# Patient Record
Sex: MALE | Race: WHITE | NOT HISPANIC OR LATINO | Employment: OTHER | URBAN - METROPOLITAN AREA
[De-identification: names, ages, dates, MRNs, and addresses within clinical notes are randomized per-mention and may not be internally consistent; named-entity substitution may affect disease eponyms.]

---

## 2019-02-19 ENCOUNTER — HOSPITAL ENCOUNTER (OUTPATIENT)
Dept: OTHER | Facility: HOSPITAL | Age: 69
Discharge: HOME | End: 2019-02-19
Attending: PHYSICIAN ASSISTANT

## 2019-05-10 ENCOUNTER — HOSPITAL ENCOUNTER (OUTPATIENT)
Dept: LAB | Facility: HOSPITAL | Age: 69
Discharge: HOME | End: 2019-05-10
Attending: INTERNAL MEDICINE

## 2019-05-10 LAB
ALBUMIN SERPL-MCNC: 4.1 G/DL (ref 3.5–5)
ALBUMIN/GLOB SERPL: 1.4 {RATIO} (ref 1.4–2.6)
ALP SERPL-CCNC: 79 U/L (ref 56–155)
ALT SERPL-CCNC: 19 U/L (ref 10–40)
ANION GAP SERPL CALC-SCNC: 17 MMOL/L (ref 8–19)
AST SERPL-CCNC: 28 U/L (ref 15–50)
BASOPHILS # BLD AUTO: 0.04 10*3/UL (ref 0–0.2)
BASOPHILS NFR BLD AUTO: 0.6 % (ref 0–3)
BILIRUB SERPL-MCNC: 0.16 MG/DL (ref 0.2–1.3)
BUN SERPL-MCNC: 4 MG/DL (ref 5–25)
BUN/CREAT SERPL: 5 {RATIO} (ref 6–20)
CALCIUM SERPL-MCNC: 8.7 MG/DL (ref 8.7–10.4)
CHLORIDE SERPL-SCNC: 89 MMOL/L (ref 99–111)
CONV ABS IMM GRAN: 0.07 10*3/UL (ref 0–0.2)
CONV CO2: 24 MMOL/L (ref 22–32)
CONV IMMATURE GRAN: 1.1 % (ref 0–1.8)
CONV TOTAL PROTEIN: 7.1 G/DL (ref 6.3–8.2)
CREAT UR-MCNC: 0.79 MG/DL (ref 0.7–1.2)
DEPRECATED RDW RBC AUTO: 47.7 FL (ref 35.1–43.9)
EOSINOPHIL # BLD AUTO: 0.48 10*3/UL (ref 0–0.7)
EOSINOPHIL # BLD AUTO: 7.5 % (ref 0–7)
ERYTHROCYTE [DISTWIDTH] IN BLOOD BY AUTOMATED COUNT: 13.5 % (ref 11.6–14.4)
EST. AVERAGE GLUCOSE BLD GHB EST-MCNC: 120 MG/DL
GFR SERPLBLD BASED ON 1.73 SQ M-ARVRAT: >60 ML/MIN/{1.73_M2}
GLOBULIN UR ELPH-MCNC: 3 G/DL (ref 2–3.5)
GLUCOSE SERPL-MCNC: 92 MG/DL (ref 70–99)
HBA1C MFR BLD: 12.1 G/DL (ref 14–18)
HBA1C MFR BLD: 5.8 % (ref 3.5–5.7)
HCT VFR BLD AUTO: 36.6 % (ref 42–52)
LYMPHOCYTES # BLD AUTO: 1.81 10*3/UL (ref 1–5)
MCH RBC QN AUTO: 31.6 PG (ref 27–31)
MCHC RBC AUTO-ENTMCNC: 33.1 G/DL (ref 33–37)
MCV RBC AUTO: 95.6 FL (ref 80–96)
MONOCYTES # BLD AUTO: 0.51 10*3/UL (ref 0.2–1.2)
MONOCYTES NFR BLD AUTO: 8 % (ref 3–10)
NEUTROPHILS # BLD AUTO: 3.5 10*3/UL (ref 2–8)
NEUTROPHILS NFR BLD AUTO: 54.6 % (ref 30–85)
NRBC CBCN: 0 % (ref 0–0.7)
OSMOLALITY SERPL CALC.SUM OF ELEC: 257 MOSM/KG (ref 273–304)
PLATELET # BLD AUTO: 349 10*3/UL (ref 130–400)
PMV BLD AUTO: 9.7 FL (ref 9.4–12.4)
POTASSIUM SERPL-SCNC: 4.8 MMOL/L (ref 3.5–5.3)
RBC # BLD AUTO: 3.83 10*6/UL (ref 4.7–6.1)
SODIUM SERPL-SCNC: 125 MMOL/L (ref 135–147)
VARIANT LYMPHS NFR BLD MANUAL: 28.2 % (ref 20–45)
WBC # BLD AUTO: 6.41 10*3/UL (ref 4.8–10.8)

## 2019-05-16 ENCOUNTER — HOSPITAL ENCOUNTER (OUTPATIENT)
Dept: LAB | Facility: HOSPITAL | Age: 69
Discharge: HOME | End: 2019-05-16
Attending: INTERNAL MEDICINE

## 2019-05-16 LAB
ANION GAP SERPL CALC-SCNC: 16 MMOL/L (ref 8–19)
BUN SERPL-MCNC: 5 MG/DL (ref 5–25)
BUN/CREAT SERPL: 6 {RATIO} (ref 6–20)
CALCIUM SERPL-MCNC: 8.7 MG/DL (ref 8.7–10.4)
CHLORIDE SERPL-SCNC: 90 MMOL/L (ref 99–111)
CONV CO2: 25 MMOL/L (ref 22–32)
CORTIS SERPL-MCNC: 13.5 UG/DL (ref 4.3–22.4)
CREAT UR-MCNC: 0.77 MG/DL (ref 0.7–1.2)
GFR SERPLBLD BASED ON 1.73 SQ M-ARVRAT: >60 ML/MIN/{1.73_M2}
GLUCOSE SERPL-MCNC: 109 MG/DL (ref 70–99)
OSMOLALITY SERPL CALC.SUM OF ELEC: 260 MOSM/KG (ref 273–304)
POTASSIUM SERPL-SCNC: 4.8 MMOL/L (ref 3.5–5.3)
SODIUM SERPL-SCNC: 126 MMOL/L (ref 135–147)
T4 FREE SERPL-MCNC: 1.1 NG/DL (ref 0.9–1.8)
TSH SERPL-ACNC: 2.48 M[IU]/L (ref 0.27–4.2)

## 2019-05-20 ENCOUNTER — HOSPITAL ENCOUNTER (OUTPATIENT)
Dept: LAB | Facility: HOSPITAL | Age: 69
Discharge: HOME | End: 2019-05-20
Attending: INTERNAL MEDICINE

## 2019-05-20 LAB
SODIUM 24H UR-SRATE: 105 MMOL/(24.H) (ref 40–220)
SODIUM 24H UR-SRATE: 41 MMOL/L
SPECIMEN VOL 24H UR: 2550 ML

## 2019-05-23 ENCOUNTER — HOSPITAL ENCOUNTER (OUTPATIENT)
Dept: OTHER | Facility: HOSPITAL | Age: 69
Discharge: HOME | End: 2019-05-23
Attending: INTERNAL MEDICINE

## 2019-07-18 ENCOUNTER — HOSPITAL ENCOUNTER (OUTPATIENT)
Dept: LAB | Facility: HOSPITAL | Age: 69
Discharge: HOME | End: 2019-07-18
Attending: INTERNAL MEDICINE

## 2019-07-18 LAB
ANION GAP SERPL CALC-SCNC: 20 MMOL/L (ref 8–19)
BASOPHILS # BLD AUTO: 0.05 10*3/UL (ref 0–0.2)
BASOPHILS NFR BLD AUTO: 0.7 % (ref 0–3)
BUN SERPL-MCNC: 8 MG/DL (ref 5–25)
BUN/CREAT SERPL: 10 {RATIO} (ref 6–20)
CALCIUM SERPL-MCNC: 8.8 MG/DL (ref 8.7–10.4)
CHLORIDE SERPL-SCNC: 94 MMOL/L (ref 99–111)
CONV ABS IMM GRAN: 0.07 10*3/UL (ref 0–0.2)
CONV CO2: 23 MMOL/L (ref 22–32)
CONV IMMATURE GRAN: 0.9 % (ref 0–1.8)
CREAT UR-MCNC: 0.84 MG/DL (ref 0.7–1.2)
DEPRECATED RDW RBC AUTO: 47.3 FL (ref 35.1–43.9)
EOSINOPHIL # BLD AUTO: 0.49 10*3/UL (ref 0–0.7)
EOSINOPHIL # BLD AUTO: 6.5 % (ref 0–7)
ERYTHROCYTE [DISTWIDTH] IN BLOOD BY AUTOMATED COUNT: 13.2 % (ref 11.6–14.4)
FOLATE SERPL-MCNC: 6.3 NG/ML (ref 4.8–20)
GFR SERPLBLD BASED ON 1.73 SQ M-ARVRAT: >60 ML/MIN/{1.73_M2}
GLUCOSE SERPL-MCNC: 87 MG/DL (ref 70–99)
HBA1C MFR BLD: 11.1 G/DL (ref 14–18)
HCT VFR BLD AUTO: 34.5 % (ref 42–52)
IRON SERPL-MCNC: 56 UG/DL (ref 70–180)
LYMPHOCYTES # BLD AUTO: 1.39 10*3/UL (ref 1–5)
MCH RBC QN AUTO: 31 PG (ref 27–31)
MCHC RBC AUTO-ENTMCNC: 32.2 G/DL (ref 33–37)
MCV RBC AUTO: 96.4 FL (ref 80–96)
MONOCYTES # BLD AUTO: 0.64 10*3/UL (ref 0.2–1.2)
MONOCYTES NFR BLD AUTO: 8.5 % (ref 3–10)
NEUTROPHILS # BLD AUTO: 4.86 10*3/UL (ref 2–8)
NEUTROPHILS NFR BLD AUTO: 64.9 % (ref 30–85)
NRBC CBCN: 0 % (ref 0–0.7)
OSMOLALITY SERPL CALC.SUM OF ELEC: 272 MOSM/KG (ref 273–304)
PLATELET # BLD AUTO: 313 10*3/UL (ref 130–400)
PMV BLD AUTO: 9.7 FL (ref 9.4–12.4)
POTASSIUM SERPL-SCNC: 5.2 MMOL/L (ref 3.5–5.3)
RBC # BLD AUTO: 3.58 10*6/UL (ref 4.7–6.1)
SODIUM SERPL-SCNC: 132 MMOL/L (ref 135–147)
VARIANT LYMPHS NFR BLD MANUAL: 18.5 % (ref 20–45)
VIT B12 SERPL-MCNC: 277 PG/ML (ref 211–911)
WBC # BLD AUTO: 7.5 10*3/UL (ref 4.8–10.8)

## 2019-07-30 ENCOUNTER — OFFICE VISIT CONVERTED (OUTPATIENT)
Dept: GASTROENTEROLOGY | Facility: CLINIC | Age: 69
End: 2019-07-30
Attending: PHYSICIAN ASSISTANT

## 2019-08-20 ENCOUNTER — HOSPITAL ENCOUNTER (OUTPATIENT)
Dept: LAB | Facility: HOSPITAL | Age: 69
Discharge: HOME | End: 2019-08-20
Attending: INTERNAL MEDICINE

## 2019-08-20 LAB
ALBUMIN SERPL-MCNC: 3.7 G/DL (ref 3.5–5)
ALBUMIN/GLOB SERPL: 1.1 {RATIO} (ref 1.4–2.6)
ALP SERPL-CCNC: 129 U/L (ref 56–155)
ALT SERPL-CCNC: 52 U/L (ref 10–40)
ANION GAP SERPL CALC-SCNC: 16 MMOL/L (ref 8–19)
AST SERPL-CCNC: 33 U/L (ref 15–50)
BASOPHILS # BLD AUTO: 0.06 10*3/UL (ref 0–0.2)
BASOPHILS NFR BLD AUTO: 0.9 % (ref 0–3)
BILIRUB SERPL-MCNC: 0.21 MG/DL (ref 0.2–1.3)
BNP SERPL-MCNC: 4499 PG/ML (ref 0–900)
BUN SERPL-MCNC: 9 MG/DL (ref 5–25)
BUN/CREAT SERPL: 10 {RATIO} (ref 6–20)
CALCIUM SERPL-MCNC: 9.1 MG/DL (ref 8.7–10.4)
CHLORIDE SERPL-SCNC: 98 MMOL/L (ref 99–111)
CONV ABS IMM GRAN: 0.13 10*3/UL (ref 0–0.2)
CONV CO2: 24 MMOL/L (ref 22–32)
CONV IMMATURE GRAN: 1.9 % (ref 0–1.8)
CONV TOTAL PROTEIN: 7 G/DL (ref 6.3–8.2)
CREAT UR-MCNC: 0.91 MG/DL (ref 0.7–1.2)
DEPRECATED RDW RBC AUTO: 50.7 FL (ref 35.1–43.9)
EOSINOPHIL # BLD AUTO: 0.96 10*3/UL (ref 0–0.7)
EOSINOPHIL # BLD AUTO: 14.3 % (ref 0–7)
ERYTHROCYTE [DISTWIDTH] IN BLOOD BY AUTOMATED COUNT: 13.9 % (ref 11.6–14.4)
GFR SERPLBLD BASED ON 1.73 SQ M-ARVRAT: >60 ML/MIN/{1.73_M2}
GLOBULIN UR ELPH-MCNC: 3.3 G/DL (ref 2–3.5)
GLUCOSE SERPL-MCNC: 117 MG/DL (ref 70–99)
HCT VFR BLD AUTO: 37.3 % (ref 42–52)
HGB BLD-MCNC: 11.5 G/DL (ref 14–18)
LYMPHOCYTES # BLD AUTO: 1.19 10*3/UL (ref 1–5)
LYMPHOCYTES NFR BLD AUTO: 17.8 % (ref 20–45)
MCH RBC QN AUTO: 30.5 PG (ref 27–31)
MCHC RBC AUTO-ENTMCNC: 30.8 G/DL (ref 33–37)
MCV RBC AUTO: 98.9 FL (ref 80–96)
MONOCYTES # BLD AUTO: 0.48 10*3/UL (ref 0.2–1.2)
MONOCYTES NFR BLD AUTO: 7.2 % (ref 3–10)
NEUTROPHILS # BLD AUTO: 3.87 10*3/UL (ref 2–8)
NEUTROPHILS NFR BLD AUTO: 57.9 % (ref 30–85)
NRBC CBCN: 0 % (ref 0–0.7)
OSMOLALITY SERPL CALC.SUM OF ELEC: 278 MOSM/KG (ref 273–304)
PLATELET # BLD AUTO: 347 10*3/UL (ref 130–400)
PMV BLD AUTO: 9.8 FL (ref 9.4–12.4)
POTASSIUM SERPL-SCNC: 4.2 MMOL/L (ref 3.5–5.3)
RBC # BLD AUTO: 3.77 10*6/UL (ref 4.7–6.1)
SODIUM SERPL-SCNC: 134 MMOL/L (ref 135–147)
WBC # BLD AUTO: 6.69 10*3/UL (ref 4.8–10.8)

## 2019-08-27 ENCOUNTER — HOSPITAL ENCOUNTER (OUTPATIENT)
Dept: OTHER | Facility: HOSPITAL | Age: 69
Discharge: HOME | End: 2019-08-27
Attending: INTERNAL MEDICINE

## 2019-08-27 LAB
ANION GAP SERPL CALC-SCNC: 20 MMOL/L (ref 8–19)
BASOPHILS # BLD AUTO: 0.05 10*3/UL (ref 0–0.2)
BASOPHILS NFR BLD AUTO: 0.8 % (ref 0–3)
BUN SERPL-MCNC: 9 MG/DL (ref 5–25)
BUN/CREAT SERPL: 10 {RATIO} (ref 6–20)
CALCIUM SERPL-MCNC: 8.7 MG/DL (ref 8.7–10.4)
CHLORIDE SERPL-SCNC: 89 MMOL/L (ref 99–111)
CONV ABS IMM GRAN: 0.05 10*3/UL (ref 0–0.2)
CONV CO2: 23 MMOL/L (ref 22–32)
CONV IMMATURE GRAN: 0.8 % (ref 0–1.8)
CREAT UR-MCNC: 0.9 MG/DL (ref 0.7–1.2)
DEPRECATED RDW RBC AUTO: 48.1 FL (ref 35.1–43.9)
EOSINOPHIL # BLD AUTO: 0.96 10*3/UL (ref 0–0.7)
EOSINOPHIL # BLD AUTO: 14.5 % (ref 0–7)
ERYTHROCYTE [DISTWIDTH] IN BLOOD BY AUTOMATED COUNT: 13.6 % (ref 11.6–14.4)
GFR SERPLBLD BASED ON 1.73 SQ M-ARVRAT: >60 ML/MIN/{1.73_M2}
GLUCOSE SERPL-MCNC: 73 MG/DL (ref 70–99)
HCT VFR BLD AUTO: 35.4 % (ref 42–52)
HGB BLD-MCNC: 11.1 G/DL (ref 14–18)
INR PPP: 1.01 (ref 2–3)
LYMPHOCYTES # BLD AUTO: 1.49 10*3/UL (ref 1–5)
LYMPHOCYTES NFR BLD AUTO: 22.5 % (ref 20–45)
MCH RBC QN AUTO: 30.1 PG (ref 27–31)
MCHC RBC AUTO-ENTMCNC: 31.4 G/DL (ref 33–37)
MCV RBC AUTO: 95.9 FL (ref 80–96)
MONOCYTES # BLD AUTO: 0.49 10*3/UL (ref 0.2–1.2)
MONOCYTES NFR BLD AUTO: 7.4 % (ref 3–10)
NEUTROPHILS # BLD AUTO: 3.57 10*3/UL (ref 2–8)
NEUTROPHILS NFR BLD AUTO: 54 % (ref 30–85)
NRBC CBCN: 0 % (ref 0–0.7)
OSMOLALITY SERPL CALC.SUM OF ELEC: 261 MOSM/KG (ref 273–304)
PLATELET # BLD AUTO: 346 10*3/UL (ref 130–400)
PMV BLD AUTO: 9.1 FL (ref 9.4–12.4)
POTASSIUM SERPL-SCNC: 4.7 MMOL/L (ref 3.5–5.3)
PROTHROMBIN TIME: 10.5 S (ref 9.4–12)
RBC # BLD AUTO: 3.69 10*6/UL (ref 4.7–6.1)
SODIUM SERPL-SCNC: 127 MMOL/L (ref 135–147)
WBC # BLD AUTO: 6.61 10*3/UL (ref 4.8–10.8)

## 2019-09-06 ENCOUNTER — HOSPITAL ENCOUNTER (OUTPATIENT)
Facility: HOSPITAL | Age: 69
Setting detail: SURGERY ADMIT
End: 2019-09-06
Attending: THORACIC SURGERY (CARDIOTHORACIC VASCULAR SURGERY) | Admitting: THORACIC SURGERY (CARDIOTHORACIC VASCULAR SURGERY)

## 2019-09-06 ENCOUNTER — HOSPITAL ENCOUNTER (INPATIENT)
Facility: HOSPITAL | Age: 69
LOS: 9 days | Discharge: HOME HEALTH | End: 2019-09-15
Attending: THORACIC SURGERY (CARDIOTHORACIC VASCULAR SURGERY) | Admitting: THORACIC SURGERY (CARDIOTHORACIC VASCULAR SURGERY)

## 2019-09-06 ENCOUNTER — PREP FOR SURGERY (OUTPATIENT)
Dept: OTHER | Facility: HOSPITAL | Age: 69
End: 2019-09-06

## 2019-09-06 DIAGNOSIS — Z95.1 S/P CABG (CORONARY ARTERY BYPASS GRAFT): ICD-10-CM

## 2019-09-06 DIAGNOSIS — I25.119 CORONARY ARTERY DISEASE INVOLVING NATIVE CORONARY ARTERY OF NATIVE HEART WITH ANGINA PECTORIS (HCC): Primary | ICD-10-CM

## 2019-09-06 LAB
BH CV XLRA MEAS - DIST GSV CALF DIST LEFT: 0.16 CM
BH CV XLRA MEAS - DIST GSV CALF DIST RIGHT: 0.14 CM
BH CV XLRA MEAS - DIST GSV THIGH DIST LEFT: 0.22 CM
BH CV XLRA MEAS - DIST GSV THIGH DIST RIGHT: 0.13 CM
BH CV XLRA MEAS - GSV ANKLE DIST LEFT: 0.12 CM
BH CV XLRA MEAS - GSV ANKLE DIST RIGHT: 0.1 CM
BH CV XLRA MEAS - GSV KNEE DIST LEFT: 0.15 CM
BH CV XLRA MEAS - GSV KNEE DIST RIGHT: 0.17 CM
BH CV XLRA MEAS - GSV ORIGIN DIST LEFT: 0.6 CM
BH CV XLRA MEAS - GSV ORIGIN DIST RIGHT: 0.36 CM
BH CV XLRA MEAS - MID GSV CALF LEFT: 0.15 CM
BH CV XLRA MEAS - MID GSV CALF RIGHT: 0.2 CM
BH CV XLRA MEAS - MID GSV THIGH  LEFT: 0.24 CM
BH CV XLRA MEAS - MID GSV THIGH  RIGHT: 0.17 CM
BH CV XLRA MEAS - MID LSV CALF DIST LEFT: 0.07 CM
BH CV XLRA MEAS - MID LSV CALF DIST RIGHT: 0.09 CM
BH CV XLRA MEAS - PROX GSV CALF DIST LEFT: 0.13 CM
BH CV XLRA MEAS - PROX GSV CALF DIST RIGHT: 0.17 CM
BH CV XLRA MEAS - PROX GSV THIGH  LEFT: 0.27 CM
BH CV XLRA MEAS - PROX GSV THIGH  RIGHT: 0.31 CM
BH CV XLRA MEAS - PROX LSV CALF DIST LEFT: 0.12 CM
BH CV XLRA MEAS - PROX LSV CALF DIST RIGHT: 0.12 CM

## 2019-09-07 ENCOUNTER — APPOINTMENT (OUTPATIENT)
Dept: CT IMAGING | Facility: HOSPITAL | Age: 69
End: 2019-09-07

## 2019-09-07 ENCOUNTER — APPOINTMENT (OUTPATIENT)
Dept: CARDIOLOGY | Facility: HOSPITAL | Age: 69
End: 2019-09-07

## 2019-09-07 ENCOUNTER — APPOINTMENT (OUTPATIENT)
Dept: ULTRASOUND IMAGING | Facility: HOSPITAL | Age: 69
End: 2019-09-07

## 2019-09-07 ENCOUNTER — APPOINTMENT (OUTPATIENT)
Dept: GENERAL RADIOLOGY | Facility: HOSPITAL | Age: 69
End: 2019-09-07

## 2019-09-07 PROBLEM — J44.9 COPD (CHRONIC OBSTRUCTIVE PULMONARY DISEASE) WITH CHRONIC BRONCHITIS: Chronic | Status: ACTIVE | Noted: 2019-09-07

## 2019-09-07 PROBLEM — J44.89 COPD (CHRONIC OBSTRUCTIVE PULMONARY DISEASE) WITH CHRONIC BRONCHITIS: Chronic | Status: ACTIVE | Noted: 2019-09-07

## 2019-09-07 LAB
ALBUMIN SERPL-MCNC: 4.2 G/DL (ref 3.5–5.2)
ALBUMIN UR-MCNC: 13.7 MG/DL
ALBUMIN/GLOB SERPL: 1.2 G/DL
ALP SERPL-CCNC: 122 U/L (ref 39–117)
ALT SERPL W P-5'-P-CCNC: 70 U/L (ref 1–41)
ANION GAP SERPL CALCULATED.3IONS-SCNC: 12.9 MMOL/L (ref 5–15)
ANION GAP SERPL CALCULATED.3IONS-SCNC: 14.7 MMOL/L (ref 5–15)
APTT PPP: 29.7 SECONDS (ref 22.7–35.4)
APTT PPP: 30.6 SECONDS (ref 22.7–35.4)
APTT PPP: 37.9 SECONDS (ref 22.7–35.4)
ARTERIAL PATENCY WRIST A: POSITIVE
AST SERPL-CCNC: 74 U/L (ref 1–40)
ATMOSPHERIC PRESS: 752.3 MMHG
BACTERIA UR QL AUTO: ABNORMAL /HPF
BASE EXCESS BLDA CALC-SCNC: 2.5 MMOL/L (ref 0–2)
BASOPHILS # BLD AUTO: 0.05 10*3/MM3 (ref 0–0.2)
BASOPHILS NFR BLD AUTO: 0.6 % (ref 0–1.5)
BDY SITE: ABNORMAL
BH CV XLRA MEAS LEFT DIST CCA EDV: 17.1 CM/SEC
BH CV XLRA MEAS LEFT DIST CCA PSV: 48.1 CM/SEC
BH CV XLRA MEAS LEFT DIST ICA EDV: 17 CM/SEC
BH CV XLRA MEAS LEFT DIST ICA PSV: 48 CM/SEC
BH CV XLRA MEAS LEFT ICA/CCA RATIO: 1
BH CV XLRA MEAS LEFT MID ICA EDV: -23.2 CM/SEC
BH CV XLRA MEAS LEFT MID ICA PSV: -49.9 CM/SEC
BH CV XLRA MEAS LEFT PROX CCA EDV: 11.7 CM/SEC
BH CV XLRA MEAS LEFT PROX CCA PSV: 56.5 CM/SEC
BH CV XLRA MEAS LEFT PROX ECA EDV: -18.8 CM/SEC
BH CV XLRA MEAS LEFT PROX ECA PSV: -80.9 CM/SEC
BH CV XLRA MEAS LEFT PROX ICA EDV: -21 CM/SEC
BH CV XLRA MEAS LEFT PROX ICA PSV: -43.3 CM/SEC
BH CV XLRA MEAS LEFT PROX SCLA PSV: 57.6 CM/SEC
BH CV XLRA MEAS LEFT VERTEBRAL A EDV: 14.1 CM/SEC
BH CV XLRA MEAS LEFT VERTEBRAL A PSV: 33.6 CM/SEC
BH CV XLRA MEAS RIGHT DIST CCA EDV: 12.1 CM/SEC
BH CV XLRA MEAS RIGHT DIST CCA PSV: 36.9 CM/SEC
BH CV XLRA MEAS RIGHT DIST ICA EDV: -17.1 CM/SEC
BH CV XLRA MEAS RIGHT DIST ICA PSV: -42.9 CM/SEC
BH CV XLRA MEAS RIGHT ICA/CCA RATIO: 1.7
BH CV XLRA MEAS RIGHT MID ICA EDV: -25.5 CM/SEC
BH CV XLRA MEAS RIGHT MID ICA PSV: -62.5 CM/SEC
BH CV XLRA MEAS RIGHT PROX CCA EDV: 14.5 CM/SEC
BH CV XLRA MEAS RIGHT PROX CCA PSV: 58.5 CM/SEC
BH CV XLRA MEAS RIGHT PROX ECA EDV: -13.8 CM/SEC
BH CV XLRA MEAS RIGHT PROX ECA PSV: -62.9 CM/SEC
BH CV XLRA MEAS RIGHT PROX ICA EDV: 11.7 CM/SEC
BH CV XLRA MEAS RIGHT PROX ICA PSV: 33.8 CM/SEC
BH CV XLRA MEAS RIGHT PROX SCLA PSV: 21.6 CM/SEC
BH CV XLRA MEAS RIGHT VERTEBRAL A EDV: 9.8 CM/SEC
BH CV XLRA MEAS RIGHT VERTEBRAL A PSV: 30.4 CM/SEC
BILIRUB SERPL-MCNC: 0.4 MG/DL (ref 0.2–1.2)
BILIRUB UR QL STRIP: NEGATIVE
BUN BLD-MCNC: 29 MG/DL (ref 8–23)
BUN BLD-MCNC: 38 MG/DL (ref 8–23)
BUN/CREAT SERPL: 19.2 (ref 7–25)
BUN/CREAT SERPL: 22.5 (ref 7–25)
CALCIUM SPEC-SCNC: 8.9 MG/DL (ref 8.6–10.5)
CALCIUM SPEC-SCNC: 9.4 MG/DL (ref 8.6–10.5)
CHLORIDE SERPL-SCNC: 87 MMOL/L (ref 98–107)
CHLORIDE SERPL-SCNC: 88 MMOL/L (ref 98–107)
CHOLEST SERPL-MCNC: 174 MG/DL (ref 0–200)
CLARITY UR: CLEAR
CLOSE TME COLL+ADP + EPINEP PNL BLD: 86 %
CO2 SERPL-SCNC: 27.1 MMOL/L (ref 22–29)
CO2 SERPL-SCNC: 28.3 MMOL/L (ref 22–29)
COLOR UR: YELLOW
CREAT BLD-MCNC: 1.51 MG/DL (ref 0.76–1.27)
CREAT BLD-MCNC: 1.69 MG/DL (ref 0.76–1.27)
CREAT UR-MCNC: 107.7 MG/DL
DEPRECATED RDW RBC AUTO: 47.2 FL (ref 37–54)
EOSINOPHIL # BLD AUTO: 0.86 10*3/MM3 (ref 0–0.4)
EOSINOPHIL NFR BLD AUTO: 10.1 % (ref 0.3–6.2)
ERYTHROCYTE [DISTWIDTH] IN BLOOD BY AUTOMATED COUNT: 13.4 % (ref 12.3–15.4)
GFR SERPL CREATININE-BSD FRML MDRD: 40 ML/MIN/1.73
GFR SERPL CREATININE-BSD FRML MDRD: 46 ML/MIN/1.73
GLOBULIN UR ELPH-MCNC: 3.4 GM/DL
GLUCOSE BLD-MCNC: 177 MG/DL (ref 65–99)
GLUCOSE BLD-MCNC: 98 MG/DL (ref 65–99)
GLUCOSE BLDC GLUCOMTR-MCNC: 115 MG/DL (ref 70–130)
GLUCOSE BLDC GLUCOMTR-MCNC: 120 MG/DL (ref 70–130)
GLUCOSE UR STRIP-MCNC: NEGATIVE MG/DL
HBA1C MFR BLD: 6.1 % (ref 4.8–5.6)
HCO3 BLDA-SCNC: 26.4 MMOL/L (ref 22–28)
HCT VFR BLD AUTO: 37.2 % (ref 37.5–51)
HDLC SERPL-MCNC: 90 MG/DL (ref 40–60)
HGB BLD-MCNC: 12.1 G/DL (ref 13–17.7)
HGB UR QL STRIP.AUTO: NEGATIVE
HYALINE CASTS UR QL AUTO: ABNORMAL /LPF
IMM GRANULOCYTES # BLD AUTO: 0.07 10*3/MM3 (ref 0–0.05)
IMM GRANULOCYTES NFR BLD AUTO: 0.8 % (ref 0–0.5)
INR PPP: 0.99 (ref 0.9–1.1)
KETONES UR QL STRIP: NEGATIVE
LDLC SERPL CALC-MCNC: 66 MG/DL (ref 0–100)
LDLC/HDLC SERPL: 0.73 {RATIO}
LEUKOCYTE ESTERASE UR QL STRIP.AUTO: ABNORMAL
LYMPHOCYTES # BLD AUTO: 1.68 10*3/MM3 (ref 0.7–3.1)
LYMPHOCYTES NFR BLD AUTO: 19.8 % (ref 19.6–45.3)
MAGNESIUM SERPL-MCNC: 2.1 MG/DL (ref 1.6–2.4)
MCH RBC QN AUTO: 30.7 PG (ref 26.6–33)
MCHC RBC AUTO-ENTMCNC: 32.5 G/DL (ref 31.5–35.7)
MCV RBC AUTO: 94.4 FL (ref 79–97)
MICROALBUMIN/CREAT UR: 127.2 MG/G
MODALITY: ABNORMAL
MONOCYTES # BLD AUTO: 0.8 10*3/MM3 (ref 0.1–0.9)
MONOCYTES NFR BLD AUTO: 9.4 % (ref 5–12)
NEUTROPHILS # BLD AUTO: 5.04 10*3/MM3 (ref 1.7–7)
NEUTROPHILS NFR BLD AUTO: 59.3 % (ref 42.7–76)
NITRITE UR QL STRIP: NEGATIVE
NRBC BLD AUTO-RTO: 0 /100 WBC (ref 0–0.2)
NT-PROBNP SERPL-MCNC: ABNORMAL PG/ML (ref 5–900)
PCO2 BLDA: 38 MM HG (ref 35–45)
PH BLDA: 7.45 PH UNITS (ref 7.35–7.45)
PH UR STRIP.AUTO: 5.5 [PH] (ref 5–8)
PLATELET # BLD AUTO: 289 10*3/MM3 (ref 140–450)
PMV BLD AUTO: 9.8 FL (ref 6–12)
PO2 BLDA: 64.7 MM HG (ref 80–100)
POTASSIUM BLD-SCNC: 4.1 MMOL/L (ref 3.5–5.2)
POTASSIUM BLD-SCNC: 4.1 MMOL/L (ref 3.5–5.2)
PROT SERPL-MCNC: 7.6 G/DL (ref 6–8.5)
PROT UR QL STRIP: ABNORMAL
PROTHROMBIN TIME: 12.8 SECONDS (ref 11.7–14.2)
RBC # BLD AUTO: 3.94 10*6/MM3 (ref 4.14–5.8)
RBC # UR: ABNORMAL /HPF
REF LAB TEST METHOD: ABNORMAL
RIGHT ARM BP: NORMAL MMHG
SAO2 % BLDCOA: 93.3 % (ref 92–99)
SODIUM BLD-SCNC: 128 MMOL/L (ref 136–145)
SODIUM BLD-SCNC: 130 MMOL/L (ref 136–145)
SP GR UR STRIP: 1.02 (ref 1–1.03)
SQUAMOUS #/AREA URNS HPF: ABNORMAL /HPF
T4 FREE SERPL-MCNC: 1.24 NG/DL (ref 0.93–1.7)
TOTAL RATE: 16 BREATHS/MINUTE
TRIGL SERPL-MCNC: 92 MG/DL (ref 0–150)
TSH SERPL DL<=0.05 MIU/L-ACNC: 2.76 UIU/ML (ref 0.27–4.2)
UROBILINOGEN UR QL STRIP: ABNORMAL
VLDLC SERPL-MCNC: 18.4 MG/DL (ref 5–40)
WBC NRBC COR # BLD: 8.5 10*3/MM3 (ref 3.4–10.8)
WBC UR QL AUTO: ABNORMAL /HPF

## 2019-09-07 PROCEDURE — 36600 WITHDRAWAL OF ARTERIAL BLOOD: CPT

## 2019-09-07 PROCEDURE — 85025 COMPLETE CBC W/AUTO DIFF WBC: CPT | Performed by: THORACIC SURGERY (CARDIOTHORACIC VASCULAR SURGERY)

## 2019-09-07 PROCEDURE — 25010000002 METHYLPREDNISOLONE PER 40 MG: Performed by: INTERNAL MEDICINE

## 2019-09-07 PROCEDURE — 71250 CT THORAX DX C-: CPT

## 2019-09-07 PROCEDURE — 84443 ASSAY THYROID STIM HORMONE: CPT | Performed by: INTERNAL MEDICINE

## 2019-09-07 PROCEDURE — 99221 1ST HOSP IP/OBS SF/LOW 40: CPT | Performed by: INTERNAL MEDICINE

## 2019-09-07 PROCEDURE — S0260 H&P FOR SURGERY: HCPCS | Performed by: THORACIC SURGERY (CARDIOTHORACIC VASCULAR SURGERY)

## 2019-09-07 PROCEDURE — 84439 ASSAY OF FREE THYROXINE: CPT | Performed by: INTERNAL MEDICINE

## 2019-09-07 PROCEDURE — 71046 X-RAY EXAM CHEST 2 VIEWS: CPT

## 2019-09-07 PROCEDURE — 94640 AIRWAY INHALATION TREATMENT: CPT

## 2019-09-07 PROCEDURE — 85576 BLOOD PLATELET AGGREGATION: CPT | Performed by: THORACIC SURGERY (CARDIOTHORACIC VASCULAR SURGERY)

## 2019-09-07 PROCEDURE — 25010000002 HEPARIN (PORCINE) PER 1000 UNITS: Performed by: THORACIC SURGERY (CARDIOTHORACIC VASCULAR SURGERY)

## 2019-09-07 PROCEDURE — 76775 US EXAM ABDO BACK WALL LIM: CPT

## 2019-09-07 PROCEDURE — 80053 COMPREHEN METABOLIC PANEL: CPT | Performed by: THORACIC SURGERY (CARDIOTHORACIC VASCULAR SURGERY)

## 2019-09-07 PROCEDURE — 82570 ASSAY OF URINE CREATININE: CPT | Performed by: INTERNAL MEDICINE

## 2019-09-07 PROCEDURE — 85730 THROMBOPLASTIN TIME PARTIAL: CPT | Performed by: THORACIC SURGERY (CARDIOTHORACIC VASCULAR SURGERY)

## 2019-09-07 PROCEDURE — 80061 LIPID PANEL: CPT | Performed by: THORACIC SURGERY (CARDIOTHORACIC VASCULAR SURGERY)

## 2019-09-07 PROCEDURE — 83036 HEMOGLOBIN GLYCOSYLATED A1C: CPT | Performed by: THORACIC SURGERY (CARDIOTHORACIC VASCULAR SURGERY)

## 2019-09-07 PROCEDURE — 93970 EXTREMITY STUDY: CPT

## 2019-09-07 PROCEDURE — 93306 TTE W/DOPPLER COMPLETE: CPT

## 2019-09-07 PROCEDURE — 82803 BLOOD GASES ANY COMBINATION: CPT

## 2019-09-07 PROCEDURE — 99222 1ST HOSP IP/OBS MODERATE 55: CPT | Performed by: INTERNAL MEDICINE

## 2019-09-07 PROCEDURE — 83735 ASSAY OF MAGNESIUM: CPT | Performed by: THORACIC SURGERY (CARDIOTHORACIC VASCULAR SURGERY)

## 2019-09-07 PROCEDURE — 93880 EXTRACRANIAL BILAT STUDY: CPT

## 2019-09-07 PROCEDURE — 82962 GLUCOSE BLOOD TEST: CPT

## 2019-09-07 PROCEDURE — 83880 ASSAY OF NATRIURETIC PEPTIDE: CPT | Performed by: THORACIC SURGERY (CARDIOTHORACIC VASCULAR SURGERY)

## 2019-09-07 PROCEDURE — 87070 CULTURE OTHR SPECIMN AEROBIC: CPT | Performed by: THORACIC SURGERY (CARDIOTHORACIC VASCULAR SURGERY)

## 2019-09-07 PROCEDURE — 85610 PROTHROMBIN TIME: CPT | Performed by: THORACIC SURGERY (CARDIOTHORACIC VASCULAR SURGERY)

## 2019-09-07 PROCEDURE — 82043 UR ALBUMIN QUANTITATIVE: CPT | Performed by: INTERNAL MEDICINE

## 2019-09-07 PROCEDURE — 93306 TTE W/DOPPLER COMPLETE: CPT | Performed by: INTERNAL MEDICINE

## 2019-09-07 PROCEDURE — 93005 ELECTROCARDIOGRAM TRACING: CPT | Performed by: THORACIC SURGERY (CARDIOTHORACIC VASCULAR SURGERY)

## 2019-09-07 PROCEDURE — 93010 ELECTROCARDIOGRAM REPORT: CPT | Performed by: INTERNAL MEDICINE

## 2019-09-07 PROCEDURE — 87205 SMEAR GRAM STAIN: CPT | Performed by: THORACIC SURGERY (CARDIOTHORACIC VASCULAR SURGERY)

## 2019-09-07 PROCEDURE — 25010000002 PERFLUTREN (DEFINITY) 8.476 MG IN SODIUM CHLORIDE 0.9 % 10 ML INJECTION: Performed by: THORACIC SURGERY (CARDIOTHORACIC VASCULAR SURGERY)

## 2019-09-07 PROCEDURE — 81001 URINALYSIS AUTO W/SCOPE: CPT | Performed by: THORACIC SURGERY (CARDIOTHORACIC VASCULAR SURGERY)

## 2019-09-07 RX ORDER — DIPHENHYDRAMINE HCL 25 MG
25 CAPSULE ORAL NIGHTLY PRN
Status: DISCONTINUED | OUTPATIENT
Start: 2019-09-07 | End: 2019-09-09

## 2019-09-07 RX ORDER — ASPIRIN 81 MG/1
81 TABLET, CHEWABLE ORAL DAILY
COMMUNITY

## 2019-09-07 RX ORDER — FUROSEMIDE 40 MG/1
40 TABLET ORAL DAILY
Status: ON HOLD | COMMUNITY
End: 2022-01-01

## 2019-09-07 RX ORDER — SPIRONOLACTONE 25 MG/1
25 TABLET ORAL DAILY
Status: ON HOLD | COMMUNITY
End: 2022-01-01

## 2019-09-07 RX ORDER — ALPRAZOLAM 0.25 MG/1
0.25 TABLET ORAL EVERY 8 HOURS PRN
Status: DISCONTINUED | OUTPATIENT
Start: 2019-09-07 | End: 2019-09-07

## 2019-09-07 RX ORDER — CETIRIZINE HYDROCHLORIDE 10 MG/1
10 TABLET ORAL DAILY
COMMUNITY

## 2019-09-07 RX ORDER — FUROSEMIDE 20 MG/1
20 TABLET ORAL DAILY
Status: DISCONTINUED | OUTPATIENT
Start: 2019-09-07 | End: 2019-09-07

## 2019-09-07 RX ORDER — SPIRONOLACTONE 25 MG/1
25 TABLET ORAL DAILY
Status: DISCONTINUED | OUTPATIENT
Start: 2019-09-07 | End: 2019-09-07

## 2019-09-07 RX ORDER — METHYLPREDNISOLONE SODIUM SUCCINATE 40 MG/ML
40 INJECTION, POWDER, LYOPHILIZED, FOR SOLUTION INTRAMUSCULAR; INTRAVENOUS EVERY 8 HOURS
Status: DISCONTINUED | OUTPATIENT
Start: 2019-09-07 | End: 2019-09-09

## 2019-09-07 RX ORDER — CETIRIZINE HYDROCHLORIDE 10 MG/1
5 TABLET ORAL DAILY
Status: DISCONTINUED | OUTPATIENT
Start: 2019-09-07 | End: 2019-09-09

## 2019-09-07 RX ORDER — GUAIFENESIN 600 MG/1
TABLET, EXTENDED RELEASE ORAL 2 TIMES DAILY
COMMUNITY
End: 2022-01-01

## 2019-09-07 RX ORDER — FAMOTIDINE 20 MG/1
20 TABLET, FILM COATED ORAL 2 TIMES DAILY
COMMUNITY
End: 2022-01-01

## 2019-09-07 RX ORDER — ALBUTEROL SULFATE 90 UG/1
2 AEROSOL, METERED RESPIRATORY (INHALATION) EVERY 4 HOURS PRN
COMMUNITY
End: 2022-01-01

## 2019-09-07 RX ORDER — ALPRAZOLAM 0.25 MG/1
0.25 TABLET ORAL EVERY 12 HOURS PRN
Status: DISCONTINUED | OUTPATIENT
Start: 2019-09-07 | End: 2019-09-09

## 2019-09-07 RX ORDER — ATORVASTATIN CALCIUM 20 MG/1
20 TABLET, FILM COATED ORAL NIGHTLY
Status: DISCONTINUED | OUTPATIENT
Start: 2019-09-07 | End: 2019-09-09

## 2019-09-07 RX ORDER — HYDROXYCHLOROQUINE SULFATE 200 MG/1
200 TABLET, FILM COATED ORAL DAILY
COMMUNITY
End: 2019-09-15 | Stop reason: HOSPADM

## 2019-09-07 RX ORDER — PANTOPRAZOLE SODIUM 40 MG/1
40 TABLET, DELAYED RELEASE ORAL DAILY
COMMUNITY

## 2019-09-07 RX ORDER — SERTRALINE HYDROCHLORIDE 25 MG/1
25 TABLET, FILM COATED ORAL DAILY
COMMUNITY
End: 2021-01-01

## 2019-09-07 RX ORDER — PRIMIDONE 250 MG/1
250 TABLET ORAL 2 TIMES DAILY
Status: ON HOLD | COMMUNITY
End: 2021-01-01

## 2019-09-07 RX ORDER — ASPIRIN 81 MG/1
81 TABLET, CHEWABLE ORAL DAILY
Status: DISCONTINUED | OUTPATIENT
Start: 2019-09-07 | End: 2019-09-09

## 2019-09-07 RX ORDER — ALBUTEROL SULFATE 0.63 MG/3ML
0.63 SOLUTION RESPIRATORY (INHALATION) EVERY 6 HOURS PRN
Status: DISCONTINUED | OUTPATIENT
Start: 2019-09-07 | End: 2019-09-09

## 2019-09-07 RX ORDER — ACETAMINOPHEN 325 MG/1
650 TABLET ORAL EVERY 4 HOURS PRN
Status: DISCONTINUED | OUTPATIENT
Start: 2019-09-07 | End: 2019-09-09

## 2019-09-07 RX ORDER — METOPROLOL SUCCINATE 25 MG/1
25 TABLET, EXTENDED RELEASE ORAL DAILY
COMMUNITY
End: 2019-09-15 | Stop reason: HOSPADM

## 2019-09-07 RX ORDER — NITROGLYCERIN 0.4 MG/1
0.4 TABLET SUBLINGUAL
Status: DISCONTINUED | OUTPATIENT
Start: 2019-09-07 | End: 2019-09-09

## 2019-09-07 RX ORDER — LISINOPRIL 5 MG/1
5 TABLET ORAL 2 TIMES DAILY
COMMUNITY
End: 2019-09-15 | Stop reason: HOSPADM

## 2019-09-07 RX ORDER — FOLIC ACID 1 MG/1
1 TABLET ORAL DAILY
COMMUNITY
End: 2021-01-01

## 2019-09-07 RX ORDER — ATORVASTATIN CALCIUM 40 MG/1
40 TABLET, FILM COATED ORAL NIGHTLY
COMMUNITY
End: 2022-01-01

## 2019-09-07 RX ORDER — IPRATROPIUM BROMIDE AND ALBUTEROL SULFATE 2.5; .5 MG/3ML; MG/3ML
3 SOLUTION RESPIRATORY (INHALATION)
Status: DISCONTINUED | OUTPATIENT
Start: 2019-09-07 | End: 2019-09-08

## 2019-09-07 RX ORDER — TEMAZEPAM 7.5 MG/1
7.5 CAPSULE ORAL NIGHTLY PRN
Status: DISCONTINUED | OUTPATIENT
Start: 2019-09-07 | End: 2019-09-09

## 2019-09-07 RX ORDER — AZITHROMYCIN 250 MG/1
500 TABLET, FILM COATED ORAL
Status: DISCONTINUED | OUTPATIENT
Start: 2019-09-07 | End: 2019-09-09

## 2019-09-07 RX ORDER — HEPARIN SODIUM 10000 [USP'U]/100ML
500 INJECTION, SOLUTION INTRAVENOUS CONTINUOUS
Status: DISCONTINUED | OUTPATIENT
Start: 2019-09-07 | End: 2019-09-09

## 2019-09-07 RX ORDER — SERTRALINE HYDROCHLORIDE 25 MG/1
25 TABLET, FILM COATED ORAL DAILY
Status: DISCONTINUED | OUTPATIENT
Start: 2019-09-07 | End: 2019-09-09

## 2019-09-07 RX ORDER — METOLAZONE 10 MG/1
10 TABLET ORAL DAILY
COMMUNITY
End: 2019-09-15 | Stop reason: HOSPADM

## 2019-09-07 RX ADMIN — SERTRALINE 25 MG: 25 TABLET, FILM COATED ORAL at 14:19

## 2019-09-07 RX ADMIN — AZITHROMYCIN 500 MG: 250 TABLET, FILM COATED ORAL at 16:57

## 2019-09-07 RX ADMIN — PERFLUTREN 2 ML: 6.52 INJECTION, SUSPENSION INTRAVENOUS at 12:15

## 2019-09-07 RX ADMIN — FUROSEMIDE 20 MG: 20 TABLET ORAL at 14:19

## 2019-09-07 RX ADMIN — CETIRIZINE HYDROCHLORIDE 5 MG: 10 TABLET, FILM COATED ORAL at 14:19

## 2019-09-07 RX ADMIN — SPIRONOLACTONE 25 MG: 25 TABLET, FILM COATED ORAL at 14:19

## 2019-09-07 RX ADMIN — ATORVASTATIN CALCIUM 20 MG: 20 TABLET, FILM COATED ORAL at 20:58

## 2019-09-07 RX ADMIN — IPRATROPIUM BROMIDE AND ALBUTEROL SULFATE 3 ML: 2.5; .5 SOLUTION RESPIRATORY (INHALATION) at 20:39

## 2019-09-07 RX ADMIN — HEPARIN SODIUM 500 UNITS/HR: 10000 INJECTION, SOLUTION INTRAVENOUS at 07:58

## 2019-09-07 RX ADMIN — METHYLPREDNISOLONE SODIUM SUCCINATE 40 MG: 40 INJECTION, POWDER, FOR SOLUTION INTRAMUSCULAR; INTRAVENOUS at 16:57

## 2019-09-07 RX ADMIN — ASPIRIN 81 MG: 81 TABLET, CHEWABLE ORAL at 14:19

## 2019-09-07 NOTE — CONSULTS
Visited Pt. He has a strong jj support group. His [employer]`s members visited. Offered prayer.     Spiritual consult.

## 2019-09-07 NOTE — CONSULTS
[facility]  Cardiology Consult Note    Patient Identification:  Name: Cedric Wade  Age: 68 y.o.  Sex: male  :  1950  MRN: [id number]             Requesting Physician: Dr. Mazariegos    Reason for Consultation / Chief Complaint: management recommendations  cad    History of Present Illness:   History of Present Illness: Cedric Wade is a 68 year old male with a history of Coronary diease and presented to [facility] with an MI. He is a current every day smoker 2 PPD for 58 years, and has Type II DM on Metformin BID. He was transferred to [facility] for CABG and was admitted to Dr. Mazariegos. He had a previous positive stress test for anterior ischemia and was scheduled for outpatient cardiac catheterization however he developed SOA and came to the ER on 9/3/0219.     He presented to [facility] with SOA that has been worsening over the past 5 weeks. He was found to have had a NSTEMI with new heart failure that required intubation and dobutamine. He was in SVT at one point that converted spontaneously. He was extubated on 2019 and taken to cath lab on 2019.  His ECHO there showed an EF of 15% on 2019 and showed Moderate TR and MR with pulmonary HTN with an estimated PAP pressure of 63 mmHg.  He was taken to heart cath urgently on 2019 that showed showed a significant CAD  Including left main. So Dr. Mazariegos and Dr. Tidwell decided to transfer him to [facility] for possible surgical intervention.     We have been asked to see for concurrent care and the plan is to take him to CABG this week, hopefully Monday.        Comorbid cardiac risk factors:     Past Medical History:  Past Medical History:   Diagnosis Date   • Arthritis    • CHF (congestive heart failure) (CMS/[facility])    • COPD (chronic obstructive pulmonary disease) (CMS/[facility])    • Coronary artery disease    • Diabetes mellitus (CMS/[facility])    • Elevated cholesterol    • GERD (gastroesophageal reflux disease)    • Hypertension      Past Surgical  History:  No past surgical history on file.   Allergies:  Allergies   Allergen Reactions   • Codeine Nausea And Vomiting     Home Meds:  Medications Prior to Admission   Medication Sig Dispense Refill Last Dose   • albuterol sulfate  (90 Base) MCG/ACT inhaler Inhale 2 puffs Every 4 (Four) Hours As Needed for Wheezing.      • aspirin 81 MG chewable tablet Chew 81 mg Daily.   9/6/2019 at Unknown time   • atorvastatin (LIPITOR) 40 MG tablet Take 40 mg by mouth Every Night.   9/6/2019 at Unknown time   • cetirizine (zyrTEC) 10 MG tablet Take 10 mg by mouth Daily.   9/6/2019 at Unknown time   • folic acid (FOLVITE) 1 MG tablet Take 1 mg by mouth Daily.   9/6/2019 at Unknown time   • furosemide (LASIX) 40 MG tablet Take 40 mg by mouth Daily.      • guaiFENesin (MUCINEX) 600 MG 12 hr tablet Take  by mouth 2 (Two) Times a Day.   9/6/2019 at Unknown time   • hydroxychloroquine (PLAQUENIL) 200 MG tablet Take 200 mg by mouth Daily.      • lisinopril (PRINIVIL,ZESTRIL) 5 MG tablet Take 5 mg by mouth 2 (Two) Times a Day.      • metFORMIN (GLUCOPHAGE) 850 MG tablet Take 850 mg by mouth 2 (Two) Times a Day With Meals.      • metOLazone (ZAROXOLYN) 10 MG tablet Take 10 mg by mouth Daily.   Past Week at Unknown time   • metoprolol succinate XL (TOPROL-XL) 25 MG 24 hr tablet Take 25 mg by mouth Daily.      • pantoprazole (PROTONIX) 40 MG EC tablet Take 40 mg by mouth Daily.      • primidone (MYSOLINE) 250 MG tablet Take 250 mg by mouth 2 (Two) Times a Day.      • sertraline (ZOLOFT) 25 MG tablet Take 25 mg by mouth Daily.   9/6/2019 at Unknown time   • spironolactone (ALDACTONE) 25 MG tablet Take 25 mg by mouth Daily.   9/6/2019 at Unknown time   • tiotropium bromide-olodaterol (STIOLTO RESPIMAT) 2.5-2.5 MCG/ACT aerosol solution inhaler Inhale 1 puff As Needed.      • famotidine (PEPCID) 20 MG tablet Take 20 mg by mouth 2 (Two) Times a Day.        Current Meds:   [unfilled]  Social History:   Social History     Tobacco Use   •  Smoking status: Not on file   Substance Use Topics   • Alcohol use: Not on file      Family History:  No family history on file.     Review of Systems    Constitutional: No weakness,fatigue, fever, rigors, chills   Eyes: No vision changes, eye pain   ENT/oropharynx: No difficulty swallowing, sore throat, epistaxis, changes in hearing   Cardiovascular: No chest pain, chest tightness, palpitations, paroxysmal nocturnal dyspnea, orthopnea, diaphoresis, dizziness / syncopal episode   Respiratory: No shortness of breath, dyspnea on exertion, cough, wheezing hemoptysis   Gastrointestinal: No abdominal pain, nausea, vomiting, diarrhea, bloody stools   Genitourinary: No hematuria, dysuria   Neurological: No headache, tremors, numbness,  one-sided weakness    Musculoskeletal: No cramps, myalgias,  joint pain, joint swelling   Integument: No rash, edema           Constitutional:  Temp:  [97.4 °F (36.3 °C)-98.7 °F (37.1 °C)] 97.4 °F (36.3 °C)  Heart Rate:  [83-84] 84  Resp:  [16-18] 18  BP: ()/(66-83) 99/66    Physical Exam   General:  Appears in no acute distress  Eyes: PERTL,  HEENT:  No JVD. Thyroid not visibly enlarged. No mucosal pallor or cyanosis  Respiratory: Respirations regular and unlabored at rest. BBS with good air entry in all fields. No crackles, rubs or wheezes auscultated  Cardiovascular: S1S2 Regular rate and rhythm. No murmur, rub or gallop auscultated. No carotid bruits. DP/PT pulses    . No pretibial pitting edema  Gastrointestinal: Abdomen soft, flat, non tender. Bowel sounds present. No hepatosplenomegaly. No ascites  Musculoskeletal: DELVALLE x4. No abnormal movements  Extremities: No digital clubbing or cyanosis  Skin: Color pink. Skin warm and dry to touch. No rashes  No xanthoma  Neuro: AAO x3 CN II-XII grossly intact              Cardiographics  ECG:     Telemetry:    Echocardiogram:     Imaging  Chest X-ray:     Lab Review       Results from last 7 days   Lab Units 09/07/19  0519   MAGNESIUM mg/dL  2.1     Results from last 7 days   Lab Units 09/07/19  0519   SODIUM mmol/L 130*   POTASSIUM mmol/L 4.1   BUN mg/dL 29*   CREATININE mg/dL 1.51*   CALCIUM mg/dL 9.4     @[id number]@  Results from last 7 days   Lab Units 09/07/19  0520   WBC 10*3/mm3 8.50   HEMOGLOBIN g/dL 12.1*   HEMATOCRIT % 37.2*   PLATELETS 10*3/mm3 289     Results from last 7 days   Lab Units 09/07/19  0813 09/07/19  0519   INR   --  0.99   APTT seconds 30.6 29.7         Assessment:  Recent myocardial infarction with a recent diagnostic heart catheter with a severe coronary artery disease    Recommendations / Plan:   cabg monday          Nidhi Riggs MD  9/7/2019, 3:27 PM      EMR Dragon/Transcription:   Dictated utilizing Dragon dictation

## 2019-09-07 NOTE — H&P
H&P    Reason for Consultation: Transfer from [facility] for possible cardiac surgery.    History of Present Illness:     This is a pleasant 68-year-old  gentleman with multiple medical problems.  Approximately 5 weeks ago he began to develop increasing shortness of breath over his baseline shortness of breath he has been having for several years.  He presented at [facility]; an echocardiogram at that time showed him to have reduced ejection fraction, around 20%.  He underwent a nuclear stress test suggesting ischemia.  Prior to his scheduled left heart catheterization he represented with substernal discomfort and a non-ST elevation myocardial infarction; at one point he was intubated and was even on dobutamine drip.  Urgent left heart catheterization was performed by Dr. Tidwell yesterday, showing a significant left main to proximal LAD lesion.  The patient appears to have viable surgical targets in the LAD, circumflex, and diagonal distributions.  He also has a point of haziness in his proximal PDA which I believe is significant.  I discussed the situation with Dr. Tidwell and we agreed that we should transfer him to [facility] for further evaluation and management.  Since his transfer here he has been relatively asymptomatic and stable.    Upon further questioning the patient is had a productive cough over the last several years.  He is a very active smoker with over 100 pack years.  He denies any hemoptysis, fevers, or chills.  He claims that his productive cough has worsened over the last 2 to 3 weeks.    Review of Systems   Constitutional: Positive for activity change, appetite change and fatigue. Negative for chills, diaphoresis, fever and unexpected weight change.   HENT: Positive for congestion and trouble swallowing. Negative for dental problem, drooling, hearing loss, mouth sores, nosebleeds, postnasal drip, rhinorrhea, sinus pressure, sinus pain, sneezing, sore  throat, tinnitus and voice change.    Eyes: Negative for visual disturbance.   Respiratory: Positive for cough, chest tightness, shortness of breath and wheezing. Negative for choking and stridor.    Cardiovascular: Positive for chest pain and palpitations. Negative for leg swelling.   Gastrointestinal: Negative for abdominal distention, abdominal pain, constipation, diarrhea, nausea and vomiting.   Endocrine: Negative for cold intolerance and heat intolerance.   Genitourinary: Negative for dysuria, flank pain and hematuria.   Musculoskeletal: Positive for neck pain and neck stiffness. Negative for arthralgias, back pain, gait problem, joint swelling and myalgias.   Skin: Negative for color change, pallor, rash and wound.   Allergic/Immunologic: Negative for environmental allergies, food allergies and immunocompromised state.   Neurological: Negative for dizziness, tremors, seizures, syncope, facial asymmetry, speech difficulty, weakness, light-headedness, numbness and headaches.   Hematological: Negative for adenopathy. Bruises/bleeds easily.   Psychiatric/Behavioral: Negative for agitation, behavioral problems, confusion, decreased concentration, dysphoric mood, hallucinations, self-injury, sleep disturbance and suicidal ideas. The patient is not nervous/anxious and is not hyperactive.         Past Medical History:   Diagnosis Date   • Arthritis    • CHF (congestive heart failure) (CMS/[facility])    • COPD (chronic obstructive pulmonary disease) (CMS/[facility])    • Coronary artery disease    • Diabetes mellitus (CMS/[facility])    • Elevated cholesterol    • GERD (gastroesophageal reflux disease)    • Hypertension      Past surgical history:  Posterior cervical surgery with plating.    No family history on file.  Social History     Tobacco Use   • Smoking status: Not on file   Substance Use Topics   • Alcohol use: Not on file   • Drug use: Not on file   [place] .  He was exposed to agent orange.      Medications Prior to  Admission   Medication Sig Dispense Refill Last Dose   • albuterol sulfate  (90 Base) MCG/ACT inhaler Inhale 2 puffs Every 4 (Four) Hours As Needed for Wheezing.      • aspirin 81 MG chewable tablet Chew 81 mg Daily.   9/6/2019 at Unknown time   • atorvastatin (LIPITOR) 40 MG tablet Take 40 mg by mouth Every Night.   9/6/2019 at Unknown time   • cetirizine (zyrTEC) 10 MG tablet Take 10 mg by mouth Daily.   9/6/2019 at Unknown time   • folic acid (FOLVITE) 1 MG tablet Take 1 mg by mouth Daily.   9/6/2019 at Unknown time   • furosemide (LASIX) 40 MG tablet Take 40 mg by mouth Daily.      • guaiFENesin (MUCINEX) 600 MG 12 hr tablet Take  by mouth 2 (Two) Times a Day.   9/6/2019 at Unknown time   • hydroxychloroquine (PLAQUENIL) 200 MG tablet Take 200 mg by mouth Daily.      • lisinopril (PRINIVIL,ZESTRIL) 5 MG tablet Take 5 mg by mouth 2 (Two) Times a Day.      • metFORMIN (GLUCOPHAGE) 850 MG tablet Take 850 mg by mouth 2 (Two) Times a Day With Meals.      • metOLazone (ZAROXOLYN) 10 MG tablet Take 10 mg by mouth Daily.   Past Week at Unknown time   • metoprolol succinate XL (TOPROL-XL) 25 MG 24 hr tablet Take 25 mg by mouth Daily.      • pantoprazole (PROTONIX) 40 MG EC tablet Take 40 mg by mouth Daily.      • primidone (MYSOLINE) 250 MG tablet Take 250 mg by mouth 2 (Two) Times a Day.      • sertraline (ZOLOFT) 25 MG tablet Take 25 mg by mouth Daily.   9/6/2019 at Unknown time   • spironolactone (ALDACTONE) 25 MG tablet Take 25 mg by mouth Daily.   9/6/2019 at Unknown time   • tiotropium bromide-olodaterol (STIOLTO RESPIMAT) 2.5-2.5 MCG/ACT aerosol solution inhaler Inhale 1 puff As Needed.      • famotidine (PEPCID) 20 MG tablet Take 20 mg by mouth 2 (Two) Times a Day.          Current Facility-Administered Medications:   •  acetaminophen (TYLENOL) tablet 650 mg, 650 mg, Oral, Q4H PRN, Alvaro Mazariegos MD  •  ALPRAZolam (XANAX) tablet 0.25 mg, 0.25 mg, Oral, Q12H PRN, Alvaro Mazariegos MD  •  aspirin  "chewable tablet 81 mg, 81 mg, Oral, Daily, Alvaro Mazariegos MD  •  atorvastatin (LIPITOR) tablet 20 mg, 20 mg, Oral, Nightly, Alvaro Mazariegos MD  •  cetirizine (zyrTEC) tablet 5 mg, 5 mg, Oral, Daily, Alvaro Mazariegos MD  •  diphenhydrAMINE (BENADRYL) capsule 25 mg, 25 mg, Oral, Nightly PRN, Alvaro Mazariegos MD  •  heparin 82130 units/250 mL (100 units/mL) in 0.45 % NaCl infusion, 500 Units/hr, Intravenous, Continuous, Alvaro Mazariegos MD, Last Rate: 5 mL/hr at 09/07/19 0758, 500 Units/hr at 09/07/19 0758  •  nitroglycerin (NITROSTAT) SL tablet 0.4 mg, 0.4 mg, Sublingual, Q5 Min PRN, Alvaro Mazariegos MD  •  sertraline (ZOLOFT) tablet 25 mg, 25 mg, Oral, Daily, Alvaro Mazariegos MD  •  spironolactone (ALDACTONE) tablet 25 mg, 25 mg, Oral, Daily, Alvaro Mazariegos MD  •  temazepam (RESTORIL) capsule 7.5 mg, 7.5 mg, Oral, Nightly PRN, Alvaro Mazariegos MD    aspirin 81 mg Oral Daily   atorvastatin 20 mg Oral Nightly   cetirizine 5 mg Oral Daily   sertraline 25 mg Oral Daily   spironolactone 25 mg Oral Daily     Allergies:  Codeine    Objective      Vital Signs  Temp:  [97.4 °F (36.3 °C)-98.7 °F (37.1 °C)] 97.4 °F (36.3 °C)  Heart Rate:  [83-84] 84  Resp:  [16-18] 18  BP: ()/(66-83) 99/66    Flowsheet Rows      First Filed Value   Admission Height  167.6 cm (66\") Documented at 09/07/2019 1234   Admission Weight  64.5 kg (142 lb 3.2 oz) Documented at 09/07/2019 0438        167.6 cm (66\")    Physical Exam   Constitutional: He is oriented to person, place, and time. No distress.   HENT:   Head: Normocephalic and atraumatic.   Mouth/Throat: No oropharyngeal exudate.   Eyes: EOM are normal. Pupils are equal, round, and reactive to light. No scleral icterus.   Neck: Normal range of motion. Neck supple. No JVD present. No tracheal deviation present. No thyromegaly present.   Cardiovascular: Normal rate and regular rhythm. PMI is not displaced. Exam reveals no gallop and no friction rub.   Murmur heard.   " Systolic murmur is present with a grade of 3/6.  Pulses:       Radial pulses are 2+ on the right side, and 2+ on the left side.        Femoral pulses are 1+ on the right side, and 0 on the left side.       Dorsalis pedis pulses are 0 on the right side, and 0 on the left side.   Pulmonary/Chest: Effort normal. No stridor. No respiratory distress. He has wheezes. He has rales. He exhibits no tenderness.   No sternal abnormality.   Abdominal: Soft. Bowel sounds are normal.   Musculoskeletal: Normal range of motion. He exhibits no edema, tenderness or deformity.   Lymphadenopathy:     He has no cervical adenopathy.   Neurological: He is alert and oriented to person, place, and time. He has normal strength. He displays normal reflexes. No cranial nerve deficit or sensory deficit. He displays a negative Romberg sign. Coordination normal. GCS eye subscore is 4. GCS verbal subscore is 5. GCS motor subscore is 6.   Skin: Skin is warm and dry. Capillary refill takes 2 to 3 seconds. No rash noted. He is not diaphoretic. No erythema. No pallor.   Psychiatric: He has a normal mood and affect. His behavior is normal. Judgment and thought content normal. Cognition and memory are normal.   Vitals reviewed.    Results Review:       Assessment/Plan:     This is a pleasant 68-year-old  gentleman with several severe comorbidities.  He was recently admitted with a non-ST elevation myocardial infarction.  He has ischemic cardiomyopathy and probably moderate mitral valve regurgitation.  He also clearly has COPD and possibly even chronic bronchitis with exacerbation.  He also appears to have chronic kidney disease.  He is also a borderline diabetic.    Work-up in progress for probable CABG/mitral valve procedure this coming Monday.    Alvaro Mazraiegos MD  09/07/19  1:30 PM

## 2019-09-07 NOTE — CONSULTS
Group: [facility]         CONSULT NOTE    Patient Identification:  Cedric Wade  68 y.o.  male  1950  [id number]            Requesting physician: Dr. Mazariegos    Reason for Consultation: COPD with exacerbation    CC:     History of Present Illness:  Very pleasant 68-year-old gentleman pretty much lifelong smoking history admitted with non-ST MI with plans for CABG.  He has ischemic cardiomyopathy with moderate mitral valve regurgitation.  He tells me he has been smoking until the day he was admitted to the hospital.  He has never had PFTs but was told in the past that he has COPD he was prescribed nebulizers at home.  Currently he is resting comfortably is coughing up some purulent sputum.  Denies any fever or chills.  He reports no shortness of breath at rest currently.  Prior to admission he stated that he had decent functional capacity with no limitation with exertion as such.  He denies any active chest pain.      Review of Systems  Constitutional: Positive for activity change, appetite change and fatigue. Negative for chills, diaphoresis, fever and unexpected weight change.   HENT: Positive for congestion and trouble swallowing. Negative for dental problem, drooling, hearing loss, mouth sores, nosebleeds, postnasal drip, rhinorrhea, sinus pressure, sinus pain, sneezing, sore throat, tinnitus and voice change.    Eyes: Negative for visual disturbance.   Respiratory: Positive for cough, chest tightness, shortness of breath and wheezing. Negative for choking and stridor.    Cardiovascular: Positive for chest pain and palpitations. Negative for leg swelling.   Gastrointestinal: Negative for abdominal distention, abdominal pain, constipation, diarrhea, nausea and vomiting.   Endocrine: Negative for cold intolerance and heat intolerance.   Genitourinary: Negative for dysuria, flank pain and hematuria.   Musculoskeletal: Positive for neck pain and neck stiffness. Negative for arthralgias, back pain,  gait problem, joint swelling and myalgias.   Skin: Negative for color change, pallor, rash and wound.   Allergic/Immunologic: Negative for environmental allergies, food allergies and immunocompromised state.   Neurological: Negative for dizziness, tremors, seizures, syncope, facial asymmetry, speech difficulty, weakness, light-headedness, numbness and headaches.   Hematological: Negative for adenopathy. Bruises/bleeds easily.   Psychiatric/Behavioral: Negative for agitation, behavioral problems, confusion, decreased concentration, dysphoric mood, hallucinations, self-injury, sleep disturbance and suicidal ideas. The patient is not nervous/anxious and is not hyperactive.    Past Medical History:  Past Medical History:   Diagnosis Date   • Arthritis    • CHF (congestive heart failure) (CMS/[facility])    • COPD (chronic obstructive pulmonary disease) (CMS/[facility])    • Coronary artery disease    • Diabetes mellitus (CMS/[facility])    • Elevated cholesterol    • GERD (gastroesophageal reflux disease)    • Hypertension        Past Surgical History:  No past surgical history on file.     Home Meds:  Medications Prior to Admission   Medication Sig Dispense Refill Last Dose   • albuterol sulfate  (90 Base) MCG/ACT inhaler Inhale 2 puffs Every 4 (Four) Hours As Needed for Wheezing.      • aspirin 81 MG chewable tablet Chew 81 mg Daily.   9/6/2019 at Unknown time   • atorvastatin (LIPITOR) 40 MG tablet Take 40 mg by mouth Every Night.   9/6/2019 at Unknown time   • cetirizine (zyrTEC) 10 MG tablet Take 10 mg by mouth Daily.   9/6/2019 at Unknown time   • folic acid (FOLVITE) 1 MG tablet Take 1 mg by mouth Daily.   9/6/2019 at Unknown time   • furosemide (LASIX) 40 MG tablet Take 40 mg by mouth Daily.      • guaiFENesin (MUCINEX) 600 MG 12 hr tablet Take  by mouth 2 (Two) Times a Day.   9/6/2019 at Unknown time   • hydroxychloroquine (PLAQUENIL) 200 MG tablet Take 200 mg by mouth Daily.      • lisinopril (PRINIVIL,ZESTRIL) 5 MG tablet  "Take 5 mg by mouth 2 (Two) Times a Day.      • metFORMIN (GLUCOPHAGE) 850 MG tablet Take 850 mg by mouth 2 (Two) Times a Day With Meals.      • metOLazone (ZAROXOLYN) 10 MG tablet Take 10 mg by mouth Daily.   Past Week at Unknown time   • metoprolol succinate XL (TOPROL-XL) 25 MG 24 hr tablet Take 25 mg by mouth Daily.      • pantoprazole (PROTONIX) 40 MG EC tablet Take 40 mg by mouth Daily.      • primidone (MYSOLINE) 250 MG tablet Take 250 mg by mouth 2 (Two) Times a Day.      • sertraline (ZOLOFT) 25 MG tablet Take 25 mg by mouth Daily.   9/6/2019 at Unknown time   • spironolactone (ALDACTONE) 25 MG tablet Take 25 mg by mouth Daily.   9/6/2019 at Unknown time   • tiotropium bromide-olodaterol (STIOLTO RESPIMAT) 2.5-2.5 MCG/ACT aerosol solution inhaler Inhale 1 puff As Needed.      • famotidine (PEPCID) 20 MG tablet Take 20 mg by mouth 2 (Two) Times a Day.          Allergies:  Allergies   Allergen Reactions   • Codeine Nausea And Vomiting       Social History:   Social History     Socioeconomic History   • Marital status:      Spouse name: Not on file   • Number of children: Not on file   • Years of education: Not on file   • Highest education level: Not on file       Family History:  No family history on file.    Physical Exam:  BP 99/66 (BP Location: Right arm, Patient Position: Lying)   Pulse 84   Temp 97.4 °F (36.3 °C) (Oral)   Resp 18   Ht 167.6 cm (66\")   Wt 64.4 kg (142 lb)   SpO2 92%   BMI 22.92 kg/m²  Body mass index is 22.92 kg/m². 92% 64.4 kg (142 lb)  Physical Exam  Middle-age gentleman resting comfortably no distress  Well-developed normal body habitus  Eyes normal conjunctive a pupils reactive to light  ENT Mallampati between 2 and 3 with normal nasal exam  Neck midline trachea no thyromegaly  Chest diminished breath sound diffuse wheezing bilaterally  No labored breathing  CVs regular rate and rhythm no lower extremity edema  Abdomen is soft nontender no hepatospleno megaly  CNS intact " with normal sensory exam  Musculoskeletal no cyanosis no clubbing normal range of motion  Skin no rashes no nodules  Psych oriented to time place and person normal memory    LABS:  Lab Results   Component Value Date    CALCIUM 9.4 09/07/2019     Results from last 7 days   Lab Units 09/07/19  0520 09/07/19  0519   MAGNESIUM mg/dL  --  2.1   SODIUM mmol/L  --  130*   POTASSIUM mmol/L  --  4.1   CHLORIDE mmol/L  --  87*   CO2 mmol/L  --  28.3   BUN mg/dL  --  29*   CREATININE mg/dL  --  1.51*   GLUCOSE mg/dL  --  98   CALCIUM mg/dL  --  9.4   WBC 10*3/mm3 8.50  --    HEMOGLOBIN g/dL 12.1*  --    PLATELETS 10*3/mm3 289  --    ALT (SGPT) U/L  --  70*   AST (SGOT) U/L  --  74*   PROBNP pg/mL  --  19,685.0*     No results found for: CKTOTAL, CKMB, CKMBINDEX, TROPONINI, TROPONINT              Results from last 7 days   Lab Units 09/07/19  0354   PH, ARTERIAL pH units 7.451*   PCO2, ARTERIAL mm Hg 38.0   PO2 ART mm Hg 64.7*   MODALITY  Room Air   O2 SATURATION CALC % 93.3         Results from last 7 days   Lab Units 09/07/19  0519   INR  0.99         No results found for: TSH  Estimated Creatinine Clearance: 42.1 mL/min (A) (by C-G formula based on SCr of 1.51 mg/dL (H)).         Imaging: I personally visualized the images of scans/x-rays performed within last 3 days.      Assessment:  COPD with exacerbation  Chronic bronchitis  Non-ST elevation MI  Ischemic cardiomyopathy  Moderate mitral regurgitation  Tobacco abuse      Recommendations:  We will go ahead and treat patient with steroid and bronchodilators  We will give her a round of Zithromax.  Chest x-ray negative for pneumonia  Advised patient to quit smoking long-term  He will need PFTs at some point but currently since patient in exacerbation they will be inaccurate  Aggressive pulmonary toilet  Advised patient to quit smoking long-term  Discussed with patient and wife.  Due to advanced COPD postop complications including postop atelectasis pneumonia prolonged vent  etc. risks are high.  Surgery obviously is not prohibitive since its cardiac.  I discussed further with  and he did state that if needed surgery can be postponed for a few days but definitely has to be done at some point.  We will reevaluate in the next 24 to 48 hours.          Yajaira Ardon MD  9/7/2019  3:17 PM      Much of this encounter note is an electronic transcription/translation of spoken language to printed text using Dragon Software.

## 2019-09-07 NOTE — PLAN OF CARE
Problem: Patient Care Overview  Goal: Plan of Care Review  Outcome: Ongoing (interventions implemented as appropriate)   09/07/19 0557   Coping/Psychosocial   Plan of Care Reviewed With patient   Plan of Care Review   Progress no change   OTHER   Outcome Summary Rec'd pt from [facility]. VSS. NSR/PVC's on monitor. No c/o CP/SOA. Rested well over night. Surigcal work up in progress. Will continue to monitor.      09/07/19 0557   Coping/Psychosocial   Plan of Care Reviewed With patient   Plan of Care Review   Progress no change   OTHER   Outcome Summary Rec'd pt from [facility]. VSS. NSR/PVC's on monitor. No c/o CP/SOA. Rested well over night. Surigcal work up in progress. Will continue to monitor.

## 2019-09-08 ENCOUNTER — ANESTHESIA EVENT (OUTPATIENT)
Dept: PERIOP | Facility: HOSPITAL | Age: 69
End: 2019-09-08

## 2019-09-08 ENCOUNTER — APPOINTMENT (OUTPATIENT)
Dept: RESPIRATORY THERAPY | Facility: HOSPITAL | Age: 69
End: 2019-09-08

## 2019-09-08 PROBLEM — R80.9 TYPE 2 DIABETES MELLITUS WITH MICROALBUMINURIA (HCC): Status: ACTIVE | Noted: 2019-09-08

## 2019-09-08 PROBLEM — I10 ESSENTIAL HYPERTENSION: Status: ACTIVE | Noted: 2019-09-08

## 2019-09-08 PROBLEM — E11.29 TYPE 2 DIABETES MELLITUS WITH MICROALBUMINURIA: Status: ACTIVE | Noted: 2019-09-08

## 2019-09-08 PROBLEM — Z77.098 H/O AGENT ORANGE EXPOSURE: Status: ACTIVE | Noted: 2019-09-08

## 2019-09-08 PROBLEM — R80.9 MICROALBUMINURIA: Status: ACTIVE | Noted: 2019-09-08

## 2019-09-08 PROBLEM — E78.5 HYPERLIPIDEMIA: Status: ACTIVE | Noted: 2019-09-08

## 2019-09-08 LAB
ABO GROUP BLD: NORMAL
ALBUMIN SERPL-MCNC: 3.6 G/DL (ref 3.5–5.2)
ANION GAP SERPL CALCULATED.3IONS-SCNC: 15.3 MMOL/L (ref 5–15)
APTT PPP: 37.7 SECONDS (ref 22.7–35.4)
APTT PPP: 41.4 SECONDS (ref 22.7–35.4)
APTT PPP: 46.5 SECONDS (ref 22.7–35.4)
BH CV ECHO MEAS - ACS: 1.9 CM
BH CV ECHO MEAS - AO MAX PG (FULL): 0.33 MMHG
BH CV ECHO MEAS - AO MAX PG: 4.5 MMHG
BH CV ECHO MEAS - AO MEAN PG (FULL): 0 MMHG
BH CV ECHO MEAS - AO MEAN PG: 2 MMHG
BH CV ECHO MEAS - AO ROOT AREA (BSA CORRECTED): 1.8
BH CV ECHO MEAS - AO ROOT AREA: 7.5 CM^2
BH CV ECHO MEAS - AO ROOT DIAM: 3.1 CM
BH CV ECHO MEAS - AO V2 MAX: 106 CM/SEC
BH CV ECHO MEAS - AO V2 MEAN: 57.6 CM/SEC
BH CV ECHO MEAS - AO V2 VTI: 16.4 CM
BH CV ECHO MEAS - AVA(I,A): 3.2 CM^2
BH CV ECHO MEAS - AVA(I,D): 3.2 CM^2
BH CV ECHO MEAS - AVA(V,A): 3 CM^2
BH CV ECHO MEAS - AVA(V,D): 3 CM^2
BH CV ECHO MEAS - BSA(HAYCOCK): 1.7 M^2
BH CV ECHO MEAS - BSA: 1.7 M^2
BH CV ECHO MEAS - BZI_BMI: 22.9 KILOGRAMS/M^2
BH CV ECHO MEAS - BZI_METRIC_HEIGHT: 167.6 CM
BH CV ECHO MEAS - BZI_METRIC_WEIGHT: 64.4 KG
BH CV ECHO MEAS - EDV(CUBED): 195.1 ML
BH CV ECHO MEAS - EDV(MOD-SP2): 168 ML
BH CV ECHO MEAS - EDV(MOD-SP4): 203 ML
BH CV ECHO MEAS - EDV(TEICH): 166.6 ML
BH CV ECHO MEAS - EF(CUBED): 27.9 %
BH CV ECHO MEAS - EF(MOD-BP): 25 %
BH CV ECHO MEAS - EF(MOD-SP2): 27.4 %
BH CV ECHO MEAS - EF(MOD-SP4): 23.2 %
BH CV ECHO MEAS - EF(TEICH): 22.2 %
BH CV ECHO MEAS - ESV(CUBED): 140.6 ML
BH CV ECHO MEAS - ESV(MOD-SP2): 122 ML
BH CV ECHO MEAS - ESV(MOD-SP4): 156 ML
BH CV ECHO MEAS - ESV(TEICH): 129.5 ML
BH CV ECHO MEAS - FS: 10.3 %
BH CV ECHO MEAS - IVS/LVPW: 0.64
BH CV ECHO MEAS - IVSD: 0.9 CM
BH CV ECHO MEAS - LAT PEAK E' VEL: 8 CM/SEC
BH CV ECHO MEAS - LV DIASTOLIC VOL/BSA (35-75): 117.4 ML/M^2
BH CV ECHO MEAS - LV MASS(C)D: 280.4 GRAMS
BH CV ECHO MEAS - LV MASS(C)DI: 162.2 GRAMS/M^2
BH CV ECHO MEAS - LV MAX PG: 4.2 MMHG
BH CV ECHO MEAS - LV MEAN PG: 2 MMHG
BH CV ECHO MEAS - LV SYSTOLIC VOL/BSA (12-30): 90.2 ML/M^2
BH CV ECHO MEAS - LV V1 MAX: 102 CM/SEC
BH CV ECHO MEAS - LV V1 MEAN: 54.3 CM/SEC
BH CV ECHO MEAS - LV V1 VTI: 16.8 CM
BH CV ECHO MEAS - LVIDD: 5.8 CM
BH CV ECHO MEAS - LVIDS: 5.2 CM
BH CV ECHO MEAS - LVLD AP2: 9.2 CM
BH CV ECHO MEAS - LVLD AP4: 9.5 CM
BH CV ECHO MEAS - LVLS AP2: 8.9 CM
BH CV ECHO MEAS - LVLS AP4: 9.5 CM
BH CV ECHO MEAS - LVOT AREA (M): 3.1 CM^2
BH CV ECHO MEAS - LVOT AREA: 3.1 CM^2
BH CV ECHO MEAS - LVOT DIAM: 2 CM
BH CV ECHO MEAS - LVPWD: 1.4 CM
BH CV ECHO MEAS - MED PEAK E' VEL: 5 CM/SEC
BH CV ECHO MEAS - MV A DUR: 0.11 SEC
BH CV ECHO MEAS - MV A MAX VEL: 77.6 CM/SEC
BH CV ECHO MEAS - MV DEC SLOPE: 504 CM/SEC^2
BH CV ECHO MEAS - MV DEC TIME: 0.15 SEC
BH CV ECHO MEAS - MV E MAX VEL: 85.4 CM/SEC
BH CV ECHO MEAS - MV E/A: 1.1
BH CV ECHO MEAS - MV MAX PG: 3.8 MMHG
BH CV ECHO MEAS - MV MEAN PG: 2 MMHG
BH CV ECHO MEAS - MV P1/2T MAX VEL: 93.7 CM/SEC
BH CV ECHO MEAS - MV P1/2T: 54.5 MSEC
BH CV ECHO MEAS - MV V2 MAX: 97.6 CM/SEC
BH CV ECHO MEAS - MV V2 MEAN: 59 CM/SEC
BH CV ECHO MEAS - MV V2 VTI: 23.1 CM
BH CV ECHO MEAS - MVA P1/2T LCG: 2.3 CM^2
BH CV ECHO MEAS - MVA(P1/2T): 4 CM^2
BH CV ECHO MEAS - MVA(VTI): 2.3 CM^2
BH CV ECHO MEAS - PA ACC TIME: 0.07 SEC
BH CV ECHO MEAS - PA MAX PG (FULL): 1.5 MMHG
BH CV ECHO MEAS - PA MAX PG: 2.9 MMHG
BH CV ECHO MEAS - PA PR(ACCEL): 47.5 MMHG
BH CV ECHO MEAS - PA V2 MAX: 85.5 CM/SEC
BH CV ECHO MEAS - PULM A REVS DUR: 0.14 SEC
BH CV ECHO MEAS - PULM A REVS VEL: 22.7 CM/SEC
BH CV ECHO MEAS - PULM DIAS VEL: 46.5 CM/SEC
BH CV ECHO MEAS - PULM S/D: 1.1
BH CV ECHO MEAS - PULM SYS VEL: 51.9 CM/SEC
BH CV ECHO MEAS - PVA(V,A): 2.2 CM^2
BH CV ECHO MEAS - PVA(V,D): 2.2 CM^2
BH CV ECHO MEAS - QP/QS: 0.65
BH CV ECHO MEAS - RV MAX PG: 1.5 MMHG
BH CV ECHO MEAS - RV MEAN PG: 1 MMHG
BH CV ECHO MEAS - RV V1 MAX: 60.6 CM/SEC
BH CV ECHO MEAS - RV V1 MEAN: 38.3 CM/SEC
BH CV ECHO MEAS - RV V1 VTI: 10.9 CM
BH CV ECHO MEAS - RVOT AREA: 3.1 CM^2
BH CV ECHO MEAS - RVOT DIAM: 2 CM
BH CV ECHO MEAS - SI(AO): 71.6 ML/M^2
BH CV ECHO MEAS - SI(CUBED): 31.5 ML/M^2
BH CV ECHO MEAS - SI(LVOT): 30.5 ML/M^2
BH CV ECHO MEAS - SI(MOD-SP2): 26.6 ML/M^2
BH CV ECHO MEAS - SI(MOD-SP4): 27.2 ML/M^2
BH CV ECHO MEAS - SI(TEICH): 21.4 ML/M^2
BH CV ECHO MEAS - SV(AO): 123.8 ML
BH CV ECHO MEAS - SV(CUBED): 54.5 ML
BH CV ECHO MEAS - SV(LVOT): 52.8 ML
BH CV ECHO MEAS - SV(MOD-SP2): 46 ML
BH CV ECHO MEAS - SV(MOD-SP4): 47 ML
BH CV ECHO MEAS - SV(RVOT): 34.2 ML
BH CV ECHO MEAS - SV(TEICH): 37.1 ML
BH CV ECHO MEAS - TAPSE (>1.6): 1.4 CM2
BH CV ECHO MEASUREMENTS AVERAGE E/E' RATIO: 13.14
BH CV VAS BP RIGHT ARM: NORMAL MMHG
BH CV XLRA - RV BASE: 3.7 CM
BH CV XLRA - TDI S': 8 CM/SEC
BLD GP AB SCN SERPL QL: NEGATIVE
BUN BLD-MCNC: 41 MG/DL (ref 8–23)
BUN/CREAT SERPL: 28.5 (ref 7–25)
CALCIUM SPEC-SCNC: 8.8 MG/DL (ref 8.6–10.5)
CHLORIDE SERPL-SCNC: 89 MMOL/L (ref 98–107)
CO2 SERPL-SCNC: 25.7 MMOL/L (ref 22–29)
CREAT BLD-MCNC: 1.44 MG/DL (ref 0.76–1.27)
DEPRECATED RDW RBC AUTO: 47.4 FL (ref 37–54)
ERYTHROCYTE [DISTWIDTH] IN BLOOD BY AUTOMATED COUNT: 13.5 % (ref 12.3–15.4)
GFR SERPL CREATININE-BSD FRML MDRD: 49 ML/MIN/1.73
GLUCOSE BLD-MCNC: 207 MG/DL (ref 65–99)
GLUCOSE BLDC GLUCOMTR-MCNC: 127 MG/DL (ref 70–130)
GLUCOSE BLDC GLUCOMTR-MCNC: 136 MG/DL (ref 70–130)
GLUCOSE BLDC GLUCOMTR-MCNC: 162 MG/DL (ref 70–130)
GLUCOSE BLDC GLUCOMTR-MCNC: 204 MG/DL (ref 70–130)
HCT VFR BLD AUTO: 36.7 % (ref 37.5–51)
HGB BLD-MCNC: 11.9 G/DL (ref 13–17.7)
LEFT ATRIUM VOLUME INDEX: 37 ML/M2
MAXIMAL PREDICTED HEART RATE: 151 BPM
MCH RBC QN AUTO: 30.9 PG (ref 26.6–33)
MCHC RBC AUTO-ENTMCNC: 32.4 G/DL (ref 31.5–35.7)
MCV RBC AUTO: 95.3 FL (ref 79–97)
PHOSPHATE SERPL-MCNC: 4.4 MG/DL (ref 2.5–4.5)
PLATELET # BLD AUTO: 307 10*3/MM3 (ref 140–450)
PMV BLD AUTO: 10.3 FL (ref 6–12)
POTASSIUM BLD-SCNC: 4.2 MMOL/L (ref 3.5–5.2)
RBC # BLD AUTO: 3.85 10*6/MM3 (ref 4.14–5.8)
RH BLD: POSITIVE
SODIUM BLD-SCNC: 130 MMOL/L (ref 136–145)
STRESS TARGET HR: 128 BPM
T&S EXPIRATION DATE: NORMAL
URATE SERPL-MCNC: 8.7 MG/DL (ref 3.4–7)
WBC NRBC COR # BLD: 8.63 10*3/MM3 (ref 3.4–10.8)

## 2019-09-08 PROCEDURE — 86923 COMPATIBILITY TEST ELECTRIC: CPT

## 2019-09-08 PROCEDURE — 86900 BLOOD TYPING SEROLOGIC ABO: CPT | Performed by: THORACIC SURGERY (CARDIOTHORACIC VASCULAR SURGERY)

## 2019-09-08 PROCEDURE — 85730 THROMBOPLASTIN TIME PARTIAL: CPT | Performed by: THORACIC SURGERY (CARDIOTHORACIC VASCULAR SURGERY)

## 2019-09-08 PROCEDURE — 94060 EVALUATION OF WHEEZING: CPT

## 2019-09-08 PROCEDURE — 84550 ASSAY OF BLOOD/URIC ACID: CPT | Performed by: INTERNAL MEDICINE

## 2019-09-08 PROCEDURE — 94799 UNLISTED PULMONARY SVC/PX: CPT

## 2019-09-08 PROCEDURE — 86901 BLOOD TYPING SEROLOGIC RH(D): CPT

## 2019-09-08 PROCEDURE — 86901 BLOOD TYPING SEROLOGIC RH(D): CPT | Performed by: THORACIC SURGERY (CARDIOTHORACIC VASCULAR SURGERY)

## 2019-09-08 PROCEDURE — 82962 GLUCOSE BLOOD TEST: CPT

## 2019-09-08 PROCEDURE — 25010000002 METHYLPREDNISOLONE PER 40 MG: Performed by: INTERNAL MEDICINE

## 2019-09-08 PROCEDURE — 63710000001 INSULIN LISPRO (HUMAN) PER 5 UNITS: Performed by: INTERNAL MEDICINE

## 2019-09-08 PROCEDURE — 86900 BLOOD TYPING SEROLOGIC ABO: CPT

## 2019-09-08 PROCEDURE — 85027 COMPLETE CBC AUTOMATED: CPT | Performed by: THORACIC SURGERY (CARDIOTHORACIC VASCULAR SURGERY)

## 2019-09-08 PROCEDURE — 99232 SBSQ HOSP IP/OBS MODERATE 35: CPT | Performed by: INTERNAL MEDICINE

## 2019-09-08 PROCEDURE — 86850 RBC ANTIBODY SCREEN: CPT | Performed by: THORACIC SURGERY (CARDIOTHORACIC VASCULAR SURGERY)

## 2019-09-08 PROCEDURE — 80069 RENAL FUNCTION PANEL: CPT | Performed by: INTERNAL MEDICINE

## 2019-09-08 PROCEDURE — 99024 POSTOP FOLLOW-UP VISIT: CPT | Performed by: NURSE PRACTITIONER

## 2019-09-08 RX ORDER — CHLORHEXIDINE GLUCONATE 0.12 MG/ML
15 RINSE ORAL EVERY 12 HOURS SCHEDULED
Status: COMPLETED | OUTPATIENT
Start: 2019-09-08 | End: 2019-09-09

## 2019-09-08 RX ORDER — IPRATROPIUM BROMIDE AND ALBUTEROL SULFATE 2.5; .5 MG/3ML; MG/3ML
3 SOLUTION RESPIRATORY (INHALATION)
Status: DISCONTINUED | OUTPATIENT
Start: 2019-09-08 | End: 2019-09-09

## 2019-09-08 RX ORDER — DOCUSATE SODIUM 100 MG/1
100 CAPSULE, LIQUID FILLED ORAL 2 TIMES DAILY
Status: DISCONTINUED | OUTPATIENT
Start: 2019-09-08 | End: 2019-09-09

## 2019-09-08 RX ORDER — GABAPENTIN 300 MG/1
600 CAPSULE ORAL
Status: COMPLETED | OUTPATIENT
Start: 2019-09-09 | End: 2019-09-09

## 2019-09-08 RX ORDER — ACETAMINOPHEN 500 MG
1000 TABLET ORAL
Status: COMPLETED | OUTPATIENT
Start: 2019-09-09 | End: 2019-09-09

## 2019-09-08 RX ORDER — NICOTINE 21 MG/24HR
1 PATCH, TRANSDERMAL 24 HOURS TRANSDERMAL EVERY 24 HOURS
Status: DISCONTINUED | OUTPATIENT
Start: 2019-09-08 | End: 2019-09-09

## 2019-09-08 RX ORDER — FAMOTIDINE 10 MG/ML
20 INJECTION, SOLUTION INTRAVENOUS ONCE
Status: COMPLETED | OUTPATIENT
Start: 2019-09-08 | End: 2019-09-09

## 2019-09-08 RX ORDER — LORAZEPAM 2 MG/ML
1 INJECTION INTRAMUSCULAR
Status: DISCONTINUED | OUTPATIENT
Start: 2019-09-09 | End: 2019-09-09 | Stop reason: HOSPADM

## 2019-09-08 RX ORDER — CHLORHEXIDINE GLUCONATE 500 MG/1
1 CLOTH TOPICAL EVERY 12 HOURS
Status: DISCONTINUED | OUTPATIENT
Start: 2019-09-08 | End: 2019-09-09

## 2019-09-08 RX ORDER — SODIUM CHLORIDE 9 MG/ML
100 INJECTION, SOLUTION INTRAVENOUS CONTINUOUS
Status: DISCONTINUED | OUTPATIENT
Start: 2019-09-08 | End: 2019-09-08

## 2019-09-08 RX ORDER — ALBUTEROL SULFATE 2.5 MG/3ML
2.5 SOLUTION RESPIRATORY (INHALATION) ONCE
Status: COMPLETED | OUTPATIENT
Start: 2019-09-08 | End: 2019-09-08

## 2019-09-08 RX ORDER — POLYETHYLENE GLYCOL 3350 17 G/17G
17 POWDER, FOR SOLUTION ORAL 2 TIMES DAILY
Status: DISCONTINUED | OUTPATIENT
Start: 2019-09-08 | End: 2019-09-09

## 2019-09-08 RX ADMIN — CHLORHEXIDINE GLUCONATE 15 ML: 1.2 RINSE ORAL at 22:20

## 2019-09-08 RX ADMIN — CETIRIZINE HYDROCHLORIDE 5 MG: 10 TABLET, FILM COATED ORAL at 08:01

## 2019-09-08 RX ADMIN — ASPIRIN 81 MG: 81 TABLET, CHEWABLE ORAL at 08:01

## 2019-09-08 RX ADMIN — NICOTINE 1 PATCH: 21 PATCH, EXTENDED RELEASE TRANSDERMAL at 12:49

## 2019-09-08 RX ADMIN — SERTRALINE 25 MG: 25 TABLET, FILM COATED ORAL at 08:01

## 2019-09-08 RX ADMIN — DOCUSATE SODIUM 100 MG: 100 CAPSULE, LIQUID FILLED ORAL at 12:50

## 2019-09-08 RX ADMIN — IPRATROPIUM BROMIDE AND ALBUTEROL SULFATE 3 ML: 2.5; .5 SOLUTION RESPIRATORY (INHALATION) at 20:13

## 2019-09-08 RX ADMIN — INSULIN LISPRO 1 UNITS: 100 INJECTION, SOLUTION INTRAVENOUS; SUBCUTANEOUS at 20:53

## 2019-09-08 RX ADMIN — POLYETHYLENE GLYCOL 3350 17 G: 17 POWDER, FOR SOLUTION ORAL at 20:53

## 2019-09-08 RX ADMIN — POLYETHYLENE GLYCOL 3350 17 G: 17 POWDER, FOR SOLUTION ORAL at 12:50

## 2019-09-08 RX ADMIN — MUPIROCIN 1 APPLICATION: 20 OINTMENT TOPICAL at 22:20

## 2019-09-08 RX ADMIN — ALBUTEROL SULFATE 2.5 MG: 2.5 SOLUTION RESPIRATORY (INHALATION) at 16:46

## 2019-09-08 RX ADMIN — IPRATROPIUM BROMIDE AND ALBUTEROL SULFATE 3 ML: 2.5; .5 SOLUTION RESPIRATORY (INHALATION) at 07:21

## 2019-09-08 RX ADMIN — METHYLPREDNISOLONE SODIUM SUCCINATE 40 MG: 40 INJECTION, POWDER, FOR SOLUTION INTRAMUSCULAR; INTRAVENOUS at 17:44

## 2019-09-08 RX ADMIN — DOCUSATE SODIUM 100 MG: 100 CAPSULE, LIQUID FILLED ORAL at 20:53

## 2019-09-08 RX ADMIN — METHYLPREDNISOLONE SODIUM SUCCINATE 40 MG: 40 INJECTION, POWDER, FOR SOLUTION INTRAMUSCULAR; INTRAVENOUS at 01:48

## 2019-09-08 RX ADMIN — ATORVASTATIN CALCIUM 20 MG: 20 TABLET, FILM COATED ORAL at 20:53

## 2019-09-08 RX ADMIN — SODIUM CHLORIDE 100 ML/HR: 9 INJECTION, SOLUTION INTRAVENOUS at 12:58

## 2019-09-08 RX ADMIN — METHYLPREDNISOLONE SODIUM SUCCINATE 40 MG: 40 INJECTION, POWDER, FOR SOLUTION INTRAMUSCULAR; INTRAVENOUS at 08:01

## 2019-09-08 RX ADMIN — AZITHROMYCIN 500 MG: 250 TABLET, FILM COATED ORAL at 08:01

## 2019-09-08 NOTE — PLAN OF CARE
Problem: Patient Care Overview  Goal: Plan of Care Review  Outcome: Ongoing (interventions implemented as appropriate)   09/08/19 0773   Coping/Psychosocial   Plan of Care Reviewed With patient   Plan of Care Review   Progress no change   OTHER   Outcome Summary SBP 's otherwise VSS. NSR/PVC's on monitor. Surgery work up and Heparin gtt continue. No c/o CP. Rested well over night with spouse at bedside. Surgery planned for Monday. Will continue to monitor.

## 2019-09-08 NOTE — CONSULTS
Patient Name: Cedric Wade  :1950  68 y.o.    Date of Admission: 2019  Date of Consultation:  19  Encounter Provider: Rody Carlos RN  Place of Service: [facility] CARDIOLOG[facility]  Referring Provider: Alvaro Mazariegos MD  Patient Care Team:  Ean Farmer MD as PCP - General (Internal Medicine)      Chief complaint: management recommendations CAD    History of Present Illness: Cedric Wade is a 68 year old male with a history of Coronary diease and presented to [facility] with an MI. He is a current every day smoker 2 PPD for 58 years, and has Type II DM on Metformin BID. He was transferred to [facility] for CABG and was admitted to Dr. Mazariegos. He had a previous positive stress test for anterior ischemia and was scheduled for outpatient cardiac catheterization however he developed SOA and came to the ER on 9/3/0219.     He presented to [facility] with SOA that has been worsening over the past 5 weeks. He was found to have had a NSTEMI with new heart failure that required intubation and dobutamine. He was in SVT at one point that converted spontaneously. He was extubated on 2019 and taken to cath lab on 2019.  His ECHO there showed an EF of 15% on 2019 and showed Moderate TR and MR with pulmonary HTN with an estimated PAP pressure of 63 mmHg.  He was taken to heart cath urgently on 2019 that showed showed a significant CAD  Including left main. So Dr. Mazariegos and Dr. Tidwell decided to transfer him to [facility] for possible surgical intervention.    We have been asked to see for concurrent care and the plan is to take him to CABG this week, hopefully Monday.         Past Medical History:   Diagnosis Date   • Arthritis    • CHF (congestive heart failure) (CMS/[facility])    • COPD (chronic obstructive pulmonary disease) (CMS/[facility])    • Coronary artery disease    • Cutaneous lupus erythematosus    • Diabetes mellitus (CMS/[facility])    • Elevated cholesterol     • GERD (gastroesophageal reflux disease)    • Hypertension        No past surgical history on file.      Prior to Admission medications    Medication Sig Start Date End Date Taking? Authorizing Provider   albuterol sulfate  (90 Base) MCG/ACT inhaler Inhale 2 puffs Every 4 (Four) Hours As Needed for Wheezing.   Yes Rajwinder Tyler MD   aspirin 81 MG chewable tablet Chew 81 mg Daily.   Yes Rajwinder Tyler MD   atorvastatin (LIPITOR) 40 MG tablet Take 40 mg by mouth Every Night.   Yes Rajwinder Tyler MD   cetirizine (zyrTEC) 10 MG tablet Take 10 mg by mouth Daily.   Yes Rajwinder Tyler MD   folic acid (FOLVITE) 1 MG tablet Take 1 mg by mouth Daily.   Yes Rajwinder Tyler MD   furosemide (LASIX) 40 MG tablet Take 40 mg by mouth Daily.   Yes Rajwinder Tyler MD   guaiFENesin (MUCINEX) 600 MG 12 hr tablet Take  by mouth 2 (Two) Times a Day.   Yes Rajwinder Tyler MD   hydroxychloroquine (PLAQUENIL) 200 MG tablet Take 200 mg by mouth Daily.   Yes Rajwinder Tyler MD   lisinopril (PRINIVIL,ZESTRIL) 5 MG tablet Take 5 mg by mouth 2 (Two) Times a Day.   Yes Rajwinder Tyler MD   metFORMIN (GLUCOPHAGE) 850 MG tablet Take 850 mg by mouth 2 (Two) Times a Day With Meals.   Yes Rajwinder Tyler MD   metOLazone (ZAROXOLYN) 10 MG tablet Take 10 mg by mouth Daily.   Yes Rajwinder Tyler MD   metoprolol succinate XL (TOPROL-XL) 25 MG 24 hr tablet Take 25 mg by mouth Daily.   Yes Rajwinder Tyler MD   pantoprazole (PROTONIX) 40 MG EC tablet Take 40 mg by mouth Daily.   Yes Rajwinder Tyler MD   primidone (MYSOLINE) 250 MG tablet Take 250 mg by mouth 2 (Two) Times a Day.   Yes Rajwinder Tyler MD   sertraline (ZOLOFT) 25 MG tablet Take 25 mg by mouth Daily.   Yes Rajwinder Tyler MD   spironolactone (ALDACTONE) 25 MG tablet Take 25 mg by mouth Daily.   Yes Rajwinder Tyler MD   tiotropium bromide-olodaterol (STIOLTO RESPIMAT) 2.5-2.5 MCG/ACT  "aerosol solution inhaler Inhale 1 puff As Needed.   Yes Provider, Historical, MD   famotidine (PEPCID) 20 MG tablet Take 20 mg by mouth 2 (Two) Times a Day.    Provider, MD Rajwinder       Allergies   Allergen Reactions   • Codeine Nausea And Vomiting       Social History     Socioeconomic History   • Marital status:      Spouse name: Not on file   • Number of children: Not on file   • Years of education: Not on file   • Highest education level: Not on file       Family History   Problem Relation Age of Onset   • Lung cancer Father    • Diabetes Sister        REVIEW OF SYSTEMS:   All systems reviewed.  Pertinent positives identified in HPI.  All other systems are negative.      Objective:     Vitals:    09/07/19 1234 09/07/19 1559 09/07/19 2039 09/08/19 0000   BP:  98/74  91/66   BP Location:  Left arm  Left arm   Patient Position:  Lying  Lying   Pulse:  92 90 81   Resp:  18 18 18   Temp:  98.1 °F (36.7 °C)  97.6 °F (36.4 °C)   TempSrc:  Oral  Oral   SpO2:  97% 97% 91%   Weight: 64.4 kg (142 lb)      Height: 167.6 cm (66\")        Body mass index is 22.92 kg/m².    General Appearance:    Alert, cooperative, in no acute distress   Head:    Normocephalic, without obvious abnormality, atraumatic   Eyes:            Lids and lashes normal, conjunctivae and sclerae normal, no   icterus, no pallor, corneas clear, PERRLA   Ears:    Ears appear intact with no abnormalities noted   Throat:   No oral lesions, no thrush, oral mucosa moist   Neck:   No adenopathy, supple, trachea midline, no thyromegaly, no   carotid bruit, no JVD   Back:     No kyphosis present, no scoliosis present, no skin lesions, erythema or scars, no tenderness to percussion or palpation, range of motion normal   Lungs:     Clear to auscultation,respirations regular, even and unlabored    Heart:    Regular rhythm and normal rate, normal S1 and S2, no murmur, no gallop, no rub, no click   Chest Wall:    No abnormalities observed   Abdomen:     " Normal bowel sounds, no masses, no organomegaly, soft        non-tender, non-distended, no guarding, no rebound  tenderness   Extremities:   Moves all extremities well, no edema, no cyanosis, no redness   Pulses:   Pulses palpable and equal bilaterally. Normal radial, carotid, femoral, dorsalis pedis and posterior tibial pulses bilaterally. Normal abdominal aorta   Skin:  Psychiatric:   No bleeding, bruising or rash    Alert and oriented x 3, normal mood and affect   Lab Review:     Results from last 7 days   Lab Units 09/07/19  1749 09/07/19  0519   SODIUM mmol/L 128* 130*   POTASSIUM mmol/L 4.1 4.1   CHLORIDE mmol/L 88* 87*   CO2 mmol/L 27.1 28.3   BUN mg/dL 38* 29*   CREATININE mg/dL 1.69* 1.51*   CALCIUM mg/dL 8.9 9.4   BILIRUBIN mg/dL  --  0.4   ALK PHOS U/L  --  122*   ALT (SGPT) U/L  --  70*   AST (SGOT) U/L  --  74*   GLUCOSE mg/dL 177* 98         Results from last 7 days   Lab Units 09/07/19  0520   WBC 10*3/mm3 8.50   HEMOGLOBIN g/dL 12.1*   HEMATOCRIT % 37.2*   PLATELETS 10*3/mm3 289     Results from last 7 days   Lab Units 09/08/19  0015 09/07/19  1604 09/07/19  0813 09/07/19  0519   INR   --   --   --  0.99   APTT seconds 41.4* 37.9* 30.6 29.7     Results from last 7 days   Lab Units 09/07/19  0519   MAGNESIUM mg/dL 2.1     Results from last 7 days   Lab Units 09/07/19  0519   CHOLESTEROL mg/dL 174   TRIGLYCERIDES mg/dL 92   HDL CHOL mg/dL 90*   LDL CHOL mg/dL 66               I personally viewed and interpreted the patient's EKG/Telemetry data.        Assessment and Plan:           Rody Carlos RN  09/08/19  6:39 AM

## 2019-09-08 NOTE — PROGRESS NOTES
" LOS: 2 days   Patient Care Team:  Ean Farmer MD as PCP - General (Internal Medicine)    Chief Complaint: RANDA/ckd    Subjective     F/u RANDA vs CKD        Subjective:  Symptoms:  No shortness of breath, chest pain, chest pressure or anxiety.    Diet:  No nausea or vomiting.    Pain:  He reports no pain.        History taken from: patient    Objective     Vital Sign Min/Max for last 24 hours  Temp  Min: 97.5 °F (36.4 °C)  Max: 98.1 °F (36.7 °C)   BP  Min: 91/66  Max: 108/68   Pulse  Min: 81  Max: 92   Resp  Min: 16  Max: 18   SpO2  Min: 91 %  Max: 97 %   No Data Recorded   Weight  Min: 62.6 kg (137 lb 14.4 oz)  Max: 64.4 kg (142 lb)     Flowsheet Rows      First Filed Value   Admission Height  167.6 cm (66\") Documented at 09/07/2019 1234   Admission Weight  64.5 kg (142 lb 3.2 oz) Documented at 09/07/2019 0438          I/O this shift:  In: 360 [P.O.:360]  Out: 600 [Urine:600]  I/O last 3 completed shifts:  In: 720 [P.O.:720]  Out: 700 [Urine:700]    Objective:  General Appearance:  Comfortable.    Vital signs: (most recent): Blood pressure 108/68, pulse 85, temperature 97.5 °F (36.4 °C), temperature source Oral, resp. rate 16, height 167.6 cm (66\"), weight 62.6 kg (137 lb 14.4 oz), SpO2 95 %.  Vital signs are normal.  No fever.    HEENT: Normal HEENT exam.    Lungs:  Normal effort and normal respiratory rate.  Breath sounds clear to auscultation.    Heart: Normal rate.  Regular rhythm.    Abdomen: Abdomen is soft and non-distended.  Bowel sounds are normal.     Extremities: There is no dependent edema.    Neurological: Patient is alert and oriented to person, place and time.    Skin:  Warm and dry.              Results Review:     I reviewed the patient's new clinical results.    WBC WBC   Date Value Ref Range Status   09/08/2019 8.63 3.40 - 10.80 10*3/mm3 Final   09/07/2019 8.50 3.40 - 10.80 10*3/mm3 Final      HGB Hemoglobin   Date Value Ref Range Status   09/08/2019 11.9 (L) 13.0 - 17.7 g/dL Final "   09/07/2019 12.1 (L) 13.0 - 17.7 g/dL Final      HCT Hematocrit   Date Value Ref Range Status   09/08/2019 36.7 (L) 37.5 - 51.0 % Final   09/07/2019 37.2 (L) 37.5 - 51.0 % Final      Platlets No results found for: LABPLAT   MCV MCV   Date Value Ref Range Status   09/08/2019 95.3 79.0 - 97.0 fL Final   09/07/2019 94.4 79.0 - 97.0 fL Final          Sodium Sodium   Date Value Ref Range Status   09/08/2019 130 (L) 136 - 145 mmol/L Final   09/07/2019 128 (L) 136 - 145 mmol/L Final   09/07/2019 130 (L) 136 - 145 mmol/L Final      Potassium Potassium   Date Value Ref Range Status   09/08/2019 4.2 3.5 - 5.2 mmol/L Final   09/07/2019 4.1 3.5 - 5.2 mmol/L Final   09/07/2019 4.1 3.5 - 5.2 mmol/L Final      Chloride Chloride   Date Value Ref Range Status   09/08/2019 89 (L) 98 - 107 mmol/L Final   09/07/2019 88 (L) 98 - 107 mmol/L Final   09/07/2019 87 (L) 98 - 107 mmol/L Final      CO2 CO2   Date Value Ref Range Status   09/08/2019 25.7 22.0 - 29.0 mmol/L Final   09/07/2019 27.1 22.0 - 29.0 mmol/L Final   09/07/2019 28.3 22.0 - 29.0 mmol/L Final      BUN BUN   Date Value Ref Range Status   09/08/2019 41 (H) 8 - 23 mg/dL Final   09/07/2019 38 (H) 8 - 23 mg/dL Final   09/07/2019 29 (H) 8 - 23 mg/dL Final      Creatinine Creatinine   Date Value Ref Range Status   09/08/2019 1.44 (H) 0.76 - 1.27 mg/dL Final   09/07/2019 1.69 (H) 0.76 - 1.27 mg/dL Final   09/07/2019 1.51 (H) 0.76 - 1.27 mg/dL Final      Calcium Calcium   Date Value Ref Range Status   09/08/2019 8.8 8.6 - 10.5 mg/dL Final   09/07/2019 8.9 8.6 - 10.5 mg/dL Final   09/07/2019 9.4 8.6 - 10.5 mg/dL Final      PO4 No results found for: CAPO4   Albumin Albumin   Date Value Ref Range Status   09/08/2019 3.60 3.50 - 5.20 g/dL Final   09/07/2019 4.20 3.50 - 5.20 g/dL Final      Magnesium Magnesium   Date Value Ref Range Status   09/07/2019 2.1 1.6 - 2.4 mg/dL Final      Uric Acid Uric Acid   Date Value Ref Range Status   09/08/2019 8.7 (H) 3.4 - 7.0 mg/dL Final         Medication Review: y    Assessment/Plan       Coronary artery disease involving native coronary artery of native heart with angina pectoris (CMS/HCC)    COPD (chronic obstructive pulmonary disease) with chronic bronchitis (CMS/[facility])      Assessment & Plan     1.  Acute kidney injury:  Creatinine slightly better today.  Unsure his baseline at this time, he is unaware of previous CKD.  He may have underlying nephrosclerosis.  USN ok.  UA fairly benign.  Will give 1L NS and monitor for improvement.  Continue to hold lasix/aldactone.  2.  Multivessel CAD:  Plans for CABG under the care of Dr. Mazariegos.  3.  Tobacco abuse:     4.  Type 2 diabetes on metformin  5. Hyponatremia- appears volume deplete, giving IVF's as noted.    Jordan Peterson MD  09/08/19  11:39 AM

## 2019-09-08 NOTE — PROGRESS NOTES
CC:  SOA    No events overnight, denies any c/o SOA or CP    Cr 1.44 (1.69, 1.51)   Sputum culture ()--light growth 2+ resp mignon   Drips:  Heparin    PREOP studies:  Carotid duplex:  Mild stenosis  Vein moe:  Inadequate  a1c 6.1  plt ag%    CT chest without contrast:  1. Minimal emphysema and fibrosis, no active airspace disease.  2. Mild nonspecific mediastinal adenopathy which will require follow-up.  3. Small pericardial effusion.  4. Probable upper pole right renal cyst    Neuro intact, nad, resting in bed, family at bedside  Tele:  SR 70s, s1s2  Lungs CTA, on room air, +productive cough  +bs x 4 quads, no n/v/d, no BM  No edema, + pedal pulses    MV CAD, EF 20%  Moderate mitral regurgitation  NSTEMI  Acute on chronic HFrEF--maximize meds prior to discharge  RANDA--cr worsening, renal following  COPD with exacerbation--pulm following  DM, type 2--a1c 6.1  HLD--statin  Current tobacco abuse--cessation d/w pt  PVD--has been on Pletal  Hx anxiety/depression--cont Zoloft  Hx Lupus--Plaquenil at home  Hx essential tremors--on primidone per neurology  Mediastinal adenopathy--per CT scan, follow up recommended    Dr. Mazariegos asked that I cancel case d/t worsening renal numbers--but late labs this morning show cr actually 1.44 down from 1.69  Dr. Mazariegos wants pulm to see and weigh in on whether timing is right for CABG tomorrow  Case cancelled currently but final decision per Dr. Mazariegos after pt evaluated by all teams--this was d/w pt and family at length    Plan for CABG/possible mitral valve procedure tomorrow if okay with pulmonary service.  The patient himself appears to be much more comfortable today; I anticipate we will do the surgery tomorrow but will wait to speak with pulmonary service.

## 2019-09-08 NOTE — PROGRESS NOTES
"  ENDOCRINE    Subjective   AND PLANS  Cedric Wade is a 68 y.o. male.     Follow-up diabetes related disorders    No chest pain or shortness of breath.  On Solu-Medrol 30 mg every 8 hours  Fasting glucose 127.  Random glucose 162-207.  Continue Humalog correction scale at 1 unit per 50 greater than 120.    Continue Lipitor        Objective   BP 97/66 (BP Location: Left arm, Patient Position: Lying)   Pulse 79   Temp 97.5 °F (36.4 °C) (Oral)   Resp 16   Ht 167.6 cm (66\")   Wt 62.6 kg (137 lb 14.4 oz)   SpO2 95%   BMI 22.26 kg/m²   Physical Exam    Awake, alert, not dyspneic, not in distress.  No rales or wheezes.    Regular heart rate and rhythm.  No gallop.  Abdomen soft, nontender.  Extremities warm.  No cyanosis or pedal edema.    Lab Results (last 24 hours)     Procedure Component Value Units Date/Time    POC Glucose Once [[id number]]  (Abnormal) Collected:  09/08/19 1038    Specimen:  Blood Updated:  09/08/19 1039     Glucose 162 mg/dL     Renal Function Panel [[id number]]  (Abnormal) Collected:  09/08/19 0817    Specimen:  Blood from Arm, Left Updated:  09/08/19 0926     Glucose 207 mg/dL      BUN 41 mg/dL      Creatinine 1.44 mg/dL      Sodium 130 mmol/L      Potassium 4.2 mmol/L      Chloride 89 mmol/L      CO2 25.7 mmol/L      Calcium 8.8 mg/dL      Albumin 3.60 g/dL      Phosphorus 4.4 mg/dL      Anion Gap 15.3 mmol/L      BUN/Creatinine Ratio 28.5     eGFR Non African Amer 49 mL/min/1.73     Narrative:       GFR Normal >60  Chronic Kidney Disease <60  Kidney Failure <15    Uric Acid [[id number]]  (Abnormal) Collected:  09/08/19 0817    Specimen:  Blood from Arm, Left Updated:  09/08/19 0926     Uric Acid 8.7 mg/dL     Respiratory Culture - Sputum, Cough [[id number]] Collected:  09/07/19 1543    Specimen:  Sputum from Cough Updated:  09/08/19 0909     Respiratory Culture Light growth (2+) Normal Respiratory Nellie     Gram Stain Rare (1+) Mixed bacterial morphotypes seen on Gram Stain      Rare (1+) " WBCs per low power field      No epithelial cells seen    aPTT [[id number]]  (Abnormal) Collected:  09/08/19 0817    Specimen:  Blood from Arm, Left Updated:  09/08/19 0904     PTT 46.5 seconds     CBC (No Diff) [[id number]]  (Abnormal) Collected:  09/08/19 0817    Specimen:  Blood from Arm, Left Updated:  09/08/19 0855     WBC 8.63 10*3/mm3      RBC 3.85 10*6/mm3      Hemoglobin 11.9 g/dL      Hematocrit 36.7 %      MCV 95.3 fL      MCH 30.9 pg      MCHC 32.4 g/dL      RDW 13.5 %      RDW-SD 47.4 fl      MPV 10.3 fL      Platelets 307 10*3/mm3     POC Glucose Once [[id number]]  (Normal) Collected:  09/08/19 0625    Specimen:  Blood Updated:  09/08/19 0627     Glucose 127 mg/dL     aPTT [[id number]]  (Abnormal) Collected:  09/08/19 0015    Specimen:  Blood Updated:  09/08/19 0118     PTT 41.4 seconds     Microalbumin / Creatinine Urine Ratio - Urine, Clean Catch [[id number]] Collected:  09/07/19 1916    Specimen:  Urine, Clean Catch Updated:  09/07/19 2146     Microalbumin/Creatinine Ratio 127.2 mg/g      Creatinine, Urine 107.7 mg/dL      Microalbumin, Urine 13.7 mg/dL     T4, Free [[id number]]  (Normal) Collected:  09/07/19 1749    Specimen:  Blood Updated:  09/07/19 2141     Free T4 1.24 ng/dL     TSH [[id number]]  (Normal) Collected:  09/07/19 1749    Specimen:  Blood Updated:  09/07/19 2141     TSH 2.760 uIU/mL     POC Glucose Once [[id number]]  (Normal) Collected:  09/07/19 2105    Specimen:  Blood Updated:  09/07/19 2106     Glucose 120 mg/dL     Urinalysis With Culture If Indicated - Urine, Clean Catch [[id number]]  (Abnormal) Collected:  09/07/19 1916    Specimen:  Urine, Clean Catch Updated:  09/07/19 1958     Color, UA Yellow     Appearance, UA Clear     pH, UA 5.5     Specific Gravity, UA 1.018     Glucose, UA Negative     Ketones, UA Negative     Bilirubin, UA Negative     Blood, UA Negative     Protein, UA 30 mg/dL (1+)     Leuk Esterase, UA Trace     Nitrite, UA Negative     Urobilinogen, UA 0.2 E.U./dL     Urinalysis, Microscopic Only - Urine, Clean Catch [[id number]]  (Abnormal) Collected:  09/07/19 1916    Specimen:  Urine, Clean Catch Updated:  09/07/19 1958     RBC, UA 3-5 /HPF      WBC, UA 3-5 /HPF      Bacteria, UA None Seen /HPF      Squamous Epithelial Cells, UA 0-2 /HPF      Hyaline Casts, UA 7-12 /LPF      Methodology Manual Light Microscopy    Basic Metabolic Panel [[id number]]  (Abnormal) Collected:  09/07/19 1749    Specimen:  Blood Updated:  09/07/19 1853     Glucose 177 mg/dL      BUN 38 mg/dL      Creatinine 1.69 mg/dL      Sodium 128 mmol/L      Potassium 4.1 mmol/L      Chloride 88 mmol/L      CO2 27.1 mmol/L      Calcium 8.9 mg/dL      eGFR Non African Amer 40 mL/min/1.73      BUN/Creatinine Ratio 22.5     Anion Gap 12.9 mmol/L     Narrative:       GFR Normal >60  Chronic Kidney Disease <60  Kidney Failure <15    aPTT [[id number]]  (Abnormal) Collected:  09/07/19 1604    Specimen:  Blood Updated:  09/07/19 1725     PTT 37.9 seconds     POC Glucose Once [[id number]]  (Normal) Collected:  09/07/19 1556    Specimen:  Blood Updated:  09/07/19 1558     Glucose 115 mg/dL               Coronary artery disease involving native coronary artery of native heart with angina pectoris (CMS/HCC)    COPD (chronic obstructive pulmonary disease) with chronic bronchitis (CMS/HCC)    Microalbuminuria    Type 2 diabetes mellitus with microalbuminuria (CMS/HCC)    Hyperlipidemia    Essential hypertension    H/O agent Orange exposure    Diabetes therapy as discussed above.  Continue Lipitor.  Serum creatinine slightly improved.  Nephrology following patient

## 2019-09-08 NOTE — PROGRESS NOTES
"[facility] PULMONARY CARE         Dr Gates Sayied   LOS: 2 days   Patient Care Team:  Ean Farmer MD as PCP - General (Internal Medicine)    Chief Complains acute exacerbation of COPD with chronic bronchitis.  MI ischemic cardia myopathy    Interval History: Feels better this morning.  Patient improved with steroid bronchodilators.    REVIEW OF SYSTEMS:   CARDIOVASCULAR: No chest pain, chest pressure or chest discomfort. No palpitations or edema.   RESPIRATORY: No shortness of breath, cough or sputum.   GASTROINTESTINAL: No anorexia, nausea, vomiting or diarrhea. No abdominal pain or blood.   HEMATOLOGIC: No bleeding or bruising.     Ventilator/Non-Invasive Ventilation Settings (From admission, onward)    None            Vital Signs  Temp:  [97.5 °F (36.4 °C)-98.1 °F (36.7 °C)] 97.5 °F (36.4 °C)  Heart Rate:  [79-92] 79  Resp:  [16-18] 16  BP: ()/(66-74) 97/66    Intake/Output Summary (Last 24 hours) at 9/8/2019 1416  Last data filed at 9/8/2019 1211  Gross per 24 hour   Intake 840 ml   Output 1050 ml   Net -210 ml     Flowsheet Rows      First Filed Value   Admission Height  167.6 cm (66\") Documented at 09/07/2019 1234   Admission Weight  64.5 kg (142 lb 3.2 oz) Documented at 09/07/2019 0438          Physical Exam:   General Appearance:    Alert, cooperative, in no acute distress   Lungs:    Diminished breath sound no wheezing today.    Heart:    Regular rhythm and normal rate, normal S1 and S2, no            murmur, no gallop, no rub, no click   Chest Wall:    No abnormalities observed   Abdomen:     Normal bowel sounds, no masses, no organomegaly, soft        non-tender, non-distended, no guarding, no rebound                tenderness   Extremities:   Moves all extremities well, no edema, no cyanosis, no             redness     Results Review:        Results from last 7 days   Lab Units 09/08/19  0817 09/07/19  1749 09/07/19  0519   SODIUM mmol/L 130* 128* 130*   POTASSIUM mmol/L 4.2 4.1 " 4.1   CHLORIDE mmol/L 89* 88* 87*   CO2 mmol/L 25.7 27.1 28.3   BUN mg/dL 41* 38* 29*   CREATININE mg/dL 1.44* 1.69* 1.51*   GLUCOSE mg/dL 207* 177* 98   CALCIUM mg/dL 8.8 8.9 9.4         Results from last 7 days   Lab Units 09/08/19  0817 09/07/19  0520   WBC 10*3/mm3 8.63 8.50   HEMOGLOBIN g/dL 11.9* 12.1*   HEMATOCRIT % 36.7* 37.2*   PLATELETS 10*3/mm3 307 289     Results from last 7 days   Lab Units 09/08/19  0817 09/08/19  0015 09/07/19  1604  09/07/19  0519   INR   --   --   --   --  0.99   APTT seconds 46.5* 41.4* 37.9*   < > 29.7    < > = values in this interval not displayed.     Results from last 7 days   Lab Units 09/07/19  0519   CHOLESTEROL mg/dL 174     Results from last 7 days   Lab Units 09/07/19  0519   MAGNESIUM mg/dL 2.1     Results from last 7 days   Lab Units 09/07/19  0519   CHOLESTEROL mg/dL 174   TRIGLYCERIDES mg/dL 92   HDL CHOL mg/dL 90*   LDL CHOL mg/dL 66     Results from last 7 days   Lab Units 09/07/19  0354   PH, ARTERIAL pH units 7.451*   PO2 ART mm Hg 64.7*   PCO2, ARTERIAL mm Hg 38.0   HCO3 ART mmol/L 26.4       I reviewed the patient's new clinical results.  I personally viewed and interpreted the patient's CXR        Medication Review:     aspirin 81 mg Oral Daily   atorvastatin 20 mg Oral Nightly   azithromycin 500 mg Oral Q24H   cetirizine 5 mg Oral Daily   docusate sodium 100 mg Oral BID   insulin lispro 0-12 Units Subcutaneous 4x Daily With Meals & Nightly   ipratropium-albuterol 3 mL Nebulization Q6H While Awake - RT   methylPREDNISolone sodium succinate 40 mg Intravenous Q8H   nicotine 1 patch Transdermal Q24H   polyethylene glycol 17 g Oral BID   sertraline 25 mg Oral Daily         heparin (porcine) 500 Units/hr Last Rate: 500 Units/hr (09/08/19 0922)   sodium chloride 100 mL/hr Last Rate: 100 mL/hr (09/08/19 1258)       ASSESSMENT:   COPD with exacerbation  Chronic bronchitis  Non-ST elevation MI  Ischemic cardiomyopathy  Moderate mitral regurgitation  Tobacco  abuse    PLAN:  Clinically much improved  We will wean down steroid as tolerated  Discussed with patient, wife and with Dr. Rios.  I believe he is close to his baseline status.  He is at increased risk for postop complications including prolonged ventilation, pneumonia atelectasis etc.  Family understands and since surgery is lifesaving surgery they are agreeable to proceed.  I will continue bronchodilators and postop aggressive pulmonary toilet  May proceed with rapid weaning protocol postop  Will likely need PFTs down the road      Yajaira Ardon MD  09/08/19  2:16 PM

## 2019-09-08 NOTE — CONSULTS
ENDOCRINOLOGY CONSULTATION    CONSULTING PHYSICIAN: Christiano Fernandes MD    REASON FOR CONSULTATION: Treatment of diabetes mellitus.    HISTORY OF PRESENT ILLNESS: The patient is a 68-year-old male who was initially seen at [facility] for acute chest pain. He was found to have a non-ST segment elevation myocardial infarction and cardiac catheterization showed left main disease. He was transferred to [facility] on 09/06/2019 for possible CABG.     Patient has known diabetes mellitus since 2009. He has been on metformin 850 mg b.i.d. prior to this admission. Hemoglobin A1c on admission was 6.1%.     He checks his blood sugar at home and its runs in the 140s. His last eye examination was 4 months ago and there was no retinopathy. He has elevated serum creatinine of 1.69 on admission. He denies any numbness, tingling or burning in his hands or feet.     PAST MEDICAL HISTORY:   1. 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   2. History of congestive heart failure.  3. COPD.   4. Hyperlipidemia, on Lipitor 40 mg per day.  5. Acid reflux disease and had previous esophageal dilatation.   6. Hypertension.   7. Depression.   8. Previous sinus surgery.   9. Posterior cervical surgery.  10. Normal colonoscopy at age 54.     SOCIAL HISTORY: Patient smoked 2 packs of cigarettes a day for 58 years. He denies alcohol or drug abuse. He is a [place]  and was exposed to agent orange.     FAMILY HISTORY: Sister had diabetes mellitus. Father had cancer of the lungs and was a smoker.     REVIEW OF SYSTEMS: He denies any significant weight change. He denies any fever or chills. He has cough productive of grayish sputum. He has intermittent wheezing and shortness of breath. He denies chest pain at present. He denies palpitations. He denies polyuria, polydipsia or nocturia. He denies melena, hematochezia, hematemesis, hematuria or dysuria. He denies diarrhea or constipation. He denies seizures or loss of  consciousness.     PHYSICAL EXAMINATION:   VITAL SIGNS: Weight 64.4 kilograms, height 167.6 cm, body mass index 22.9, blood pressure 98/74, respiratory rate 18, heart rate 90, temperature 98.1.   GENERAL: Patient is awake, alert, oriented, not dyspneic, not tachypneic, not orthopneic, not in acute distress.   HEENT:  Pink conjunctivae. Anicteric sclerae. No xanthelasma. Full extraocular muscle movement. No facial asymmetry. Speech is fluent. No pharyngeal congestion. No oral thrush. Thyroid is not enlarged. No cervical lymphadenopathy. No carotid bruits.   LUNGS: Equal chest expansion. No rales or wheezes.   HEART: Regular heart rate and rhythm. No gallop.   ABDOMEN: Soft, nontender. Bowel sounds are active. No palpable masses.  EXTREMITIES: Warm. No cyanosis, pedal edema or clubbing. Light touch sensation is grossly intact. No xanthomas.    IMPRESSION:   1. Type 2 diabetes mellitus.  2. Coronary artery disease status post recent non-ST segment elevation myocardial infarction.  3. Hyperlipidemia.  4. Hypertension.  5. COPD exacerbation.    RECOMMENDATIONS: The patient has been well controlled on metformin.  However, because of the elevated serum creatinine, will stop metformin. He has been started on Solu-Medrol by the pulmonologist for hisCOPD and his blood sugar may rise further. Start patient on Humalog correction scale 1 unit per 50 greater than 120. Obtain urine microalbumin  And thyroid function test. Continue Lipitor 40 mg daily and adjust dose if needed.     Thank you for the referral. I will follow the patient with you during his hospital stay.

## 2019-09-08 NOTE — CONSULTS
Referring Provider: Dr. Mazariegos  Reason for Consultation: Acute kidney injury    Subjective     Chief complaint No chief complaint on file.      History of present illness:  68 year old WM with recent myocardial infarction who is s/p cardiac catheterization yesterday at [facility] and has been sent up here due to multivessel disease.  Dr. Mazariegos is planning on doing a CABG on Monday.  His creatinine has been noted to be abnormal at 1.5.  He has no prior diagnosis of ckd, no family history of kidney disease.  He denies urinary symptoms.  Multiple family members with CAD.      Past Medical History:   Diagnosis Date   • Arthritis    • CHF (congestive heart failure) (CMS/[facility])    • COPD (chronic obstructive pulmonary disease) (CMS/[facility])    • Coronary artery disease    • Diabetes mellitus (CMS/[facility])    • Elevated cholesterol    • GERD (gastroesophageal reflux disease)    • Hypertension      No past surgical history on file.  No family history on file.  He is  and works at a [employer] store.  He smokes daily, no alcohol.    Social History     Tobacco Use   • Smoking status: Not on file   Substance Use Topics   • Alcohol use: Not on file   • Drug use: Not on file     Medications Prior to Admission   Medication Sig Dispense Refill Last Dose   • albuterol sulfate  (90 Base) MCG/ACT inhaler Inhale 2 puffs Every 4 (Four) Hours As Needed for Wheezing.      • aspirin 81 MG chewable tablet Chew 81 mg Daily.   9/6/2019 at Unknown time   • atorvastatin (LIPITOR) 40 MG tablet Take 40 mg by mouth Every Night.   9/6/2019 at Unknown time   • cetirizine (zyrTEC) 10 MG tablet Take 10 mg by mouth Daily.   9/6/2019 at Unknown time   • folic acid (FOLVITE) 1 MG tablet Take 1 mg by mouth Daily.   9/6/2019 at Unknown time   • furosemide (LASIX) 40 MG tablet Take 40 mg by mouth Daily.      • guaiFENesin (MUCINEX) 600 MG 12 hr tablet Take  by mouth 2 (Two) Times a Day.   9/6/2019 at Unknown time   • hydroxychloroquine (PLAQUENIL)  "200 MG tablet Take 200 mg by mouth Daily.      • lisinopril (PRINIVIL,ZESTRIL) 5 MG tablet Take 5 mg by mouth 2 (Two) Times a Day.      • metFORMIN (GLUCOPHAGE) 850 MG tablet Take 850 mg by mouth 2 (Two) Times a Day With Meals.      • metOLazone (ZAROXOLYN) 10 MG tablet Take 10 mg by mouth Daily.   Past Week at Unknown time   • metoprolol succinate XL (TOPROL-XL) 25 MG 24 hr tablet Take 25 mg by mouth Daily.      • pantoprazole (PROTONIX) 40 MG EC tablet Take 40 mg by mouth Daily.      • primidone (MYSOLINE) 250 MG tablet Take 250 mg by mouth 2 (Two) Times a Day.      • sertraline (ZOLOFT) 25 MG tablet Take 25 mg by mouth Daily.   9/6/2019 at Unknown time   • spironolactone (ALDACTONE) 25 MG tablet Take 25 mg by mouth Daily.   9/6/2019 at Unknown time   • tiotropium bromide-olodaterol (STIOLTO RESPIMAT) 2.5-2.5 MCG/ACT aerosol solution inhaler Inhale 1 puff As Needed.      • famotidine (PEPCID) 20 MG tablet Take 20 mg by mouth 2 (Two) Times a Day.        Allergies:  Codeine    Review of Systems  A comprehensive review of systems was negative.    Objective     Vital Signs  Temp:  [97.4 °F (36.3 °C)-98.7 °F (37.1 °C)] 98.1 °F (36.7 °C)  Heart Rate:  [83-92] 92  Resp:  [16-18] 18  BP: ()/(66-83) 98/74    Flowsheet Rows      First Filed Value   Admission Height  167.6 cm (66\") Documented at 09/07/2019 1234   Admission Weight  64.5 kg (142 lb 3.2 oz) Documented at 09/07/2019 0438           No intake/output data recorded.  I/O last 3 completed shifts:  In: 720 [P.O.:720]  Out: 700 [Urine:700]    Intake/Output Summary (Last 24 hours) at 9/7/2019 2036  Last data filed at 9/7/2019 1900  Gross per 24 hour   Intake 720 ml   Output 700 ml   Net 20 ml       Physical Exam:     General Appearance:    Alert, cooperative, in no acute distress   Head:    Normocephalic, without obvious abnormality, atraumatic   Eyes:            Lids and lashes normal, conjunctivae and sclerae normal, no   icterus, no pallor, corneas clear, " PERRLA   Ears:    Ears appear intact with no abnormalities noted   Throat:   No oral lesions, no thrush, oral mucosa moist   Neck:   No adenopathy, supple, trachea midline, no thyromegaly, no   carotid bruit, no JVD   Back:     No kyphosis present, no scoliosis present, no skin lesions,      erythema or scars, no tenderness to percussion or                   palpation,   range of motion normal   Lungs:     Clear to auscultation,respirations regular, even and                  unlabored    Heart:    Regular rhythm and normal rate, normal S1 and S2, no            murmur, no gallop, no rub, no click   Chest Wall:    No abnormalities observed   Abdomen:     Normal bowel sounds, no masses, no organomegaly, soft        non-tender, non-distended, no guarding, no rebound                tenderness   Rectal:     Deferred   Extremities:   Moves all extremities well, no edema, no cyanosis, no             redness   Pulses:   Pulses palpable and equal bilaterally   Skin:   No bleeding, bruising or rash   Lymph nodes:   No palpable adenopathy   Neurologic:   Cranial nerves 2 - 12 grossly intact, sensation intact, DTR       present and equal bilaterally       Results Review:  Results for orders placed or performed during the hospital encounter of 09/06/19   Respiratory Culture - Sputum, Cough   Result Value Ref Range    Gram Stain       Rare (1+) Mixed bacterial morphotypes seen on Gram Stain    Gram Stain Rare (1+) WBCs per low power field     Gram Stain No epithelial cells seen    Urinalysis With Culture If Indicated - Urine, Clean Catch   Result Value Ref Range    Color, UA Yellow Yellow, Straw    Appearance, UA Clear Clear    pH, UA 5.5 5.0 - 8.0    Specific Gravity, UA 1.018 1.005 - 1.030    Glucose, UA Negative Negative    Ketones, UA Negative Negative    Bilirubin, UA Negative Negative    Blood, UA Negative Negative    Protein, UA 30 mg/dL (1+) (A) Negative    Leuk Esterase, UA Trace (A) Negative    Nitrite, UA Negative  Negative    Urobilinogen, UA 0.2 E.U./dL 0.2 - 1.0 E.U./dL   Platelet Function ADP   Result Value Ref Range    ADP Aggregation, % Platelet 86 %   aPTT   Result Value Ref Range    PTT 29.7 22.7 - 35.4 seconds   Lipid Panel   Result Value Ref Range    Total Cholesterol 174 0 - 200 mg/dL    Triglycerides 92 0 - 150 mg/dL    HDL Cholesterol 90 (H) 40 - 60 mg/dL    LDL Cholesterol  66 0 - 100 mg/dL    VLDL Cholesterol 18.4 5 - 40 mg/dL    LDL/HDL Ratio 0.73    BNP   Result Value Ref Range    proBNP 19,685.0 (H) 5.0 - 900.0 pg/mL   Hemoglobin A1c   Result Value Ref Range    Hemoglobin A1C 6.10 (H) 4.80 - 5.60 %   Magnesium   Result Value Ref Range    Magnesium 2.1 1.6 - 2.4 mg/dL   Comprehensive Metabolic Panel   Result Value Ref Range    Glucose 98 65 - 99 mg/dL    BUN 29 (H) 8 - 23 mg/dL    Creatinine 1.51 (H) 0.76 - 1.27 mg/dL    Sodium 130 (L) 136 - 145 mmol/L    Potassium 4.1 3.5 - 5.2 mmol/L    Chloride 87 (L) 98 - 107 mmol/L    CO2 28.3 22.0 - 29.0 mmol/L    Calcium 9.4 8.6 - 10.5 mg/dL    Total Protein 7.6 6.0 - 8.5 g/dL    Albumin 4.20 3.50 - 5.20 g/dL    ALT (SGPT) 70 (H) 1 - 41 U/L    AST (SGOT) 74 (H) 1 - 40 U/L    Alkaline Phosphatase 122 (H) 39 - 117 U/L    Total Bilirubin 0.4 0.2 - 1.2 mg/dL    eGFR Non African Amer 46 (L) >60 mL/min/1.73    Globulin 3.4 gm/dL    A/G Ratio 1.2 g/dL    BUN/Creatinine Ratio 19.2 7.0 - 25.0    Anion Gap 14.7 5.0 - 15.0 mmol/L   Protime-INR   Result Value Ref Range    Protime 12.8 11.7 - 14.2 Seconds    INR 0.99 0.90 - 1.10   CBC Auto Differential   Result Value Ref Range    WBC 8.50 3.40 - 10.80 10*3/mm3    RBC 3.94 (L) 4.14 - 5.80 10*6/mm3    Hemoglobin 12.1 (L) 13.0 - 17.7 g/dL    Hematocrit 37.2 (L) 37.5 - 51.0 %    MCV 94.4 79.0 - 97.0 fL    MCH 30.7 26.6 - 33.0 pg    MCHC 32.5 31.5 - 35.7 g/dL    RDW 13.4 12.3 - 15.4 %    RDW-SD 47.2 37.0 - 54.0 fl    MPV 9.8 6.0 - 12.0 fL    Platelets 289 140 - 450 10*3/mm3    Neutrophil % 59.3 42.7 - 76.0 %    Lymphocyte % 19.8 19.6 -  45.3 %    Monocyte % 9.4 5.0 - 12.0 %    Eosinophil % 10.1 (H) 0.3 - 6.2 %    Basophil % 0.6 0.0 - 1.5 %    Immature Grans % 0.8 (H) 0.0 - 0.5 %    Neutrophils, Absolute 5.04 1.70 - 7.00 10*3/mm3    Lymphocytes, Absolute 1.68 0.70 - 3.10 10*3/mm3    Monocytes, Absolute 0.80 0.10 - 0.90 10*3/mm3    Eosinophils, Absolute 0.86 (H) 0.00 - 0.40 10*3/mm3    Basophils, Absolute 0.05 0.00 - 0.20 10*3/mm3    Immature Grans, Absolute 0.07 (H) 0.00 - 0.05 10*3/mm3    nRBC 0.0 0.0 - 0.2 /100 WBC   Blood Gas, Arterial   Result Value Ref Range    Site Arterial: right radial     Brad's Test Positive     pH, Arterial 7.451 (H) 7.350 - 7.450 pH units    pCO2, Arterial 38.0 35.0 - 45.0 mm Hg    pO2, Arterial 64.7 (L) 80.0 - 100.0 mm Hg    HCO3, Arterial 26.4 22.0 - 28.0 mmol/L    Base Excess, Arterial 2.5 (H) 0.0 - 2.0 mmol/L    O2 Saturation Calculated 93.3 92.0 - 99.0 %    Barometric Pressure for Blood Gas 752.3 mmHg    Modality Room Air     Rate 16 Breaths/minute   aPTT   Result Value Ref Range    PTT 30.6 22.7 - 35.4 seconds   aPTT   Result Value Ref Range    PTT 37.9 (H) 22.7 - 35.4 seconds   Basic Metabolic Panel   Result Value Ref Range    Glucose 177 (H) 65 - 99 mg/dL    BUN 38 (H) 8 - 23 mg/dL    Creatinine 1.69 (H) 0.76 - 1.27 mg/dL    Sodium 128 (L) 136 - 145 mmol/L    Potassium 4.1 3.5 - 5.2 mmol/L    Chloride 88 (L) 98 - 107 mmol/L    CO2 27.1 22.0 - 29.0 mmol/L    Calcium 8.9 8.6 - 10.5 mg/dL    eGFR Non African Amer 40 (L) >60 mL/min/1.73    BUN/Creatinine Ratio 22.5 7.0 - 25.0    Anion Gap 12.9 5.0 - 15.0 mmol/L   Urinalysis, Microscopic Only - Urine, Clean Catch   Result Value Ref Range    RBC, UA 3-5 (A) None Seen, 0-2 /HPF    WBC, UA 3-5 (A) None Seen, 0-2 /HPF    Bacteria, UA None Seen None Seen /HPF    Squamous Epithelial Cells, UA 0-2 None Seen, 0-2 /HPF    Hyaline Casts, UA 7-12 None Seen /LPF    Methodology Manual Light Microscopy    POC Glucose Once   Result Value Ref Range    Glucose 115 70 - 130 mg/dL    Adult Transthoracic Echo Complete W/ Cont if Necessary Per Protocol   Result Value Ref Range    BSA 1.7 m^2    IVSd 0.9 cm    LVIDd 5.8 cm    LVIDs 5.2 cm    LVPWd 1.4 cm    IVS/LVPW 0.64     FS 10.3 %    EDV(Teich) 166.6 ml    ESV(Teich) 129.5 ml    EF(Teich) 22.2 %    EDV(cubed) 195.1 ml    ESV(cubed) 140.6 ml    EF(cubed) 27.9 %    LV mass(C)d 280.4 grams    LV mass(C)dI 162.2 grams/m^2    SV(Teich) 37.1 ml    SI(Teich) 21.4 ml/m^2    SV(cubed) 54.5 ml    SI(cubed) 31.5 ml/m^2    Ao root diam 3.1 cm    Ao root area 7.5 cm^2    ACS 1.9 cm    LVOT diam 2.0 cm    LVOT area 3.1 cm^2    LVOT area(traced) 3.1 cm^2    RVOT diam 2.0 cm    RVOT area 3.1 cm^2    LVLd ap4 9.5 cm    EDV(MOD-sp4) 203.0 ml    LVLs ap4 9.5 cm    ESV(MOD-sp4) 156.0 ml    EF(MOD-sp4) 23.2 %    LVLd ap2 9.2 cm    EDV(MOD-sp2) 168.0 ml    LVLs ap2 8.9 cm    ESV(MOD-sp2) 122.0 ml    EF(MOD-sp2) 27.4 %    SV(MOD-sp4) 47.0 ml    SI(MOD-sp4) 27.2 ml/m^2    SV(MOD-sp2) 46.0 ml    SI(MOD-sp2) 26.6 ml/m^2    Ao root area (BSA corrected) 1.8     LV Martinez Vol (BSA corrected) 117.4 ml/m^2    LV Sys Vol (BSA corrected) 90.2 ml/m^2    MV A dur 0.11 sec    MV E max mukund 85.4 cm/sec    MV A max mukund 77.6 cm/sec    MV E/A 1.1     MV V2 max 97.6 cm/sec    MV max PG 3.8 mmHg    MV V2 mean 59.0 cm/sec    MV mean PG 2.0 mmHg    MV V2 VTI 23.1 cm    MVA(VTI) 2.3 cm^2    MV P1/2t max mukund 93.7 cm/sec    MV P1/2t 54.5 msec    MVA(P1/2t) 4.0 cm^2    MV dec slope 504.0 cm/sec^2    MV dec time 0.15 sec    Ao pk mukund 106.0 cm/sec    Ao max PG 4.5 mmHg    Ao max PG (full) 0.33 mmHg    Ao V2 mean 57.6 cm/sec    Ao mean PG 2.0 mmHg    Ao mean PG (full) 0 mmHg    Ao V2 VTI 16.4 cm    ALICIA(I,A) 3.2 cm^2    ALICIA(I,D) 3.2 cm^2    ALICIA(V,A) 3.0 cm^2    ALICIA(V,D) 3.0 cm^2    LV V1 max PG 4.2 mmHg    LV V1 mean PG 2.0 mmHg    LV V1 max 102.0 cm/sec    LV V1 mean 54.3 cm/sec    LV V1 VTI 16.8 cm    SV(Ao) 123.8 ml    SI(Ao) 71.6 ml/m^2    SV(LVOT) 52.8 ml    SV(RVOT) 34.2 ml    SI(LVOT) 30.5  ml/m^2    PA V2 max 85.5 cm/sec    PA max PG 2.9 mmHg    PA max PG (full) 1.5 mmHg     CV ECHO MARCELINO - PVA(V,A) 2.2 cm^2     CV ECHO MARCELINO - PVA(V,D) 2.2 cm^2    PA acc time 0.07 sec    RV V1 max PG 1.5 mmHg    RV V1 mean PG 1.0 mmHg    RV V1 max 60.6 cm/sec    RV V1 mean 38.3 cm/sec    RV V1 VTI 10.9 cm    PA pr(Accel) 47.5 mmHg    Pulm Sys Edwin 51.9 cm/sec    Pulm Martinez Edwin 46.5 cm/sec    Pulm S/D 1.1     Qp/Qs 0.65     Pulm A Revs Dur 0.14 sec    Pulm A Revs Edwin 22.7 cm/sec    MVA P1/2T LCG 2.3 cm^2     CV ECHO MARCELINO - BZI_BMI 22.9 kilograms/m^2     CV ECHO MARCELINO - BSA(HAYCOCK) 1.7 m^2     CV ECHO MARCELINO - BZI_METRIC_WEIGHT 64.4 kg     CV ECHO MARCELINO - BZI_METRIC_HEIGHT 167.6 cm    Target HR (85%) 128 bpm    Max. Pred. HR (100%) 151 bpm     CV VAS BP RIGHT ARM 99/66 mmHg    TDI S' 8.00 cm/sec    RV Base 3.70 cm    LA Volume Index 37.0 mL/m2    Avg E/e' ratio 13.14     EF(MOD-bp) 25.0 %    Lat Peak E' Edwin 8.0 cm/sec    Med Peak E' Edwin 5.00 cm/sec    TAPSE (>1.6) 1.40 cm2   Duplex Vein Mapping Lower Extremity - Bilateral CAR   Result Value Ref Range    GSV ORIGIN DIST RIGHT 0.36 cm    DIST GSV THIGH DIST RIGHT 0.13 cm    MID GSV THIGH  RIGHT 0.17 cm    PROX GSV THIGH  RIGHT 0.31 cm    GSV KNEE DIST RIGHT 0.17 cm    PROX GSV CALF DIST RIGHT 0.17 cm    DIST GSV CALF DIST RIGHT 0.14 cm    GSV ANKLE DIST RIGHT 0.10 cm    PROX LSV CALF DIST RIGHT 0.12 cm    MID LSV CALF DIST RIGHT 0.09 cm    GSV ORIGIN DIST LEFT 0.60 cm    DIST GSV THIGH DIST LEFT 0.22 cm    MID GSV THIGH  LEFT 0.24 cm    PROX GSV THIGH  LEFT 0.27 cm    GSV KNEE DIST LEFT 0.15 cm    PROX GSV CALF DIST LEFT 0.13 cm    DIST GSV CALF DIST LEFT 0.16 cm    GSV ANKLE DIST LEFT 0.12 cm    PROX LSV CALF DIST LEFT 0.12 cm    MID LSV CALF DIST LEFT 0.07 cm    MID GSV CALF RIGHT 0.20 cm    MID GSV CALF LEFT 0.15 cm   Carotid Duplex - Bilateral   Result Value Ref Range    Prox CCA PSV 56.5 cm/sec    Prox CCA PSV 58.5 cm/sec    Prox CCA EDV 11.7 cm/sec    Prox  CCA EDV 14.5 cm/sec    Dist CCA PSV 48.1 cm/sec    Dist CCA PSV 36.9 cm/sec    Dist CCA EDV 17.1 cm/sec    Dist CCA EDV 12.1 cm/sec    Prox ECA PSV -80.9 cm/sec    Prox ECA PSV -62.9 cm/sec    Prox ECA EDV -18.8 cm/sec    Prox ECA EDV -13.8 cm/sec    Prox ICA PSV -43.3 cm/sec    Prox ICA PSV 33.8 cm/sec    Prox ICA EDV -21.0 cm/sec    Prox ICA EDV 11.7 cm/sec    Mid ICA PSV -49.9 cm/sec    Mid ICA PSV -62.5 cm/sec    Mid ICA EDV -23.2 cm/sec    Mid ICA EDV -25.5 cm/sec    Dist ICA PSV -42.9 cm/sec    Dist ICA EDV -17.1 cm/sec    Vertebral A PSV 33.6 cm/sec    Vertebral A PSV 30.4 cm/sec    Vertebral A EDV 14.1 cm/sec    Vertebral A EDV 9.8 cm/sec    Prox SCLA PSV 57.6 cm/sec    Prox SCLA PSV 21.6 cm/sec    ICA/CCA ratio 1.7     Dist ICA PSV 48 cm/sec    Dist ICA EDV 17 cm/sec    ICA/CCA ratio 1     Right arm BP 90/60 mmHg     Imaging Results (last 72 hours)     Procedure Component Value Units Date/Time    US Renal Bilateral [[id number]] Collected:  09/07/19 2034     Updated:  09/07/19 2034    Narrative:       BILATERAL RENAL ULTRASOUND     CLINICAL HISTORY: carlos; I25.119-Atherosclerotic heart disease of native  coronary artery with unspecified angina pectoris     FINDINGS: Longitudinal and transverse images of the kidneys were  obtained. The right kidney measures 11.3 cm in length. The left kidney  measures 11.5 cm in length. There is an 11 mm upper pole right renal  cyst. There is no solid mass. There is no hydronephrosis. Limited  urinary bladder images are unremarkable.          Impression:       11 mm upper pole right renal cyst.          CT Chest Without Contrast [[id number]] Collected:  09/07/19 2026     Updated:  09/07/19 2026    Narrative:       THORACIC CT SCAN WITHOUT CONTRAST     HISTORY: COPD exacerbation, complicated     COMPARISON: None.     TECHNIQUE:  Radiation dose reduction techniques were utilized, including  automated exposure control and exposure modulation based on body size.  Axial images of the  thorax obtained without IV contrast, per request.     FINDINGS: Lungs well inflated. Minimal emphysema and fibrosis. No active  airspace disease, edema, effusion. Although performed without contrast,  there is mild mediastinal adenopathy. AP window node on image 38  measures 2.2 cm. Precarinal node on image 38 measures 2.3 cm. Subcarinal  node on image 47 measures 2.4 cm. This may be reactive or neoplastic.  Follow-up will be needed. Aorta nonaneurysmal. Heart size at the upper  spectrum of normal with small pericardial effusion present. 1 cm upper  pole right renal lesion favored to reflect tiny cyst. Assessment limited  without contrast.     Unenhanced images of the included upper abdomen are otherwise  unremarkable.       Impression:       1. Minimal emphysema and fibrosis, no active airspace disease.  2. Mild nonspecific mediastinal adenopathy which will require follow-up.  3. Small pericardial effusion.  4. Probable upper pole right renal cyst                    .        XR Chest PA & Lateral [[id number]] Collected:  09/07/19 1041     Updated:  09/07/19 1205    Narrative:       TWO-VIEW CHEST     HISTORY: Preop for cardiac surgery. Chest pain.     FINDINGS: The lungs are well-expanded and clear and the heart is  top-normal in size. There is no acute disease.     This report was finalized on 9/7/2019 12:02 PM by Dr. David Linda M.D.                 aspirin 81 mg Oral Daily   atorvastatin 20 mg Oral Nightly   azithromycin 500 mg Oral Q24H   cetirizine 5 mg Oral Daily   furosemide 20 mg Oral Daily   ipratropium-albuterol 3 mL Nebulization 6x Daily   methylPREDNISolone sodium succinate 40 mg Intravenous Q8H   sertraline 25 mg Oral Daily   spironolactone 25 mg Oral Daily       heparin (porcine) 500 Units/hr Last Rate: 500 Units/hr (09/07/19 0758)       Assessment/Plan       Coronary artery disease involving native coronary artery of native heart with angina pectoris (CMS/HCC)    COPD (chronic obstructive  pulmonary disease) with chronic bronchitis (CMS/[facility])    1.  Acute kidney injury:  This could be related to contrast from cardiac catherization yesterday.  He may also have underlying nephrosclerosis.  Urinalysis and kidney ultrasound have been ordered.  Repeat bmp today to see if he needs IVF, his blood pressure is relatively low.  2.  Multivessel CAD:  Plans for CABG under the care of Dr. Mazariegos.  3.  Tobacco abuse:  I counseled him on cessation.    4.  Type 2 diabetes on metformin  5.  Relative low blood pressure:  Will stop lasix and aldactone for now.    I discussed the patients findings and my recommendations with patient    Alma Luz Arndt MD  09/07/19  8:36 PM      Much of this encounter note is an electronic transcription/translation of spoken language to printed text. The electronic translation of spoken language may permit erroneous, or at times, nonsensical words or phrases to be inadvertently transcribed; Although I have reviewed the note for such errors, some may still exist

## 2019-09-08 NOTE — ANESTHESIA PREPROCEDURE EVALUATION
Anesthesia Evaluation                  Airway   Mallampati: II  Dental      Pulmonary - normal exam   (+) a smoker Current, COPD,   (-) sleep apnea  Cardiovascular - normal exam    (+) hypertension, CAD, angina, CHF, hyperlipidemia,       Neuro/Psych  GI/Hepatic/Renal/Endo    (+)  GERD,  diabetes mellitus type 2,     Musculoskeletal     Abdominal    Substance History      OB/GYN          Other                        Anesthesia Plan    ASA 4     general     Anesthetic plan, all risks, benefits, and alternatives have been provided, discussed and informed consent has been obtained with: patient.

## 2019-09-09 ENCOUNTER — APPOINTMENT (OUTPATIENT)
Dept: GENERAL RADIOLOGY | Facility: HOSPITAL | Age: 69
End: 2019-09-09

## 2019-09-09 ENCOUNTER — ANESTHESIA (OUTPATIENT)
Dept: PERIOP | Facility: HOSPITAL | Age: 69
End: 2019-09-09

## 2019-09-09 ENCOUNTER — ANCILLARY PROCEDURE (OUTPATIENT)
Dept: PERIOP | Facility: HOSPITAL | Age: 69
End: 2019-09-09

## 2019-09-09 LAB
ACT BLD: 103 SECONDS (ref 82–152)
ACT BLD: 109 SECONDS (ref 82–152)
ACT BLD: 312 SECONDS (ref 82–152)
ACT BLD: 378 SECONDS (ref 82–152)
ACT BLD: 450 SECONDS (ref 82–152)
ALBUMIN SERPL-MCNC: 3.8 G/DL (ref 3.5–5.2)
ALBUMIN SERPL-MCNC: 3.9 G/DL (ref 3.5–5.2)
ANION GAP SERPL CALCULATED.3IONS-SCNC: 11.6 MMOL/L (ref 5–15)
ANION GAP SERPL CALCULATED.3IONS-SCNC: 12.9 MMOL/L (ref 5–15)
ANION GAP SERPL CALCULATED.3IONS-SCNC: 13.4 MMOL/L (ref 5–15)
APTT PPP: 28.7 SECONDS (ref 22.7–35.4)
APTT PPP: 29.3 SECONDS (ref 22.7–35.4)
APTT PPP: 37 SECONDS (ref 22.7–35.4)
ARTERIAL PATENCY WRIST A: ABNORMAL
ARTERIAL PATENCY WRIST A: POSITIVE
ATMOSPHERIC PRESS: 754.8 MMHG
ATMOSPHERIC PRESS: 755.1 MMHG
ATMOSPHERIC PRESS: 756.1 MMHG
ATMOSPHERIC PRESS: 757.8 MMHG
BACTERIA SPEC RESP CULT: NORMAL
BASE EXCESS BLDA CALC-SCNC: -1.2 MMOL/L (ref 0–2)
BASE EXCESS BLDA CALC-SCNC: -1.2 MMOL/L (ref 0–2)
BASE EXCESS BLDA CALC-SCNC: -1.7 MMOL/L (ref 0–2)
BASE EXCESS BLDA CALC-SCNC: -1.9 MMOL/L (ref 0–2)
BASOPHILS # BLD AUTO: 0.04 10*3/MM3 (ref 0–0.2)
BASOPHILS NFR BLD AUTO: 0.2 % (ref 0–1.5)
BDY SITE: ABNORMAL
BUN BLD-MCNC: 29 MG/DL (ref 8–23)
BUN BLD-MCNC: 31 MG/DL (ref 8–23)
BUN BLD-MCNC: 37 MG/DL (ref 8–23)
BUN/CREAT SERPL: 22.5 (ref 7–25)
BUN/CREAT SERPL: 24.4 (ref 7–25)
BUN/CREAT SERPL: 30.8 (ref 7–25)
CA-I BLD-MCNC: 5 MG/DL (ref 4.6–5.4)
CA-I SERPL ISE-MCNC: 1.25 MMOL/L (ref 1.15–1.35)
CALCIUM SPEC-SCNC: 8 MG/DL (ref 8.6–10.5)
CALCIUM SPEC-SCNC: 8.1 MG/DL (ref 8.6–10.5)
CALCIUM SPEC-SCNC: 8.7 MG/DL (ref 8.6–10.5)
CHLORIDE SERPL-SCNC: 101 MMOL/L (ref 98–107)
CHLORIDE SERPL-SCNC: 102 MMOL/L (ref 98–107)
CHLORIDE SERPL-SCNC: 94 MMOL/L (ref 98–107)
CO2 SERPL-SCNC: 23.1 MMOL/L (ref 22–29)
CO2 SERPL-SCNC: 23.4 MMOL/L (ref 22–29)
CO2 SERPL-SCNC: 23.6 MMOL/L (ref 22–29)
CREAT BLD-MCNC: 1.2 MG/DL (ref 0.76–1.27)
CREAT BLD-MCNC: 1.27 MG/DL (ref 0.76–1.27)
CREAT BLD-MCNC: 1.29 MG/DL (ref 0.76–1.27)
DEPRECATED RDW RBC AUTO: 46.9 FL (ref 37–54)
DEPRECATED RDW RBC AUTO: 48.7 FL (ref 37–54)
DEPRECATED RDW RBC AUTO: 48.7 FL (ref 37–54)
DEPRECATED RDW RBC AUTO: 48.8 FL (ref 37–54)
EOSINOPHIL # BLD AUTO: 0.46 10*3/MM3 (ref 0–0.4)
EOSINOPHIL NFR BLD AUTO: 2.8 % (ref 0.3–6.2)
ERYTHROCYTE [DISTWIDTH] IN BLOOD BY AUTOMATED COUNT: 13.5 % (ref 12.3–15.4)
ERYTHROCYTE [DISTWIDTH] IN BLOOD BY AUTOMATED COUNT: 13.7 % (ref 12.3–15.4)
ERYTHROCYTE [DISTWIDTH] IN BLOOD BY AUTOMATED COUNT: 13.8 % (ref 12.3–15.4)
ERYTHROCYTE [DISTWIDTH] IN BLOOD BY AUTOMATED COUNT: 13.9 % (ref 12.3–15.4)
FIBRINOGEN PPP-MCNC: 273 MG/DL (ref 219–464)
GAS FLOW AIRWAY: 3 LPM
GAS FLOW AIRWAY: 5 LPM
GFR SERPL CREATININE-BSD FRML MDRD: 55 ML/MIN/1.73
GFR SERPL CREATININE-BSD FRML MDRD: 56 ML/MIN/1.73
GFR SERPL CREATININE-BSD FRML MDRD: 60 ML/MIN/1.73
GLUCOSE BLD-MCNC: 109 MG/DL (ref 65–99)
GLUCOSE BLD-MCNC: 133 MG/DL (ref 65–99)
GLUCOSE BLD-MCNC: 155 MG/DL (ref 65–99)
GLUCOSE BLDC GLUCOMTR-MCNC: 121 MG/DL (ref 70–130)
GLUCOSE BLDC GLUCOMTR-MCNC: 121 MG/DL (ref 70–130)
GLUCOSE BLDC GLUCOMTR-MCNC: 123 MG/DL (ref 70–130)
GLUCOSE BLDC GLUCOMTR-MCNC: 130 MG/DL (ref 70–130)
GLUCOSE BLDC GLUCOMTR-MCNC: 132 MG/DL (ref 70–130)
GLUCOSE BLDC GLUCOMTR-MCNC: 136 MG/DL (ref 70–130)
GLUCOSE BLDC GLUCOMTR-MCNC: 151 MG/DL (ref 70–130)
GLUCOSE BLDC GLUCOMTR-MCNC: 169 MG/DL (ref 70–130)
GLUCOSE BLDC GLUCOMTR-MCNC: 96 MG/DL (ref 70–130)
GRAM STN SPEC: NORMAL
HCO3 BLDA-SCNC: 23 MMOL/L (ref 22–28)
HCO3 BLDA-SCNC: 24.3 MMOL/L (ref 22–28)
HCO3 BLDA-SCNC: 24.4 MMOL/L (ref 22–28)
HCO3 BLDA-SCNC: 24.5 MMOL/L (ref 22–28)
HCT VFR BLD AUTO: 26.9 % (ref 37.5–51)
HCT VFR BLD AUTO: 29 % (ref 37.5–51)
HCT VFR BLD AUTO: 29.8 % (ref 37.5–51)
HCT VFR BLD AUTO: 31.8 % (ref 37.5–51)
HGB BLD-MCNC: 10.2 G/DL (ref 13–17.7)
HGB BLD-MCNC: 8.4 G/DL (ref 13–17.7)
HGB BLD-MCNC: 9.2 G/DL (ref 13–17.7)
HGB BLD-MCNC: 9.4 G/DL (ref 13–17.7)
HOROWITZ INDEX BLD+IHG-RTO: 100 %
HOROWITZ INDEX BLD+IHG-RTO: 40 %
IMM GRANULOCYTES # BLD AUTO: 0.31 10*3/MM3 (ref 0–0.05)
IMM GRANULOCYTES NFR BLD AUTO: 1.9 % (ref 0–0.5)
INR PPP: 1.35 (ref 0.9–1.1)
LYMPHOCYTES # BLD AUTO: 1.93 10*3/MM3 (ref 0.7–3.1)
LYMPHOCYTES NFR BLD AUTO: 11.7 % (ref 19.6–45.3)
MAGNESIUM SERPL-MCNC: 2.2 MG/DL (ref 1.6–2.4)
MAGNESIUM SERPL-MCNC: 2.7 MG/DL (ref 1.6–2.4)
MCH RBC QN AUTO: 30.5 PG (ref 26.6–33)
MCH RBC QN AUTO: 30.7 PG (ref 26.6–33)
MCH RBC QN AUTO: 30.7 PG (ref 26.6–33)
MCH RBC QN AUTO: 30.8 PG (ref 26.6–33)
MCHC RBC AUTO-ENTMCNC: 31.2 G/DL (ref 31.5–35.7)
MCHC RBC AUTO-ENTMCNC: 31.5 G/DL (ref 31.5–35.7)
MCHC RBC AUTO-ENTMCNC: 31.7 G/DL (ref 31.5–35.7)
MCHC RBC AUTO-ENTMCNC: 32.1 G/DL (ref 31.5–35.7)
MCV RBC AUTO: 95.2 FL (ref 79–97)
MCV RBC AUTO: 96.7 FL (ref 79–97)
MCV RBC AUTO: 97.4 FL (ref 79–97)
MCV RBC AUTO: 98.5 FL (ref 79–97)
MODALITY: ABNORMAL
MONOCYTES # BLD AUTO: 1.15 10*3/MM3 (ref 0.1–0.9)
MONOCYTES NFR BLD AUTO: 7 % (ref 5–12)
NEUTROPHILS # BLD AUTO: 12.6 10*3/MM3 (ref 1.7–7)
NEUTROPHILS NFR BLD AUTO: 76.4 % (ref 42.7–76)
NRBC BLD AUTO-RTO: 0 /100 WBC (ref 0–0.2)
O2 A-A PPRESDIFF RESPIRATORY: 0.5 MMHG
O2 A-A PPRESDIFF RESPIRATORY: 0.7 MMHG
PCO2 BLDA: 38.7 MM HG (ref 35–45)
PCO2 BLDA: 44.3 MM HG (ref 35–45)
PCO2 BLDA: 44.4 MM HG (ref 35–45)
PCO2 BLDA: 45.7 MM HG (ref 35–45)
PEEP RESPIRATORY: 5 CM[H2O]
PEEP RESPIRATORY: 5 CM[H2O]
PH BLDA: 7.33 PH UNITS (ref 7.35–7.45)
PH BLDA: 7.35 PH UNITS (ref 7.35–7.45)
PH BLDA: 7.35 PH UNITS (ref 7.35–7.45)
PH BLDA: 7.38 PH UNITS (ref 7.35–7.45)
PHOSPHATE SERPL-MCNC: 3.9 MG/DL (ref 2.5–4.5)
PHOSPHATE SERPL-MCNC: 4.1 MG/DL (ref 2.5–4.5)
PLATELET # BLD AUTO: 189 10*3/MM3 (ref 140–450)
PLATELET # BLD AUTO: 190 10*3/MM3 (ref 140–450)
PLATELET # BLD AUTO: 202 10*3/MM3 (ref 140–450)
PLATELET # BLD AUTO: 279 10*3/MM3 (ref 140–450)
PMV BLD AUTO: 10 FL (ref 6–12)
PMV BLD AUTO: 10.3 FL (ref 6–12)
PO2 BLDA: 106.8 MM HG (ref 80–100)
PO2 BLDA: 114.4 MM HG (ref 80–100)
PO2 BLDA: 124.7 MM HG (ref 80–100)
PO2 BLDA: 458.6 MM HG (ref 80–100)
POTASSIUM BLD-SCNC: 3.8 MMOL/L (ref 3.5–5.2)
POTASSIUM BLD-SCNC: 3.8 MMOL/L (ref 3.5–5.2)
POTASSIUM BLD-SCNC: 4 MMOL/L (ref 3.5–5.2)
POTASSIUM BLD-SCNC: 4.4 MMOL/L (ref 3.5–5.2)
PROTHROMBIN TIME: 16.4 SECONDS (ref 11.7–14.2)
PSV: 8 CMH2O
RBC # BLD AUTO: 2.73 10*6/MM3 (ref 4.14–5.8)
RBC # BLD AUTO: 3 10*6/MM3 (ref 4.14–5.8)
RBC # BLD AUTO: 3.06 10*6/MM3 (ref 4.14–5.8)
RBC # BLD AUTO: 3.34 10*6/MM3 (ref 4.14–5.8)
SAO2 % BLDCOA: 100 % (ref 92–99)
SAO2 % BLDCOA: 98.1 % (ref 92–99)
SAO2 % BLDCOA: 98.2 % (ref 92–99)
SAO2 % BLDCOA: 98.6 % (ref 92–99)
SET MECH RESP RATE: 12
SET MECH RESP RATE: 12
SET MECH RESP RATE: 16
SODIUM BLD-SCNC: 131 MMOL/L (ref 136–145)
SODIUM BLD-SCNC: 136 MMOL/L (ref 136–145)
SODIUM BLD-SCNC: 138 MMOL/L (ref 136–145)
TOTAL RATE: 14 BREATHS/MINUTE
TOTAL RATE: 18 BREATHS/MINUTE
VENTILATOR MODE: ABNORMAL
VENTILATOR MODE: AC
VT ON VENT VENT: 550 ML
VT ON VENT VENT: 710 ML
WBC NRBC COR # BLD: 13.87 10*3/MM3 (ref 3.4–10.8)
WBC NRBC COR # BLD: 16.49 10*3/MM3 (ref 3.4–10.8)
WBC NRBC COR # BLD: 16.9 10*3/MM3 (ref 3.4–10.8)
WBC NRBC COR # BLD: 9 10*3/MM3 (ref 3.4–10.8)

## 2019-09-09 PROCEDURE — 94799 UNLISTED PULMONARY SVC/PX: CPT

## 2019-09-09 PROCEDURE — P9041 ALBUMIN (HUMAN),5%, 50ML: HCPCS | Performed by: INTERNAL MEDICINE

## 2019-09-09 PROCEDURE — 25010000002 ALBUMIN HUMAN 5% PER 50 ML: Performed by: INTERNAL MEDICINE

## 2019-09-09 PROCEDURE — 80048 BASIC METABOLIC PNL TOTAL CA: CPT | Performed by: THORACIC SURGERY (CARDIOTHORACIC VASCULAR SURGERY)

## 2019-09-09 PROCEDURE — 25010000002 EPINEPHRINE PER 0.1 MG: Performed by: ANESTHESIOLOGY

## 2019-09-09 PROCEDURE — 85018 HEMOGLOBIN: CPT

## 2019-09-09 PROCEDURE — 82330 ASSAY OF CALCIUM: CPT | Performed by: THORACIC SURGERY (CARDIOTHORACIC VASCULAR SURGERY)

## 2019-09-09 PROCEDURE — 85730 THROMBOPLASTIN TIME PARTIAL: CPT | Performed by: THORACIC SURGERY (CARDIOTHORACIC VASCULAR SURGERY)

## 2019-09-09 PROCEDURE — 25010000002 AMIODARONE IN DEXTROSE 5% 360-4.14 MG/200ML-% SOLUTION

## 2019-09-09 PROCEDURE — 85027 COMPLETE CBC AUTOMATED: CPT | Performed by: THORACIC SURGERY (CARDIOTHORACIC VASCULAR SURGERY)

## 2019-09-09 PROCEDURE — 85014 HEMATOCRIT: CPT

## 2019-09-09 PROCEDURE — 02B70ZK EXCISION OF LEFT ATRIAL APPENDAGE, OPEN APPROACH: ICD-10-PCS | Performed by: THORACIC SURGERY (CARDIOTHORACIC VASCULAR SURGERY)

## 2019-09-09 PROCEDURE — 25010000002 HEPARIN (PORCINE) PER 1000 UNITS: Performed by: THORACIC SURGERY (CARDIOTHORACIC VASCULAR SURGERY)

## 2019-09-09 PROCEDURE — 25010000002 METOCLOPRAMIDE PER 10 MG: Performed by: THORACIC SURGERY (CARDIOTHORACIC VASCULAR SURGERY)

## 2019-09-09 PROCEDURE — 06BQ4ZZ EXCISION OF LEFT SAPHENOUS VEIN, PERCUTANEOUS ENDOSCOPIC APPROACH: ICD-10-PCS | Performed by: THORACIC SURGERY (CARDIOTHORACIC VASCULAR SURGERY)

## 2019-09-09 PROCEDURE — C1729 CATH, DRAINAGE: HCPCS | Performed by: THORACIC SURGERY (CARDIOTHORACIC VASCULAR SURGERY)

## 2019-09-09 PROCEDURE — 25010000002 PROTAMINE SULFATE PER 10 MG: Performed by: ANESTHESIOLOGY

## 2019-09-09 PROCEDURE — 93005 ELECTROCARDIOGRAM TRACING: CPT | Performed by: THORACIC SURGERY (CARDIOTHORACIC VASCULAR SURGERY)

## 2019-09-09 PROCEDURE — 84132 ASSAY OF SERUM POTASSIUM: CPT | Performed by: THORACIC SURGERY (CARDIOTHORACIC VASCULAR SURGERY)

## 2019-09-09 PROCEDURE — 80069 RENAL FUNCTION PANEL: CPT | Performed by: THORACIC SURGERY (CARDIOTHORACIC VASCULAR SURGERY)

## 2019-09-09 PROCEDURE — 99232 SBSQ HOSP IP/OBS MODERATE 35: CPT | Performed by: INTERNAL MEDICINE

## 2019-09-09 PROCEDURE — 25010000002 MAGNESIUM SULFATE IN D5W 1G/100ML (PREMIX) 1-5 GM/100ML-% SOLUTION: Performed by: THORACIC SURGERY (CARDIOTHORACIC VASCULAR SURGERY)

## 2019-09-09 PROCEDURE — 33508 ENDOSCOPIC VEIN HARVEST: CPT | Performed by: PHYSICIAN ASSISTANT

## 2019-09-09 PROCEDURE — 93318 ECHO TRANSESOPHAGEAL INTRAOP: CPT | Performed by: ANESTHESIOLOGY

## 2019-09-09 PROCEDURE — 02100Z9 BYPASS CORONARY ARTERY, ONE ARTERY FROM LEFT INTERNAL MAMMARY, OPEN APPROACH: ICD-10-PCS | Performed by: THORACIC SURGERY (CARDIOTHORACIC VASCULAR SURGERY)

## 2019-09-09 PROCEDURE — 25010000002 AMIODARONE IN DEXTROSE 5% 360-4.14 MG/200ML-% SOLUTION: Performed by: THORACIC SURGERY (CARDIOTHORACIC VASCULAR SURGERY)

## 2019-09-09 PROCEDURE — 25010000002 MIDAZOLAM PER 1 MG: Performed by: ANESTHESIOLOGY

## 2019-09-09 PROCEDURE — 63710000001 INSULIN REGULAR HUMAN PER 5 UNITS: Performed by: ANESTHESIOLOGY

## 2019-09-09 PROCEDURE — 71045 X-RAY EXAM CHEST 1 VIEW: CPT

## 2019-09-09 PROCEDURE — 25010000002 FENTANYL CITRATE (PF) 100 MCG/2ML SOLUTION: Performed by: ANESTHESIOLOGY

## 2019-09-09 PROCEDURE — 5A1221Z PERFORMANCE OF CARDIAC OUTPUT, CONTINUOUS: ICD-10-PCS | Performed by: THORACIC SURGERY (CARDIOTHORACIC VASCULAR SURGERY)

## 2019-09-09 PROCEDURE — 33518 CABG ARTERY-VEIN TWO: CPT | Performed by: PHYSICIAN ASSISTANT

## 2019-09-09 PROCEDURE — 33533 CABG ARTERIAL SINGLE: CPT | Performed by: PHYSICIAN ASSISTANT

## 2019-09-09 PROCEDURE — 85347 COAGULATION TIME ACTIVATED: CPT

## 2019-09-09 PROCEDURE — 93010 ELECTROCARDIOGRAM REPORT: CPT | Performed by: INTERNAL MEDICINE

## 2019-09-09 PROCEDURE — 33508 ENDOSCOPIC VEIN HARVEST: CPT | Performed by: THORACIC SURGERY (CARDIOTHORACIC VASCULAR SURGERY)

## 2019-09-09 PROCEDURE — 25010000002 HEPARIN (PORCINE) PER 1000 UNITS

## 2019-09-09 PROCEDURE — 25010000002 METHYLPREDNISOLONE PER 40 MG: Performed by: INTERNAL MEDICINE

## 2019-09-09 PROCEDURE — 85384 FIBRINOGEN ACTIVITY: CPT | Performed by: THORACIC SURGERY (CARDIOTHORACIC VASCULAR SURGERY)

## 2019-09-09 PROCEDURE — 82947 ASSAY GLUCOSE BLOOD QUANT: CPT

## 2019-09-09 PROCEDURE — 82803 BLOOD GASES ANY COMBINATION: CPT

## 2019-09-09 PROCEDURE — 33518 CABG ARTERY-VEIN TWO: CPT | Performed by: THORACIC SURGERY (CARDIOTHORACIC VASCULAR SURGERY)

## 2019-09-09 PROCEDURE — 82962 GLUCOSE BLOOD TEST: CPT

## 2019-09-09 PROCEDURE — 25010000002 PHENYLEPHRINE PER 1 ML: Performed by: ANESTHESIOLOGY

## 2019-09-09 PROCEDURE — A4648 IMPLANTABLE TISSUE MARKER: HCPCS | Performed by: THORACIC SURGERY (CARDIOTHORACIC VASCULAR SURGERY)

## 2019-09-09 PROCEDURE — P9047 ALBUMIN (HUMAN), 25%, 50ML: HCPCS

## 2019-09-09 PROCEDURE — C1713 ANCHOR/SCREW BN/BN,TIS/BN: HCPCS | Performed by: THORACIC SURGERY (CARDIOTHORACIC VASCULAR SURGERY)

## 2019-09-09 PROCEDURE — 25010000002 PAPAVERINE PER 60 MG: Performed by: THORACIC SURGERY (CARDIOTHORACIC VASCULAR SURGERY)

## 2019-09-09 PROCEDURE — C1751 CATH, INF, PER/CENT/MIDLINE: HCPCS | Performed by: ANESTHESIOLOGY

## 2019-09-09 PROCEDURE — 25010000002 ALBUMIN HUMAN 25% PER 50 ML

## 2019-09-09 PROCEDURE — 25010000002 PROPOFOL 10 MG/ML EMULSION: Performed by: ANESTHESIOLOGY

## 2019-09-09 PROCEDURE — 94002 VENT MGMT INPAT INIT DAY: CPT

## 2019-09-09 PROCEDURE — 25010000003 CEFAZOLIN 1-4 GM/50ML-% SOLUTION: Performed by: ANESTHESIOLOGY

## 2019-09-09 PROCEDURE — 25010000002 HEPARIN (PORCINE) PER 1000 UNITS: Performed by: ANESTHESIOLOGY

## 2019-09-09 PROCEDURE — 25010000003 CEFAZOLIN IN DEXTROSE 2-4 GM/100ML-% SOLUTION: Performed by: THORACIC SURGERY (CARDIOTHORACIC VASCULAR SURGERY)

## 2019-09-09 PROCEDURE — 85025 COMPLETE CBC W/AUTO DIFF WBC: CPT | Performed by: THORACIC SURGERY (CARDIOTHORACIC VASCULAR SURGERY)

## 2019-09-09 PROCEDURE — 33533 CABG ARTERIAL SINGLE: CPT | Performed by: THORACIC SURGERY (CARDIOTHORACIC VASCULAR SURGERY)

## 2019-09-09 PROCEDURE — 83735 ASSAY OF MAGNESIUM: CPT | Performed by: THORACIC SURGERY (CARDIOTHORACIC VASCULAR SURGERY)

## 2019-09-09 PROCEDURE — 021109W BYPASS CORONARY ARTERY, TWO ARTERIES FROM AORTA WITH AUTOLOGOUS VENOUS TISSUE, OPEN APPROACH: ICD-10-PCS | Performed by: THORACIC SURGERY (CARDIOTHORACIC VASCULAR SURGERY)

## 2019-09-09 PROCEDURE — 25010000002 ONDANSETRON PER 1 MG: Performed by: ANESTHESIOLOGY

## 2019-09-09 PROCEDURE — 85610 PROTHROMBIN TIME: CPT | Performed by: THORACIC SURGERY (CARDIOTHORACIC VASCULAR SURGERY)

## 2019-09-09 PROCEDURE — 25010000002 MORPHINE PER 10 MG: Performed by: THORACIC SURGERY (CARDIOTHORACIC VASCULAR SURGERY)

## 2019-09-09 DEVICE — SS SUTURE, 4 PER SLEEVE
Type: IMPLANTABLE DEVICE | Site: STERNUM | Status: FUNCTIONAL
Brand: MYO/WIRE II

## 2019-09-09 RX ORDER — POTASSIUM CHLORIDE 1.5 G/1.77G
40 POWDER, FOR SOLUTION ORAL AS NEEDED
Status: DISCONTINUED | OUTPATIENT
Start: 2019-09-09 | End: 2019-09-15 | Stop reason: HOSPADM

## 2019-09-09 RX ORDER — SODIUM CHLORIDE, SODIUM LACTATE, POTASSIUM CHLORIDE, CALCIUM CHLORIDE 600; 310; 30; 20 MG/100ML; MG/100ML; MG/100ML; MG/100ML
INJECTION, SOLUTION INTRAVENOUS CONTINUOUS PRN
Status: DISCONTINUED | OUTPATIENT
Start: 2019-09-09 | End: 2019-09-09 | Stop reason: SURG

## 2019-09-09 RX ORDER — ASPIRIN 325 MG
325 TABLET, DELAYED RELEASE (ENTERIC COATED) ORAL DAILY
Status: DISCONTINUED | OUTPATIENT
Start: 2019-09-10 | End: 2019-09-15 | Stop reason: HOSPADM

## 2019-09-09 RX ORDER — SODIUM CHLORIDE FOR INHALATION 0.9 %
3 VIAL, NEBULIZER (ML) INHALATION
Status: DISCONTINUED | OUTPATIENT
Start: 2019-09-09 | End: 2019-09-10

## 2019-09-09 RX ORDER — POTASSIUM CHLORIDE 29.8 MG/ML
20 INJECTION INTRAVENOUS
Status: DISCONTINUED | OUTPATIENT
Start: 2019-09-09 | End: 2019-09-15 | Stop reason: HOSPADM

## 2019-09-09 RX ORDER — MORPHINE SULFATE 2 MG/ML
4 INJECTION, SOLUTION INTRAMUSCULAR; INTRAVENOUS
Status: DISCONTINUED | OUTPATIENT
Start: 2019-09-09 | End: 2019-09-09

## 2019-09-09 RX ORDER — SENNA AND DOCUSATE SODIUM 50; 8.6 MG/1; MG/1
2 TABLET, FILM COATED ORAL NIGHTLY
Status: DISCONTINUED | OUTPATIENT
Start: 2019-09-10 | End: 2019-09-15 | Stop reason: HOSPADM

## 2019-09-09 RX ORDER — AMINOCAPROIC ACID 250 MG/ML
INJECTION, SOLUTION INTRAVENOUS AS NEEDED
Status: DISCONTINUED | OUTPATIENT
Start: 2019-09-09 | End: 2019-09-09 | Stop reason: SURG

## 2019-09-09 RX ORDER — NOREPINEPHRINE BIT/0.9 % NACL 8 MG/250ML
.02-.06 INFUSION BOTTLE (ML) INTRAVENOUS CONTINUOUS PRN
Status: DISCONTINUED | OUTPATIENT
Start: 2019-09-09 | End: 2019-09-10

## 2019-09-09 RX ORDER — MIDAZOLAM HYDROCHLORIDE 1 MG/ML
2 INJECTION INTRAMUSCULAR; INTRAVENOUS
Status: DISCONTINUED | OUTPATIENT
Start: 2019-09-09 | End: 2019-09-09

## 2019-09-09 RX ORDER — NITROGLYCERIN 20 MG/100ML
5-200 INJECTION INTRAVENOUS
Status: DISCONTINUED | OUTPATIENT
Start: 2019-09-09 | End: 2019-09-09

## 2019-09-09 RX ORDER — ONDANSETRON 2 MG/ML
INJECTION INTRAMUSCULAR; INTRAVENOUS AS NEEDED
Status: DISCONTINUED | OUTPATIENT
Start: 2019-09-09 | End: 2019-09-09 | Stop reason: SURG

## 2019-09-09 RX ORDER — PROTAMINE SULFATE 10 MG/ML
INJECTION, SOLUTION INTRAVENOUS AS NEEDED
Status: DISCONTINUED | OUTPATIENT
Start: 2019-09-09 | End: 2019-09-09 | Stop reason: SURG

## 2019-09-09 RX ORDER — MIDAZOLAM HYDROCHLORIDE 1 MG/ML
INJECTION INTRAMUSCULAR; INTRAVENOUS AS NEEDED
Status: DISCONTINUED | OUTPATIENT
Start: 2019-09-09 | End: 2019-09-09 | Stop reason: SURG

## 2019-09-09 RX ORDER — ACETAMINOPHEN 650 MG/1
650 SUPPOSITORY RECTAL EVERY 4 HOURS PRN
Status: DISCONTINUED | OUTPATIENT
Start: 2019-09-10 | End: 2019-09-09

## 2019-09-09 RX ORDER — METHYLPREDNISOLONE SODIUM SUCCINATE 40 MG/ML
40 INJECTION, POWDER, LYOPHILIZED, FOR SOLUTION INTRAMUSCULAR; INTRAVENOUS EVERY 8 HOURS
Status: DISCONTINUED | OUTPATIENT
Start: 2019-09-09 | End: 2019-09-09

## 2019-09-09 RX ORDER — ALPRAZOLAM 0.25 MG/1
0.25 TABLET ORAL EVERY 12 HOURS PRN
Status: DISCONTINUED | OUTPATIENT
Start: 2019-09-09 | End: 2019-09-15 | Stop reason: HOSPADM

## 2019-09-09 RX ORDER — POTASSIUM CHLORIDE 7.45 MG/ML
10 INJECTION INTRAVENOUS
Status: DISCONTINUED | OUTPATIENT
Start: 2019-09-09 | End: 2019-09-15 | Stop reason: HOSPADM

## 2019-09-09 RX ORDER — MILRINONE LACTATE 0.2 MG/ML
.25-.75 INJECTION, SOLUTION INTRAVENOUS CONTINUOUS PRN
Status: DISCONTINUED | OUTPATIENT
Start: 2019-09-09 | End: 2019-09-09

## 2019-09-09 RX ORDER — SODIUM CHLORIDE 9 MG/ML
30 INJECTION, SOLUTION INTRAVENOUS CONTINUOUS PRN
Status: DISCONTINUED | OUTPATIENT
Start: 2019-09-09 | End: 2019-09-15 | Stop reason: HOSPADM

## 2019-09-09 RX ORDER — MORPHINE SULFATE 2 MG/ML
1 INJECTION, SOLUTION INTRAMUSCULAR; INTRAVENOUS EVERY 4 HOURS PRN
Status: DISCONTINUED | OUTPATIENT
Start: 2019-09-09 | End: 2019-09-15 | Stop reason: HOSPADM

## 2019-09-09 RX ORDER — ALBUTEROL SULFATE 2.5 MG/3ML
2.5 SOLUTION RESPIRATORY (INHALATION)
Status: DISCONTINUED | OUTPATIENT
Start: 2019-09-09 | End: 2019-09-10

## 2019-09-09 RX ORDER — PROPOFOL 10 MG/ML
VIAL (ML) INTRAVENOUS AS NEEDED
Status: DISCONTINUED | OUTPATIENT
Start: 2019-09-09 | End: 2019-09-09 | Stop reason: SURG

## 2019-09-09 RX ORDER — PAPAVERINE HYDROCHLORIDE 30 MG/ML
INJECTION INTRAMUSCULAR; INTRAVENOUS AS NEEDED
Status: DISCONTINUED | OUTPATIENT
Start: 2019-09-09 | End: 2019-09-09 | Stop reason: HOSPADM

## 2019-09-09 RX ORDER — MEPERIDINE HYDROCHLORIDE 25 MG/ML
25 INJECTION INTRAMUSCULAR; INTRAVENOUS; SUBCUTANEOUS EVERY 4 HOURS PRN
Status: DISCONTINUED | OUTPATIENT
Start: 2019-09-09 | End: 2019-09-10

## 2019-09-09 RX ORDER — ALBUMIN, HUMAN INJ 5% 5 %
1500 SOLUTION INTRAVENOUS AS NEEDED
Status: ACTIVE | OUTPATIENT
Start: 2019-09-09 | End: 2019-09-10

## 2019-09-09 RX ORDER — SODIUM CHLORIDE 9 MG/ML
30 INJECTION, SOLUTION INTRAVENOUS CONTINUOUS
Status: DISCONTINUED | OUTPATIENT
Start: 2019-09-09 | End: 2019-09-12

## 2019-09-09 RX ORDER — DOPAMINE HYDROCHLORIDE 160 MG/100ML
2-20 INJECTION, SOLUTION INTRAVENOUS CONTINUOUS PRN
Status: DISCONTINUED | OUTPATIENT
Start: 2019-09-09 | End: 2019-09-09

## 2019-09-09 RX ORDER — FUROSEMIDE 10 MG/ML
40 INJECTION INTRAMUSCULAR; INTRAVENOUS EVERY 6 HOURS PRN
Status: DISCONTINUED | OUTPATIENT
Start: 2019-09-09 | End: 2019-09-09

## 2019-09-09 RX ORDER — ACETAMINOPHEN 325 MG/1
650 TABLET ORAL EVERY 4 HOURS
Status: ACTIVE | OUTPATIENT
Start: 2019-09-09 | End: 2019-09-10

## 2019-09-09 RX ORDER — OXYCODONE HYDROCHLORIDE 5 MG/1
10 TABLET ORAL EVERY 4 HOURS PRN
Status: DISCONTINUED | OUTPATIENT
Start: 2019-09-09 | End: 2019-09-09

## 2019-09-09 RX ORDER — ALBUMIN, HUMAN INJ 5% 5 %
500 SOLUTION INTRAVENOUS EVERY 6 HOURS
Status: DISPENSED | OUTPATIENT
Start: 2019-09-09 | End: 2019-09-10

## 2019-09-09 RX ORDER — CYCLOBENZAPRINE HCL 10 MG
10 TABLET ORAL EVERY 8 HOURS PRN
Status: DISCONTINUED | OUTPATIENT
Start: 2019-09-10 | End: 2019-09-09

## 2019-09-09 RX ORDER — ASPIRIN 81 MG/1
81 TABLET ORAL DAILY
Status: DISCONTINUED | OUTPATIENT
Start: 2019-09-10 | End: 2019-09-09

## 2019-09-09 RX ORDER — CEFAZOLIN SODIUM 1 G/50ML
INJECTION, SOLUTION INTRAVENOUS AS NEEDED
Status: DISCONTINUED | OUTPATIENT
Start: 2019-09-09 | End: 2019-09-09 | Stop reason: SURG

## 2019-09-09 RX ORDER — ALPRAZOLAM 0.25 MG/1
0.25 TABLET ORAL EVERY 8 HOURS PRN
Status: DISCONTINUED | OUTPATIENT
Start: 2019-09-09 | End: 2019-09-09

## 2019-09-09 RX ORDER — ACETAMINOPHEN 650 MG/1
650 SUPPOSITORY RECTAL EVERY 4 HOURS
Status: DISCONTINUED | OUTPATIENT
Start: 2019-09-09 | End: 2019-09-09

## 2019-09-09 RX ORDER — ACETAMINOPHEN 500 MG
500 TABLET ORAL EVERY 6 HOURS PRN
Status: DISCONTINUED | OUTPATIENT
Start: 2019-09-09 | End: 2019-09-15 | Stop reason: HOSPADM

## 2019-09-09 RX ORDER — SODIUM CHLORIDE 0.9 % (FLUSH) 0.9 %
30 SYRINGE (ML) INJECTION ONCE AS NEEDED
Status: DISCONTINUED | OUTPATIENT
Start: 2019-09-09 | End: 2019-09-15 | Stop reason: HOSPADM

## 2019-09-09 RX ORDER — METOCLOPRAMIDE HYDROCHLORIDE 5 MG/ML
10 INJECTION INTRAMUSCULAR; INTRAVENOUS EVERY 6 HOURS
Status: DISCONTINUED | OUTPATIENT
Start: 2019-09-09 | End: 2019-09-10

## 2019-09-09 RX ORDER — METHYLPREDNISOLONE SODIUM SUCCINATE 40 MG/ML
20 INJECTION, POWDER, LYOPHILIZED, FOR SOLUTION INTRAMUSCULAR; INTRAVENOUS EVERY 12 HOURS
Status: COMPLETED | OUTPATIENT
Start: 2019-09-10 | End: 2019-09-10

## 2019-09-09 RX ORDER — ACETAMINOPHEN 160 MG/5ML
650 SOLUTION ORAL EVERY 4 HOURS
Status: DISPENSED | OUTPATIENT
Start: 2019-09-09 | End: 2019-09-10

## 2019-09-09 RX ORDER — PROPOFOL 10 MG/ML
VIAL (ML) INTRAVENOUS CONTINUOUS PRN
Status: DISCONTINUED | OUTPATIENT
Start: 2019-09-09 | End: 2019-09-09 | Stop reason: SURG

## 2019-09-09 RX ORDER — PANTOPRAZOLE SODIUM 40 MG/1
40 TABLET, DELAYED RELEASE ORAL
Status: DISCONTINUED | OUTPATIENT
Start: 2019-09-10 | End: 2019-09-15 | Stop reason: HOSPADM

## 2019-09-09 RX ORDER — BISACODYL 10 MG
10 SUPPOSITORY, RECTAL RECTAL DAILY PRN
Status: DISCONTINUED | OUTPATIENT
Start: 2019-09-10 | End: 2019-09-15 | Stop reason: HOSPADM

## 2019-09-09 RX ORDER — NALOXONE HCL 0.4 MG/ML
0.4 VIAL (ML) INJECTION
Status: DISCONTINUED | OUTPATIENT
Start: 2019-09-09 | End: 2019-09-15 | Stop reason: HOSPADM

## 2019-09-09 RX ORDER — ACETAMINOPHEN 160 MG/5ML
650 SOLUTION ORAL EVERY 4 HOURS PRN
Status: DISCONTINUED | OUTPATIENT
Start: 2019-09-10 | End: 2019-09-09

## 2019-09-09 RX ORDER — ALBUTEROL SULFATE 90 UG/1
6 AEROSOL, METERED RESPIRATORY (INHALATION)
Status: DISCONTINUED | OUTPATIENT
Start: 2019-09-09 | End: 2019-09-09

## 2019-09-09 RX ORDER — NOREPINEPHRINE BITARTRATE 1 MG/ML
INJECTION, SOLUTION INTRAVENOUS CONTINUOUS PRN
Status: DISCONTINUED | OUTPATIENT
Start: 2019-09-09 | End: 2019-09-09 | Stop reason: SURG

## 2019-09-09 RX ORDER — BISACODYL 5 MG/1
10 TABLET, DELAYED RELEASE ORAL DAILY PRN
Status: DISCONTINUED | OUTPATIENT
Start: 2019-09-09 | End: 2019-09-15 | Stop reason: HOSPADM

## 2019-09-09 RX ORDER — ATORVASTATIN CALCIUM 20 MG/1
40 TABLET, FILM COATED ORAL NIGHTLY
Status: DISCONTINUED | OUTPATIENT
Start: 2019-09-09 | End: 2019-09-15 | Stop reason: HOSPADM

## 2019-09-09 RX ORDER — HEPARIN SODIUM 1000 [USP'U]/ML
INJECTION, SOLUTION INTRAVENOUS; SUBCUTANEOUS AS NEEDED
Status: DISCONTINUED | OUTPATIENT
Start: 2019-09-09 | End: 2019-09-09 | Stop reason: SURG

## 2019-09-09 RX ORDER — ONDANSETRON 2 MG/ML
4 INJECTION INTRAMUSCULAR; INTRAVENOUS EVERY 6 HOURS PRN
Status: DISCONTINUED | OUTPATIENT
Start: 2019-09-09 | End: 2019-09-15 | Stop reason: HOSPADM

## 2019-09-09 RX ORDER — FENTANYL CITRATE 50 UG/ML
INJECTION, SOLUTION INTRAMUSCULAR; INTRAVENOUS AS NEEDED
Status: DISCONTINUED | OUTPATIENT
Start: 2019-09-09 | End: 2019-09-09 | Stop reason: SURG

## 2019-09-09 RX ORDER — ROCURONIUM BROMIDE 10 MG/ML
INJECTION, SOLUTION INTRAVENOUS AS NEEDED
Status: DISCONTINUED | OUTPATIENT
Start: 2019-09-09 | End: 2019-09-09 | Stop reason: SURG

## 2019-09-09 RX ORDER — ACETAMINOPHEN 325 MG/1
650 TABLET ORAL EVERY 4 HOURS PRN
Status: DISCONTINUED | OUTPATIENT
Start: 2019-09-10 | End: 2019-09-09

## 2019-09-09 RX ORDER — MAGNESIUM SULFATE 1 G/100ML
1 INJECTION INTRAVENOUS EVERY 8 HOURS
Status: DISCONTINUED | OUTPATIENT
Start: 2019-09-09 | End: 2019-09-10

## 2019-09-09 RX ORDER — ALBUTEROL SULFATE 90 UG/1
AEROSOL, METERED RESPIRATORY (INHALATION) AS NEEDED
Status: DISCONTINUED | OUTPATIENT
Start: 2019-09-09 | End: 2019-09-09 | Stop reason: SURG

## 2019-09-09 RX ORDER — POTASSIUM CHLORIDE 750 MG/1
40 CAPSULE, EXTENDED RELEASE ORAL AS NEEDED
Status: DISCONTINUED | OUTPATIENT
Start: 2019-09-09 | End: 2019-09-15 | Stop reason: HOSPADM

## 2019-09-09 RX ORDER — CEFAZOLIN SODIUM 2 G/100ML
2 INJECTION, SOLUTION INTRAVENOUS EVERY 8 HOURS
Status: COMPLETED | OUTPATIENT
Start: 2019-09-09 | End: 2019-09-11

## 2019-09-09 RX ORDER — CHLORHEXIDINE GLUCONATE 0.12 MG/ML
15 RINSE ORAL EVERY 12 HOURS
Status: DISCONTINUED | OUTPATIENT
Start: 2019-09-09 | End: 2019-09-10

## 2019-09-09 RX ORDER — HYDROCODONE BITARTRATE AND ACETAMINOPHEN 5; 325 MG/1; MG/1
2 TABLET ORAL EVERY 4 HOURS PRN
Status: DISCONTINUED | OUTPATIENT
Start: 2019-09-09 | End: 2019-09-12

## 2019-09-09 RX ADMIN — SODIUM CHLORIDE, POTASSIUM CHLORIDE, SODIUM LACTATE AND CALCIUM CHLORIDE: 600; 310; 30; 20 INJECTION, SOLUTION INTRAVENOUS at 06:37

## 2019-09-09 RX ADMIN — SODIUM CHLORIDE 0.5 MCG/KG/HR: 900 INJECTION, SOLUTION INTRAVENOUS at 08:00

## 2019-09-09 RX ADMIN — RACEPINEPHRINE HYDROCHLORIDE 0.5 ML: 11.25 SOLUTION RESPIRATORY (INHALATION) at 18:48

## 2019-09-09 RX ADMIN — CHLORHEXIDINE GLUCONATE 15 ML: 1.2 RINSE ORAL at 05:52

## 2019-09-09 RX ADMIN — GABAPENTIN 600 MG: 300 CAPSULE ORAL at 05:52

## 2019-09-09 RX ADMIN — CEFAZOLIN SODIUM 2 G: 1 INJECTION, SOLUTION INTRAVENOUS at 11:10

## 2019-09-09 RX ADMIN — SODIUM CHLORIDE 0.25 MCG/KG/MIN: 0.9 INJECTION, SOLUTION INTRAVENOUS at 11:06

## 2019-09-09 RX ADMIN — ALBUTEROL SULFATE 2.5 MG: 2.5 SOLUTION RESPIRATORY (INHALATION) at 18:38

## 2019-09-09 RX ADMIN — MUPIROCIN 1 APPLICATION: 20 OINTMENT TOPICAL at 05:52

## 2019-09-09 RX ADMIN — EPINEPHRINE 0.02 MCG/KG/MIN: 1 INJECTION, SOLUTION, CONCENTRATE INTRAVENOUS at 07:08

## 2019-09-09 RX ADMIN — FENTANYL CITRATE 150 MCG: 50 INJECTION, SOLUTION INTRAMUSCULAR; INTRAVENOUS at 11:26

## 2019-09-09 RX ADMIN — MORPHINE SULFATE 2 MG: 2 INJECTION, SOLUTION INTRAMUSCULAR; INTRAVENOUS at 16:05

## 2019-09-09 RX ADMIN — METHYLPREDNISOLONE SODIUM SUCCINATE 40 MG: 40 INJECTION, POWDER, FOR SOLUTION INTRAMUSCULAR; INTRAVENOUS at 01:41

## 2019-09-09 RX ADMIN — METHYLPREDNISOLONE SODIUM SUCCINATE 40 MG: 40 INJECTION, POWDER, FOR SOLUTION INTRAMUSCULAR; INTRAVENOUS at 18:58

## 2019-09-09 RX ADMIN — MAGNESIUM SULFATE 1 G: 1 INJECTION INTRAVENOUS at 15:41

## 2019-09-09 RX ADMIN — ACETAMINOPHEN 1000 MG: 500 TABLET, FILM COATED ORAL at 05:52

## 2019-09-09 RX ADMIN — FENTANYL CITRATE 100 MCG: 50 INJECTION, SOLUTION INTRAMUSCULAR; INTRAVENOUS at 12:35

## 2019-09-09 RX ADMIN — VASOPRESSIN 0.02 UNITS/MIN: 20 INJECTION INTRAVENOUS at 23:24

## 2019-09-09 RX ADMIN — FENTANYL CITRATE 250 MCG: 50 INJECTION, SOLUTION INTRAMUSCULAR; INTRAVENOUS at 08:10

## 2019-09-09 RX ADMIN — SODIUM CHLORIDE 4 UNITS/HR: 900 INJECTION, SOLUTION INTRAVENOUS at 08:56

## 2019-09-09 RX ADMIN — AMINOCAPROIC ACID 5 G: 250 INJECTION, SOLUTION INTRAVENOUS at 07:50

## 2019-09-09 RX ADMIN — AMIODARONE HYDROCHLORIDE 1 MG/MIN: 1.8 INJECTION, SOLUTION INTRAVENOUS at 12:45

## 2019-09-09 RX ADMIN — PROPOFOL 25 MCG/KG/MIN: 10 INJECTION, EMULSION INTRAVENOUS at 07:35

## 2019-09-09 RX ADMIN — NOREPINEPHRINE BITARTRATE 0.05 MCG/KG/MIN: 1 INJECTION, SOLUTION, CONCENTRATE INTRAVENOUS at 10:47

## 2019-09-09 RX ADMIN — Medication 2 MG: at 07:07

## 2019-09-09 RX ADMIN — FAMOTIDINE 20 MG: 10 INJECTION INTRAVENOUS at 05:53

## 2019-09-09 RX ADMIN — ROCURONIUM BROMIDE 30 MG: 10 INJECTION INTRAVENOUS at 08:45

## 2019-09-09 RX ADMIN — AMINOCAPROIC ACID 5 G: 250 INJECTION, SOLUTION INTRAVENOUS at 11:15

## 2019-09-09 RX ADMIN — HEPARIN SODIUM 20000 UNITS: 1000 INJECTION, SOLUTION INTRAVENOUS; SUBCUTANEOUS at 08:46

## 2019-09-09 RX ADMIN — CHLORHEXIDINE GLUCONATE 15 ML: 1.2 RINSE ORAL at 19:11

## 2019-09-09 RX ADMIN — ATORVASTATIN CALCIUM 40 MG: 20 TABLET, FILM COATED ORAL at 20:26

## 2019-09-09 RX ADMIN — METOCLOPRAMIDE 10 MG: 5 INJECTION, SOLUTION INTRAMUSCULAR; INTRAVENOUS at 20:28

## 2019-09-09 RX ADMIN — SODIUM CHLORIDE 30 ML/HR: 9 INJECTION, SOLUTION INTRAVENOUS at 13:34

## 2019-09-09 RX ADMIN — SODIUM CHLORIDE 30 ML/HR: 9 INJECTION, SOLUTION INTRAVENOUS at 12:35

## 2019-09-09 RX ADMIN — PHENYLEPHRINE HYDROCHLORIDE 0.5 MCG/KG/MIN: 10 INJECTION, SOLUTION INTRAMUSCULAR; INTRAVENOUS; SUBCUTANEOUS at 07:08

## 2019-09-09 RX ADMIN — FENTANYL CITRATE 250 MCG: 50 INJECTION, SOLUTION INTRAMUSCULAR; INTRAVENOUS at 09:35

## 2019-09-09 RX ADMIN — MUPIROCIN 1 APPLICATION: 20 OINTMENT TOPICAL at 20:26

## 2019-09-09 RX ADMIN — ALBUMIN HUMAN 250 ML: 0.05 INJECTION, SOLUTION INTRAVENOUS at 14:08

## 2019-09-09 RX ADMIN — SODIUM CHLORIDE: 9 INJECTION, SOLUTION INTRAVENOUS at 06:37

## 2019-09-09 RX ADMIN — OXYCODONE HYDROCHLORIDE 5 MG: 5 TABLET ORAL at 18:23

## 2019-09-09 RX ADMIN — PROTAMINE SULFATE 300 MG: 10 INJECTION, SOLUTION INTRAVENOUS at 10:54

## 2019-09-09 RX ADMIN — PROPOFOL 70 MG: 10 INJECTION, EMULSION INTRAVENOUS at 07:10

## 2019-09-09 RX ADMIN — ACETAMINOPHEN ORAL SOLUTION 650 MG: 325 SOLUTION ORAL at 20:26

## 2019-09-09 RX ADMIN — METOPROLOL TARTRATE 12.5 MG: 25 TABLET ORAL at 05:53

## 2019-09-09 RX ADMIN — ONDANSETRON 4 MG: 2 INJECTION INTRAMUSCULAR; INTRAVENOUS at 11:22

## 2019-09-09 RX ADMIN — FENTANYL CITRATE 250 MCG: 50 INJECTION, SOLUTION INTRAMUSCULAR; INTRAVENOUS at 07:10

## 2019-09-09 RX ADMIN — AMIODARONE HYDROCHLORIDE 0.5 MG/MIN: 1.8 INJECTION, SOLUTION INTRAVENOUS at 18:23

## 2019-09-09 RX ADMIN — ROCURONIUM BROMIDE 70 MG: 10 INJECTION INTRAVENOUS at 07:10

## 2019-09-09 RX ADMIN — ALBUTEROL SULFATE 2 PUFF: 90 AEROSOL, METERED RESPIRATORY (INHALATION) at 10:55

## 2019-09-09 RX ADMIN — CEFAZOLIN SODIUM 2 G: 10 INJECTION, POWDER, FOR SOLUTION INTRAVENOUS at 19:11

## 2019-09-09 NOTE — PROGRESS NOTES
[facility]  Cardiology Progress Note    Patient Identification:  Name: Cedric Wade  Age: 68 y.o.  Sex: male  :  1950  MRN: [id number]                 Follow Up / Chief Complaint: Management recommendations for CAD    Interval History: Urgent coronary bypass x3 with lima to distal LAD, saphenous vein to OM 2, saphenous vein to PDA.  Left atrial appendage ligation.  Left lower extremity endoscopic vein harvest.  Intraoperative transesophageal echocardiogram.  PRP application to lima bed and sternum, by Dr. Alvaro Mazariegos       Subjective; unable to obtain  Objective:    Past Medical History:  Past Medical History:   Diagnosis Date   • Arthritis    • CHF (congestive heart failure) (CMS/[facility])    • COPD (chronic obstructive pulmonary disease) (CMS/[facility])    • Coronary artery disease    • Cutaneous lupus erythematosus    • Diabetes mellitus (CMS/HCC)    • Elevated cholesterol    • GERD (gastroesophageal reflux disease)    • Hypertension      Past Surgical History:  History reviewed. No pertinent surgical history.     Social History:   Social History     Tobacco Use   • Smoking status: Current Every Day Smoker     Packs/day: 2.00     Years: 58.00     Pack years: 116.00   • Smokeless tobacco: Never Used   Substance Use Topics   • Alcohol use: No     Frequency: Never      Family History:  Family History   Problem Relation Age of Onset   • Lung cancer Father    • Diabetes Sister           Allergies:  Allergies   Allergen Reactions   • Codeine Nausea And Vomiting     Scheduled Meds:    acetaminophen 650 mg Q4H   Or     acetaminophen 650 mg Q4H   Or     acetaminophen 650 mg Q4H   albumin human 500 mL Q6H   [START ON 9/10/2019] aspirin 81 mg Daily   atorvastatin 40 mg Nightly   ceFAZolin 2 g Q8H   chlorhexidine 15 mL Q12H   [START ON 9/10/2019] enoxaparin 40 mg Q24H   magnesium sulfate 1 g Q8H   metoclopramide 10 mg Q6H   [START ON 9/10/2019] metoprolol tartrate 12.5 mg Q12H   mupirocin  BID   [START ON  "9/10/2019] pantoprazole 40 mg Q AM   [START ON 9/10/2019] sennosides-docusate sodium 2 tablet Nightly           INTAKE AND OUTPUT:    Intake/Output Summary (Last 24 hours) at 9/9/2019 1548  Last data filed at 9/9/2019 1430  Gross per 24 hour   Intake 1675 ml   Output 4360 ml   Net -2685 ml       Review of Systems: Unable to obtain  GI:  Cardiac:  Pulmonary:    BP 93/65   Pulse 89   Temp 98.4 °F (36.9 °C) (Oral)   Resp 18   Ht 167.6 cm (66\")   Wt 63.6 kg (140 lb 4.8 oz)   SpO2 97%   BMI 22.65 kg/m²   General appearance: intubated  Neck: Trachea midline; NECK, supple, no thyromegaly or lymphadenopathy   Lungs: Normal size and shape, normal breath sounds, equal distribution of air, no rales and rhonchi   CV: S1-S2 regular, no murmurs, no rub, no gallop   Abdomen: Soft, non-tender; no masses , no abnormal abdominal sounds   Extremities: No deformity , normal color , no peripheral edema   Skin: Normal temperature, turgor and texture; no rash, ulcers                 Cardiographics  Telemetry:     ECG:         Echocardiogram:   9/7/19  Interpretation Summary     · The left ventricular cavity is mildly dilated.  · Right ventricular cavity is mildly dilated.  · Left atrial cavity size is mildly dilated.  · Calculated EF = 25.0%.  · There is no evidence of pericardial effusion.           Lab Review       Results from last 7 days   Lab Units 09/09/19  1247   MAGNESIUM mg/dL 2.2     Results from last 7 days   Lab Units 09/09/19  1247   SODIUM mmol/L 136   POTASSIUM mmol/L 3.8   BUN mg/dL 31*   CREATININE mg/dL 1.27   CALCIUM mg/dL 8.0*        Results from last 7 days   Lab Units 09/09/19  1247 09/09/19  0435 09/08/19  0817   WBC 10*3/mm3 16.90*  16.49* 9.00 8.63   HEMOGLOBIN g/dL 9.2*  9.4* 10.2* 11.9*   HEMATOCRIT % 29.0*  29.8* 31.8* 36.7*   PLATELETS 10*3/mm3 189  202 279 307     Results from last 7 days   Lab Units 09/09/19  1247 09/09/19  0435 09/09/19  0011  09/07/19  0519   INR  1.35*  --   --   --  0.99 " "  APTT seconds 28.7 29.3 37.0*   < > 29.7    < > = values in this interval not displayed.       The following medical decision was discussed in detail with Dr. Riggs      Assessment/plan:  1. NS STEMI: stable post cabg.     2. CAD, EF 20%, S/P which x3 with lima to distal LAD, saphenous vein to OM 2, saphenous vein to PDA.  Left atrial appendage ligation.  Left lower extremity endoscopic vein harvest.  Intraoperative transesophageal echocardiogram.  PRP application to lima bed and sternum by Dr. Alvaro Mazariegos.     3.  RANDA: Defer to nephrology    4.  HLD: On 9/7/2019 TC was 174, HDL 90, LDL 66, and triglycerides 92 .  Good control, continue current management    5.  Tobacco abuse: We will educate when he awakens from sedation.    6.  Acute on chronic systolic CHF: EF 20%.  Continue BB, and aspirin when not npo.  Add ACE or ARB when able, current blood pressure low normal.    7.  COPD with exacerbation: Defer to pulmonary    8.  DM: Defer to nephrology    9. PVD: Defer to attending    10.  History of lupus: Defer to attending    Labs/tests ordered for am: EKG, BMP, and mag      )9/9/2019  LEONIDAS Martin    Patient personally interviewed and above subjective findings personally confirmed during a face to face contact with patient today  All findings of physical examination confirmed  All pertinent and performed labs, cardiac procedures ,  radiographs of the last 24 hours personally reviewed  Impression and plans discussed/elaborated and implemented jointly as described above     Nidhi Riggs MD            EMR Dragon/Transcription:   \"Dictated utilizing Dragon dictation\".     "

## 2019-09-09 NOTE — PROGRESS NOTES
"  ENDOCRINE    Subjective   AND PLANS  Cedric Wade is a 68 y.o. male.     Follow-up diabetes and related disorders    Had three-vessel coronary artery bypass surgery earlier.  Extubated  On epinephrine, norepinephrine and insulin drips  Wheezing earlier and restarted back on Solu-Medrol 40 mg every 8 hours.  Blood sugar 130-169.  Continue on insulin drip until seen tomorrow.    Restart Lipitor when able to take oral medications well    Objective   BP (!) 84/57   Pulse 89   Temp 98.4 °F (36.9 °C) (Oral)   Resp 16   Ht 167.6 cm (66\")   Wt 63.6 kg (140 lb 4.8 oz)   SpO2 97%   BMI 22.65 kg/m²   Physical Exam    Sleepy but arousable to alertness.  Denies shortness of breath.  Complaining of postop pain.  Few rhonchi.  No rales or wheezes.  Regular heart rate and rhythm.  No gallop.  Abdomen soft, nontender.  Extremities cool but not cyanotic.  No pedal edema    Lab Results (last 24 hours)     Procedure Component Value Units Date/Time    POC Glucose Once [[id number]]  (Normal) Collected:  09/09/19 1806    Specimen:  Blood Updated:  09/09/19 1830     Glucose 130 mg/dL     Renal Function Panel [[id number]]  (Abnormal) Collected:  09/09/19 1652    Specimen:  Blood Updated:  09/09/19 1724     Glucose 155 mg/dL      BUN 29 mg/dL      Creatinine 1.29 mg/dL      Sodium 138 mmol/L      Potassium 3.8 mmol/L      Chloride 102 mmol/L      CO2 23.1 mmol/L      Calcium 8.1 mg/dL      Albumin 3.90 g/dL      Phosphorus 3.9 mg/dL      Anion Gap 12.9 mmol/L      BUN/Creatinine Ratio 22.5     eGFR Non African Amer 55 mL/min/1.73     Narrative:       GFR Normal >60  Chronic Kidney Disease <60  Kidney Failure <15    Magnesium [[id number]]  (Abnormal) Collected:  09/09/19 1652    Specimen:  Blood Updated:  09/09/19 1724     Magnesium 2.7 mg/dL     Blood Gas, Arterial [[id number]]  (Abnormal) Collected:  09/09/19 1719    Specimen:  Arterial Blood Updated:  09/09/19 1721     Site Arterial Line     Brad's Test Positive     pH, Arterial " 7.349 pH units      pCO2, Arterial 44.3 mm Hg      pO2, Arterial 114.4 mm Hg      HCO3, Arterial 24.4 mmol/L      Base Excess, Arterial -1.2 mmol/L      O2 Saturation Calculated 98.2 %      Barometric Pressure for Blood Gas 756.1 mmHg      Modality Cannula     Flow Rate 5 lpm      Set [facility] 16    CBC (No Diff) [[id number]]  (Abnormal) Collected:  09/09/19 1652    Specimen:  Blood Updated:  09/09/19 1717     WBC 13.87 10*3/mm3      RBC 2.73 10*6/mm3      Hemoglobin 8.4 g/dL      Hematocrit 26.9 %      MCV 98.5 fL      MCH 30.8 pg      MCHC 31.2 g/dL      RDW 13.9 %      RDW-SD 48.7 fl      MPV 10.0 fL      Platelets 190 10*3/mm3     POC Glucose Once [[id number]]  (Abnormal) Collected:  09/09/19 1654    Specimen:  Blood Updated:  09/09/19 1704     Glucose 169 mg/dL     Blood Gas, Arterial [[id number]]  (Abnormal) Collected:  09/09/19 1556    Specimen:  Arterial Blood Updated:  09/09/19 1605     Site Arterial Line     Brad's Test N/A     pH, Arterial 7.350 pH units      pCO2, Arterial 44.4 mm Hg      pO2, Arterial 124.7 mm Hg      HCO3, Arterial 24.5 mmol/L      Base Excess, Arterial -1.2 mmol/L      O2 Saturation Calculated 98.6 %      A-a Gradiant 0.5 mmHg      Barometric Pressure for Blood Gas 754.8 mmHg      Modality Adult Vent     FIO2 40 %      Ventilator Mode CPAP/PS     Set Tidal Volume 710     Set [facility] Resp Rate 12     PEEP 5     PSV 8 cmH2O     POC Glucose Once [[id number]]  (Normal) Collected:  09/09/19 1533    Specimen:  Blood Updated:  09/09/19 1544     Glucose 130 mg/dL     POC Glucose Once [[id number]]  (Normal) Collected:  09/09/19 1410    Specimen:  Blood Updated:  09/09/19 1420     Glucose 130 mg/dL     Blood Gas, Arterial [[id number]]  (Abnormal) Collected:  09/09/19 1346    Specimen:  Arterial Blood Updated:  09/09/19 1350     Site Arterial Line     Brad's Test N/A     pH, Arterial 7.333 pH units      pCO2, Arterial 45.7 mm Hg      pO2, Arterial 458.6 mm Hg      HCO3, Arterial 24.3 mmol/L       Base Excess, Arterial -1.7 mmol/L      O2 Saturation Calculated 100.0 %      A-a Gradiant 0.7 mmHg      Barometric Pressure for Blood Gas 757.8 mmHg      Modality Adult Vent     FIO2 100 %      Ventilator Mode AC     Set Tidal Volume 550     Set [facility] Resp Rate 12     Rate 14 Breaths/minute      PEEP 5    CBC (No Diff) [[id number]]  (Abnormal) Collected:  09/09/19 1247    Specimen:  Blood Updated:  09/09/19 1340     WBC 16.90 10*3/mm3      RBC 3.00 10*6/mm3      Hemoglobin 9.2 g/dL      Hematocrit 29.0 %      MCV 96.7 fL      MCH 30.7 pg      MCHC 31.7 g/dL      RDW 13.8 %      RDW-SD 48.7 fl      MPV 10.0 fL      Platelets 189 10*3/mm3     Calcium, Ionized [[id number]]  (Normal) Collected:  09/09/19 1247    Specimen:  Blood Updated:  09/09/19 1337     Ionized Calcium 1.25 mmol/L      Ionized Calcium 5.0 mg/dL     CBC & Differential [[id number]] Collected:  09/09/19 1247    Specimen:  Blood Updated:  09/09/19 1336    Narrative:       The following orders were created for panel order CBC & Differential.  Procedure                               Abnormality         Status                     ---------                               -----------         ------                     CBC Auto Differential[[id number]]        Abnormal            Final result                 Please view results for these tests on the individual orders.    CBC Auto Differential [[id number]]  (Abnormal) Collected:  09/09/19 1247    Specimen:  Blood Updated:  09/09/19 1336     WBC 16.49 10*3/mm3      RBC 3.06 10*6/mm3      Hemoglobin 9.4 g/dL      Hematocrit 29.8 %      MCV 97.4 fL      MCH 30.7 pg      MCHC 31.5 g/dL      RDW 13.7 %      RDW-SD 48.8 fl      MPV 10.0 fL      Platelets 202 10*3/mm3      Neutrophil % 76.4 %      Lymphocyte % 11.7 %      Monocyte % 7.0 %      Eosinophil % 2.8 %      Basophil % 0.2 %      Immature Grans % 1.9 %      Neutrophils, Absolute 12.60 10*3/mm3      Lymphocytes, Absolute 1.93 10*3/mm3      Monocytes, Absolute 1.15  10*3/mm3      Eosinophils, Absolute 0.46 10*3/mm3      Basophils, Absolute 0.04 10*3/mm3      Immature Grans, Absolute 0.31 10*3/mm3      nRBC 0.0 /100 WBC     Protime-INR [[id number]]  (Abnormal) Collected:  09/09/19 1247    Specimen:  Blood Updated:  09/09/19 1334     Protime 16.4 Seconds      INR 1.35    aPTT [[id number]]  (Normal) Collected:  09/09/19 1247    Specimen:  Blood Updated:  09/09/19 1334     PTT 28.7 seconds     Fibrinogen [[id number]]  (Normal) Collected:  09/09/19 1247    Specimen:  Blood Updated:  09/09/19 1334     Fibrinogen 273 mg/dL     Renal Function Panel [[id number]]  (Abnormal) Collected:  09/09/19 1247    Specimen:  Blood Updated:  09/09/19 1332     Glucose 109 mg/dL      BUN 31 mg/dL      Creatinine 1.27 mg/dL      Sodium 136 mmol/L      Potassium 3.8 mmol/L      Chloride 101 mmol/L      CO2 23.4 mmol/L      Calcium 8.0 mg/dL      Albumin 3.80 g/dL      Phosphorus 4.1 mg/dL      Anion Gap 11.6 mmol/L      BUN/Creatinine Ratio 24.4     eGFR Non African Amer 56 mL/min/1.73     Narrative:       GFR Normal >60  Chronic Kidney Disease <60  Kidney Failure <15    Magnesium [[id number]]  (Normal) Collected:  09/09/19 1247    Specimen:  Blood Updated:  09/09/19 1332     Magnesium 2.2 mg/dL     POC Glucose Once [[id number]]  (Normal) Collected:  09/09/19 1254    Specimen:  Blood Updated:  09/09/19 1259     Glucose 123 mg/dL     Respiratory Culture - Sputum, Cough [[id number]] Collected:  09/07/19 1543    Specimen:  Sputum from Cough Updated:  09/09/19 1140     Respiratory Culture Light growth (2+) Normal Respiratory Nellie     Gram Stain Rare (1+) Mixed bacterial morphotypes seen on Gram Stain      Rare (1+) WBCs per low power field      No epithelial cells seen    POC Activated Clotting Time [[id number]]  (Abnormal) Collected:  09/09/19 0934    Specimen:  Blood Updated:  09/09/19 1123     Activated Clotting Time  312 Seconds      Comment: Serial Number: [id number]:  [id number]       POC Activated Clotting  Time [[id number]]  (Normal) Collected:  09/09/19 0753    Specimen:  Blood Updated:  09/09/19 1123     Activated Clotting Time  109 Seconds      Comment: Serial Number: [id number]:  [id number]       POC Activated Clotting Time [[id number]]  (Normal) Collected:  09/09/19 1111    Specimen:  Blood Updated:  09/09/19 1123     Activated Clotting Time  103 Seconds      Comment: Serial Number: [id number]:  [id number]       POC Activated Clotting Time [[id number]]  (Abnormal) Collected:  09/09/19 1036    Specimen:  Blood Updated:  09/09/19 1122     Activated Clotting Time  378 Seconds      Comment: Serial Number: [id number]:  [id number]       POC Activated Clotting Time [[id number]]  (Abnormal) Collected:  09/09/19 1008    Specimen:  Blood Updated:  09/09/19 1122     Activated Clotting Time  450 Seconds      Comment: Serial Number: [id number]:  [id number]       Basic Metabolic Panel [[id number]]  (Abnormal) Collected:  09/09/19 0435    Specimen:  Blood Updated:  09/09/19 0559     Glucose 133 mg/dL      BUN 37 mg/dL      Creatinine 1.20 mg/dL      Sodium 131 mmol/L      Potassium 4.4 mmol/L      Chloride 94 mmol/L      CO2 23.6 mmol/L      Calcium 8.7 mg/dL      eGFR Non African Amer 60 mL/min/1.73      BUN/Creatinine Ratio 30.8     Anion Gap 13.4 mmol/L     Narrative:       GFR Normal >60  Chronic Kidney Disease <60  Kidney Failure <15    aPTT [[id number]]  (Normal) Collected:  09/09/19 0435    Specimen:  Blood from Arm, Right Updated:  09/09/19 0546     PTT 29.3 seconds     CBC (No Diff) [[id number]]  (Abnormal) Collected:  09/09/19 0435    Specimen:  Blood Updated:  09/09/19 0530     WBC 9.00 10*3/mm3      RBC 3.34 10*6/mm3      Hemoglobin 10.2 g/dL      Hematocrit 31.8 %      MCV 95.2 fL      MCH 30.5 pg      MCHC 32.1 g/dL      RDW 13.5 %      RDW-SD 46.9 fl      MPV 10.3 fL      Platelets 279 10*3/mm3     POC Glucose Once [[id number]]  (Abnormal) Collected:  09/09/19 0526    Specimen:  Blood Updated:  09/09/19 0527     Glucose 151  mg/dL     aPTT [[id number]]  (Abnormal) Collected:  09/09/19 0011    Specimen:  Blood Updated:  09/09/19 0031     PTT 37.0 seconds     POC Glucose Once [[id number]]  (Abnormal) Collected:  09/08/19 2003    Specimen:  Blood Updated:  09/08/19 2004     Glucose 204 mg/dL               Coronary artery disease involving native coronary artery of native heart with angina pectoris (CMS/HCC)    COPD (chronic obstructive pulmonary disease) with chronic bronchitis (CMS/[facility])    Microalbuminuria    Type 2 diabetes mellitus with microalbuminuria (CMS/[facility])    Hyperlipidemia    Essential hypertension    H/O agent Orange exposure    Continue insulin drip  Restart Lipitor when able to take oral medications well

## 2019-09-09 NOTE — PROGRESS NOTES
" LOS: 3 days   Patient Care Team:  Ean Farmer MD as PCP - General (Internal Medicine)    Chief Complaint: RANDA/ckd    Subjective     F/u RANDA vs CKD      Seen and examined.  Just came out of surgery.  Remains on touch of pressors including Primacor, epi and Levophed.  Sedated and intubated.    History taken from: patient    Objective     Vital Sign Min/Max for last 24 hours  Temp  Min: 97.6 °F (36.4 °C)  Max: 98.4 °F (36.9 °C)   BP  Min: 109/77  Max: 115/78   Pulse  Min: 83  Max: 99   Resp  Min: 12  Max: 18   SpO2  Min: 95 %  Max: 100 %   No Data Recorded   Weight  Min: 63.6 kg (140 lb 4.8 oz)  Max: 63.6 kg (140 lb 4.8 oz)     Flowsheet Rows      First Filed Value   Admission Height  167.6 cm (66\") Documented at 09/07/2019 1234   Admission Weight  64.5 kg (142 lb 3.2 oz) Documented at 09/07/2019 0438          I/O this shift:  In: 1435 [I.V.:1000; Blood:435]  Out: 550 [Urine:550]  I/O last 3 completed shifts:  In: 840 [P.O.:840]  Out: 2050 [Urine:2050]    Objective:  General Appearance:  Comfortable.    Vital signs: (most recent): Blood pressure 115/78, pulse 90, temperature 98.4 °F (36.9 °C), temperature source Oral, resp. rate 12, height 167.6 cm (66\"), weight 63.6 kg (140 lb 4.8 oz), SpO2 100 %.  Vital signs are normal.  No fever.    HEENT: Normal HEENT exam.    Lungs:  Normal effort and normal respiratory rate.  Breath sounds clear to auscultation.    Heart: Normal rate.  Regular rhythm.    Abdomen: Abdomen is soft and non-distended.  Bowel sounds are normal.     Extremities: There is no dependent edema.    Neurological: Patient is alert and oriented to person, place and time.    Skin:  Warm and dry.              Results Review:     I reviewed the patient's new clinical results.    WBC WBC   Date Value Ref Range Status   09/09/2019 9.00 3.40 - 10.80 10*3/mm3 Final   09/08/2019 8.63 3.40 - 10.80 10*3/mm3 Final   09/07/2019 8.50 3.40 - 10.80 10*3/mm3 Final      HGB Hemoglobin   Date Value Ref Range " Status   09/09/2019 10.2 (L) 13.0 - 17.7 g/dL Final   09/08/2019 11.9 (L) 13.0 - 17.7 g/dL Final   09/07/2019 12.1 (L) 13.0 - 17.7 g/dL Final      HCT Hematocrit   Date Value Ref Range Status   09/09/2019 31.8 (L) 37.5 - 51.0 % Final   09/08/2019 36.7 (L) 37.5 - 51.0 % Final   09/07/2019 37.2 (L) 37.5 - 51.0 % Final      Platlets No results found for: LABPLAT   MCV MCV   Date Value Ref Range Status   09/09/2019 95.2 79.0 - 97.0 fL Final   09/08/2019 95.3 79.0 - 97.0 fL Final   09/07/2019 94.4 79.0 - 97.0 fL Final          Sodium Sodium   Date Value Ref Range Status   09/09/2019 131 (L) 136 - 145 mmol/L Final   09/08/2019 130 (L) 136 - 145 mmol/L Final   09/07/2019 128 (L) 136 - 145 mmol/L Final   09/07/2019 130 (L) 136 - 145 mmol/L Final      Potassium Potassium   Date Value Ref Range Status   09/09/2019 4.4 3.5 - 5.2 mmol/L Final   09/08/2019 4.2 3.5 - 5.2 mmol/L Final   09/07/2019 4.1 3.5 - 5.2 mmol/L Final   09/07/2019 4.1 3.5 - 5.2 mmol/L Final      Chloride Chloride   Date Value Ref Range Status   09/09/2019 94 (L) 98 - 107 mmol/L Final   09/08/2019 89 (L) 98 - 107 mmol/L Final   09/07/2019 88 (L) 98 - 107 mmol/L Final   09/07/2019 87 (L) 98 - 107 mmol/L Final      CO2 CO2   Date Value Ref Range Status   09/09/2019 23.6 22.0 - 29.0 mmol/L Final   09/08/2019 25.7 22.0 - 29.0 mmol/L Final   09/07/2019 27.1 22.0 - 29.0 mmol/L Final   09/07/2019 28.3 22.0 - 29.0 mmol/L Final      BUN BUN   Date Value Ref Range Status   09/09/2019 37 (H) 8 - 23 mg/dL Final   09/08/2019 41 (H) 8 - 23 mg/dL Final   09/07/2019 38 (H) 8 - 23 mg/dL Final   09/07/2019 29 (H) 8 - 23 mg/dL Final      Creatinine Creatinine   Date Value Ref Range Status   09/09/2019 1.20 0.76 - 1.27 mg/dL Final   09/08/2019 1.44 (H) 0.76 - 1.27 mg/dL Final   09/07/2019 1.69 (H) 0.76 - 1.27 mg/dL Final   09/07/2019 1.51 (H) 0.76 - 1.27 mg/dL Final      Calcium Calcium   Date Value Ref Range Status   09/09/2019 8.7 8.6 - 10.5 mg/dL Final   09/08/2019 8.8 8.6 -  10.5 mg/dL Final   09/07/2019 8.9 8.6 - 10.5 mg/dL Final   09/07/2019 9.4 8.6 - 10.5 mg/dL Final      PO4 No results found for: CAPO4   Albumin Albumin   Date Value Ref Range Status   09/08/2019 3.60 3.50 - 5.20 g/dL Final   09/07/2019 4.20 3.50 - 5.20 g/dL Final      Magnesium Magnesium   Date Value Ref Range Status   09/07/2019 2.1 1.6 - 2.4 mg/dL Final      Uric Acid Uric Acid   Date Value Ref Range Status   09/08/2019 8.7 (H) 3.4 - 7.0 mg/dL Final        Medication Review: y    Assessment/Plan       Coronary artery disease involving native coronary artery of native heart with angina pectoris (CMS/HCC)    COPD (chronic obstructive pulmonary disease) with chronic bronchitis (CMS/[facility])    Microalbuminuria    Type 2 diabetes mellitus with microalbuminuria (CMS/[facility])    Hyperlipidemia    Essential hypertension    H/O agent Candor exposure      Assessment & Plan     1.  Acute kidney injury:    Unsure his baseline at this time, he is unaware of previous CKD.  He may have underlying nephrosclerosis.  He is down to 1.2 but his urine output dropped after surgery so we will repeat labs and monitor closely.  Maintain map above 65.  We will start him on IV albumin 5 %  500×2.  2.  Multivessel CAD:  S/P Urgent coronary bypass x3 with LIMA to distal LAD, saphenous vein to OM 2, saphenous vein to PDA  3.  Tobacco abuse:     4.  Type 2 diabetes on metformin  5. Hyponatremia-sodium is 131 today.    Nikolas Pedroza MD  09/09/19  12:59 PM

## 2019-09-09 NOTE — ANESTHESIA PROCEDURE NOTES
Airway  Urgency: elective    Date/Time: 9/9/2019 7:17 AM  Airway not difficult    General Information and Staff    Patient location during procedure: OR  Anesthesiologist: Rudy Coats MD    Indications and Patient Condition  Indications for airway management: airway protection    Preoxygenated: yes  MILS maintained throughout  Mask difficulty assessment: 1 - vent by mask    Final Airway Details  Final airway type: endotracheal airway      Successful airway: ETT  Cuffed: yes   Successful intubation technique: direct laryngoscopy  Endotracheal tube insertion site: oral  Blade: Yobani  Blade size: 3  ETT size (mm): 8.0  Cormack-Lehane Classification: grade I - full view of glottis  Placement verified by: chest auscultation and capnometry   Measured from: lips  ETT/EBT  to lips (cm): 23  Number of attempts at approach: 1

## 2019-09-09 NOTE — ANESTHESIA PROCEDURE NOTES
Procedure Performed: Emergent/Open-Heart Anesthesia BUDDY     Start Time:        End Time:      Preanesthesia Checklist:  Patient identified, IV assessed, risks and benefits discussed, monitors and equipment assessed, procedure being performed at surgeon's request and anesthesia consent obtained.    General Procedure Information  Diagnostic Indications for Echo:  assessment of ascending aorta and assessment of surgical repair  Physician Requesting Echo: Alvaro Mazariegos MD  Location performed:  OR  Intubated  Bite block not placed  Heart visualized  Probe Insertion:  Easy  Probe Type:  Multiplane  Modalities:  Color flow mapping, continuous wave Doppler and pulse wave Doppler    Echocardiographic and Doppler Measurements    Ventricles    Right Ventricle:  Cavity size normal.  Hypertrophy not present.  Thrombus not present.  Global function moderately impaired.    Left Ventricle:  Cavity size normal.  Hypertrophy not present.  Thrombus not present.  Global Function severely impaired.  Ejection Fraction 16%.      Ventricular Regional Function:    Wall Motion Comments:       There is severe global hypokinesia.    Estimated LV EF is 20-25%.    Valves    Aortic Valve:  Annulus normal.  Stenosis not present.  Regurgitation absent.  Leaflets normal.  Leaflet motions normal.      Mitral Valve:  Annulus normal.  Stenosis not present.  Regurgitation mild.  Leaflets normal.  Leaflet motions normal.      Tricuspid Valve:  Stenosis not present.  Regurgitation absent.    Pulmonic Valve:  Stenosis not present.  Regurgitation absent.          Aorta    Ascending Aorta:  Size normal.  Dissection not present.  Plaque thickness less than 3 mm.  Mobile plaque not present.    Aortic Arch:  Size normal.  Dissection not present.  Plaque thickness less than 3 mm.  Mobile plaque not present.    Descending Aorta:  Size normal.  Dissection not present.  Plaque thickness less than 3 mm.  Mobile plaque not present.          Atria    Right  Atrium:  Size normal.  Spontaneous echo contrast not present.  Thrombus not present.  Tumor not present.  Device not present.      Left Atrium:  Size normal.  Spontaneous echo contrast not present.  Thrombus not present.  Tumor not present.  Device not present.    Left atrial appendage normal.      Septa    Atrial Septum:  Intra-atrial septal morphology normal.      Ventricular Septum:  Intra-ventricular septum morphology normal.          Other Findings  Pericardium:  normal  Pleural Effusion:  none  Pulmonary Arteries:  normal  Pulmonary Venous Flow:  blunted (decreased) systolic flow    Anesthesia Information  Performed Personally  Anesthesiologist:  Rudy Coats MD      Echocardiogram Comments:       Post bypass:  LV EF is 35% by calculation. Estimated EF is 25-30%.  RV is mildly hypokinetic.  Valvular function is similar to baseline. MR is trace-mild.  Aorta is intact.  There is no pericardial effusion.

## 2019-09-09 NOTE — ANESTHESIA PROCEDURE NOTES
Central Line      Patient reassessed immediately prior to procedure    Patient location during procedure: OR  Indications: central pressure monitoring, vascular access and MD/Surgeon request  Staff  Anesthesiologist: Rudy Coats MD  Preanesthetic Checklist  Completed: patient identified, site marked, surgical consent, pre-op evaluation, timeout performed, IV checked, risks and benefits discussed and monitors and equipment checked  Central Line Prep  Sterile Tech:cap, gloves, gown, mask and sterile barriers  Prep: chloraprep  Patient monitoring: blood pressure monitoring, continuous pulse oximetry and EKG  Central Line Procedure  Laterality:right  Location:internal jugular  Catheter Type:MAC  Catheter Size:9 Fr  Guidance:ultrasound guided  PROCEDURE NOTE/ULTRASOUND INTERPRETATION.  Using ultrasound guidance the potential vascular sites for insertion of the catheter were visualized to determine the patency of the vessel to be used for vascular access.  After selecting the appropriate site for insertion, the needle was visualized under ultrasound being inserted into the internal jugular vein, followed by ultrasound confirmation of wire and catheter placement. There were no abnormalities seen on ultrasound; an image was taken; and the patient tolerated the procedure with no complications.   Assessment  Post procedure:biopatch applied, line sutured and occlusive dressing applied  Assessement:blood return through all ports and free fluid flow  Complications:no  Patient Tolerance:patient tolerated the procedure well with no apparent complications

## 2019-09-09 NOTE — ANESTHESIA PROCEDURE NOTES
Central Line      Patient reassessed immediately prior to procedure    Patient location during procedure: OR  Indications: central pressure monitoring, vascular access and MD/Surgeon request  Staff  Anesthesiologist: Rudy Coats MD  Preanesthetic Checklist  Completed: patient identified, site marked, surgical consent, pre-op evaluation, timeout performed, IV checked, risks and benefits discussed and monitors and equipment checked  Central Line Prep  Sterile Tech:cap, gown, sterile barriers and gloves  Prep: chloraprep  Patient monitoring: EKG, continuous pulse oximetry and blood pressure monitoring  Central Line Procedure  Laterality:right  Location:internal jugular  Catheter Type:[place]  Catheter Size:7 Fr  Guidance:ultrasound guided  PROCEDURE NOTE/ULTRASOUND INTERPRETATION.  Using ultrasound guidance the potential vascular sites for insertion of the catheter were visualized to determine the patency of the vessel to be used for vascular access.  After selecting the appropriate site for insertion, the needle was visualized under ultrasound being inserted into the internal jugular vein, followed by ultrasound confirmation of wire and catheter placement. There were no abnormalities seen on ultrasound; an image was taken; and the patient tolerated the procedure with no complications. Images: still images not obtained  Assessment  Post procedure:biopatch applied, line sutured and occlusive dressing applied  Assessement:free fluid flow and blood return through all ports  Complications:no  Patient Tolerance:patient tolerated the procedure well with no apparent complications

## 2019-09-09 NOTE — PROGRESS NOTES
"[facility] PULMONARY CARE         Dr Gates Sayied   LOS: 3 days   Patient Care Team:  Ean Farmer MD as PCP - General (Internal Medicine)    Chief Complains acute exacerbation of COPD with chronic bronchitis.  MI ischemic cardia myopathy    Interval History: Status post CABG postop on the vent.  On postop pressors and sedation.  He wakes up follow simple commands.  SBT trial is in the process.  Currently sedated intubated.    REVIEW OF SYSTEMS:   Sedated intubated.    Ventilator/Non-Invasive Ventilation Settings (From admission, onward)   SBT trial tolerating well.      Vital Signs  Temp:  [97.6 °F (36.4 °C)-98.4 °F (36.9 °C)] 98.4 °F (36.9 °C)  Heart Rate:  [83-99] 89  Resp:  [12-18] 12  BP: (102-115)/(68-80) 102/68  Arterial Line BP: (101-112)/(56-60) 101/56  FiO2 (%):  [50 %-100 %] 50 %    Intake/Output Summary (Last 24 hours) at 9/9/2019 1600  Last data filed at 9/9/2019 1430  Gross per 24 hour   Intake 1675 ml   Output 4360 ml   Net -2685 ml     Flowsheet Rows      First Filed Value   Admission Height  167.6 cm (66\") Documented at 09/07/2019 1234   Admission Weight  64.5 kg (142 lb 3.2 oz) Documented at 09/07/2019 0438          Physical Exam:   General Appearance:   Sedated intubated.  ET tube good position orally.  Wakes up follow simple commands.  SBT trial currently ongoing tolerating well.   Lungs:    Equal breath sounds on the vent.  No wheezing.  Peak pressures adequate.    Heart:    Regular rhythm and normal rate, normal S1 and S2, no            murmur, no gallop, no rub, no click   Chest Wall:   Status post CABG   Abdomen:     Normal bowel sounds, no masses, no organomegaly, soft        non-tender, non-distended, no guarding, no rebound                tenderness   Extremities:   Moves all extremities well, no edema, no cyanosis, no             redness  CNS intact  Skin no rashes no nodules       Results Review:        Results from last 7 days   Lab Units 09/09/19  1247 09/09/19  0435 " 09/08/19  0817   SODIUM mmol/L 136 131* 130*   POTASSIUM mmol/L 3.8 4.4 4.2   CHLORIDE mmol/L 101 94* 89*   CO2 mmol/L 23.4 23.6 25.7   BUN mg/dL 31* 37* 41*   CREATININE mg/dL 1.27 1.20 1.44*   GLUCOSE mg/dL 109* 133* 207*   CALCIUM mg/dL 8.0* 8.7 8.8         Results from last 7 days   Lab Units 09/09/19  1247 09/09/19  0435 09/08/19  0817   WBC 10*3/mm3 16.90*  16.49* 9.00 8.63   HEMOGLOBIN g/dL 9.2*  9.4* 10.2* 11.9*   HEMATOCRIT % 29.0*  29.8* 31.8* 36.7*   PLATELETS 10*3/mm3 189  202 279 307     Results from last 7 days   Lab Units 09/09/19  1247 09/09/19  0435 09/09/19  0011  09/07/19  0519   INR  1.35*  --   --   --  0.99   APTT seconds 28.7 29.3 37.0*   < > 29.7    < > = values in this interval not displayed.     Results from last 7 days   Lab Units 09/07/19  0519   CHOLESTEROL mg/dL 174     Results from last 7 days   Lab Units 09/09/19  1247   MAGNESIUM mg/dL 2.2     Results from last 7 days   Lab Units 09/07/19  0519   CHOLESTEROL mg/dL 174   TRIGLYCERIDES mg/dL 92   HDL CHOL mg/dL 90*   LDL CHOL mg/dL 66     Results from last 7 days   Lab Units 09/09/19  1346   PH, ARTERIAL pH units 7.333*   PO2 ART mm Hg 458.6*   PCO2, ARTERIAL mm Hg 45.7*   HCO3 ART mmol/L 24.3       I reviewed the patient's new clinical results.  I personally viewed and interpreted the patient's CXR        Medication Review:     acetaminophen 650 mg Oral Q4H   Or      acetaminophen 650 mg Oral Q4H   Or      acetaminophen 650 mg Rectal Q4H   albumin human 500 mL Intravenous Q6H   [START ON 9/10/2019] aspirin 81 mg Oral Daily   atorvastatin 40 mg Oral Nightly   ceFAZolin 2 g Intravenous Q8H   chlorhexidine 15 mL Mouth/Throat Q12H   [START ON 9/10/2019] enoxaparin 40 mg Subcutaneous Q24H   magnesium sulfate 1 g Intravenous Q8H   metoclopramide 10 mg Intravenous Q6H   [START ON 9/10/2019] metoprolol tartrate 12.5 mg Oral Q12H   mupirocin  Each Nare BID   [START ON 9/10/2019] pantoprazole 40 mg Oral Q AM   [START ON 9/10/2019]  sennosides-docusate sodium 2 tablet Oral Nightly         amiodarone 1 mg/min Last Rate: 1 mg/min (09/09/19 1345)   clevidipine 2-32 mg/hr    dexmedetomidine 0.2-1.5 mcg/kg/hr Last Rate: Stopped (09/09/19 1500)   DOPamine 2-20 mcg/kg/min    EPINEPHrine 0.02-0.3 mcg/kg/min Last Rate: 0.04 mcg/kg/min (09/09/19 1537)   insulin 0-50 Units/hr    milrinone 0.25-0.75 mcg/kg/min Last Rate: 0.125 mcg/kg/min (09/09/19 1413)   niCARdipine 5-15 mg/hr    nitroglycerin 5-200 mcg/min    norepinephrine 0.02-0.3 mcg/kg/min Last Rate: 0.05 mcg/kg/min (09/09/19 1537)   phenylephrine 0.2-3 mcg/kg/min    propofol 5-50 mcg/kg/min Last Rate: Stopped (09/09/19 1412)   sodium chloride 30 mL/hr Last Rate: 30 mL/hr (09/09/19 1334)   sodium chloride 30 mL/hr Last Rate: 30 mL/hr (09/09/19 1235)   vasopressin 0.02-0.1 Units/min        ASSESSMENT:   Coronary artery disease status post CABG  Postop respiratory failure  COPD with exacerbation  Chronic bronchitis  Non-ST elevation MI  Ischemic cardiomyopathy  Moderate mitral regurgitation  Tobacco abuse    PLAN:  Status post CABG.  Spontaneous breathing trial currently and tolerating well.  Patient wakes up follow simple commands.  We will proceed with rapid weaning protocol.  Postop drips per cardiothoracic surgery to be weaned down  Will add bronchodilators  No active wheezing so hold off steroids  Wean down pressors as tolerated  Will likely need outpatient PFTs down the road  Patient to quit smoking down the road  We will follow along closely        Yajaira Ardon MD  09/09/19  4:00 PM

## 2019-09-09 NOTE — ANESTHESIA PROCEDURE NOTES
Arterial Line      Patient reassessed immediately prior to procedure    Patient location during procedure: OR   Line placed for hemodynamic monitoring and ABGs/Labs/ISTAT.  Performed By   Anesthesiologist: Rudy Coats MD  Preanesthetic Checklist  Completed: patient identified, site marked, surgical consent, pre-op evaluation, timeout performed, IV checked, risks and benefits discussed and monitors and equipment checked  Arterial Line Prep   Sterile Tech: cap, gloves, mask and sterile barriers  Prep: ChloraPrep  Patient monitoring: blood pressure monitoring, continuous pulse oximetry and EKG  Arterial Line Procedure   Laterality:left  Location:  radial artery  Catheter size: 20 G   Guidance: palpation technique  Number of attempts: 1  Post Assessment   Dressing Type: occlusive dressing applied, secured with tape and wrist guard applied.   Complications no  Circ/Move/Sens Assessment: normal and unchanged.   Patient Tolerance: patient tolerated the procedure well with no apparent complications

## 2019-09-09 NOTE — OP NOTE
Date of procedure: 9/9/2019.    Preoperative diagnosis: Non-ST elevation myocardial infarction, left main and three-vessel coronary disease, ischemic cardiomyopathy, acute on chronic systolic congestive heart failure, acute on chronic renal insufficiency, exacerbation of COPD, hypertension, diabetes, exposure to agent orange.    Postoperative diagnosis: Same.    Procedure: Urgent coronary bypass x3 with LIMA to distal LAD, saphenous vein to OM 2, saphenous vein to PDA.  Left atrial appendage ligation.  Left lower extremity endoscopic vein harvest.  Intraoperative transesophageal echocardiogram.  PRP application to LIMA bed and sternum.    Surgeon: Alvaro Mazariegos MD.    Assistant: SALONI Oviedo.    Cardiologist: Mono Tidwell MD/SNEHA Riggs MD.    Anesthesia: GETA.    Findings: No atherosclerotic plaque on aortic palpation.  Left internal mammary artery and saphenous vein good-quality.  PDA 2 mm, OM 2 2 mm, LAD 2.5 mm.  Left atrial appendage small, ligated externally with 4 Prolene pledgeted suture.  Transesophageal echocardiogram showed a pre-sternotomy ejection fraction of 20%.  Images also showed mild mitral valve regurgitation.  Overall very friable tissue.    Aortic cross-clamp time: 67 minutes.    Cardio primary bypass time: 89 minutes.    Cell Saver: 435 ml.    Antegrade and retrograde cardioplegia.    Arterial: 20 Turkish to aorta.    Venous: 29/37 Turkish to right atrium.    History of present illness: This a pleasant 68-year-old  gentleman with multiple severe comorbidities and worsening dyspnea over the last several weeks.  Besides exacerbation of COPD he was diagnosed with a non-ST elevation myocardial infarction.  Left heart catheterization showed a severe ostial LAD lesion with extension into the left main.  The patient also had independent lesions in the circumflex and PDA distributions.  He also had mild mitral valve regurgitation.  His left ventricle ejection fraction was reduced at 20%,  consistent with ischemic cardiomyopathy.  His STS risks for morbidity and mortality were calculated and discussed.  Despite his increased risk he was counseled and consented for coronary bypass surgery.    Details: The patient was identified in the prep and holding area.  He was taken to the OR.  Following intubation he received a PD catheter and radial arterial line.  He also received a Weeks catheter.  He was positioned and then prepped and draped.  A midline sternotomy incision was made in the sternum was split with electric saw.  The left hemisternum was retracted and the left internal mammary artery was harvested as a pedicle.  The patient was heparinized and the pedicle was transected distally; the distal stump was controlled and the pedicle was fashioned; a left pleural chest was placed.  During LIMA harvest the patient underwent endoscopic separation of his left lower extremity, revealing adequate saphenous vein conduit which was harvested and prepared.  The sternum was retracted and the pericardium was opened; a well was created.  The aorta was palpated and found to be free of any worrisome atherosclerotic plaque; this was confirmed by BUDDY.  ACT was confirmed.  The ascending aorta and right atrium were cannulated.  Antegrade and retrograde cardioplegia lines were inserted.    Cardiopulmonary bypass was instituted and the patient was cooled to 34 °C.  An aortic cross-clamp was applied.  The patient received 800 mL of antegrade and 200 mL of retrograde cardioplegia.  Between distal anastomoses the patient received additional retrograde cardioplegia doses.  The heart was retracted.  The left atrial appendage was examined; this was relatively small.  The base was ligated externally with a 4-0 Prolene pledgeted suture to good effect.  The PDA was identified.  An arteriotomy was performed; a saphenous vein segment was anastomosed to this and found to be hemostatic; the proximal end of this segment was anastomosed  to an aortotomy; this was tied down and marked with a metallic ring.  The OM 2 branch was identified.  An arteriotomy was performed; a saphenous vein segment was anastomosed to this and found to be hemostatic; the proximal end of this segment was anastomosed to a separate aortotomy; this was tied down and marked with a metallic ring as well.  A pericardial window was created with electrocautery and used to deliver the LIMA pedicle into the well.  A distal LAD arteriotomy was created and then anastomosed to the LIMA and.  This was tied down and found to be hemostatic.  The pedicle was anchored to the epicardial surface.     After a hotshot antegrade cardioplegia dose the aortic cross clamp was removed and the bulldog clamp was removed from the LIMA; the patient recovered into sinus rhythm.  The retrograde cannula was removed.  The proximal and distal anastomoses were examined and found to be hemostatic.  Pacing wires were applied to the right ventricle and right atrium.  After sufficient de-airing the antegrade cardioplegia line was removed.  The patient was weaned off cardiopulmonary bypass.    The venous line was clamped and removed.  The patient received protamine.  The arterial line was clamped and removed.  Cannulation sites were reinforced.  The pericardium was approximated loosely.  A substernal chest tube was placed.  8 steel wires were used to approximate the sternum.  Sponge counts, instrument counts, and needle counts were correct.  The fascia layer, subdermal layer, and subcuticular layer were approximated.  The leg incision were closed in a similar fashion.  Bandages were applied.  The patient was transferred to the CVR.

## 2019-09-10 ENCOUNTER — APPOINTMENT (OUTPATIENT)
Dept: GENERAL RADIOLOGY | Facility: HOSPITAL | Age: 69
End: 2019-09-10

## 2019-09-10 LAB
ACT BLD: 268 SECONDS (ref 82–152)
ACT BLD: 279 SECONDS (ref 82–152)
ALBUMIN SERPL-MCNC: 3.9 G/DL (ref 3.5–5.2)
ALBUMIN SERPL-MCNC: 4.2 G/DL (ref 3.5–5.2)
ANION GAP SERPL CALCULATED.3IONS-SCNC: 13.2 MMOL/L (ref 5–15)
ANION GAP SERPL CALCULATED.3IONS-SCNC: 19.5 MMOL/L (ref 5–15)
BASE EXCESS BLDA CALC-SCNC: -1 MMOL/L (ref -5–5)
BASE EXCESS BLDA CALC-SCNC: -1 MMOL/L (ref -5–5)
BASE EXCESS BLDA CALC-SCNC: 1 MMOL/L (ref -5–5)
BASE EXCESS BLDA CALC-SCNC: 1 MMOL/L (ref -5–5)
BASE EXCESS BLDA CALC-SCNC: 2 MMOL/L (ref -5–5)
BASE EXCESS BLDA CALC-SCNC: 2 MMOL/L (ref -5–5)
BASOPHILS # BLD AUTO: 0.02 10*3/MM3 (ref 0–0.2)
BASOPHILS # BLD AUTO: 0.04 10*3/MM3 (ref 0–0.2)
BASOPHILS NFR BLD AUTO: 0.2 % (ref 0–1.5)
BASOPHILS NFR BLD AUTO: 0.4 % (ref 0–1.5)
BUN BLD-MCNC: 28 MG/DL (ref 8–23)
BUN BLD-MCNC: 28 MG/DL (ref 8–23)
BUN/CREAT SERPL: 19.2 (ref 7–25)
BUN/CREAT SERPL: 20 (ref 7–25)
CA-I BLD-MCNC: 4.9 MG/DL (ref 4.6–5.4)
CA-I SERPL ISE-MCNC: 1.22 MMOL/L (ref 1.15–1.35)
CALCIUM SPEC-SCNC: 8 MG/DL (ref 8.6–10.5)
CALCIUM SPEC-SCNC: 8.7 MG/DL (ref 8.6–10.5)
CHLORIDE SERPL-SCNC: 101 MMOL/L (ref 98–107)
CHLORIDE SERPL-SCNC: 103 MMOL/L (ref 98–107)
CO2 BLDA-SCNC: 27 MMOL/L (ref 24–29)
CO2 BLDA-SCNC: 28 MMOL/L (ref 24–29)
CO2 BLDA-SCNC: 28 MMOL/L (ref 24–29)
CO2 BLDA-SCNC: 29 MMOL/L (ref 24–29)
CO2 BLDA-SCNC: 29 MMOL/L (ref 24–29)
CO2 BLDA-SCNC: 30 MMOL/L (ref 24–29)
CO2 SERPL-SCNC: 17.5 MMOL/L (ref 22–29)
CO2 SERPL-SCNC: 21.8 MMOL/L (ref 22–29)
CREAT BLD-MCNC: 1.4 MG/DL (ref 0.76–1.27)
CREAT BLD-MCNC: 1.46 MG/DL (ref 0.76–1.27)
DEPRECATED RDW RBC AUTO: 50.6 FL (ref 37–54)
DEPRECATED RDW RBC AUTO: 52.4 FL (ref 37–54)
EOSINOPHIL # BLD AUTO: 0.09 10*3/MM3 (ref 0–0.4)
EOSINOPHIL # BLD AUTO: 0.15 10*3/MM3 (ref 0–0.4)
EOSINOPHIL NFR BLD AUTO: 1 % (ref 0.3–6.2)
EOSINOPHIL NFR BLD AUTO: 1.6 % (ref 0.3–6.2)
ERYTHROCYTE [DISTWIDTH] IN BLOOD BY AUTOMATED COUNT: 14 % (ref 12.3–15.4)
ERYTHROCYTE [DISTWIDTH] IN BLOOD BY AUTOMATED COUNT: 14.4 % (ref 12.3–15.4)
GFR SERPL CREATININE-BSD FRML MDRD: 48 ML/MIN/1.73
GFR SERPL CREATININE-BSD FRML MDRD: 50 ML/MIN/1.73
GLUCOSE BLD-MCNC: 125 MG/DL (ref 65–99)
GLUCOSE BLD-MCNC: 141 MG/DL (ref 65–99)
GLUCOSE BLDC GLUCOMTR-MCNC: 107 MG/DL (ref 70–130)
GLUCOSE BLDC GLUCOMTR-MCNC: 108 MG/DL (ref 70–130)
GLUCOSE BLDC GLUCOMTR-MCNC: 113 MG/DL (ref 70–130)
GLUCOSE BLDC GLUCOMTR-MCNC: 116 MG/DL (ref 70–130)
GLUCOSE BLDC GLUCOMTR-MCNC: 131 MG/DL (ref 70–130)
GLUCOSE BLDC GLUCOMTR-MCNC: 136 MG/DL (ref 70–130)
GLUCOSE BLDC GLUCOMTR-MCNC: 143 MG/DL (ref 70–130)
GLUCOSE BLDC GLUCOMTR-MCNC: 145 MG/DL (ref 70–130)
GLUCOSE BLDC GLUCOMTR-MCNC: 149 MG/DL (ref 70–130)
GLUCOSE BLDC GLUCOMTR-MCNC: 151 MG/DL (ref 70–130)
GLUCOSE BLDC GLUCOMTR-MCNC: 158 MG/DL (ref 70–130)
GLUCOSE BLDC GLUCOMTR-MCNC: 167 MG/DL (ref 70–130)
GLUCOSE BLDC GLUCOMTR-MCNC: 174 MG/DL (ref 70–130)
GLUCOSE BLDC GLUCOMTR-MCNC: 176 MG/DL (ref 70–130)
GLUCOSE BLDC GLUCOMTR-MCNC: 177 MG/DL (ref 70–130)
GLUCOSE BLDC GLUCOMTR-MCNC: 180 MG/DL (ref 70–130)
GLUCOSE BLDC GLUCOMTR-MCNC: 186 MG/DL (ref 70–130)
GLUCOSE BLDC GLUCOMTR-MCNC: 80 MG/DL (ref 70–130)
GLUCOSE BLDC GLUCOMTR-MCNC: 91 MG/DL (ref 70–130)
HCO3 BLDA-SCNC: 25.8 MMOL/L (ref 22–26)
HCO3 BLDA-SCNC: 26.3 MMOL/L (ref 22–26)
HCO3 BLDA-SCNC: 26.7 MMOL/L (ref 22–26)
HCO3 BLDA-SCNC: 27.6 MMOL/L (ref 22–26)
HCO3 BLDA-SCNC: 27.8 MMOL/L (ref 22–26)
HCO3 BLDA-SCNC: 27.8 MMOL/L (ref 22–26)
HCT VFR BLD AUTO: 25.3 % (ref 37.5–51)
HCT VFR BLD AUTO: 25.5 % (ref 37.5–51)
HCT VFR BLDA CALC: 23 % (ref 38–51)
HCT VFR BLDA CALC: 24 % (ref 38–51)
HCT VFR BLDA CALC: 26 % (ref 38–51)
HCT VFR BLDA CALC: 26 % (ref 38–51)
HCT VFR BLDA CALC: 27 % (ref 38–51)
HCT VFR BLDA CALC: 31 % (ref 38–51)
HGB BLD-MCNC: 7.7 G/DL (ref 13–17.7)
HGB BLD-MCNC: 7.8 G/DL (ref 13–17.7)
HGB BLDA-MCNC: 10.5 G/DL (ref 12–17)
HGB BLDA-MCNC: 7.8 G/DL (ref 12–17)
HGB BLDA-MCNC: 8.2 G/DL (ref 12–17)
HGB BLDA-MCNC: 8.8 G/DL (ref 12–17)
HGB BLDA-MCNC: 8.8 G/DL (ref 12–17)
HGB BLDA-MCNC: 9.2 G/DL (ref 12–17)
IMM GRANULOCYTES # BLD AUTO: 0.08 10*3/MM3 (ref 0–0.05)
IMM GRANULOCYTES # BLD AUTO: 0.08 10*3/MM3 (ref 0–0.05)
IMM GRANULOCYTES NFR BLD AUTO: 0.8 % (ref 0–0.5)
IMM GRANULOCYTES NFR BLD AUTO: 0.9 % (ref 0–0.5)
INR PPP: 1.16 (ref 0.9–1.1)
LYMPHOCYTES # BLD AUTO: 0.85 10*3/MM3 (ref 0.7–3.1)
LYMPHOCYTES # BLD AUTO: 0.9 10*3/MM3 (ref 0.7–3.1)
LYMPHOCYTES NFR BLD AUTO: 8.9 % (ref 19.6–45.3)
LYMPHOCYTES NFR BLD AUTO: 9.9 % (ref 19.6–45.3)
MAGNESIUM SERPL-MCNC: 2.3 MG/DL (ref 1.6–2.4)
MAGNESIUM SERPL-MCNC: 2.3 MG/DL (ref 1.6–2.4)
MCH RBC QN AUTO: 30.2 PG (ref 26.6–33)
MCH RBC QN AUTO: 30.7 PG (ref 26.6–33)
MCHC RBC AUTO-ENTMCNC: 30.4 G/DL (ref 31.5–35.7)
MCHC RBC AUTO-ENTMCNC: 30.6 G/DL (ref 31.5–35.7)
MCV RBC AUTO: 100.8 FL (ref 79–97)
MCV RBC AUTO: 98.8 FL (ref 79–97)
MONOCYTES # BLD AUTO: 0.81 10*3/MM3 (ref 0.1–0.9)
MONOCYTES # BLD AUTO: 0.87 10*3/MM3 (ref 0.1–0.9)
MONOCYTES NFR BLD AUTO: 8.5 % (ref 5–12)
MONOCYTES NFR BLD AUTO: 9.5 % (ref 5–12)
NEUTROPHILS # BLD AUTO: 7.15 10*3/MM3 (ref 1.7–7)
NEUTROPHILS # BLD AUTO: 7.65 10*3/MM3 (ref 1.7–7)
NEUTROPHILS NFR BLD AUTO: 78.5 % (ref 42.7–76)
NEUTROPHILS NFR BLD AUTO: 79.8 % (ref 42.7–76)
NRBC BLD AUTO-RTO: 0 /100 WBC (ref 0–0.2)
NRBC BLD AUTO-RTO: 0 /100 WBC (ref 0–0.2)
PCO2 BLDA: 46.9 MM HG (ref 35–45)
PCO2 BLDA: 51.1 MM HG (ref 35–45)
PCO2 BLDA: 54.8 MM HG (ref 35–45)
PCO2 BLDA: 55.3 MM HG (ref 35–45)
PCO2 BLDA: 56.4 MM HG (ref 35–45)
PCO2 BLDA: 56.7 MM HG (ref 35–45)
PH BLDA: 7.27 PH UNITS (ref 7.35–7.6)
PH BLDA: 7.28 PH UNITS (ref 7.35–7.6)
PH BLDA: 7.3 PH UNITS (ref 7.35–7.6)
PH BLDA: 7.31 PH UNITS (ref 7.35–7.6)
PH BLDA: 7.34 PH UNITS (ref 7.35–7.6)
PH BLDA: 7.36 PH UNITS (ref 7.35–7.6)
PHOSPHATE SERPL-MCNC: 4.1 MG/DL (ref 2.5–4.5)
PHOSPHATE SERPL-MCNC: 4.4 MG/DL (ref 2.5–4.5)
PLATELET # BLD AUTO: 149 10*3/MM3 (ref 140–450)
PLATELET # BLD AUTO: 160 10*3/MM3 (ref 140–450)
PMV BLD AUTO: 10 FL (ref 6–12)
PMV BLD AUTO: 9.8 FL (ref 6–12)
PO2 BLDA: 383 MMHG (ref 80–105)
PO2 BLDA: 447 MMHG (ref 80–105)
PO2 BLDA: 45 MMHG (ref 80–105)
PO2 BLDA: 469 MMHG (ref 80–105)
PO2 BLDA: 490 MMHG (ref 80–105)
PO2 BLDA: 498 MMHG (ref 80–105)
POTASSIUM BLD-SCNC: 4.2 MMOL/L (ref 3.5–5.2)
POTASSIUM BLD-SCNC: 4.3 MMOL/L (ref 3.5–5.2)
POTASSIUM BLDA-SCNC: 4.1 MMOL/L (ref 3.5–4.9)
POTASSIUM BLDA-SCNC: 4.2 MMOL/L (ref 3.5–4.9)
POTASSIUM BLDA-SCNC: 4.3 MMOL/L (ref 3.5–4.9)
POTASSIUM BLDA-SCNC: 4.6 MMOL/L (ref 3.5–4.9)
POTASSIUM BLDA-SCNC: 4.8 MMOL/L (ref 3.5–4.9)
POTASSIUM BLDA-SCNC: 5.3 MMOL/L (ref 3.5–4.9)
PROTHROMBIN TIME: 14.5 SECONDS (ref 11.7–14.2)
RBC # BLD AUTO: 2.51 10*6/MM3 (ref 4.14–5.8)
RBC # BLD AUTO: 2.58 10*6/MM3 (ref 4.14–5.8)
SAO2 % BLDA: 100 % (ref 95–98)
SAO2 % BLDA: 75 % (ref 95–98)
SODIUM BLD-SCNC: 138 MMOL/L (ref 136–145)
SODIUM BLD-SCNC: 138 MMOL/L (ref 136–145)
WBC NRBC COR # BLD: 9.11 10*3/MM3 (ref 3.4–10.8)
WBC NRBC COR # BLD: 9.58 10*3/MM3 (ref 3.4–10.8)

## 2019-09-10 PROCEDURE — 99232 SBSQ HOSP IP/OBS MODERATE 35: CPT | Performed by: INTERNAL MEDICINE

## 2019-09-10 PROCEDURE — 25010000002 CALCIUM GLUCONATE PER 10 ML: Performed by: NURSE PRACTITIONER

## 2019-09-10 PROCEDURE — 25010000002 AMIODARONE IN DEXTROSE 5% 360-4.14 MG/200ML-% SOLUTION: Performed by: NURSE PRACTITIONER

## 2019-09-10 PROCEDURE — 25010000002 METHYLPREDNISOLONE PER 40 MG: Performed by: THORACIC SURGERY (CARDIOTHORACIC VASCULAR SURGERY)

## 2019-09-10 PROCEDURE — 80069 RENAL FUNCTION PANEL: CPT | Performed by: THORACIC SURGERY (CARDIOTHORACIC VASCULAR SURGERY)

## 2019-09-10 PROCEDURE — 85610 PROTHROMBIN TIME: CPT | Performed by: THORACIC SURGERY (CARDIOTHORACIC VASCULAR SURGERY)

## 2019-09-10 PROCEDURE — 85025 COMPLETE CBC W/AUTO DIFF WBC: CPT | Performed by: THORACIC SURGERY (CARDIOTHORACIC VASCULAR SURGERY)

## 2019-09-10 PROCEDURE — 93005 ELECTROCARDIOGRAM TRACING: CPT | Performed by: NURSE PRACTITIONER

## 2019-09-10 PROCEDURE — 94799 UNLISTED PULMONARY SVC/PX: CPT

## 2019-09-10 PROCEDURE — 25010000002 AMIODARONE IN DEXTROSE 5% 150-4.21 MG/100ML-% SOLUTION: Performed by: NURSE PRACTITIONER

## 2019-09-10 PROCEDURE — 71045 X-RAY EXAM CHEST 1 VIEW: CPT

## 2019-09-10 PROCEDURE — 25010000002 MAGNESIUM SULFATE IN D5W 1G/100ML (PREMIX) 1-5 GM/100ML-% SOLUTION: Performed by: THORACIC SURGERY (CARDIOTHORACIC VASCULAR SURGERY)

## 2019-09-10 PROCEDURE — 25010000002 FUROSEMIDE PER 20 MG: Performed by: INTERNAL MEDICINE

## 2019-09-10 PROCEDURE — 25010000002 MAGNESIUM SULFATE IN D5W 1G/100ML (PREMIX) 1-5 GM/100ML-% SOLUTION: Performed by: NURSE PRACTITIONER

## 2019-09-10 PROCEDURE — 97162 PT EVAL MOD COMPLEX 30 MIN: CPT

## 2019-09-10 PROCEDURE — 25010000002 ENOXAPARIN PER 10 MG: Performed by: THORACIC SURGERY (CARDIOTHORACIC VASCULAR SURGERY)

## 2019-09-10 PROCEDURE — 97110 THERAPEUTIC EXERCISES: CPT

## 2019-09-10 PROCEDURE — 82962 GLUCOSE BLOOD TEST: CPT

## 2019-09-10 PROCEDURE — 25010000002 METOCLOPRAMIDE PER 10 MG: Performed by: THORACIC SURGERY (CARDIOTHORACIC VASCULAR SURGERY)

## 2019-09-10 PROCEDURE — 25010000002 CEFAZOLIN PER 500 MG: Performed by: THORACIC SURGERY (CARDIOTHORACIC VASCULAR SURGERY)

## 2019-09-10 PROCEDURE — 93010 ELECTROCARDIOGRAM REPORT: CPT | Performed by: INTERNAL MEDICINE

## 2019-09-10 PROCEDURE — 83735 ASSAY OF MAGNESIUM: CPT | Performed by: THORACIC SURGERY (CARDIOTHORACIC VASCULAR SURGERY)

## 2019-09-10 PROCEDURE — 63710000001 INSULIN LISPRO (HUMAN) PER 5 UNITS: Performed by: INTERNAL MEDICINE

## 2019-09-10 PROCEDURE — 83735 ASSAY OF MAGNESIUM: CPT | Performed by: NURSE PRACTITIONER

## 2019-09-10 RX ORDER — MAGNESIUM SULFATE 1 G/100ML
1 INJECTION INTRAVENOUS ONCE
Status: DISCONTINUED | OUTPATIENT
Start: 2019-09-10 | End: 2019-09-10

## 2019-09-10 RX ORDER — MAGNESIUM SULFATE 1 G/100ML
2 INJECTION INTRAVENOUS ONCE
Status: COMPLETED | OUTPATIENT
Start: 2019-09-10 | End: 2019-09-10

## 2019-09-10 RX ORDER — AMIODARONE HYDROCHLORIDE 200 MG/1
400 TABLET ORAL EVERY 12 HOURS SCHEDULED
Status: DISCONTINUED | OUTPATIENT
Start: 2019-09-10 | End: 2019-09-10

## 2019-09-10 RX ORDER — GUAIFENESIN 600 MG/1
1200 TABLET, EXTENDED RELEASE ORAL EVERY 12 HOURS SCHEDULED
Status: DISCONTINUED | OUTPATIENT
Start: 2019-09-10 | End: 2019-09-15 | Stop reason: HOSPADM

## 2019-09-10 RX ORDER — CYCLOBENZAPRINE HCL 10 MG
10 TABLET ORAL EVERY 8 HOURS PRN
Status: DISCONTINUED | OUTPATIENT
Start: 2019-09-10 | End: 2019-09-11

## 2019-09-10 RX ORDER — FUROSEMIDE 10 MG/ML
40 INJECTION INTRAMUSCULAR; INTRAVENOUS EVERY 8 HOURS
Status: DISCONTINUED | OUTPATIENT
Start: 2019-09-10 | End: 2019-09-11

## 2019-09-10 RX ORDER — CHLORHEXIDINE GLUCONATE 0.12 MG/ML
15 RINSE ORAL EVERY 12 HOURS
Status: DISCONTINUED | OUTPATIENT
Start: 2019-09-10 | End: 2019-09-11

## 2019-09-10 RX ORDER — PRIMIDONE 250 MG/1
250 TABLET ORAL 2 TIMES DAILY
Status: DISCONTINUED | OUTPATIENT
Start: 2019-09-10 | End: 2019-09-15 | Stop reason: HOSPADM

## 2019-09-10 RX ORDER — IPRATROPIUM BROMIDE AND ALBUTEROL SULFATE 2.5; .5 MG/3ML; MG/3ML
3 SOLUTION RESPIRATORY (INHALATION)
Status: DISCONTINUED | OUTPATIENT
Start: 2019-09-10 | End: 2019-09-12

## 2019-09-10 RX ORDER — SERTRALINE HYDROCHLORIDE 25 MG/1
25 TABLET, FILM COATED ORAL DAILY
Status: DISCONTINUED | OUTPATIENT
Start: 2019-09-10 | End: 2019-09-15 | Stop reason: HOSPADM

## 2019-09-10 RX ORDER — TRAMADOL HYDROCHLORIDE 50 MG/1
50 TABLET ORAL EVERY 6 HOURS PRN
Status: DISCONTINUED | OUTPATIENT
Start: 2019-09-10 | End: 2019-09-12

## 2019-09-10 RX ADMIN — METOCLOPRAMIDE 10 MG: 5 INJECTION, SOLUTION INTRAMUSCULAR; INTRAVENOUS at 01:30

## 2019-09-10 RX ADMIN — HYDROCODONE BITARTRATE AND ACETAMINOPHEN 1 TABLET: 5; 325 TABLET ORAL at 23:46

## 2019-09-10 RX ADMIN — CALCIUM GLUCONATE 1 G: 98 INJECTION, SOLUTION INTRAVENOUS at 09:25

## 2019-09-10 RX ADMIN — CHLORHEXIDINE GLUCONATE 15 ML: 1.2 RINSE ORAL at 05:27

## 2019-09-10 RX ADMIN — AMIODARONE HYDROCHLORIDE 1 MG/MIN: 1.8 INJECTION, SOLUTION INTRAVENOUS at 13:00

## 2019-09-10 RX ADMIN — CHLORHEXIDINE GLUCONATE 15 ML: 1.2 RINSE ORAL at 20:29

## 2019-09-10 RX ADMIN — INSULIN LISPRO 1 UNITS: 100 INJECTION, SOLUTION INTRAVENOUS; SUBCUTANEOUS at 22:00

## 2019-09-10 RX ADMIN — ACETAMINOPHEN ORAL SOLUTION 650 MG: 325 SOLUTION ORAL at 01:30

## 2019-09-10 RX ADMIN — HYDROCODONE BITARTRATE AND ACETAMINOPHEN 2 TABLET: 5; 325 TABLET ORAL at 16:11

## 2019-09-10 RX ADMIN — AMIODARONE HYDROCHLORIDE 0.5 MG/MIN: 1.8 INJECTION, SOLUTION INTRAVENOUS at 23:25

## 2019-09-10 RX ADMIN — FUROSEMIDE 40 MG: 10 INJECTION, SOLUTION INTRAMUSCULAR; INTRAVENOUS at 09:26

## 2019-09-10 RX ADMIN — ENOXAPARIN SODIUM 40 MG: 40 INJECTION SUBCUTANEOUS at 22:13

## 2019-09-10 RX ADMIN — AMIODARONE HYDROCHLORIDE 1 MG/MIN: 1.8 INJECTION, SOLUTION INTRAVENOUS at 15:36

## 2019-09-10 RX ADMIN — METHYLPREDNISOLONE SODIUM SUCCINATE 20 MG: 40 INJECTION, POWDER, FOR SOLUTION INTRAMUSCULAR; INTRAVENOUS at 10:11

## 2019-09-10 RX ADMIN — SENNOSIDES AND DOCUSATE SODIUM 2 TABLET: 8.6; 5 TABLET ORAL at 20:31

## 2019-09-10 RX ADMIN — ASPIRIN 325 MG: 325 TABLET, COATED ORAL at 09:25

## 2019-09-10 RX ADMIN — MUPIROCIN 1 APPLICATION: 20 OINTMENT TOPICAL at 22:00

## 2019-09-10 RX ADMIN — ALBUTEROL SULFATE 2.5 MG: 2.5 SOLUTION RESPIRATORY (INHALATION) at 11:45

## 2019-09-10 RX ADMIN — MUPIROCIN 1 APPLICATION: 20 OINTMENT TOPICAL at 09:37

## 2019-09-10 RX ADMIN — IPRATROPIUM BROMIDE AND ALBUTEROL SULFATE 3 ML: 2.5; .5 SOLUTION RESPIRATORY (INHALATION) at 20:45

## 2019-09-10 RX ADMIN — MAGNESIUM SULFATE 1 G: 1 INJECTION INTRAVENOUS at 05:12

## 2019-09-10 RX ADMIN — ALBUTEROL SULFATE 2.5 MG: 2.5 SOLUTION RESPIRATORY (INHALATION) at 06:59

## 2019-09-10 RX ADMIN — FUROSEMIDE 40 MG: 10 INJECTION, SOLUTION INTRAMUSCULAR; INTRAVENOUS at 19:04

## 2019-09-10 RX ADMIN — HYDROCODONE BITARTRATE AND ACETAMINOPHEN 2 TABLET: 5; 325 TABLET ORAL at 11:45

## 2019-09-10 RX ADMIN — SERTRALINE 25 MG: 25 TABLET, FILM COATED ORAL at 20:32

## 2019-09-10 RX ADMIN — METOCLOPRAMIDE 10 MG: 5 INJECTION, SOLUTION INTRAMUSCULAR; INTRAVENOUS at 07:01

## 2019-09-10 RX ADMIN — CEFAZOLIN SODIUM 2 G: 10 INJECTION, POWDER, FOR SOLUTION INTRAVENOUS at 18:23

## 2019-09-10 RX ADMIN — ALBUTEROL SULFATE 2.5 MG: 2.5 SOLUTION RESPIRATORY (INHALATION) at 15:27

## 2019-09-10 RX ADMIN — METHYLPREDNISOLONE SODIUM SUCCINATE 20 MG: 40 INJECTION, POWDER, FOR SOLUTION INTRAMUSCULAR; INTRAVENOUS at 20:29

## 2019-09-10 RX ADMIN — GUAIFENESIN 1200 MG: 600 TABLET, EXTENDED RELEASE ORAL at 20:30

## 2019-09-10 RX ADMIN — HYDROCODONE BITARTRATE AND ACETAMINOPHEN 2 TABLET: 5; 325 TABLET ORAL at 07:41

## 2019-09-10 RX ADMIN — PANTOPRAZOLE SODIUM 40 MG: 40 TABLET, DELAYED RELEASE ORAL at 05:27

## 2019-09-10 RX ADMIN — AMIODARONE HYDROCHLORIDE 150 MG: 1.5 INJECTION, SOLUTION INTRAVENOUS at 12:40

## 2019-09-10 RX ADMIN — CEFAZOLIN SODIUM 2 G: 10 INJECTION, POWDER, FOR SOLUTION INTRAVENOUS at 02:24

## 2019-09-10 RX ADMIN — CEFAZOLIN SODIUM 2 G: 10 INJECTION, POWDER, FOR SOLUTION INTRAVENOUS at 10:57

## 2019-09-10 RX ADMIN — MAGNESIUM SULFATE 2 G: 1 INJECTION INTRAVENOUS at 13:09

## 2019-09-10 RX ADMIN — AMIODARONE HYDROCHLORIDE 400 MG: 200 TABLET ORAL at 09:25

## 2019-09-10 RX ADMIN — ATORVASTATIN CALCIUM 40 MG: 20 TABLET, FILM COATED ORAL at 20:31

## 2019-09-10 NOTE — PROGRESS NOTES
[facility]  Cardiology Progress Note    Patient Identification:  Name: Cedric Wade  Age: 68 y.o.  Sex: male  :  1950  MRN: [id number]                 Follow Up / Chief Complaint: Management recommendations for CAD    Interval History: POD 1 CABG x3 with left EVH and SHANIA.  Now on amiodarone gtt, to be switched to PO.        Subjective: Patient has post-op pain, tolerable.  Denies chest pain and SOA    Objective:    Past Medical History:  Past Medical History:   Diagnosis Date   • Arthritis    • CHF (congestive heart failure) (CMS/[facility])    • COPD (chronic obstructive pulmonary disease) (CMS/[facility])    • Coronary artery disease    • Cutaneous lupus erythematosus    • Diabetes mellitus (CMS/HCC)    • Elevated cholesterol    • GERD (gastroesophageal reflux disease)    • Hypertension      Past Surgical History:  Past Surgical History:   Procedure Laterality Date   • CORONARY ARTERY BYPASS GRAFT N/A 2019    Procedure: INTRAOPERATIVE BUDDY, MIDLINE STENOTOMY WITH CORONARY ARTERY BYPASS GRAPHS X  3 UTILIZING LEFT SAVAGE  AND LEFT ENDOSCOPIC HARVESTED SAPHENOUS VEIN, LIGATION OF LEFT  ATRIAL APPENDAGE; PRP;  Surgeon: Alvaro Mazariegos MD;  [facility];  Service: Cardiothoracic        Social History:   Social History     Tobacco Use   • Smoking status: Current Every Day Smoker     Packs/day: 2.00     Years: 58.00     Pack years: 116.00   • Smokeless tobacco: Never Used   Substance Use Topics   • Alcohol use: No     Frequency: Never      Family History:  Family History   Problem Relation Age of Onset   • Lung cancer Father    • Diabetes Sister           Allergies:  Allergies   Allergen Reactions   • Codeine Nausea And Vomiting     Scheduled Meds:    albuterol 2.5 mg 4x Daily - RT   amiodarone 400 mg Q12H   aspirin 325 mg Daily   atorvastatin 40 mg Nightly   ceFAZolin 2 g Q8H   chlorhexidine 15 mL Q12H   enoxaparin 40 mg Q24H   furosemide 40 mg Q8H   insulin lispro 0-12 Units Q4H  "  methylPREDNISolone sodium succinate 20 mg Q12H   mupirocin  BID   pantoprazole 40 mg Q AM   racemic epinephrine 0.5 mL TID - RT   And     sodium chloride 3 mL TID - RT   sennosides-docusate sodium 2 tablet Nightly     I have reviewed the patient's recent medical history and current medications, as well as personally reviewed/interpreted the ECG/telemetry data    INTAKE AND OUTPUT:    Intake/Output Summary (Last 24 hours) at 9/10/2019 1420  Last data filed at 9/10/2019 1309  Gross per 24 hour   Intake 3284.2 ml   Output 2180 ml   Net 1104.2 ml     ROS  Constitutional: Awake and alert, no fever. No nosebleeds  Abdomen           no abdominal pain   Cardiac              no chest pain  Pulmonary          no shortness of breath      BP (!) 87/60   Pulse 80   Temp 98.4 °F (36.9 °C) (Oral)   Resp 18   Ht 167.6 cm (66\")   Wt 63.6 kg (140 lb 4.8 oz)   SpO2 100%   BMI 22.65 kg/m²   General appearance: No acute changes   Neck: Trachea midline; NECK, supple, no thyromegaly or lymphadenopathy   Lungs: Normal size and shape, normal breath sounds, equal distribution of air, no rales and rhonchi   CV: S1-S2 regular, no murmurs, no rub, no gallop   Abdomen: Soft, non-tender; no masses , no abnormal abdominal sounds   Extremities: No deformity , normal color , no peripheral edema   Skin: Normal temperature, turgor and texture; no rash, ulcers          Cardiographics  Telemetry:   9/10 A paced rate 90      EC/10 SR rate 70         SR with PVCs and LBBB          Echocardiogram:   19  Interpretation Summary     · The left ventricular cavity is mildly dilated.  · Right ventricular cavity is mildly dilated.  · Left atrial cavity size is mildly dilated.  · Calculated EF = 25.0%.  · There is no evidence of pericardial effusion.           Lab Review       Results from last 7 days   Lab Units 09/10/19  1200   MAGNESIUM mg/dL 2.3     Results from last 7 days   Lab Units 09/10/19  1200   SODIUM mmol/L 138   POTASSIUM mmol/L " "4.2   BUN mg/dL 28*   CREATININE mg/dL 1.46*   CALCIUM mg/dL 8.7        Results from last 7 days   Lab Units 09/10/19  1200 09/10/19  0259 09/09/19  1652   WBC 10*3/mm3 9.58 9.11 13.87*   HEMOGLOBIN g/dL 7.8* 7.7* 8.4*   HEMATOCRIT % 25.5* 25.3* 26.9*   PLATELETS 10*3/mm3 149 160 190     Results from last 7 days   Lab Units 09/10/19  0259 09/09/19  1247 09/09/19  0435 09/09/19  0011  09/07/19  0519   INR  1.16* 1.35*  --   --   --  0.99   APTT seconds  --  28.7 29.3 37.0*   < > 29.7    < > = values in this interval not displayed.     Estimated Creatinine Clearance: 43 mL/min (A) (by C-G formula based on SCr of 1.46 mg/dL (H)).    I have reviewed the medical decision making with Dr. Riggs in detail.       Assessment  1. NSTEMI  2. CAD  3.  RANDA  4.  HLD  5.  Tobacco abuse  6.  Acute on chronic systolic CHF  7.  COPD  8.  DM  9. PVD  10.  History of lupus    Plan  Now postop day 1 status post CABG x3 with left atrial appendage ligation and left EVH.  Anemia stable from yesterday, hemoglobin 7.8.  Creatinine 1.46, nephrology following for acute kidney injury.  Lipid panel shows good control this admission, continue current therapy.  Tobacco cessation discussed with patient at length, patient warned of risks associated with smoking to include but not limited to lung cancer, worsening heart disease, COPD, and death.  EF 25% per echo on September 7, plan to start ACE inhibitor when okay with nephrology and CT surgery.  Pressors turned off approximately 3 hours ago, pressure low normal but stable.  Patient a paced on telemetry, heart rate 90.  Diabetes appears controlled given A1c of 6.10 on September 7, defer to endocrinology.  We will plan for repeat echo in 90 days regarding cardiomyopathy once medical therapy has been maximized.    We will continue to offer supportive care throughout recovery period.        )9/10/2019  LEONIDAS Aguilar    EMR Dragon/Transcription:   \"Dictated utilizing Dragon dictation\". "

## 2019-09-10 NOTE — THERAPY EVALUATION
Patient Name: Cedric Wade  : 1950    MRN: [id number]                              Today's Date: 9/10/2019       Admit Date: 2019    Visit Dx:     ICD-10-CM ICD-9-CM   1. Coronary artery disease involving native coronary artery of native heart with angina pectoris (CMS/[facility]) I25.119 414.01     413.9     Patient Active Problem List   Diagnosis   • Coronary artery disease involving native coronary artery of native heart with angina pectoris (CMS/[facility])   • COPD (chronic obstructive pulmonary disease) with chronic bronchitis (CMS/[facility])   • Microalbuminuria   • Type 2 diabetes mellitus with microalbuminuria (CMS/[facility])   • Hyperlipidemia   • Essential hypertension   • H/O agent [place] exposure     Past Medical History:   Diagnosis Date   • Arthritis    • CHF (congestive heart failure) (CMS/[facility])    • COPD (chronic obstructive pulmonary disease) (CMS/[facility])    • Coronary artery disease    • Cutaneous lupus erythematosus    • Diabetes mellitus (CMS/[facility])    • Elevated cholesterol    • GERD (gastroesophageal reflux disease)    • Hypertension      History reviewed. No pertinent surgical history.  General Information     Row Name 09/10/19 1033          PT Evaluation Time/Intention    Document Type  evaluation  -EM     Mode of Treatment  individual therapy;physical therapy  -EM     Row Name 09/10/19 1033          General Information    Patient Profile Reviewed?  yes  -EM     Prior Level of Function  independent:;community mobility  -EM     Existing Precautions/Restrictions  cardiac;fall;sternal  -EM     Row Name 09/10/19 1033          Relationship/Environment    Lives With  spouse  -EM     Row Name 09/10/19 1033          Resource/Environmental Concerns    Current Living Arrangements  home/apartment/condo  -EM     Row Name 09/10/19 1033          Home Main Entrance    Number of Stairs, Main Entrance  none  -EM     Row Name 09/10/19 1033          Stairs Within Home, Primary    Number of Stairs, Within Home, Primary  none   -EM     Row Name 09/10/19 1033          Cognitive Assessment/Intervention- PT/OT    Orientation Status (Cognition)  oriented x 4  -EM     Row Name 09/10/19 1033          Safety Issues, Functional Mobility    Impairments Affecting Function (Mobility)  endurance/activity tolerance;strength  -EM       User Key  (r) = Recorded By, (t) = Taken By, (c) = Cosigned By    Initials Name Provider Type    Mariah Terry PT Physical Therapist        Mobility     Row Name 09/10/19 1035          Bed Mobility Assessment/Treatment    Bed Mobility Assessment/Treatment  supine-sit  -EM     Supine-Sit [place] (Bed Mobility)  minimum assist (75% patient effort);verbal cues  -EM     Assistive Device (Bed Mobility)  head of bed elevated  -EM     Row Name 09/10/19 1035          Bed-Chair Transfer    Bed-Chair [place] (Transfers)  minimum assist (75% patient effort)  -EM     Row Name 09/10/19 1035          Sit-Stand Transfer    Sit-Stand [place] (Transfers)  minimum assist (75% patient effort);1 person to manage equipment  -     Row Name 09/10/19 1035          Gait/Stairs Assessment/Training    Comment (Gait/Stairs)  few steps around from bed to chair, limited by multiple lines/tubes   -EM       User Key  (r) = Recorded By, (t) = Taken By, (c) = Cosigned By    Initials Name Provider Type    Mariah Terry PT Physical Therapist        Obj/Interventions     Row Name 09/10/19 1037          General ROM    GENERAL ROM COMMENTS  ROM WNL   -EM     Row Name 09/10/19 1037          MMT (Manual Muscle Testing)    General MMT Comments  no focal deficits identified   -     Row Name 09/10/19 1037          Therapeutic Exercise    Comment (Therapeutic Exercise)  5 reps cardiac protocol, level 2   -EM     Row Name 09/10/19 1037          Static Sitting Balance    Level of [place] (Unsupported Sitting, Static Balance)  supervision  -EM     Sitting Position (Unsupported Sitting, Static Balance)  sitting on edge of  bed  -EM     Time Able to Maintain Position (Unsupported Sitting, Static Balance)  1 to 2 minutes  -EM     Row Name 09/10/19 1037          Static Standing Balance    Level of [place] (Supported Standing, Static Balance)  minimal assist, 75% patient effort  -EM     Row Name 09/10/19 1037          Sensory Assessment/Intervention    Sensory General Assessment  no sensation deficits identified  -EM       User Key  (r) = Recorded By, (t) = Taken By, (c) = Cosigned By    Initials Name Provider Type    Mariah Terry PT Physical Therapist        Goals/Plan     Row Name 09/10/19 1042          Patient Education Goal (PT)    Activity (Patient Education Goal, PT)  patient able to complete level 5 per cardiac protocol   -EM     [place]/Cues/Accuracy (Memory Goal 2, PT)  demonstrates adequately  -EM     Time Frame (Patient Education Goal, PT)  1 week  -EM       User Key  (r) = Recorded By, (t) = Taken By, (c) = Cosigned By    Initials Name Provider Type    Mariah Terry PT Physical Therapist        Clinical Impression     [facility] Name 09/10/19 1040          Pain Assessment    Additional Documentation  Pain Scale: Numbers Pre/Post-Treatment (Group)  -EM     Row Name 09/10/19 1040          Pain Scale: Numbers Pre/Post-Treatment    Pain Scale: Numbers, Pretreatment  6/10  -EM     Pain Location  chest  -EM     Pain Intervention(s)  Medication (See MAR);Repositioned  -EM     Row Name 09/10/19 1040          Plan of Care Review    Plan of Care Reviewed With  patient  -EM     [facility] Name 09/10/19 1040          Physical Therapy Clinical Impression    Patient/Family Goals Statement (PT Clinical Impression)  go home   -EM     Criteria for Skilled Interventions Met (PT Clinical Impression)  yes;treatment indicated  -EM     Rehab Potential (PT Clinical Summary)  good, to achieve stated therapy goals  -EM     Row Name 09/10/19 1040          Vital Signs    Pre Systolic BP Rehab  110  -EM     Pre Treatment Diastolic BP   54  -EM     Pretreatment Heart Rate (beats/min)  89  -EM     Posttreatment Heart Rate (beats/min)  89  -EM     Pre SpO2 (%)  98  -EM     O2 Delivery Pre Treatment  supplemental O2  -EM     Post SpO2 (%)  97  -EM     O2 Delivery Post Treatment  supplemental O2  -EM     Row Name 09/10/19 1040          Positioning and Restraints    Pre-Treatment Position  in bed  -EM     Post Treatment Position  chair  -EM     In Chair  reclined;with nsg  -EM       User Key  (r) = Recorded By, (t) = Taken By, (c) = Cosigned By    Initials Name Provider Type    Mariah Terry PT Physical Therapist        Outcome Measures     Row Name 09/10/19 1045          How much help from another person do you currently need...    Turning from your back to your side while in flat bed without using bedrails?  3  -EM     Moving from lying on back to sitting on the side of a flat bed without bedrails?  3  -EM     Moving to and from a bed to a chair (including a wheelchair)?  3  -EM     Standing up from a chair using your arms (e.g., wheelchair, bedside chair)?  3  -EM     Climbing 3-5 steps with a railing?  1  -EM     To walk in hospital room?  2  -EM     AM-PAC 6 Clicks Score (PT)  15  -EM     Row Name 09/10/19 1045          Functional Assessment    Outcome Measure Options  AM-PAC 6 Clicks Basic Mobility (PT)  -EM       User Key  (r) = Recorded By, (t) = Taken By, (c) = Cosigned By    Initials Name Provider Type    Mariah Terry PT Physical Therapist        Physical Therapy Education     Title: PT OT SLP Therapies (In Progress)     Topic: Physical Therapy (In Progress)     Point: Mobility training (In Progress)     Learning Progress Summary           Patient Acceptance, E, NR by EM at 9/10/2019 10:43 AM                   Point: Home exercise program (In Progress)     Learning Progress Summary           Patient Acceptance, E, NR by EM at 9/10/2019 10:43 AM                               User Key     Initials Effective Dates Name  Provider Type Discipline    EM 04/03/18 -  Mariah Gordon PT Physical Therapist PT              PT Recommendation and Plan  Planned Therapy Interventions (PT Eval): bed mobility training, gait training, home exercise program  Outcome Summary/Treatment Plan (PT)  Anticipated Discharge Disposition (PT): home with assist, home with home health  Plan of Care Reviewed With: patient  Outcome Summary: Patient is a 68 y.o. male admitted to [facility] for CABG x3 on 9/6/2019. PMHx includes COPD, CKD. Patient is independent at baseline and lives with his spouse. Patient reports no use of AD prior to admission, no stairs in home. Today, patient performed bed mobility with Ni, required Ni for transfers, and ambulated a few steps around from bed to chair with Ni, limited by multiple lines/tubes.  Patient demonstrates impairments consisting of generalized post op weakness and pain, decreased activity tolerance. Patient may benefit from skilled PT services acutely to address functional deficits as well as improve level of independence prior to discharge. Anticipate home with assist upon DC.     Time Calculation:   PT Charges     Row Name 09/10/19 1046             Time Calculation    Start Time  1015  -EM      Stop Time  1032  -EM      Time Calculation (min)  17 min  -EM      PT Received On  09/10/19  -EM      PT - Next Appointment  09/11/19  -EM      PT Goal Re-Cert Due Date  09/17/19  -EM         Time Calculation- PT    Total Timed Code Minutes- PT  8 minute(s)  -EM        User Key  (r) = Recorded By, (t) = Taken By, (c) = Cosigned By    Initials Name Provider Type    EM Mariah Gordon PT Physical Therapist        Therapy Charges for Today     Code Description Service Date Service Provider Modifiers Qty    87972595767 HC PT EVAL MOD COMPLEXITY 2 9/10/2019 Mariah Gordon, PT GP 1    09235008961 HC PT THER PROC EA 15 MIN 9/10/2019 Mariah Gordon, PT GP 1    62065861378 HC PT THER SUPP EA 15 MIN 9/10/2019  Heidi, Mariah ROBLERO, PT GP 1          PT G-Codes  Outcome Measure Options: AM-PAC 6 Clicks Basic Mobility (PT)  AM-PAC 6 Clicks Score (PT): 15    Mariah Gordon, PT  9/10/2019

## 2019-09-10 NOTE — NURSING NOTE
Talked with Dr. Mazariegos concerning patient's status at 22:31.  Informed him that Renal wanrted to keep patient's MAP >/= 70.Urine output 40-65/hour.  Patient's MAP had been in the low 60's. CI around 2.4 last. Levophed had been increased due to lower MAP.  Dr. Mazariegos wanted to Levophed to be reduced and start the patient on Vasopressin at .02 units/min.

## 2019-09-10 NOTE — ANESTHESIA POSTPROCEDURE EVALUATION
"Patient: Cedric Wade    Procedure Summary     Date:  09/09/19 Room / Location:  [facility] OR  / [facility] MAIN OR    Anesthesia Start:  0637 Anesthesia Stop:  1240    Procedure:  INTRAOPERATIVE BUDDY, MIDLINE STENOTOMY WITH CORONARY ARTERY BYPASS GRAPHS X  3 UTILIZING LEFT SAVAGE  AND LEFT ENDOSCOPIC HARVESTED SAPHENOUS VEIN, LIGATION OF LEFT  ATRIAL APPENDAGE; PRP (N/A Chest) Diagnosis:       Coronary artery disease involving native coronary artery of native heart with angina pectoris (CMS/HCC)      (Coronary artery disease involving native coronary artery of native heart with angina pectoris (CMS/HCC) [I25.119])    Surgeon:  Alvaro Mazariegos MD Provider:  Rudy Coats MD    Anesthesia Type:  general ASA Status:  4          Anesthesia Type: general  Last vitals  BP   (!) 84/57 (09/09/19 1503)   Temp   36.9 °C (98.4 °F) (09/09/19 0017)   Pulse   89 (09/09/19 1900)   Resp   16 (09/09/19 1848)     SpO2   99 % (09/09/19 1900)     Post Anesthesia Care and Evaluation    Patient location during evaluation: bedside  Patient participation: complete - patient participated  Level of consciousness: awake and alert  Pain management: adequate  Airway patency: patent  Anesthetic complications: No anesthetic complications    Cardiovascular status: acceptable  Respiratory status: acceptable  Hydration status: acceptable    Comments: BP (!) 84/57   Pulse 89   Temp 36.9 °C (98.4 °F) (Oral)   Resp 16   Ht 167.6 cm (66\")   Wt 63.6 kg (140 lb 4.8 oz)   SpO2 99%   BMI 22.65 kg/m²       "

## 2019-09-10 NOTE — PROGRESS NOTES
"[facility]         Dr Gates Sayied   LOS: 4 days   Patient Care Team:  Ean Farmer MD as PCP - General (Internal Medicine)    Chief Complains acute exacerbation of COPD with chronic bronchitis.  MI ischemic cardia myopathy    Interval History: Status post CABG postop on the vent.  Extubated successfully yesterday.  Patient did have postop stridor which resolved with racemic epi.  Currently sitting up in a chair and feels much better.  Postop chest pain.    REVIEW OF SYSTEMS:   Postop chest pain    Ventilator/Non-Invasive Ventilation Settings (From admission, onward)   Nasal cannula oxygen 2 L      Vital Signs  Heart Rate:  [] 80  Resp:  [13-20] 18  BP: ()/(60-68) 87/60  Arterial Line BP: ()/(45-60) 97/50    Intake/Output Summary (Last 24 hours) at 9/10/2019 1622  Last data filed at 9/10/2019 1600  Gross per 24 hour   Intake 3284.2 ml   Output 2140 ml   Net 1144.2 ml     Flowsheet Rows      First Filed Value   Admission Height  167.6 cm (66\") Documented at 09/07/2019 1234   Admission Weight  64.5 kg (142 lb 3.2 oz) Documented at 09/07/2019 0438          Physical Exam:   General Appearance:   Awake no distress.  Following simple commands.  On nasal oxygen.   Lungs:    Diminished breath sounds bilaterally no crackles or wheezing.    Heart:    Regular rhythm and normal rate, normal S1 and S2, no            murmur, no gallop, no rub, no click   Chest Wall:   Status post CABG   Abdomen:     Normal bowel sounds, no masses, no organomegaly, soft        non-tender, non-distended, no guarding, no rebound                tenderness   Extremities:   Moves all extremities well, no edema, no cyanosis, no             redness  CNS intact  Skin no rashes no nodules       Results Review:        Results from last 7 days   Lab Units 09/10/19  1200 09/10/19  0259 09/09/19  2054 09/09/19  1652   SODIUM mmol/L 138 138  --  138   POTASSIUM mmol/L 4.2 4.3 4.0 3.8   CHLORIDE mmol/L 101 103  " --  102   CO2 mmol/L 17.5* 21.8*  --  23.1   BUN mg/dL 28* 28*  --  29*   CREATININE mg/dL 1.46* 1.40*  --  1.29*   GLUCOSE mg/dL 125* 141*  --  155*   CALCIUM mg/dL 8.7 8.0*  --  8.1*         Results from last 7 days   Lab Units 09/10/19  1200 09/10/19  0259 09/09/19  1652   WBC 10*3/mm3 9.58 9.11 13.87*   HEMOGLOBIN g/dL 7.8* 7.7* 8.4*   HEMATOCRIT % 25.5* 25.3* 26.9*   PLATELETS 10*3/mm3 149 160 190     Results from last 7 days   Lab Units 09/10/19  0259 09/09/19  1247 09/09/19  0435 09/09/19  0011  09/07/19  0519   INR  1.16* 1.35*  --   --   --  0.99   APTT seconds  --  28.7 29.3 37.0*   < > 29.7    < > = values in this interval not displayed.     Results from last 7 days   Lab Units 09/07/19  0519   CHOLESTEROL mg/dL 174     Results from last 7 days   Lab Units 09/10/19  1200   MAGNESIUM mg/dL 2.3     Results from last 7 days   Lab Units 09/07/19  0519   CHOLESTEROL mg/dL 174   TRIGLYCERIDES mg/dL 92   HDL CHOL mg/dL 90*   LDL CHOL mg/dL 66     Results from last 7 days   Lab Units 09/09/19  2254   PH, ARTERIAL pH units 7.382   PO2 ART mm Hg 106.8*   PCO2, ARTERIAL mm Hg 38.7   HCO3 ART mmol/L 23.0       I reviewed the patient's new clinical results.  I personally viewed and interpreted the patient's CXR        Medication Review:     albuterol 2.5 mg Nebulization 4x Daily - RT   amiodarone 400 mg Oral Q12H   aspirin 325 mg Oral Daily   atorvastatin 40 mg Oral Nightly   ceFAZolin 2 g Intravenous Q8H   chlorhexidine 15 mL Mouth/Throat Q12H   enoxaparin 40 mg Subcutaneous Q24H   furosemide 40 mg Intravenous Q8H   insulin lispro 0-12 Units Subcutaneous Q4H   methylPREDNISolone sodium succinate 20 mg Intravenous Q12H   mupirocin  Each Nare BID   pantoprazole 40 mg Oral Q AM   racemic epinephrine 0.5 mL Nebulization TID - RT   And      sodium chloride 3 mL Nebulization TID - RT   sennosides-docusate sodium 2 tablet Oral Nightly         amiodarone 0.5 mg/min Last Rate: Stopped (09/10/19 1230)   amiodarone 1 mg/min  Last Rate: 1 mg/min (09/10/19 1536)   Followed by     amiodarone 0.5 mg/min    dexmedetomidine 0.2-1.5 mcg/kg/hr Last Rate: Stopped (09/09/19 1500)   EPINEPHrine 0.04 mcg/kg/min Last Rate: Stopped (09/10/19 1200)   insulin 0-50 Units/hr Last Rate: Stopped (09/10/19 0600)   norepinephrine 0.02-0.06 mcg/kg/min Last Rate: Stopped (09/10/19 0200)   sodium chloride 30 mL/hr Last Rate: 30 mL/hr (09/09/19 1334)   sodium chloride 30 mL/hr Last Rate: 30 mL/hr (09/09/19 1235)   vasopressin 0.02 Units/min Last Rate: Stopped (09/10/19 1100)   vasopressin 0.02-0.1 Units/min Last Rate: 0.02 Units/min (09/09/19 2324)       ASSESSMENT:   Coronary artery disease status post CABG  Postop respiratory failure  COPD with exacerbation  Chronic bronchitis  Non-ST elevation MI  Ischemic cardiomyopathy  Moderate mitral regurgitation  Tobacco abuse    PLAN:  Status post CABG. extubated with some postop stridor which is now resolved.  Discontinue racemic epi  Wean down steroid as tolerated  Postop drips per cardiothoracic surgery to be weaned down  Will add bronchodilators  Wean down pressors as tolerated  Will likely need outpatient PFTs down the road  Patient to quit smoking down the road  We will follow along closely  Okay to transfer out of open heart recovery        Yajaira Ardon MD  09/10/19  4:22 PM

## 2019-09-10 NOTE — PROGRESS NOTES
"  ENDOCRINE    Subjective   AND PLANS  Cedric Wade is a 68 y.o. male.      follow-up diabetes and related disorders    Awake.  Sitting on bed  Denies shortness of breath.  On epinephrine, norepinephrine, and vasopressin drips    Blood sugar .    On Solu-Medrol 20 mg every 12 hours  Insulin drip has been discontinued since 6 AM this morning.  Start Humalog every 4 hours as needed    Restart Lipitor.    Objective   BP (!) 82/63   Pulse 89   Temp 98.4 °F (36.9 °C) (Oral)   Resp 18   Ht 167.6 cm (66\")   Wt 63.6 kg (140 lb 4.8 oz)   SpO2 100%   BMI 22.65 kg/m²   Physical Exam    Awake, alert, not in distress  Regular heart rate and rhythm  No rales or wheezes  Abdomen soft, nontender   extremities warm.  No cyanosis.  No calf tenderness    Lab Results (last 24 hours)     Procedure Component Value Units Date/Time    POC Glucose Once [[id number]]  (Normal) Collected:  09/10/19 0705    Specimen:  Blood Updated:  09/10/19 0706     Glucose 113 mg/dL     POC Glucose Once [[id number]]  (Normal) Collected:  09/10/19 0600    Specimen:  Blood Updated:  09/10/19 0602     Glucose 80 mg/dL     POC Glucose Once [[id number]]  (Normal) Collected:  09/10/19 0514    Specimen:  Blood Updated:  09/10/19 0518     Glucose 91 mg/dL     POC Glucose Once [[id number]]  (Abnormal) Collected:  09/10/19 0359    Specimen:  Blood Updated:  09/10/19 0408     Glucose 131 mg/dL     Renal Function Panel [[id number]]  (Abnormal) Collected:  09/10/19 0259    Specimen:  Blood Updated:  09/10/19 0340     Glucose 141 mg/dL      BUN 28 mg/dL      Creatinine 1.40 mg/dL      Sodium 138 mmol/L      Potassium 4.3 mmol/L      Chloride 103 mmol/L      CO2 21.8 mmol/L      Calcium 8.0 mg/dL      Albumin 4.20 g/dL      Phosphorus 4.4 mg/dL      Anion Gap 13.2 mmol/L      BUN/Creatinine Ratio 20.0     eGFR Non African Amer 50 mL/min/1.73     Narrative:       GFR Normal >60  Chronic Kidney Disease <60  Kidney Failure <15    Magnesium [[id number]]  (Normal) " Collected:  09/10/19 0259    Specimen:  Blood Updated:  09/10/19 0340     Magnesium 2.3 mg/dL     Protime-INR [[id number]]  (Abnormal) Collected:  09/10/19 0259    Specimen:  Blood Updated:  09/10/19 0331     Protime 14.5 Seconds      INR 1.16    CBC & Differential [[id number]] Collected:  09/10/19 0259    Specimen:  Blood Updated:  09/10/19 0320    Narrative:       The following orders were created for panel order CBC & Differential.  Procedure                               Abnormality         Status                     ---------                               -----------         ------                     CBC Auto Differential[[id number]]        Abnormal            Final result                 Please view results for these tests on the individual orders.    CBC Auto Differential [[id number]]  (Abnormal) Collected:  09/10/19 0259    Specimen:  Blood Updated:  09/10/19 0320     WBC 9.11 10*3/mm3      RBC 2.51 10*6/mm3      Hemoglobin 7.7 g/dL      Hematocrit 25.3 %      .8 fL      MCH 30.7 pg      MCHC 30.4 g/dL      RDW 14.0 %      RDW-SD 50.6 fl      MPV 9.8 fL      Platelets 160 10*3/mm3      Neutrophil % 78.5 %      Lymphocyte % 9.9 %      Monocyte % 9.5 %      Eosinophil % 1.0 %      Basophil % 0.2 %      Immature Grans % 0.9 %      Neutrophils, Absolute 7.15 10*3/mm3      Lymphocytes, Absolute 0.90 10*3/mm3      Monocytes, Absolute 0.87 10*3/mm3      Eosinophils, Absolute 0.09 10*3/mm3      Basophils, Absolute 0.02 10*3/mm3      Immature Grans, Absolute 0.08 10*3/mm3      nRBC 0.0 /100 WBC     POC Glucose Once [[id number]]  (Abnormal) Collected:  09/10/19 0305    Specimen:  Blood Updated:  09/10/19 0310     Glucose 149 mg/dL     POC Glucose Once [[id number]]  (Normal) Collected:  09/10/19 0202    Specimen:  Blood Updated:  09/10/19 0209     Glucose 116 mg/dL     POC Glucose Once [[id number]]  (Normal) Collected:  09/10/19 0105    Specimen:  Blood Updated:  09/10/19 0116     Glucose 107 mg/dL     POC Glucose  Once [[id number]]  (Normal) Collected:  09/10/19 0017    Specimen:  Blood Updated:  09/10/19 0019     Glucose 108 mg/dL     Calcium, Ionized [[id number]]  (Normal) Collected:  09/09/19 2320    Specimen:  Blood Updated:  09/10/19 0000     Ionized Calcium 1.22 mmol/L      Ionized Calcium 4.9 mg/dL     POC Glucose Once [[id number]]  (Normal) Collected:  09/09/19 2300    Specimen:  Blood Updated:  09/09/19 2316     Glucose 121 mg/dL     Blood Gas, Arterial [[id number]]  (Abnormal) Collected:  09/09/19 2254    Specimen:  Arterial Blood Updated:  09/09/19 2256     Site Arterial Line     Brad's Test N/A     pH, Arterial 7.382 pH units      pCO2, Arterial 38.7 mm Hg      pO2, Arterial 106.8 mm Hg      HCO3, Arterial 23.0 mmol/L      Base Excess, Arterial -1.9 mmol/L      O2 Saturation Calculated 98.1 %      Barometric Pressure for Blood Gas 755.1 mmHg      Modality Cannula     Flow Rate 3 lpm      Rate 18 Breaths/minute     Potassium [[id number]]  (Normal) Collected:  09/09/19 2054    Specimen:  Blood Updated:  09/09/19 2223     Potassium 4.0 mmol/L     POC Glucose Once [[id number]]  (Abnormal) Collected:  09/09/19 2159    Specimen:  Blood Updated:  09/09/19 2202     Glucose 132 mg/dL     POC Glucose Once [[id number]]  (Abnormal) Collected:  09/09/19 2057    Specimen:  Blood Updated:  09/09/19 2128     Glucose 136 mg/dL     POC Glucose Once [[id number]]  (Normal) Collected:  09/09/19 2000    Specimen:  Blood Updated:  09/09/19 2003     Glucose 121 mg/dL     POC Glucose Once [[id number]]  (Normal) Collected:  09/09/19 1904    Specimen:  Blood Updated:  09/09/19 1932     Glucose 96 mg/dL     POC Glucose Once [[id number]]  (Normal) Collected:  09/09/19 1806    Specimen:  Blood Updated:  09/09/19 1830     Glucose 130 mg/dL     Renal Function Panel [[id number]]  (Abnormal) Collected:  09/09/19 1652    Specimen:  Blood Updated:  09/09/19 1724     Glucose 155 mg/dL      BUN 29 mg/dL      Creatinine 1.29 mg/dL      Sodium 138 mmol/L       Potassium 3.8 mmol/L      Chloride 102 mmol/L      CO2 23.1 mmol/L      Calcium 8.1 mg/dL      Albumin 3.90 g/dL      Phosphorus 3.9 mg/dL      Anion Gap 12.9 mmol/L      BUN/Creatinine Ratio 22.5     eGFR Non African Amer 55 mL/min/1.73     Narrative:       GFR Normal >60  Chronic Kidney Disease <60  Kidney Failure <15    Magnesium [[id number]]  (Abnormal) Collected:  09/09/19 1652    Specimen:  Blood Updated:  09/09/19 1724     Magnesium 2.7 mg/dL     Blood Gas, Arterial [[id number]]  (Abnormal) Collected:  09/09/19 1719    Specimen:  Arterial Blood Updated:  09/09/19 1721     Site Arterial Line     Brad's Test Positive     pH, Arterial 7.349 pH units      pCO2, Arterial 44.3 mm Hg      pO2, Arterial 114.4 mm Hg      HCO3, Arterial 24.4 mmol/L      Base Excess, Arterial -1.2 mmol/L      O2 Saturation Calculated 98.2 %      Barometric Pressure for Blood Gas 756.1 mmHg      Modality Cannula     Flow Rate 5 lpm      Set Mech Resp Rate 16    CBC (No Diff) [[id number]]  (Abnormal) Collected:  09/09/19 1652    Specimen:  Blood Updated:  09/09/19 1717     WBC 13.87 10*3/mm3      RBC 2.73 10*6/mm3      Hemoglobin 8.4 g/dL      Hematocrit 26.9 %      MCV 98.5 fL      MCH 30.8 pg      MCHC 31.2 g/dL      RDW 13.9 %      RDW-SD 48.7 fl      MPV 10.0 fL      Platelets 190 10*3/mm3     POC Glucose Once [[id number]]  (Abnormal) Collected:  09/09/19 1654    Specimen:  Blood Updated:  09/09/19 1704     Glucose 169 mg/dL     Blood Gas, Arterial [[id number]]  (Abnormal) Collected:  09/09/19 1556    Specimen:  Arterial Blood Updated:  09/09/19 1605     Site Arterial Line     Brad's Test N/A     pH, Arterial 7.350 pH units      pCO2, Arterial 44.4 mm Hg      pO2, Arterial 124.7 mm Hg      HCO3, Arterial 24.5 mmol/L      Base Excess, Arterial -1.2 mmol/L      O2 Saturation Calculated 98.6 %      A-a Gradiant 0.5 mmHg      Barometric Pressure for Blood Gas 754.8 mmHg      Modality Adult Vent     FIO2 40 %      Ventilator Mode CPAP/PS      Set Tidal Volume 710     Set Mech Resp Rate 12     PEEP 5     PSV 8 cmH2O     POC Glucose Once [[id number]]  (Normal) Collected:  09/09/19 1533    Specimen:  Blood Updated:  09/09/19 1544     Glucose 130 mg/dL     POC Glucose Once [[id number]]  (Normal) Collected:  09/09/19 1410    Specimen:  Blood Updated:  09/09/19 1420     Glucose 130 mg/dL     Blood Gas, Arterial [[id number]]  (Abnormal) Collected:  09/09/19 1346    Specimen:  Arterial Blood Updated:  09/09/19 1350     Site Arterial Line     Brad's Test N/A     pH, Arterial 7.333 pH units      pCO2, Arterial 45.7 mm Hg      pO2, Arterial 458.6 mm Hg      HCO3, Arterial 24.3 mmol/L      Base Excess, Arterial -1.7 mmol/L      O2 Saturation Calculated 100.0 %      A-a Gradiant 0.7 mmHg      Barometric Pressure for Blood Gas 757.8 mmHg      Modality Adult Vent     FIO2 100 %      Ventilator Mode AC     Set Tidal Volume 550     Set Mech Resp Rate 12     Rate 14 Breaths/minute      PEEP 5    CBC (No Diff) [[id number]]  (Abnormal) Collected:  09/09/19 1247    Specimen:  Blood Updated:  09/09/19 1340     WBC 16.90 10*3/mm3      RBC 3.00 10*6/mm3      Hemoglobin 9.2 g/dL      Hematocrit 29.0 %      MCV 96.7 fL      MCH 30.7 pg      MCHC 31.7 g/dL      RDW 13.8 %      RDW-SD 48.7 fl      MPV 10.0 fL      Platelets 189 10*3/mm3     Calcium, Ionized [[id number]]  (Normal) Collected:  09/09/19 1247    Specimen:  Blood Updated:  09/09/19 1337     Ionized Calcium 1.25 mmol/L      Ionized Calcium 5.0 mg/dL     CBC & Differential [[id number]] Collected:  09/09/19 1247    Specimen:  Blood Updated:  09/09/19 1336    Narrative:       The following orders were created for panel order CBC & Differential.  Procedure                               Abnormality         Status                     ---------                               -----------         ------                     CBC Auto Differential[[id number]]        Abnormal            Final result                 Please view results for  these tests on the individual orders.    CBC Auto Differential [[id number]]  (Abnormal) Collected:  09/09/19 1247    Specimen:  Blood Updated:  09/09/19 1336     WBC 16.49 10*3/mm3      RBC 3.06 10*6/mm3      Hemoglobin 9.4 g/dL      Hematocrit 29.8 %      MCV 97.4 fL      MCH 30.7 pg      MCHC 31.5 g/dL      RDW 13.7 %      RDW-SD 48.8 fl      MPV 10.0 fL      Platelets 202 10*3/mm3      Neutrophil % 76.4 %      Lymphocyte % 11.7 %      Monocyte % 7.0 %      Eosinophil % 2.8 %      Basophil % 0.2 %      Immature Grans % 1.9 %      Neutrophils, Absolute 12.60 10*3/mm3      Lymphocytes, Absolute 1.93 10*3/mm3      Monocytes, Absolute 1.15 10*3/mm3      Eosinophils, Absolute 0.46 10*3/mm3      Basophils, Absolute 0.04 10*3/mm3      Immature Grans, Absolute 0.31 10*3/mm3      nRBC 0.0 /100 WBC     Protime-INR [[id number]]  (Abnormal) Collected:  09/09/19 1247    Specimen:  Blood Updated:  09/09/19 1334     Protime 16.4 Seconds      INR 1.35    aPTT [[id number]]  (Normal) Collected:  09/09/19 1247    Specimen:  Blood Updated:  09/09/19 1334     PTT 28.7 seconds     Fibrinogen [[id number]]  (Normal) Collected:  09/09/19 1247    Specimen:  Blood Updated:  09/09/19 1334     Fibrinogen 273 mg/dL     Renal Function Panel [[id number]]  (Abnormal) Collected:  09/09/19 1247    Specimen:  Blood Updated:  09/09/19 1332     Glucose 109 mg/dL      BUN 31 mg/dL      Creatinine 1.27 mg/dL      Sodium 136 mmol/L      Potassium 3.8 mmol/L      Chloride 101 mmol/L      CO2 23.4 mmol/L      Calcium 8.0 mg/dL      Albumin 3.80 g/dL      Phosphorus 4.1 mg/dL      Anion Gap 11.6 mmol/L      BUN/Creatinine Ratio 24.4     eGFR Non African Amer 56 mL/min/1.73     Narrative:       GFR Normal >60  Chronic Kidney Disease <60  Kidney Failure <15    Magnesium [[id number]]  (Normal) Collected:  09/09/19 1247    Specimen:  Blood Updated:  09/09/19 1332     Magnesium 2.2 mg/dL     POC Glucose Once [[id number]]  (Normal) Collected:  09/09/19 1254    Specimen:   Blood Updated:  09/09/19 1259     Glucose 123 mg/dL     Respiratory Culture - Sputum, Cough [[id number]] Collected:  09/07/19 1543    Specimen:  Sputum from Cough Updated:  09/09/19 1140     Respiratory Culture Light growth (2+) Normal Respiratory Nellie     Gram Stain Rare (1+) Mixed bacterial morphotypes seen on Gram Stain      Rare (1+) WBCs per low power field      No epithelial cells seen    POC Activated Clotting Time [[id number]]  (Abnormal) Collected:  09/09/19 0934    Specimen:  Blood Updated:  09/09/19 1123     Activated Clotting Time  312 Seconds      Comment: Serial Number: [id number]:  [id number]       POC Activated Clotting Time [[id number]]  (Normal) Collected:  09/09/19 0753    Specimen:  Blood Updated:  09/09/19 1123     Activated Clotting Time  109 Seconds      Comment: Serial Number: [id number]:  [id number]       POC Activated Clotting Time [[id number]]  (Normal) Collected:  09/09/19 1111    Specimen:  Blood Updated:  09/09/19 1123     Activated Clotting Time  103 Seconds      Comment: Serial Number: [id number]:  [id number]       POC Activated Clotting Time [[id number]]  (Abnormal) Collected:  09/09/19 1036    Specimen:  Blood Updated:  09/09/19 1122     Activated Clotting Time  378 Seconds      Comment: Serial Number: [id number]:  [id number]       POC Activated Clotting Time [[id number]]  (Abnormal) Collected:  09/09/19 1008    Specimen:  Blood Updated:  09/09/19 1122     Activated Clotting Time  450 Seconds      Comment: Serial Number: [id number]:  [id number]                 Coronary artery disease involving 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  Restart Lipitor.  Wean off vasopressors when okay with  surgeon

## 2019-09-10 NOTE — PLAN OF CARE
Problem: Patient Care Overview  Goal: Plan of Care Review   09/10/19 1044   OTHER   Outcome Summary Patient is a 68 y.o. male admitted to [facility] for CABG x3 on 9/6/2019. PMHx includes COPD, CKD. Patient is independent at baseline and lives with his spouse. Patient reports no use of AD prior to admission, no stairs in home. Today, patient performed bed mobility with Ni, required Ni for transfers, and ambulated a few steps around from bed to chair with Ni, limited by multiple lines/tubes. Patient demonstrates impairments consisting of generalized post op weakness and pain, decreased activity tolerance. Patient may benefit from skilled PT services acutely to address functional deficits as well as improve level of independence prior to discharge. Anticipate home with assist upon DC.

## 2019-09-10 NOTE — PROGRESS NOTES
LOS: 4 days   Patient Care Team:  Ean Farmer MD as PCP - General (Internal Medicine)    Chief Complaint:   Post-op follow-up, s/p CABGx3, LIMA, SHANIA ligation    Subjective  No complaints    Vital Signs  Heart Rate:  [89-93] 89  Resp:  [12-20] 18  BP: ()/(57-68) 82/63  Arterial Line BP: ()/(49-60) 93/59  FiO2 (%):  [40 %-100 %] 40 %      09/07/19  1234 09/08/19  0643 09/09/19  0500   Weight: 64.4 kg (142 lb) 62.6 kg (137 lb 14.4 oz) 63.6 kg (140 lb 4.8 oz)     Body mass index is 22.65 kg/m².    Intake/Output Summary (Last 24 hours) at 9/10/2019 0751  Last data filed at 9/10/2019 0700  Gross per 24 hour   Intake 4419.2 ml   Output 4335 ml   Net 84.2 ml     No intake/output data recorded.    Chest tube drainage last 8 hours: 110      Objective    Physical Exam:   General Appearance: awake and alert, no acute distress   Lungs: respirations regular, respirations even, respirations unlabored and dimished lung sounds, shallow breathing   Heart: regular rhythm & normal rate and normal S1, S2   Abdomen: soft or nontender, hypoactive bowel sounds    Skin: sternal incision dressing clean, dry, and inatct   Neuro: alert and oriented, no focal deficits.     Results Review:        WBC WBC   Date Value Ref Range Status   09/10/2019 9.11 3.40 - 10.80 10*3/mm3 Final   09/09/2019 13.87 (H) 3.40 - 10.80 10*3/mm3 Final   09/09/2019 16.90 (H) 3.40 - 10.80 10*3/mm3 Final   09/09/2019 16.49 (H) 3.40 - 10.80 10*3/mm3 Final   09/09/2019 9.00 3.40 - 10.80 10*3/mm3 Final   09/08/2019 8.63 3.40 - 10.80 10*3/mm3 Final      HGB Hemoglobin   Date Value Ref Range Status   09/10/2019 7.7 (L) 13.0 - 17.7 g/dL Final   09/09/2019 8.4 (L) 13.0 - 17.7 g/dL Final   09/09/2019 9.2 (L) 13.0 - 17.7 g/dL Final   09/09/2019 9.4 (L) 13.0 - 17.7 g/dL Final   09/09/2019 10.2 (L) 13.0 - 17.7 g/dL Final   09/08/2019 11.9 (L) 13.0 - 17.7 g/dL Final      HCT Hematocrit   Date Value Ref Range Status   09/10/2019 25.3 (L) 37.5 - 51.0 % Final    09/09/2019 26.9 (L) 37.5 - 51.0 % Final   09/09/2019 29.0 (L) 37.5 - 51.0 % Final   09/09/2019 29.8 (L) 37.5 - 51.0 % Final   09/09/2019 31.8 (L) 37.5 - 51.0 % Final   09/08/2019 36.7 (L) 37.5 - 51.0 % Final      Platelets Platelets   Date Value Ref Range Status   09/10/2019 160 140 - 450 10*3/mm3 Final   09/09/2019 190 140 - 450 10*3/mm3 Final   09/09/2019 189 140 - 450 10*3/mm3 Final   09/09/2019 202 140 - 450 10*3/mm3 Final   09/09/2019 279 140 - 450 10*3/mm3 Final   09/08/2019 307 140 - 450 10*3/mm3 Final        PT/INR:    Protime   Date Value Ref Range Status   09/10/2019 14.5 (H) 11.7 - 14.2 Seconds Final   09/09/2019 16.4 (H) 11.7 - 14.2 Seconds Final   /  INR   Date Value Ref Range Status   09/10/2019 1.16 (H) 0.90 - 1.10 Final   09/09/2019 1.35 (H) 0.90 - 1.10 Final       Sodium Sodium   Date Value Ref Range Status   09/10/2019 138 136 - 145 mmol/L Final   09/09/2019 138 136 - 145 mmol/L Final   09/09/2019 136 136 - 145 mmol/L Final   09/09/2019 131 (L) 136 - 145 mmol/L Final   09/08/2019 130 (L) 136 - 145 mmol/L Final   09/07/2019 128 (L) 136 - 145 mmol/L Final      Potassium Potassium   Date Value Ref Range Status   09/10/2019 4.3 3.5 - 5.2 mmol/L Final   09/09/2019 4.0 3.5 - 5.2 mmol/L Final   09/09/2019 3.8 3.5 - 5.2 mmol/L Final   09/09/2019 3.8 3.5 - 5.2 mmol/L Final   09/09/2019 4.4 3.5 - 5.2 mmol/L Final   09/08/2019 4.2 3.5 - 5.2 mmol/L Final   09/07/2019 4.1 3.5 - 5.2 mmol/L Final      Chloride Chloride   Date Value Ref Range Status   09/10/2019 103 98 - 107 mmol/L Final   09/09/2019 102 98 - 107 mmol/L Final   09/09/2019 101 98 - 107 mmol/L Final   09/09/2019 94 (L) 98 - 107 mmol/L Final   09/08/2019 89 (L) 98 - 107 mmol/L Final   09/07/2019 88 (L) 98 - 107 mmol/L Final      Bicarbonate CO2   Date Value Ref Range Status   09/10/2019 21.8 (L) 22.0 - 29.0 mmol/L Final   09/09/2019 23.1 22.0 - 29.0 mmol/L Final   09/09/2019 23.4 22.0 - 29.0 mmol/L Final   09/09/2019 23.6 22.0 - 29.0 mmol/L Final    09/08/2019 25.7 22.0 - 29.0 mmol/L Final   09/07/2019 27.1 22.0 - 29.0 mmol/L Final      BUN BUN   Date Value Ref Range Status   09/10/2019 28 (H) 8 - 23 mg/dL Final   09/09/2019 29 (H) 8 - 23 mg/dL Final   09/09/2019 31 (H) 8 - 23 mg/dL Final   09/09/2019 37 (H) 8 - 23 mg/dL Final   09/08/2019 41 (H) 8 - 23 mg/dL Final   09/07/2019 38 (H) 8 - 23 mg/dL Final      Creatinine Creatinine   Date Value Ref Range Status   09/10/2019 1.40 (H) 0.76 - 1.27 mg/dL Final   09/09/2019 1.29 (H) 0.76 - 1.27 mg/dL Final   09/09/2019 1.27 0.76 - 1.27 mg/dL Final   09/09/2019 1.20 0.76 - 1.27 mg/dL Final   09/08/2019 1.44 (H) 0.76 - 1.27 mg/dL Final   09/07/2019 1.69 (H) 0.76 - 1.27 mg/dL Final      Calcium Calcium   Date Value Ref Range Status   09/10/2019 8.0 (L) 8.6 - 10.5 mg/dL Final   09/09/2019 8.1 (L) 8.6 - 10.5 mg/dL Final   09/09/2019 8.0 (L) 8.6 - 10.5 mg/dL Final   09/09/2019 8.7 8.6 - 10.5 mg/dL Final   09/08/2019 8.8 8.6 - 10.5 mg/dL Final   09/07/2019 8.9 8.6 - 10.5 mg/dL Final      Magnesium Magnesium   Date Value Ref Range Status   09/10/2019 2.3 1.6 - 2.4 mg/dL Final   09/09/2019 2.7 (H) 1.6 - 2.4 mg/dL Final   09/09/2019 2.2 1.6 - 2.4 mg/dL Final            albuterol 2.5 mg Nebulization 4x Daily - RT   aspirin 325 mg Oral Daily   atorvastatin 40 mg Oral Nightly   calcium gluconate 1 g Intravenous Once   ceFAZolin 2 g Intravenous Q8H   chlorhexidine 15 mL Mouth/Throat Q12H   enoxaparin 40 mg Subcutaneous Q24H   magnesium sulfate 1 g Intravenous Q8H   methylPREDNISolone sodium succinate 20 mg Intravenous Q12H   mupirocin  Each Nare BID   pantoprazole 40 mg Oral Q AM   racemic epinephrine 0.5 mL Nebulization TID - RT   And      sodium chloride 3 mL Nebulization TID - RT   sennosides-docusate sodium 2 tablet Oral Nightly       dexmedetomidine 0.2-1.5 mcg/kg/hr Last Rate: Stopped (09/09/19 1500)   EPINEPHrine 0.04 mcg/kg/min Last Rate: 0.04 mcg/kg/min (09/09/19 1537)   insulin 0-50 Units/hr Last Rate: Stopped (09/10/19  0600)   norepinephrine 0.02-0.06 mcg/kg/min Last Rate: Stopped (09/10/19 0200)   sodium chloride 30 mL/hr Last Rate: 30 mL/hr (09/09/19 1334)   sodium chloride 30 mL/hr Last Rate: 30 mL/hr (09/09/19 1235)   vasopressin 0.02 Units/min    vasopressin 0.02-0.1 Units/min Last Rate: 0.02 Units/min (09/09/19 2904)           Patient Active Problem List   Diagnosis Code   • Coronary artery disease involving native coronary artery of native heart with angina pectoris (CMS/HCC) I25.119   • COPD (chronic obstructive pulmonary disease) with chronic bronchitis (CMS/HCC) J44.9   • Microalbuminuria R80.9   • Type 2 diabetes mellitus with microalbuminuria (CMS/HCC) E11.29, R80.9   • Hyperlipidemia E78.5   • Essential hypertension I10   • H/O agent Orange exposure Z77.098       Assessment & Plan       -MV CAD, mild ischemic mitral incompetence-- s/p CABGx3 LIMA, EVH left SVG, SHANIA ligation--POD#1 Mazariegos  -NSTEMI  -ICM, EF 20%  -Acute on chronic HFrEF--maximize meds prior to discharge  -RANDA--cr 1.4, renal following  -COPD with exacerbation--pulm following  -DM, type 2--a1c 6.1  -HLD--statin  -Current tobacco abuse--cessation d/w pt  -PVD--has been on Pletal  -Hx anxiety/depression--cont Zoloft  -Hx Lupus--Plaquenil at home  -Hx essential tremors--on primidone per neurology  -Mediastinal adenopathy--per CT scan, follow up recommended  -Post op anemia, expected acute blood loss    Hypotensive this morning requiring 0.04 epi, 0.02 vaso. Wean as tolerate, replete calcium.  Switch to PO Amio.  Increase activity as able.  Pulmonary toilet.  Diurese per Renal.    Madyson Rodriguez, LEONIDAS  09/10/19  7:51 AM    Making good progress.

## 2019-09-11 ENCOUNTER — APPOINTMENT (OUTPATIENT)
Dept: GENERAL RADIOLOGY | Facility: HOSPITAL | Age: 69
End: 2019-09-11

## 2019-09-11 LAB
ALBUMIN SERPL-MCNC: 4 G/DL (ref 3.5–5.2)
ALBUMIN/GLOB SERPL: 1.8 G/DL
ALP SERPL-CCNC: 62 U/L (ref 39–117)
ALT SERPL W P-5'-P-CCNC: 26 U/L (ref 1–41)
ANION GAP SERPL CALCULATED.3IONS-SCNC: 15.1 MMOL/L (ref 5–15)
AST SERPL-CCNC: 56 U/L (ref 1–40)
BILIRUB SERPL-MCNC: 0.2 MG/DL (ref 0.2–1.2)
BUN BLD-MCNC: 34 MG/DL (ref 8–23)
BUN/CREAT SERPL: 22.8 (ref 7–25)
CALCIUM SPEC-SCNC: 8.7 MG/DL (ref 8.6–10.5)
CHLORIDE SERPL-SCNC: 100 MMOL/L (ref 98–107)
CO2 SERPL-SCNC: 19.9 MMOL/L (ref 22–29)
CREAT BLD-MCNC: 1.49 MG/DL (ref 0.76–1.27)
DEPRECATED RDW RBC AUTO: 50.9 FL (ref 37–54)
ERYTHROCYTE [DISTWIDTH] IN BLOOD BY AUTOMATED COUNT: 14.5 % (ref 12.3–15.4)
GFR SERPL CREATININE-BSD FRML MDRD: 47 ML/MIN/1.73
GLOBULIN UR ELPH-MCNC: 2.2 GM/DL
GLUCOSE BLD-MCNC: 157 MG/DL (ref 65–99)
GLUCOSE BLDC GLUCOMTR-MCNC: 167 MG/DL (ref 70–130)
GLUCOSE BLDC GLUCOMTR-MCNC: 172 MG/DL (ref 70–130)
GLUCOSE BLDC GLUCOMTR-MCNC: 181 MG/DL (ref 70–130)
GLUCOSE BLDC GLUCOMTR-MCNC: 249 MG/DL (ref 70–130)
HCT VFR BLD AUTO: 26.6 % (ref 37.5–51)
HGB BLD-MCNC: 8.1 G/DL (ref 13–17.7)
MCH RBC QN AUTO: 29.8 PG (ref 26.6–33)
MCHC RBC AUTO-ENTMCNC: 30.5 G/DL (ref 31.5–35.7)
MCV RBC AUTO: 97.8 FL (ref 79–97)
PLATELET # BLD AUTO: 152 10*3/MM3 (ref 140–450)
PMV BLD AUTO: 10.5 FL (ref 6–12)
POTASSIUM BLD-SCNC: 4.6 MMOL/L (ref 3.5–5.2)
PROT SERPL-MCNC: 6.2 G/DL (ref 6–8.5)
RBC # BLD AUTO: 2.72 10*6/MM3 (ref 4.14–5.8)
SODIUM BLD-SCNC: 135 MMOL/L (ref 136–145)
WBC NRBC COR # BLD: 10.01 10*3/MM3 (ref 3.4–10.8)

## 2019-09-11 PROCEDURE — 93005 ELECTROCARDIOGRAM TRACING: CPT | Performed by: THORACIC SURGERY (CARDIOTHORACIC VASCULAR SURGERY)

## 2019-09-11 PROCEDURE — 99232 SBSQ HOSP IP/OBS MODERATE 35: CPT | Performed by: INTERNAL MEDICINE

## 2019-09-11 PROCEDURE — 85027 COMPLETE CBC AUTOMATED: CPT | Performed by: THORACIC SURGERY (CARDIOTHORACIC VASCULAR SURGERY)

## 2019-09-11 PROCEDURE — 71045 X-RAY EXAM CHEST 1 VIEW: CPT

## 2019-09-11 PROCEDURE — 94799 UNLISTED PULMONARY SVC/PX: CPT

## 2019-09-11 PROCEDURE — 25010000002 FUROSEMIDE PER 20 MG: Performed by: INTERNAL MEDICINE

## 2019-09-11 PROCEDURE — 82962 GLUCOSE BLOOD TEST: CPT

## 2019-09-11 PROCEDURE — 74018 RADEX ABDOMEN 1 VIEW: CPT

## 2019-09-11 PROCEDURE — 25010000003 CEFAZOLIN IN DEXTROSE 2-4 GM/100ML-% SOLUTION: Performed by: THORACIC SURGERY (CARDIOTHORACIC VASCULAR SURGERY)

## 2019-09-11 PROCEDURE — 97110 THERAPEUTIC EXERCISES: CPT

## 2019-09-11 PROCEDURE — 25010000002 ENOXAPARIN PER 10 MG: Performed by: THORACIC SURGERY (CARDIOTHORACIC VASCULAR SURGERY)

## 2019-09-11 PROCEDURE — 63710000001 INSULIN LISPRO (HUMAN) PER 5 UNITS: Performed by: INTERNAL MEDICINE

## 2019-09-11 PROCEDURE — 93010 ELECTROCARDIOGRAM REPORT: CPT | Performed by: INTERNAL MEDICINE

## 2019-09-11 PROCEDURE — 80053 COMPREHEN METABOLIC PANEL: CPT | Performed by: INTERNAL MEDICINE

## 2019-09-11 PROCEDURE — 25010000002 ONDANSETRON PER 1 MG: Performed by: THORACIC SURGERY (CARDIOTHORACIC VASCULAR SURGERY)

## 2019-09-11 PROCEDURE — 25010000002 CALCIUM GLUCONATE PER 10 ML: Performed by: NURSE PRACTITIONER

## 2019-09-11 RX ORDER — SODIUM CHLORIDE FOR INHALATION 7 %
4 VIAL, NEBULIZER (ML) INHALATION
Status: DISCONTINUED | OUTPATIENT
Start: 2019-09-11 | End: 2019-09-12

## 2019-09-11 RX ORDER — AMIODARONE HYDROCHLORIDE 200 MG/1
400 TABLET ORAL EVERY 12 HOURS SCHEDULED
Status: DISCONTINUED | OUTPATIENT
Start: 2019-09-11 | End: 2019-09-12

## 2019-09-11 RX ADMIN — INSULIN LISPRO 1 UNITS: 100 INJECTION, SOLUTION INTRAVENOUS; SUBCUTANEOUS at 16:34

## 2019-09-11 RX ADMIN — AMIODARONE HYDROCHLORIDE 400 MG: 200 TABLET ORAL at 21:33

## 2019-09-11 RX ADMIN — CEFAZOLIN SODIUM 2 G: 10 INJECTION, POWDER, FOR SOLUTION INTRAVENOUS at 03:16

## 2019-09-11 RX ADMIN — MAGNESIUM HYDROXIDE 10 ML: 2400 SUSPENSION ORAL at 08:37

## 2019-09-11 RX ADMIN — INSULIN LISPRO 2 UNITS: 100 INJECTION, SOLUTION INTRAVENOUS; SUBCUTANEOUS at 11:02

## 2019-09-11 RX ADMIN — IPRATROPIUM BROMIDE AND ALBUTEROL SULFATE 3 ML: 2.5; .5 SOLUTION RESPIRATORY (INHALATION) at 08:19

## 2019-09-11 RX ADMIN — HYDROCODONE BITARTRATE AND ACETAMINOPHEN 1 TABLET: 5; 325 TABLET ORAL at 10:32

## 2019-09-11 RX ADMIN — AMIODARONE HYDROCHLORIDE 400 MG: 200 TABLET ORAL at 10:32

## 2019-09-11 RX ADMIN — ENOXAPARIN SODIUM 40 MG: 40 INJECTION SUBCUTANEOUS at 16:34

## 2019-09-11 RX ADMIN — CALCIUM GLUCONATE 1 G: 98 INJECTION, SOLUTION INTRAVENOUS at 10:32

## 2019-09-11 RX ADMIN — ONDANSETRON 4 MG: 2 INJECTION INTRAMUSCULAR; INTRAVENOUS at 12:33

## 2019-09-11 RX ADMIN — INSULIN LISPRO 2 UNITS: 100 INJECTION, SOLUTION INTRAVENOUS; SUBCUTANEOUS at 21:34

## 2019-09-11 RX ADMIN — GUAIFENESIN 1200 MG: 600 TABLET, EXTENDED RELEASE ORAL at 21:33

## 2019-09-11 RX ADMIN — PANTOPRAZOLE SODIUM 40 MG: 40 TABLET, DELAYED RELEASE ORAL at 06:35

## 2019-09-11 RX ADMIN — HYDROCODONE BITARTRATE AND ACETAMINOPHEN 1 TABLET: 5; 325 TABLET ORAL at 06:43

## 2019-09-11 RX ADMIN — LINAGLIPTIN 5 MG: 5 TABLET, FILM COATED ORAL at 10:56

## 2019-09-11 RX ADMIN — MUPIROCIN 1 APPLICATION: 20 OINTMENT TOPICAL at 21:33

## 2019-09-11 RX ADMIN — CHLORHEXIDINE GLUCONATE 15 ML: 1.2 RINSE ORAL at 08:37

## 2019-09-11 RX ADMIN — ASPIRIN 325 MG: 325 TABLET, COATED ORAL at 08:38

## 2019-09-11 RX ADMIN — MUPIROCIN 1 APPLICATION: 20 OINTMENT TOPICAL at 08:38

## 2019-09-11 RX ADMIN — ONDANSETRON 4 MG: 2 INJECTION INTRAMUSCULAR; INTRAVENOUS at 21:33

## 2019-09-11 RX ADMIN — SERTRALINE 25 MG: 25 TABLET, FILM COATED ORAL at 08:38

## 2019-09-11 RX ADMIN — IPRATROPIUM BROMIDE AND ALBUTEROL SULFATE 3 ML: 2.5; .5 SOLUTION RESPIRATORY (INHALATION) at 11:55

## 2019-09-11 RX ADMIN — IPRATROPIUM BROMIDE AND ALBUTEROL SULFATE 3 ML: 2.5; .5 SOLUTION RESPIRATORY (INHALATION) at 14:44

## 2019-09-11 RX ADMIN — INSULIN LISPRO 1 UNITS: 100 INJECTION, SOLUTION INTRAVENOUS; SUBCUTANEOUS at 06:35

## 2019-09-11 RX ADMIN — GUAIFENESIN 1200 MG: 600 TABLET, EXTENDED RELEASE ORAL at 08:38

## 2019-09-11 RX ADMIN — SENNOSIDES AND DOCUSATE SODIUM 2 TABLET: 8.6; 5 TABLET ORAL at 21:33

## 2019-09-11 RX ADMIN — TRAMADOL HYDROCHLORIDE 50 MG: 50 TABLET, FILM COATED ORAL at 08:37

## 2019-09-11 RX ADMIN — PRIMIDONE 250 MG: 250 TABLET ORAL at 08:38

## 2019-09-11 RX ADMIN — PRIMIDONE 250 MG: 250 TABLET ORAL at 21:33

## 2019-09-11 RX ADMIN — ATORVASTATIN CALCIUM 40 MG: 20 TABLET, FILM COATED ORAL at 21:33

## 2019-09-11 NOTE — PROGRESS NOTES
[facility]  Cardiology Progress Note    Patient Identification:  Name: Cedric Wade  Age: 68 y.o.  Sex: male  :  1950  MRN: [id number]                 Follow Up / Chief Complaint: Management recommendations for CAD      Interval History: POD 1 CABG x3 with left EVH and SHANIA.  Now on amiodarone p.o. Remains in this rhythm.       Subjective: He states he is very nauseous today.  Denies chest pain and shortness of breath      Objective:    Past Medical History:  Past Medical History:   Diagnosis Date   • Arthritis    • CHF (congestive heart failure) (CMS/[facility])    • COPD (chronic obstructive pulmonary disease) (CMS/[facility])    • Coronary artery disease    • Cutaneous lupus erythematosus    • Diabetes mellitus (CMS/[facility])    • Elevated cholesterol    • GERD (gastroesophageal reflux disease)    • Hypertension      Past Surgical History:  Past Surgical History:   Procedure Laterality Date   • CORONARY ARTERY BYPASS GRAFT N/A 2019    Procedure: INTRAOPERATIVE BUDDY, MIDLINE STENOTOMY WITH CORONARY ARTERY BYPASS GRAPHS X  3 UTILIZING LEFT SAVAGE  AND LEFT ENDOSCOPIC HARVESTED SAPHENOUS VEIN, LIGATION OF LEFT  ATRIAL APPENDAGE; PRP;  Surgeon: Alvaro Mazariegos MD;  Location: [facility];  Service: Cardiothoracic        Social History:   Social History     Tobacco Use   • Smoking status: Current Every Day Smoker     Packs/day: 2.00     Years: 58.00     Pack years: 116.00   • Smokeless tobacco: Never Used   Substance Use Topics   • Alcohol use: No     Frequency: Never      Family History:  Family History   Problem Relation Age of Onset   • Lung cancer Father    • Diabetes Sister           Allergies:  Allergies   Allergen Reactions   • Codeine Nausea And Vomiting     Scheduled Meds:    amiodarone 400 mg Q12H   aspirin 325 mg Daily   atorvastatin 40 mg Nightly   enoxaparin 40 mg Q24H   guaiFENesin 1,200 mg Q12H   insulin lispro 0-12 Units 4x Daily With Meals & Nightly   ipratropium-albuterol 3 mL 4x Daily -  "RT   linagliptin 5 mg Daily   mupirocin  BID   pantoprazole 40 mg Q AM   primidone 250 mg BID   sennosides-docusate sodium 2 tablet Nightly   sertraline 25 mg Daily   sodium chloride 4 mL BID - RT     I have reviewed the patient's recent medical history and current medications, as well as personally reviewed/interpreted the ECG/telemetry data    INTAKE AND OUTPUT:    Intake/Output Summary (Last 24 hours) at 2019 1644  Last data filed at 2019 1524  Gross per 24 hour   Intake 240 ml   Output 1180 ml   Net -940 ml     ROS  Constitutional: Awake and alert, no fever. No nosebleeds  Abdomen           no abdominal pain   Cardiac              no chest pain  Pulmonary          no shortness of breath      /77 (BP Location: Right arm, Patient Position: Lying)   Pulse 71   Temp 97.4 °F (36.3 °C) (Oral)   Resp 20   Ht 167.6 cm (66\")   Wt 66.3 kg (146 lb 3.2 oz)   SpO2 98%   BMI 23.60 kg/m²   General appearance: No acute changes   Neck: Trachea midline; NECK, supple, no thyromegaly or lymphadenopathy   Lungs: Normal size and shape, normal breath sounds, equal distribution of air, no rales and rhonchi   CV: S1-S2 regular, no murmurs, no rub, no gallop   Abdomen: Soft, non-tender; no masses , no abnormal abdominal sounds   Extremities: No deformity , normal color , no peripheral edema   Skin: Normal temperature, turgor and texture; no rash, ulcers            Cardiographics  Telemetry:   SR      9/10 A paced rate 90      EC/10 SR rate 70         SR with PVCs and LBBB          Echocardiogram:   19  Interpretation Summary     · The left ventricular cavity is mildly dilated.  · Right ventricular cavity is mildly dilated.  · Left atrial cavity size is mildly dilated.  · Calculated EF = 25.0%.  · There is no evidence of pericardial effusion.           Lab Review       Results from last 7 days   Lab Units 09/10/19  1200   MAGNESIUM mg/dL 2.3     Results from last 7 days   Lab Units 19  0301 " "  SODIUM mmol/L 135*   POTASSIUM mmol/L 4.6   BUN mg/dL 34*   CREATININE mg/dL 1.49*   CALCIUM mg/dL 8.7        Results from last 7 days   Lab Units 09/11/19  0301 09/10/19  1200 09/10/19  0259   WBC 10*3/mm3 10.01 9.58 9.11   HEMOGLOBIN g/dL 8.1* 7.8* 7.7*   HEMATOCRIT % 26.6* 25.5* 25.3*   PLATELETS 10*3/mm3 152 149 160     Results from last 7 days   Lab Units 09/10/19  0259 09/09/19  1247 09/09/19  0435 09/09/19  0011  09/07/19  0519   INR  1.16* 1.35*  --   --   --  0.99   APTT seconds  --  28.7 29.3 37.0*   < > 29.7    < > = values in this interval not displayed.     Estimated Creatinine Clearance: 43.9 mL/min (A) (by C-G formula based on SCr of 1.49 mg/dL (H)).    I have reviewed the medical decision making with Dr. Riggs in detail.       Assessment  1. NSTEMI  2. CAD  3.  RANDA  4.  HLD  5.  Tobacco abuse  6.  Acute on chronic systolic CHF  7.  COPD  8.  DM  9. PVD  10.  History of lupus    Plan  Postop day 2, status post CABG x3.  Hgb improved from yesterday, 8.1 currently.  Creatinine 1.46, defer to nephrology.Lipid panel shows good control. Tobacco cessation given. EF 25% on 9/7/19. Currently unable to take ACE due to acute kidney injury. Will add ace when ok with nephrology.   will redo echo in approx. 90 days regarding Cardiomyopathy once medical therapy has been maximized.     Will continue to offer supportive care throughout his stay.      9/11/2019  LEONIDAS Martin    Patient personally interviewed and above subjective findings personally confirmed during a face to face contact with patient today  All findings of physical examination confirmed  All pertinent and performed labs, cardiac procedures ,  radiographs of the last 24 hours personally reviewed  Impression and plans discussed/elaborated and implemented jointly as described above     Nidhi Riggs MD          EMR Dragon/Transcription:   \"Dictated utilizing Dragon dictation\".     "

## 2019-09-11 NOTE — PROGRESS NOTES
"   LOS: 5 days    Patient Care Team:  Ean Farmer MD as PCP - General (Internal Medicine)    Chief Complaint:  No chief complaint on file.    Follow UPAKi on CKD 9  Subjective     Interval History:     Seen and examined.  No shortness of air.  Sitting on chair.  Able to ambulate.  Matos catheter and chest tubes are in place    Review of Systems:   See above.     Objective     Vital Signs  Temp:  [97.4 °F (36.3 °C)-98.7 °F (37.1 °C)] 97.4 °F (36.3 °C)  Heart Rate:  [71-89] 71  Resp:  [16-20] 20  BP: ()/(56-77) 106/77    Flowsheet Rows      First Filed Value   Admission Height  167.6 cm (66\") Documented at 09/07/2019 1234   Admission Weight  64.5 kg (142 lb 3.2 oz) Documented at 09/07/2019 0438          I/O this shift:  In: 240 [P.O.:240]  Out: 400 [Urine:210; Chest Tube:190]  I/O last 3 completed shifts:  In: 3376.2 [P.O.:1050; I.V.:2126.2; IV Piggyback:200]  Out: 2860 [Urine:2260; Chest Tube:580]    Intake/Output Summary (Last 24 hours) at 9/11/2019 1629  Last data filed at 9/11/2019 1524  Gross per 24 hour   Intake 240 ml   Output 1180 ml   Net -940 ml       Physical Exam:  Thin WM lying in bed. Flat affect.  Nasal 02  Oral mucosa dry  No scleral icterus  Neck RIJ SG  Heart RRR, + rub.  Lungs coarse BS, no wheezes. Mediastinal tubes.   Abd + bs soft, nontender. No body wall edema  Ext 1+ lower ext edema. Left radial nat.    matos        Results Review:    Results from last 7 days   Lab Units 09/11/19  0301 09/10/19  1200 09/10/19  0259  09/07/19  0519   SODIUM mmol/L 135* 138 138   < > 130*   POTASSIUM mmol/L 4.6 4.2 4.3   < > 4.1   CHLORIDE mmol/L 100 101 103   < > 87*   CO2 mmol/L 19.9* 17.5* 21.8*   < > 28.3   BUN mg/dL 34* 28* 28*   < > 29*   CREATININE mg/dL 1.49* 1.46* 1.40*   < > 1.51*   CALCIUM mg/dL 8.7 8.7 8.0*   < > 9.4   BILIRUBIN mg/dL 0.2  --   --   --  0.4   ALK PHOS U/L 62  --   --   --  122*   ALT (SGPT) U/L 26  --   --   --  70*   AST (SGOT) U/L 56*  --   --   --  74* "   GLUCOSE mg/dL 157* 125* 141*   < > 98    < > = values in this interval not displayed.       Estimated Creatinine Clearance: 43.9 mL/min (A) (by C-G formula based on SCr of 1.49 mg/dL (H)).    Results from last 7 days   Lab Units 09/10/19  1200 09/10/19  0259 09/09/19  1652   MAGNESIUM mg/dL 2.3 2.3 2.7*   PHOSPHORUS mg/dL 4.1 4.4 3.9       Results from last 7 days   Lab Units 09/08/19  0817   URIC ACID mg/dL 8.7*       Results from last 7 days   Lab Units 09/11/19  0301 09/10/19  1200 09/10/19  0259 09/09/19  1652 09/09/19  1247   WBC 10*3/mm3 10.01 9.58 9.11 13.87* 16.90*  16.49*   HEMOGLOBIN g/dL 8.1* 7.8* 7.7* 8.4* 9.2*  9.4*   PLATELETS 10*3/mm3 152 149 160 190 189  202       Results from last 7 days   Lab Units 09/10/19  0259 09/09/19  1247 09/07/19  0519   INR  1.16* 1.35* 0.99         Imaging Results (last 24 hours)     Procedure Component Value Units Date/Time    XR Chest 1 View [[id number]] Collected:  09/11/19 0522     Updated:  09/11/19 0526    Narrative:       PORTABLE CHEST RADIOGRAPH     HISTORY: Postop open heart surgery     COMPARISON: 09/10/2019     FINDINGS:  Haswell-Haley catheter has been removed, although the introducer remains.  There is a tiny left apical pneumothorax. Cardiomegaly and vascular  congestion are noted. There is bibasilar atelectasis.       Impression:       Tiny left apical pneumothorax. This appears stable when compared to  prior study.     This report was finalized on 9/11/2019 5:23 AM by Dr. Clarissa Duckworth M.D.             amiodarone 400 mg Oral Q12H   aspirin 325 mg Oral Daily   atorvastatin 40 mg Oral Nightly   enoxaparin 40 mg Subcutaneous Q24H   guaiFENesin 1,200 mg Oral Q12H   insulin lispro 0-12 Units Subcutaneous 4x Daily With Meals & Nightly   ipratropium-albuterol 3 mL Nebulization 4x Daily - RT   linagliptin 5 mg Oral Daily   mupirocin  Each Nare BID   pantoprazole 40 mg Oral Q AM   primidone 250 mg Oral BID   sennosides-docusate sodium 2 tablet Oral Nightly    sertraline 25 mg Oral Daily   sodium chloride 4 mL Nebulization BID - RT       amiodarone 0.5 mg/min Last Rate: Stopped (09/10/19 1230)   sodium chloride 30 mL/hr Last Rate: 30 mL/hr (09/09/19 1334)   sodium chloride 30 mL/hr Last Rate: 30 mL/hr (09/09/19 1235)       Medication Review:   Current Facility-Administered Medications   Medication Dose Route Frequency Provider Last Rate Last Dose   • acetaminophen (TYLENOL) tablet 500 mg  500 mg Oral Q6H PRN Alvaro Mazariegos MD       • ALPRAZolam (XANAX) tablet 0.25 mg  0.25 mg Oral Q12H PRN Alvaro Mazariegos MD       • amiodarone (NEXTERONE) 360 mg/200 mL (1.8 mg/mL) infusion  0.5 mg/min Intravenous Continuous Alvaro Mazariegos MD   Stopped at 09/10/19 1230   • amiodarone (PACERONE) tablet 400 mg  400 mg Oral Q12H Madyson Smith APRN   400 mg at 09/11/19 1032   • aspirin EC tablet 325 mg  325 mg Oral Daily Alvaro Mazariegos MD   325 mg at 09/11/19 0838   • atorvastatin (LIPITOR) tablet 40 mg  40 mg Oral Nightly Alvaro Mazariegos MD   40 mg at 09/10/19 2031   • bisacodyl (DULCOLAX) EC tablet 10 mg  10 mg Oral Daily PRN Alvaro Mazariegos MD       • bisacodyl (DULCOLAX) suppository 10 mg  10 mg Rectal Daily PRN Alvaro Mazariegos MD       • enoxaparin (LOVENOX) syringe 40 mg  40 mg Subcutaneous Q24H Alvaro Mazariegos MD   40 mg at 09/10/19 2213   • guaiFENesin (MUCINEX) 12 hr tablet 1,200 mg  1,200 mg Oral Q12H Alvaro Mazariegos MD   1,200 mg at 09/11/19 0838   • HYDROcodone-acetaminophen (NORCO) 5-325 MG per tablet 2 tablet  2 tablet Oral Q4H PRN Alvaro Mazariegos MD   1 tablet at 09/11/19 1032   • insulin lispro (humaLOG) injection 0-12 Units  0-12 Units Subcutaneous 4x Daily With Meals & Nightly Christiano Fernandes MD   2 Units at 09/11/19 1102   • ipratropium-albuterol (DUO-NEB) nebulizer solution 3 mL  3 mL Nebulization 4x Daily - RT Alvaor Mazariegos MD   3 mL at 09/11/19 1444   • linagliptin (TRADJENTA) tablet 5 mg  5 mg Oral Daily Christiano Fernandes,  MD   5 mg at 09/11/19 1056   • magnesium hydroxide (MILK OF MAGNESIA) suspension 2400 mg/10mL 10 mL  10 mL Oral Daily PRN Alvaro Mazariegos MD   10 mL at 09/11/19 0837   • morphine injection 1 mg  1 mg Intravenous Q4H PRN Alvaro Mazariegos MD        And   • naloxone (NARCAN) injection 0.4 mg  0.4 mg Intravenous Q5 Min PRN Alvaro Mazariegos MD       • mupirocin (BACTROBAN) 2 % nasal ointment   Each Nare BID Alvaro Mazariegos MD   1 application at 09/11/19 0838   • ondansetron (ZOFRAN) injection 4 mg  4 mg Intravenous Q6H PRN Alvaro Mazariegos MD   4 mg at 09/11/19 1233   • pantoprazole (PROTONIX) EC tablet 40 mg  40 mg Oral Q AM Alvaro Mazariegos MD   40 mg at 09/11/19 0635   • potassium chloride (MICRO-K) CR capsule 40 mEq  40 mEq Oral PRN Alvaro Mazariegos MD        Or   • potassium chloride (KLOR-CON) packet 40 mEq  40 mEq Oral PRN Alvaro Mazariegos MD       • potassium chloride 10 mEq in 100 mL IVPB  10 mEq Intravenous Q1H PRN Alvaro Mazariegos MD        Or   • potassium chloride 10 mEq in 100 mL IVPB  10 mEq Intravenous Q1H PRN Alvaro Mazariegos MD       • potassium chloride 20 mEq in 50 mL IVPB  20 mEq Intravenous Q1H PRAlvaro Rico MD       • potassium chloride 20 mEq in 50 mL IVPB  20 mEq Intravenous Q1H PRN Alvaro Mazariegos MD       • potassium chloride 20 mEq in 50 mL IVPB  20 mEq Intravenous Q1H PRAlvaro Rico MD       • primidone (MYSOLINE) tablet 250 mg  250 mg Oral BID Alvaro Mazariegos MD   250 mg at 09/11/19 0838   • sennosides-docusate sodium (SENOKOT-S) 8.6-50 MG tablet 2 tablet  2 tablet Oral Nightly Alvaro Mazariegos MD   2 tablet at 09/10/19 2031   • sertraline (ZOLOFT) tablet 25 mg  25 mg Oral Daily Alvaro Mazariegos MD   25 mg at 09/11/19 0838   • sodium chloride 0.9 % flush 30 mL  30 mL Intravenous Once PRN Alvaro Mazariegos MD       • sodium chloride 0.9 % infusion  30 mL/hr Intravenous Continuous Alvaro Mazariegos MD 30 mL/hr at 09/09/19 1334 30 mL/hr at  09/09/19 1334   • sodium chloride 0.9 % infusion  30 mL/hr Intravenous Continuous PRN Alvaro Mazariegos MD 30 mL/hr at 09/09/19 1235 30 mL/hr at 09/09/19 1235   • sodium chloride 7 % nebulizer solution nebulizer solution 4 mL  4 mL Nebulization BID - RT Madyson Smith APRN       • traMADol (ULTRAM) tablet 50 mg  50 mg Oral Q6H PRN Alvaro Mazariegos MD   50 mg at 09/11/19 0837       Assessment/Plan   1. RANDA on CKD 9. Baseline unknown.  Creatinine fairly stable. Volume excess by exam.  Diuretic today.  2. SP 3 vessel CABG, LIMA, SHANIA ligation. POD 2. Systolic cardiomyopathy  EF 20%. Will remove matos at AM  Mild MR. Hold Lasix and re evaluate at AM   3. Anemia post op.  BP may benefit from PRBC.   4. Hypotension post op.  Resolved  5. COPD exacerbation  6. DM2  7. Lupus. On plaquenil at home.         Nikolas Pedroza MD  09/11/19  4:29 PM

## 2019-09-11 NOTE — PROGRESS NOTES
"  ENDOCRINE    Subjective    AND PLANS  Cedric Wade is a 68 y.o. male.     Follow-up diabetes and related disorders    Sitting on chair.  Denies shortness of breath  Denies nausea or vomiting.  Remains on Solu-Medrol 20 mg every 12 hours.    Fasting glucose 172.  Random glucose 136-180.  Start Tradjenta 5 mg/day.  Change Humalog correction scale to before meals and at bedtime    Continue Lipitor 40 mg/day.    Objective   BP 92/71 (BP Location: Right arm, Patient Position: Sitting)   Pulse 89   Temp 98.6 °F (37 °C) (Oral)   Resp 16   Ht 167.6 cm (66\")   Wt 66.3 kg (146 lb 3.2 oz)   SpO2 100%   BMI 23.60 kg/m²   Physical Exam    Awake, alert, not in distress  Regular heart rate and rhythm.  No gallop.  Atrial paced per monitor.  No rales or wheezes.  Abdomen soft, nontender.  Extremities warm.  No cyanosis or pedal edema.    Lab Results (last 24 hours)     Procedure Component Value Units Date/Time    POC Glucose Once [[id number]]  (Abnormal) Collected:  09/11/19 0535    Specimen:  Blood Updated:  09/11/19 0535     Glucose 172 mg/dL     Comprehensive Metabolic Panel [[id number]]  (Abnormal) Collected:  09/11/19 0301    Specimen:  Blood from Arm, Right Updated:  09/11/19 0402     Glucose 157 mg/dL      BUN 34 mg/dL      Creatinine 1.49 mg/dL      Sodium 135 mmol/L      Potassium 4.6 mmol/L      Chloride 100 mmol/L      CO2 19.9 mmol/L      Calcium 8.7 mg/dL      Total Protein 6.2 g/dL      Albumin 4.00 g/dL      ALT (SGPT) 26 U/L      AST (SGOT) 56 U/L      Alkaline Phosphatase 62 U/L      Total Bilirubin 0.2 mg/dL      eGFR Non African Amer 47 mL/min/1.73      Globulin 2.2 gm/dL      A/G Ratio 1.8 g/dL      BUN/Creatinine Ratio 22.8     Anion Gap 15.1 mmol/L     Narrative:       GFR Normal >60  Chronic Kidney Disease <60  Kidney Failure <15    CBC (No Diff) [[id number]]  (Abnormal) Collected:  09/11/19 0301    Specimen:  Blood from Arm, Right Updated:  09/11/19 0345     WBC 10.01 10*3/mm3      RBC 2.72 10*6/mm3  "     Hemoglobin 8.1 g/dL      Hematocrit 26.6 %      MCV 97.8 fL      MCH 29.8 pg      MCHC 30.5 g/dL      RDW 14.5 %      RDW-SD 50.9 fl      MPV 10.5 fL      Platelets 152 10*3/mm3     POC Glucose Once [[id number]]  (Abnormal) Collected:  09/10/19 2328    Specimen:  Blood Updated:  09/10/19 2329     Glucose 176 mg/dL     POC Glucose Once [[id number]]  (Abnormal) Collected:  09/10/19 2022    Specimen:  Blood Updated:  09/10/19 2024     Glucose 180 mg/dL     POC Glucose Once [[id number]]  (Abnormal) Collected:  09/10/19 1555    Specimen:  Blood Updated:  09/10/19 1615     Glucose 177 mg/dL     POC OR Panel, ISTAT [[id number]]  (Abnormal) Collected:  09/09/19 1111    Specimen:  Blood Updated:  09/10/19 1512     Potassium 4.2 mmol/L      Total CO2 28 mmol/L      Comment: Serial Number: [id number]:  [id number]        Hemoglobin 8.8 g/dL      Hematocrit 26 %      pH, Arterial 7.27 pH units      HCO3, Arterial 26.3 mmol/L      Base Excess -1.0000 mmol/L      O2 Saturation, Arterial 100 %      pO2, Arterial 447 mmHg      pCO2, Arterial 56.7 mm Hg      Glucose 145 mg/dL     POC OR Panel, ISTAT [[id number]]  (Abnormal) Collected:  09/09/19 1037    Specimen:  Blood Updated:  09/10/19 1512     Potassium 5.3 mmol/L      Total CO2 28 mmol/L      Comment: Serial Number: [id number]:  [id number]        Hemoglobin 8.8 g/dL      Hematocrit 26 %      pH, Arterial 7.36 pH units      HCO3, Arterial 26.7 mmol/L      Base Excess 1.0000 mmol/L      O2 Saturation, Arterial 100 %      pO2, Arterial 383 mmHg      pCO2, Arterial 46.9 mm Hg      Glucose 143 mg/dL     POC OR Panel, ISTAT [[id number]]  (Abnormal) Collected:  09/09/19 1009    Specimen:  Blood Updated:  09/10/19 1511     Potassium 4.3 mmol/L      Total CO2 29 mmol/L      Comment: Serial Number: [id number]:  0808        Hemoglobin 9.2 g/dL      Hematocrit 27 %      pH, Arterial 7.31 pH units      HCO3, Arterial 27.8 mmol/L      Base Excess 2.0000 mmol/L      O2 Saturation, Arterial 100 %       pO2, Arterial 498 mmHg      pCO2, Arterial 54.8 mm Hg      Glucose 158 mg/dL     POC OR Panel, ISTAT [[id number]]  (Abnormal) Collected:  09/09/19 0943    Specimen:  Blood Updated:  09/10/19 1511     Potassium 4.1 mmol/L      Total CO2 30 mmol/L      Comment: Serial Number: [id number]:  0808        Hemoglobin 8.2 g/dL      Hematocrit 24 %      pH, Arterial 7.30 pH units      HCO3, Arterial 27.8 mmol/L      Base Excess 1.0000 mmol/L      O2 Saturation, Arterial 75 %      pO2, Arterial 45 mmHg      pCO2, Arterial 56.4 mm Hg      Glucose 167 mg/dL     POC OR Panel, ISTAT [[id number]]  (Abnormal) Collected:  09/09/19 0935    Specimen:  Blood Updated:  09/10/19 1511     Potassium 4.6 mmol/L      Total CO2 27 mmol/L      Comment: Serial Number: [id number]:  0808        Hemoglobin 7.8 g/dL      Hematocrit 23 %      pH, Arterial 7.28 pH units      HCO3, Arterial 25.8 mmol/L      Base Excess -1.0000 mmol/L      O2 Saturation, Arterial 100 %      pO2, Arterial 469 mmHg      pCO2, Arterial 55.3 mm Hg      Glucose 174 mg/dL     POC Activated Clotting Time [[id number]]  (Abnormal) Collected:  09/09/19 0900    Specimen:  Blood Updated:  09/10/19 1511     Activated Clotting Time  279 Seconds      Comment: Serial Number: [id number]:  0808       POC Activated Clotting Time [[id number]]  (Abnormal) Collected:  09/09/19 0852    Specimen:  Blood Updated:  09/10/19 1511     Activated Clotting Time  268 Seconds      Comment: Serial Number: [id number]:  [id number]       POC OR Panel, ISTAT [[id number]]  (Abnormal) Collected:  09/09/19 0753    Specimen:  Blood Updated:  09/10/19 1511     Potassium 4.8 mmol/L      Total CO2 29 mmol/L      Comment: Serial Number: [id number]:  0808        Hemoglobin 10.5 g/dL      Hematocrit 31 %      pH, Arterial 7.34 pH units      HCO3, Arterial 27.6 mmol/L      Base Excess 2.0000 mmol/L      O2 Saturation, Arterial 100 %      pO2, Arterial 490 mmHg      pCO2, Arterial 51.1 mm Hg      Glucose  186 mg/dL     Magnesium [[id number]]  (Normal) Collected:  09/10/19 1200    Specimen:  Blood Updated:  09/10/19 1258     Magnesium 2.3 mg/dL     Renal Function Panel [[id number]]  (Abnormal) Collected:  09/10/19 1200    Specimen:  Blood Updated:  09/10/19 1258     Glucose 125 mg/dL      BUN 28 mg/dL      Creatinine 1.46 mg/dL      Sodium 138 mmol/L      Potassium 4.2 mmol/L      Chloride 101 mmol/L      CO2 17.5 mmol/L      Calcium 8.7 mg/dL      Albumin 3.90 g/dL      Phosphorus 4.1 mg/dL      Anion Gap 19.5 mmol/L      BUN/Creatinine Ratio 19.2     eGFR Non African Amer 48 mL/min/1.73     Narrative:       GFR Normal >60  Chronic Kidney Disease <60  Kidney Failure <15    CBC & Differential [[id number]] Collected:  09/10/19 1200    Specimen:  Blood Updated:  09/10/19 1229    Narrative:       The following orders were created for panel order CBC & Differential.  Procedure                               Abnormality         Status                     ---------                               -----------         ------                     CBC Auto Differential[[id number]]        Abnormal            Final result                 Please view results for these tests on the individual orders.    CBC Auto Differential [[id number]]  (Abnormal) Collected:  09/10/19 1200    Specimen:  Blood Updated:  09/10/19 1229     WBC 9.58 10*3/mm3      RBC 2.58 10*6/mm3      Hemoglobin 7.8 g/dL      Hematocrit 25.5 %      MCV 98.8 fL      MCH 30.2 pg      MCHC 30.6 g/dL      RDW 14.4 %      RDW-SD 52.4 fl      MPV 10.0 fL      Platelets 149 10*3/mm3      Neutrophil % 79.8 %      Lymphocyte % 8.9 %      Monocyte % 8.5 %      Eosinophil % 1.6 %      Basophil % 0.4 %      Immature Grans % 0.8 %      Neutrophils, Absolute 7.65 10*3/mm3      Lymphocytes, Absolute 0.85 10*3/mm3      Monocytes, Absolute 0.81 10*3/mm3      Eosinophils, Absolute 0.15 10*3/mm3      Basophils, Absolute 0.04 10*3/mm3      Immature Grans, Absolute 0.08 10*3/mm3      nRBC 0.0  /100 WBC     POC Glucose Once [[id number]]  (Abnormal) Collected:  09/10/19 1204    Specimen:  Blood Updated:  09/10/19 1211     Glucose 136 mg/dL     POC Glucose Once [[id number]]  (Abnormal) Collected:  09/10/19 0915    Specimen:  Blood Updated:  09/10/19 0921     Glucose 151 mg/dL               Coronary artery disease involving native coronary artery of native heart with angina pectoris (CMS/HCC)    COPD (chronic obstructive pulmonary disease) with chronic bronchitis (CMS/HCC)    Microalbuminuria    Type 2 diabetes mellitus with microalbuminuria (CMS/HCC)    Hyperlipidemia    Essential hypertension    H/O agent Orange exposure    Diabetes therapy as discussed above.  Lipid therapy as discussed above.  Mobilize patient as tolerated.

## 2019-09-11 NOTE — PLAN OF CARE
Problem: Patient Care Overview  Goal: Plan of Care Review  Outcome: Ongoing (interventions implemented as appropriate)   09/11/19 3852   Coping/Psychosocial   Plan of Care Reviewed With patient   Plan of Care Review   Progress improving   OTHER   Outcome Summary SBP remains 80-'s90, otherwise VSS. O2 2LNC. CT/IJ/matos remain. Pt did not rest well over night. Pain treated per MAR. Will continue to monitor closely.

## 2019-09-11 NOTE — PLAN OF CARE
Problem: Patient Care Overview  Goal: Plan of Care Review  Outcome: Ongoing (interventions implemented as appropriate)   09/11/19 1120   Coping/Psychosocial   Plan of Care Reviewed With patient   OTHER   Outcome Summary patient able to ambulate today, steady with ambulation although cues for increased arm swing.

## 2019-09-11 NOTE — CONSULTS
"Met with patient and his wife,discussed benefits of cardiac rehab. Provided phase II information packet, which includes; general information about cardiac rehab, [facility]iac [facility] handout and [facility] letter article entitled “Cardiac Rehab is often the Best Medicine for Recovery\", stresses the importance of cardiac rehab after a heart event.   I provided the contact information for cardiac rehab at [facility] and encouraged him to call when discharged.   Explained if receiving home health would not be able to attend cardiac rehab until finished with home health. Instructed to bring a copy of After Visit Summary to initial assessment.      "

## 2019-09-11 NOTE — PROGRESS NOTES
LOS: 5 days   Patient Care Team:  Ean Farmer MD as PCP - General (Internal Medicine)    Chief Complaint: post op    Subjective      Vital Signs  Temp:  [97.6 °F (36.4 °C)-98.7 °F (37.1 °C)] 98.6 °F (37 °C)  Heart Rate:  [] 89  Resp:  [16-20] 20  BP: ()/(56-77) 92/71  Arterial Line BP: ()/(45-57) 97/50  Body mass index is 23.6 kg/m².    Intake/Output Summary (Last 24 hours) at 9/11/2019 0839  Last data filed at 9/11/2019 0834  Gross per 24 hour   Intake 1901 ml   Output 1840 ml   Net 61 ml     I/O this shift:  In: 240 [P.O.:240]  Out: -     Chest tube drainage last 8 hours 160        09/08/19  0643 09/09/19  0500 09/11/19  0622   Weight: 62.6 kg (137 lb 14.4 oz) 63.6 kg (140 lb 4.8 oz) 66.3 kg (146 lb 3.2 oz)         Objective    Results Review:        WBC WBC   Date Value Ref Range Status   09/11/2019 10.01 3.40 - 10.80 10*3/mm3 Final   09/10/2019 9.58 3.40 - 10.80 10*3/mm3 Final   09/10/2019 9.11 3.40 - 10.80 10*3/mm3 Final   09/09/2019 13.87 (H) 3.40 - 10.80 10*3/mm3 Final   09/09/2019 16.90 (H) 3.40 - 10.80 10*3/mm3 Final   09/09/2019 16.49 (H) 3.40 - 10.80 10*3/mm3 Final   09/09/2019 9.00 3.40 - 10.80 10*3/mm3 Final      HGB Hemoglobin   Date Value Ref Range Status   09/11/2019 8.1 (L) 13.0 - 17.7 g/dL Final   09/10/2019 7.8 (L) 13.0 - 17.7 g/dL Final   09/10/2019 7.7 (L) 13.0 - 17.7 g/dL Final   09/09/2019 8.4 (L) 13.0 - 17.7 g/dL Final   09/09/2019 9.2 (L) 13.0 - 17.7 g/dL Final   09/09/2019 9.4 (L) 13.0 - 17.7 g/dL Final   09/09/2019 8.8 (L) 12.0 - 17.0 g/dL Final   09/09/2019 8.8 (L) 12.0 - 17.0 g/dL Final   09/09/2019 9.2 (L) 12.0 - 17.0 g/dL Final   09/09/2019 8.2 (L) 12.0 - 17.0 g/dL Final   09/09/2019 7.8 (L) 12.0 - 17.0 g/dL Final   09/09/2019 10.5 (L) 12.0 - 17.0 g/dL Final   09/09/2019 10.2 (L) 13.0 - 17.7 g/dL Final      HCT Hematocrit   Date Value Ref Range Status   09/11/2019 26.6 (L) 37.5 - 51.0 % Final   09/10/2019 25.5 (L) 37.5 - 51.0 % Final   09/10/2019 25.3 (L)  37.5 - 51.0 % Final   09/09/2019 26.9 (L) 37.5 - 51.0 % Final   09/09/2019 29.0 (L) 37.5 - 51.0 % Final   09/09/2019 29.8 (L) 37.5 - 51.0 % Final   09/09/2019 26 (L) 38 - 51 % Final   09/09/2019 26 (L) 38 - 51 % Final   09/09/2019 27 (L) 38 - 51 % Final   09/09/2019 24 (L) 38 - 51 % Final   09/09/2019 23 (L) 38 - 51 % Final   09/09/2019 31 (L) 38 - 51 % Final   09/09/2019 31.8 (L) 37.5 - 51.0 % Final      Platelets Platelets   Date Value Ref Range Status   09/11/2019 152 140 - 450 10*3/mm3 Final   09/10/2019 149 140 - 450 10*3/mm3 Final   09/10/2019 160 140 - 450 10*3/mm3 Final   09/09/2019 190 140 - 450 10*3/mm3 Final   09/09/2019 189 140 - 450 10*3/mm3 Final   09/09/2019 202 140 - 450 10*3/mm3 Final   09/09/2019 279 140 - 450 10*3/mm3 Final        PT/INR:    Protime   Date Value Ref Range Status   09/10/2019 14.5 (H) 11.7 - 14.2 Seconds Final   09/09/2019 16.4 (H) 11.7 - 14.2 Seconds Final   /  INR   Date Value Ref Range Status   09/10/2019 1.16 (H) 0.90 - 1.10 Final   09/09/2019 1.35 (H) 0.90 - 1.10 Final       Sodium Sodium   Date Value Ref Range Status   09/11/2019 135 (L) 136 - 145 mmol/L Final   09/10/2019 138 136 - 145 mmol/L Final   09/10/2019 138 136 - 145 mmol/L Final   09/09/2019 138 136 - 145 mmol/L Final   09/09/2019 136 136 - 145 mmol/L Final   09/09/2019 131 (L) 136 - 145 mmol/L Final      Potassium Potassium   Date Value Ref Range Status   09/11/2019 4.6 3.5 - 5.2 mmol/L Final   09/10/2019 4.2 3.5 - 5.2 mmol/L Final   09/10/2019 4.3 3.5 - 5.2 mmol/L Final   09/09/2019 4.0 3.5 - 5.2 mmol/L Final   09/09/2019 3.8 3.5 - 5.2 mmol/L Final   09/09/2019 3.8 3.5 - 5.2 mmol/L Final   09/09/2019 4.4 3.5 - 5.2 mmol/L Final      Chloride Chloride   Date Value Ref Range Status   09/11/2019 100 98 - 107 mmol/L Final   09/10/2019 101 98 - 107 mmol/L Final   09/10/2019 103 98 - 107 mmol/L Final   09/09/2019 102 98 - 107 mmol/L Final   09/09/2019 101 98 - 107 mmol/L Final   09/09/2019 94 (L) 98 - 107 mmol/L Final       Bicarbonate CO2   Date Value Ref Range Status   09/11/2019 19.9 (L) 22.0 - 29.0 mmol/L Final   09/10/2019 17.5 (L) 22.0 - 29.0 mmol/L Final   09/10/2019 21.8 (L) 22.0 - 29.0 mmol/L Final   09/09/2019 23.1 22.0 - 29.0 mmol/L Final   09/09/2019 23.4 22.0 - 29.0 mmol/L Final   09/09/2019 23.6 22.0 - 29.0 mmol/L Final      BUN BUN   Date Value Ref Range Status   09/11/2019 34 (H) 8 - 23 mg/dL Final   09/10/2019 28 (H) 8 - 23 mg/dL Final   09/10/2019 28 (H) 8 - 23 mg/dL Final   09/09/2019 29 (H) 8 - 23 mg/dL Final   09/09/2019 31 (H) 8 - 23 mg/dL Final   09/09/2019 37 (H) 8 - 23 mg/dL Final      Creatinine Creatinine   Date Value Ref Range Status   09/11/2019 1.49 (H) 0.76 - 1.27 mg/dL Final   09/10/2019 1.46 (H) 0.76 - 1.27 mg/dL Final   09/10/2019 1.40 (H) 0.76 - 1.27 mg/dL Final   09/09/2019 1.29 (H) 0.76 - 1.27 mg/dL Final   09/09/2019 1.27 0.76 - 1.27 mg/dL Final   09/09/2019 1.20 0.76 - 1.27 mg/dL Final      Calcium Calcium   Date Value Ref Range Status   09/11/2019 8.7 8.6 - 10.5 mg/dL Final   09/10/2019 8.7 8.6 - 10.5 mg/dL Final   09/10/2019 8.0 (L) 8.6 - 10.5 mg/dL Final   09/09/2019 8.1 (L) 8.6 - 10.5 mg/dL Final   09/09/2019 8.0 (L) 8.6 - 10.5 mg/dL Final   09/09/2019 8.7 8.6 - 10.5 mg/dL Final      Magnesium Magnesium   Date Value Ref Range Status   09/10/2019 2.3 1.6 - 2.4 mg/dL Final   09/10/2019 2.3 1.6 - 2.4 mg/dL Final   09/09/2019 2.7 (H) 1.6 - 2.4 mg/dL Final   09/09/2019 2.2 1.6 - 2.4 mg/dL Final            aspirin 325 mg Oral Daily   atorvastatin 40 mg Oral Nightly   chlorhexidine 15 mL Mouth/Throat Q12H   enoxaparin 40 mg Subcutaneous Q24H   furosemide 40 mg Intravenous Q8H   guaiFENesin 1,200 mg Oral Q12H   insulin lispro 0-12 Units Subcutaneous 4x Daily With Meals & Nightly   ipratropium-albuterol 3 mL Nebulization 4x Daily - RT   linagliptin 5 mg Oral Daily   mupirocin  Each Nare BID   pantoprazole 40 mg Oral Q AM   primidone 250 mg Oral BID   sennosides-docusate sodium 2 tablet Oral Nightly    sertraline 25 mg Oral Daily       amiodarone 0.5 mg/min Last Rate: Stopped (09/10/19 1230)   amiodarone 0.5 mg/min Last Rate: 0.5 mg/min (09/10/19 1985)   sodium chloride 30 mL/hr Last Rate: 30 mL/hr (09/09/19 1334)   sodium chloride 30 mL/hr Last Rate: 30 mL/hr (09/09/19 1235)           Patient Active Problem List   Diagnosis Code   • Coronary artery disease involving native coronary artery of native heart with angina pectoris (CMS/HCC) I25.119   • COPD (chronic obstructive pulmonary disease) with chronic bronchitis (CMS/HCC) J44.9   • Microalbuminuria R80.9   • Type 2 diabetes mellitus with microalbuminuria (CMS/HCC) E11.29, R80.9   • Hyperlipidemia E78.5   • Essential hypertension I10   • H/O agent [place] exposure Z77.098       Assessment & Plan      -MV CAD, mild ischemic mitral incompetence-- s/p CABGx3 LIMA, EVH left SVG, SHANIA ligation--POD#2 Mazariegos  -NSTEMI  -ICM, EF 20%  -Acute on chronic HFrEF--maximize meds prior to discharge  -RANDA--cr 1.4, renal following  -COPD with exacerbation--pulm following  -DM, type 2--a1c 6.1  -HLD--statin  -Current tobacco abuse--cessation d/w pt  -PVD--has been on Pletal  -Hx anxiety/depression--cont Zoloft  -Hx Lupus--Plaquenil at home  -Hx essential tremors--on primidone per neurology  -Mediastinal adenopathy--per CT scan, follow up recommended  -Post op anemia, expected acute blood loss     Encourage pulmonary toilet.   replete calcium.  Discontinue chest tubes and central line.    Switch to PO Amio.  Increase activity as able.  Up 3 kgs, CXR with congestion, creatinine stable,   Diurese per Renal.      Madyson Rodriguez, LEONIDAS  09/11/19  8:39 AM     Doing well.

## 2019-09-11 NOTE — THERAPY TREATMENT NOTE
Patient Name: Cedric Wade  : 1950    MRN: [id number]                              Today's Date: 2019       Admit Date: 2019    Visit Dx:     ICD-10-CM ICD-9-CM   1. Coronary artery disease involving native coronary artery of native heart with angina pectoris (CMS/HCC) I25.119 414.01     413.9     Patient Active Problem List   Diagnosis   • Coronary artery disease involving native coronary artery of native heart with angina pectoris (CMS/HCC)   • COPD (chronic obstructive pulmonary disease) with chronic bronchitis (CMS/[facility])   • Microalbuminuria   • Type 2 diabetes mellitus with microalbuminuria (CMS/[facility])   • Hyperlipidemia   • Essential hypertension   • H/O agent [place] exposure     Past Medical History:   Diagnosis Date   • Arthritis    • CHF (congestive heart failure) (CMS/[facility])    • COPD (chronic obstructive pulmonary disease) (CMS/[facility])    • Coronary artery disease    • Cutaneous lupus erythematosus    • Diabetes mellitus (CMS/[facility])    • Elevated cholesterol    • GERD (gastroesophageal reflux disease)    • Hypertension      Past Surgical History:   Procedure Laterality Date   • CORONARY ARTERY BYPASS GRAFT N/A 2019    Procedure: INTRAOPERATIVE BUDDY, MIDLINE STENOTOMY WITH CORONARY ARTERY BYPASS GRAPHS X  3 UTILIZING LEFT SAVAGE  AND LEFT ENDOSCOPIC HARVESTED SAPHENOUS VEIN, LIGATION OF LEFT  ATRIAL APPENDAGE; PRP;  Surgeon: Alvaro Mazariegos MD;  Location: [facility];  Service: Cardiothoracic     General Information     Row Name 19 1117          PT Evaluation Time/Intention    Document Type  therapy note (daily note)  -EM     Mode of Treatment  individual therapy;physical therapy  -EM     Row Name 19 1117          General Information    Existing Precautions/Restrictions  fall;cardiac;sternal  -EM       User Key  (r) = Recorded By, (t) = Taken By, (c) = Cosigned By    Initials Name Provider Type    EM Mariah Gordon, PT Physical Therapist        Mobility     Row Name  09/11/19 1118          Bed Mobility Assessment/Treatment    Bed Mobility Assessment/Treatment  sit-supine  -EM     Supine-Sit [place] (Bed Mobility)  minimum assist (75% patient effort);verbal cues  -EM     Sit-Supine [place] (Bed Mobility)  minimum assist (75% patient effort);2 person assist  -EM     Assistive Device (Bed Mobility)  head of bed elevated;draw sheet  -EM     Row Name 09/11/19 1118          Sit-Stand Transfer    Sit-Stand [place] (Transfers)  contact guard  -EM     Row Name 09/11/19 1118          Gait/Stairs Assessment/Training    [place] Level (Gait)  contact guard;1 person to manage equipment  -EM     Distance in Feet (Gait)  80  -EM     Deviations/Abnormal Patterns (Gait)  gait speed decreased;stride length decreased  -EM     Bilateral Gait Deviations  forward flexed posture  -EM     Comment (Gait/Stairs)  guarded, decreased arm swing   -EM       User Key  (r) = Recorded By, (t) = Taken By, (c) = Cosigned By    Initials Name Provider Type    Mariah Terry, PT Physical Therapist        Obj/Interventions     Row Name 09/11/19 1119          Therapeutic Exercise    Comment (Therapeutic Exercise)  5 reps cardiac protocol, level 3   -EM       User Key  (r) = Recorded By, (t) = Taken By, (c) = Cosigned By    Initials Name Provider Type    Mariah eTrry, PT Physical Therapist        Goals/Plan    No documentation.       Clinical Impression     Row Name 09/11/19 1119          Pain Scale: Numbers Pre/Post-Treatment    Pain Scale: Numbers, Pretreatment  7/10  -EM     Pain Location  chest  -EM     Pain Intervention(s)  Medication (See MAR);Repositioned  -EM     Row Name 09/11/19 1119          Vital Signs    Pre Systolic BP Rehab  92  -EM     Pre Treatment Diastolic BP  71  -EM     Pretreatment Heart Rate (beats/min)  85  -EM     Pre SpO2 (%)  96  -EM     O2 Delivery Pre Treatment  supplemental O2  -EM     Row Name 09/11/19 1119          Positioning and Restraints     Pre-Treatment Position  in bed  -EM     Post Treatment Position  bed  -EM     In Bed  sitting;call light within reach;notified nsg;with family/caregiver  -EM       User Key  (r) = Recorded By, (t) = Taken By, (c) = Cosigned By    Initials Name Provider Type    Mariah Terry PT Physical Therapist        Outcome Measures     Row Name 09/11/19 1121          How much help from another person do you currently need...    Turning from your back to your side while in flat bed without using bedrails?  3  -EM     Moving from lying on back to sitting on the side of a flat bed without bedrails?  3  -EM     Moving to and from a bed to a chair (including a wheelchair)?  3  -EM     Standing up from a chair using your arms (e.g., wheelchair, bedside chair)?  3  -EM     Climbing 3-5 steps with a railing?  2  -EM     To walk in hospital room?  3  -EM     AM-PAC 6 Clicks Score (PT)  17  -EM       User Key  (r) = Recorded By, (t) = Taken By, (c) = Cosigned By    Initials Name Provider Type    EM Mariah Gordon PT Physical Therapist        Physical Therapy Education     Title: PT OT SLP Therapies (In Progress)     Topic: Physical Therapy (In Progress)     Point: Mobility training (In Progress)     Learning Progress Summary           Patient Acceptance, E, NR by EM at 9/11/2019 11:20 AM    Acceptance, E, NR by EM at 9/10/2019 10:43 AM                   Point: Home exercise program (In Progress)     Learning Progress Summary           Patient Acceptance, E, NR by EM at 9/11/2019 11:20 AM    Acceptance, E, NR by EM at 9/10/2019 10:43 AM                               User Key     Initials Effective Dates Name Provider Type [facility] 04/03/18 -  Mariah Gordon PT Physical Therapist PT              PT Recommendation and Plan  Planned Therapy Interventions (PT Eval): bed mobility training, gait training, home exercise program  Outcome Summary/Treatment Plan (PT)  Anticipated Discharge Disposition (PT): home with  assist, home with home health  Plan of Care Reviewed With: patient  Outcome Summary: patient able to ambulate today, steady with ambulation although cues for increased arm swing.      Time Calculation:   PT Charges     Row Name 09/11/19 1122             Time Calculation    Start Time  1100  -EM      Stop Time  1115  -EM      Time Calculation (min)  15 min  -EM      PT Received On  09/11/19  -EM      PT - Next Appointment  09/12/19  -EM         Time Calculation- PT    Total Timed Code Minutes- PT  15 minute(s)  -EM        User Key  (r) = Recorded By, (t) = Taken By, (c) = Cosigned By    Initials Name Provider Type    EM Mariah Gordon, PT Physical Therapist        Therapy Charges for Today     Code Description Service Date Service Provider Modifiers Qty    57533848715 HC PT EVAL MOD COMPLEXITY 2 9/10/2019 Mariah Gordon, PT GP 1    10994067741 HC PT THER PROC EA 15 MIN 9/10/2019 Mariah Gordon, PT GP 1    86331064352 HC PT THER SUPP EA 15 MIN 9/10/2019 Mariah Gordon, PT GP 1    16751023149 HC PT THER PROC EA 15 MIN 9/11/2019 Mariah Gordon, PT GP 2    44877269682 HC PT THER SUPP EA 15 MIN 9/11/2019 Mariah Gordon, PT GP 1          PT G-Codes  Outcome Measure Options: AM-PAC 6 Clicks Basic Mobility (PT)  AM-PAC 6 Clicks Score (PT): 17    Mariah Gordon PT  9/11/2019

## 2019-09-12 ENCOUNTER — APPOINTMENT (OUTPATIENT)
Dept: GENERAL RADIOLOGY | Facility: HOSPITAL | Age: 69
End: 2019-09-12

## 2019-09-12 LAB
ABO + RH BLD: NORMAL
ABO + RH BLD: NORMAL
ANION GAP SERPL CALCULATED.3IONS-SCNC: 13.2 MMOL/L (ref 5–15)
BH BB BLOOD EXPIRATION DATE: NORMAL
BH BB BLOOD EXPIRATION DATE: NORMAL
BH BB BLOOD TYPE BARCODE: 6200
BH BB BLOOD TYPE BARCODE: 6200
BH BB DISPENSE STATUS: NORMAL
BH BB DISPENSE STATUS: NORMAL
BH BB PRODUCT CODE: NORMAL
BH BB PRODUCT CODE: NORMAL
BH BB UNIT NUMBER: NORMAL
BH BB UNIT NUMBER: NORMAL
BUN BLD-MCNC: 42 MG/DL (ref 8–23)
BUN/CREAT SERPL: 30.9 (ref 7–25)
CALCIUM SPEC-SCNC: 8.7 MG/DL (ref 8.6–10.5)
CHLORIDE SERPL-SCNC: 94 MMOL/L (ref 98–107)
CO2 SERPL-SCNC: 23.8 MMOL/L (ref 22–29)
CREAT BLD-MCNC: 1.36 MG/DL (ref 0.76–1.27)
DEPRECATED RDW RBC AUTO: 51.4 FL (ref 37–54)
ERYTHROCYTE [DISTWIDTH] IN BLOOD BY AUTOMATED COUNT: 14.3 % (ref 12.3–15.4)
GFR SERPL CREATININE-BSD FRML MDRD: 52 ML/MIN/1.73
GLUCOSE BLD-MCNC: 118 MG/DL (ref 65–99)
GLUCOSE BLDC GLUCOMTR-MCNC: 101 MG/DL (ref 70–130)
GLUCOSE BLDC GLUCOMTR-MCNC: 111 MG/DL (ref 70–130)
GLUCOSE BLDC GLUCOMTR-MCNC: 125 MG/DL (ref 70–130)
GLUCOSE BLDC GLUCOMTR-MCNC: 170 MG/DL (ref 70–130)
HCT VFR BLD AUTO: 26.9 % (ref 37.5–51)
HGB BLD-MCNC: 8.5 G/DL (ref 13–17.7)
MCH RBC QN AUTO: 30.9 PG (ref 26.6–33)
MCHC RBC AUTO-ENTMCNC: 31.6 G/DL (ref 31.5–35.7)
MCV RBC AUTO: 97.8 FL (ref 79–97)
PLATELET # BLD AUTO: 164 10*3/MM3 (ref 140–450)
PMV BLD AUTO: 10.5 FL (ref 6–12)
POTASSIUM BLD-SCNC: 3.7 MMOL/L (ref 3.5–5.2)
RBC # BLD AUTO: 2.75 10*6/MM3 (ref 4.14–5.8)
SODIUM BLD-SCNC: 131 MMOL/L (ref 136–145)
UNIT  ABO: NORMAL
UNIT  ABO: NORMAL
UNIT  RH: NORMAL
UNIT  RH: NORMAL
WBC NRBC COR # BLD: 10.58 10*3/MM3 (ref 3.4–10.8)

## 2019-09-12 PROCEDURE — 94799 UNLISTED PULMONARY SVC/PX: CPT

## 2019-09-12 PROCEDURE — 85027 COMPLETE CBC AUTOMATED: CPT | Performed by: THORACIC SURGERY (CARDIOTHORACIC VASCULAR SURGERY)

## 2019-09-12 PROCEDURE — 99024 POSTOP FOLLOW-UP VISIT: CPT | Performed by: NURSE PRACTITIONER

## 2019-09-12 PROCEDURE — 80048 BASIC METABOLIC PNL TOTAL CA: CPT | Performed by: THORACIC SURGERY (CARDIOTHORACIC VASCULAR SURGERY)

## 2019-09-12 PROCEDURE — 97110 THERAPEUTIC EXERCISES: CPT

## 2019-09-12 PROCEDURE — 82962 GLUCOSE BLOOD TEST: CPT

## 2019-09-12 PROCEDURE — 63710000001 INSULIN LISPRO (HUMAN) PER 5 UNITS: Performed by: INTERNAL MEDICINE

## 2019-09-12 PROCEDURE — 71045 X-RAY EXAM CHEST 1 VIEW: CPT

## 2019-09-12 PROCEDURE — 99232 SBSQ HOSP IP/OBS MODERATE 35: CPT | Performed by: INTERNAL MEDICINE

## 2019-09-12 PROCEDURE — 25010000002 ENOXAPARIN PER 10 MG: Performed by: THORACIC SURGERY (CARDIOTHORACIC VASCULAR SURGERY)

## 2019-09-12 PROCEDURE — 71046 X-RAY EXAM CHEST 2 VIEWS: CPT

## 2019-09-12 RX ORDER — TRAMADOL HYDROCHLORIDE 50 MG/1
25 TABLET ORAL EVERY 6 HOURS PRN
Status: DISCONTINUED | OUTPATIENT
Start: 2019-09-12 | End: 2019-09-15 | Stop reason: HOSPADM

## 2019-09-12 RX ORDER — AMIODARONE HYDROCHLORIDE 200 MG/1
200 TABLET ORAL EVERY 12 HOURS SCHEDULED
Status: DISCONTINUED | OUTPATIENT
Start: 2019-09-12 | End: 2019-09-15 | Stop reason: HOSPADM

## 2019-09-12 RX ORDER — LACTULOSE 10 G/15ML
10 SOLUTION ORAL DAILY
Status: DISCONTINUED | OUTPATIENT
Start: 2019-09-12 | End: 2019-09-15 | Stop reason: HOSPADM

## 2019-09-12 RX ORDER — BISACODYL 10 MG
10 SUPPOSITORY, RECTAL RECTAL ONCE
Status: DISCONTINUED | OUTPATIENT
Start: 2019-09-12 | End: 2019-09-15 | Stop reason: HOSPADM

## 2019-09-12 RX ORDER — IPRATROPIUM BROMIDE AND ALBUTEROL SULFATE 2.5; .5 MG/3ML; MG/3ML
3 SOLUTION RESPIRATORY (INHALATION) EVERY 4 HOURS PRN
Status: DISCONTINUED | OUTPATIENT
Start: 2019-09-12 | End: 2019-09-15 | Stop reason: HOSPADM

## 2019-09-12 RX ORDER — SCOLOPAMINE TRANSDERMAL SYSTEM 1 MG/1
1 PATCH, EXTENDED RELEASE TRANSDERMAL
Status: DISCONTINUED | OUTPATIENT
Start: 2019-09-12 | End: 2019-09-15 | Stop reason: HOSPADM

## 2019-09-12 RX ADMIN — SODIUM CHLORIDE 4 ML: 7 NEBU SOLN,3 % NEBU at 07:28

## 2019-09-12 RX ADMIN — INSULIN LISPRO 1 UNITS: 100 INJECTION, SOLUTION INTRAVENOUS; SUBCUTANEOUS at 17:39

## 2019-09-12 RX ADMIN — PRIMIDONE 250 MG: 250 TABLET ORAL at 20:47

## 2019-09-12 RX ADMIN — MUPIROCIN 1 APPLICATION: 20 OINTMENT TOPICAL at 20:46

## 2019-09-12 RX ADMIN — IPRATROPIUM BROMIDE AND ALBUTEROL SULFATE 3 ML: 2.5; .5 SOLUTION RESPIRATORY (INHALATION) at 07:28

## 2019-09-12 RX ADMIN — ATORVASTATIN CALCIUM 40 MG: 20 TABLET, FILM COATED ORAL at 20:46

## 2019-09-12 RX ADMIN — ASPIRIN 325 MG: 325 TABLET, COATED ORAL at 13:16

## 2019-09-12 RX ADMIN — PRIMIDONE 250 MG: 250 TABLET ORAL at 13:40

## 2019-09-12 RX ADMIN — SERTRALINE 25 MG: 25 TABLET, FILM COATED ORAL at 13:16

## 2019-09-12 RX ADMIN — GUAIFENESIN 1200 MG: 600 TABLET, EXTENDED RELEASE ORAL at 13:16

## 2019-09-12 RX ADMIN — AMIODARONE HYDROCHLORIDE 200 MG: 200 TABLET ORAL at 20:47

## 2019-09-12 RX ADMIN — LACTULOSE 10 G: 10 SOLUTION ORAL at 13:40

## 2019-09-12 RX ADMIN — SCOPALAMINE 1 PATCH: 1 PATCH, EXTENDED RELEASE TRANSDERMAL at 13:40

## 2019-09-12 RX ADMIN — LINAGLIPTIN 5 MG: 5 TABLET, FILM COATED ORAL at 13:16

## 2019-09-12 RX ADMIN — GUAIFENESIN 1200 MG: 600 TABLET, EXTENDED RELEASE ORAL at 20:46

## 2019-09-12 RX ADMIN — ENOXAPARIN SODIUM 40 MG: 40 INJECTION SUBCUTANEOUS at 17:40

## 2019-09-12 NOTE — THERAPY TREATMENT NOTE
Patient Name: Cedric Wade  : 1950    MRN: [id number]                              Today's Date: 2019       Admit Date: 2019    Visit Dx:     ICD-10-CM ICD-9-CM   1. Coronary artery disease involving native coronary artery of native heart with angina pectoris (CMS/HCC) I25.119 414.01     413.9     Patient Active Problem List   Diagnosis   • Coronary artery disease involving native coronary artery of native heart with angina pectoris (CMS/HCC)   • COPD (chronic obstructive pulmonary disease) with chronic bronchitis (CMS/[facility])   • Microalbuminuria   • Type 2 diabetes mellitus with microalbuminuria (CMS/[facility])   • Hyperlipidemia   • Essential hypertension   • H/O agent [place] exposure     Past Medical History:   Diagnosis Date   • Arthritis    • CHF (congestive heart failure) (CMS/[facility])    • COPD (chronic obstructive pulmonary disease) (CMS/[facility])    • Coronary artery disease    • Cutaneous lupus erythematosus    • Diabetes mellitus (CMS/[facility])    • Elevated cholesterol    • GERD (gastroesophageal reflux disease)    • Hypertension      Past Surgical History:   Procedure Laterality Date   • CORONARY ARTERY BYPASS GRAFT N/A 2019    Procedure: INTRAOPERATIVE BUDDY, MIDLINE STENOTOMY WITH CORONARY ARTERY BYPASS GRAPHS X  3 UTILIZING LEFT SAVAGE  AND LEFT ENDOSCOPIC HARVESTED SAPHENOUS VEIN, LIGATION OF LEFT  ATRIAL APPENDAGE; PRP;  Surgeon: Alvaro Mazariegos MD;  Location: [facility];  Service: Cardiothoracic     General Information     Row Name 19 0934          PT Evaluation Time/Intention    Document Type  therapy note (daily note)  -EM     Mode of Treatment  individual therapy;physical therapy  -EM     Row Name 19 0934          General Information    Existing Precautions/Restrictions  fall;cardiac;sternal  -EM       User Key  (r) = Recorded By, (t) = Taken By, (c) = Cosigned By    Initials Name Provider Type    EM Mariah Gordon, PT Physical Therapist        Mobility     Row Name  09/12/19 0934          Bed Mobility Assessment/Treatment    Comment (Bed Mobility)  up in chair   -EM     Row Name 09/12/19 0934          Sit-Stand Transfer    Sit-Stand [place] (Transfers)  contact guard;verbal cues  -EM     Row Name 09/12/19 0934          Gait/Stairs Assessment/Training    [place] Level (Gait)  contact guard;1 person to manage equipment  -EM     Distance in Feet (Gait)  150  -EM     Deviations/Abnormal Patterns (Gait)  gait speed decreased  -EM     Bilateral Gait Deviations  forward flexed posture  -EM     Comment (Gait/Stairs)  cues to relax UEs to side   -EM       User Key  (r) = Recorded By, (t) = Taken By, (c) = Cosigned By    Initials Name Provider Type    Mariah Terry, PT Physical Therapist        Obj/Interventions     Row Name 09/12/19 0935          Therapeutic Exercise    Comment (Therapeutic Exercise)  5 reps cardiac protocol, level 4   -EM       User Key  (r) = Recorded By, (t) = Taken By, (c) = Cosigned By    Initials Name Provider Type    Mariah Terry PT Physical Therapist        Goals/Plan    No documentation.       Clinical Impression     Row Name 09/12/19 0936          Pain Scale: Numbers Pre/Post-Treatment    Pain Scale: Numbers, Pretreatment  7/10  -EM     Pain Location  chest  -EM     Pain Intervention(s)  Medication (See MAR);Repositioned  -EM     Row Name 09/12/19 0936          Vital Signs    Pre Systolic BP Rehab  104  -EM     Pre Treatment Diastolic BP  71  -EM     Pretreatment Heart Rate (beats/min)  78  -EM     Posttreatment Heart Rate (beats/min)  80  -EM     Pre SpO2 (%)  99  -EM     O2 Delivery Pre Treatment  room air  -EM     Row Name 09/12/19 0936          Positioning and Restraints    Pre-Treatment Position  sitting in chair/recliner  -EM     Post Treatment Position  chair  -EM     In Chair  sitting;call light within reach  -EM       User Key  (r) = Recorded By, (t) = Taken By, (c) = Cosigned By    Initials Name Provider Type    EM  Mariah Gordon, PT Physical Therapist        Outcome Measures     Row Name 09/12/19 0938          How much help from another person do you currently need...    Turning from your back to your side while in flat bed without using bedrails?  3  -EM     Moving from lying on back to sitting on the side of a flat bed without bedrails?  3  -EM     Moving to and from a bed to a chair (including a wheelchair)?  3  -EM     Standing up from a chair using your arms (e.g., wheelchair, bedside chair)?  3  -EM     Climbing 3-5 steps with a railing?  3  -EM     To walk in hospital room?  3  -EM     AM-PAC 6 Clicks Score (PT)  18  -EM       User Key  (r) = Recorded By, (t) = Taken By, (c) = Cosigned By    Initials Name Provider Type    EM Mariah Gordon PT Physical Therapist        Physical Therapy Education     Title: PT OT SLP Therapies (In Progress)     Topic: Physical Therapy (In Progress)     Point: Mobility training (In Progress)     Learning Progress Summary           Patient Acceptance, E, NR by EM at 9/12/2019  9:37 AM    Acceptance, E, NR by EM at 9/11/2019 11:20 AM    Acceptance, E, NR by EM at 9/10/2019 10:43 AM                   Point: Home exercise program (In Progress)     Learning Progress Summary           Patient Acceptance, E, NR by EM at 9/12/2019  9:37 AM    Acceptance, E, NR by EM at 9/11/2019 11:20 AM    Acceptance, E, NR by EM at 9/10/2019 10:43 AM                               User Key     Initials Effective Dates Name Provider Type [facility] 04/03/18 -  Mariah Gordon PT Physical Therapist PT              PT Recommendation and Plan  Planned Therapy Interventions (PT Eval): bed mobility training, gait training, home exercise program  Outcome Summary/Treatment Plan (PT)  Anticipated Discharge Disposition (PT): home with assist, home with home health  Plan of Care Reviewed With: patient  Progress: improving  Outcome Summary: patient able to tolerate increased ambulation distance  today, good overall progress with mobility      Time Calculation:   PT Charges     Row Name 09/12/19 0939             Time Calculation    Start Time  0920  -EM      Stop Time  0932  -EM      Time Calculation (min)  12 min  -EM      PT Received On  09/12/19  -EM      PT - Next Appointment  09/13/19  -EM         Time Calculation- PT    Total Timed Code Minutes- PT  12 minute(s)  -EM        User Key  (r) = Recorded By, (t) = Taken By, (c) = Cosigned By    Initials Name Provider Type    EM Mariah Gordon PT Physical Therapist        Therapy Charges for Today     Code Description Service Date Service Provider Modifiers Qty    07370183981 HC PT THER PROC EA 15 MIN 9/11/2019 Mariah Gordon, PT GP 2    91751744714 HC PT THER SUPP EA 15 MIN 9/11/2019 Mariah Gordon, PT GP 1    69503830216 HC PT THER PROC EA 15 MIN 9/12/2019 Mariah Gordon, PT GP 1    61101791941 HC PT THER SUPP EA 15 MIN 9/12/2019 Mariah Gordon, PT GP 1          PT G-Codes  Outcome Measure Options: AM-PAC 6 Clicks Basic Mobility (PT)  AM-PAC 6 Clicks Score (PT): 18    Mariah Gordon PT  9/12/2019

## 2019-09-12 NOTE — PLAN OF CARE
Problem: Patient Care Overview  Goal: Plan of Care Review  Outcome: Ongoing (interventions implemented as appropriate)   09/12/19 0451   Coping/Psychosocial   Plan of Care Reviewed With patient   Plan of Care Review   Progress improving   OTHER   Outcome Summary VSS. No complaints of pain. CTx3, F/C in place. At beginning of shift pt had large amount of emesis. Bowel sounds were absent. Dr. Drummond ordered for a KUB and NG tube placement. During 0400 assessment, bowel sounds had returned and x-ray was negative. Will call this AM about possible removal of NG tube.        Problem: Cardiac: ACS (Acute Coronary Syndrome) (Adult)  Goal: Signs and Symptoms of Listed Potential Problems Will be Absent, Minimized or Managed (Cardiac: ACS)  Outcome: Ongoing (interventions implemented as appropriate)   09/12/19 0451   Goal/Outcome Evaluation   Problems Assessed (Acute Coronary Syndrome) all   Problems Present (Acute Coronary Syn) situational response       Problem: Fall Risk (Adult)  Goal: Identify Related Risk Factors and Signs and Symptoms  Outcome: Outcome(s) achieved Date Met: 09/12/19 09/12/19 0451   Fall Risk (Adult)   Related Risk Factors (Fall Risk) gait/mobility problems;history of falls;polypharmacy;environment unfamiliar   Signs and Symptoms (Fall Risk) presence of risk factors     Goal: Absence of Fall  Outcome: Ongoing (interventions implemented as appropriate)   09/12/19 0451   Fall Risk (Adult)   Absence of Fall making progress toward outcome       Problem: Skin Injury Risk (Adult)  Goal: Identify Related Risk Factors and Signs and Symptoms  Outcome: Ongoing (interventions implemented as appropriate)   09/12/19 0451   Skin Injury Risk (Adult)   Related Risk Factors (Skin Injury Risk) medical devices;mobility impaired     Goal: Skin Health and Integrity  Outcome: Ongoing (interventions implemented as appropriate)   09/12/19 0451   Skin Injury Risk (Adult)   Skin Health and Integrity making progress toward  outcome       Problem: Cardiac Surgery (Adult)  Goal: Signs and Symptoms of Listed Potential Problems Will be Absent, Minimized or Managed (Cardiac Surgery)  Outcome: Ongoing (interventions implemented as appropriate)   09/12/19 1642   Goal/Outcome Evaluation   Problems Assessed (Cardiac Surgery) all   Problems Present (Cardiac Surgery) bowel motility decreased;postoperative nausea and vomiting;situational response

## 2019-09-12 NOTE — PROGRESS NOTES
Discharge Planning Assessment  [facility]     Patient Name: Cedric Wade  MRN: [id number]  Today's Date: 9/12/2019    Admit Date: 9/6/2019    Discharge Needs Assessment     Row Name 09/12/19 9418       Living Environment    Lives With  spouse    Name(s) of Who Lives With Patient  Rosalba Wade, spouse [phone]    Current Living Arrangements  home/apartment/condo    Primary Care Provided by  self    Provides Primary Care For  pet(s) 1 dog & 2 cats    Family Caregiver if Needed  spouse    Quality of Family Relationships  helpful;involved;supportive    Able to Return to Prior Arrangements  yes       Resource/Environmental Concerns    Resource/Environmental Concerns  none       Transition Planning    Patient/Family Anticipates Transition to  home with family    Patient/Family Anticipated Services at Transition  home health care    Transportation Anticipated  family or friend will provide       Discharge Needs Assessment    Readmission Within the Last 30 Days  no previous admission in last 30 days    Concerns to be Addressed  discharge planning    Equipment Currently Used at Home  glucometer;nebulizer    Anticipated Changes Related to Illness  none    Equipment Needed After Discharge  none    Offered/Gave Vendor List  yes    Patient's Choice of Community Agency(s)  Pt chose [facility] but they do not cover [facility] anymore.  Pt 2nd choice is [facility].        Discharge Plan     Row Name 09/12/19 3976       Plan    Plan  Home w/ [facility] and [facility].    Patient/Family in Agreement with Plan  yes    Plan Comments  Spoke with Pt at bedside.  CCP introduced self and role.  Pt is [place] and has bilateral hearing aids (with a Bluetooth connection on a necklace around his neck).  Pt confirmed information on face sheet.  Pt stated he is IADL'S, retired from [employer].  Pt still drives.  Pt lives in a house with no entrance steps with his spouse Rosalba Wade ([phone]) in [place], [place]/[facility].  Pt reports  PCP is Ean Farmer.  Pt confirms he gets his medications from [facility] in [place], [place] and [facility].  Pt is agreeable with [facility] at d/c.  MICHELLE Linares has already entered [facility] in [facility].  Pt denies issues with affording his medications.  Pt denies past home health, sub-acute rehab & DME.  Pt plans to return home at discharge.  Pt chose [facility] to follow him at home.  Referral called to ole Vasquez [phone] at 4:05 PM.  Christina reports she will see Pt tomorrow.  Pt states his spouse will transport him home upon discharge.  CCP will continue to follow…MICHELLE BENNETT/CCP        Destination      No service coordination in this encounter.      Durable Medical Equipment      No service coordination in this encounter.      Dialysis/Infusion      No service coordination in this encounter.      Home Medical Care      Service Provider Request Status Selected Services Address Phone Number Fax Number    [facility] Accepted N/A [id number]  [facility] [address] [id number] [phone]    [facility] Declined N/A [address] [facility] [address] [id number] [phone]      Therapy      No service coordination in this encounter.      Community Resources      No service coordination in this encounter.          Demographic Summary     Row Name 09/12/19 1626       General Information    Admission Type  inpatient    Arrived From  operating room    Required Notices Provided  Important Message from Medicare    Referral Source  admission list;physician    Reason for Consult  discharge planning    Preferred Language  English     Used During This Interaction  no        Functional Status     Row Name 09/12/19 1626       Functional Status    Usual Activity Tolerance  good    Current Activity Tolerance  fair       Functional Status, IADL    Medications  independent    Meal Preparation  assistive person     Housekeeping  assistive person    Laundry  independent    Shopping  independent       Mental Status    General Appearance WDL  WDL       Mental Status Summary    Recent Changes in Mental Status/Cognitive Functioning  no changes       Employment/    Employment Status  retired;, previous service    Current or Previous Occupation      Employment/ Comments  [employer]ired [employer]             [employer]        Psychosocial    No documentation.       Abuse/Neglect    No documentation.       Legal    No documentation.       Substance Abuse    No documentation.       Patient Forms    No documentation.           Soraya Rodriguez RN

## 2019-09-12 NOTE — PROGRESS NOTES
"   LOS: 6 days    Patient Care Team:  Ean Farmer MD as PCP - General (Internal Medicine)    Chief Complaint:  No chief complaint on file.    Follow UPAKi on CKD 9  Subjective     Interval History:   Nausea last night.  Had NG placed. No bm since surgery.  Passing more gas today.  Not nauseated now.  Thirsty  .  Pain in back from lying in bed. Forceful cough, a little phlegm.    Review of Systems:   See above.     Objective     Vital Signs  Temp:  [97.4 °F (36.3 °C)-98.4 °F (36.9 °C)] 98.4 °F (36.9 °C)  Heart Rate:  [71-85] 79  Resp:  [18-20] 18  BP: ()/(70-77) 104/71    Flowsheet Rows      First Filed Value   Admission Height  167.6 cm (66\") Documented at 09/07/2019 1234   Admission Weight  64.5 kg (142 lb 3.2 oz) Documented at 09/07/2019 0438          I/O this shift:  In: -   Out: 70 [Chest Tube:70]  I/O last 3 completed shifts:  In: 290 [P.O.:290]  Out: 1950 [Urine:1340; Chest Tube:610]    Intake/Output Summary (Last 24 hours) at 9/12/2019 1117  Last data filed at 9/12/2019 0840  Gross per 24 hour   Intake 50 ml   Output 1240 ml   Net -1190 ml       Physical Exam:  Thin WM. Sitting in chair.   NG in place.  Flat affect. Irritable.   Nasal 02  Oral mucosa dry  No scleral icterus  Neck no jvd  Heart RRR, no rub or s3.  Lungs coarse BS, no wheezes. Chest tube .  Abd + bs ,soft, nontender. No body wall edema  Ext no lower ext edema.   SKin no rash .        Results Review:    Results from last 7 days   Lab Units 09/12/19  0325 09/11/19  0301 09/10/19  1200  09/07/19  0519   SODIUM mmol/L 131* 135* 138   < > 130*   POTASSIUM mmol/L 3.7 4.6 4.2   < > 4.1   CHLORIDE mmol/L 94* 100 101   < > 87*   CO2 mmol/L 23.8 19.9* 17.5*   < > 28.3   BUN mg/dL 42* 34* 28*   < > 29*   CREATININE mg/dL 1.36* 1.49* 1.46*   < > 1.51*   CALCIUM mg/dL 8.7 8.7 8.7   < > 9.4   BILIRUBIN mg/dL  --  0.2  --   --  0.4   ALK PHOS U/L  --  62  --   --  122*   ALT (SGPT) U/L  --  26  --   --  70*   AST (SGOT) U/L  --  56*  --   -- "  74*   GLUCOSE mg/dL 118* 157* 125*   < > 98    < > = values in this interval not displayed.       Estimated Creatinine Clearance: 48.4 mL/min (A) (by C-G formula based on SCr of 1.36 mg/dL (H)).    Results from last 7 days   Lab Units 09/10/19  1200 09/10/19  0259 09/09/19  1652   MAGNESIUM mg/dL 2.3 2.3 2.7*   PHOSPHORUS mg/dL 4.1 4.4 3.9       Results from last 7 days   Lab Units 09/08/19  0817   URIC ACID mg/dL 8.7*       Results from last 7 days   Lab Units 09/12/19  0325 09/11/19  0301 09/10/19  1200 09/10/19  0259 09/09/19  1652   WBC 10*3/mm3 10.58 10.01 9.58 9.11 13.87*   HEMOGLOBIN g/dL 8.5* 8.1* 7.8* 7.7* 8.4*   PLATELETS 10*3/mm3 164 152 149 160 190       Results from last 7 days   Lab Units 09/10/19  0259 09/09/19  1247 09/07/19  0519   INR  1.16* 1.35* 0.99         Imaging Results (last 24 hours)     Procedure Component Value Units Date/Time    XR Chest PA & Lateral [[id number]] Updated:  09/12/19 1052    XR Abdomen KUB [[id number]] Collected:  09/12/19 0008     Updated:  09/12/19 0012    Narrative:       KUB     HISTORY: Nasogastric tube insertion.     COMPARISON: None available.     FINDINGS:  Nasogastric tube is identified. This extends into the proximal body of  the stomach. Visualized bowel gas pattern is unremarkable.     This report was finalized on 9/12/2019 12:09 AM by Dr. Clarissa Duckworth M.D.             amiodarone 200 mg Oral Q12H   aspirin 325 mg Oral Daily   atorvastatin 40 mg Oral Nightly   bisacodyl 10 mg Rectal Once   enoxaparin 40 mg Subcutaneous Q24H   guaiFENesin 1,200 mg Oral Q12H   insulin lispro 0-12 Units Subcutaneous 4x Daily With Meals & Nightly   linagliptin 5 mg Oral Daily   mupirocin  Each Nare BID   pantoprazole 40 mg Oral Q AM   primidone 250 mg Oral BID   Scopolamine 1 patch Transdermal Q72H   sennosides-docusate sodium 2 tablet Oral Nightly   sertraline 25 mg Oral Daily       sodium chloride 30 mL/hr Last Rate: 30 mL/hr (09/09/19 1334)   sodium chloride 30 mL/hr Last  Rate: 30 mL/hr (09/09/19 1235)       Medication Review:   Current Facility-Administered Medications   Medication Dose Route Frequency Provider Last Rate Last Dose   • acetaminophen (TYLENOL) tablet 500 mg  500 mg Oral Q6H PRN Alvaro Mazariegos MD       • ALPRAZolam (XANAX) tablet 0.25 mg  0.25 mg Oral Q12H PRN Alvaro Mazariegos MD       • amiodarone (PACERONE) tablet 200 mg  200 mg Oral Q12H Madyson Smith APRN       • aspirin EC tablet 325 mg  325 mg Oral Daily Alvaro Mazariegos MD   325 mg at 09/11/19 0838   • atorvastatin (LIPITOR) tablet 40 mg  40 mg Oral Nightly Alvaro Mazariegos MD   40 mg at 09/11/19 2133   • bisacodyl (DULCOLAX) EC tablet 10 mg  10 mg Oral Daily PRN Alvaro Mazariegos MD       • bisacodyl (DULCOLAX) suppository 10 mg  10 mg Rectal Daily PRN Alvaro Mazariegos MD       • bisacodyl (DULCOLAX) suppository 10 mg  10 mg Rectal Once Madyson Smith APRN       • enoxaparin (LOVENOX) syringe 40 mg  40 mg Subcutaneous Q24H Alvaro Mazariegos MD   40 mg at 09/11/19 1634   • guaiFENesin (MUCINEX) 12 hr tablet 1,200 mg  1,200 mg Oral Q12H Alvaro Mazariegos MD   1,200 mg at 09/11/19 2133   • insulin lispro (humaLOG) injection 0-12 Units  0-12 Units Subcutaneous 4x Daily With Meals & Nightly Christiano Fernandes MD   2 Units at 09/11/19 2134   • ipratropium-albuterol (DUO-NEB) nebulizer solution 3 mL  3 mL Nebulization Q4H PRN Madyson Smith APRN       • linagliptin (TRADJENTA) tablet 5 mg  5 mg Oral Daily Christiano Fernandes MD   5 mg at 09/11/19 1056   • magnesium hydroxide (MILK OF MAGNESIA) suspension 2400 mg/10mL 10 mL  10 mL Oral Daily PRN Alvaro Mazariegos MD   10 mL at 09/11/19 0837   • morphine injection 1 mg  1 mg Intravenous Q4H PRN Alvaro Mazariegos MD        And   • naloxone (NARCAN) injection 0.4 mg  0.4 mg Intravenous Q5 Min PRN Alvaro Mazariegos MD       • mupirocin (BACTROBAN) 2 % nasal ointment   Each Nare BID Alvaro Mazariegos MD   1 application at 09/11/19 6650   •  ondansetron (ZOFRAN) injection 4 mg  4 mg Intravenous Q6H PRN Alvaro Mazariegos MD   4 mg at 09/11/19 2133   • pantoprazole (PROTONIX) EC tablet 40 mg  40 mg Oral Q AM Alvaro Mazariegos MD   40 mg at 09/11/19 0635   • potassium chloride (MICRO-K) CR capsule 40 mEq  40 mEq Oral PRN Alvaro Mazariegos MD        Or   • potassium chloride (KLOR-CON) packet 40 mEq  40 mEq Oral PRN Alvaro Mazariegos MD       • potassium chloride 10 mEq in 100 mL IVPB  10 mEq Intravenous Q1H PRN Alvaro Mazariegos MD        Or   • potassium chloride 10 mEq in 100 mL IVPB  10 mEq Intravenous Q1H PRN Alvaro Mazariegos MD       • potassium chloride 20 mEq in 50 mL IVPB  20 mEq Intravenous Q1H PRN Alvaro Mazariegos MD       • potassium chloride 20 mEq in 50 mL IVPB  20 mEq Intravenous Q1H PRN Alvaro Mazariegos MD       • potassium chloride 20 mEq in 50 mL IVPB  20 mEq Intravenous Q1H PRN Alvaro Mazariegos MD       • primidone (MYSOLINE) tablet 250 mg  250 mg Oral BID Alvaro Mazariegos MD   250 mg at 09/11/19 2133   • Scopolamine (TRANSDERM-SCOP) 1.5 MG/3DAYS patch 1 patch  1 patch Transdermal Q72H Madyson Smith, APRN       • sennosides-docusate sodium (SENOKOT-S) 8.6-50 MG tablet 2 tablet  2 tablet Oral Nightly Alvaro Mazariegos MD   2 tablet at 09/11/19 2133   • sertraline (ZOLOFT) tablet 25 mg  25 mg Oral Daily Alvaro Mazariegos MD   25 mg at 09/11/19 0838   • sodium chloride 0.9 % flush 30 mL  30 mL Intravenous Once PRN Alvaro Mazariegos MD       • sodium chloride 0.9 % infusion  30 mL/hr Intravenous Continuous Alvaro Mazariegos MD 30 mL/hr at 09/09/19 1334 30 mL/hr at 09/09/19 1334   • sodium chloride 0.9 % infusion  30 mL/hr Intravenous Continuous PRN Alvaro Mazariegos MD 30 mL/hr at 09/09/19 1235 30 mL/hr at 09/09/19 1235   • traMADol (ULTRAM) tablet 25 mg  25 mg Oral Q6H PRN Madyson Smith APRN           Assessment/Plan   1. RANDA on CKD 9. Baseline unknown.  Creatinine improved. BUN up a little. Volume ok by exam. No  diuretic today .  2. SP 3 vessel CABG, LIMA, SHANIA ligation 9/9/19. Systolic cardiomyopathy  EF 20%.   Mild MR.   3. Anemia post op. Hg stable.   4. Hypotension post op.   5. COPD exacerbation  6. DM2  7. Lupus. On plaquenil at home.   8. Nausea resolved.  NG per leodan.       Arleth Easton MD  09/12/19  11:17 AM

## 2019-09-12 NOTE — PROGRESS NOTES
"  ENDOCRINE    Subjective   AND PLANS  Cedric Wade is a 68 y.o. male.     For follow-up of diabetes and related    Nauseated last night and vomited twice.  Has an NG tube in place.    Patient denies nausea this morning.  Denies shortness of breath    Fasting glucose 111.  Random glucose 118-249.  Patient is off Solu-Medrol.  Continue Tradjenta 5 mg/day.  Continue Humalog correction scale before meals and at bedtime    Continue Lipitor 40 mg/day    Objective   /71 (BP Location: Right arm, Patient Position: Sitting)   Pulse 79   Temp 98.4 °F (36.9 °C) (Oral)   Resp 18   Ht 167.6 cm (66\")   Wt 66.8 kg (147 lb 3.2 oz)   SpO2 98%   BMI 23.76 kg/m²   Physical Exam    Awake, alert, not in distress.  No rales or wheezes.  Regular heart rate and rhythm.  No rubs.  Abdomen soft, nontender.  Extremities warm.  No cyanosis.    Lab Results (last 24 hours)     Procedure Component Value Units Date/Time    POC Glucose Once [[id number]]  (Normal) Collected:  09/12/19 0641    Specimen:  Blood Updated:  09/12/19 0642     Glucose 111 mg/dL     Basic Metabolic Panel [[id number]]  (Abnormal) Collected:  09/12/19 0325    Specimen:  Blood Updated:  09/12/19 0412     Glucose 118 mg/dL      BUN 42 mg/dL      Creatinine 1.36 mg/dL      Sodium 131 mmol/L      Potassium 3.7 mmol/L      Chloride 94 mmol/L      CO2 23.8 mmol/L      Calcium 8.7 mg/dL      eGFR Non African Amer 52 mL/min/1.73      BUN/Creatinine Ratio 30.9     Anion Gap 13.2 mmol/L     Narrative:       GFR Normal >60  Chronic Kidney Disease <60  Kidney Failure <15    CBC (No Diff) [[id number]]  (Abnormal) Collected:  09/12/19 0325    Specimen:  Blood Updated:  09/12/19 0354     WBC 10.58 10*3/mm3      RBC 2.75 10*6/mm3      Hemoglobin 8.5 g/dL      Hematocrit 26.9 %      MCV 97.8 fL      MCH 30.9 pg      MCHC 31.6 g/dL      RDW 14.3 %      RDW-SD 51.4 fl      MPV 10.5 fL      Platelets 164 10*3/mm3     POC Glucose Once [[id number]]  (Abnormal) Collected:  09/11/19 2015 "    Specimen:  Blood Updated:  09/11/19 2017     Glucose 249 mg/dL     POC Glucose Once [[id number]]  (Abnormal) Collected:  09/11/19 1549    Specimen:  Blood Updated:  09/11/19 1552     Glucose 167 mg/dL     POC Glucose Once [[id number]]  (Abnormal) Collected:  09/11/19 1037    Specimen:  Blood Updated:  09/11/19 1038     Glucose 181 mg/dL               Coronary artery disease involving native coronary artery of native heart with angina pectoris (CMS/HCC)    COPD (chronic obstructive pulmonary disease) with chronic bronchitis (CMS/HCC)    Microalbuminuria    Type 2 diabetes mellitus with microalbuminuria (CMS/HCC)    Hyperlipidemia    Essential hypertension    H/O agent Orange exposure    Diabetes therapy as discussed above.  Continue Lipitor.  Ambulate as tolerated.

## 2019-09-12 NOTE — DISCHARGE PLACEMENT REQUEST
"Cedric Peña (68 y.o. Male)     Date of Birth Social Security Number Address Home Phone MRN    1950  [address] [place] [address] [phone] [id number]    Mandaen Marital Status          Sikh        Admission Date Admission Type Admitting Provider Attending Provider Department, Room/Bed    9/6/19 Urgent Alvaro Mazariegos MD Khan, Ahmad Aftab, MD [facility] CARDIOVASC UNIT, 2227/1    Discharge Date Discharge Disposition Discharge Destination                       Attending Provider:  Alvaro Mazariegos MD    Allergies:  Codeine    Isolation:  None   Infection:  None   Code Status:  CPR    Ht:  167.6 cm (66\")   Wt:  66.8 kg (147 lb 3.2 oz)    Admission Cmt:  None   Principal Problem:  None                Active Insurance as of 9/6/2019     Primary Coverage     Payor Plan Insurance Group Employer/Plan Group    MEDICARE MEDICARE A & B      Payor Plan Address Payor Plan Phone Number Payor Plan Fax Number Effective Dates    [address] [phone]  9/1/2014 - None Entered    [facility] [id number]       Subscriber Name Subscriber Birth Date Member ID       CEDRIC PEÑA 1950 [id number]           Secondary Coverage     Payor Plan Insurance Group Employer/Plan Group     FOR LIFE  FOR LIFE  SUPP      Payor Plan Address Payor Plan Phone Number Payor Plan Fax Number Effective Dates    [address] [phone]  9/6/2019 - None Entered    [facility] [id number]       Subscriber Name Subscriber Birth Date Member ID       CEDRIC PEÑA 1950 [id number]                 Emergency Contacts      (Rel.) Home Phone Work Phone Mobile Phone    ELSIE PEÑA (Spouse) [phone] --    Aliyah Bronson (Daughter) [phone] -- [phone]              "

## 2019-09-12 NOTE — PROGRESS NOTES
"      [facility]         Dr Gates Sayied   LOS: 6 days   Patient Care Team:  Ean Farmer MD as PCP - General (Internal Medicine)    Chief Complains acute exacerbation of COPD with chronic bronchitis.  MI ischemic cardia myopathy    Interval History: Status post CABG postop on the vent.  Extubated successfully   Patient did have postop stridor which resolved with racemic epi.  Currently out of the open-heart recovery doing well.  Appears to be weak and tired    REVIEW OF SYSTEMS:   Postop chest pain    Ventilator/Non-Invasive Ventilation Settings (From admission, onward)   Nasal cannula oxygen 2 L      Vital Signs  Temp:  [97.6 °F (36.4 °C)-98.8 °F (37.1 °C)] 98.8 °F (37.1 °C)  Heart Rate:  [73-81] 75  Resp:  [16-19] 16  BP: ()/(67-80) 95/67    Intake/Output Summary (Last 24 hours) at 9/12/2019 1601  Last data filed at 9/12/2019 1250  Gross per 24 hour   Intake 50 ml   Output 860 ml   Net -810 ml     Flowsheet Rows      First Filed Value   Admission Height  167.6 cm (66\") Documented at 09/07/2019 1234   Admission Weight  64.5 kg (142 lb 3.2 oz) Documented at 09/07/2019 0438          Physical Exam:   General Appearance:   Awake no distress.  Following simple commands.  On nasal oxygen.   Lungs:    Diminished breath sounds bilaterally no crackles or wheezing.    Heart:    Regular rhythm and normal rate, normal S1 and S2, no            murmur, no gallop, no rub, no click   Chest Wall:   Status post CABG   Abdomen:     Normal bowel sounds, no masses, no organomegaly, soft        non-tender, non-distended, no guarding, no rebound                tenderness   Extremities:   Moves all extremities well, no edema, no cyanosis, no             redness  CNS intact  Skin no rashes no nodules       Results Review:        Results from last 7 days   Lab Units 09/12/19  0325 09/11/19  0301 09/10/19  1200   SODIUM mmol/L 131* 135* 138   POTASSIUM mmol/L 3.7 4.6 4.2   CHLORIDE mmol/L 94* 100 101   CO2 " mmol/L 23.8 19.9* 17.5*   BUN mg/dL 42* 34* 28*   CREATININE mg/dL 1.36* 1.49* 1.46*   GLUCOSE mg/dL 118* 157* 125*   CALCIUM mg/dL 8.7 8.7 8.7         Results from last 7 days   Lab Units 09/12/19  0325 09/11/19  0301 09/10/19  1200   WBC 10*3/mm3 10.58 10.01 9.58   HEMOGLOBIN g/dL 8.5* 8.1* 7.8*   HEMATOCRIT % 26.9* 26.6* 25.5*   PLATELETS 10*3/mm3 164 152 149     Results from last 7 days   Lab Units 09/10/19  0259 09/09/19  1247 09/09/19  0435 09/09/19  0011  09/07/19  0519   INR  1.16* 1.35*  --   --   --  0.99   APTT seconds  --  28.7 29.3 37.0*   < > 29.7    < > = values in this interval not displayed.     Results from last 7 days   Lab Units 09/07/19  0519   CHOLESTEROL mg/dL 174     Results from last 7 days   Lab Units 09/10/19  1200   MAGNESIUM mg/dL 2.3     Results from last 7 days   Lab Units 09/07/19  0519   CHOLESTEROL mg/dL 174   TRIGLYCERIDES mg/dL 92   HDL CHOL mg/dL 90*   LDL CHOL mg/dL 66     Results from last 7 days   Lab Units 09/09/19  2254   PH, ARTERIAL pH units 7.382   PO2 ART mm Hg 106.8*   PCO2, ARTERIAL mm Hg 38.7   HCO3 ART mmol/L 23.0       I reviewed the patient's new clinical results.  I personally viewed and interpreted the patient's CXR        Medication Review:     amiodarone 200 mg Oral Q12H   aspirin 325 mg Oral Daily   atorvastatin 40 mg Oral Nightly   bisacodyl 10 mg Rectal Once   enoxaparin 40 mg Subcutaneous Q24H   guaiFENesin 1,200 mg Oral Q12H   insulin lispro 0-12 Units Subcutaneous 4x Daily With Meals & Nightly   lactulose 10 g Oral Daily   linagliptin 5 mg Oral Daily   mupirocin  Each Nare BID   pantoprazole 40 mg Oral Q AM   primidone 250 mg Oral BID   Scopolamine 1 patch Transdermal Q72H   sennosides-docusate sodium 2 tablet Oral Nightly   sertraline 25 mg Oral Daily         sodium chloride 30 mL/hr Last Rate: 30 mL/hr (09/09/19 1235)       ASSESSMENT:   Coronary artery disease status post CABG  Postop respiratory failure  COPD with exacerbation  Chronic  bronchitis  Non-ST elevation MI  Ischemic cardiomyopathy  Moderate mitral regurgitation  Tobacco abuse    PLAN:  Status post CABG. extubated with some postop stridor which is now resolved.   Wean down steroid as tolerated  Continue bronchodilators  Will likely need outpatient PFTs down the road  Patient to quit smoking down the road  We will follow along closely  Ambulate as much as possible  Incentive spirometry        Yajaira Ardon MD  09/12/19  4:01 PM

## 2019-09-12 NOTE — PLAN OF CARE
Problem: Patient Care Overview  Goal: Plan of Care Review  Outcome: Ongoing (interventions implemented as appropriate)   09/12/19 1600 09/12/19 1918   Coping/Psychosocial   Plan of Care Reviewed With patient;family --    Plan of Care Review   Progress --  improving   OTHER   Outcome Summary --  VSS, SR on heart monitor. NG tube removed, clear liquids started, no vomiting. AV wires and Chest tubes removed. Lactulose ordered this morning, large BM this afternoon. ECHO ordered.

## 2019-09-12 NOTE — PLAN OF CARE
Problem: Patient Care Overview  Goal: Plan of Care Review  Outcome: Ongoing (interventions implemented as appropriate)   09/12/19 0814   Coping/Psychosocial   Plan of Care Reviewed With patient   Plan of Care Review   Progress improving   OTHER   Outcome Summary patient able to tolerate increased ambulation distance today, good overall progress with mobility

## 2019-09-12 NOTE — PROGRESS NOTES
LOS: 6 days   Patient Care Team:  Ean Farmer MD as PCP - General (Internal Medicine)    Chief Complaint: post op    Subjective      Vital Signs  Temp:  [97.4 °F (36.3 °C)-98.4 °F (36.9 °C)] 98.4 °F (36.9 °C)  Heart Rate:  [71-85] 79  Resp:  [18-20] 18  BP: ()/(63-77) 104/71  Body mass index is 23.76 kg/m².    Intake/Output Summary (Last 24 hours) at 9/12/2019 1009  Last data filed at 9/12/2019 0840  Gross per 24 hour   Intake 50 ml   Output 1240 ml   Net -1190 ml     I/O this shift:  In: -   Out: 70 [Chest Tube:70]    Chest tube drainage last 8 hours 40        09/09/19  0500 09/11/19  0622 09/12/19  0611   Weight: 63.6 kg (140 lb 4.8 oz) 66.3 kg (146 lb 3.2 oz) 66.8 kg (147 lb 3.2 oz)         Objective    Results Review:        WBC WBC   Date Value Ref Range Status   09/12/2019 10.58 3.40 - 10.80 10*3/mm3 Final   09/11/2019 10.01 3.40 - 10.80 10*3/mm3 Final   09/10/2019 9.58 3.40 - 10.80 10*3/mm3 Final   09/10/2019 9.11 3.40 - 10.80 10*3/mm3 Final   09/09/2019 13.87 (H) 3.40 - 10.80 10*3/mm3 Final   09/09/2019 16.90 (H) 3.40 - 10.80 10*3/mm3 Final   09/09/2019 16.49 (H) 3.40 - 10.80 10*3/mm3 Final      HGB Hemoglobin   Date Value Ref Range Status   09/12/2019 8.5 (L) 13.0 - 17.7 g/dL Final   09/11/2019 8.1 (L) 13.0 - 17.7 g/dL Final   09/10/2019 7.8 (L) 13.0 - 17.7 g/dL Final   09/10/2019 7.7 (L) 13.0 - 17.7 g/dL Final   09/09/2019 8.4 (L) 13.0 - 17.7 g/dL Final   09/09/2019 9.2 (L) 13.0 - 17.7 g/dL Final   09/09/2019 9.4 (L) 13.0 - 17.7 g/dL Final   09/09/2019 8.8 (L) 12.0 - 17.0 g/dL Final   09/09/2019 8.8 (L) 12.0 - 17.0 g/dL Final      HCT Hematocrit   Date Value Ref Range Status   09/12/2019 26.9 (L) 37.5 - 51.0 % Final   09/11/2019 26.6 (L) 37.5 - 51.0 % Final   09/10/2019 25.5 (L) 37.5 - 51.0 % Final   09/10/2019 25.3 (L) 37.5 - 51.0 % Final   09/09/2019 26.9 (L) 37.5 - 51.0 % Final   09/09/2019 29.0 (L) 37.5 - 51.0 % Final   09/09/2019 29.8 (L) 37.5 - 51.0 % Final   09/09/2019 26 (L) 38 -  51 % Final   09/09/2019 26 (L) 38 - 51 % Final      Platelets Platelets   Date Value Ref Range Status   09/12/2019 164 140 - 450 10*3/mm3 Final   09/11/2019 152 140 - 450 10*3/mm3 Final   09/10/2019 149 140 - 450 10*3/mm3 Final   09/10/2019 160 140 - 450 10*3/mm3 Final   09/09/2019 190 140 - 450 10*3/mm3 Final   09/09/2019 189 140 - 450 10*3/mm3 Final   09/09/2019 202 140 - 450 10*3/mm3 Final        PT/INR:    Protime   Date Value Ref Range Status   09/10/2019 14.5 (H) 11.7 - 14.2 Seconds Final   09/09/2019 16.4 (H) 11.7 - 14.2 Seconds Final   /  INR   Date Value Ref Range Status   09/10/2019 1.16 (H) 0.90 - 1.10 Final   09/09/2019 1.35 (H) 0.90 - 1.10 Final       Sodium Sodium   Date Value Ref Range Status   09/12/2019 131 (L) 136 - 145 mmol/L Final   09/11/2019 135 (L) 136 - 145 mmol/L Final   09/10/2019 138 136 - 145 mmol/L Final   09/10/2019 138 136 - 145 mmol/L Final   09/09/2019 138 136 - 145 mmol/L Final   09/09/2019 136 136 - 145 mmol/L Final      Potassium Potassium   Date Value Ref Range Status   09/12/2019 3.7 3.5 - 5.2 mmol/L Final   09/11/2019 4.6 3.5 - 5.2 mmol/L Final   09/10/2019 4.2 3.5 - 5.2 mmol/L Final   09/10/2019 4.3 3.5 - 5.2 mmol/L Final   09/09/2019 4.0 3.5 - 5.2 mmol/L Final   09/09/2019 3.8 3.5 - 5.2 mmol/L Final   09/09/2019 3.8 3.5 - 5.2 mmol/L Final      Chloride Chloride   Date Value Ref Range Status   09/12/2019 94 (L) 98 - 107 mmol/L Final   09/11/2019 100 98 - 107 mmol/L Final   09/10/2019 101 98 - 107 mmol/L Final   09/10/2019 103 98 - 107 mmol/L Final   09/09/2019 102 98 - 107 mmol/L Final   09/09/2019 101 98 - 107 mmol/L Final      Bicarbonate CO2   Date Value Ref Range Status   09/12/2019 23.8 22.0 - 29.0 mmol/L Final   09/11/2019 19.9 (L) 22.0 - 29.0 mmol/L Final   09/10/2019 17.5 (L) 22.0 - 29.0 mmol/L Final   09/10/2019 21.8 (L) 22.0 - 29.0 mmol/L Final   09/09/2019 23.1 22.0 - 29.0 mmol/L Final   09/09/2019 23.4 22.0 - 29.0 mmol/L Final      BUN BUN   Date Value Ref Range  Status   09/12/2019 42 (H) 8 - 23 mg/dL Final   09/11/2019 34 (H) 8 - 23 mg/dL Final   09/10/2019 28 (H) 8 - 23 mg/dL Final   09/10/2019 28 (H) 8 - 23 mg/dL Final   09/09/2019 29 (H) 8 - 23 mg/dL Final   09/09/2019 31 (H) 8 - 23 mg/dL Final      Creatinine Creatinine   Date Value Ref Range Status   09/12/2019 1.36 (H) 0.76 - 1.27 mg/dL Final   09/11/2019 1.49 (H) 0.76 - 1.27 mg/dL Final   09/10/2019 1.46 (H) 0.76 - 1.27 mg/dL Final   09/10/2019 1.40 (H) 0.76 - 1.27 mg/dL Final   09/09/2019 1.29 (H) 0.76 - 1.27 mg/dL Final   09/09/2019 1.27 0.76 - 1.27 mg/dL Final      Calcium Calcium   Date Value Ref Range Status   09/12/2019 8.7 8.6 - 10.5 mg/dL Final   09/11/2019 8.7 8.6 - 10.5 mg/dL Final   09/10/2019 8.7 8.6 - 10.5 mg/dL Final   09/10/2019 8.0 (L) 8.6 - 10.5 mg/dL Final   09/09/2019 8.1 (L) 8.6 - 10.5 mg/dL Final   09/09/2019 8.0 (L) 8.6 - 10.5 mg/dL Final      Magnesium Magnesium   Date Value Ref Range Status   09/10/2019 2.3 1.6 - 2.4 mg/dL Final   09/10/2019 2.3 1.6 - 2.4 mg/dL Final   09/09/2019 2.7 (H) 1.6 - 2.4 mg/dL Final   09/09/2019 2.2 1.6 - 2.4 mg/dL Final            amiodarone 400 mg Oral Q12H   aspirin 325 mg Oral Daily   atorvastatin 40 mg Oral Nightly   enoxaparin 40 mg Subcutaneous Q24H   guaiFENesin 1,200 mg Oral Q12H   insulin lispro 0-12 Units Subcutaneous 4x Daily With Meals & Nightly   ipratropium-albuterol 3 mL Nebulization 4x Daily - RT   linagliptin 5 mg Oral Daily   mupirocin  Each Nare BID   pantoprazole 40 mg Oral Q AM   primidone 250 mg Oral BID   sennosides-docusate sodium 2 tablet Oral Nightly   sertraline 25 mg Oral Daily   sodium chloride 4 mL Nebulization BID - RT       amiodarone 0.5 mg/min Last Rate: Stopped (09/10/19 1230)   sodium chloride 30 mL/hr Last Rate: 30 mL/hr (09/09/19 1334)   sodium chloride 30 mL/hr Last Rate: 30 mL/hr (09/09/19 1235)           Patient Active Problem List   Diagnosis Code   • Coronary artery disease involving native coronary artery of native heart  with angina pectoris (CMS/[facility]) I25.119   • COPD (chronic obstructive pulmonary disease) with chronic bronchitis (CMS/[facility]) J44.9   • Microalbuminuria R80.9   • Type 2 diabetes mellitus with microalbuminuria (CMS/[facility]) E11.29, R80.9   • Hyperlipidemia E78.5   • Essential hypertension I10   • H/O agent Orange exposure Z77.098       Assessment & Plan    -MV CAD, mild ischemic mitral incompetence-- s/p CABGx3 LIMA, EVH left SVG, SHANIA ligation--POD#3 Mazariegos  -NSTEMI  -ICM, EF 20%  -Acute on chronic HFrEF--maximize meds prior to discharge  -RANDA--cr 1.4, renal following  -COPD with exacerbation--pulm following  -DM, type 2--a1c 6.1  -HLD--statin  -Current tobacco abuse--cessation d/w pt  -PVD--has been on Pletal  -Hx anxiety/depression--cont Zoloft  -Hx Lupus--Plaquenil at home  -Hx essential tremors--on primidone per neurology  -Mediastinal adenopathy--per CT scan, follow up recommended  -Post op anemia, expected acute blood loss     Encourage pulmonary toilet.   Discontinue chest tubes  Nauseated last night, with emesis, NG tube placed KUB obtained.   No signs of ileus, I hear great bowel sounds.  We can discontinue. I think his amiodarone is making him nauseated.  Unable to tolerate bet a blocker.  Increase activity as able.  Up  kgs, CXR with congestion, creatinine improved  Diurese per Renal.       Madyson Rodriguez, APRN  09/12/19  10:09 AM     Slow but definite progress.

## 2019-09-13 ENCOUNTER — APPOINTMENT (OUTPATIENT)
Dept: CARDIOLOGY | Facility: HOSPITAL | Age: 69
End: 2019-09-13

## 2019-09-13 LAB
ALBUMIN SERPL-MCNC: 3.6 G/DL (ref 3.5–5.2)
ALBUMIN/GLOB SERPL: 1.6 G/DL
ALP SERPL-CCNC: 77 U/L (ref 39–117)
ALT SERPL W P-5'-P-CCNC: 63 U/L (ref 1–41)
ANION GAP SERPL CALCULATED.3IONS-SCNC: 11.6 MMOL/L (ref 5–15)
AST SERPL-CCNC: 122 U/L (ref 1–40)
BH CV ECHO MEAS - BSA(HAYCOCK): 1.8 M^2
BH CV ECHO MEAS - BSA: 1.7 M^2
BH CV ECHO MEAS - BZI_BMI: 23.4 KILOGRAMS/M^2
BH CV ECHO MEAS - BZI_METRIC_HEIGHT: 167.6 CM
BH CV ECHO MEAS - BZI_METRIC_WEIGHT: 65.8 KG
BH CV ECHO MEAS - EDV(CUBED): 140.6 ML
BH CV ECHO MEAS - EDV(MOD-SP2): 180 ML
BH CV ECHO MEAS - EDV(MOD-SP4): 155 ML
BH CV ECHO MEAS - EDV(TEICH): 129.5 ML
BH CV ECHO MEAS - EF(CUBED): 30.8 %
BH CV ECHO MEAS - EF(MOD-BP): 26 %
BH CV ECHO MEAS - EF(MOD-SP2): 28.3 %
BH CV ECHO MEAS - EF(MOD-SP4): 27.7 %
BH CV ECHO MEAS - EF(TEICH): 24.8 %
BH CV ECHO MEAS - ESV(CUBED): 97.3 ML
BH CV ECHO MEAS - ESV(MOD-SP2): 129 ML
BH CV ECHO MEAS - ESV(MOD-SP4): 112 ML
BH CV ECHO MEAS - ESV(TEICH): 97.3 ML
BH CV ECHO MEAS - FS: 11.5 %
BH CV ECHO MEAS - IVS/LVPW: 1
BH CV ECHO MEAS - IVSD: 1.2 CM
BH CV ECHO MEAS - LV DIASTOLIC VOL/BSA (35-75): 88.8 ML/M^2
BH CV ECHO MEAS - LV MASS(C)D: 248.8 GRAMS
BH CV ECHO MEAS - LV MASS(C)DI: 142.6 GRAMS/M^2
BH CV ECHO MEAS - LV SYSTOLIC VOL/BSA (12-30): 64.2 ML/M^2
BH CV ECHO MEAS - LVIDD: 5.2 CM
BH CV ECHO MEAS - LVIDS: 4.6 CM
BH CV ECHO MEAS - LVLD AP2: 9.1 CM
BH CV ECHO MEAS - LVLD AP4: 8.9 CM
BH CV ECHO MEAS - LVLS AP2: 8.7 CM
BH CV ECHO MEAS - LVLS AP4: 8 CM
BH CV ECHO MEAS - LVPWD: 1.2 CM
BH CV ECHO MEAS - SI(CUBED): 24.8 ML/M^2
BH CV ECHO MEAS - SI(MOD-SP2): 29.2 ML/M^2
BH CV ECHO MEAS - SI(MOD-SP4): 24.6 ML/M^2
BH CV ECHO MEAS - SI(TEICH): 18.4 ML/M^2
BH CV ECHO MEAS - SV(CUBED): 43.3 ML
BH CV ECHO MEAS - SV(MOD-SP2): 51 ML
BH CV ECHO MEAS - SV(MOD-SP4): 43 ML
BH CV ECHO MEAS - SV(TEICH): 32.2 ML
BH CV VAS BP RIGHT ARM: NORMAL MMHG
BILIRUB SERPL-MCNC: 0.3 MG/DL (ref 0.2–1.2)
BUN BLD-MCNC: 38 MG/DL (ref 8–23)
BUN/CREAT SERPL: 30.6 (ref 7–25)
CALCIUM SPEC-SCNC: 8 MG/DL (ref 8.6–10.5)
CHLORIDE SERPL-SCNC: 98 MMOL/L (ref 98–107)
CO2 SERPL-SCNC: 24.4 MMOL/L (ref 22–29)
CREAT BLD-MCNC: 1.24 MG/DL (ref 0.76–1.27)
DEPRECATED RDW RBC AUTO: 49.8 FL (ref 37–54)
ERYTHROCYTE [DISTWIDTH] IN BLOOD BY AUTOMATED COUNT: 14 % (ref 12.3–15.4)
GFR SERPL CREATININE-BSD FRML MDRD: 58 ML/MIN/1.73
GLOBULIN UR ELPH-MCNC: 2.2 GM/DL
GLUCOSE BLD-MCNC: 106 MG/DL (ref 65–99)
GLUCOSE BLDC GLUCOMTR-MCNC: 100 MG/DL (ref 70–130)
GLUCOSE BLDC GLUCOMTR-MCNC: 114 MG/DL (ref 70–130)
GLUCOSE BLDC GLUCOMTR-MCNC: 127 MG/DL (ref 70–130)
GLUCOSE BLDC GLUCOMTR-MCNC: 137 MG/DL (ref 70–130)
HCT VFR BLD AUTO: 25.8 % (ref 37.5–51)
HGB BLD-MCNC: 8.1 G/DL (ref 13–17.7)
MAXIMAL PREDICTED HEART RATE: 151 BPM
MCH RBC QN AUTO: 30.8 PG (ref 26.6–33)
MCHC RBC AUTO-ENTMCNC: 31.4 G/DL (ref 31.5–35.7)
MCV RBC AUTO: 98.1 FL (ref 79–97)
PLATELET # BLD AUTO: 191 10*3/MM3 (ref 140–450)
PMV BLD AUTO: 10.5 FL (ref 6–12)
POTASSIUM BLD-SCNC: 3.7 MMOL/L (ref 3.5–5.2)
PROT SERPL-MCNC: 5.8 G/DL (ref 6–8.5)
RBC # BLD AUTO: 2.63 10*6/MM3 (ref 4.14–5.8)
SODIUM BLD-SCNC: 134 MMOL/L (ref 136–145)
STRESS TARGET HR: 128 BPM
WBC NRBC COR # BLD: 10.05 10*3/MM3 (ref 3.4–10.8)

## 2019-09-13 PROCEDURE — 25010000002 ENOXAPARIN PER 10 MG: Performed by: THORACIC SURGERY (CARDIOTHORACIC VASCULAR SURGERY)

## 2019-09-13 PROCEDURE — 82962 GLUCOSE BLOOD TEST: CPT

## 2019-09-13 PROCEDURE — 25010000002 CALCIUM GLUCONATE PER 10 ML: Performed by: NURSE PRACTITIONER

## 2019-09-13 PROCEDURE — 93308 TTE F-UP OR LMTD: CPT | Performed by: INTERNAL MEDICINE

## 2019-09-13 PROCEDURE — 94799 UNLISTED PULMONARY SVC/PX: CPT

## 2019-09-13 PROCEDURE — 99232 SBSQ HOSP IP/OBS MODERATE 35: CPT | Performed by: INTERNAL MEDICINE

## 2019-09-13 PROCEDURE — 80053 COMPREHEN METABOLIC PANEL: CPT | Performed by: INTERNAL MEDICINE

## 2019-09-13 PROCEDURE — 25010000002 MAGNESIUM SULFATE IN D5W 1G/100ML (PREMIX) 1-5 GM/100ML-% SOLUTION: Performed by: NURSE PRACTITIONER

## 2019-09-13 PROCEDURE — 93308 TTE F-UP OR LMTD: CPT

## 2019-09-13 PROCEDURE — 97110 THERAPEUTIC EXERCISES: CPT

## 2019-09-13 PROCEDURE — 25010000002 PERFLUTREN (DEFINITY) 8.476 MG IN SODIUM CHLORIDE 0.9 % 10 ML INJECTION: Performed by: NURSE PRACTITIONER

## 2019-09-13 PROCEDURE — 85027 COMPLETE CBC AUTOMATED: CPT | Performed by: THORACIC SURGERY (CARDIOTHORACIC VASCULAR SURGERY)

## 2019-09-13 RX ORDER — TRAMADOL HYDROCHLORIDE 50 MG/1
25 TABLET ORAL EVERY 6 HOURS PRN
Qty: 21 TABLET | Refills: 0 | Status: SHIPPED | OUTPATIENT
Start: 2019-09-13 | End: 2019-09-20

## 2019-09-13 RX ORDER — MAGNESIUM SULFATE 1 G/100ML
2 INJECTION INTRAVENOUS ONCE
Status: COMPLETED | OUTPATIENT
Start: 2019-09-13 | End: 2019-09-13

## 2019-09-13 RX ADMIN — PERFLUTREN 3 ML: 6.52 INJECTION, SUSPENSION INTRAVENOUS at 11:15

## 2019-09-13 RX ADMIN — MUPIROCIN: 20 OINTMENT TOPICAL at 08:50

## 2019-09-13 RX ADMIN — GUAIFENESIN 1200 MG: 600 TABLET, EXTENDED RELEASE ORAL at 21:09

## 2019-09-13 RX ADMIN — ASPIRIN 325 MG: 325 TABLET, COATED ORAL at 08:58

## 2019-09-13 RX ADMIN — ENOXAPARIN SODIUM 40 MG: 40 INJECTION SUBCUTANEOUS at 21:09

## 2019-09-13 RX ADMIN — PRIMIDONE 250 MG: 250 TABLET ORAL at 21:08

## 2019-09-13 RX ADMIN — GUAIFENESIN 1200 MG: 600 TABLET, EXTENDED RELEASE ORAL at 08:57

## 2019-09-13 RX ADMIN — SERTRALINE 25 MG: 25 TABLET, FILM COATED ORAL at 08:58

## 2019-09-13 RX ADMIN — AMIODARONE HYDROCHLORIDE 200 MG: 200 TABLET ORAL at 08:58

## 2019-09-13 RX ADMIN — PANTOPRAZOLE SODIUM 40 MG: 40 TABLET, DELAYED RELEASE ORAL at 06:18

## 2019-09-13 RX ADMIN — LINAGLIPTIN 5 MG: 5 TABLET, FILM COATED ORAL at 08:57

## 2019-09-13 RX ADMIN — MAGNESIUM SULFATE 2 G: 1 INJECTION INTRAVENOUS at 15:24

## 2019-09-13 RX ADMIN — AMIODARONE HYDROCHLORIDE 200 MG: 200 TABLET ORAL at 21:09

## 2019-09-13 RX ADMIN — PRIMIDONE 250 MG: 250 TABLET ORAL at 08:57

## 2019-09-13 RX ADMIN — CALCIUM GLUCONATE 1 G: 98 INJECTION, SOLUTION INTRAVENOUS at 15:24

## 2019-09-13 RX ADMIN — ATORVASTATIN CALCIUM 40 MG: 20 TABLET, FILM COATED ORAL at 21:09

## 2019-09-13 RX ADMIN — MUPIROCIN 1 APPLICATION: 20 OINTMENT TOPICAL at 21:08

## 2019-09-13 NOTE — PLAN OF CARE
Problem: Patient Care Overview  Goal: Plan of Care Review  Outcome: Ongoing (interventions implemented as appropriate)   09/13/19 1043   Coping/Psychosocial   Plan of Care Reviewed With patient   Plan of Care Review   Progress improving   OTHER   Outcome Summary Pt on RA today. Less asst noted with increased distance today. Pt should be ready for level 5 tomorrow. Pt amb 300' with cga of 2 today. Precautions and body mechanics reviewed with pt today.

## 2019-09-13 NOTE — PLAN OF CARE
Problem: Patient Care Overview  Goal: Plan of Care Review  Outcome: Ongoing (interventions implemented as appropriate)   09/13/19 2865   Coping/Psychosocial   Plan of Care Reviewed With patient   Plan of Care Review   Progress improving   OTHER   Outcome Summary VSS. NSR on monitor. No c/o N/V this shift. Rested well over night. No c/o pain/SOA. Remains on room air. Rested well over night. Will continue to monitor.

## 2019-09-13 NOTE — PROGRESS NOTES
[facility]  Cardiology Progress Note    Patient Identification:  Name: Cedric Wade  Age: 68 y.o.  Sex: male  :  1950  MRN: [id number]                 Follow Up / Chief Complaint: Management recommendations for CAD       Interval History: CABG x3, remains in sinus rhythm          Subjective:  Denies chest pain, shortness of breath.           Objective:    Past Medical History:  Past Medical History:   Diagnosis Date   • Arthritis    • CHF (congestive heart failure) (CMS/[facility])    • COPD (chronic obstructive pulmonary disease) (CMS/[facility])    • Coronary artery disease    • Cutaneous lupus erythematosus    • Diabetes mellitus (CMS/[facility])    • Elevated cholesterol    • GERD (gastroesophageal reflux disease)    • Hypertension      Past Surgical History:  Past Surgical History:   Procedure Laterality Date   • CORONARY ARTERY BYPASS GRAFT N/A 2019    Procedure: INTRAOPERATIVE BUDDY, MIDLINE STENOTOMY WITH CORONARY ARTERY BYPASS GRAPHS X  3 UTILIZING LEFT SAVAGE  AND LEFT ENDOSCOPIC HARVESTED SAPHENOUS VEIN, LIGATION OF LEFT  ATRIAL APPENDAGE; PRP;  Surgeon: Alvaro Mazariegos MD;  Location: [facility];  Service: Cardiothoracic        Social History:   Social History     Tobacco Use   • Smoking status: Current Every Day Smoker     Packs/day: 2.00     Years: 58.00     Pack years: 116.00   • Smokeless tobacco: Never Used   Substance Use Topics   • Alcohol use: No     Frequency: Never      Family History:  Family History   Problem Relation Age of Onset   • Lung cancer Father    • Diabetes Sister           Allergies:  Allergies   Allergen Reactions   • Codeine Nausea And Vomiting     Scheduled Meds:    amiodarone 200 mg Q12H   aspirin 325 mg Daily   atorvastatin 40 mg Nightly   bisacodyl 10 mg Once   enoxaparin 40 mg Q24H   guaiFENesin 1,200 mg Q12H   insulin lispro 0-12 Units 4x Daily With Meals & Nightly   lactulose 10 g Daily   linagliptin 5 mg Daily   mupirocin  BID   pantoprazole 40 mg Q AM  "  primidone 250 mg BID   Scopolamine 1 patch Q72H   sennosides-docusate sodium 2 tablet Nightly   sertraline 25 mg Daily     I have reviewed the patient's recent medical history and current medications, as well as personally reviewed/interpreted the ECG/telemetry data    INTAKE AND OUTPUT:    Intake/Output Summary (Last 24 hours) at 2019 1641  Last data filed at 2019 1524  Gross per 24 hour   Intake 360 ml   Output 425 ml   Net -65 ml     ROS  Constitutional: Awake and alert, no fever. No nosebleeds  Abdomen           no abdominal pain   Cardiac              no chest pain  Pulmonary          no shortness of breath      BP 90/59 (BP Location: Right arm, Patient Position: Sitting)   Pulse 72   Temp 98.2 °F (36.8 °C) (Oral)   Resp 16   Ht 167.6 cm (66\")   Wt 66 kg (145 lb 9.6 oz)   SpO2 98%   BMI 23.50 kg/m²   General appearance: No acute changes   Neck: Trachea midline; NECK, supple, no thyromegaly or lymphadenopathy   Lungs: Normal size and shape, normal breath sounds, equal distribution of air, no rales and rhonchi   CV: S1-S2 regular, no murmurs, no rub, no gallop   Abdomen: Soft, non-tender; no masses , no abnormal abdominal sounds   Extremities: No deformity , normal color , no peripheral edema   Skin: Normal temperature, turgor and texture; no rash, ulcers              Cardiographics  Telemetry:    SR    9/11SR      9/10 A paced rate 90      EC/10 SR rate 70        9/9 SR with PVCs and LBBB          Echocardiogram:   19  Interpretation Summary     · The left ventricular cavity is mildly dilated.  · Right ventricular cavity is mildly dilated.  · Left atrial cavity size is mildly dilated.  · Calculated EF = 25.0%.  · There is no evidence of pericardial effusion.           Lab Review       Results from last 7 days   Lab Units 09/10/19  1200   MAGNESIUM mg/dL 2.3     Results from last 7 days   Lab Units 19  0319   SODIUM mmol/L 134*   POTASSIUM mmol/L 3.7   BUN mg/dL 38* " "  CREATININE mg/dL 1.24   CALCIUM mg/dL 8.0*        Results from last 7 days   Lab Units 09/13/19  0319 09/12/19  0325 09/11/19  0301   WBC 10*3/mm3 10.05 10.58 10.01   HEMOGLOBIN g/dL 8.1* 8.5* 8.1*   HEMATOCRIT % 25.8* 26.9* 26.6*   PLATELETS 10*3/mm3 191 164 152     Results from last 7 days   Lab Units 09/10/19  0259 09/09/19  1247 09/09/19  0435 09/09/19  0011  09/07/19  0519   INR  1.16* 1.35*  --   --   --  0.99   APTT seconds  --  28.7 29.3 37.0*   < > 29.7    < > = values in this interval not displayed.     Estimated Creatinine Clearance: 52.5 mL/min (by C-G formula based on SCr of 1.24 mg/dL).    I have reviewed the medical decision making with Dr. Riggs in detail.       Assessment  1. NSTEMI  2. CAD  3.  RANDA  4.  HLD  5.  Tobacco abuse  6.  Acute on chronic systolic CHF  7.  COPD  8.  DM  9. PVD  10.  History of lupus    Plan  Status post CABG x3.  Hemoglobin 8.1 defer to attending.  Creatinine 1.24, improved from yesterday defer to nephrology.  Lipid panel shows good control.  Tobacco cessation given.  EF 28% on 9/13/2019.  Currently unable to take ACE due to acute kidney injury.  We will add ACE when okay with nephrology.  Will redo echo in approximately 90 days regarding cardiomyopathy once medically therapy has been maximized.  Will need to go home on a external defibrillator, order is placed in the computer.     Will need external defibrillator prior to going home, the order is already placed.        9/13/2019  LEONIDAS Martin      Patient personally interviewed and above subjective findings personally confirmed during a face to face contact with patient today  All findings of physical examination confirmed  All pertinent and performed labs, cardiac procedures ,  radiographs of the last 24 hours personally reviewed  Impression and plans discussed/elaborated and implemented jointly as described above     Nidhi Riggs MD            EMR Dragon/Transcription:   \"Dictated utilizing " "Dragon dictation\".     "

## 2019-09-13 NOTE — PROGRESS NOTES
LOS: 7 days   Patient Care Team:  Ean Farmer MD as PCP - General (Internal Medicine)    Chief Complaint: post op    Subjective      Vital Signs  Temp:  [98.2 °F (36.8 °C)-98.8 °F (37.1 °C)] 98.2 °F (36.8 °C)  Heart Rate:  [71-81] 71  Resp:  [16-18] 16  BP: ()/(64-80) 94/64  Body mass index is 23.5 kg/m².    Intake/Output Summary (Last 24 hours) at 9/13/2019 0934  Last data filed at 9/12/2019 1710  Gross per 24 hour   Intake 120 ml   Output 145 ml   Net -25 ml     No intake/output data recorded.            09/11/19  0622 09/12/19  0611 09/13/19  0606   Weight: 66.3 kg (146 lb 3.2 oz) 66.8 kg (147 lb 3.2 oz) 66 kg (145 lb 9.6 oz)         Objective    Results Review:        WBC WBC   Date Value Ref Range Status   09/13/2019 10.05 3.40 - 10.80 10*3/mm3 Final   09/12/2019 10.58 3.40 - 10.80 10*3/mm3 Final   09/11/2019 10.01 3.40 - 10.80 10*3/mm3 Final   09/10/2019 9.58 3.40 - 10.80 10*3/mm3 Final      HGB Hemoglobin   Date Value Ref Range Status   09/13/2019 8.1 (L) 13.0 - 17.7 g/dL Final   09/12/2019 8.5 (L) 13.0 - 17.7 g/dL Final   09/11/2019 8.1 (L) 13.0 - 17.7 g/dL Final   09/10/2019 7.8 (L) 13.0 - 17.7 g/dL Final      HCT Hematocrit   Date Value Ref Range Status   09/13/2019 25.8 (L) 37.5 - 51.0 % Final   09/12/2019 26.9 (L) 37.5 - 51.0 % Final   09/11/2019 26.6 (L) 37.5 - 51.0 % Final   09/10/2019 25.5 (L) 37.5 - 51.0 % Final      Platelets Platelets   Date Value Ref Range Status   09/13/2019 191 140 - 450 10*3/mm3 Final   09/12/2019 164 140 - 450 10*3/mm3 Final   09/11/2019 152 140 - 450 10*3/mm3 Final   09/10/2019 149 140 - 450 10*3/mm3 Final        PT/INR:  No results found for: PROTIME/No results found for: INR    Sodium Sodium   Date Value Ref Range Status   09/13/2019 134 (L) 136 - 145 mmol/L Final   09/12/2019 131 (L) 136 - 145 mmol/L Final   09/11/2019 135 (L) 136 - 145 mmol/L Final   09/10/2019 138 136 - 145 mmol/L Final      Potassium Potassium   Date Value Ref Range Status    09/13/2019 3.7 3.5 - 5.2 mmol/L Final   09/12/2019 3.7 3.5 - 5.2 mmol/L Final   09/11/2019 4.6 3.5 - 5.2 mmol/L Final   09/10/2019 4.2 3.5 - 5.2 mmol/L Final      Chloride Chloride   Date Value Ref Range Status   09/13/2019 98 98 - 107 mmol/L Final   09/12/2019 94 (L) 98 - 107 mmol/L Final   09/11/2019 100 98 - 107 mmol/L Final   09/10/2019 101 98 - 107 mmol/L Final      Bicarbonate CO2   Date Value Ref Range Status   09/13/2019 24.4 22.0 - 29.0 mmol/L Final   09/12/2019 23.8 22.0 - 29.0 mmol/L Final   09/11/2019 19.9 (L) 22.0 - 29.0 mmol/L Final   09/10/2019 17.5 (L) 22.0 - 29.0 mmol/L Final      BUN BUN   Date Value Ref Range Status   09/13/2019 38 (H) 8 - 23 mg/dL Final   09/12/2019 42 (H) 8 - 23 mg/dL Final   09/11/2019 34 (H) 8 - 23 mg/dL Final   09/10/2019 28 (H) 8 - 23 mg/dL Final      Creatinine Creatinine   Date Value Ref Range Status   09/13/2019 1.24 0.76 - 1.27 mg/dL Final   09/12/2019 1.36 (H) 0.76 - 1.27 mg/dL Final   09/11/2019 1.49 (H) 0.76 - 1.27 mg/dL Final   09/10/2019 1.46 (H) 0.76 - 1.27 mg/dL Final      Calcium Calcium   Date Value Ref Range Status   09/13/2019 8.0 (L) 8.6 - 10.5 mg/dL Final   09/12/2019 8.7 8.6 - 10.5 mg/dL Final   09/11/2019 8.7 8.6 - 10.5 mg/dL Final   09/10/2019 8.7 8.6 - 10.5 mg/dL Final      Magnesium Magnesium   Date Value Ref Range Status   09/10/2019 2.3 1.6 - 2.4 mg/dL Final            amiodarone 200 mg Oral Q12H   aspirin 325 mg Oral Daily   atorvastatin 40 mg Oral Nightly   bisacodyl 10 mg Rectal Once   calcium gluconate 1 g Intravenous Once   enoxaparin 40 mg Subcutaneous Q24H   guaiFENesin 1,200 mg Oral Q12H   insulin lispro 0-12 Units Subcutaneous 4x Daily With Meals & Nightly   lactulose 10 g Oral Daily   linagliptin 5 mg Oral Daily   magnesium sulfate 2 g Intravenous Once   mupirocin  Each Nare BID   pantoprazole 40 mg Oral Q AM   primidone 250 mg Oral BID   Scopolamine 1 patch Transdermal Q72H   sennosides-docusate sodium 2 tablet Oral Nightly   sertraline  25 mg Oral Daily       sodium chloride 30 mL/hr Last Rate: 30 mL/hr (09/09/19 1235)           Patient Active Problem List   Diagnosis Code   • Coronary artery disease involving native coronary artery of native heart with angina pectoris (CMS/[facility]) I25.119   • COPD (chronic obstructive pulmonary disease) with chronic bronchitis (CMS/HCC) J44.9   • Microalbuminuria R80.9   • Type 2 diabetes mellitus with microalbuminuria (CMS/HCC) E11.29, R80.9   • Hyperlipidemia E78.5   • Essential hypertension I10   • H/O agent [place] exposure Z77.098       Assessment & Plan    -MV CAD, mild ischemic mitral incompetence-- s/p CABGx3 LIMA, EVH left SVG, SHANIA ligation--POD#4 Mazariegos  -NSTEMI  -ICM, EF 20%  -Acute on chronic HFrEF--maximize meds prior to discharge  -RANDA--cr 1.4, renal following  -COPD with exacerbation--pulm following  -DM, type 2--a1c 6.1  -HLD--statin  -Current tobacco abuse--cessation d/w pt  -PVD--has been on Pletal  -Hx anxiety/depression--cont Zoloft  -Hx Lupus--Plaquenil at home  -Hx essential tremors--on primidone per neurology  -Mediastinal adenopathy--per CT scan, follow up recommended  -Post op anemia, expected acute blood loss     Encourage pulmonary toilet.   Increase activity as able.  Looks good today.  May be able to go home tomorrow with home health.        Madyson Rodriguez, LEONIDAS  09/13/19  9:34 AM

## 2019-09-13 NOTE — PROGRESS NOTES
"  ENDOCRINE    Subjective   AND PLANS  Cedric Wade is a 68 y.o. male.     Follow-up diabetes and related disorders    Denies nausea or vomiting.    No shortness of breath.  Fasting glucose 114.  Random glucose 100-170.  Continue Tradjenta 5 mg/day.  Continue Humalog correction scale before meals and at bedtime.  Metformin was discontinued earlier because of increased serum creatinine after cardiac catheterization    Continue Lipitor 40 mg/day.    Objective   BP 90/59 (BP Location: Right arm, Patient Position: Sitting)   Pulse 72   Temp 98.2 °F (36.8 °C) (Oral)   Resp 16   Ht 167.6 cm (66\")   Wt 66 kg (145 lb 9.6 oz)   SpO2 98%   BMI 23.50 kg/m²   Physical Exam    Awake, alert, not in distress.  Regular heart rate and rhythm.  No rales or wheezes.  Abdomen soft, nontender.  Extremities warm.  No cyanosis or calf tenderness.    Lab Results (last 24 hours)     Procedure Component Value Units Date/Time    POC Glucose Once [[id number]]  (Abnormal) Collected:  09/13/19 1543    Specimen:  Blood Updated:  09/13/19 1554     Glucose 137 mg/dL     POC Glucose Once [[id number]]  (Normal) Collected:  09/13/19 1156    Specimen:  Blood Updated:  09/13/19 1205     Glucose 100 mg/dL     POC Glucose Once [[id number]]  (Normal) Collected:  09/13/19 0555    Specimen:  Blood Updated:  09/13/19 0556     Glucose 114 mg/dL     Comprehensive Metabolic Panel [[id number]]  (Abnormal) Collected:  09/13/19 0319    Specimen:  Blood Updated:  09/13/19 0421     Glucose 106 mg/dL      BUN 38 mg/dL      Creatinine 1.24 mg/dL      Sodium 134 mmol/L      Potassium 3.7 mmol/L      Chloride 98 mmol/L      CO2 24.4 mmol/L      Calcium 8.0 mg/dL      Total Protein 5.8 g/dL      Albumin 3.60 g/dL      ALT (SGPT) 63 U/L      AST (SGOT) 122 U/L      Alkaline Phosphatase 77 U/L      Total Bilirubin 0.3 mg/dL      eGFR Non African Amer 58 mL/min/1.73      Globulin 2.2 gm/dL      A/G Ratio 1.6 g/dL      BUN/Creatinine Ratio 30.6     Anion Gap 11.6 mmol/L  "    Narrative:       GFR Normal >60  Chronic Kidney Disease <60  Kidney Failure <15    CBC (No Diff) [[id number]]  (Abnormal) Collected:  09/13/19 0319    Specimen:  Blood Updated:  09/13/19 0357     WBC 10.05 10*3/mm3      RBC 2.63 10*6/mm3      Hemoglobin 8.1 g/dL      Hematocrit 25.8 %      MCV 98.1 fL      MCH 30.8 pg      MCHC 31.4 g/dL      RDW 14.0 %      RDW-SD 49.8 fl      MPV 10.5 fL      Platelets 191 10*3/mm3     POC Glucose Once [[id number]]  (Normal) Collected:  09/12/19 2001    Specimen:  Blood Updated:  09/12/19 2002     Glucose 101 mg/dL               Coronary artery disease involving native coronary artery of native heart with angina pectoris (CMS/HCC)    COPD (chronic obstructive pulmonary disease) with chronic bronchitis (CMS/HCC)    Microalbuminuria    Type 2 diabetes mellitus with microalbuminuria (CMS/HCC)    Hyperlipidemia    Essential hypertension    H/O agent Orange exposure    Diabetes therapy as discussed above.  Continue Lipitor.  Will defer blood pressure management to nephrologist.

## 2019-09-13 NOTE — THERAPY TREATMENT NOTE
Acute Care - Physical Therapy Treatment Note  [facility]     Patient Name: Cedric Wade  : 1950  MRN: [id number]  Today's Date: 2019             Admit Date: 2019    Visit Dx:    ICD-10-CM ICD-9-CM   1. Coronary artery disease involving native coronary artery of native heart with angina pectoris (CMS/HCC) I25.119 414.01     413.9     Patient Active Problem List   Diagnosis   • Coronary artery disease involving native coronary artery of native heart with angina pectoris (CMS/HCC)   • COPD (chronic obstructive pulmonary disease) with chronic bronchitis (CMS/[facility])   • Microalbuminuria   • Type 2 diabetes mellitus with microalbuminuria (CMS/[facility])   • Hyperlipidemia   • Essential hypertension   • H/O agent [place] exposure       Therapy Treatment    Rehabilitation Treatment Summary     Row Name 1946             Treatment Time/Intention    Discipline  physical therapist  -SV      Document Type  therapy note (daily note)  -SV      Subjective Information  no complaints  -SV      Mode of Treatment  individual therapy;physical therapy  -SV      Patient/Family Observations  none  -SV      Patient Effort  excellent  -SV      Existing Precautions/Restrictions  cardiac;fall;sternal  -SV      Recorded by [SV] Xiao Duarte, PT 1955      Row Name 19 0945             Vital Signs    Pre Systolic BP Rehab  94  -SV      Pre Treatment Diastolic BP  64  -SV      Post Systolic BP Rehab  101  -SV      Post Treatment Diastolic BP  71  -SV      Pretreatment Heart Rate (beats/min)  77  -SV      Posttreatment Heart Rate (beats/min)  84  -SV      Pre SpO2 (%)  94  -SV      O2 Delivery Pre Treatment  room air  -SV      O2 Delivery Intra Treatment  room air  -SV      O2 Delivery Post Treatment  room air  -SV      Recorded by [SV] Xiao Duarte, PT 1955      Row Name 19 0950             Sit-Stand Transfer    Sit-Stand [place] (Transfers)  verbal cues;contact guard  -SV       Recorded by [SV] Xiao Duarte, PT 09/13/19 1043      Row Name 09/13/19 0950             Gait/Stairs Assessment/Training    [place] Level (Gait)  contact guard;2 person assist  -SV      Assistive Device (Gait)  -- HHA at times  -SV      Distance in Feet (Gait)  300  -SV      Bilateral Gait Deviations  forward flexed posture  -SV      Recorded by [SV] Xiao Duarte, PT 09/13/19 1043      Row Name 09/13/19 0950             Motor Skills Assessment/Interventions    Additional Documentation  -- 5 reps cardiac ex with vc  -SV      Recorded by [SV] Xiao Duarte, PT 09/13/19 1043      Row Name 09/13/19 0950             Positioning and Restraints    Pre-Treatment Position  sitting in chair/recliner  -SV      In Chair  reclined;sitting;call light within reach;encouraged to call for assist  -SV      Recorded by [SV] Xiao Duarte, PT 09/13/19 1043      Row Name                Wound 09/09/19 0827 Other (See comments) chest Incision    Wound - Properties Group Date first assessed: 09/09/19 [TL] Time first assessed: 0827 [TL] Side: Other (See comments) [TL] Location: chest [TL] Primary Wound Type: Incision [TL] Recorded by:  [NAT] Saundra Lynn RN 09/09/19 0827    Row Name                Wound 09/09/19 0827 Left leg Incision    Wound - Properties Group Date first assessed: 09/09/19 [TL] Time first assessed: 0827 [TL] Side: Left [TL] Location: leg [TL] Primary Wound Type: Incision [TL] Recorded by:  [NAT] Saundra Lynn RN 09/09/19 0827      User Key  (r) = Recorded By, (t) = Taken By, (c) = Cosigned By    Initials Name Effective Dates Discipline    Saundra Mendoza RN 06/16/16 -  Nurse    Xiao Byrne, PT 04/03/18 -  PT          Wound 09/09/19 0827 Other (See comments) chest Incision (Active)   Dressing Appearance open to air 9/13/2019 12:01 AM   Closure Approximated 9/13/2019 12:01 AM   Base scab 9/13/2019 12:01 AM   Drainage Amount none 9/13/2019 12:01 AM       Wound  09/09/19 0827 Left leg Incision (Active)   Dressing Appearance open to air 9/13/2019 12:01 AM   Closure Liquid skin adhesive 9/13/2019 12:01 AM   Base scab 9/13/2019 12:01 AM   Drainage Amount none 9/13/2019 12:01 AM           Physical Therapy Education     Title: PT OT SLP Therapies (Done)     Topic: Physical Therapy (Done)     Point: Mobility training (Done)     Learning Progress Summary           Patient Acceptance, E,TB,D, VU,NR by  at 9/13/2019  9:54 AM    Acceptance, E, NR by EM at 9/12/2019  9:37 AM    Acceptance, E, NR by EM at 9/11/2019 11:20 AM    Acceptance, E, NR by EM at 9/10/2019 10:43 AM                   Point: Home exercise program (Done)     Learning Progress Summary           Patient Acceptance, E,TB,D, VU,NR by  at 9/13/2019  9:54 AM    Acceptance, E, NR by EM at 9/12/2019  9:37 AM    Acceptance, E, NR by EM at 9/11/2019 11:20 AM    Acceptance, E, NR by EM at 9/10/2019 10:43 AM                   Point: Body mechanics (Done)     Learning Progress Summary           Patient Acceptance, E,TB,D, VU,NR by  at 9/13/2019  9:54 AM                   Point: Precautions (Done)     Learning Progress Summary           Patient Acceptance, E,TB,D, VU,NR by  at 9/13/2019  9:54 AM                               User Key     Initials Effective Dates Name Provider Type Discipline     04/03/18 -  Mariah Gordon, PT Physical Therapist PT     04/03/18 -  Xiao Duarte, PT Physical Therapist PT                PT Recommendation and Plan     Plan of Care Reviewed With: patient  Progress: improving  Outcome Summary: Pt on RA today. Less asst noted with increased distance today. Pt should be ready for level 5 tomorrow. Pt amb 300' with cga of 2 today. Precautions and body mechanics reviewed with pt today.   Outcome Measures     Row Name 09/13/19 0900             How much help from another person do you currently need...    Turning from your back to your side while in flat bed without using bedrails?  4   -SV      Moving from lying on back to sitting on the side of a flat bed without bedrails?  4  -SV      Moving to and from a bed to a chair (including a wheelchair)?  3  -SV      Standing up from a chair using your arms (e.g., wheelchair, bedside chair)?  3  -SV      Climbing 3-5 steps with a railing?  3  -SV      To walk in hospital room?  3  -SV      AM-PAC 6 Clicks Score (PT)  20  -SV        User Key  (r) = Recorded By, (t) = Taken By, (c) = Cosigned By    Initials Name Provider Type    Xiao Byrne, PT Physical Therapist         Time Calculation:     Therapy Charges for Today     Code Description Service Date Service Provider Modifiers Qty    41602427032  PT THER PROC EA 15 MIN 9/13/2019 Xiao Duarte, PT GP 1    22307727103 HC PT THER SUPP EA 15 MIN 9/13/2019 Xiao Duarte, PT GP 1    [id number]  PT THER PROC EA 15 MIN 9/13/2019 Xiao Duarte, PT GP 2    82992745680 HC PT THER SUPP EA 15 MIN 9/13/2019 Xiao Duarte, PT GP 1          PT G-Codes  Outcome Measure Options: AM-PAC 6 Clicks Basic Mobility (PT)  AM-PAC 6 Clicks Score (PT): 20    Xiao Duarte, PT  9/13/2019

## 2019-09-13 NOTE — PROGRESS NOTES
"   LOS: 7 days    Patient Care Team:  Ean Farmer MD as PCP - General (Internal Medicine)    Chief Complaint:  No chief complaint on file.    Follow UPAKi on CKD 9  Subjective     Interval History:   Seen and examined.  Sitting up in chair.  On room air.  Weeks catheter is out and no problem urination.  No new issues.    Review of Systems:   See above.     Objective     Vital Signs  Temp:  [98.1 °F (36.7 °C)-98.8 °F (37.1 °C)] 98.1 °F (36.7 °C)  Heart Rate:  [71-77] 73  Resp:  [16] 16  BP: ()/(64-80) 99/66    Flowsheet Rows      First Filed Value   Admission Height  167.6 cm (66\") Documented at 09/07/2019 1234   Admission Weight  64.5 kg (142 lb 3.2 oz) Documented at 09/07/2019 0438          No intake/output data recorded.  I/O last 3 completed shifts:  In: 170 [P.O.:170]  Out: 825 [Urine:605; Chest Tube:220]    Intake/Output Summary (Last 24 hours) at 9/13/2019 1305  Last data filed at 9/12/2019 1710  Gross per 24 hour   Intake 120 ml   Output --   Net 120 ml       Physical Exam:  Thin WM. Sitting in chair.   NG in place.  Flat affect. Irritable.   Nasal 02  Oral mucosa dry  No scleral icterus  Neck no jvd  Heart RRR, no rub or s3.  Lungs coarse BS, no wheezes. Chest tube .  Abd + bs ,soft, nontender. No body wall edema  Ext no lower ext edema.   SKin no rash .        Results Review:    Results from last 7 days   Lab Units 09/13/19  0319 09/12/19  0325 09/11/19  0301  09/07/19  0519   SODIUM mmol/L 134* 131* 135*   < > 130*   POTASSIUM mmol/L 3.7 3.7 4.6   < > 4.1   CHLORIDE mmol/L 98 94* 100   < > 87*   CO2 mmol/L 24.4 23.8 19.9*   < > 28.3   BUN mg/dL 38* 42* 34*   < > 29*   CREATININE mg/dL 1.24 1.36* 1.49*   < > 1.51*   CALCIUM mg/dL 8.0* 8.7 8.7   < > 9.4   BILIRUBIN mg/dL 0.3  --  0.2  --  0.4   ALK PHOS U/L 77  --  62  --  122*   ALT (SGPT) U/L 63*  --  26  --  70*   AST (SGOT) U/L 122*  --  56*  --  74*   GLUCOSE mg/dL 106* 118* 157*   < > 98    < > = values in this interval not displayed. "       Estimated Creatinine Clearance: 52.5 mL/min (by C-G formula based on SCr of 1.24 mg/dL).    Results from last 7 days   Lab Units 09/10/19  1200 09/10/19  0259 09/09/19  1652   MAGNESIUM mg/dL 2.3 2.3 2.7*   PHOSPHORUS mg/dL 4.1 4.4 3.9       Results from last 7 days   Lab Units 09/08/19  0817   URIC ACID mg/dL 8.7*       Results from last 7 days   Lab Units 09/13/19  0319 09/12/19  0325 09/11/19  0301 09/10/19  1200 09/10/19  0259   WBC 10*3/mm3 10.05 10.58 10.01 9.58 9.11   HEMOGLOBIN g/dL 8.1* 8.5* 8.1* 7.8* 7.7*   PLATELETS 10*3/mm3 191 164 152 149 160       Results from last 7 days   Lab Units 09/10/19  0259 09/09/19  1247 09/07/19  0519   INR  1.16* 1.35* 0.99         Imaging Results (last 24 hours)     Procedure Component Value Units Date/Time    XR Chest 1 View [[id number]] Collected:  09/12/19 1336     Updated:  09/12/19 1342    Narrative:       XR CHEST 1 VW-     HISTORY: Male who is 68 years-old,  chest tube removal     TECHNIQUE: Frontal view of the chest     COMPARISON: 9/12/2019 at 1030 hours     FINDINGS: Interval removal of chest tubes, NG tube. Sternotomy wires are  noted.. Heart size is borderline. Aorta is tortuous, calcified. Slight  prominence of vascular and interstitial markings. Minimal basilar  pleural effusions and atelectasis. No definite pneumothorax. No acute  osseous process.       Impression:       Chest tube removal. No definite pneumothorax.     This report was finalized on 9/12/2019 1:39 PM by Dr. Abhijeet Bell M.D.             amiodarone 200 mg Oral Q12H   aspirin 325 mg Oral Daily   atorvastatin 40 mg Oral Nightly   bisacodyl 10 mg Rectal Once   calcium gluconate 1 g Intravenous Once   enoxaparin 40 mg Subcutaneous Q24H   guaiFENesin 1,200 mg Oral Q12H   insulin lispro 0-12 Units Subcutaneous 4x Daily With Meals & Nightly   lactulose 10 g Oral Daily   linagliptin 5 mg Oral Daily   magnesium sulfate 2 g Intravenous Once   mupirocin  Each Nare BID   pantoprazole 40 mg Oral  Q AM   primidone 250 mg Oral BID   Scopolamine 1 patch Transdermal Q72H   sennosides-docusate sodium 2 tablet Oral Nightly   sertraline 25 mg Oral Daily       sodium chloride 30 mL/hr Last Rate: 30 mL/hr (09/09/19 1235)       Medication Review:   Current Facility-Administered Medications   Medication Dose Route Frequency Provider Last Rate Last Dose   • acetaminophen (TYLENOL) tablet 500 mg  500 mg Oral Q6H PRN Alvaro Mazariegos MD       • ALPRAZolam (XANAX) tablet 0.25 mg  0.25 mg Oral Q12H PRN Alvaro Mazariegos MD       • amiodarone (PACERONE) tablet 200 mg  200 mg Oral Q12H Madyson Smith APRN   200 mg at 09/13/19 0858   • aspirin EC tablet 325 mg  325 mg Oral Daily Alvaro Mazariegos MD   325 mg at 09/13/19 0858   • atorvastatin (LIPITOR) tablet 40 mg  40 mg Oral Nightly Alvaro Mazariegos MD   40 mg at 09/12/19 2046   • bisacodyl (DULCOLAX) EC tablet 10 mg  10 mg Oral Daily PRN Alvaro Mazariegos MD       • bisacodyl (DULCOLAX) suppository 10 mg  10 mg Rectal Daily PRN Alvaro Mazariegos MD       • bisacodyl (DULCOLAX) suppository 10 mg  10 mg Rectal Once Madyson Smith APRN       • calcium gluconate 1 g/100 mL (10 mg/mL) NS IVPB (VTB)  1 g Intravenous Once Madyson Smith APRN       • enoxaparin (LOVENOX) syringe 40 mg  40 mg Subcutaneous Q24H Alvaro Mazariegos MD   40 mg at 09/12/19 1740   • guaiFENesin (MUCINEX) 12 hr tablet 1,200 mg  1,200 mg Oral Q12H Alvaro Mazariegos MD   1,200 mg at 09/13/19 0857   • insulin lispro (humaLOG) injection 0-12 Units  0-12 Units Subcutaneous 4x Daily With Meals & Nightly Christiano Fernandes MD   1 Units at 09/12/19 1739   • ipratropium-albuterol (DUO-NEB) nebulizer solution 3 mL  3 mL Nebulization Q4H PRN Madyson Smith APRN       • lactulose (CHRONULAC) 10 GM/15ML solution 10 g  10 g Oral Daily Arleth Easton MD   10 g at 09/12/19 1340   • linagliptin (TRADJENTA) tablet 5 mg  5 mg Oral Daily Christiano Fernandes MD   5 mg at 09/13/19 0857   • magnesium  hydroxide (MILK OF MAGNESIA) suspension 2400 mg/10mL 10 mL  10 mL Oral Daily PRN Alvaro Mazariegos MD   10 mL at 09/11/19 0837   • magnesium sulfate in D5W 1g/100mL (PREMIX)  2 g Intravenous Once Madyson Smith, APRN       • morphine injection 1 mg  1 mg Intravenous Q4H PRN Alvaro Mazariegos MD        And   • naloxone (NARCAN) injection 0.4 mg  0.4 mg Intravenous Q5 Min PRN Alvaro Mazariegos MD       • mupirocin (BACTROBAN) 2 % nasal ointment   Each Nare BID Alvaro Mazariegos MD       • ondansetron (ZOFRAN) injection 4 mg  4 mg Intravenous Q6H PRN Alvaro Mazariegos MD   4 mg at 09/11/19 2133   • pantoprazole (PROTONIX) EC tablet 40 mg  40 mg Oral Q AM Alvaro Mazariegos MD   40 mg at 09/13/19 0618   • potassium chloride (MICRO-K) CR capsule 40 mEq  40 mEq Oral PRN Alvaro Mazariegos MD        Or   • potassium chloride (KLOR-CON) packet 40 mEq  40 mEq Oral PRN Alvaro Mazariegos MD       • potassium chloride 10 mEq in 100 mL IVPB  10 mEq Intravenous Q1H PRAlvaro Rico MD        Or   • potassium chloride 10 mEq in 100 mL IVPB  10 mEq Intravenous Q1H PRN Alvaro Mazariegos MD       • potassium chloride 20 mEq in 50 mL IVPB  20 mEq Intravenous Q1H PRN Alvaro Mazariegos MD       • potassium chloride 20 mEq in 50 mL IVPB  20 mEq Intravenous Q1H PRN Alvaro Mazariegos MD       • potassium chloride 20 mEq in 50 mL IVPB  20 mEq Intravenous Q1H PRAlvaro Rico MD       • primidone (MYSOLINE) tablet 250 mg  250 mg Oral BID Alvaro Mazariegos MD   250 mg at 09/13/19 0857   • Scopolamine (TRANSDERM-SCOP) 1.5 MG/3DAYS patch 1 patch  1 patch Transdermal Q72H Madyson Smith, APRN   1 patch at 09/12/19 1340   • sennosides-docusate sodium (SENOKOT-S) 8.6-50 MG tablet 2 tablet  2 tablet Oral Nightly Alvaro Mazariegos MD   2 tablet at 09/11/19 3805   • sertraline (ZOLOFT) tablet 25 mg  25 mg Oral Daily Alvaro Mazariegos MD   25 mg at 09/13/19 5712   • sodium chloride 0.9 % flush 30 mL  30 mL Intravenous Once  PRN Alvaro Mazariegos MD       • sodium chloride 0.9 % infusion  30 mL/hr Intravenous Continuous PRN Alvaro Mazariegos MD 30 mL/hr at 09/09/19 1235 30 mL/hr at 09/09/19 1235   • traMADol (ULTRAM) tablet 25 mg  25 mg Oral Q6H PRN Madyson Smith APRN           Assessment/Plan   1. RANDA on CKD 9. Baseline unknown.  Creatinine improved. Volume ok by exam. No diuretic today . Re-start low dose diuretics at AM    2. SP 3 vessel CABG, LIMA, SHANIA ligation 9/9/19. Systolic cardiomyopathy  EF 20%.   Mild MR.   3. Anemia post op. Hg stable.   4. Hypotension post op.   5. COPD exacerbation  6. DM2  7. Lupus. On plaquenil at home.   8. Nausea resolved.  NG per leodan.       Nikolas Pedroza MD  09/13/19  1:05 PM

## 2019-09-14 LAB
ANION GAP SERPL CALCULATED.3IONS-SCNC: 14 MMOL/L (ref 5–15)
BUN BLD-MCNC: 34 MG/DL (ref 8–23)
BUN/CREAT SERPL: 29.3 (ref 7–25)
CALCIUM SPEC-SCNC: 7.9 MG/DL (ref 8.6–10.5)
CHLORIDE SERPL-SCNC: 95 MMOL/L (ref 98–107)
CO2 SERPL-SCNC: 23 MMOL/L (ref 22–29)
CREAT BLD-MCNC: 1.16 MG/DL (ref 0.76–1.27)
GFR SERPL CREATININE-BSD FRML MDRD: 62 ML/MIN/1.73
GLUCOSE BLD-MCNC: 110 MG/DL (ref 65–99)
GLUCOSE BLDC GLUCOMTR-MCNC: 135 MG/DL (ref 70–130)
GLUCOSE BLDC GLUCOMTR-MCNC: 154 MG/DL (ref 70–130)
GLUCOSE BLDC GLUCOMTR-MCNC: 183 MG/DL (ref 70–130)
POTASSIUM BLD-SCNC: 3.5 MMOL/L (ref 3.5–5.2)
POTASSIUM BLD-SCNC: 4.1 MMOL/L (ref 3.5–5.2)
SODIUM BLD-SCNC: 132 MMOL/L (ref 136–145)

## 2019-09-14 PROCEDURE — 82962 GLUCOSE BLOOD TEST: CPT

## 2019-09-14 PROCEDURE — 94640 AIRWAY INHALATION TREATMENT: CPT

## 2019-09-14 PROCEDURE — 99232 SBSQ HOSP IP/OBS MODERATE 35: CPT | Performed by: INTERNAL MEDICINE

## 2019-09-14 PROCEDURE — 99231 SBSQ HOSP IP/OBS SF/LOW 25: CPT | Performed by: NURSE PRACTITIONER

## 2019-09-14 PROCEDURE — 84132 ASSAY OF SERUM POTASSIUM: CPT | Performed by: NURSE PRACTITIONER

## 2019-09-14 PROCEDURE — 63710000001 INSULIN LISPRO (HUMAN) PER 5 UNITS: Performed by: INTERNAL MEDICINE

## 2019-09-14 PROCEDURE — 94799 UNLISTED PULMONARY SVC/PX: CPT

## 2019-09-14 PROCEDURE — 25010000002 ENOXAPARIN PER 10 MG: Performed by: THORACIC SURGERY (CARDIOTHORACIC VASCULAR SURGERY)

## 2019-09-14 PROCEDURE — 99024 POSTOP FOLLOW-UP VISIT: CPT | Performed by: NURSE PRACTITIONER

## 2019-09-14 PROCEDURE — 80048 BASIC METABOLIC PNL TOTAL CA: CPT | Performed by: THORACIC SURGERY (CARDIOTHORACIC VASCULAR SURGERY)

## 2019-09-14 RX ORDER — AMIODARONE HYDROCHLORIDE 200 MG/1
200 TABLET ORAL EVERY 12 HOURS SCHEDULED
Qty: 60 TABLET | Refills: 2 | Status: SHIPPED | OUTPATIENT
Start: 2019-09-14 | End: 2019-12-13

## 2019-09-14 RX ORDER — POTASSIUM CHLORIDE 750 MG/1
40 CAPSULE, EXTENDED RELEASE ORAL ONCE
Status: COMPLETED | OUTPATIENT
Start: 2019-09-14 | End: 2019-09-14

## 2019-09-14 RX ORDER — BUDESONIDE AND FORMOTEROL FUMARATE DIHYDRATE 160; 4.5 UG/1; UG/1
2 AEROSOL RESPIRATORY (INHALATION)
Status: DISCONTINUED | OUTPATIENT
Start: 2019-09-14 | End: 2019-09-15 | Stop reason: HOSPADM

## 2019-09-14 RX ORDER — BUMETANIDE 0.25 MG/ML
2 INJECTION INTRAMUSCULAR; INTRAVENOUS ONCE
Status: COMPLETED | OUTPATIENT
Start: 2019-09-14 | End: 2019-09-14

## 2019-09-14 RX ORDER — POTASSIUM CHLORIDE 750 MG/1
20 CAPSULE, EXTENDED RELEASE ORAL DAILY
Qty: 60 CAPSULE | Refills: 1 | Status: SHIPPED | OUTPATIENT
Start: 2019-09-14 | End: 2019-11-13

## 2019-09-14 RX ADMIN — INSULIN LISPRO 1 UNITS: 100 INJECTION, SOLUTION INTRAVENOUS; SUBCUTANEOUS at 20:54

## 2019-09-14 RX ADMIN — MUPIROCIN 1 APPLICATION: 20 OINTMENT TOPICAL at 20:54

## 2019-09-14 RX ADMIN — SENNOSIDES AND DOCUSATE SODIUM 2 TABLET: 8.6; 5 TABLET ORAL at 20:53

## 2019-09-14 RX ADMIN — POTASSIUM CHLORIDE 40 MEQ: 750 CAPSULE, EXTENDED RELEASE ORAL at 09:25

## 2019-09-14 RX ADMIN — BUDESONIDE AND FORMOTEROL FUMARATE DIHYDRATE 2 PUFF: 160; 4.5 AEROSOL RESPIRATORY (INHALATION) at 19:15

## 2019-09-14 RX ADMIN — PANTOPRAZOLE SODIUM 40 MG: 40 TABLET, DELAYED RELEASE ORAL at 05:55

## 2019-09-14 RX ADMIN — ASPIRIN 325 MG: 325 TABLET, COATED ORAL at 09:26

## 2019-09-14 RX ADMIN — GUAIFENESIN 1200 MG: 600 TABLET, EXTENDED RELEASE ORAL at 20:54

## 2019-09-14 RX ADMIN — MUPIROCIN 1 APPLICATION: 20 OINTMENT TOPICAL at 09:25

## 2019-09-14 RX ADMIN — SERTRALINE 25 MG: 25 TABLET, FILM COATED ORAL at 09:26

## 2019-09-14 RX ADMIN — BUMETANIDE 2 MG: 0.25 INJECTION INTRAMUSCULAR; INTRAVENOUS at 09:25

## 2019-09-14 RX ADMIN — PRIMIDONE 250 MG: 250 TABLET ORAL at 09:26

## 2019-09-14 RX ADMIN — LINAGLIPTIN 5 MG: 5 TABLET, FILM COATED ORAL at 09:26

## 2019-09-14 RX ADMIN — AMIODARONE HYDROCHLORIDE 200 MG: 200 TABLET ORAL at 09:26

## 2019-09-14 RX ADMIN — AMIODARONE HYDROCHLORIDE 200 MG: 200 TABLET ORAL at 20:54

## 2019-09-14 RX ADMIN — ATORVASTATIN CALCIUM 40 MG: 20 TABLET, FILM COATED ORAL at 20:53

## 2019-09-14 RX ADMIN — GUAIFENESIN 1200 MG: 600 TABLET, EXTENDED RELEASE ORAL at 09:26

## 2019-09-14 RX ADMIN — PRIMIDONE 250 MG: 250 TABLET ORAL at 20:54

## 2019-09-14 RX ADMIN — ENOXAPARIN SODIUM 40 MG: 40 INJECTION SUBCUTANEOUS at 18:28

## 2019-09-14 NOTE — SIGNIFICANT NOTE
09/14/19 1047   Rehab Treatment   Discipline physical therapist   Reason Treatment Not Performed other (see comments)  (Patient is being D/C'ed home. Will not see for PT at this time.)

## 2019-09-14 NOTE — PLAN OF CARE
Problem: Patient Care Overview  Goal: Plan of Care Review  Outcome: Ongoing (interventions implemented as appropriate)   09/14/19 8205   Coping/Psychosocial   Plan of Care Reviewed With patient   Plan of Care Review   Progress improving   OTHER   Outcome Summary VSS. NSR on monitor. No c/o pain this shift. Rested well over night. Probable d/c today. Will continue to monitor.

## 2019-09-14 NOTE — PROGRESS NOTES
-MV CAD, mild ischemic mitral incompetence-- s/p CABGx3 LIMA, EVH left SVG, SHANIA ligation--POD#5 Mazariegos  -NSTEMI  -ICM, EF 20%  -Acute on chronic HFrEF--maximize meds prior to discharge  -RANDA--cr 1.4, renal following  -COPD with exacerbation--pulm following  -DM, type 2--a1c 6.1  -HLD--statin  -Current tobacco abuse--cessation d/w pt  -PVD--has been on Pletal  -Hx anxiety/depression--cont Zoloft  -Hx Lupus--Plaquenil at home  -Hx essential tremors--on primidone per neurology  -Mediastinal adenopathy--per CT scan, follow up recommended  -Post op anemia, expected acute blood loss     Encourage pulmonary toilet.   Increase activity as able.  Looks good today.  Repeat echo with EF 28%- orders placed for external defibrillator at discharge  Getting one dose of IV bumex.  Patient eager to go home. I was going to send hime but he appears a little more dyspneic than he was yesterday.  Unable to start BB or ace due to hypotension.  Reinforced s/s of volume overload.  Sternal precautions and instructed the patient and wife to call with and all questions.   May be able to go home tomorrow, we will see how he does        LEONIDAS Bello  09/14/19  10:16 AM

## 2019-09-14 NOTE — PROGRESS NOTES
"[facility] PULMONARY CARE         Dr Gates Sayied   LOS: 8 days   Patient Care Team:  Ean Farmer MD as PCP - General (Internal Medicine)    Chief Complains acute exacerbation of COPD with chronic bronchitis.  MI ischemic cardia myopathy.  Status post CABG with post extubation stridor improved and resolved.    Interval History: Resting comfortably.  Currently on room air.  No overnight complaints reported.    REVIEW OF SYSTEMS:   Postop chest pain    Ventilator/Non-Invasive Ventilation Settings (From admission, onward)   Nasal cannula oxygen 2 L      Vital Signs  Temp:  [97.5 °F (36.4 °C)-98.6 °F (37 °C)] 98 °F (36.7 °C)  Heart Rate:  [71-83] 74  Resp:  [16-20] 18  BP: ()/(59-78) 97/65    Intake/Output Summary (Last 24 hours) at 9/14/2019 1447  Last data filed at 9/14/2019 1200  Gross per 24 hour   Intake 810 ml   Output 1675 ml   Net -865 ml     Flowsheet Rows      First Filed Value   Admission Height  167.6 cm (66\") Documented at 09/07/2019 1234   Admission Weight  64.5 kg (142 lb 3.2 oz) Documented at 09/07/2019 0438          Physical Exam:   General Appearance:   Awake no distress.  Following simple commands.  On nasal oxygen.   Lungs:    Diminished breath sounds bilaterally no crackles or wheezing.    Heart:    Regular rhythm and normal rate, normal S1 and S2, no            murmur, no gallop, no rub, no click   Chest Wall:   Status post CABG   Abdomen:     Normal bowel sounds, no masses, no organomegaly, soft        non-tender, non-distended, no guarding, no rebound                tenderness   Extremities:   Moves all extremities well, no edema, no cyanosis, no             redness  CNS intact  Skin no rashes no nodules       Results Review:        Results from last 7 days   Lab Units 09/14/19  1317 09/14/19  0316 09/13/19  0319 09/12/19  0325   SODIUM mmol/L  --  132* 134* 131*   POTASSIUM mmol/L 4.1 3.5 3.7 3.7   CHLORIDE mmol/L  --  95* 98 94*   CO2 mmol/L  --  23.0 24.4 23.8   BUN " mg/dL  --  34* 38* 42*   CREATININE mg/dL  --  1.16 1.24 1.36*   GLUCOSE mg/dL  --  110* 106* 118*   CALCIUM mg/dL  --  7.9* 8.0* 8.7         Results from last 7 days   Lab Units 09/13/19  0319 09/12/19  0325 09/11/19  0301   WBC 10*3/mm3 10.05 10.58 10.01   HEMOGLOBIN g/dL 8.1* 8.5* 8.1*   HEMATOCRIT % 25.8* 26.9* 26.6*   PLATELETS 10*3/mm3 191 164 152     Results from last 7 days   Lab Units 09/10/19  0259 09/09/19  1247 09/09/19  0435 09/09/19  0011   INR  1.16* 1.35*  --   --    APTT seconds  --  28.7 29.3 37.0*         Results from last 7 days   Lab Units 09/10/19  1200   MAGNESIUM mg/dL 2.3         Results from last 7 days   Lab Units 09/09/19  2254   PH, ARTERIAL pH units 7.382   PO2 ART mm Hg 106.8*   PCO2, ARTERIAL mm Hg 38.7   HCO3 ART mmol/L 23.0       I reviewed the patient's new clinical results.  I personally viewed and interpreted the patient's CXR        Medication Review:     amiodarone 200 mg Oral Q12H   aspirin 325 mg Oral Daily   atorvastatin 40 mg Oral Nightly   bisacodyl 10 mg Rectal Once   enoxaparin 40 mg Subcutaneous Q24H   guaiFENesin 1,200 mg Oral Q12H   insulin lispro 0-12 Units Subcutaneous 4x Daily With Meals & Nightly   lactulose 10 g Oral Daily   linagliptin 5 mg Oral Daily   mupirocin  Each Nare BID   pantoprazole 40 mg Oral Q AM   primidone 250 mg Oral BID   Scopolamine 1 patch Transdermal Q72H   sennosides-docusate sodium 2 tablet Oral Nightly   sertraline 25 mg Oral Daily         sodium chloride 30 mL/hr Last Rate: 30 mL/hr (09/09/19 1235)       ASSESSMENT:   Coronary artery disease status post CABG  Postop respiratory failure  COPD with exacerbation  Chronic bronchitis  Non-ST elevation MI  Ischemic cardiomyopathy  Moderate mitral regurgitation  Tobacco abuse    PLAN:  Post op aggressive pulmonary toilet.  Bronchodilators to be continued.  I will go ahead and start patient on some long-acting bronchodilators  Will likely need outpatient PFTs down the road  Patient to quit smoking  down the road  We will follow along closely  Ambulate as much as possible  Incentive spirometry        Yajaira Ardon MD  09/14/19  2:47 PM

## 2019-09-14 NOTE — PROGRESS NOTES
68 y.o.   LOS: 8 days   Patient Care Team:  Ean Farmer MD as PCP - General (Internal Medicine)    Chief Complaint: Elevated blood sugars    No chief complaint on file.      Subjective   Patient has been doing well.  No major overnight events.  Blood sugars are decent.    Interval History:    Review of Systems:   Constitutional: Positive for appetite change and fatigue.   Eyes: Negative for visual disturbance.   Respiratory: no shortness of breath.    Cardiovascular: no leg swelling.   Gastrointestinal: Negative for abdominal pain.   Endocrine: Negative for polydipsia and polyuria.   Musculoskeletal: Positive for arthralgias and myalgias.   Skin: Negative for pallor.   Neurological: Positive for weakness and numbness.   Psychiatric/Behavioral: Positive for sleep disturbance.     Review of Systems  Objective     Vital Signs   Temp:  [97.5 °F (36.4 °C)-98.6 °F (37 °C)] 97.8 °F (36.6 °C)  Heart Rate:  [71-83] 74  Resp:  [16-20] 18  BP: ()/(62-78) 97/64    Physical Exam:  Gen exam       No distress, appears stated age  EYES:             PERRL, anicteric sclera  ENT:                External ears/nose normal  NECK:             No adenopathy, midline trachea  LUNGS:           Normal chest on inspection, palpation and diminished bilateral breath       sounds on auscultation  CV:                  Normal S1S2, without murmur  ABD:                Non tender, non distended, no hepatosplenomegaly, +BS  EXT:                No edema, cyanosis or clubbing      Physical ExamResults Review:     I reviewed the patient's new clinical results and summarized them in subjective and in plan.      Glucose   Date/Time Value Ref Range Status   09/14/2019 0316 110 (H) 65 - 99 mg/dL Final   09/13/2019 0319 106 (H) 65 - 99 mg/dL Final   09/12/2019 0325 118 (H) 65 - 99 mg/dL Final     Lab Results (last 24 hours)     Procedure Component Value Units Date/Time    POC Glucose Once [[id number]]  (Abnormal) Collected:  09/14/19 7078     Specimen:  Blood Updated:  09/14/19 1527     Glucose 135 mg/dL     Potassium [[id number]]  (Normal) Collected:  09/14/19 1317    Specimen:  Blood Updated:  09/14/19 1345     Potassium 4.1 mmol/L     POC Glucose Once [[id number]]  (Abnormal) Collected:  09/14/19 1008    Specimen:  Blood Updated:  09/14/19 1010     Glucose 154 mg/dL     Basic Metabolic Panel [[id number]]  (Abnormal) Collected:  09/14/19 0316    Specimen:  Blood Updated:  09/14/19 0424     Glucose 110 mg/dL      BUN 34 mg/dL      Creatinine 1.16 mg/dL      Sodium 132 mmol/L      Potassium 3.5 mmol/L      Chloride 95 mmol/L      CO2 23.0 mmol/L      Calcium 7.9 mg/dL      eGFR Non African Amer 62 mL/min/1.73      BUN/Creatinine Ratio 29.3     Anion Gap 14.0 mmol/L     Narrative:       GFR Normal >60  Chronic Kidney Disease <60  Kidney Failure <15    POC Glucose Once [[id number]]  (Normal) Collected:  09/13/19 2045    Specimen:  Blood Updated:  09/13/19 2047     Glucose 127 mg/dL         Imaging Results (last 24 hours)     ** No results found for the last 24 hours. **          Medication Review: done      Current Facility-Administered Medications:   •  acetaminophen (TYLENOL) tablet 500 mg, 500 mg, Oral, Q6H PRN **OR** [DISCONTINUED] acetaminophen (TYLENOL) 160 MG/5ML solution 650 mg, 650 mg, Oral, Q4H PRN **OR** [DISCONTINUED] acetaminophen (TYLENOL) suppository 650 mg, 650 mg, Rectal, Q4H PRN, Alvaro Mazariegos MD  •  ALPRAZolam (XANAX) tablet 0.25 mg, 0.25 mg, Oral, Q12H PRN, Alvaro Mazariegos MD  •  amiodarone (PACERONE) tablet 200 mg, 200 mg, Oral, Q12H, Madyson Smith APRN, 200 mg at 09/14/19 0926  •  aspirin EC tablet 325 mg, 325 mg, Oral, Daily, Alvaro Mazariegos MD, 325 mg at 09/14/19 0926  •  atorvastatin (LIPITOR) tablet 40 mg, 40 mg, Oral, Nightly, Alvaro Mazariegos MD, 40 mg at 09/13/19 2109  •  bisacodyl (DULCOLAX) EC tablet 10 mg, 10 mg, Oral, Daily PRN, Alvaro Mazariegos MD  •  bisacodyl (DULCOLAX) suppository 10 mg, 10 mg, Rectal,  Daily PRN, Alvaro Mazariegos MD  •  bisacodyl (DULCOLAX) suppository 10 mg, 10 mg, Rectal, Once, Madyson Smith APRN  •  budesonide-formoterol (SYMBICORT) 160-4.5 MCG/ACT inhaler 2 puff, 2 puff, Inhalation, BID - RT, Yajaira Ardon MD  •  enoxaparin (LOVENOX) syringe 40 mg, 40 mg, Subcutaneous, Q24H, Alvaro Mazariegos MD, 40 mg at 09/13/19 2109  •  guaiFENesin (MUCINEX) 12 hr tablet 1,200 mg, 1,200 mg, Oral, Q12H, Alvaro Mazariegos MD, 1,200 mg at 09/14/19 0926  •  insulin lispro (humaLOG) injection 0-12 Units, 0-12 Units, Subcutaneous, 4x Daily With Meals & Nightly, Christiano Fernandes MD, 1 Units at 09/12/19 1739  •  ipratropium-albuterol (DUO-NEB) nebulizer solution 3 mL, 3 mL, Nebulization, Q4H PRN, Madyson Smith, LEONIDAS  •  lactulose (CHRONULAC) 10 GM/15ML solution 10 g, 10 g, Oral, Daily, Arleth Easton MD, 10 g at 09/12/19 1340  •  linagliptin (TRADJENTA) tablet 5 mg, 5 mg, Oral, Daily, Christiano Fernandes MD, 5 mg at 09/14/19 0926  •  magnesium hydroxide (MILK OF MAGNESIA) suspension 2400 mg/10mL 10 mL, 10 mL, Oral, Daily PRN, Alvaro Mazariegos MD, 10 mL at 09/11/19 0837  •  morphine injection 1 mg, 1 mg, Intravenous, Q4H PRN **AND** naloxone (NARCAN) injection 0.4 mg, 0.4 mg, Intravenous, Q5 Min PRN, Alvaro Mazariegos MD  •  mupirocin (BACTROBAN) 2 % nasal ointment, , Each Nare, BID, Alvaro Mazariegos MD, 1 application at 09/14/19 0925  •  ondansetron (ZOFRAN) injection 4 mg, 4 mg, Intravenous, Q6H PRN, Alvaro Mazariegos MD, 4 mg at 09/11/19 2133  •  pantoprazole (PROTONIX) EC tablet 40 mg, 40 mg, Oral, Q AM, Alvaro Mazariegos MD, 40 mg at 09/14/19 0555  •  potassium chloride (MICRO-K) CR capsule 40 mEq, 40 mEq, Oral, PRN **OR** potassium chloride (KLOR-CON) packet 40 mEq, 40 mEq, Oral, PRN, Alvaro Mazariegos MD  •  potassium chloride 10 mEq in 100 mL IVPB, 10 mEq, Intravenous, Q1H PRN **OR** potassium chloride 10 mEq in 100 mL IVPB, 10 mEq, Intravenous, Q1H PRN, Alvaro Mazariegos MD  •   potassium chloride 20 mEq in 50 mL IVPB, 20 mEq, Intravenous, Q1H PRN, Alvaro Mazariegos MD  •  potassium chloride 20 mEq in 50 mL IVPB, 20 mEq, Intravenous, Q1H PRN, Alvaro Mazariegos MD  •  potassium chloride 20 mEq in 50 mL IVPB, 20 mEq, Intravenous, Q1H PRN, Alvaro Mazariegos MD  •  primidone (MYSOLINE) tablet 250 mg, 250 mg, Oral, BID, Alvaro Mazariegos MD, 250 mg at 09/14/19 0926  •  Scopolamine (TRANSDERM-SCOP) 1.5 MG/3DAYS patch 1 patch, 1 patch, Transdermal, Q72H, Madyson Smith APRN, 1 patch at 09/12/19 1340  •  sennosides-docusate sodium (SENOKOT-S) 8.6-50 MG tablet 2 tablet, 2 tablet, Oral, Nightly, Alvaro Mazariegos MD, 2 tablet at 09/11/19 2133  •  sertraline (ZOLOFT) tablet 25 mg, 25 mg, Oral, Daily, Alvaro Mazariegos MD, 25 mg at 09/14/19 0926  •  sodium chloride 0.9 % flush 30 mL, 30 mL, Intravenous, Once PRN, Alvaro Mazariegos MD  •  sodium chloride 0.9 % infusion, 30 mL/hr, Intravenous, Continuous PRN, Alvaro Mazariegos MD, Last Rate: 30 mL/hr at 09/09/19 1235, 30 mL/hr at 09/09/19 1235  •  traMADol (ULTRAM) tablet 25 mg, 25 mg, Oral, Q6H PRN, Madyson Smith APRN    Assessment/Plan     Active Hospital Problems    Diagnosis  POA   • Microalbuminuria [R80.9]  Yes   • Type 2 diabetes mellitus with microalbuminuria (CMS/HCC) [E11.29, R80.9]  Yes   • Hyperlipidemia [E78.5]  Yes   • Essential hypertension [I10]  Yes   • H/O agent [place] exposure [Z77.098]  Not Applicable   • COPD (chronic obstructive pulmonary disease) with chronic bronchitis (CMS/HCC) [J44.9]  Yes   • Coronary artery disease involving native coronary artery of native heart with angina pectoris (CMS/HCC) [I25.119]  Yes      Resolved Hospital Problems   No resolved problems to display.     Type 2 diabetes mellitus-uncontrolled  Continue Tradjenta 5 mg oral daily  Continue Humalog sliding scale 3 times daily before meals and at bedtime  Metformin has been discontinued.    Hyperlipidemia  Continue Lipitor 40 mg  "daily.    Discussed plan with Nurse.     Francheska Eli MD.  09/14/19  4:00 PM      EMR Dragon / transcription disclaimer:    \"Dictated utilizing Dragon dictation\".   "

## 2019-09-14 NOTE — PROGRESS NOTES
[facility]  Cardiology Progress Note    Patient Identification:  Name: Cedric Wade  Age: 68 y.o.  Sex: male  :  1950  MRN: [id number]                 Follow Up / Chief Complaint: Management recommendations for CAD       Interval History: CABG x3, remains in sinus rhythm          Subjective:  Denies chest pain, shortness of breath.  Anxious to go home.  Up walking and doing well with that.  Has had BM.  Using IS and flutter valve.              Past Medical History:  Past Medical History:   Diagnosis Date   • Arthritis    • CHF (congestive heart failure) (CMS/[facility])    • COPD (chronic obstructive pulmonary disease) (CMS/[facility])    • Coronary artery disease    • Cutaneous lupus erythematosus    • Diabetes mellitus (CMS/HCC)    • Elevated cholesterol    • GERD (gastroesophageal reflux disease)    • Hypertension      Past Surgical History:  Past Surgical History:   Procedure Laterality Date   • CORONARY ARTERY BYPASS GRAFT N/A 2019    Procedure: INTRAOPERATIVE BUDDY, MIDLINE STENOTOMY WITH CORONARY ARTERY BYPASS GRAPHS X  3 UTILIZING LEFT SAVAGE  AND LEFT ENDOSCOPIC HARVESTED SAPHENOUS VEIN, LIGATION OF LEFT  ATRIAL APPENDAGE; PRP;  Surgeon: Alvaro Mazariegos MD;  Location: [facility];  Service: Cardiothoracic        Social History:   Social History     Tobacco Use   • Smoking status: Current Every Day Smoker     Packs/day: 2.00     Years: 58.00     Pack years: 116.00   • Smokeless tobacco: Never Used   Substance Use Topics   • Alcohol use: No     Frequency: Never      Family History:  Family History   Problem Relation Age of Onset   • Lung cancer Father    • Diabetes Sister           Allergies:  Allergies   Allergen Reactions   • Codeine Nausea And Vomiting     Scheduled Meds:    amiodarone 200 mg Q12H   aspirin 325 mg Daily   atorvastatin 40 mg Nightly   bisacodyl 10 mg Once   budesonide-formoterol 2 puff BID - RT   enoxaparin 40 mg Q24H   guaiFENesin 1,200 mg Q12H   insulin lispro 0-12 Units  "4x Daily With Meals & Nightly   lactulose 10 g Daily   linagliptin 5 mg Daily   mupirocin  BID   pantoprazole 40 mg Q AM   primidone 250 mg BID   Scopolamine 1 patch Q72H   sennosides-docusate sodium 2 tablet Nightly   sertraline 25 mg Daily     I have reviewed the patient's recent medical history and current medications, as well as personally reviewed/interpreted the ECG/telemetry data    INTAKE AND OUTPUT:    Intake/Output Summary (Last 24 hours) at 2019 1717  Last data filed at 2019 1200  Gross per 24 hour   Intake 600 ml   Output 1250 ml   Net -650 ml     ROS  Constitutional: Awake and alert, no fever. No nosebleeds  Abdomen           no abdominal pain   Cardiac              no chest pain  Pulmonary          no shortness of breath      BP 97/64 (BP Location: Right arm, Patient Position: Sitting)   Pulse 74   Temp 97.8 °F (36.6 °C) (Oral)   Resp 18   Ht 167.6 cm (66\")   Wt 66.8 kg (147 lb 3.2 oz)   SpO2 100%   BMI 23.76 kg/m²   General appearance: No acute changes   Neck: Trachea midline; NECK, supple, no thyromegaly or lymphadenopathy   Lungs: Normal size and shape, normal breath sounds, equal distribution of air, no rales and rhonchi   CV: S1-S2 regular, no murmurs, no rub, no gallop   Abdomen: Soft, non-tender; no masses , no abnormal abdominal sounds   Extremities: No deformity , normal color , no peripheral edema   Skin: Normal temperature, turgor and texture; no rash, ulcers              Cardiographics  Telemetry:      SR.       EC/10 SR rate 70         SR with PVCs and LBBB          Echocardiogram:   19  Interpretation Summary     · The left ventricular cavity is mildly dilated.  · Right ventricular cavity is mildly dilated.  · Left atrial cavity size is mildly dilated.  · Calculated EF = 25.0%.  · There is no evidence of pericardial effusion.           Lab Review       Results from last 7 days   Lab Units 09/10/19  1200   MAGNESIUM mg/dL 2.3     Results from last 7 days " "  Lab Units 09/14/19  1317 09/14/19  0316   SODIUM mmol/L  --  132*   POTASSIUM mmol/L 4.1 3.5   BUN mg/dL  --  34*   CREATININE mg/dL  --  1.16   CALCIUM mg/dL  --  7.9*        Results from last 7 days   Lab Units 09/13/19  0319 09/12/19  0325 09/11/19  0301   WBC 10*3/mm3 10.05 10.58 10.01   HEMOGLOBIN g/dL 8.1* 8.5* 8.1*   HEMATOCRIT % 25.8* 26.9* 26.6*   PLATELETS 10*3/mm3 191 164 152     Results from last 7 days   Lab Units 09/10/19  0259 09/09/19  1247 09/09/19  0435 09/09/19  0011   INR  1.16* 1.35*  --   --    APTT seconds  --  28.7 29.3 37.0*     Estimated Creatinine Clearance: 56.8 mL/min (by C-G formula based on SCr of 1.16 mg/dL).      Assessment  1. NSTEMI  2. CAD  3.  RANDA: nephrology following   4.  HLD  5.  Tobacco abuse: recommend cessation  6.  Acute on chronic systolic CHF  7.  COPD  8.  DM  9. PVD  10.  History of lupus    Plan  Status post CABG x3. Continue statin and asa.  BP too low to start BB.    Hemoglobin 8.1 defer to attending.  Creatinine improved.    EF 28% on 9/13/2019.  Currently unable to take ACE due to acute kidney injury.  We will add ACE when okay with nephrology.  Plan redo echo in approximately 90 days regarding cardiomyopathy once medically therapy has been maximized.  Will need to go home on a Lifevest, order is placed in the computer.     Will need Life vest prior to going home, the order is already placed.       Pushed pulm toilet.  Continue supportive care.      9/14/2019  Jadyn Chopra, LEONIDAS              EMR Dragon/Transcription:   \"Dictated utilizing Dragon dictation\".     "

## 2019-09-14 NOTE — PROGRESS NOTES
"   LOS: 8 days    Patient Care Team:  Ean Farmer MD as PCP - General (Internal Medicine)    Chief Complaint:  No chief complaint on file.    Follow-up RANDA on CKD   Subjective     Interval History:   Patient is sitting up in the chair, he is feeling better, he has chest discomfort related to surgery, no shortness of air, no orthopnea PND, no nausea or vomiting, no dysuria or gross hematuria.    Review of Systems:   See above.     Objective     Vital Signs  Temp:  [97.5 °F (36.4 °C)-98.6 °F (37 °C)] 97.5 °F (36.4 °C)  Heart Rate:  [71-83] 83  Resp:  [16-20] 18  BP: ()/(59-78) 106/68    Flowsheet Rows      First Filed Value   Admission Height  167.6 cm (66\") Documented at 09/07/2019 1234   Admission Weight  64.5 kg (142 lb 3.2 oz) Documented at 09/07/2019 0438          No intake/output data recorded.  I/O last 3 completed shifts:  In: 450 [P.O.:450]  Out: 825 [Urine:825]    Intake/Output Summary (Last 24 hours) at 9/14/2019 0739  Last data filed at 9/14/2019 0300  Gross per 24 hour   Intake 450 ml   Output 825 ml   Net -375 ml       Physical Exam:  General Appearance: alert, oriented x 3, no acute distress, appears to be chronically ill and frail, sitting in the chair  Skin: warm and dry  HEENT: pupils round and reactive to light, oral mucosa slightly dry, nonicteric sclera  Neck: supple, no JVD, trachea midline  Lungs: Decreased breath sounds in the bases with scattered rhonchi, unlabored breathing effort  Heart: RRR, normal S1 and S2, no S3, no rub  Abdomen: soft, non-tender,  present bowel sounds to auscultation  : no palpable bladder, no CVA tenderness  Extremities: Trace ankle edema, no cyanosis or clubbing  Neuro: normal speech and mental status            Results Review:    Results from last 7 days   Lab Units 09/14/19  0316 09/13/19  0319 09/12/19  0325 09/11/19  0301   SODIUM mmol/L 132* 134* 131* 135*   POTASSIUM mmol/L 3.5 3.7 3.7 4.6   CHLORIDE mmol/L 95* 98 94* 100   CO2 mmol/L 23.0 " 24.4 23.8 19.9*   BUN mg/dL 34* 38* 42* 34*   CREATININE mg/dL 1.16 1.24 1.36* 1.49*   CALCIUM mg/dL 7.9* 8.0* 8.7 8.7   BILIRUBIN mg/dL  --  0.3  --  0.2   ALK PHOS U/L  --  77  --  62   ALT (SGPT) U/L  --  63*  --  26   AST (SGOT) U/L  --  122*  --  56*   GLUCOSE mg/dL 110* 106* 118* 157*       Estimated Creatinine Clearance: 56.8 mL/min (by C-G formula based on SCr of 1.16 mg/dL).    Results from last 7 days   Lab Units 09/10/19  1200 09/10/19  0259 09/09/19  1652   MAGNESIUM mg/dL 2.3 2.3 2.7*   PHOSPHORUS mg/dL 4.1 4.4 3.9       Results from last 7 days   Lab Units 09/08/19  0817   URIC ACID mg/dL 8.7*       Results from last 7 days   Lab Units 09/13/19  0319 09/12/19  0325 09/11/19  0301 09/10/19  1200 09/10/19  0259   WBC 10*3/mm3 10.05 10.58 10.01 9.58 9.11   HEMOGLOBIN g/dL 8.1* 8.5* 8.1* 7.8* 7.7*   PLATELETS 10*3/mm3 191 164 152 149 160       Results from last 7 days   Lab Units 09/10/19  0259 09/09/19  1247   INR  1.16* 1.35*         Imaging Results (last 24 hours)     ** No results found for the last 24 hours. **          amiodarone 200 mg Oral Q12H   aspirin 325 mg Oral Daily   atorvastatin 40 mg Oral Nightly   bisacodyl 10 mg Rectal Once   enoxaparin 40 mg Subcutaneous Q24H   guaiFENesin 1,200 mg Oral Q12H   insulin lispro 0-12 Units Subcutaneous 4x Daily With Meals & Nightly   lactulose 10 g Oral Daily   linagliptin 5 mg Oral Daily   mupirocin  Each Nare BID   pantoprazole 40 mg Oral Q AM   primidone 250 mg Oral BID   Scopolamine 1 patch Transdermal Q72H   sennosides-docusate sodium 2 tablet Oral Nightly   sertraline 25 mg Oral Daily       sodium chloride 30 mL/hr Last Rate: 30 mL/hr (09/09/19 1235)       Medication Review:   Current Facility-Administered Medications   Medication Dose Route Frequency Provider Last Rate Last Dose   • acetaminophen (TYLENOL) tablet 500 mg  500 mg Oral Q6H PRN Alvaro Mazariegos MD       • ALPRAZolam (XANAX) tablet 0.25 mg  0.25 mg Oral Q12H PRN Alvaro Mazariegos MD        • amiodarone (PACERONE) tablet 200 mg  200 mg Oral Q12H Madyson Smith APRN   200 mg at 09/13/19 2109   • aspirin EC tablet 325 mg  325 mg Oral Daily Alvaro Mazariegos MD   325 mg at 09/13/19 0858   • atorvastatin (LIPITOR) tablet 40 mg  40 mg Oral Nightly Alvaro Mazariegos MD   40 mg at 09/13/19 2109   • bisacodyl (DULCOLAX) EC tablet 10 mg  10 mg Oral Daily PRN Alvaro Mazariegos MD       • bisacodyl (DULCOLAX) suppository 10 mg  10 mg Rectal Daily PRN Alvaro Mazariegos MD       • bisacodyl (DULCOLAX) suppository 10 mg  10 mg Rectal Once Madyson Smith APRN       • enoxaparin (LOVENOX) syringe 40 mg  40 mg Subcutaneous Q24H Alvaro Mazariegos MD   40 mg at 09/13/19 2109   • guaiFENesin (MUCINEX) 12 hr tablet 1,200 mg  1,200 mg Oral Q12H Alvaro Mazariegos MD   1,200 mg at 09/13/19 2109   • insulin lispro (humaLOG) injection 0-12 Units  0-12 Units Subcutaneous 4x Daily With Meals & Nightly Christiano Fernandes MD   1 Units at 09/12/19 1739   • ipratropium-albuterol (DUO-NEB) nebulizer solution 3 mL  3 mL Nebulization Q4H PRN Madyson Smith APRN       • lactulose (CHRONULAC) 10 GM/15ML solution 10 g  10 g Oral Daily Arleth Easton MD   10 g at 09/12/19 1340   • linagliptin (TRADJENTA) tablet 5 mg  5 mg Oral Daily Christiano Fernandes MD   5 mg at 09/13/19 0857   • magnesium hydroxide (MILK OF MAGNESIA) suspension 2400 mg/10mL 10 mL  10 mL Oral Daily PRN Alvaro Mazariegos MD   10 mL at 09/11/19 0837   • morphine injection 1 mg  1 mg Intravenous Q4H PRN Alvaro Mazariegos MD        And   • naloxone (NARCAN) injection 0.4 mg  0.4 mg Intravenous Q5 Min PRN Alvaro Mazariegos MD       • mupirocin (BACTROBAN) 2 % nasal ointment   Each Nare BID Alvaro Mazariegos MD   1 application at 09/13/19 2108   • ondansetron (ZOFRAN) injection 4 mg  4 mg Intravenous Q6H PRN Alvaro Mazariegos MD   4 mg at 09/11/19 2133   • pantoprazole (PROTONIX) EC tablet 40 mg  40 mg Oral Q AM Alvaro Mazariegos MD   40 mg at  09/14/19 0555   • potassium chloride (MICRO-K) CR capsule 40 mEq  40 mEq Oral PRN Alvaro Mazariegos MD        Or   • potassium chloride (KLOR-CON) packet 40 mEq  40 mEq Oral PRN Alvaro Mazariegos MD       • potassium chloride 10 mEq in 100 mL IVPB  10 mEq Intravenous Q1H PRN Alvaro Mazariegos MD        Or   • potassium chloride 10 mEq in 100 mL IVPB  10 mEq Intravenous Q1H PRN Alvaro Mazariegos MD       • potassium chloride 20 mEq in 50 mL IVPB  20 mEq Intravenous Q1H PRN Alvaro Mazariegos MD       • potassium chloride 20 mEq in 50 mL IVPB  20 mEq Intravenous Q1H PRN Alvaro Mazariegos MD       • potassium chloride 20 mEq in 50 mL IVPB  20 mEq Intravenous Q1H PRN Alvaro Mazariegos MD       • primidone (MYSOLINE) tablet 250 mg  250 mg Oral BID Alvaro Mazariegos MD   250 mg at 09/13/19 2108   • Scopolamine (TRANSDERM-SCOP) 1.5 MG/3DAYS patch 1 patch  1 patch Transdermal Q72H Madyson Smith APRN   1 patch at 09/12/19 1340   • sennosides-docusate sodium (SENOKOT-S) 8.6-50 MG tablet 2 tablet  2 tablet Oral Nightly Alvaro Mazariegos MD   2 tablet at 09/11/19 2133   • sertraline (ZOLOFT) tablet 25 mg  25 mg Oral Daily Alvaro Mazariegos MD   25 mg at 09/13/19 0858   • sodium chloride 0.9 % flush 30 mL  30 mL Intravenous Once PRN Alvaro Mazariegos MD       • sodium chloride 0.9 % infusion  30 mL/hr Intravenous Continuous PRN Alvaro Mazariegos MD 30 mL/hr at 09/09/19 1235 30 mL/hr at 09/09/19 1235   • traMADol (ULTRAM) tablet 25 mg  25 mg Oral Q6H PRN Madyson Smith APRN           Assessment/Plan   1. RANDA on CKD . Baseline unknown.  Creatinine is down to 1.16, sodium is 132 and potassium 3.5, he had some increased lower extremity edema but his central volume appears to be acceptable  2. SP 3 vessel CABG, LIMA, SHANIA ligation 9/9/19. Systolic cardiomyopathy  EF 20%.   Mild MR.   3.  Anemia, hemoglobin yesterday was 8.1  4.  Postoperative hypotension resolved  5. COPD stable  6. DM2  7. Lupus. On plaquenil at  home.       Plan:  1.  Give one dose of diuretics today and replete potassium  2.  Surveillance labs      Mihai Madrigal MD  09/14/19  7:39 AM

## 2019-09-15 ENCOUNTER — APPOINTMENT (OUTPATIENT)
Dept: GENERAL RADIOLOGY | Facility: HOSPITAL | Age: 69
End: 2019-09-15

## 2019-09-15 VITALS
HEIGHT: 66 IN | BODY MASS INDEX: 23.91 KG/M2 | OXYGEN SATURATION: 100 % | RESPIRATION RATE: 18 BRPM | WEIGHT: 148.8 LBS | DIASTOLIC BLOOD PRESSURE: 65 MMHG | HEART RATE: 73 BPM | TEMPERATURE: 98.5 F | SYSTOLIC BLOOD PRESSURE: 91 MMHG

## 2019-09-15 LAB
ALBUMIN SERPL-MCNC: 3 G/DL (ref 3.5–5.2)
ANION GAP SERPL CALCULATED.3IONS-SCNC: 13.3 MMOL/L (ref 5–15)
BASOPHILS # BLD AUTO: 0.02 10*3/MM3 (ref 0–0.2)
BASOPHILS NFR BLD AUTO: 0.2 % (ref 0–1.5)
BUN BLD-MCNC: 32 MG/DL (ref 8–23)
BUN/CREAT SERPL: 25.8 (ref 7–25)
CALCIUM SPEC-SCNC: 8.2 MG/DL (ref 8.6–10.5)
CHLORIDE SERPL-SCNC: 95 MMOL/L (ref 98–107)
CO2 SERPL-SCNC: 21.7 MMOL/L (ref 22–29)
CREAT BLD-MCNC: 1.24 MG/DL (ref 0.76–1.27)
DEPRECATED RDW RBC AUTO: 48.9 FL (ref 37–54)
EOSINOPHIL # BLD AUTO: 0.93 10*3/MM3 (ref 0–0.4)
EOSINOPHIL NFR BLD AUTO: 8.7 % (ref 0.3–6.2)
ERYTHROCYTE [DISTWIDTH] IN BLOOD BY AUTOMATED COUNT: 13.9 % (ref 12.3–15.4)
GFR SERPL CREATININE-BSD FRML MDRD: 58 ML/MIN/1.73
GLUCOSE BLD-MCNC: 114 MG/DL (ref 65–99)
GLUCOSE BLDC GLUCOMTR-MCNC: 126 MG/DL (ref 70–130)
GLUCOSE BLDC GLUCOMTR-MCNC: 138 MG/DL (ref 70–130)
HCT VFR BLD AUTO: 25.5 % (ref 37.5–51)
HGB BLD-MCNC: 8.3 G/DL (ref 13–17.7)
IMM GRANULOCYTES # BLD AUTO: 0.23 10*3/MM3 (ref 0–0.05)
IMM GRANULOCYTES NFR BLD AUTO: 2.2 % (ref 0–0.5)
LYMPHOCYTES # BLD AUTO: 1.41 10*3/MM3 (ref 0.7–3.1)
LYMPHOCYTES NFR BLD AUTO: 13.3 % (ref 19.6–45.3)
MAGNESIUM SERPL-MCNC: 1.9 MG/DL (ref 1.6–2.4)
MCH RBC QN AUTO: 31.6 PG (ref 26.6–33)
MCHC RBC AUTO-ENTMCNC: 32.5 G/DL (ref 31.5–35.7)
MCV RBC AUTO: 97 FL (ref 79–97)
MONOCYTES # BLD AUTO: 0.68 10*3/MM3 (ref 0.1–0.9)
MONOCYTES NFR BLD AUTO: 6.4 % (ref 5–12)
NEUTROPHILS # BLD AUTO: 7.36 10*3/MM3 (ref 1.7–7)
NEUTROPHILS NFR BLD AUTO: 69.2 % (ref 42.7–76)
NRBC BLD AUTO-RTO: 0 /100 WBC (ref 0–0.2)
PHOSPHATE SERPL-MCNC: 3.3 MG/DL (ref 2.5–4.5)
PLATELET # BLD AUTO: 267 10*3/MM3 (ref 140–450)
PMV BLD AUTO: 10 FL (ref 6–12)
POTASSIUM BLD-SCNC: 3.6 MMOL/L (ref 3.5–5.2)
RBC # BLD AUTO: 2.63 10*6/MM3 (ref 4.14–5.8)
SODIUM BLD-SCNC: 130 MMOL/L (ref 136–145)
URATE SERPL-MCNC: 6 MG/DL (ref 3.4–7)
WBC NRBC COR # BLD: 10.63 10*3/MM3 (ref 3.4–10.8)

## 2019-09-15 PROCEDURE — 99231 SBSQ HOSP IP/OBS SF/LOW 25: CPT | Performed by: NURSE PRACTITIONER

## 2019-09-15 PROCEDURE — 84550 ASSAY OF BLOOD/URIC ACID: CPT | Performed by: INTERNAL MEDICINE

## 2019-09-15 PROCEDURE — 82962 GLUCOSE BLOOD TEST: CPT

## 2019-09-15 PROCEDURE — 99024 POSTOP FOLLOW-UP VISIT: CPT | Performed by: NURSE PRACTITIONER

## 2019-09-15 PROCEDURE — 83735 ASSAY OF MAGNESIUM: CPT | Performed by: INTERNAL MEDICINE

## 2019-09-15 PROCEDURE — 85025 COMPLETE CBC W/AUTO DIFF WBC: CPT | Performed by: INTERNAL MEDICINE

## 2019-09-15 PROCEDURE — 94799 UNLISTED PULMONARY SVC/PX: CPT

## 2019-09-15 PROCEDURE — 80069 RENAL FUNCTION PANEL: CPT | Performed by: INTERNAL MEDICINE

## 2019-09-15 PROCEDURE — 71045 X-RAY EXAM CHEST 1 VIEW: CPT

## 2019-09-15 RX ORDER — BUMETANIDE 0.25 MG/ML
2 INJECTION INTRAMUSCULAR; INTRAVENOUS EVERY 8 HOURS
Status: DISCONTINUED | OUTPATIENT
Start: 2019-09-15 | End: 2019-09-15 | Stop reason: HOSPADM

## 2019-09-15 RX ADMIN — PRIMIDONE 250 MG: 250 TABLET ORAL at 08:38

## 2019-09-15 RX ADMIN — AMIODARONE HYDROCHLORIDE 200 MG: 200 TABLET ORAL at 08:37

## 2019-09-15 RX ADMIN — MUPIROCIN 1 APPLICATION: 20 OINTMENT TOPICAL at 08:37

## 2019-09-15 RX ADMIN — SERTRALINE 25 MG: 25 TABLET, FILM COATED ORAL at 08:37

## 2019-09-15 RX ADMIN — LINAGLIPTIN 5 MG: 5 TABLET, FILM COATED ORAL at 08:37

## 2019-09-15 RX ADMIN — POTASSIUM CHLORIDE 40 MEQ: 750 CAPSULE, EXTENDED RELEASE ORAL at 05:34

## 2019-09-15 RX ADMIN — GUAIFENESIN 1200 MG: 600 TABLET, EXTENDED RELEASE ORAL at 08:38

## 2019-09-15 RX ADMIN — ASPIRIN 325 MG: 325 TABLET, COATED ORAL at 08:38

## 2019-09-15 RX ADMIN — SCOPALAMINE 1 PATCH: 1 PATCH, EXTENDED RELEASE TRANSDERMAL at 08:41

## 2019-09-15 RX ADMIN — PANTOPRAZOLE SODIUM 40 MG: 40 TABLET, DELAYED RELEASE ORAL at 05:33

## 2019-09-15 RX ADMIN — BUDESONIDE AND FORMOTEROL FUMARATE DIHYDRATE 2 PUFF: 160; 4.5 AEROSOL RESPIRATORY (INHALATION) at 06:52

## 2019-09-15 NOTE — PROGRESS NOTES
"   LOS: 9 days    Patient Care Team:  Ean Farmer MD as PCP - General (Internal Medicine)    Chief Complaint:  No chief complaint on file.    Follow-up RANDA on CKD   Subjective     Interval History:   Patient is sitting up in the chair, he is feeling better, he has chest discomfort related to surgery, no shortness of air, no orthopnea PND, no nausea or vomiting, no dysuria or gross hematuria.  He has increased edema    Review of Systems:   See above.     Objective     Vital Signs  Temp:  [97.7 °F (36.5 °C)-98.5 °F (36.9 °C)] 98.5 °F (36.9 °C)  Heart Rate:  [73-85] 73  Resp:  [18-20] 18  BP: ()/(62-75) 91/65    Flowsheet Rows      First Filed Value   Admission Height  167.6 cm (66\") Documented at 09/07/2019 1234   Admission Weight  64.5 kg (142 lb 3.2 oz) Documented at 09/07/2019 0438          I/O this shift:  In: 360 [P.O.:360]  Out: -   I/O last 3 completed shifts:  In: 1320 [P.O.:1320]  Out: 2400 [Urine:2400]    Intake/Output Summary (Last 24 hours) at 9/15/2019 1333  Last data filed at 9/15/2019 0747  Gross per 24 hour   Intake 1080 ml   Output 1150 ml   Net -70 ml       Physical Exam:  General Appearance: alert, oriented x 3, no acute distress, appears to be chronically ill and frail, sitting in the chair  Skin: warm and dry  HEENT: pupils round and reactive to light, oral mucosa slightly dry, nonicteric sclera  Neck: supple, no JVD, trachea midline  Lungs: Decreased breath sounds in the bases with scattered rhonchi, unlabored breathing effort  Heart: RRR, normal S1 and S2, no S3, no rub  Abdomen: soft, non-tender,  present bowel sounds to auscultation  : no palpable bladder, no CVA tenderness  Extremities: 2+ pretibial and pedal edema, no cyanosis or clubbing  Neuro: normal speech and mental status            Results Review:    Results from last 7 days   Lab Units 09/15/19  0332 09/14/19  1317 09/14/19  0316 09/13/19  0319  09/11/19  0301   SODIUM mmol/L 130*  --  132* 134*   < > 135* "   POTASSIUM mmol/L 3.6 4.1 3.5 3.7   < > 4.6   CHLORIDE mmol/L 95*  --  95* 98   < > 100   CO2 mmol/L 21.7*  --  23.0 24.4   < > 19.9*   BUN mg/dL 32*  --  34* 38*   < > 34*   CREATININE mg/dL 1.24  --  1.16 1.24   < > 1.49*   CALCIUM mg/dL 8.2*  --  7.9* 8.0*   < > 8.7   BILIRUBIN mg/dL  --   --   --  0.3  --  0.2   ALK PHOS U/L  --   --   --  77  --  62   ALT (SGPT) U/L  --   --   --  63*  --  26   AST (SGOT) U/L  --   --   --  122*  --  56*   GLUCOSE mg/dL 114*  --  110* 106*   < > 157*    < > = values in this interval not displayed.       Estimated Creatinine Clearance: 53.7 mL/min (by C-G formula based on SCr of 1.24 mg/dL).    Results from last 7 days   Lab Units 09/15/19  0332 09/10/19  1200 09/10/19  0259   MAGNESIUM mg/dL 1.9 2.3 2.3   PHOSPHORUS mg/dL 3.3 4.1 4.4       Results from last 7 days   Lab Units 09/15/19  0332   URIC ACID mg/dL 6.0       Results from last 7 days   Lab Units 09/15/19  0332 09/13/19  0319 09/12/19  0325 09/11/19  0301 09/10/19  1200   WBC 10*3/mm3 10.63 10.05 10.58 10.01 9.58   HEMOGLOBIN g/dL 8.3* 8.1* 8.5* 8.1* 7.8*   PLATELETS 10*3/mm3 267 191 164 152 149       Results from last 7 days   Lab Units 09/10/19  0259 09/09/19  1247   INR  1.16* 1.35*         Imaging Results (last 24 hours)     Procedure Component Value Units Date/Time    XR Chest 1 View [[id number]] Collected:  09/15/19 0511     Updated:  09/15/19 0516    Narrative:       PORTABLE CHEST RADIOGRAPH     HISTORY: Postop open heart surgery     COMPARISON: 09/12/2019     FINDINGS:  Cardiomegaly and vascular congestion are noted. There is a small left  apical pneumothorax. It is stable compared to prior exam. Increasing  consolidation is noted at the left lung base. There is a small left  pleural effusion and trace right pleural effusion.       Impression:          1. Stable tiny left apical pneumothorax.  2. Increasing consolidation identified at the left lung base.     This report was finalized on 9/15/2019 5:13 AM by   Clarissa Duckworth M.D.             amiodarone 200 mg Oral Q12H   aspirin 325 mg Oral Daily   atorvastatin 40 mg Oral Nightly   bisacodyl 10 mg Rectal Once   budesonide-formoterol 2 puff Inhalation BID - RT   enoxaparin 40 mg Subcutaneous Q24H   guaiFENesin 1,200 mg Oral Q12H   insulin lispro 0-12 Units Subcutaneous 4x Daily With Meals & Nightly   lactulose 10 g Oral Daily   linagliptin 5 mg Oral Daily   mupirocin  Each Nare BID   pantoprazole 40 mg Oral Q AM   primidone 250 mg Oral BID   Scopolamine 1 patch Transdermal Q72H   sennosides-docusate sodium 2 tablet Oral Nightly   sertraline 25 mg Oral Daily       sodium chloride 30 mL/hr Last Rate: 30 mL/hr (09/09/19 1235)       Medication Review:   Current Facility-Administered Medications   Medication Dose Route Frequency Provider Last Rate Last Dose   • acetaminophen (TYLENOL) tablet 500 mg  500 mg Oral Q6H PRN Alvaro Mazariegos MD       • ALPRAZolam (XANAX) tablet 0.25 mg  0.25 mg Oral Q12H PRN Alvaro Mazariegos MD       • amiodarone (PACERONE) tablet 200 mg  200 mg Oral Q12H Madyson Smith APRN   200 mg at 09/15/19 0837   • aspirin EC tablet 325 mg  325 mg Oral Daily Alvaro Mazariegos MD   325 mg at 09/15/19 0838   • atorvastatin (LIPITOR) tablet 40 mg  40 mg Oral Nightly Alvaro Mazariegos MD   40 mg at 09/14/19 2053   • bisacodyl (DULCOLAX) EC tablet 10 mg  10 mg Oral Daily PRN Alvaro Mazariegos MD       • bisacodyl (DULCOLAX) suppository 10 mg  10 mg Rectal Daily PRN Alvaro Mazariegos MD       • bisacodyl (DULCOLAX) suppository 10 mg  10 mg Rectal Once Madyson Smith APRN       • budesonide-formoterol (SYMBICORT) 160-4.5 MCG/ACT inhaler 2 puff  2 puff Inhalation BID - RT Yajaira Ardon MD   2 puff at 09/15/19 0652   • enoxaparin (LOVENOX) syringe 40 mg  40 mg Subcutaneous Q24H Alvaro Mazariegos MD   40 mg at 09/14/19 1828   • guaiFENesin (MUCINEX) 12 hr tablet 1,200 mg  1,200 mg Oral Q12H Alvaro Mazariegos MD   1,200 mg at 09/15/19 0838   •  insulin lispro (humaLOG) injection 0-12 Units  0-12 Units Subcutaneous 4x Daily With Meals & Nightly Christiano Fernandes MD   1 Units at 09/14/19 2054   • ipratropium-albuterol (DUO-NEB) nebulizer solution 3 mL  3 mL Nebulization Q4H PRN Madyson Smith APRN       • lactulose (CHRONULAC) 10 GM/15ML solution 10 g  10 g Oral Daily Arleth Easton MD   10 g at 09/12/19 1340   • linagliptin (TRADJENTA) tablet 5 mg  5 mg Oral Daily Christiano Fernandes MD   5 mg at 09/15/19 0837   • magnesium hydroxide (MILK OF MAGNESIA) suspension 2400 mg/10mL 10 mL  10 mL Oral Daily PRN Alvaro Mazariegos MD   10 mL at 09/11/19 0837   • morphine injection 1 mg  1 mg Intravenous Q4H PRN Alvaro Mazariegos MD        And   • naloxone (NARCAN) injection 0.4 mg  0.4 mg Intravenous Q5 Min PRN Alvaro Mazariegos MD       • mupirocin (BACTROBAN) 2 % nasal ointment   Each Nare BID Alvaro Mazariegos MD   1 application at 09/15/19 0837   • ondansetron (ZOFRAN) injection 4 mg  4 mg Intravenous Q6H PRN Alvaro Mazariegos MD   4 mg at 09/11/19 2133   • pantoprazole (PROTONIX) EC tablet 40 mg  40 mg Oral Q AM Alvaro Mazariegos MD   40 mg at 09/15/19 0533   • potassium chloride (MICRO-K) CR capsule 40 mEq  40 mEq Oral PRN Alvaro Mazariegos MD   40 mEq at 09/15/19 0534    Or   • potassium chloride (KLOR-CON) packet 40 mEq  40 mEq Oral PRN Alvaro Mazariegos MD       • potassium chloride 10 mEq in 100 mL IVPB  10 mEq Intravenous Q1H PRN Alvaro Mazariegos MD        Or   • potassium chloride 10 mEq in 100 mL IVPB  10 mEq Intravenous Q1H PRN Alvaro Mazariegos MD       • potassium chloride 20 mEq in 50 mL IVPB  20 mEq Intravenous Q1H PRN Alvaro Mazariegos MD       • potassium chloride 20 mEq in 50 mL IVPB  20 mEq Intravenous Q1H PRN Alvaro Mazariegos MD       • potassium chloride 20 mEq in 50 mL IVPB  20 mEq Intravenous Q1H PRN Alvaro Mazariegos MD       • primidone (MYSOLINE) tablet 250 mg  250 mg Oral BID Alvaro Mazariegos MD   250 mg at  09/15/19 0838   • Scopolamine (TRANSDERM-SCOP) 1.5 MG/3DAYS patch 1 patch  1 patch Transdermal Q72H Madyson Smith APRN   1 patch at 09/15/19 0841   • sennosides-docusate sodium (SENOKOT-S) 8.6-50 MG tablet 2 tablet  2 tablet Oral Nightly Alvaro Mazariegos MD   2 tablet at 09/14/19 2053   • sertraline (ZOLOFT) tablet 25 mg  25 mg Oral Daily Alvaro Mazariegos MD   25 mg at 09/15/19 0837   • sodium chloride 0.9 % flush 30 mL  30 mL Intravenous Once PRN Alvaro Mazariegos MD       • sodium chloride 0.9 % infusion  30 mL/hr Intravenous Continuous PRN Alvaro Mazariegos MD 30 mL/hr at 09/09/19 1235 30 mL/hr at 09/09/19 1235   • traMADol (ULTRAM) tablet 25 mg  25 mg Oral Q6H PRN Madyson Smith APRN           Assessment/Plan   1. RANDA on CKD . Baseline unknown.  Creatinine is 1.24, stable, sodium is 130 and potassium 3.6, he had some increased lower extremity edema but his central volume appears to be acceptable  2. SP 3 vessel CABG, LIMA, SHANIA ligation 9/9/19. Systolic cardiomyopathy  EF 20%.   Mild MR.   3.  Anemia, hemoglobin yesterday was 8.3  4.  Postoperative hypotension resolved  5. COPD stable  6. DM2  7. Lupus. On plaquenil at home.   8.  Hyponatremia associated with fluid excess.      Plan:  1.  Will diurese and replete potassium  2.  Surveillance labs    I discussed the case with the patient and his daughter at the bedside      Mihai Madrigal MD  09/15/19  1:33 PM

## 2019-09-15 NOTE — PROGRESS NOTES
-MV CAD, mild ischemic mitral incompetence-- s/p CABGx3 LIMA, EVH left SVG, SHANIA ligation--POD#6 Mazariegos  -NSTEMI  -ICM, EF 20%  -Acute on chronic HFrEF--maximize meds prior to discharge  -RANDA--cr 1.4, renal following  -COPD with exacerbation--pulm following  -DM, type 2--a1c 6.1  -HLD--statin  -Current tobacco abuse--cessation d/w pt  -PVD--has been on Pletal  -Hx anxiety/depression--cont Zoloft  -Hx Lupus--Plaquenil at home  -Hx essential tremors--on primidone per neurology  -Mediastinal adenopathy--per CT scan, follow up recommended  -Post op anemia, expected acute blood loss     Encourage pulmonary toilet.   Increase activity as able.  Looks good today.  Repeat echo with EF 28%- orders placed for external defibrillator at discharge  Okay for discharge home with home health     LEONIDAS Bello  09/15/19  10:16 AM

## 2019-09-15 NOTE — PLAN OF CARE
Problem: Patient Care Overview  Goal: Plan of Care Review  Outcome: Ongoing (interventions implemented as appropriate)   09/14/19 1800   Coping/Psychosocial   Plan of Care Reviewed With patient   Plan of Care Review   Progress improving   OTHER   Outcome Summary VSS. NSR. No c/o pain. K+ replaced, recheck WNL. Eager to go home. Will continue to monitor.

## 2019-09-15 NOTE — DISCHARGE SUMMARY
Date of Admission:   Date of Discharge:  9/15/2019    Discharge Diagnosis:   -MV CAD, mild ischemic mitral incompetence-- s/p CABGx3 LIMA, EVH left SVG, SHANIA ligation--POD#6 [facility]  -NSTEMI  -ICM, EF 20%  -Acute on chronic HFrEF--maximize meds prior to discharge  -RANDA--cr 1.4, renal following  -COPD with exacerbation--pulm following  -DM, type 2--a1c 6.1  -HLD--statin  -Current tobacco abuse--cessation d/w pt  -PVD--has been on Pletal  -Hx anxiety/depression--cont Zoloft  -Hx Lupus--Plaquenil at home  -Hx essential tremors--on primidone per neurology  -Mediastinal adenopathy--per CT scan, follow up recommended  -Post op anemia, expected acute blood loss    Presenting Problem/History of Present Illness  Coronary artery disease involving native coronary artery of native heart with angina pectoris (CMS/Formerly [facility]) [I25.119]     Hospital Course  Patient is a 68 y.o. male presented for cardiac surgery on 9/9 he underwent an Urgent coronary bypass x3 with LIMA to distal LAD, saphenous vein to OM 2, saphenous vein to PDA.  Left atrial appendage ligation.  Left lower extremity endoscopic vein harvest.  Intraoperative transesophageal echocardiogram.  PRP application to LIMA bed and sternum with Dr. Mazariegos (see op report for full details).  He had CKD preoperatively we consulted renal for help managing postoperatively.  We consulted cardiology, endocrine and pulmonary for help managing post operatively.  He made progress.  We were unable to start a beta blocker or ACE/ARB therapy due to hypotension.  A repeat echocardiogram revealed an EF of 28%.  A life-vest was ordered for discharge.  He met PT goals.  Adequate PO intake.  Urinating and defecating normally.  On post op day number 6 he was deemed ready for discharge.  Follow up appointments as below.  Sternal precautions reviewed.  Signs and symptoms of sternal instability and sternal infection reviewed.  Patient instructed to call with any and all questions and concerns.    Procedures  Performed  Procedure(s):  INTRAOPERATIVE BUDDY, MIDLINE STENOTOMY WITH CORONARY ARTERY BYPASS GRAPHS X  3 UTILIZING LEFT SAVAGE  AND LEFT ENDOSCOPIC HARVESTED SAPHENOUS VEIN, LIGATION OF LEFT  ATRIAL APPENDAGE; PRP       Consults:   Consults     Date and Time Order Name Status Description    9/7/2019 1403 Inpatient Nephrology Consult Completed     9/7/2019 1357 Inpatient Cardiology Consult Completed     9/7/2019 1357 Inpatient Pulmonology Consult Completed     9/7/2019 1357 Inpatient Endocrinology Consult Completed           Pertinent Test Results:    Lab Results   Component Value Date    WBC 10.63 09/15/2019    HGB 8.3 (L) 09/15/2019    HCT 25.5 (L) 09/15/2019    MCV 97.0 09/15/2019     09/15/2019      Lab Results   Component Value Date    GLUCOSE 114 (H) 09/15/2019    CALCIUM 8.2 (L) 09/15/2019     (L) 09/15/2019    K 3.6 09/15/2019    CO2 21.7 (L) 09/15/2019    CL 95 (L) 09/15/2019    BUN 32 (H) 09/15/2019    CREATININE 1.24 09/15/2019    EGFRIFNONA 58 (L) 09/15/2019    BCR 25.8 (H) 09/15/2019    ANIONGAP 13.3 09/15/2019     Lab Results   Component Value Date    INR 1.16 (H) 09/10/2019    PROTIME 14.5 (H) 09/10/2019         Condition on Discharge:  stable    Vital Signs  Temp:  [97.7 °F (36.5 °C)-98.3 °F (36.8 °C)] 97.7 °F (36.5 °C)  Heart Rate:  [73-85] 80  Resp:  [18-20] 18  BP: ()/(62-75) 100/67      Discharge Disposition  Home-Health Care [facility] [place] [place]    Discharge Medications     Discharge Medications      New Medications      Instructions Start Date   amiodarone 200 MG tablet  Commonly known as:  PACERONE   200 mg, Oral, Every 12 Hours Scheduled      potassium chloride 10 MEQ CR capsule  Commonly known as:  MICRO-K   20 mEq, Oral, Daily      traMADol 50 MG tablet  Commonly known as:  ULTRAM   25 mg, Oral, Every 6 Hours PRN         Continue These Medications      Instructions Start Date   albuterol sulfate  (90 Base) MCG/ACT inhaler  Commonly known as:  PROVENTIL HFA;VENTOLIN HFA;PROAIR HFA   2  puffs, Inhalation, Every 4 Hours PRN      aspirin 81 MG chewable tablet   81 mg, Oral, Daily      atorvastatin 40 MG tablet  Commonly known as:  LIPITOR   40 mg, Oral, Nightly      cetirizine 10 MG tablet  Commonly known as:  zyrTEC   10 mg, Oral, Daily      famotidine 20 MG tablet  Commonly known as:  PEPCID   20 mg, Oral, 2 Times Daily      folic acid 1 MG tablet  Commonly known as:  FOLVITE   1 mg, Oral, Daily      furosemide 40 MG tablet  Commonly known as:  LASIX   40 mg, Oral, Daily      guaiFENesin 600 MG 12 hr tablet  Commonly known as:  MUCINEX   Oral, 2 Times Daily      metFORMIN 850 MG tablet  Commonly known as:  GLUCOPHAGE   850 mg, Oral, 2 Times Daily With Meals      pantoprazole 40 MG EC tablet  Commonly known as:  PROTONIX   40 mg, Oral, Daily      primidone 250 MG tablet  Commonly known as:  MYSOLINE   250 mg, Oral, 2 Times Daily      sertraline 25 MG tablet  Commonly known as:  ZOLOFT   25 mg, Oral, Daily      spironolactone 25 MG tablet  Commonly known as:  ALDACTONE   25 mg, Oral, Daily      tiotropium bromide-olodaterol 2.5-2.5 MCG/ACT aerosol solution inhaler  Commonly known as:  STIOLTO RESPIMAT   1 puff, Inhalation, As Needed         Stop These Medications    hydroxychloroquine 200 MG tablet  Commonly known as:  PLAQUENIL     lisinopril 5 MG tablet  Commonly known as:  PRINIVIL,ZESTRIL     metOLazone 10 MG tablet  Commonly known as:  ZAROXOLYN     metoprolol succinate XL 25 MG 24 hr tablet  Commonly known as:  TOPROL-XL            Discharge Diet:     Activity at Discharge:   Activity Instructions     Measure Weight            Follow-up Appointments  No future appointments.  Additional Instructions for the Follow-ups that You Need to Schedule     Call MD With Problems / Concerns   As directed      Instructions:  Call office at [phone] for any drainage, increased redness, or fever over 100.5    Order Comments:  Instructions:  Call office at [phone] for any drainage, increased redness,  or fever over 100.5          Discharge Follow-up with PCP   As directed       Currently Documented PCP:    Ean Farmer MD    PCP Phone Number:    [phone]     Follow Up Details:  in 1 week         Discharge Follow-up with Specified Provider: Cardiologist; 1 Week   As directed      To:  Cardiologist    Follow Up:  1 Week    Follow Up Details:  call for appointment, bring all medication bottles to appointment         Discharge Follow-up with Specified Provider: Dr. Mazariegos   As directed      To:  Dr. Mazariegos    Follow Up Details:  4-6 weeks, bring all current medications to appointment         Referral to Home Health   As directed      Face to Face Visit Date:  9/14/2019    Follow-up provider for Plan of Care?:  I will be treating the patient on an ongoing basis.  Please send me the Plan of Care for signature.    Follow-up provider:  LUIS MIGUEL MAZARIEGOS [[id number]]    Reason/Clinical Findings:  post op open heart    Describe mobility limitations that make leaving home difficult:  weakness    Nursing/Therapeutic Services Requested:  Skilled Nursing    Skilled nursing orders:  Post CABG care    Frequency:  1 Week 1               Test Results Pending at Discharge       LEONIDAS Bello  09/15/19  10:08 AM

## 2019-09-15 NOTE — PROGRESS NOTES
[facility]  Cardiology Progress Note    Patient Identification:  Name: Cedric Wade  Age: 68 y.o.  Sex: male  :  1950  MRN: [id number]                 Follow Up / Chief Complaint: Management recommendations for CAD       Interval History: CABG x3, remains in sinus rhythm          Subjective:  Denies chest pain, shortness of breath.  Anxious to go home.  Up walking and doing well with that.  Has had BM.  Using IS and flutter valve.  Plans for DC today noted after life vest is placed.              Past Medical History:  Past Medical History:   Diagnosis Date   • Arthritis    • CHF (congestive heart failure) (CMS/Formerly [facility])    • COPD (chronic obstructive pulmonary disease) (CMS/Formerly [facility])    • Coronary artery disease    • Cutaneous lupus erythematosus    • Diabetes mellitus (CMS/HCC)    • Elevated cholesterol    • GERD (gastroesophageal reflux disease)    • Hypertension      Past Surgical History:  Past Surgical History:   Procedure Laterality Date   • CORONARY ARTERY BYPASS GRAFT N/A 2019    Procedure: INTRAOPERATIVE BUDDY, MIDLINE STENOTOMY WITH CORONARY ARTERY BYPASS GRAPHS X  3 UTILIZING LEFT SAVAGE  AND LEFT ENDOSCOPIC HARVESTED SAPHENOUS VEIN, LIGATION OF LEFT  ATRIAL APPENDAGE; PRP;  Surgeon: Alvaro Mazariegos MD;  Location: [facility];  Service: Cardiothoracic        Social History:   Social History     Tobacco Use   • Smoking status: Current Every Day Smoker     Packs/day: 2.00     Years: 58.00     Pack years: 116.00   • Smokeless tobacco: Never Used   Substance Use Topics   • Alcohol use: No     Frequency: Never      Family History:  Family History   Problem Relation Age of Onset   • Lung cancer Father    • Diabetes Sister           Allergies:  Allergies   Allergen Reactions   • Codeine Nausea And Vomiting     Scheduled Meds:    amiodarone 200 mg Q12H   aspirin 325 mg Daily   atorvastatin 40 mg Nightly   bisacodyl 10 mg Once   budesonide-formoterol 2 puff BID - RT   enoxaparin 40 mg Q24H  "  guaiFENesin 1,200 mg Q12H   insulin lispro 0-12 Units 4x Daily With Meals & Nightly   lactulose 10 g Daily   linagliptin 5 mg Daily   mupirocin  BID   pantoprazole 40 mg Q AM   primidone 250 mg BID   Scopolamine 1 patch Q72H   sennosides-docusate sodium 2 tablet Nightly   sertraline 25 mg Daily     I have reviewed the patient's recent medical history and current medications, as well as personally reviewed/interpreted the ECG/telemetry data    INTAKE AND OUTPUT:    Intake/Output Summary (Last 24 hours) at 9/15/2019 0920  Last data filed at 9/15/2019 0747  Gross per 24 hour   Intake 1440 ml   Output 2000 ml   Net -560 ml     ROS  Constitutional: Awake and alert, no fever. No nosebleeds  Abdomen           no abdominal pain   Cardiac              no chest pain  Pulmonary          no shortness of breath      /67 (BP Location: Right arm, Patient Position: Sitting)   Pulse 80   Temp 97.7 °F (36.5 °C) (Oral)   Resp 18   Ht 167.6 cm (66\")   Wt 67.5 kg (148 lb 12.8 oz)   SpO2 100%   BMI 24.02 kg/m²   General appearance: No acute changes   Neck: Trachea midline; NECK, supple, no thyromegaly or lymphadenopathy   Lungs: Normal size and shape, normal breath sounds, equal distribution of air, no rales and rhonchi   CV: S1-S2 regular, no murmurs, no rub, no gallop   Abdomen: Soft, non-tender; no masses , no abnormal abdominal sounds   Extremities: No deformity , normal color , no peripheral edema   Skin: Normal temperature, turgor and texture; no rash, ulcers              Cardiographics  Telemetry:     9/15 SR.       EC/10 SR rate 70         SR with PVCs and LBBB          Echocardiogram:   19  Interpretation Summary     · The left ventricular cavity is mildly dilated.  · Right ventricular cavity is mildly dilated.  · Left atrial cavity size is mildly dilated.  · Calculated EF = 25.0%.  · There is no evidence of pericardial effusion.           Lab Review       Results from last 7 days   Lab Units " "09/15/19  0332   MAGNESIUM mg/dL 1.9     Results from last 7 days   Lab Units 09/15/19  0332   SODIUM mmol/L 130*   POTASSIUM mmol/L 3.6   BUN mg/dL 32*   CREATININE mg/dL 1.24   CALCIUM mg/dL 8.2*        Results from last 7 days   Lab Units 09/15/19  0332 09/13/19  0319 09/12/19  0325   WBC 10*3/mm3 10.63 10.05 10.58   HEMOGLOBIN g/dL 8.3* 8.1* 8.5*   HEMATOCRIT % 25.5* 25.8* 26.9*   PLATELETS 10*3/mm3 267 191 164     Results from last 7 days   Lab Units 09/10/19  0259 09/09/19  1247 09/09/19  0435 09/09/19  0011   INR  1.16* 1.35*  --   --    APTT seconds  --  28.7 29.3 37.0*     Estimated Creatinine Clearance: 53.7 mL/min (by C-G formula based on SCr of 1.24 mg/dL).      Assessment  1. NSTEMI  2. CAD  3.  RANDA: nephrology following   4.  HLD  5.  Tobacco abuse: recommend cessation  6.  Acute on chronic systolic CHF  7.  COPD  8.  DM  9. PVD  10.  History of lupus    Plan  Status post CABG x3. Continue statin and asa.  BP too low to start BB.    Hemoglobin 8.1 defer to attending.  Creatinine improved.    EF 28% on 9/13/2019.  Currently unable to take ACE due to acute kidney injury.  We will add ACE when okay with nephrology.  Plan redo echo in approximately 90 days regarding cardiomyopathy once medically therapy has been maximized.  Will need to go home on a Lifevest, order is placed in the computer.      Life vest hsa been notified by RN.  This will be placed  prior to going home, the order is on chart.       Follow up with Cardiology NP in 2 weeks and Dr. Riggs 4 weeks.      9/15/2019  LEONIDAS Ramos/Transcription:   \"Dictated utilizing Dragon dictation\".     "

## 2019-09-15 NOTE — PLAN OF CARE
Problem: Patient Care Overview  Goal: Plan of Care Review  Outcome: Ongoing (interventions implemented as appropriate)   09/15/19 050   Coping/Psychosocial   Plan of Care Reviewed With patient   Plan of Care Review   Progress improving   OTHER   Outcome Summary VSS. No complaints of pain. Room air. CTM       Problem: Cardiac: ACS (Acute Coronary Syndrome) (Adult)  Goal: Signs and Symptoms of Listed Potential Problems Will be Absent, Minimized or Managed (Cardiac: ACS)  Outcome: Ongoing (interventions implemented as appropriate)      Problem: Fall Risk (Adult)  Goal: Absence of Fall  Outcome: Ongoing (interventions implemented as appropriate)      Problem: Skin Injury Risk (Adult)  Goal: Skin Health and Integrity  Outcome: Ongoing (interventions implemented as appropriate)

## 2019-09-16 ENCOUNTER — READMISSION MANAGEMENT (OUTPATIENT)
Dept: CALL CENTER | Facility: HOSPITAL | Age: 69
End: 2019-09-16

## 2019-09-16 NOTE — PROGRESS NOTES
Case Management Discharge Note    Final Note: [facility].  [facility] did deliver Life Vest prior to Pt discharging home on [place] 9/15/19.  MICHELLE BENNETT/CCP    Destination      No service has been selected for the patient.      Durable Medical Equipment      No service has been selected for the patient.      Dialysis/Infusion      No service has been selected for the patient.      Home Medical Care - Selection Complete      Service Provider Request Status Selected Services Address Phone Number Fax Number    [facility] Selected [facility] [phone] [facility] [place] [id number] [phone]      Therapy      No service has been selected for the patient.      Community Resources      No service has been selected for the patient.             Final Discharge Disposition Code: 06 - home with home health care

## 2019-09-16 NOTE — OUTREACH NOTE
Prep Survey      Responses   Facility patient discharged from?  [facility]   Is patient eligible?  Yes   Discharge diagnosis  CAD   Does the patient have one of the following disease processes/diagnoses(primary or secondary)?  Cardiothoracic surgery   Does the patient have Home health ordered?  Yes   What is the Home health agency?   [facility]     Is there a DME ordered?  Yes   What DME was ordered?  [employer] life vest    Medication alerts for this patient  [place]   Prep survey completed?  Yes          Cristiana Roach RN

## 2019-09-17 ENCOUNTER — READMISSION MANAGEMENT (OUTPATIENT)
Dept: CALL CENTER | Facility: HOSPITAL | Age: 69
End: 2019-09-17

## 2019-09-17 NOTE — OUTREACH NOTE
CT Surgery Week 1 Survey      Responses   Facility patient discharged from?  [facility]   Does the patient have one of the following disease processes/diagnoses(primary or secondary)?  Cardiothoracic surgery   Is there a successful TCM telephone encounter documented?  No   Week 1 attempt successful?  Yes   Call start time  1427   Call end time  1434   Is patient permission given to speak with other caregiver?  Yes   List who call center can speak with  wife   Meds reviewed with patient/caregiver?  Yes   Is the patient having any side effects they believe may be caused by any medication additions or changes?  No   Does the patient have all medications related to this admission filled (includes all antibiotics, pain medications, cardiac medications, etc.)  Yes   Is the patient taking all medications as directed (includes completed medication regime)?  Yes   Does the patient have a primary care provider?   Yes   Does the patient have an appointment scheduled with their C/T surgeon?  Yes   Has the patient kept scheduled appointments due by today?  N/A   What is the Home health agency?   [facility]    Has home health visited the patient within 72 hours of discharge?  Yes   Psychosocial issues?  No   Psychosocial comments  wife is helping him   Did the patient receive a copy of their discharge instructions?  Yes   Nursing interventions  Reviewed instructions with patient   What is the patient's perception of their health status since discharge?  Same   Nursing interventions  Nurse provided patient education   Is the patient/caregiver able to teach back normal signs of recovery?  Nausea and lack of appetite, Constipation, Depression or irritability, Pain or discomfort at incisional site   Nursing interventions  Reassured on normal signs of recovery   Is the patient /caregiver able to teach back basic post-op care?  Continue use of incentive spirometry at least 1 week post discharge, Practice 'cough and deep breath', Drive  as instructed by MD in discharge instructions, Take showers only when approved by MD-sponge bathe until then, No tub bath, swimming, or hot tub until instructed by MD, Keep incision areas clean, dry and protected, Lifting as instructed by MD in discharge instructions   Is the patient/caregiver able to teach back signs and symptoms of incisional infection?  Increased redness, swelling or pain at the incisonal site, Increased drainage or bleeding, Incisional warmth, Pus or odor from incision, Fever   Is the patient/caregiver able to teach back steps to recovery at home?  Set small, achievable goals for return to baseline health, Rest and rebuild strength, gradually increase activity, Weigh daily, Practice good oral hygiene, Eat a well-balance diet, Make a list of questions for surgeon's appointment   Is the patient /caregiver able to teach back the importance of cardiac rehab?  Yes   Nursing interventions  Provided education on importance of cardiac rehab   If the patient is a current smoker, are they able to teach back resources for cessation?  -- [Has quit smoking.]   Is the patient/caregiver able to teach back the hierarchy of who to call/visit for symptoms/problems? PCP, Specialist, Home health nurse, Urgent Care, ED, 911  Yes   Additional teach back comments  States weighs daily-instructed to notify PCP if weight gain of 3# over 24 hours or 5# over one week.   Week 1 call completed?  Yes   Wrap up additional comments  States is feeling about the same. States no s/s of infection. States edema of feet has improved. Denies any problems today.            Beth Daniel RN

## 2019-09-19 ENCOUNTER — OFFICE VISIT CONVERTED (OUTPATIENT)
Dept: PULMONOLOGY | Facility: CLINIC | Age: 69
End: 2019-09-19
Attending: PHYSICIAN ASSISTANT

## 2019-09-24 ENCOUNTER — HOSPITAL ENCOUNTER (OUTPATIENT)
Dept: LAB | Facility: HOSPITAL | Age: 69
Discharge: HOME | End: 2019-09-24
Attending: INTERNAL MEDICINE

## 2019-09-24 LAB
ALBUMIN SERPL-MCNC: 3.8 G/DL (ref 3.5–5)
ALBUMIN/GLOB SERPL: 1 {RATIO} (ref 1.4–2.6)
ALP SERPL-CCNC: 136 U/L (ref 56–155)
ALT SERPL-CCNC: 28 U/L (ref 10–40)
ANION GAP SERPL CALC-SCNC: 26 MMOL/L (ref 8–19)
AST SERPL-CCNC: 40 U/L (ref 15–50)
BASOPHILS # BLD AUTO: 0.05 10*3/UL (ref 0–0.2)
BASOPHILS NFR BLD AUTO: 0.6 % (ref 0–3)
BILIRUB SERPL-MCNC: 0.22 MG/DL (ref 0.2–1.3)
BNP SERPL-MCNC: 6704 PG/ML (ref 0–900)
BUN SERPL-MCNC: 23 MG/DL (ref 5–25)
BUN/CREAT SERPL: 16 {RATIO} (ref 6–20)
CALCIUM SERPL-MCNC: 9.6 MG/DL (ref 8.7–10.4)
CHLORIDE SERPL-SCNC: 96 MMOL/L (ref 99–111)
CONV ABS IMM GRAN: 0.05 10*3/UL (ref 0–0.2)
CONV CO2: 19 MMOL/L (ref 22–32)
CONV IMMATURE GRAN: 0.6 % (ref 0–1.8)
CONV TOTAL PROTEIN: 7.6 G/DL (ref 6.3–8.2)
CREAT UR-MCNC: 1.42 MG/DL (ref 0.7–1.2)
DEPRECATED RDW RBC AUTO: 53.8 FL (ref 35.1–43.9)
EOSINOPHIL # BLD AUTO: 0.61 10*3/UL (ref 0–0.7)
EOSINOPHIL # BLD AUTO: 6.8 % (ref 0–7)
ERYTHROCYTE [DISTWIDTH] IN BLOOD BY AUTOMATED COUNT: 14.7 % (ref 11.6–14.4)
GFR SERPLBLD BASED ON 1.73 SQ M-ARVRAT: 50 ML/MIN/{1.73_M2}
GLOBULIN UR ELPH-MCNC: 3.8 G/DL (ref 2–3.5)
GLUCOSE SERPL-MCNC: 76 MG/DL (ref 70–99)
HCT VFR BLD AUTO: 30.2 % (ref 42–52)
HGB BLD-MCNC: 9.2 G/DL (ref 14–18)
LYMPHOCYTES # BLD AUTO: 1.31 10*3/UL (ref 1–5)
LYMPHOCYTES NFR BLD AUTO: 14.7 % (ref 20–45)
MCH RBC QN AUTO: 30.4 PG (ref 27–31)
MCHC RBC AUTO-ENTMCNC: 30.5 G/DL (ref 33–37)
MCV RBC AUTO: 99.7 FL (ref 80–96)
MONOCYTES # BLD AUTO: 0.59 10*3/UL (ref 0.2–1.2)
MONOCYTES NFR BLD AUTO: 6.6 % (ref 3–10)
NEUTROPHILS # BLD AUTO: 6.33 10*3/UL (ref 2–8)
NEUTROPHILS NFR BLD AUTO: 70.7 % (ref 30–85)
NRBC CBCN: 0 % (ref 0–0.7)
OSMOLALITY SERPL CALC.SUM OF ELEC: 284 MOSM/KG (ref 273–304)
PLATELET # BLD AUTO: 631 10*3/UL (ref 130–400)
PMV BLD AUTO: 10.1 FL (ref 9.4–12.4)
POTASSIUM SERPL-SCNC: 4.9 MMOL/L (ref 3.5–5.3)
RBC # BLD AUTO: 3.03 10*6/UL (ref 4.7–6.1)
SODIUM SERPL-SCNC: 136 MMOL/L (ref 135–147)
WBC # BLD AUTO: 8.94 10*3/UL (ref 4.8–10.8)

## 2019-09-26 ENCOUNTER — OFFICE VISIT CONVERTED (OUTPATIENT)
Dept: CARDIOLOGY | Facility: CLINIC | Age: 69
End: 2019-09-26
Attending: INTERNAL MEDICINE

## 2019-09-26 ENCOUNTER — READMISSION MANAGEMENT (OUTPATIENT)
Dept: CALL CENTER | Facility: HOSPITAL | Age: 69
End: 2019-09-26

## 2019-09-26 NOTE — OUTREACH NOTE
CT Surgery Week 2 Survey      Responses   Facility patient discharged from?  [place]   Does the patient have one of the following disease processes/diagnoses(primary or secondary)?  Cardiothoracic surgery   Week 2 attempt successful?  No   Unsuccessful attempts  Attempt 1          Beth Daniel RN

## 2019-09-30 ENCOUNTER — HOSPITAL ENCOUNTER (OUTPATIENT)
Dept: CT IMAGING | Facility: HOSPITAL | Age: 69
Discharge: HOME | End: 2019-09-30
Attending: PHYSICIAN ASSISTANT

## 2019-09-30 ENCOUNTER — READMISSION MANAGEMENT (OUTPATIENT)
Dept: CALL CENTER | Facility: HOSPITAL | Age: 69
End: 2019-09-30

## 2019-09-30 ENCOUNTER — HOSPITAL ENCOUNTER (OUTPATIENT)
Dept: LAB | Facility: HOSPITAL | Age: 69
Discharge: HOME | End: 2019-09-30
Attending: INTERNAL MEDICINE

## 2019-09-30 LAB
ANION GAP SERPL CALC-SCNC: 27 MMOL/L (ref 8–19)
BUN SERPL-MCNC: 27 MG/DL (ref 5–25)
BUN/CREAT SERPL: 18 {RATIO} (ref 6–20)
CALCIUM SERPL-MCNC: 9.4 MG/DL (ref 8.7–10.4)
CHLORIDE SERPL-SCNC: 98 MMOL/L (ref 99–111)
CONV CO2: 20 MMOL/L (ref 22–32)
CREAT UR-MCNC: 1.47 MG/DL (ref 0.7–1.2)
GFR SERPLBLD BASED ON 1.73 SQ M-ARVRAT: 48 ML/MIN/{1.73_M2}
GLUCOSE SERPL-MCNC: 100 MG/DL (ref 70–99)
OSMOLALITY SERPL CALC.SUM OF ELEC: 295 MOSM/KG (ref 273–304)
POTASSIUM SERPL-SCNC: 5.4 MMOL/L (ref 3.5–5.3)
SODIUM SERPL-SCNC: 140 MMOL/L (ref 135–147)

## 2019-09-30 NOTE — OUTREACH NOTE
CT Surgery Week 2 Survey      Responses   Facility patient discharged from?  [facility]   Does the patient have one of the following disease processes/diagnoses(primary or secondary)?  Cardiothoracic surgery   Week 2 attempt successful?  No   Unsuccessful attempts  Attempt 2          Amos Muse RN

## 2019-10-03 ENCOUNTER — CONVERSION ENCOUNTER (OUTPATIENT)
Dept: CARDIOLOGY | Facility: CLINIC | Age: 69
End: 2019-10-03

## 2019-10-03 ENCOUNTER — CONVERSION ENCOUNTER (OUTPATIENT)
Dept: CARDIOLOGY | Facility: CLINIC | Age: 69
End: 2019-10-03
Attending: INTERNAL MEDICINE

## 2019-10-03 ENCOUNTER — HOSPITAL ENCOUNTER (OUTPATIENT)
Dept: INFUSION THERAPY | Facility: HOSPITAL | Age: 69
Discharge: HOME | End: 2019-10-03
Attending: INTERNAL MEDICINE

## 2019-10-03 LAB
ALBUMIN SERPL-MCNC: 4 G/DL (ref 3.5–5)
ALBUMIN/GLOB SERPL: 0.9 {RATIO} (ref 1.4–2.6)
AMYLASE FLD-CCNC: 41 U/L (ref 30–150)
APPEARANCE FLD: ABNORMAL
BASOPHILS NFR FLD: 0 %
COLOR AMN: ABNORMAL
CONV TOTAL PROTEIN: 8.5 G/DL (ref 6.3–8.2)
CRYSTALS FLD MICRO: ABNORMAL
EOSINOPHIL NFR FLD MANUAL: 0 %
GLOBULIN UR ELPH-MCNC: 4.5 G/DL (ref 2–3.5)
GLUCOSE FLD-MCNC: 119 MG/DL
LDH FLD-CCNC: 169 U/L
LDH SERPL-CCNC: 240 U/L (ref 120–240)
LYMPHOCYTES NFR FLD MANUAL: 71 %
MACROPHAGE FLUID: 2 /100{WBCS}
MESOTHL CELL NFR FLD MANUAL: 6 /100{WBCS}
MONOCYTES NFR FLD: 15 %
NEUTROPHILS NFR FLD MANUAL: 14 %
PH FLD: NORMAL [PH]
PLASMA CELLS NFR FLD: 0 %
PROT FLD-MCNC: 4.3 G/DL (ref 6.3–8.2)
RBC # FLD AUTO: 3250 /UL
SPECIMEN SOURCE: ABNORMAL
WBC # FLD AUTO: 113 /UL

## 2019-10-06 LAB — BACTERIA FLD CULT: NORMAL

## 2019-10-08 ENCOUNTER — READMISSION MANAGEMENT (OUTPATIENT)
Dept: CALL CENTER | Facility: HOSPITAL | Age: 69
End: 2019-10-08

## 2019-10-08 NOTE — OUTREACH NOTE
CT Surgery Week 3 Survey      Responses   Facility patient discharged from?  [facility]   Does the patient have one of the following disease processes/diagnoses(primary or secondary)?  Cardiothoracic surgery   Week 3 attempt successful?  Yes   Call start time  1650   Call end time  1655   Discharge diagnosis  CAD   Meds reviewed with patient/caregiver?  Yes   Is the patient having any side effects they believe may be caused by any medication additions or changes?  No   Does the patient have all medications related to this admission filled (includes all antibiotics, pain medications, cardiac medications, etc.)  Yes   Is the patient taking all medications as directed (includes completed medication regime)?  Yes   Does the patient have a primary care provider?   Yes   Does the patient have an appointment scheduled with their C/T surgeon?  Yes   Has the patient kept scheduled appointments due by today?  Yes   What is the Home health agency?   [facility]    Has home health visited the patient within 72 hours of discharge?  Yes   Home health comments  last  visit completed today   Psychosocial issues?  No   Did the patient receive a copy of their discharge instructions?  Yes   Nursing interventions  Reviewed instructions with patient   What is the patient's perception of their health status since discharge?  Improving   Nursing interventions  Nurse provided patient education   Is the patient /caregiver able to teach back basic post-op care?  Take showers only when approved by MD-sponge bathe until then, No tub bath, swimming, or hot tub until instructed by MD, Keep incision areas clean, dry and protected, Do not remove steri-strips, Lifting as instructed by MD in discharge instructions   Is the patient/caregiver able to teach back signs and symptoms of incisional infection?  Increased redness, swelling or pain at the incisonal site, Increased drainage or bleeding, Incisional warmth, Pus or odor from incision, Fever   Is  the patient/caregiver able to teach back steps to recovery at home?  Set small, achievable goals for return to baseline health, Rest and rebuild strength, gradually increase activity   Is the patient /caregiver able to teach back the importance of cardiac rehab?  Yes   Is the patient/caregiver able to teach back the hierarchy of who to call/visit for symptoms/problems? PCP, Specialist, Home health nurse, Urgent Care, ED, 911  Yes   Additional teach back comments  walks 60 minutes/day, starts card rehab on 10/15/19   Week 3 call completed?  Yes          Mackenzie Wyman, RN

## 2019-10-15 ENCOUNTER — HOSPITAL ENCOUNTER (OUTPATIENT)
Dept: CARDIAC REHAB | Facility: HOSPITAL | Age: 69
Setting detail: RECURRING SERIES
Discharge: HOME | End: 2020-02-06
Attending: INTERNAL MEDICINE

## 2019-10-17 ENCOUNTER — READMISSION MANAGEMENT (OUTPATIENT)
Dept: CALL CENTER | Facility: HOSPITAL | Age: 69
End: 2019-10-17

## 2019-10-17 NOTE — OUTREACH NOTE
CT Surgery Week 4 Survey      Responses   Facility patient discharged from?  [facility]   Does the patient have one of the following disease processes/diagnoses(primary or secondary)?  Cardiothoracic surgery   Week 4 attempt successful?  Yes   Call start time  1623   Call end time  1627   Discharge diagnosis  CAD   Person spoke with today (if not patient) and relationship  Rosalba, wife   Meds reviewed with patient/caregiver?  Yes   Is the patient having any side effects they believe may be caused by any medication additions or changes?  No   Is the patient taking all medications as directed (includes completed medication regime)?  Yes   Has the patient kept scheduled appointments due by today?  Yes   Is the patient still receiving Home Health Services?  No   Psychosocial issues?  No   Comments  tolerating cardiac rehab well   Nursing interventions  Nurse provided patient education   Is the patient/caregiver able to teach back steps to recovery at home?  Set small, achievable goals for return to baseline health, Rest and rebuild strength, gradually increase activity   Is the patient /caregiver able to teach back the importance of cardiac rehab?  Yes   Is the patient/caregiver able to teach back the hierarchy of who to call/visit for symptoms/problems? PCP, Specialist, Home health nurse, Urgent Care, ED, 911  Yes   Week 4 Call Completed?  Yes   Would the patient like one additional call?  No   Graduated  Yes   Did the patient feel the follow up calls were helpful during their recovery period?  Yes   Was the number of calls appropriate?  Yes          Mackenzie Wyman RN

## 2019-10-18 ENCOUNTER — OFFICE VISIT (OUTPATIENT)
Dept: CARDIAC SURGERY | Facility: CLINIC | Age: 69
End: 2019-10-18

## 2019-10-18 ENCOUNTER — OFFICE VISIT (OUTPATIENT)
Dept: CARDIOLOGY | Facility: CLINIC | Age: 69
End: 2019-10-18

## 2019-10-18 VITALS
WEIGHT: 141 LBS | SYSTOLIC BLOOD PRESSURE: 96 MMHG | HEART RATE: 76 BPM | HEIGHT: 66 IN | DIASTOLIC BLOOD PRESSURE: 64 MMHG | BODY MASS INDEX: 22.66 KG/M2

## 2019-10-18 VITALS
WEIGHT: 141 LBS | RESPIRATION RATE: 20 BRPM | SYSTOLIC BLOOD PRESSURE: 91 MMHG | OXYGEN SATURATION: 92 % | TEMPERATURE: 97.8 F | HEART RATE: 72 BPM | DIASTOLIC BLOOD PRESSURE: 70 MMHG | HEIGHT: 66 IN | BODY MASS INDEX: 22.66 KG/M2

## 2019-10-18 DIAGNOSIS — Z95.1 S/P CABG (CORONARY ARTERY BYPASS GRAFT): Primary | ICD-10-CM

## 2019-10-18 DIAGNOSIS — I25.119 CORONARY ARTERY DISEASE INVOLVING NATIVE CORONARY ARTERY OF NATIVE HEART WITH ANGINA PECTORIS (HCC): Primary | ICD-10-CM

## 2019-10-18 DIAGNOSIS — I10 ESSENTIAL HYPERTENSION: ICD-10-CM

## 2019-10-18 DIAGNOSIS — I25.5 ISCHEMIC CARDIOMYOPATHY: ICD-10-CM

## 2019-10-18 DIAGNOSIS — E78.5 HYPERLIPIDEMIA, UNSPECIFIED HYPERLIPIDEMIA TYPE: ICD-10-CM

## 2019-10-18 DIAGNOSIS — R94.31 ABNORMAL ELECTROCARDIOGRAM (ECG) (EKG): ICD-10-CM

## 2019-10-18 PROBLEM — I42.8 NON-ISCHEMIC CARDIOMYOPATHY (HCC): Status: ACTIVE | Noted: 2019-10-18

## 2019-10-18 PROCEDURE — 99214 OFFICE O/P EST MOD 30 MIN: CPT | Performed by: NURSE PRACTITIONER

## 2019-10-18 PROCEDURE — 99024 POSTOP FOLLOW-UP VISIT: CPT | Performed by: THORACIC SURGERY (CARDIOTHORACIC VASCULAR SURGERY)

## 2019-10-18 RX ORDER — LISINOPRIL 2.5 MG/1
2.5 TABLET ORAL DAILY
COMMUNITY
End: 2021-01-01

## 2019-10-18 RX ORDER — BUDESONIDE AND FORMOTEROL FUMARATE DIHYDRATE 160; 4.5 UG/1; UG/1
2 AEROSOL RESPIRATORY (INHALATION) 2 TIMES DAILY
Refills: 5 | COMMUNITY
Start: 2019-10-16 | End: 2021-01-01

## 2019-10-18 NOTE — PROGRESS NOTES
Patient Name: Cedric Wade  :1950  Age: 69 y.o.  Primary Cardiologist: Nidhi Riggs MD  Encounter Provider:  LEONIDAS Aguilar      Chief Complaint:   Chief Complaint   Patient presents with   • Coronary Artery Disease         HPI this 69-year-old male, known to this provider, comes today in follow-up after recent admission for CAD which resulted in CABG x3 with left atrial appendage ligation.  History to include N STEMI, ischemic cardiomyopathy, COPD, DM 2, hyper lipidemia, PVD, lupus.  Echo done on  revealed mild dilation of LV C, modulation of RV C, modulation of LAC, EF 25% with no evidence of pericardial effusion.  Renal ultrasound revealed right renal cyst of 11 mm in size.  Lipid panel on  revealed  triglycerides 92 HDL 90 and LDL 66, patient currently on statin therapy.  AST/ALT were elevated at that time.  The patient is currently having trouble with left sided pleural effusion for which he is following with pulmonary.  2 weeks ago he states he had 1300 mL's removed via thoracentesis.  He is currently on Lasix and spironolactone, follows up with pulmonary next week with repeat CT scan.    Patient Active Problem List   Diagnosis   • Coronary artery disease involving native coronary artery of native heart with angina pectoris (CMS/HCC)   • COPD (chronic obstructive pulmonary disease) with chronic bronchitis (CMS/HCC)   • Microalbuminuria   • Type 2 diabetes mellitus with microalbuminuria (CMS/HCC)   • Hyperlipidemia   • Essential hypertension   • H/O agent Orange exposure   • Ischemic cardiomyopathy           The following portions of the patient's history were reviewed and updated as appropriate: allergies, current medications, past family history, past medical history, past social history, past surgical history and problem list.    Current Outpatient Medications on File Prior to Visit   Medication Sig Dispense Refill   • albuterol sulfate  (90  Base) MCG/ACT inhaler Inhale 2 puffs Every 4 (Four) Hours As Needed for Wheezing.     • amiodarone (PACERONE) 200 MG tablet Take 1 tablet by mouth Every 12 (Twelve) Hours for 90 days. 60 tablet 2   • aspirin 81 MG chewable tablet Chew 81 mg Daily.     • atorvastatin (LIPITOR) 40 MG tablet Take 40 mg by mouth Every Night.     • cetirizine (zyrTEC) 10 MG tablet Take 10 mg by mouth Daily.     • folic acid (FOLVITE) 1 MG tablet Take 1 mg by mouth Daily.     • furosemide (LASIX) 40 MG tablet Take 40 mg by mouth Daily.     • guaiFENesin (MUCINEX) 600 MG 12 hr tablet Take  by mouth 2 (Two) Times a Day.     • lisinopril (PRINIVIL,ZESTRIL) 2.5 MG tablet Take 2.5 mg by mouth Daily.     • metFORMIN (GLUCOPHAGE) 850 MG tablet Take 850 mg by mouth 2 (Two) Times a Day With Meals.     • pantoprazole (PROTONIX) 40 MG EC tablet Take 40 mg by mouth Daily.     • Patiromer Sorbitex Calcium (VELTASSA) 8.4 g pack Take 8.4 g by mouth 1 (One) Time.     • potassium chloride (MICRO-K) 10 MEQ CR capsule Take 2 capsules by mouth Daily for 60 days. 60 capsule 1   • sertraline (ZOLOFT) 25 MG tablet Take 25 mg by mouth Daily.     • spironolactone (ALDACTONE) 25 MG tablet Take 25 mg by mouth Daily.     • SYMBICORT 160-4.5 MCG/ACT inhaler Inhale 2 puffs 2 (Two) Times a Day.  5   • tiotropium bromide-olodaterol (STIOLTO RESPIMAT) 2.5-2.5 MCG/ACT aerosol solution inhaler Inhale 1 puff As Needed.     • famotidine (PEPCID) 20 MG tablet Take 20 mg by mouth 2 (Two) Times a Day.     • primidone (MYSOLINE) 250 MG tablet Take 250 mg by mouth 2 (Two) Times a Day.       No current facility-administered medications on file prior to visit.        Past Medical History:   Diagnosis Date   • Arthritis    • CHF (congestive heart failure) (CMS/[facility])    • COPD (chronic obstructive pulmonary disease) (CMS/[facility])    • Coronary artery disease    • Cutaneous lupus erythematosus    • Diabetes mellitus (CMS/[facility])    • Elevated cholesterol    • GERD (gastroesophageal reflux  "disease)    • Hypertension        Past Surgical History:   Procedure Laterality Date   • CORONARY ARTERY BYPASS GRAFT N/A 2019    Procedure: INTRAOPERATIVE BUDDY, MIDLINE STENOTOMY WITH CORONARY ARTERY BYPASS GRAPHS X  3 UTILIZING LEFT SAVAGE  AND LEFT ENDOSCOPIC HARVESTED SAPHENOUS VEIN, LIGATION OF LEFT  ATRIAL APPENDAGE; PRP;  Surgeon: Alvaro Mazariegos MD;  Location: [facility];  Service: Cardiothoracic       Family History   Problem Relation Age of Onset   • Lung cancer Father    • Diabetes Sister        Social History     Socioeconomic History   • Marital status:      Spouse name: Not on file   • Number of children: Not on file   • Years of education: Not on file   • Highest education level: Not on file   Tobacco Use   • Smoking status: Former Smoker     Packs/day: 2.00     Years: 58.00     Pack years: 116.00     Last attempt to quit: 2019     Years since quittin.0   • Smokeless tobacco: Never Used   Substance and Sexual Activity   • Alcohol use: No     Frequency: Never   • Drug use: No   • Sexual activity: Defer       Review of Systems   Constitution: Positive for malaise/fatigue. Negative for diaphoresis and weakness.   HENT: Negative for nosebleeds and stridor.    Cardiovascular: Negative for chest pain, claudication, dyspnea on exertion, irregular heartbeat, leg swelling, near-syncope, orthopnea, palpitations, paroxysmal nocturnal dyspnea and syncope.   Respiratory: Positive for shortness of breath. Negative for cough, hemoptysis and wheezing.    Gastrointestinal: Negative for abdominal pain, constipation, diarrhea, hematemesis, hematochezia, melena, nausea and vomiting.   Neurological: Negative for dizziness, focal weakness and light-headedness.       OBJECTIVE:   Vital Signs  Vitals:    10/18/19 1149   BP: 96/64   Pulse: 76     Estimated body mass index is 22.76 kg/m² as calculated from the following:    Height as of this encounter: 167.6 cm (66\").    Weight as of this encounter: 64 " kg (141 lb).    Physical Exam   Constitutional: He is oriented to person, place, and time. He appears well-developed and well-nourished. No distress.   HENT:   Head: Normocephalic and atraumatic.   Eyes: Conjunctivae and EOM are normal. Pupils are equal, round, and reactive to light.   Neck: Normal range of motion. Neck supple. No JVD present. No tracheal deviation present. No thyromegaly present.   Cardiovascular: Normal rate, regular rhythm, normal heart sounds and intact distal pulses. Exam reveals no gallop and no friction rub.   No murmur heard.  Pulmonary/Chest: Breath sounds normal. No respiratory distress. He has no wheezes. He has no rales. He exhibits no tenderness.   Breathing slightly labored   Abdominal: Soft. Bowel sounds are normal. He exhibits no distension. There is no tenderness.   Musculoskeletal: Normal range of motion. He exhibits no edema, tenderness or deformity.   Neurological: He is alert and oriented to person, place, and time.   Skin: Skin is warm and dry. No rash noted. He is not diaphoretic. No erythema. No pallor.   Psychiatric: He has a normal mood and affect. His behavior is normal.       Procedures    Cardiac Echo:  9/13/19  Interpretation Summary     · The left ventricular cavity is mildly dilated.  · Limited echocardiogram was performed for the LV function shows enlarged left ventricle with a severe LV dysfunction ejection fraction 28%           ASSESSMENT:      Diagnosis Plan   1. Coronary artery disease involving native coronary artery of native heart with angina pectoris (CMS/HCC)  Adult Transthoracic Echo Complete W/ Cont if Necessary Per Protocol   2. Essential hypertension  Adult Transthoracic Echo Complete W/ Cont if Necessary Per Protocol   3. Hyperlipidemia, unspecified hyperlipidemia type  Adult Transthoracic Echo Complete W/ Cont if Necessary Per Protocol   4. Abnormal electrocardiogram (ECG) (EKG)   Adult Transthoracic Echo Complete W/ Cont if Necessary Per Protocol    5. Ischemic cardiomyopathy           PLAN OF CARE:     1.  CAD-patient now status post CABG x3 with left atrial appendage ligation.  Currently on aspirin, statin, spironolactone, Lasix.  Not on beta-blocker given low normal blood pressure.  He is currently asymptomatic and doing well postoperatively.  He is to follow-up with CT surgery today.    2.  Ischemic cardiomyopathy- EF noted to be 28% on last echo.  Will need a repeat echo in December to evaluate LV function since intervention.  If LVEF remains 35 or below we will need to consider ICD placement, patient is compliant with LifeVest.    3.  Atrial fibrillation- patient had a brief run of atrial fibrillation postoperatively.  Currently is on amiodarone tablets.  Will continue until follow-up appointment and get repeat ECG at that time.  When LifeVest is removed we will check report for frequency of AF.  Will likely discontinue amiodarone if no further atrial fibrillation is detected on LifeVest monitor.  Not anticoagulated currently given his very minimal amount of atrial fibrillation noted.    4.  Hypertension-blood pressure in office today is currently low normal.  Not on beta-blocker given low to low normal blood pressure.    Importance of controlling hypertension and blood pressure  checkup on the regular basis has been explained  Hypertension as a silent killer has been discussed  Risk reduction of the weight and regular exercises to control the hypertension has been explained    5.  Left-sided pleural effusion- following with pulmonary as above.  Thoracentesis 2 weeks ago, per patient report 1300 mL's were removed.  He is to follow-up with repeat CT scan next week.      Same day echo with follow-up appointment the second week of December with Dr. Riggs.  Continue current medication therapy.  Continue LifeVest.      Sincerely,   LEONIDAS Aguilar  [facility]  10/18/19  1:37 PM

## 2019-10-22 ENCOUNTER — HOSPITAL ENCOUNTER (OUTPATIENT)
Dept: URGENT CARE | Facility: CLINIC | Age: 69
Discharge: HOME | End: 2019-10-22

## 2019-10-26 NOTE — PROGRESS NOTES
Cedric Wade is a 69 y.o. male s/p CABG x3 and left atrial appendage ligation on 9/9/19. He denies any signs or symptoms of myocardial ischemia, cerebrovascular event, or infection. He reports good exercise tolerance.    Sternum is stable, incision is clean, dry, and intact.   CTA B/L.   Lower extremity incisions are clean, dry and intact.  GCS 15, no neurologic deficit.    He appears to be doing well. Follow up with us will be on a PRN basis.

## 2019-10-28 ENCOUNTER — HOSPITAL ENCOUNTER (OUTPATIENT)
Dept: LAB | Facility: HOSPITAL | Age: 69
Discharge: HOME | End: 2019-10-28
Attending: INTERNAL MEDICINE

## 2019-10-28 LAB
ALBUMIN SERPL-MCNC: 4 G/DL (ref 3.5–5)
ALBUMIN/GLOB SERPL: 1.1 {RATIO} (ref 1.4–2.6)
ALP SERPL-CCNC: 178 U/L (ref 56–155)
ALT SERPL-CCNC: 106 U/L (ref 10–40)
ANION GAP SERPL CALC-SCNC: 21 MMOL/L (ref 8–19)
AST SERPL-CCNC: 130 U/L (ref 15–50)
BASOPHILS # BLD AUTO: 0.02 10*3/UL (ref 0–0.2)
BASOPHILS NFR BLD AUTO: 0.2 % (ref 0–3)
BILIRUB SERPL-MCNC: 0.16 MG/DL (ref 0.2–1.3)
BUN SERPL-MCNC: 25 MG/DL (ref 5–25)
BUN/CREAT SERPL: 22 {RATIO} (ref 6–20)
CALCIUM SERPL-MCNC: 8.8 MG/DL (ref 8.7–10.4)
CHLORIDE SERPL-SCNC: 95 MMOL/L (ref 99–111)
CONV ABS IMM GRAN: 0.04 10*3/UL (ref 0–0.2)
CONV CO2: 22 MMOL/L (ref 22–32)
CONV IMMATURE GRAN: 0.5 % (ref 0–1.8)
CONV TOTAL PROTEIN: 7.8 G/DL (ref 6.3–8.2)
CREAT UR-MCNC: 1.12 MG/DL (ref 0.7–1.2)
DEPRECATED RDW RBC AUTO: 53.2 FL (ref 35.1–43.9)
EOSINOPHIL # BLD AUTO: 0.19 10*3/UL (ref 0–0.7)
EOSINOPHIL # BLD AUTO: 2.2 % (ref 0–7)
ERYTHROCYTE [DISTWIDTH] IN BLOOD BY AUTOMATED COUNT: 15.2 % (ref 11.6–14.4)
GFR SERPLBLD BASED ON 1.73 SQ M-ARVRAT: >60 ML/MIN/{1.73_M2}
GLOBULIN UR ELPH-MCNC: 3.8 G/DL (ref 2–3.5)
GLUCOSE SERPL-MCNC: 136 MG/DL (ref 70–99)
HCT VFR BLD AUTO: 28.7 % (ref 42–52)
HGB BLD-MCNC: 8.8 G/DL (ref 14–18)
LYMPHOCYTES # BLD AUTO: 0.86 10*3/UL (ref 1–5)
LYMPHOCYTES NFR BLD AUTO: 10 % (ref 20–45)
MCH RBC QN AUTO: 29.6 PG (ref 27–31)
MCHC RBC AUTO-ENTMCNC: 30.7 G/DL (ref 33–37)
MCV RBC AUTO: 96.6 FL (ref 80–96)
MONOCYTES # BLD AUTO: 0.38 10*3/UL (ref 0.2–1.2)
MONOCYTES NFR BLD AUTO: 4.4 % (ref 3–10)
NEUTROPHILS # BLD AUTO: 7.11 10*3/UL (ref 2–8)
NEUTROPHILS NFR BLD AUTO: 82.7 % (ref 30–85)
NRBC CBCN: 0 % (ref 0–0.7)
OSMOLALITY SERPL CALC.SUM OF ELEC: 284 MOSM/KG (ref 273–304)
PLATELET # BLD AUTO: 306 10*3/UL (ref 130–400)
PMV BLD AUTO: 10.8 FL (ref 9.4–12.4)
POTASSIUM SERPL-SCNC: 3.9 MMOL/L (ref 3.5–5.3)
RBC # BLD AUTO: 2.97 10*6/UL (ref 4.7–6.1)
SODIUM SERPL-SCNC: 134 MMOL/L (ref 135–147)
WBC # BLD AUTO: 8.6 10*3/UL (ref 4.8–10.8)

## 2019-11-01 ENCOUNTER — HOSPITAL ENCOUNTER (OUTPATIENT)
Dept: CT IMAGING | Facility: HOSPITAL | Age: 69
Discharge: HOME | End: 2019-11-01
Attending: PHYSICIAN ASSISTANT

## 2019-11-04 ENCOUNTER — HOSPITAL ENCOUNTER (OUTPATIENT)
Dept: CARDIOLOGY | Facility: HOSPITAL | Age: 69
Discharge: HOME | End: 2019-11-04
Attending: PHYSICIAN ASSISTANT

## 2019-11-05 ENCOUNTER — HOSPITAL ENCOUNTER (OUTPATIENT)
Dept: INFUSION THERAPY | Facility: HOSPITAL | Age: 69
Discharge: HOME | End: 2019-11-05
Attending: INTERNAL MEDICINE

## 2019-11-05 LAB
AMYLASE FLD-CCNC: 41 U/L (ref 30–150)
APPEARANCE FLD: ABNORMAL
COLOR AMN: YELLOW
CRYSTALS FLD MICRO: ABNORMAL
EOSINOPHIL NFR FLD MANUAL: 1 %
GLUCOSE FLD-MCNC: 125 MG/DL
LDH FLD-CCNC: 143 U/L
LYMPHOCYTES NFR FLD MANUAL: 46 %
MACROPHAGE FLUID: 28 /100{WBCS}
MESOTHL CELL NFR FLD MANUAL: 32 /100{WBCS}
MONOCYTES NFR FLD: 1 %
NEUTROPHILS NFR FLD MANUAL: 52 %
PH FLD: 7.5 [PH]
PROT FLD-MCNC: 3.8 G/DL (ref 6.3–8.2)
RBC # FLD AUTO: 1000 /UL
SPECIMEN SOURCE: ABNORMAL
WBC # FLD AUTO: 300 /UL

## 2019-11-06 ENCOUNTER — OFFICE VISIT CONVERTED (OUTPATIENT)
Dept: CARDIOLOGY | Facility: CLINIC | Age: 69
End: 2019-11-06
Attending: INTERNAL MEDICINE

## 2019-11-08 ENCOUNTER — HOSPITAL ENCOUNTER (OUTPATIENT)
Dept: LAB | Facility: HOSPITAL | Age: 69
Discharge: HOME | End: 2019-11-08
Attending: INTERNAL MEDICINE

## 2019-11-08 LAB
ALBUMIN SERPL-MCNC: 3.6 G/DL (ref 3.5–5)
ALBUMIN/GLOB SERPL: 0.8 {RATIO} (ref 1.4–2.6)
ALP SERPL-CCNC: 192 U/L (ref 56–155)
ALT SERPL-CCNC: 41 U/L (ref 10–40)
ANION GAP SERPL CALC-SCNC: 22 MMOL/L (ref 8–19)
AST SERPL-CCNC: 46 U/L (ref 15–50)
BACTERIA FLD CULT: NORMAL
BASOPHILS # BLD AUTO: 0.04 10*3/UL (ref 0–0.2)
BASOPHILS NFR BLD AUTO: 0.5 % (ref 0–3)
BILIRUB SERPL-MCNC: 0.16 MG/DL (ref 0.2–1.3)
BUN SERPL-MCNC: 16 MG/DL (ref 5–25)
BUN/CREAT SERPL: 12 {RATIO} (ref 6–20)
CALCIUM SERPL-MCNC: 9.3 MG/DL (ref 8.7–10.4)
CHLORIDE SERPL-SCNC: 95 MMOL/L (ref 99–111)
CONV ABS IMM GRAN: 0.04 10*3/UL (ref 0–0.2)
CONV CO2: 21 MMOL/L (ref 22–32)
CONV IMMATURE GRAN: 0.5 % (ref 0–1.8)
CONV TOTAL PROTEIN: 7.9 G/DL (ref 6.3–8.2)
CREAT UR-MCNC: 1.37 MG/DL (ref 0.7–1.2)
DEPRECATED RDW RBC AUTO: 54.9 FL (ref 35.1–43.9)
EOSINOPHIL # BLD AUTO: 0.6 10*3/UL (ref 0–0.7)
EOSINOPHIL # BLD AUTO: 6.8 % (ref 0–7)
ERYTHROCYTE [DISTWIDTH] IN BLOOD BY AUTOMATED COUNT: 15.5 % (ref 11.6–14.4)
GFR SERPLBLD BASED ON 1.73 SQ M-ARVRAT: 52 ML/MIN/{1.73_M2}
GLOBULIN UR ELPH-MCNC: 4.3 G/DL (ref 2–3.5)
GLUCOSE SERPL-MCNC: 86 MG/DL (ref 70–99)
HCT VFR BLD AUTO: 29.9 % (ref 42–52)
HGB BLD-MCNC: 9.2 G/DL (ref 14–18)
LYMPHOCYTES # BLD AUTO: 1.82 10*3/UL (ref 1–5)
LYMPHOCYTES NFR BLD AUTO: 20.7 % (ref 20–45)
MCH RBC QN AUTO: 29.9 PG (ref 27–31)
MCHC RBC AUTO-ENTMCNC: 30.8 G/DL (ref 33–37)
MCV RBC AUTO: 97.1 FL (ref 80–96)
MONOCYTES # BLD AUTO: 0.62 10*3/UL (ref 0.2–1.2)
MONOCYTES NFR BLD AUTO: 7.1 % (ref 3–10)
NEUTROPHILS # BLD AUTO: 5.67 10*3/UL (ref 2–8)
NEUTROPHILS NFR BLD AUTO: 64.4 % (ref 30–85)
NRBC CBCN: 0 % (ref 0–0.7)
OSMOLALITY SERPL CALC.SUM OF ELEC: 278 MOSM/KG (ref 273–304)
PLATELET # BLD AUTO: 284 10*3/UL (ref 130–400)
PMV BLD AUTO: 9.7 FL (ref 9.4–12.4)
POTASSIUM SERPL-SCNC: 4.4 MMOL/L (ref 3.5–5.3)
RBC # BLD AUTO: 3.08 10*6/UL (ref 4.7–6.1)
SODIUM SERPL-SCNC: 134 MMOL/L (ref 135–147)
WBC # BLD AUTO: 8.79 10*3/UL (ref 4.8–10.8)

## 2019-11-19 ENCOUNTER — OFFICE VISIT CONVERTED (OUTPATIENT)
Dept: SURGERY | Facility: CLINIC | Age: 69
End: 2019-11-19
Attending: SURGERY

## 2019-11-19 ENCOUNTER — HOSPITAL ENCOUNTER (OUTPATIENT)
Dept: SURGERY | Facility: CLINIC | Age: 69
Discharge: HOME | End: 2019-11-19
Attending: SURGERY

## 2019-11-19 ENCOUNTER — CONVERSION ENCOUNTER (OUTPATIENT)
Dept: SURGERY | Facility: CLINIC | Age: 69
End: 2019-11-19

## 2019-11-21 LAB
BACTERIA SPEC AEROBE CULT: ABNORMAL
CIPROFLOXACIN SUSC ISLT: >=8
CLINDAMYCIN SUSC ISLT: 0.25
DAPTOMYCIN SUSC ISLT: 0.5
DOXYCYCLINE SUSC ISLT: <=0.5
ERYTHROMYCIN SUSC ISLT: >=8
GENTAMICIN SUSC ISLT: <=0.5
LEVOFLOXACIN SUSC ISLT: 4
OXACILLIN SUSC ISLT: 0.5
RIFAMPIN SUSC ISLT: <=0.5
TETRACYCLINE SUSC ISLT: <=1
TIGECYCLINE SUSC ISLT: <=0.12
TMP SMX SUSC ISLT: <=10
VANCOMYCIN SUSC ISLT: 1

## 2019-12-06 ENCOUNTER — OFFICE VISIT CONVERTED (OUTPATIENT)
Dept: CARDIOLOGY | Facility: CLINIC | Age: 69
End: 2019-12-06
Attending: INTERNAL MEDICINE

## 2019-12-06 ENCOUNTER — CONVERSION ENCOUNTER (OUTPATIENT)
Dept: CARDIOLOGY | Facility: CLINIC | Age: 69
End: 2019-12-06
Attending: INTERNAL MEDICINE

## 2019-12-10 ENCOUNTER — OFFICE VISIT CONVERTED (OUTPATIENT)
Dept: PULMONOLOGY | Facility: CLINIC | Age: 69
End: 2019-12-10
Attending: INTERNAL MEDICINE

## 2019-12-10 ENCOUNTER — HOSPITAL ENCOUNTER (OUTPATIENT)
Dept: GENERAL RADIOLOGY | Facility: HOSPITAL | Age: 69
Discharge: HOME | End: 2019-12-10
Attending: INTERNAL MEDICINE

## 2019-12-11 ENCOUNTER — HOSPITAL ENCOUNTER (OUTPATIENT)
Dept: LAB | Facility: HOSPITAL | Age: 69
Discharge: HOME | End: 2019-12-11
Attending: INTERNAL MEDICINE

## 2019-12-11 LAB
ANION GAP SERPL CALC-SCNC: 18 MMOL/L (ref 8–19)
BUN SERPL-MCNC: 20 MG/DL (ref 5–25)
BUN/CREAT SERPL: 15 {RATIO} (ref 6–20)
CALCIUM SERPL-MCNC: 8.6 MG/DL (ref 8.7–10.4)
CHLORIDE SERPL-SCNC: 87 MMOL/L (ref 99–111)
CONV CO2: 27 MMOL/L (ref 22–32)
CREAT UR-MCNC: 1.31 MG/DL (ref 0.7–1.2)
GFR SERPLBLD BASED ON 1.73 SQ M-ARVRAT: 55 ML/MIN/{1.73_M2}
GLUCOSE SERPL-MCNC: 94 MG/DL (ref 70–99)
OSMOLALITY SERPL CALC.SUM OF ELEC: 268 MOSM/KG (ref 273–304)
POTASSIUM SERPL-SCNC: 4 MMOL/L (ref 3.5–5.3)
SODIUM SERPL-SCNC: 128 MMOL/L (ref 135–147)

## 2019-12-16 ENCOUNTER — HOSPITAL ENCOUNTER (OUTPATIENT)
Dept: INFUSION THERAPY | Facility: HOSPITAL | Age: 69
Discharge: HOME | End: 2019-12-16
Attending: INTERNAL MEDICINE

## 2019-12-17 RX ORDER — AMIODARONE HYDROCHLORIDE 200 MG/1
TABLET ORAL
Qty: 60 TABLET | Refills: 2 | OUTPATIENT
Start: 2019-12-17

## 2019-12-19 ENCOUNTER — OFFICE VISIT CONVERTED (OUTPATIENT)
Dept: CARDIOLOGY | Facility: CLINIC | Age: 69
End: 2019-12-19
Attending: INTERNAL MEDICINE

## 2019-12-30 ENCOUNTER — HOSPITAL ENCOUNTER (OUTPATIENT)
Dept: LAB | Facility: HOSPITAL | Age: 69
Discharge: HOME | End: 2019-12-30
Attending: INTERNAL MEDICINE

## 2019-12-30 LAB
ANION GAP SERPL CALC-SCNC: 20 MMOL/L (ref 8–19)
BUN SERPL-MCNC: 30 MG/DL (ref 5–25)
BUN/CREAT SERPL: 17 {RATIO} (ref 6–20)
CALCIUM SERPL-MCNC: 9.1 MG/DL (ref 8.7–10.4)
CHLORIDE SERPL-SCNC: 81 MMOL/L (ref 99–111)
CONV CO2: 28 MMOL/L (ref 22–32)
CREAT UR-MCNC: 1.77 MG/DL (ref 0.7–1.2)
GFR SERPLBLD BASED ON 1.73 SQ M-ARVRAT: 38 ML/MIN/{1.73_M2}
GLUCOSE SERPL-MCNC: 181 MG/DL (ref 70–99)
OSMOLALITY SERPL CALC.SUM OF ELEC: 273 MOSM/KG (ref 273–304)
POTASSIUM SERPL-SCNC: 3 MMOL/L (ref 3.5–5.3)
SODIUM SERPL-SCNC: 126 MMOL/L (ref 135–147)

## 2020-01-09 ENCOUNTER — OFFICE VISIT CONVERTED (OUTPATIENT)
Dept: CARDIOLOGY | Facility: CLINIC | Age: 70
End: 2020-01-09
Attending: INTERNAL MEDICINE

## 2020-01-09 ENCOUNTER — CONVERSION ENCOUNTER (OUTPATIENT)
Dept: CARDIOLOGY | Facility: CLINIC | Age: 70
End: 2020-01-09

## 2020-01-10 ENCOUNTER — HOSPITAL ENCOUNTER (OUTPATIENT)
Dept: LAB | Facility: HOSPITAL | Age: 70
Discharge: HOME | End: 2020-01-10
Attending: INTERNAL MEDICINE

## 2020-01-10 LAB
ANION GAP SERPL CALC-SCNC: 16 MMOL/L (ref 8–19)
BNP SERPL-MCNC: 1735 PG/ML (ref 0–900)
BUN SERPL-MCNC: 15 MG/DL (ref 5–25)
BUN/CREAT SERPL: 12 {RATIO} (ref 6–20)
CALCIUM SERPL-MCNC: 9.3 MG/DL (ref 8.7–10.4)
CHLORIDE SERPL-SCNC: 89 MMOL/L (ref 99–111)
CONV CO2: 28 MMOL/L (ref 22–32)
CREAT UR-MCNC: 1.27 MG/DL (ref 0.7–1.2)
GFR SERPLBLD BASED ON 1.73 SQ M-ARVRAT: 57 ML/MIN/{1.73_M2}
GLUCOSE SERPL-MCNC: 105 MG/DL (ref 70–99)
OSMOLALITY SERPL CALC.SUM OF ELEC: 269 MOSM/KG (ref 273–304)
POTASSIUM SERPL-SCNC: 4.4 MMOL/L (ref 3.5–5.3)
SODIUM SERPL-SCNC: 129 MMOL/L (ref 135–147)

## 2020-01-13 ENCOUNTER — HOSPITAL ENCOUNTER (OUTPATIENT)
Dept: INFUSION THERAPY | Facility: HOSPITAL | Age: 70
Setting detail: HOSPITAL OUTPATIENT SURGERY
Discharge: HOME | End: 2020-01-14
Attending: INTERNAL MEDICINE

## 2020-01-13 LAB
ANION GAP SERPL CALC-SCNC: 18 MMOL/L (ref 8–19)
APTT BLD: 28.2 S (ref 22.2–34.2)
BASOPHILS # BLD AUTO: 0.05 10*3/UL (ref 0–0.2)
BASOPHILS NFR BLD AUTO: 0.5 % (ref 0–3)
BUN SERPL-MCNC: 15 MG/DL (ref 5–25)
BUN/CREAT SERPL: 11 {RATIO} (ref 6–20)
CALCIUM SERPL-MCNC: 9 MG/DL (ref 8.7–10.4)
CHLORIDE SERPL-SCNC: 89 MMOL/L (ref 99–111)
CONV ABS IMM GRAN: 0.09 10*3/UL (ref 0–0.2)
CONV CO2: 27 MMOL/L (ref 22–32)
CONV IMMATURE GRAN: 0.8 % (ref 0–1.8)
CREAT UR-MCNC: 1.32 MG/DL (ref 0.7–1.2)
DEPRECATED RDW RBC AUTO: 52.6 FL (ref 35.1–43.9)
EOSINOPHIL # BLD AUTO: 0.67 10*3/UL (ref 0–0.7)
EOSINOPHIL # BLD AUTO: 6.3 % (ref 0–7)
ERYTHROCYTE [DISTWIDTH] IN BLOOD BY AUTOMATED COUNT: 15.9 % (ref 11.6–14.4)
GFR SERPLBLD BASED ON 1.73 SQ M-ARVRAT: 54 ML/MIN/{1.73_M2}
GLUCOSE SERPL-MCNC: 77 MG/DL (ref 70–99)
HCT VFR BLD AUTO: 30 % (ref 42–52)
HGB BLD-MCNC: 9.4 G/DL (ref 14–18)
INR PPP: 1 (ref 2–3)
LYMPHOCYTES # BLD AUTO: 1.51 10*3/UL (ref 1–5)
LYMPHOCYTES NFR BLD AUTO: 14.2 % (ref 20–45)
MCH RBC QN AUTO: 28.2 PG (ref 27–31)
MCHC RBC AUTO-ENTMCNC: 31.3 G/DL (ref 33–37)
MCV RBC AUTO: 90.1 FL (ref 80–96)
MONOCYTES # BLD AUTO: 0.77 10*3/UL (ref 0.2–1.2)
MONOCYTES NFR BLD AUTO: 7.3 % (ref 3–10)
NEUTROPHILS # BLD AUTO: 7.53 10*3/UL (ref 2–8)
NEUTROPHILS NFR BLD AUTO: 70.9 % (ref 30–85)
NRBC CBCN: 0 % (ref 0–0.7)
OSMOLALITY SERPL CALC.SUM OF ELEC: 270 MOSM/KG (ref 273–304)
PLATELET # BLD AUTO: 329 10*3/UL (ref 130–400)
PMV BLD AUTO: 9.6 FL (ref 9.4–12.4)
POTASSIUM SERPL-SCNC: 3.8 MMOL/L (ref 3.5–5.3)
PROTHROMBIN TIME: 10.8 S (ref 9.4–12)
RBC # BLD AUTO: 3.33 10*6/UL (ref 4.7–6.1)
SODIUM SERPL-SCNC: 130 MMOL/L (ref 135–147)
WBC # BLD AUTO: 10.62 10*3/UL (ref 4.8–10.8)

## 2020-01-14 LAB
GLUCOSE BLD-MCNC: 101 MG/DL (ref 70–99)
GLUCOSE BLD-MCNC: 128 MG/DL (ref 70–99)

## 2020-01-22 ENCOUNTER — HOSPITAL ENCOUNTER (OUTPATIENT)
Dept: LAB | Facility: HOSPITAL | Age: 70
Discharge: HOME | End: 2020-01-22
Attending: INTERNAL MEDICINE

## 2020-01-22 LAB
ALBUMIN SERPL-MCNC: 3.9 G/DL (ref 3.5–5)
ALBUMIN/GLOB SERPL: 0.9 {RATIO} (ref 1.4–2.6)
ALP SERPL-CCNC: 150 U/L (ref 56–155)
ALT SERPL-CCNC: 24 U/L (ref 10–40)
ANION GAP SERPL CALC-SCNC: 20 MMOL/L (ref 8–19)
AST SERPL-CCNC: 33 U/L (ref 15–50)
BASOPHILS # BLD AUTO: 0.06 10*3/UL (ref 0–0.2)
BASOPHILS NFR BLD AUTO: 0.6 % (ref 0–3)
BILIRUB SERPL-MCNC: 0.25 MG/DL (ref 0.2–1.3)
BNP SERPL-MCNC: 1798 PG/ML (ref 0–900)
BUN SERPL-MCNC: 13 MG/DL (ref 5–25)
BUN/CREAT SERPL: 11 {RATIO} (ref 6–20)
CALCIUM SERPL-MCNC: 9.6 MG/DL (ref 8.7–10.4)
CHLORIDE SERPL-SCNC: 86 MMOL/L (ref 99–111)
CHOLEST SERPL-MCNC: 175 MG/DL (ref 107–200)
CHOLEST/HDLC SERPL: 2.3 {RATIO} (ref 3–6)
CONV ABS IMM GRAN: 0.13 10*3/UL (ref 0–0.2)
CONV CO2: 26 MMOL/L (ref 22–32)
CONV IMMATURE GRAN: 1.3 % (ref 0–1.8)
CONV TOTAL PROTEIN: 8.4 G/DL (ref 6.3–8.2)
CREAT UR-MCNC: 1.17 MG/DL (ref 0.7–1.2)
DEPRECATED RDW RBC AUTO: 55.2 FL (ref 35.1–43.9)
EOSINOPHIL # BLD AUTO: 0.67 10*3/UL (ref 0–0.7)
EOSINOPHIL # BLD AUTO: 6.5 % (ref 0–7)
ERYTHROCYTE [DISTWIDTH] IN BLOOD BY AUTOMATED COUNT: 16.3 % (ref 11.6–14.4)
EST. AVERAGE GLUCOSE BLD GHB EST-MCNC: 143 MG/DL
GFR SERPLBLD BASED ON 1.73 SQ M-ARVRAT: >60 ML/MIN/{1.73_M2}
GLOBULIN UR ELPH-MCNC: 4.5 G/DL (ref 2–3.5)
GLUCOSE SERPL-MCNC: 99 MG/DL (ref 70–99)
HBA1C MFR BLD: 6.6 % (ref 3.5–5.7)
HCT VFR BLD AUTO: 31.7 % (ref 42–52)
HDLC SERPL-MCNC: 77 MG/DL (ref 40–60)
HGB BLD-MCNC: 9.6 G/DL (ref 14–18)
LDLC SERPL CALC-MCNC: 86 MG/DL (ref 70–100)
LYMPHOCYTES # BLD AUTO: 1.54 10*3/UL (ref 1–5)
LYMPHOCYTES NFR BLD AUTO: 15 % (ref 20–45)
MCH RBC QN AUTO: 28.2 PG (ref 27–31)
MCHC RBC AUTO-ENTMCNC: 30.3 G/DL (ref 33–37)
MCV RBC AUTO: 93.2 FL (ref 80–96)
MONOCYTES # BLD AUTO: 0.79 10*3/UL (ref 0.2–1.2)
MONOCYTES NFR BLD AUTO: 7.7 % (ref 3–10)
NEUTROPHILS # BLD AUTO: 7.08 10*3/UL (ref 2–8)
NEUTROPHILS NFR BLD AUTO: 68.9 % (ref 30–85)
NRBC CBCN: 0 % (ref 0–0.7)
OSMOLALITY SERPL CALC.SUM OF ELEC: 266 MOSM/KG (ref 273–304)
PLATELET # BLD AUTO: 347 10*3/UL (ref 130–400)
PMV BLD AUTO: 10 FL (ref 9.4–12.4)
POTASSIUM SERPL-SCNC: 4.3 MMOL/L (ref 3.5–5.3)
RBC # BLD AUTO: 3.4 10*6/UL (ref 4.7–6.1)
SODIUM SERPL-SCNC: 128 MMOL/L (ref 135–147)
T4 FREE SERPL-MCNC: 0.9 NG/DL (ref 0.9–1.8)
TRIGL SERPL-MCNC: 60 MG/DL (ref 40–150)
TSH SERPL-ACNC: 12.67 M[IU]/L (ref 0.27–4.2)
VLDLC SERPL-MCNC: 12 MG/DL (ref 5–37)
WBC # BLD AUTO: 10.27 10*3/UL (ref 4.8–10.8)

## 2020-01-28 ENCOUNTER — OFFICE VISIT CONVERTED (OUTPATIENT)
Dept: PULMONOLOGY | Facility: CLINIC | Age: 70
End: 2020-01-28
Attending: NURSE PRACTITIONER

## 2020-02-04 ENCOUNTER — HOSPITAL ENCOUNTER (OUTPATIENT)
Dept: INFUSION THERAPY | Facility: HOSPITAL | Age: 70
Setting detail: RECURRING SERIES
Discharge: HOME | End: 2020-02-04
Attending: INTERNAL MEDICINE

## 2020-02-04 LAB
IRON SATN MFR SERPL: 20 % (ref 20–55)
IRON SERPL-MCNC: 82 UG/DL (ref 70–180)
TIBC SERPL-MCNC: 415 UG/DL (ref 245–450)
TRANSFERRIN SERPL-MCNC: 290 MG/DL (ref 215–365)

## 2020-02-05 ENCOUNTER — HOSPITAL ENCOUNTER (OUTPATIENT)
Dept: LAB | Facility: HOSPITAL | Age: 70
Discharge: HOME | End: 2020-02-05
Attending: INTERNAL MEDICINE

## 2020-02-05 LAB
ANION GAP SERPL CALC-SCNC: 18 MMOL/L (ref 8–19)
BNP SERPL-MCNC: 828 PG/ML (ref 0–900)
BUN SERPL-MCNC: 8 MG/DL (ref 5–25)
BUN/CREAT SERPL: 7 {RATIO} (ref 6–20)
CALCIUM SERPL-MCNC: 9.1 MG/DL (ref 8.7–10.4)
CHLORIDE SERPL-SCNC: 93 MMOL/L (ref 99–111)
CONV CO2: 25 MMOL/L (ref 22–32)
CREAT UR-MCNC: 1.11 MG/DL (ref 0.7–1.2)
GFR SERPLBLD BASED ON 1.73 SQ M-ARVRAT: >60 ML/MIN/{1.73_M2}
GLUCOSE SERPL-MCNC: 105 MG/DL (ref 70–99)
OSMOLALITY SERPL CALC.SUM OF ELEC: 271 MOSM/KG (ref 273–304)
POTASSIUM SERPL-SCNC: 4.7 MMOL/L (ref 3.5–5.3)
SODIUM SERPL-SCNC: 131 MMOL/L (ref 135–147)

## 2020-02-07 ENCOUNTER — HOSPITAL ENCOUNTER (OUTPATIENT)
Dept: OTHER | Facility: HOSPITAL | Age: 70
Setting detail: RECURRING SERIES
Discharge: HOME | End: 2020-02-07
Attending: INTERNAL MEDICINE

## 2020-02-10 ENCOUNTER — HOSPITAL ENCOUNTER (OUTPATIENT)
Dept: GENERAL RADIOLOGY | Facility: HOSPITAL | Age: 70
Discharge: HOME | End: 2020-02-10
Attending: NURSE PRACTITIONER

## 2020-02-10 LAB
ANION GAP SERPL CALC-SCNC: 19 MMOL/L (ref 8–19)
BNP SERPL-MCNC: 741 PG/ML (ref 0–900)
BUN SERPL-MCNC: 11 MG/DL (ref 5–25)
BUN/CREAT SERPL: 9 {RATIO} (ref 6–20)
CALCIUM SERPL-MCNC: 9.2 MG/DL (ref 8.7–10.4)
CHLORIDE SERPL-SCNC: 87 MMOL/L (ref 99–111)
CONV CO2: 25 MMOL/L (ref 22–32)
CREAT UR-MCNC: 1.19 MG/DL (ref 0.7–1.2)
GFR SERPLBLD BASED ON 1.73 SQ M-ARVRAT: >60 ML/MIN/{1.73_M2}
GLUCOSE SERPL-MCNC: 150 MG/DL (ref 70–99)
OSMOLALITY SERPL CALC.SUM OF ELEC: 266 MOSM/KG (ref 273–304)
POTASSIUM SERPL-SCNC: 3.9 MMOL/L (ref 3.5–5.3)
SODIUM SERPL-SCNC: 127 MMOL/L (ref 135–147)

## 2020-02-24 ENCOUNTER — HOSPITAL ENCOUNTER (OUTPATIENT)
Dept: LAB | Facility: HOSPITAL | Age: 70
Discharge: HOME | End: 2020-02-24
Attending: NURSE PRACTITIONER

## 2020-02-24 LAB
ANION GAP SERPL CALC-SCNC: 17 MMOL/L (ref 8–19)
BUN SERPL-MCNC: 9 MG/DL (ref 5–25)
BUN/CREAT SERPL: 7 {RATIO} (ref 6–20)
CALCIUM SERPL-MCNC: 8.9 MG/DL (ref 8.7–10.4)
CHLORIDE SERPL-SCNC: 94 MMOL/L (ref 99–111)
CONV CO2: 25 MMOL/L (ref 22–32)
CREAT UR-MCNC: 1.29 MG/DL (ref 0.7–1.2)
GFR SERPLBLD BASED ON 1.73 SQ M-ARVRAT: 56 ML/MIN/{1.73_M2}
GLUCOSE SERPL-MCNC: 106 MG/DL (ref 70–99)
OSMOLALITY SERPL CALC.SUM OF ELEC: 273 MOSM/KG (ref 273–304)
POTASSIUM SERPL-SCNC: 3.9 MMOL/L (ref 3.5–5.3)
SODIUM SERPL-SCNC: 132 MMOL/L (ref 135–147)

## 2020-03-13 ENCOUNTER — HOSPITAL ENCOUNTER (OUTPATIENT)
Dept: LAB | Facility: HOSPITAL | Age: 70
Discharge: HOME | End: 2020-03-13
Attending: INTERNAL MEDICINE

## 2020-03-13 LAB
ALBUMIN SERPL-MCNC: 4 G/DL (ref 3.5–5)
ALBUMIN/GLOB SERPL: 1.1 {RATIO} (ref 1.4–2.6)
ALP SERPL-CCNC: 128 U/L (ref 56–155)
ALT SERPL-CCNC: 18 U/L (ref 10–40)
ANION GAP SERPL CALC-SCNC: 21 MMOL/L (ref 8–19)
AST SERPL-CCNC: 24 U/L (ref 15–50)
BASOPHILS # BLD AUTO: 0.04 10*3/UL (ref 0–0.2)
BASOPHILS NFR BLD AUTO: 0.6 % (ref 0–3)
BILIRUB SERPL-MCNC: 0.19 MG/DL (ref 0.2–1.3)
BNP SERPL-MCNC: 921 PG/ML (ref 0–900)
BUN SERPL-MCNC: 12 MG/DL (ref 5–25)
BUN/CREAT SERPL: 9 {RATIO} (ref 6–20)
CALCIUM SERPL-MCNC: 9.3 MG/DL (ref 8.7–10.4)
CHLORIDE SERPL-SCNC: 97 MMOL/L (ref 99–111)
CONV ABS IMM GRAN: 0.07 10*3/UL (ref 0–0.2)
CONV CO2: 24 MMOL/L (ref 22–32)
CONV IMMATURE GRAN: 1 % (ref 0–1.8)
CONV TOTAL PROTEIN: 7.8 G/DL (ref 6.3–8.2)
CREAT UR-MCNC: 1.35 MG/DL (ref 0.7–1.2)
DEPRECATED RDW RBC AUTO: 64.2 FL (ref 35.1–43.9)
EOSINOPHIL # BLD AUTO: 0.78 10*3/UL (ref 0–0.7)
EOSINOPHIL # BLD AUTO: 10.8 % (ref 0–7)
ERYTHROCYTE [DISTWIDTH] IN BLOOD BY AUTOMATED COUNT: 18.3 % (ref 11.6–14.4)
EST. AVERAGE GLUCOSE BLD GHB EST-MCNC: 126 MG/DL
GFR SERPLBLD BASED ON 1.73 SQ M-ARVRAT: 53 ML/MIN/{1.73_M2}
GLOBULIN UR ELPH-MCNC: 3.8 G/DL (ref 2–3.5)
GLUCOSE SERPL-MCNC: 95 MG/DL (ref 70–99)
HBA1C MFR BLD: 6 % (ref 3.5–5.7)
HCT VFR BLD AUTO: 35 % (ref 42–52)
HGB BLD-MCNC: 11.2 G/DL (ref 14–18)
LYMPHOCYTES # BLD AUTO: 1.85 10*3/UL (ref 1–5)
LYMPHOCYTES NFR BLD AUTO: 25.6 % (ref 20–45)
MCH RBC QN AUTO: 30.2 PG (ref 27–31)
MCHC RBC AUTO-ENTMCNC: 32 G/DL (ref 33–37)
MCV RBC AUTO: 94.3 FL (ref 80–96)
MONOCYTES # BLD AUTO: 0.4 10*3/UL (ref 0.2–1.2)
MONOCYTES NFR BLD AUTO: 5.5 % (ref 3–10)
NEUTROPHILS # BLD AUTO: 4.1 10*3/UL (ref 2–8)
NEUTROPHILS NFR BLD AUTO: 56.5 % (ref 30–85)
NRBC CBCN: 0 % (ref 0–0.7)
OSMOLALITY SERPL CALC.SUM OF ELEC: 286 MOSM/KG (ref 273–304)
PLATELET # BLD AUTO: 331 10*3/UL (ref 130–400)
PMV BLD AUTO: 9.6 FL (ref 9.4–12.4)
POTASSIUM SERPL-SCNC: 4.1 MMOL/L (ref 3.5–5.3)
RBC # BLD AUTO: 3.71 10*6/UL (ref 4.7–6.1)
SODIUM SERPL-SCNC: 138 MMOL/L (ref 135–147)
T4 FREE SERPL-MCNC: 1.2 NG/DL (ref 0.9–1.8)
TSH SERPL-ACNC: 7.63 M[IU]/L (ref 0.27–4.2)
WBC # BLD AUTO: 7.24 10*3/UL (ref 4.8–10.8)

## 2020-03-31 ENCOUNTER — OFFICE VISIT CONVERTED (OUTPATIENT)
Dept: PULMONOLOGY | Facility: CLINIC | Age: 70
End: 2020-03-31
Attending: INTERNAL MEDICINE

## 2020-04-08 ENCOUNTER — HOSPITAL ENCOUNTER (OUTPATIENT)
Dept: OTHER | Facility: HOSPITAL | Age: 70
Discharge: HOME | End: 2020-04-08
Attending: OPHTHALMOLOGY

## 2020-04-08 ENCOUNTER — HOSPITAL ENCOUNTER (OUTPATIENT)
Dept: GENERAL RADIOLOGY | Facility: HOSPITAL | Age: 70
Discharge: HOME | End: 2020-04-08
Attending: OPHTHALMOLOGY

## 2020-04-08 LAB
CRP SERPL-MCNC: 40.8 MG/L (ref 0–5)
ERYTHROCYTE [SEDIMENTATION RATE] IN BLOOD: 44 MM/H (ref 0–20)

## 2020-04-23 ENCOUNTER — TELEMEDICINE CONVERTED (OUTPATIENT)
Dept: CARDIOLOGY | Facility: CLINIC | Age: 70
End: 2020-04-23
Attending: INTERNAL MEDICINE

## 2020-05-26 ENCOUNTER — HOSPITAL ENCOUNTER (OUTPATIENT)
Dept: LAB | Facility: HOSPITAL | Age: 70
Discharge: HOME | End: 2020-05-26
Attending: INTERNAL MEDICINE

## 2020-05-26 ENCOUNTER — HOSPITAL ENCOUNTER (OUTPATIENT)
Dept: GENERAL RADIOLOGY | Facility: HOSPITAL | Age: 70
Discharge: HOME | End: 2020-05-26
Attending: INTERNAL MEDICINE

## 2020-05-26 LAB
ANION GAP SERPL CALC-SCNC: 19 MMOL/L (ref 8–19)
BNP SERPL-MCNC: 861 PG/ML (ref 0–900)
BUN SERPL-MCNC: 19 MG/DL (ref 5–25)
BUN/CREAT SERPL: 14 {RATIO} (ref 6–20)
CALCIUM SERPL-MCNC: 9.4 MG/DL (ref 8.7–10.4)
CHLORIDE SERPL-SCNC: 83 MMOL/L (ref 99–111)
CONV CO2: 26 MMOL/L (ref 22–32)
CREAT UR-MCNC: 1.37 MG/DL (ref 0.7–1.2)
GFR SERPLBLD BASED ON 1.73 SQ M-ARVRAT: 52 ML/MIN/{1.73_M2}
GLUCOSE SERPL-MCNC: 116 MG/DL (ref 70–99)
OSMOLALITY SERPL CALC.SUM OF ELEC: 261 MOSM/KG (ref 273–304)
POTASSIUM SERPL-SCNC: 4 MMOL/L (ref 3.5–5.3)
SODIUM SERPL-SCNC: 124 MMOL/L (ref 135–147)

## 2020-06-12 ENCOUNTER — HOSPITAL ENCOUNTER (OUTPATIENT)
Dept: LAB | Facility: HOSPITAL | Age: 70
Discharge: HOME | End: 2020-06-12
Attending: INTERNAL MEDICINE

## 2020-06-12 LAB
ALBUMIN SERPL-MCNC: 3.8 G/DL (ref 3.5–5)
ALBUMIN/GLOB SERPL: 0.9 {RATIO} (ref 1.4–2.6)
ALP SERPL-CCNC: 109 U/L (ref 56–155)
ALT SERPL-CCNC: 20 U/L (ref 10–40)
ANION GAP SERPL CALC-SCNC: 17 MMOL/L (ref 8–19)
AST SERPL-CCNC: 23 U/L (ref 15–50)
BASOPHILS # BLD AUTO: 0.05 10*3/UL (ref 0–0.2)
BASOPHILS NFR BLD AUTO: 0.7 % (ref 0–3)
BILIRUB SERPL-MCNC: 0.2 MG/DL (ref 0.2–1.3)
BNP SERPL-MCNC: 640 PG/ML (ref 0–900)
BUN SERPL-MCNC: 20 MG/DL (ref 5–25)
BUN/CREAT SERPL: 17 {RATIO} (ref 6–20)
CALCIUM SERPL-MCNC: 9.7 MG/DL (ref 8.7–10.4)
CHLORIDE SERPL-SCNC: 92 MMOL/L (ref 99–111)
CONV ABS IMM GRAN: 0.05 10*3/UL (ref 0–0.2)
CONV CO2: 28 MMOL/L (ref 22–32)
CONV IMMATURE GRAN: 0.7 % (ref 0–1.8)
CONV TOTAL PROTEIN: 7.9 G/DL (ref 6.3–8.2)
CREAT UR-MCNC: 1.18 MG/DL (ref 0.7–1.2)
DEPRECATED RDW RBC AUTO: 48.8 FL (ref 35.1–43.9)
EOSINOPHIL # BLD AUTO: 0.38 10*3/UL (ref 0–0.7)
EOSINOPHIL # BLD AUTO: 5.4 % (ref 0–7)
ERYTHROCYTE [DISTWIDTH] IN BLOOD BY AUTOMATED COUNT: 13.3 % (ref 11.6–14.4)
EST. AVERAGE GLUCOSE BLD GHB EST-MCNC: 143 MG/DL
GFR SERPLBLD BASED ON 1.73 SQ M-ARVRAT: >60 ML/MIN/{1.73_M2}
GLOBULIN UR ELPH-MCNC: 4.1 G/DL (ref 2–3.5)
GLUCOSE SERPL-MCNC: 114 MG/DL (ref 70–99)
HBA1C MFR BLD: 6.6 % (ref 3.5–5.7)
HCT VFR BLD AUTO: 34.6 % (ref 42–52)
HGB BLD-MCNC: 11 G/DL (ref 14–18)
LYMPHOCYTES # BLD AUTO: 1.38 10*3/UL (ref 1–5)
LYMPHOCYTES NFR BLD AUTO: 19.7 % (ref 20–45)
MCH RBC QN AUTO: 31.6 PG (ref 27–31)
MCHC RBC AUTO-ENTMCNC: 31.8 G/DL (ref 33–37)
MCV RBC AUTO: 99.4 FL (ref 80–96)
MONOCYTES # BLD AUTO: 0.51 10*3/UL (ref 0.2–1.2)
MONOCYTES NFR BLD AUTO: 7.3 % (ref 3–10)
NEUTROPHILS # BLD AUTO: 4.65 10*3/UL (ref 2–8)
NEUTROPHILS NFR BLD AUTO: 66.2 % (ref 30–85)
NRBC CBCN: 0 % (ref 0–0.7)
OSMOLALITY SERPL CALC.SUM OF ELEC: 279 MOSM/KG (ref 273–304)
PLATELET # BLD AUTO: 414 10*3/UL (ref 130–400)
PMV BLD AUTO: 9.6 FL (ref 9.4–12.4)
POTASSIUM SERPL-SCNC: 3.7 MMOL/L (ref 3.5–5.3)
RBC # BLD AUTO: 3.48 10*6/UL (ref 4.7–6.1)
SODIUM SERPL-SCNC: 133 MMOL/L (ref 135–147)
T4 FREE SERPL-MCNC: 1.3 NG/DL (ref 0.9–1.8)
TSH SERPL-ACNC: 0.75 M[IU]/L (ref 0.27–4.2)
WBC # BLD AUTO: 7.02 10*3/UL (ref 4.8–10.8)

## 2020-07-22 ENCOUNTER — OFFICE VISIT CONVERTED (OUTPATIENT)
Dept: PULMONOLOGY | Facility: CLINIC | Age: 70
End: 2020-07-22
Attending: INTERNAL MEDICINE

## 2020-07-27 ENCOUNTER — HOSPITAL ENCOUNTER (OUTPATIENT)
Dept: PREADMISSION TESTING | Facility: HOSPITAL | Age: 70
Discharge: HOME | End: 2020-07-27
Attending: OPHTHALMOLOGY

## 2020-07-27 LAB — SARS-COV-2 RNA SPEC QL NAA+PROBE: NOT DETECTED

## 2020-07-29 ENCOUNTER — HOSPITAL ENCOUNTER (OUTPATIENT)
Dept: PERIOP | Facility: HOSPITAL | Age: 70
Setting detail: HOSPITAL OUTPATIENT SURGERY
Discharge: HOME | End: 2020-07-29
Attending: OPHTHALMOLOGY

## 2020-07-29 LAB — GLUCOSE BLD-MCNC: 114 MG/DL (ref 70–99)

## 2020-08-07 ENCOUNTER — HOSPITAL ENCOUNTER (OUTPATIENT)
Dept: PREADMISSION TESTING | Facility: HOSPITAL | Age: 70
Discharge: HOME | End: 2020-08-07
Attending: OPHTHALMOLOGY

## 2020-08-08 LAB — SARS-COV-2 RNA SPEC QL NAA+PROBE: NOT DETECTED

## 2020-08-12 ENCOUNTER — HOSPITAL ENCOUNTER (OUTPATIENT)
Dept: PERIOP | Facility: HOSPITAL | Age: 70
Setting detail: HOSPITAL OUTPATIENT SURGERY
Discharge: HOME | End: 2020-08-12
Attending: OPHTHALMOLOGY

## 2020-08-12 LAB — GLUCOSE BLD-MCNC: 117 MG/DL (ref 70–99)

## 2020-09-02 ENCOUNTER — HOSPITAL ENCOUNTER (OUTPATIENT)
Dept: LAB | Facility: HOSPITAL | Age: 70
Discharge: HOME | End: 2020-09-02
Attending: PHYSICIAN ASSISTANT

## 2020-09-02 ENCOUNTER — HOSPITAL ENCOUNTER (OUTPATIENT)
Dept: GENERAL RADIOLOGY | Facility: HOSPITAL | Age: 70
Discharge: HOME | End: 2020-09-02
Attending: PHYSICIAN ASSISTANT

## 2020-09-02 LAB — ERYTHROCYTE [SEDIMENTATION RATE] IN BLOOD: 56 MM/H (ref 0–20)

## 2020-09-03 LAB — CRP SERPL HS-MCNC: 5.11 MG/DL (ref 0–0.5)

## 2020-09-17 ENCOUNTER — HOSPITAL ENCOUNTER (OUTPATIENT)
Dept: LAB | Facility: HOSPITAL | Age: 70
Discharge: HOME | End: 2020-09-17
Attending: INTERNAL MEDICINE

## 2020-09-17 LAB
ALBUMIN SERPL-MCNC: 3.8 G/DL (ref 3.5–5)
ALBUMIN/GLOB SERPL: 1.1 {RATIO} (ref 1.4–2.6)
ALP SERPL-CCNC: 115 U/L (ref 56–155)
ALT SERPL-CCNC: 13 U/L (ref 10–40)
ANION GAP SERPL CALC-SCNC: 17 MMOL/L (ref 8–19)
AST SERPL-CCNC: 19 U/L (ref 15–50)
BASOPHILS # BLD AUTO: 0.05 10*3/UL (ref 0–0.2)
BASOPHILS NFR BLD AUTO: 0.8 % (ref 0–3)
BILIRUB SERPL-MCNC: 0.26 MG/DL (ref 0.2–1.3)
BNP SERPL-MCNC: 640 PG/ML (ref 0–900)
BUN SERPL-MCNC: 12 MG/DL (ref 5–25)
BUN/CREAT SERPL: 11 {RATIO} (ref 6–20)
CALCIUM SERPL-MCNC: 9.1 MG/DL (ref 8.7–10.4)
CHLORIDE SERPL-SCNC: 97 MMOL/L (ref 99–111)
CONV ABS IMM GRAN: 0.04 10*3/UL (ref 0–0.2)
CONV CO2: 24 MMOL/L (ref 22–32)
CONV IMMATURE GRAN: 0.7 % (ref 0–1.8)
CONV TOTAL PROTEIN: 7.4 G/DL (ref 6.3–8.2)
CREAT UR-MCNC: 1.1 MG/DL (ref 0.7–1.2)
DEPRECATED RDW RBC AUTO: 48.2 FL (ref 35.1–43.9)
EOSINOPHIL # BLD AUTO: 0.45 10*3/UL (ref 0–0.7)
EOSINOPHIL # BLD AUTO: 7.3 % (ref 0–7)
ERYTHROCYTE [DISTWIDTH] IN BLOOD BY AUTOMATED COUNT: 13.1 % (ref 11.6–14.4)
EST. AVERAGE GLUCOSE BLD GHB EST-MCNC: 128 MG/DL
GFR SERPLBLD BASED ON 1.73 SQ M-ARVRAT: >60 ML/MIN/{1.73_M2}
GLOBULIN UR ELPH-MCNC: 3.6 G/DL (ref 2–3.5)
GLUCOSE SERPL-MCNC: 106 MG/DL (ref 70–99)
HBA1C MFR BLD: 6.1 % (ref 3.5–5.7)
HCT VFR BLD AUTO: 37.9 % (ref 42–52)
HGB BLD-MCNC: 12.2 G/DL (ref 14–18)
IRON SATN MFR SERPL: 21 % (ref 20–55)
IRON SERPL-MCNC: 65 UG/DL (ref 70–180)
LYMPHOCYTES # BLD AUTO: 1.47 10*3/UL (ref 1–5)
LYMPHOCYTES NFR BLD AUTO: 23.9 % (ref 20–45)
MCH RBC QN AUTO: 32.3 PG (ref 27–31)
MCHC RBC AUTO-ENTMCNC: 32.2 G/DL (ref 33–37)
MCV RBC AUTO: 100.3 FL (ref 80–96)
MONOCYTES # BLD AUTO: 0.38 10*3/UL (ref 0.2–1.2)
MONOCYTES NFR BLD AUTO: 6.2 % (ref 3–10)
NEUTROPHILS # BLD AUTO: 3.75 10*3/UL (ref 2–8)
NEUTROPHILS NFR BLD AUTO: 61.1 % (ref 30–85)
NRBC CBCN: 0 % (ref 0–0.7)
OSMOLALITY SERPL CALC.SUM OF ELEC: 278 MOSM/KG (ref 273–304)
PLATELET # BLD AUTO: 302 10*3/UL (ref 130–400)
PMV BLD AUTO: 10.2 FL (ref 9.4–12.4)
POTASSIUM SERPL-SCNC: 4.3 MMOL/L (ref 3.5–5.3)
RBC # BLD AUTO: 3.78 10*6/UL (ref 4.7–6.1)
SODIUM SERPL-SCNC: 134 MMOL/L (ref 135–147)
T4 FREE SERPL-MCNC: 1.2 NG/DL (ref 0.9–1.8)
TIBC SERPL-MCNC: 315 UG/DL (ref 245–450)
TRANSFERRIN SERPL-MCNC: 220 MG/DL (ref 215–365)
TSH SERPL-ACNC: 0.24 M[IU]/L (ref 0.27–4.2)
WBC # BLD AUTO: 6.14 10*3/UL (ref 4.8–10.8)

## 2020-10-26 ENCOUNTER — HOSPITAL ENCOUNTER (OUTPATIENT)
Dept: URGENT CARE | Facility: CLINIC | Age: 70
Discharge: HOME | End: 2020-10-26
Attending: INTERNAL MEDICINE

## 2020-10-27 ENCOUNTER — OFFICE VISIT CONVERTED (OUTPATIENT)
Dept: PULMONOLOGY | Facility: CLINIC | Age: 70
End: 2020-10-27
Attending: NURSE PRACTITIONER

## 2020-11-04 ENCOUNTER — HOSPITAL ENCOUNTER (OUTPATIENT)
Dept: CT IMAGING | Facility: HOSPITAL | Age: 70
Discharge: HOME | End: 2020-11-04
Attending: INTERNAL MEDICINE

## 2020-11-12 ENCOUNTER — OFFICE VISIT CONVERTED (OUTPATIENT)
Dept: CARDIOLOGY | Facility: CLINIC | Age: 70
End: 2020-11-12
Attending: INTERNAL MEDICINE

## 2020-11-12 ENCOUNTER — CONVERSION ENCOUNTER (OUTPATIENT)
Dept: CARDIOLOGY | Facility: CLINIC | Age: 70
End: 2020-11-12

## 2021-01-01 ENCOUNTER — OFFICE VISIT (OUTPATIENT)
Dept: INTERNAL MEDICINE | Facility: CLINIC | Age: 71
End: 2021-01-01

## 2021-01-01 ENCOUNTER — TRANSCRIBE ORDERS (OUTPATIENT)
Dept: LAB | Facility: HOSPITAL | Age: 71
End: 2021-01-01

## 2021-01-01 ENCOUNTER — CONVERSION ENCOUNTER (OUTPATIENT)
Dept: CARDIOLOGY | Facility: CLINIC | Age: 71
End: 2021-01-01

## 2021-01-01 ENCOUNTER — APPOINTMENT (OUTPATIENT)
Dept: CARDIAC REHAB | Facility: HOSPITAL | Age: 71
End: 2021-01-01

## 2021-01-01 ENCOUNTER — TREATMENT (OUTPATIENT)
Dept: CARDIAC REHAB | Facility: HOSPITAL | Age: 71
End: 2021-01-01

## 2021-01-01 ENCOUNTER — APPOINTMENT (OUTPATIENT)
Dept: RESPIRATORY THERAPY | Facility: HOSPITAL | Age: 71
End: 2021-01-01

## 2021-01-01 ENCOUNTER — TELEPHONE (OUTPATIENT)
Dept: INTERNAL MEDICINE | Facility: CLINIC | Age: 71
End: 2021-01-01

## 2021-01-01 ENCOUNTER — HOSPITAL ENCOUNTER (EMERGENCY)
Facility: HOSPITAL | Age: 71
Discharge: HOME | End: 2021-11-17
Attending: EMERGENCY MEDICINE | Admitting: EMERGENCY MEDICINE

## 2021-01-01 ENCOUNTER — CLINICAL SUPPORT NO REQUIREMENTS (OUTPATIENT)
Dept: CARDIOLOGY | Facility: CLINIC | Age: 71
End: 2021-01-01

## 2021-01-01 ENCOUNTER — LAB (OUTPATIENT)
Dept: LAB | Facility: HOSPITAL | Age: 71
End: 2021-01-01

## 2021-01-01 ENCOUNTER — OFFICE VISIT (OUTPATIENT)
Dept: CARDIAC REHAB | Facility: HOSPITAL | Age: 71
End: 2021-01-01

## 2021-01-01 ENCOUNTER — TRANSCRIBE ORDERS (OUTPATIENT)
Dept: CARDIAC REHAB | Facility: HOSPITAL | Age: 71
End: 2021-01-01

## 2021-01-01 ENCOUNTER — HOSPITAL ENCOUNTER (OUTPATIENT)
Dept: GENERAL RADIOLOGY | Facility: HOSPITAL | Age: 71
Discharge: HOME | End: 2021-11-01
Admitting: NURSE PRACTITIONER

## 2021-01-01 ENCOUNTER — OFFICE VISIT (OUTPATIENT)
Dept: PULMONOLOGY | Facility: CLINIC | Age: 71
End: 2021-01-01

## 2021-01-01 ENCOUNTER — APPOINTMENT (OUTPATIENT)
Dept: GENERAL RADIOLOGY | Facility: HOSPITAL | Age: 71
End: 2021-01-01

## 2021-01-01 ENCOUNTER — PREP FOR SURGERY (OUTPATIENT)
Dept: OTHER | Facility: HOSPITAL | Age: 71
End: 2021-01-01

## 2021-01-01 ENCOUNTER — OFFICE VISIT (OUTPATIENT)
Dept: CARDIOLOGY | Facility: CLINIC | Age: 71
End: 2021-01-01

## 2021-01-01 ENCOUNTER — OFFICE VISIT CONVERTED (OUTPATIENT)
Dept: CARDIOLOGY | Facility: CLINIC | Age: 71
End: 2021-01-01
Attending: INTERNAL MEDICINE

## 2021-01-01 ENCOUNTER — HOSPITAL ENCOUNTER (OUTPATIENT)
Dept: INFUSION THERAPY | Facility: HOSPITAL | Age: 71
Discharge: HOME | End: 2021-11-05
Attending: INTERNAL MEDICINE | Admitting: NURSE PRACTITIONER

## 2021-01-01 ENCOUNTER — HOSPITAL ENCOUNTER (OUTPATIENT)
Dept: RESPIRATORY THERAPY | Facility: HOSPITAL | Age: 71
Discharge: HOME | End: 2021-12-30
Admitting: INTERNAL MEDICINE

## 2021-01-01 VITALS
RESPIRATION RATE: 18 BRPM | WEIGHT: 150.57 LBS | OXYGEN SATURATION: 99 % | SYSTOLIC BLOOD PRESSURE: 118 MMHG | BODY MASS INDEX: 24.2 KG/M2 | HEIGHT: 66 IN | HEART RATE: 51 BPM | DIASTOLIC BLOOD PRESSURE: 62 MMHG

## 2021-01-01 VITALS
BODY MASS INDEX: 23.53 KG/M2 | WEIGHT: 146.4 LBS | DIASTOLIC BLOOD PRESSURE: 76 MMHG | TEMPERATURE: 97.9 F | HEART RATE: 51 BPM | HEIGHT: 66 IN | OXYGEN SATURATION: 97 % | SYSTOLIC BLOOD PRESSURE: 128 MMHG

## 2021-01-01 VITALS
RESPIRATION RATE: 16 BRPM | HEIGHT: 66 IN | WEIGHT: 149 LBS | BODY MASS INDEX: 23.95 KG/M2 | HEART RATE: 75 BPM | OXYGEN SATURATION: 96 % | SYSTOLIC BLOOD PRESSURE: 72 MMHG | TEMPERATURE: 97.1 F | DIASTOLIC BLOOD PRESSURE: 48 MMHG

## 2021-01-01 VITALS
BODY MASS INDEX: 22.82 KG/M2 | HEART RATE: 76 BPM | DIASTOLIC BLOOD PRESSURE: 74 MMHG | WEIGHT: 142 LBS | HEIGHT: 66 IN | TEMPERATURE: 97.9 F | OXYGEN SATURATION: 96 % | SYSTOLIC BLOOD PRESSURE: 126 MMHG

## 2021-01-01 VITALS
TEMPERATURE: 98 F | BODY MASS INDEX: 24.11 KG/M2 | DIASTOLIC BLOOD PRESSURE: 74 MMHG | OXYGEN SATURATION: 100 % | SYSTOLIC BLOOD PRESSURE: 117 MMHG | HEIGHT: 66 IN | HEART RATE: 79 BPM | WEIGHT: 150 LBS | RESPIRATION RATE: 14 BRPM

## 2021-01-01 VITALS
BODY MASS INDEX: 24.43 KG/M2 | HEIGHT: 66 IN | RESPIRATION RATE: 15 BRPM | DIASTOLIC BLOOD PRESSURE: 73 MMHG | TEMPERATURE: 98 F | WEIGHT: 152 LBS | SYSTOLIC BLOOD PRESSURE: 102 MMHG | OXYGEN SATURATION: 98 % | HEART RATE: 73 BPM

## 2021-01-01 VITALS
TEMPERATURE: 97.6 F | RESPIRATION RATE: 15 BRPM | DIASTOLIC BLOOD PRESSURE: 60 MMHG | HEIGHT: 66 IN | BODY MASS INDEX: 24.43 KG/M2 | HEART RATE: 75 BPM | OXYGEN SATURATION: 98 % | WEIGHT: 152 LBS | SYSTOLIC BLOOD PRESSURE: 90 MMHG

## 2021-01-01 VITALS
BODY MASS INDEX: 24.43 KG/M2 | WEIGHT: 150.37 LBS | OXYGEN SATURATION: 97 % | BODY MASS INDEX: 24.17 KG/M2 | RESPIRATION RATE: 16 BRPM | RESPIRATION RATE: 16 BRPM | SYSTOLIC BLOOD PRESSURE: 114 MMHG | DIASTOLIC BLOOD PRESSURE: 62 MMHG | TEMPERATURE: 98.2 F | DIASTOLIC BLOOD PRESSURE: 70 MMHG | HEIGHT: 66 IN | HEART RATE: 74 BPM | SYSTOLIC BLOOD PRESSURE: 112 MMHG | OXYGEN SATURATION: 96 % | TEMPERATURE: 98.4 F | WEIGHT: 152 LBS | HEART RATE: 55 BPM | HEIGHT: 66 IN

## 2021-01-01 VITALS
SYSTOLIC BLOOD PRESSURE: 99 MMHG | HEART RATE: 91 BPM | DIASTOLIC BLOOD PRESSURE: 68 MMHG | HEIGHT: 66 IN | WEIGHT: 150.57 LBS | RESPIRATION RATE: 19 BRPM | BODY MASS INDEX: 24.2 KG/M2 | TEMPERATURE: 97.6 F | OXYGEN SATURATION: 98 %

## 2021-01-01 VITALS
HEART RATE: 61 BPM | SYSTOLIC BLOOD PRESSURE: 115 MMHG | HEIGHT: 66 IN | BODY MASS INDEX: 23.43 KG/M2 | DIASTOLIC BLOOD PRESSURE: 70 MMHG | WEIGHT: 145.8 LBS | TEMPERATURE: 98 F

## 2021-01-01 VITALS
RESPIRATION RATE: 14 BRPM | HEART RATE: 51 BPM | HEIGHT: 66 IN | WEIGHT: 143.8 LBS | SYSTOLIC BLOOD PRESSURE: 121 MMHG | DIASTOLIC BLOOD PRESSURE: 67 MMHG | OXYGEN SATURATION: 100 % | BODY MASS INDEX: 23.11 KG/M2

## 2021-01-01 VITALS
HEART RATE: 66 BPM | SYSTOLIC BLOOD PRESSURE: 140 MMHG | BODY MASS INDEX: 23.78 KG/M2 | WEIGHT: 148 LBS | DIASTOLIC BLOOD PRESSURE: 78 MMHG | HEIGHT: 66 IN

## 2021-01-01 VITALS
TEMPERATURE: 98 F | SYSTOLIC BLOOD PRESSURE: 110 MMHG | HEART RATE: 57 BPM | BODY MASS INDEX: 22.92 KG/M2 | WEIGHT: 142.6 LBS | DIASTOLIC BLOOD PRESSURE: 71 MMHG | RESPIRATION RATE: 14 BRPM | OXYGEN SATURATION: 99 % | HEIGHT: 66 IN

## 2021-01-01 VITALS
BODY MASS INDEX: 24.11 KG/M2 | WEIGHT: 150 LBS | HEIGHT: 66 IN | DIASTOLIC BLOOD PRESSURE: 54 MMHG | HEART RATE: 48 BPM | SYSTOLIC BLOOD PRESSURE: 102 MMHG

## 2021-01-01 DIAGNOSIS — G25.0 ESSENTIAL TREMOR: ICD-10-CM

## 2021-01-01 DIAGNOSIS — Z98.1 S/P CERVICAL SPINAL FUSION: Primary | ICD-10-CM

## 2021-01-01 DIAGNOSIS — J44.9 CHRONIC OBSTRUCTIVE PULMONARY DISEASE, UNSPECIFIED COPD TYPE (HCC): Primary | ICD-10-CM

## 2021-01-01 DIAGNOSIS — I25.119 CORONARY ARTERY DISEASE INVOLVING NATIVE CORONARY ARTERY OF NATIVE HEART WITH ANGINA PECTORIS (HCC): ICD-10-CM

## 2021-01-01 DIAGNOSIS — J90 PLEURAL EFFUSION: ICD-10-CM

## 2021-01-01 DIAGNOSIS — I10 ESSENTIAL HYPERTENSION: ICD-10-CM

## 2021-01-01 DIAGNOSIS — F32.A DEPRESSION, UNSPECIFIED DEPRESSION TYPE: ICD-10-CM

## 2021-01-01 DIAGNOSIS — I50.22 CHRONIC SYSTOLIC HEART FAILURE (HCC): ICD-10-CM

## 2021-01-01 DIAGNOSIS — J43.9 PULMONARY EMPHYSEMA, UNSPECIFIED EMPHYSEMA TYPE (HCC): Primary | ICD-10-CM

## 2021-01-01 DIAGNOSIS — E78.5 HYPERLIPIDEMIA, UNSPECIFIED HYPERLIPIDEMIA TYPE: ICD-10-CM

## 2021-01-01 DIAGNOSIS — I10 ESSENTIAL HYPERTENSION, MALIGNANT: ICD-10-CM

## 2021-01-01 DIAGNOSIS — R06.02 SHORTNESS OF BREATH: ICD-10-CM

## 2021-01-01 DIAGNOSIS — J30.2 SEASONAL ALLERGIES: ICD-10-CM

## 2021-01-01 DIAGNOSIS — E78.49 OTHER HYPERLIPIDEMIA: ICD-10-CM

## 2021-01-01 DIAGNOSIS — Z77.098 H/O AGENT ORANGE EXPOSURE: ICD-10-CM

## 2021-01-01 DIAGNOSIS — R80.9 TYPE 2 DIABETES MELLITUS WITH MICROALBUMINURIA, WITHOUT LONG-TERM CURRENT USE OF INSULIN (HCC): ICD-10-CM

## 2021-01-01 DIAGNOSIS — Z72.0 TOBACCO ABUSE: ICD-10-CM

## 2021-01-01 DIAGNOSIS — G47.34 NOCTURNAL HYPOXIA: ICD-10-CM

## 2021-01-01 DIAGNOSIS — J30.9 ALLERGIC RHINITIS, UNSPECIFIED SEASONALITY, UNSPECIFIED TRIGGER: ICD-10-CM

## 2021-01-01 DIAGNOSIS — E11.29 TYPE 2 DIABETES MELLITUS WITH MICROALBUMINURIA, WITHOUT LONG-TERM CURRENT USE OF INSULIN (HCC): ICD-10-CM

## 2021-01-01 DIAGNOSIS — E03.9 ACQUIRED HYPOTHYROIDISM: ICD-10-CM

## 2021-01-01 DIAGNOSIS — I25.5 ISCHEMIC CARDIOMYOPATHY: ICD-10-CM

## 2021-01-01 DIAGNOSIS — J44.9 CHRONIC OBSTRUCTIVE PULMONARY DISEASE, UNSPECIFIED COPD TYPE (HCC): ICD-10-CM

## 2021-01-01 DIAGNOSIS — I25.119 CORONARY ARTERY DISEASE INVOLVING NATIVE CORONARY ARTERY OF NATIVE HEART WITH ANGINA PECTORIS (HCC): Primary | ICD-10-CM

## 2021-01-01 DIAGNOSIS — I48.0 PAROXYSMAL ATRIAL FIBRILLATION (HCC): ICD-10-CM

## 2021-01-01 DIAGNOSIS — M13.0 POLYARTHRITIS: ICD-10-CM

## 2021-01-01 DIAGNOSIS — J44.1 ACUTE EXACERBATION OF CHRONIC OBSTRUCTIVE PULMONARY DISEASE (HCC): Primary | ICD-10-CM

## 2021-01-01 DIAGNOSIS — J44.9 COPD (CHRONIC OBSTRUCTIVE PULMONARY DISEASE) WITH CHRONIC BRONCHITIS (HCC): ICD-10-CM

## 2021-01-01 DIAGNOSIS — F41.1 ANXIETY, GENERALIZED: ICD-10-CM

## 2021-01-01 DIAGNOSIS — R80.9 MICROALBUMINURIA: ICD-10-CM

## 2021-01-01 DIAGNOSIS — R53.83 FATIGUE, UNSPECIFIED TYPE: ICD-10-CM

## 2021-01-01 DIAGNOSIS — Z98.1 S/P CERVICAL SPINAL FUSION: ICD-10-CM

## 2021-01-01 DIAGNOSIS — F17.210 CIGARETTE NICOTINE DEPENDENCE WITHOUT COMPLICATION: ICD-10-CM

## 2021-01-01 DIAGNOSIS — J90 PLEURAL EFFUSION: Primary | ICD-10-CM

## 2021-01-01 DIAGNOSIS — M32.9 LUPUS (HCC): ICD-10-CM

## 2021-01-01 DIAGNOSIS — I50.22 CHRONIC SYSTOLIC (CONGESTIVE) HEART FAILURE (HCC): ICD-10-CM

## 2021-01-01 DIAGNOSIS — E03.9 HYPOTHYROIDISM, UNSPECIFIED TYPE: ICD-10-CM

## 2021-01-01 DIAGNOSIS — I42.9 CARDIOMYOPATHY, UNSPECIFIED TYPE (HCC): Primary | ICD-10-CM

## 2021-01-01 DIAGNOSIS — J44.9 COPD (CHRONIC OBSTRUCTIVE PULMONARY DISEASE) WITH CHRONIC BRONCHITIS (HCC): Primary | Chronic | ICD-10-CM

## 2021-01-01 DIAGNOSIS — R73.01 IMPAIRED FASTING GLUCOSE: ICD-10-CM

## 2021-01-01 DIAGNOSIS — I48.91 ATRIAL FIBRILLATION WITH RAPID VENTRICULAR RESPONSE (HCC): Primary | ICD-10-CM

## 2021-01-01 DIAGNOSIS — F32.A DEPRESSION, UNSPECIFIED DEPRESSION TYPE: Primary | ICD-10-CM

## 2021-01-01 DIAGNOSIS — Z12.5 SCREENING PSA (PROSTATE SPECIFIC ANTIGEN): ICD-10-CM

## 2021-01-01 DIAGNOSIS — J44.1 COPD WITH EXACERBATION (HCC): Primary | ICD-10-CM

## 2021-01-01 DIAGNOSIS — R06.02 SHORTNESS OF BREATH: Primary | ICD-10-CM

## 2021-01-01 LAB
ALBUMIN SERPL-MCNC: 3.8 G/DL (ref 3.5–5.2)
ALBUMIN SERPL-MCNC: 3.9 G/DL (ref 3.5–5.2)
ALBUMIN SERPL-MCNC: 4 G/DL (ref 3.5–5.2)
ALBUMIN/GLOB SERPL: 1.1 G/DL
ALP SERPL-CCNC: 104 U/L (ref 39–117)
ALP SERPL-CCNC: 106 U/L (ref 39–117)
ALP SERPL-CCNC: 134 U/L (ref 39–117)
ALT SERPL W P-5'-P-CCNC: 22 U/L (ref 1–41)
ALT SERPL W P-5'-P-CCNC: 27 U/L (ref 1–41)
ALT SERPL W P-5'-P-CCNC: 35 U/L (ref 1–41)
ANION GAP SERPL CALCULATED.3IONS-SCNC: 10.5 MMOL/L (ref 5–15)
ANION GAP SERPL CALCULATED.3IONS-SCNC: 8.3 MMOL/L (ref 5–15)
ANION GAP SERPL CALCULATED.3IONS-SCNC: 9.8 MMOL/L (ref 5–15)
AST SERPL-CCNC: 22 U/L (ref 1–40)
AST SERPL-CCNC: 26 U/L (ref 1–40)
AST SERPL-CCNC: 29 U/L (ref 1–40)
BASOPHILS # BLD AUTO: 0.04 10*3/MM3 (ref 0–0.2)
BASOPHILS # BLD AUTO: 0.05 10*3/MM3 (ref 0–0.2)
BASOPHILS # BLD AUTO: 0.07 10*3/MM3 (ref 0–0.2)
BASOPHILS NFR BLD AUTO: 0.6 % (ref 0–1.5)
BASOPHILS NFR BLD AUTO: 0.6 % (ref 0–1.5)
BASOPHILS NFR BLD AUTO: 0.7 % (ref 0–1.5)
BILIRUB SERPL-MCNC: 0.2 MG/DL (ref 0–1.2)
BILIRUB SERPL-MCNC: 0.2 MG/DL (ref 0–1.2)
BILIRUB SERPL-MCNC: <0.2 MG/DL (ref 0–1.2)
BUN SERPL-MCNC: 12 MG/DL (ref 8–23)
BUN SERPL-MCNC: 17 MG/DL (ref 8–23)
BUN SERPL-MCNC: 18 MG/DL (ref 8–23)
BUN/CREAT SERPL: 11.7 (ref 7–25)
BUN/CREAT SERPL: 18.1 (ref 7–25)
BUN/CREAT SERPL: 18.6 (ref 7–25)
CALCIUM SPEC-SCNC: 9 MG/DL (ref 8.6–10.5)
CALCIUM SPEC-SCNC: 9.1 MG/DL (ref 8.6–10.5)
CALCIUM SPEC-SCNC: 9.5 MG/DL (ref 8.6–10.5)
CHLORIDE SERPL-SCNC: 100 MMOL/L (ref 98–107)
CHLORIDE SERPL-SCNC: 101 MMOL/L (ref 98–107)
CHLORIDE SERPL-SCNC: 99 MMOL/L (ref 98–107)
CHOLEST SERPL-MCNC: 166 MG/DL (ref 0–200)
CHOLEST SERPL-MCNC: 182 MG/DL (ref 0–200)
CO2 SERPL-SCNC: 26.2 MMOL/L (ref 22–29)
CO2 SERPL-SCNC: 26.5 MMOL/L (ref 22–29)
CO2 SERPL-SCNC: 26.7 MMOL/L (ref 22–29)
CREAT SERPL-MCNC: 0.94 MG/DL (ref 0.76–1.27)
CREAT SERPL-MCNC: 0.97 MG/DL (ref 0.76–1.27)
CREAT SERPL-MCNC: 1.03 MG/DL (ref 0.76–1.27)
DEPRECATED RDW RBC AUTO: 43.5 FL (ref 37–54)
DEPRECATED RDW RBC AUTO: 44.4 FL (ref 37–54)
DEPRECATED RDW RBC AUTO: 50.5 FL (ref 37–54)
EOSINOPHIL # BLD AUTO: 0.37 10*3/MM3 (ref 0–0.4)
EOSINOPHIL # BLD AUTO: 0.51 10*3/MM3 (ref 0–0.4)
EOSINOPHIL # BLD AUTO: 0.55 10*3/MM3 (ref 0–0.4)
EOSINOPHIL NFR BLD AUTO: 3.3 % (ref 0.3–6.2)
EOSINOPHIL NFR BLD AUTO: 7.3 % (ref 0.3–6.2)
EOSINOPHIL NFR BLD AUTO: 8.2 % (ref 0.3–6.2)
ERYTHROCYTE [DISTWIDTH] IN BLOOD BY AUTOMATED COUNT: 12.3 % (ref 12.3–15.4)
ERYTHROCYTE [DISTWIDTH] IN BLOOD BY AUTOMATED COUNT: 13 % (ref 12.3–15.4)
ERYTHROCYTE [DISTWIDTH] IN BLOOD BY AUTOMATED COUNT: 13.2 % (ref 12.3–15.4)
GFR SERPL CREATININE-BSD FRML MDRD: 71 ML/MIN/1.73
GFR SERPL CREATININE-BSD FRML MDRD: 76 ML/MIN/1.73
GFR SERPL CREATININE-BSD FRML MDRD: 79 ML/MIN/1.73
GLOBULIN UR ELPH-MCNC: 3.4 GM/DL
GLOBULIN UR ELPH-MCNC: 3.5 GM/DL
GLOBULIN UR ELPH-MCNC: 3.7 GM/DL
GLUCOSE BLDC GLUCOMTR-MCNC: 100 MG/DL (ref 70–130)
GLUCOSE BLDC GLUCOMTR-MCNC: 100 MG/DL (ref 70–99)
GLUCOSE BLDC GLUCOMTR-MCNC: 101 MG/DL (ref 70–130)
GLUCOSE BLDC GLUCOMTR-MCNC: 101 MG/DL (ref 70–99)
GLUCOSE BLDC GLUCOMTR-MCNC: 102 MG/DL (ref 70–99)
GLUCOSE BLDC GLUCOMTR-MCNC: 104 MG/DL (ref 70–130)
GLUCOSE BLDC GLUCOMTR-MCNC: 104 MG/DL (ref 70–99)
GLUCOSE BLDC GLUCOMTR-MCNC: 105 MG/DL (ref 70–130)
GLUCOSE BLDC GLUCOMTR-MCNC: 105 MG/DL (ref 70–99)
GLUCOSE BLDC GLUCOMTR-MCNC: 107 MG/DL (ref 70–130)
GLUCOSE BLDC GLUCOMTR-MCNC: 107 MG/DL (ref 70–130)
GLUCOSE BLDC GLUCOMTR-MCNC: 108 MG/DL (ref 70–99)
GLUCOSE BLDC GLUCOMTR-MCNC: 109 MG/DL (ref 70–130)
GLUCOSE BLDC GLUCOMTR-MCNC: 109 MG/DL (ref 70–99)
GLUCOSE BLDC GLUCOMTR-MCNC: 109 MG/DL (ref 70–99)
GLUCOSE BLDC GLUCOMTR-MCNC: 110 MG/DL (ref 70–99)
GLUCOSE BLDC GLUCOMTR-MCNC: 110 MG/DL (ref 70–99)
GLUCOSE BLDC GLUCOMTR-MCNC: 111 MG/DL (ref 70–130)
GLUCOSE BLDC GLUCOMTR-MCNC: 111 MG/DL (ref 70–99)
GLUCOSE BLDC GLUCOMTR-MCNC: 112 MG/DL (ref 70–130)
GLUCOSE BLDC GLUCOMTR-MCNC: 112 MG/DL (ref 70–99)
GLUCOSE BLDC GLUCOMTR-MCNC: 115 MG/DL (ref 70–130)
GLUCOSE BLDC GLUCOMTR-MCNC: 115 MG/DL (ref 70–99)
GLUCOSE BLDC GLUCOMTR-MCNC: 122 MG/DL (ref 70–130)
GLUCOSE BLDC GLUCOMTR-MCNC: 122 MG/DL (ref 70–99)
GLUCOSE BLDC GLUCOMTR-MCNC: 122 MG/DL (ref 70–99)
GLUCOSE BLDC GLUCOMTR-MCNC: 123 MG/DL (ref 70–130)
GLUCOSE BLDC GLUCOMTR-MCNC: 123 MG/DL (ref 70–99)
GLUCOSE BLDC GLUCOMTR-MCNC: 125 MG/DL (ref 70–99)
GLUCOSE BLDC GLUCOMTR-MCNC: 126 MG/DL (ref 70–99)
GLUCOSE BLDC GLUCOMTR-MCNC: 127 MG/DL (ref 70–99)
GLUCOSE BLDC GLUCOMTR-MCNC: 128 MG/DL (ref 70–130)
GLUCOSE BLDC GLUCOMTR-MCNC: 128 MG/DL (ref 70–99)
GLUCOSE BLDC GLUCOMTR-MCNC: 130 MG/DL (ref 70–99)
GLUCOSE BLDC GLUCOMTR-MCNC: 131 MG/DL (ref 70–99)
GLUCOSE BLDC GLUCOMTR-MCNC: 132 MG/DL (ref 70–130)
GLUCOSE BLDC GLUCOMTR-MCNC: 133 MG/DL (ref 70–130)
GLUCOSE BLDC GLUCOMTR-MCNC: 135 MG/DL (ref 70–99)
GLUCOSE BLDC GLUCOMTR-MCNC: 136 MG/DL (ref 70–99)
GLUCOSE BLDC GLUCOMTR-MCNC: 137 MG/DL (ref 70–130)
GLUCOSE BLDC GLUCOMTR-MCNC: 137 MG/DL (ref 70–99)
GLUCOSE BLDC GLUCOMTR-MCNC: 138 MG/DL (ref 70–99)
GLUCOSE BLDC GLUCOMTR-MCNC: 141 MG/DL (ref 70–99)
GLUCOSE BLDC GLUCOMTR-MCNC: 143 MG/DL (ref 70–99)
GLUCOSE BLDC GLUCOMTR-MCNC: 146 MG/DL (ref 70–130)
GLUCOSE BLDC GLUCOMTR-MCNC: 151 MG/DL (ref 70–99)
GLUCOSE BLDC GLUCOMTR-MCNC: 157 MG/DL (ref 70–99)
GLUCOSE BLDC GLUCOMTR-MCNC: 157 MG/DL (ref 70–99)
GLUCOSE BLDC GLUCOMTR-MCNC: 168 MG/DL (ref 70–99)
GLUCOSE BLDC GLUCOMTR-MCNC: 173 MG/DL (ref 70–99)
GLUCOSE BLDC GLUCOMTR-MCNC: 181 MG/DL (ref 70–130)
GLUCOSE BLDC GLUCOMTR-MCNC: 187 MG/DL (ref 70–99)
GLUCOSE BLDC GLUCOMTR-MCNC: 195 MG/DL (ref 70–99)
GLUCOSE BLDC GLUCOMTR-MCNC: 220 MG/DL (ref 70–99)
GLUCOSE BLDC GLUCOMTR-MCNC: 56 MG/DL (ref 70–130)
GLUCOSE BLDC GLUCOMTR-MCNC: 65 MG/DL (ref 70–130)
GLUCOSE BLDC GLUCOMTR-MCNC: 70 MG/DL (ref 70–130)
GLUCOSE BLDC GLUCOMTR-MCNC: 72 MG/DL (ref 70–99)
GLUCOSE BLDC GLUCOMTR-MCNC: 73 MG/DL (ref 70–99)
GLUCOSE BLDC GLUCOMTR-MCNC: 75 MG/DL (ref 70–99)
GLUCOSE BLDC GLUCOMTR-MCNC: 76 MG/DL (ref 70–130)
GLUCOSE BLDC GLUCOMTR-MCNC: 77 MG/DL (ref 70–130)
GLUCOSE BLDC GLUCOMTR-MCNC: 77 MG/DL (ref 70–130)
GLUCOSE BLDC GLUCOMTR-MCNC: 77 MG/DL (ref 70–99)
GLUCOSE BLDC GLUCOMTR-MCNC: 79 MG/DL (ref 70–130)
GLUCOSE BLDC GLUCOMTR-MCNC: 83 MG/DL (ref 70–130)
GLUCOSE BLDC GLUCOMTR-MCNC: 83 MG/DL (ref 70–99)
GLUCOSE BLDC GLUCOMTR-MCNC: 85 MG/DL (ref 70–99)
GLUCOSE BLDC GLUCOMTR-MCNC: 86 MG/DL (ref 70–99)
GLUCOSE BLDC GLUCOMTR-MCNC: 87 MG/DL (ref 70–99)
GLUCOSE BLDC GLUCOMTR-MCNC: 91 MG/DL (ref 70–130)
GLUCOSE BLDC GLUCOMTR-MCNC: 93 MG/DL (ref 70–130)
GLUCOSE BLDC GLUCOMTR-MCNC: 94 MG/DL (ref 70–99)
GLUCOSE BLDC GLUCOMTR-MCNC: 95 MG/DL (ref 70–130)
GLUCOSE BLDC GLUCOMTR-MCNC: 97 MG/DL (ref 70–99)
GLUCOSE SERPL-MCNC: 107 MG/DL (ref 65–99)
GLUCOSE SERPL-MCNC: 170 MG/DL (ref 65–99)
GLUCOSE SERPL-MCNC: 91 MG/DL (ref 65–99)
HBA1C MFR BLD: 5.5 % (ref 4.8–5.6)
HBA1C MFR BLD: 6.62 % (ref 4.8–5.6)
HCT VFR BLD AUTO: 36.7 % (ref 37.5–51)
HCT VFR BLD AUTO: 40 % (ref 37.5–51)
HCT VFR BLD AUTO: 41 % (ref 37.5–51)
HDLC SERPL-MCNC: 76 MG/DL (ref 40–60)
HDLC SERPL-MCNC: 91 MG/DL (ref 40–60)
HGB BLD-MCNC: 12.4 G/DL (ref 13–17.7)
HGB BLD-MCNC: 13.1 G/DL (ref 13–17.7)
HGB BLD-MCNC: 13.6 G/DL (ref 13–17.7)
HOLD SPECIMEN: NORMAL
HOLD SPECIMEN: NORMAL
IMM GRANULOCYTES # BLD AUTO: 0.04 10*3/MM3 (ref 0–0.05)
IMM GRANULOCYTES # BLD AUTO: 0.07 10*3/MM3 (ref 0–0.05)
IMM GRANULOCYTES # BLD AUTO: 0.16 10*3/MM3 (ref 0–0.05)
IMM GRANULOCYTES NFR BLD AUTO: 0.6 % (ref 0–0.5)
IMM GRANULOCYTES NFR BLD AUTO: 1 % (ref 0–0.5)
IMM GRANULOCYTES NFR BLD AUTO: 1.4 % (ref 0–0.5)
LDLC SERPL CALC-MCNC: 76 MG/DL (ref 0–100)
LDLC SERPL CALC-MCNC: 77 MG/DL (ref 0–100)
LDLC/HDLC SERPL: 0.81 {RATIO}
LDLC/HDLC SERPL: 1 {RATIO}
LYMPHOCYTES # BLD AUTO: 1.54 10*3/MM3 (ref 0.7–3.1)
LYMPHOCYTES # BLD AUTO: 1.57 10*3/MM3 (ref 0.7–3.1)
LYMPHOCYTES # BLD AUTO: 2.4 10*3/MM3 (ref 0.7–3.1)
LYMPHOCYTES NFR BLD AUTO: 21.2 % (ref 19.6–45.3)
LYMPHOCYTES NFR BLD AUTO: 21.9 % (ref 19.6–45.3)
LYMPHOCYTES NFR BLD AUTO: 23.3 % (ref 19.6–45.3)
MCH RBC QN AUTO: 32 PG (ref 26.6–33)
MCH RBC QN AUTO: 32.7 PG (ref 26.6–33)
MCH RBC QN AUTO: 32.8 PG (ref 26.6–33)
MCHC RBC AUTO-ENTMCNC: 32 G/DL (ref 31.5–35.7)
MCHC RBC AUTO-ENTMCNC: 33.8 G/DL (ref 31.5–35.7)
MCHC RBC AUTO-ENTMCNC: 34 G/DL (ref 31.5–35.7)
MCV RBC AUTO: 102.8 FL (ref 79–97)
MCV RBC AUTO: 94.8 FL (ref 79–97)
MCV RBC AUTO: 96.2 FL (ref 79–97)
MONOCYTES # BLD AUTO: 0.42 10*3/MM3 (ref 0.1–0.9)
MONOCYTES # BLD AUTO: 0.45 10*3/MM3 (ref 0.1–0.9)
MONOCYTES # BLD AUTO: 0.66 10*3/MM3 (ref 0.1–0.9)
MONOCYTES NFR BLD AUTO: 5.8 % (ref 5–12)
MONOCYTES NFR BLD AUTO: 6.2 % (ref 5–12)
MONOCYTES NFR BLD AUTO: 6.4 % (ref 5–12)
NEUTROPHILS NFR BLD AUTO: 4.08 10*3/MM3 (ref 1.7–7)
NEUTROPHILS NFR BLD AUTO: 4.43 10*3/MM3 (ref 1.7–7)
NEUTROPHILS NFR BLD AUTO: 60.7 % (ref 42.7–76)
NEUTROPHILS NFR BLD AUTO: 63.1 % (ref 42.7–76)
NEUTROPHILS NFR BLD AUTO: 67.7 % (ref 42.7–76)
NEUTROPHILS NFR BLD AUTO: 7.67 10*3/MM3 (ref 1.7–7)
NRBC BLD AUTO-RTO: 0 /100 WBC (ref 0–0.2)
NT-PROBNP SERPL-MCNC: 1551 PG/ML (ref 0–900)
NT-PROBNP SERPL-MCNC: 825.4 PG/ML (ref 0–900)
NT-PROBNP SERPL-MCNC: 985.7 PG/ML (ref 0–900)
PLATELET # BLD AUTO: 260 10*3/MM3 (ref 140–450)
PLATELET # BLD AUTO: 287 10*3/MM3 (ref 140–450)
PLATELET # BLD AUTO: 291 10*3/MM3 (ref 140–450)
PMV BLD AUTO: 10.3 FL (ref 6–12)
PMV BLD AUTO: 10.6 FL (ref 6–12)
PMV BLD AUTO: 9.9 FL (ref 6–12)
POTASSIUM SERPL-SCNC: 4.2 MMOL/L (ref 3.5–5.2)
POTASSIUM SERPL-SCNC: 5.2 MMOL/L (ref 3.5–5.2)
POTASSIUM SERPL-SCNC: 5.5 MMOL/L (ref 3.5–5.2)
PROT SERPL-MCNC: 7.2 G/DL (ref 6–8.5)
PROT SERPL-MCNC: 7.4 G/DL (ref 6–8.5)
PROT SERPL-MCNC: 7.7 G/DL (ref 6–8.5)
PSA SERPL-MCNC: 0.89 NG/ML (ref 0–4)
QT INTERVAL: 363 MS
RBC # BLD AUTO: 3.87 10*6/MM3 (ref 4.14–5.8)
RBC # BLD AUTO: 3.99 10*6/MM3 (ref 4.14–5.8)
RBC # BLD AUTO: 4.16 10*6/MM3 (ref 4.14–5.8)
SODIUM SERPL-SCNC: 135 MMOL/L (ref 136–145)
SODIUM SERPL-SCNC: 135 MMOL/L (ref 136–145)
SODIUM SERPL-SCNC: 138 MMOL/L (ref 136–145)
T4 FREE SERPL-MCNC: 1.32 NG/DL (ref 0.93–1.7)
TRIGL SERPL-MCNC: 70 MG/DL (ref 0–150)
TRIGL SERPL-MCNC: 85 MG/DL (ref 0–150)
TROPONIN T SERPL-MCNC: <0.01 NG/ML (ref 0–0.03)
TSH SERPL DL<=0.05 MIU/L-ACNC: 1.57 UIU/ML (ref 0.27–4.2)
TSH SERPL DL<=0.05 MIU/L-ACNC: 2.12 UIU/ML (ref 0.27–4.2)
VLDLC SERPL-MCNC: 13 MG/DL (ref 5–40)
VLDLC SERPL-MCNC: 15 MG/DL (ref 5–40)
WBC # BLD AUTO: 6.73 10*3/MM3 (ref 3.4–10.8)
WBC NRBC COR # BLD: 11.33 10*3/MM3 (ref 3.4–10.8)
WBC NRBC COR # BLD: 7.02 10*3/MM3 (ref 3.4–10.8)
WHOLE BLOOD HOLD SPECIMEN: NORMAL
WHOLE BLOOD HOLD SPECIMEN: NORMAL

## 2021-01-01 PROCEDURE — G0424 PULMONARY REHAB W EXER: HCPCS

## 2021-01-01 PROCEDURE — 99214 OFFICE O/P EST MOD 30 MIN: CPT | Performed by: INTERNAL MEDICINE

## 2021-01-01 PROCEDURE — 82962 GLUCOSE BLOOD TEST: CPT

## 2021-01-01 PROCEDURE — 84443 ASSAY THYROID STIM HORMONE: CPT

## 2021-01-01 PROCEDURE — 94060 EVALUATION OF WHEEZING: CPT

## 2021-01-01 PROCEDURE — 85025 COMPLETE CBC W/AUTO DIFF WBC: CPT

## 2021-01-01 PROCEDURE — 83036 HEMOGLOBIN GLYCOSYLATED A1C: CPT

## 2021-01-01 PROCEDURE — 36415 COLL VENOUS BLD VENIPUNCTURE: CPT

## 2021-01-01 PROCEDURE — G0239 OTH RESP PROC, GROUP: HCPCS

## 2021-01-01 PROCEDURE — 94729 DIFFUSING CAPACITY: CPT

## 2021-01-01 PROCEDURE — 94726 PLETHYSMOGRAPHY LUNG VOLUMES: CPT

## 2021-01-01 PROCEDURE — 82962 GLUCOSE BLOOD TEST: CPT | Performed by: INTERNAL MEDICINE

## 2021-01-01 PROCEDURE — 84484 ASSAY OF TROPONIN QUANT: CPT

## 2021-01-01 PROCEDURE — 80061 LIPID PANEL: CPT

## 2021-01-01 PROCEDURE — 83880 ASSAY OF NATRIURETIC PEPTIDE: CPT

## 2021-01-01 PROCEDURE — 84439 ASSAY OF FREE THYROXINE: CPT

## 2021-01-01 PROCEDURE — 71046 X-RAY EXAM CHEST 2 VIEWS: CPT

## 2021-01-01 PROCEDURE — 99214 OFFICE O/P EST MOD 30 MIN: CPT | Performed by: NURSE PRACTITIONER

## 2021-01-01 PROCEDURE — G0463 HOSPITAL OUTPT CLINIC VISIT: HCPCS

## 2021-01-01 PROCEDURE — 93010 ELECTROCARDIOGRAM REPORT: CPT | Performed by: INTERNAL MEDICINE

## 2021-01-01 PROCEDURE — 80053 COMPREHEN METABOLIC PANEL: CPT

## 2021-01-01 PROCEDURE — 99213 OFFICE O/P EST LOW 20 MIN: CPT | Performed by: INTERNAL MEDICINE

## 2021-01-01 PROCEDURE — 99284 EMERGENCY DEPT VISIT MOD MDM: CPT

## 2021-01-01 PROCEDURE — 93005 ELECTROCARDIOGRAM TRACING: CPT | Performed by: EMERGENCY MEDICINE

## 2021-01-01 PROCEDURE — 99406 BEHAV CHNG SMOKING 3-10 MIN: CPT | Performed by: NURSE PRACTITIONER

## 2021-01-01 PROCEDURE — 71045 X-RAY EXAM CHEST 1 VIEW: CPT

## 2021-01-01 PROCEDURE — 93005 ELECTROCARDIOGRAM TRACING: CPT

## 2021-01-01 PROCEDURE — 93282 PRGRMG EVAL IMPLANTABLE DFB: CPT | Performed by: INTERNAL MEDICINE

## 2021-01-01 PROCEDURE — G0103 PSA SCREENING: HCPCS

## 2021-01-01 PROCEDURE — 76604 US EXAM CHEST: CPT | Performed by: INTERNAL MEDICINE

## 2021-01-01 RX ORDER — LEVALBUTEROL INHALATION SOLUTION 0.63 MG/3ML
SOLUTION RESPIRATORY (INHALATION)
COMMUNITY
End: 2021-01-01 | Stop reason: SDUPTHER

## 2021-01-01 RX ORDER — BENZONATATE 100 MG/1
100 CAPSULE ORAL 3 TIMES DAILY PRN
COMMUNITY
End: 2021-01-01

## 2021-01-01 RX ORDER — HYDROXYCHLOROQUINE SULFATE 200 MG/1
TABLET, FILM COATED ORAL SEE ADMIN INSTRUCTIONS
COMMUNITY
End: 2021-01-01 | Stop reason: SDUPTHER

## 2021-01-01 RX ORDER — CETIRIZINE HYDROCHLORIDE 10 MG/1
10 TABLET ORAL DAILY
Qty: 90 TABLET | Refills: 3 | Status: SHIPPED | OUTPATIENT
Start: 2021-01-01 | End: 2021-01-01 | Stop reason: SDUPTHER

## 2021-01-01 RX ORDER — PREDNISONE 50 MG/1
50 TABLET ORAL DAILY
Qty: 8 TABLET | Refills: 0 | Status: SHIPPED | OUTPATIENT
Start: 2021-01-01 | End: 2021-01-01

## 2021-01-01 RX ORDER — IPRATROPIUM BROMIDE AND ALBUTEROL SULFATE 2.5; .5 MG/3ML; MG/3ML
SOLUTION RESPIRATORY (INHALATION)
COMMUNITY
End: 2021-01-01 | Stop reason: SDUPTHER

## 2021-01-01 RX ORDER — PREDNISONE 50 MG/1
50 TABLET ORAL DAILY
Qty: 7 TABLET | Refills: 0 | Status: SHIPPED | OUTPATIENT
Start: 2021-01-01 | End: 2022-01-01

## 2021-01-01 RX ORDER — ERGOCALCIFEROL 1.25 MG/1
50000 CAPSULE ORAL
COMMUNITY
Start: 2021-01-01 | End: 2021-01-01

## 2021-01-01 RX ORDER — GLIPIZIDE 5 MG/1
TABLET ORAL
Qty: 90 TABLET | Refills: 0 | Status: SHIPPED | OUTPATIENT
Start: 2021-01-01 | End: 2022-01-01

## 2021-01-01 RX ORDER — PREDNISONE 50 MG/1
50 TABLET ORAL DAILY
Qty: 6 TABLET | Refills: 1 | Status: SHIPPED | OUTPATIENT
Start: 2021-01-01 | End: 2021-01-01 | Stop reason: SDUPTHER

## 2021-01-01 RX ORDER — LEVOTHYROXINE SODIUM 0.07 MG/1
75 TABLET ORAL DAILY
COMMUNITY
End: 2021-01-01

## 2021-01-01 RX ORDER — PREDNISONE 10 MG/1
TABLET ORAL
Qty: 45 TABLET | Refills: 0 | Status: SHIPPED | OUTPATIENT
Start: 2021-01-01 | End: 2022-01-01

## 2021-01-01 RX ORDER — POLYETHYLENE GLYCOL 3350 17 G
2 POWDER IN PACKET (EA) ORAL AS NEEDED
Qty: 120 LOZENGE | Refills: 2 | Status: SHIPPED | OUTPATIENT
Start: 2021-01-01 | End: 2022-01-01

## 2021-01-01 RX ORDER — SODIUM CHLORIDE 0.9 % (FLUSH) 0.9 %
10 SYRINGE (ML) INJECTION AS NEEDED
Status: DISCONTINUED | OUTPATIENT
Start: 2021-01-01 | End: 2021-01-01 | Stop reason: HOSPADM

## 2021-01-01 RX ORDER — CEFDINIR 300 MG/1
300 CAPSULE ORAL 2 TIMES DAILY
Qty: 20 CAPSULE | Refills: 0 | Status: SHIPPED | OUTPATIENT
Start: 2021-01-01 | End: 2021-01-01

## 2021-01-01 RX ORDER — SOTALOL HYDROCHLORIDE 120 MG/1
120 TABLET ORAL
COMMUNITY
Start: 2021-01-01 | End: 2021-01-01 | Stop reason: SDUPTHER

## 2021-01-01 RX ORDER — SOTALOL HYDROCHLORIDE 120 MG/1
TABLET ORAL
Qty: 90 TABLET | Refills: 3 | Status: SHIPPED | OUTPATIENT
Start: 2021-01-01 | End: 2021-01-01

## 2021-01-01 RX ORDER — BUMETANIDE 0.5 MG/1
0.5 TABLET ORAL DAILY
COMMUNITY
End: 2022-01-01

## 2021-01-01 RX ORDER — TIOTROPIUM BROMIDE AND OLODATEROL 3.124; 2.736 UG/1; UG/1
2 SPRAY, METERED RESPIRATORY (INHALATION)
Qty: 3 EACH | Refills: 3 | Status: ON HOLD | OUTPATIENT
Start: 2021-01-01 | End: 2022-01-01

## 2021-01-01 RX ORDER — AZITHROMYCIN 250 MG/1
TABLET, FILM COATED ORAL
Qty: 6 TABLET | Refills: 0 | Status: SHIPPED | OUTPATIENT
Start: 2021-01-01 | End: 2021-01-01

## 2021-01-01 RX ORDER — BENZONATATE 100 MG/1
CAPSULE ORAL
Qty: 270 CAPSULE | Refills: 3 | Status: SHIPPED | OUTPATIENT
Start: 2021-01-01

## 2021-01-01 RX ORDER — SACUBITRIL AND VALSARTAN 24; 26 MG/1; MG/1
TABLET, FILM COATED ORAL EVERY 12 HOURS SCHEDULED
COMMUNITY
Start: 2021-01-27 | End: 2022-01-01

## 2021-01-01 RX ORDER — CLOBETASOL PROPIONATE 0.5 MG/G
CREAM TOPICAL
COMMUNITY
Start: 2021-01-01 | End: 2022-01-01

## 2021-01-01 RX ORDER — ALBUTEROL SULFATE 2.5 MG/3ML
2.5 SOLUTION RESPIRATORY (INHALATION) ONCE
Status: COMPLETED | OUTPATIENT
Start: 2021-01-01 | End: 2021-01-01

## 2021-01-01 RX ORDER — LEVOTHYROXINE SODIUM 0.07 MG/1
TABLET ORAL
Qty: 90 TABLET | Refills: 1 | Status: SHIPPED | OUTPATIENT
Start: 2021-01-01

## 2021-01-01 RX ORDER — AZITHROMYCIN 250 MG/1
250 TABLET, FILM COATED ORAL DAILY
Qty: 30 TABLET | Refills: 5 | Status: SHIPPED | OUTPATIENT
Start: 2021-01-01 | End: 2022-01-01

## 2021-01-01 RX ORDER — BUDESONIDE 0.5 MG/2ML
INHALANT ORAL
COMMUNITY
Start: 2021-01-01 | End: 2021-01-01 | Stop reason: SDUPTHER

## 2021-01-01 RX ORDER — PREDNISONE 50 MG/1
50 TABLET ORAL DAILY
Qty: 6 TABLET | Refills: 1 | Status: SHIPPED | OUTPATIENT
Start: 2021-01-01 | End: 2021-01-01

## 2021-01-01 RX ORDER — MONTELUKAST SODIUM 10 MG/1
TABLET ORAL
Qty: 90 TABLET | Refills: 1 | Status: SHIPPED | OUTPATIENT
Start: 2021-01-01

## 2021-01-01 RX ORDER — PREDNISONE 50 MG/1
50 TABLET ORAL DAILY
Qty: 7 TABLET | Refills: 0 | Status: SHIPPED | OUTPATIENT
Start: 2021-01-01 | End: 2021-01-01

## 2021-01-01 RX ORDER — GLIPIZIDE 5 MG/1
5 TABLET ORAL EVERY 24 HOURS
COMMUNITY
End: 2021-01-01

## 2021-01-01 RX ORDER — PREDNISONE 10 MG/1
10 TABLET ORAL DAILY
Qty: 30 TABLET | Refills: 5 | Status: SHIPPED | OUTPATIENT
Start: 2021-01-01 | End: 2022-01-01 | Stop reason: HOSPADM

## 2021-01-01 RX ORDER — IPRATROPIUM BROMIDE AND ALBUTEROL SULFATE 2.5; .5 MG/3ML; MG/3ML
3 SOLUTION RESPIRATORY (INHALATION) EVERY 6 HOURS PRN
Qty: 360 ML | Refills: 3 | Status: ON HOLD | OUTPATIENT
Start: 2021-01-01 | End: 2022-01-01

## 2021-01-01 RX ORDER — LEVALBUTEROL INHALATION SOLUTION 0.63 MG/3ML
1 SOLUTION RESPIRATORY (INHALATION) EVERY 8 HOURS PRN
COMMUNITY
End: 2021-01-01

## 2021-01-01 RX ORDER — FOLIC ACID 1 MG/1
TABLET ORAL
Qty: 90 TABLET | Refills: 3 | Status: SHIPPED | OUTPATIENT
Start: 2021-01-01

## 2021-01-01 RX ORDER — MONTELUKAST SODIUM 10 MG/1
10 TABLET ORAL EVERY 24 HOURS
COMMUNITY
End: 2021-01-01

## 2021-01-01 RX ORDER — PRIMIDONE 250 MG/1
TABLET ORAL
Qty: 180 TABLET | Refills: 3 | Status: SHIPPED | OUTPATIENT
Start: 2021-01-01 | End: 2022-01-01 | Stop reason: HOSPADM

## 2021-01-01 RX ORDER — SERTRALINE HYDROCHLORIDE 25 MG/1
TABLET, FILM COATED ORAL
Qty: 90 TABLET | Refills: 1 | Status: SHIPPED | OUTPATIENT
Start: 2021-01-01 | End: 2022-01-01

## 2021-01-01 RX ORDER — GLIPIZIDE 5 MG/1
TABLET ORAL EVERY 24 HOURS
COMMUNITY
End: 2021-01-01 | Stop reason: SDUPTHER

## 2021-01-01 RX ORDER — ERGOCALCIFEROL 1.25 MG/1
CAPSULE ORAL
Qty: 12 CAPSULE | Refills: 3 | Status: SHIPPED | OUTPATIENT
Start: 2021-01-01 | End: 2022-01-01 | Stop reason: HOSPADM

## 2021-01-01 RX ORDER — IPRATROPIUM BROMIDE AND ALBUTEROL SULFATE 2.5; .5 MG/3ML; MG/3ML
3 SOLUTION RESPIRATORY (INHALATION) 4 TIMES DAILY PRN
Qty: 1080 ML | Refills: 3 | Status: SHIPPED | OUTPATIENT
Start: 2021-01-01 | End: 2021-01-01

## 2021-01-01 RX ORDER — AZITHROMYCIN 250 MG/1
250 TABLET, FILM COATED ORAL DAILY
Qty: 30 TABLET | Refills: 5 | Status: SHIPPED | OUTPATIENT
Start: 2021-01-01 | End: 2021-01-01

## 2021-01-01 RX ORDER — IPRATROPIUM BROMIDE AND ALBUTEROL SULFATE 2.5; .5 MG/3ML; MG/3ML
SOLUTION RESPIRATORY (INHALATION)
COMMUNITY
End: 2021-01-01

## 2021-01-01 RX ORDER — BUDESONIDE 0.5 MG/2ML
0.5 INHALANT ORAL 2 TIMES DAILY
Qty: 60 EACH | Refills: 5 | Status: SHIPPED | OUTPATIENT
Start: 2021-01-01 | End: 2022-01-01

## 2021-01-01 RX ORDER — AZELASTINE HYDROCHLORIDE, FLUTICASONE PROPIONATE 137; 50 UG/1; UG/1
1 SPRAY, METERED NASAL 2 TIMES DAILY
COMMUNITY

## 2021-01-01 RX ORDER — MONTELUKAST SODIUM 10 MG/1
TABLET ORAL EVERY 24 HOURS
COMMUNITY
End: 2021-01-01 | Stop reason: SDUPTHER

## 2021-01-01 RX ORDER — IPRATROPIUM BROMIDE AND ALBUTEROL SULFATE 2.5; .5 MG/3ML; MG/3ML
3 SOLUTION RESPIRATORY (INHALATION) EVERY 4 HOURS PRN
COMMUNITY
End: 2021-01-01

## 2021-01-01 RX ORDER — HYDROXYCHLOROQUINE SULFATE 200 MG/1
200 TABLET, FILM COATED ORAL DAILY
Status: ON HOLD | COMMUNITY
End: 2022-01-01

## 2021-01-01 RX ADMIN — ALBUTEROL SULFATE 2.5 MG: 2.5 SOLUTION RESPIRATORY (INHALATION) at 13:58

## 2021-01-01 RX ADMIN — SODIUM CHLORIDE 500 ML: 9 INJECTION, SOLUTION INTRAVENOUS at 14:31

## 2021-01-22 ENCOUNTER — HOSPITAL ENCOUNTER (OUTPATIENT)
Dept: LAB | Facility: HOSPITAL | Age: 71
Discharge: HOME | End: 2021-01-22
Attending: INTERNAL MEDICINE

## 2021-01-22 LAB
ALBUMIN SERPL-MCNC: 3.9 G/DL (ref 3.5–5)
ALBUMIN/GLOB SERPL: 1.1 {RATIO} (ref 1.4–2.6)
ALP SERPL-CCNC: 124 U/L (ref 56–155)
ALT SERPL-CCNC: 17 U/L (ref 10–40)
ANION GAP SERPL CALC-SCNC: 17 MMOL/L (ref 8–19)
AST SERPL-CCNC: 21 U/L (ref 15–50)
BASOPHILS # BLD AUTO: 0.04 10*3/UL (ref 0–0.2)
BASOPHILS NFR BLD AUTO: 0.6 % (ref 0–3)
BILIRUB SERPL-MCNC: 0.16 MG/DL (ref 0.2–1.3)
BNP SERPL-MCNC: 826 PG/ML (ref 0–900)
BUN SERPL-MCNC: 10 MG/DL (ref 5–25)
BUN/CREAT SERPL: 9 {RATIO} (ref 6–20)
CALCIUM SERPL-MCNC: 9.1 MG/DL (ref 8.7–10.4)
CHLORIDE SERPL-SCNC: 94 MMOL/L (ref 99–111)
CONV ABS IMM GRAN: 0.05 10*3/UL (ref 0–0.2)
CONV CO2: 26 MMOL/L (ref 22–32)
CONV IMMATURE GRAN: 0.8 % (ref 0–1.8)
CONV TOTAL PROTEIN: 7.6 G/DL (ref 6.3–8.2)
CREAT UR-MCNC: 1.07 MG/DL (ref 0.7–1.2)
DEPRECATED RDW RBC AUTO: 51.1 FL (ref 35.1–43.9)
EOSINOPHIL # BLD AUTO: 0.66 10*3/UL (ref 0–0.7)
EOSINOPHIL # BLD AUTO: 10.7 % (ref 0–7)
ERYTHROCYTE [DISTWIDTH] IN BLOOD BY AUTOMATED COUNT: 14.1 % (ref 11.6–14.4)
EST. AVERAGE GLUCOSE BLD GHB EST-MCNC: 120 MG/DL
GFR SERPLBLD BASED ON 1.73 SQ M-ARVRAT: >60 ML/MIN/{1.73_M2}
GLOBULIN UR ELPH-MCNC: 3.7 G/DL (ref 2–3.5)
GLUCOSE SERPL-MCNC: 91 MG/DL (ref 70–99)
HBA1C MFR BLD: 5.8 % (ref 3.5–5.7)
HCT VFR BLD AUTO: 37.5 % (ref 42–52)
HGB BLD-MCNC: 12.5 G/DL (ref 14–18)
LYMPHOCYTES # BLD AUTO: 1.76 10*3/UL (ref 1–5)
LYMPHOCYTES NFR BLD AUTO: 28.6 % (ref 20–45)
MCH RBC QN AUTO: 32.6 PG (ref 27–31)
MCHC RBC AUTO-ENTMCNC: 33.3 G/DL (ref 33–37)
MCV RBC AUTO: 97.9 FL (ref 80–96)
MONOCYTES # BLD AUTO: 0.32 10*3/UL (ref 0.2–1.2)
MONOCYTES NFR BLD AUTO: 5.2 % (ref 3–10)
NEUTROPHILS # BLD AUTO: 3.33 10*3/UL (ref 2–8)
NEUTROPHILS NFR BLD AUTO: 54.1 % (ref 30–85)
NRBC CBCN: 0 % (ref 0–0.7)
OSMOLALITY SERPL CALC.SUM OF ELEC: 275 MOSM/KG (ref 273–304)
PLATELET # BLD AUTO: 333 10*3/UL (ref 130–400)
PMV BLD AUTO: 9.3 FL (ref 9.4–12.4)
POTASSIUM SERPL-SCNC: 3.9 MMOL/L (ref 3.5–5.3)
RBC # BLD AUTO: 3.83 10*6/UL (ref 4.7–6.1)
SODIUM SERPL-SCNC: 133 MMOL/L (ref 135–147)
T4 FREE SERPL-MCNC: 1.4 NG/DL (ref 0.9–1.8)
TSH SERPL-ACNC: 0.84 M[IU]/L (ref 0.27–4.2)
WBC # BLD AUTO: 6.16 10*3/UL (ref 4.8–10.8)

## 2021-01-28 ENCOUNTER — OFFICE VISIT CONVERTED (OUTPATIENT)
Dept: PULMONOLOGY | Facility: CLINIC | Age: 71
End: 2021-01-28
Attending: INTERNAL MEDICINE

## 2021-02-02 ENCOUNTER — HOSPITAL ENCOUNTER (OUTPATIENT)
Dept: CARDIOLOGY | Facility: HOSPITAL | Age: 71
Discharge: HOME | End: 2021-02-02
Attending: INTERNAL MEDICINE

## 2021-02-22 ENCOUNTER — HOSPITAL ENCOUNTER (OUTPATIENT)
Dept: OTHER | Facility: HOSPITAL | Age: 71
Discharge: HOME | End: 2021-02-22
Attending: INTERNAL MEDICINE

## 2021-02-22 LAB
ALBUMIN SERPL-MCNC: 3.9 G/DL (ref 3.5–5)
ALBUMIN/GLOB SERPL: 1.1 {RATIO} (ref 1.4–2.6)
ALP SERPL-CCNC: 144 U/L (ref 56–155)
ALT SERPL-CCNC: 17 U/L (ref 10–40)
ANION GAP SERPL CALC-SCNC: 17 MMOL/L (ref 8–19)
AST SERPL-CCNC: 26 U/L (ref 15–50)
BASOPHILS # BLD AUTO: 0.03 10*3/UL (ref 0–0.2)
BASOPHILS NFR BLD AUTO: 0.4 % (ref 0–3)
BILIRUB SERPL-MCNC: 0.17 MG/DL (ref 0.2–1.3)
BNP SERPL-MCNC: 346 PG/ML (ref 0–900)
BUN SERPL-MCNC: 18 MG/DL (ref 5–25)
BUN/CREAT SERPL: 16 {RATIO} (ref 6–20)
CALCIUM SERPL-MCNC: 8.7 MG/DL (ref 8.7–10.4)
CHLORIDE SERPL-SCNC: 93 MMOL/L (ref 99–111)
CONV ABS IMM GRAN: 0.05 10*3/UL (ref 0–0.2)
CONV CO2: 28 MMOL/L (ref 22–32)
CONV IMMATURE GRAN: 0.7 % (ref 0–1.8)
CONV TOTAL PROTEIN: 7.6 G/DL (ref 6.3–8.2)
CREAT UR-MCNC: 1.14 MG/DL (ref 0.7–1.2)
DEPRECATED RDW RBC AUTO: 48.3 FL (ref 35.1–43.9)
EOSINOPHIL # BLD AUTO: 0.67 10*3/UL (ref 0–0.7)
EOSINOPHIL # BLD AUTO: 9.6 % (ref 0–7)
ERYTHROCYTE [DISTWIDTH] IN BLOOD BY AUTOMATED COUNT: 13.3 % (ref 11.6–14.4)
GFR SERPLBLD BASED ON 1.73 SQ M-ARVRAT: >60 ML/MIN/{1.73_M2}
GLOBULIN UR ELPH-MCNC: 3.7 G/DL (ref 2–3.5)
GLUCOSE SERPL-MCNC: 110 MG/DL (ref 70–99)
HCT VFR BLD AUTO: 37.8 % (ref 42–52)
HGB BLD-MCNC: 12.6 G/DL (ref 14–18)
LYMPHOCYTES # BLD AUTO: 1.58 10*3/UL (ref 1–5)
LYMPHOCYTES NFR BLD AUTO: 22.6 % (ref 20–45)
MCH RBC QN AUTO: 32.7 PG (ref 27–31)
MCHC RBC AUTO-ENTMCNC: 33.3 G/DL (ref 33–37)
MCV RBC AUTO: 98.2 FL (ref 80–96)
MONOCYTES # BLD AUTO: 0.4 10*3/UL (ref 0.2–1.2)
MONOCYTES NFR BLD AUTO: 5.7 % (ref 3–10)
NEUTROPHILS # BLD AUTO: 4.25 10*3/UL (ref 2–8)
NEUTROPHILS NFR BLD AUTO: 61 % (ref 30–85)
NRBC CBCN: 0 % (ref 0–0.7)
OSMOLALITY SERPL CALC.SUM OF ELEC: 281 MOSM/KG (ref 273–304)
PLATELET # BLD AUTO: 313 10*3/UL (ref 130–400)
PMV BLD AUTO: 10.7 FL (ref 9.4–12.4)
POTASSIUM SERPL-SCNC: 3.8 MMOL/L (ref 3.5–5.3)
RBC # BLD AUTO: 3.85 10*6/UL (ref 4.7–6.1)
SODIUM SERPL-SCNC: 134 MMOL/L (ref 135–147)
T4 FREE SERPL-MCNC: 1.3 NG/DL (ref 0.9–1.8)
TSH SERPL-ACNC: 0.69 M[IU]/L (ref 0.27–4.2)
WBC # BLD AUTO: 6.98 10*3/UL (ref 4.8–10.8)

## 2021-03-16 ENCOUNTER — HOSPITAL ENCOUNTER (OUTPATIENT)
Dept: VACCINE CLINIC | Facility: HOSPITAL | Age: 71
Discharge: HOME | End: 2021-03-16
Attending: INTERNAL MEDICINE

## 2021-04-06 ENCOUNTER — HOSPITAL ENCOUNTER (OUTPATIENT)
Dept: VACCINE CLINIC | Facility: HOSPITAL | Age: 71
Discharge: HOME | End: 2021-04-06
Attending: INTERNAL MEDICINE

## 2021-04-28 ENCOUNTER — HOSPITAL ENCOUNTER (OUTPATIENT)
Dept: CT IMAGING | Facility: HOSPITAL | Age: 71
Discharge: HOME | End: 2021-04-28
Attending: INTERNAL MEDICINE

## 2021-05-12 NOTE — PROGRESS NOTES
Progress Note      Patient Name: Cedric Wade   Patient ID: [id number]   Sex: Male   YOB: 1950    Primary Care Provider: Ean Farmer MD   Referring Provider: Ean Farmer MD    Visit Date: April 23, 2020    Provider: Mono Tidwell MD   Location: [facility]   Location Address: [address] [place] [address], [facility]   [place], [place]  [id number]   Location Phone: [phone]          Chief Complaint  · Congestive heart failure.      History Of Present Illness  Video Conferencing Visit  Cedric Wade is a 69 year old /White male with a history of known CAD with previous CABG, ischemic cardiomyopathy, COPD, and bradycardia with pacemaker who has been having some intermittent atypical chest pain and chronic shortness of breath. He is presenting for evaluation via video conferencing. Verbal consent obtained before beginning visit. Telehealth visit due to COVID-19.   The following staff were present during this visit: Provider only.   PAST MEDICAL HISTORY: Bradycardia with pacemaker; COPD; Chronic renal failure; Congestive heart failure; Coronary artery disease with CABG x3 09/09/2019; Diabetes mellitus; Hypertension; Ischemic cardiomyopathy.   FAMILY HISTORY: Positive for diabetes mellitus. Negative for hypertension or heart disease.   PSYCHOSOCIAL HISTORY: Denies mood changes or depression. Denies alcohol use. Currently smokes one-fourth of a pack per day.   CURRENT MEDICATIONS: Lipitor 400 mg daily; glipizide 5 mg daily; Zyrtec 10 mg daily; sertraline 25 mg daily; spironolactone 25 mg daily; Coreg 3.125 mg 1/2 b.i.d.; Bumex 2 mg daily; metolazone 2.5 mg p.r.n.; Entresto 24-26 mg daily; Advair; Spiriva; Albuterol; meloxicam 7.5 mg daily. The dosage and frequency of the medications were reviewed with the patient.       Review of Systems  · Cardiovascular  o Admits  o : shortness of breath while walking or lying flat, chest pain or angina pectoris    o Denies  o : palpitations (fast, fluttering, or skipping beats), swelling (feet, ankles, hands)  · Respiratory  o Admits  o : chronic or frequent cough  o Denies  o : asthma or wheezing      Vitals     At-home vitals unable to be obtained.       Physical Examination  · Device Interrogation  o Device Interrogation  o : Patient had an interrogation of his single-chamber defibrillator remotely, which is working normally.           Assessment     ASSESSMENT & PLAN:    1.  Systolic congestive heart failure.  Patient with stable symptoms.  Continue with his current medications.  2.  ICD working properly.  Full interrogation on next visit.  3.  Coronary artery disease with previous coronary artery bypass graft.      Mono Tidwell MD  /             Electronically Signed by: Vanessa Hurley-, Other -Author on April 29, 2020 07:51:33 AM  Electronically Co-signed by: Mono Tidwell MD -Reviewer on May 5, 2020 09:08:32 AM

## 2021-05-13 ENCOUNTER — OFFICE VISIT CONVERTED (OUTPATIENT)
Dept: PULMONOLOGY | Facility: CLINIC | Age: 71
End: 2021-05-13
Attending: NURSE PRACTITIONER

## 2021-05-13 NOTE — PROGRESS NOTES
Progress Note      Patient Name: Cedric Wade   Patient ID: [id number]   Sex: Male   YOB: 1950    Primary Care Provider: Ean Farmer MD   Referring Provider: Ean Farmer MD    Visit Date: November 12, 2020    Provider: Mono Tidwell MD   Location: [facility]   Location Address: [address] [place] [address], [facility]   [place], [place]  [id number]   Location Phone: [phone]          Chief Complaint     CAD and CABG.       History Of Present Illness  REFERRING CARE PROVIDER: Ean Farmer MD   Cedric Wade is a 70 year old /White male with CAD and previous CABG, COPD, and a history of previous angioedema who has been doing well. Patient has had some issues with low blood pressures intermittently, also coughing. Denies any chest discomfort.   PAST MEDICAL HISTORY: Bradycardia with pacemaker; COPD; Chronic renal failure; Congestive heart failure; Coronary artery disease with CABG x3 09/09/2019; Diabetes mellitus; Hypertension; Ischemic cardiomyopathy.   PSYCHOSOCIAL HISTORY: Current smoker of half-a-pack per day. Denies alcohol use.   CURRENT MEDICATIONS: Carvedilol 6.25 mg b.i.d.; bumetanide 2 mg daily; metolazone 2.5 mg p.r.n.; Entresto 24-26 mg b.i.d.; Lipitor 40 mg q. h.s.; glipizide 5 mg daily; Zyrtec 10 mg daily; sertraline 25 mg daily; spironolactone 25 mg daily; levothyroxine 75 mcg daily; montelukast 10 mg daily; Dymista nasal spray; folic acid 1 mg daily; famotidine 20 mg b.i.d.; polyethylene glycol 3350 powder 1 cap full daily; Senna; vitamin D 50,000 units weekly; ipratropium-albuterol inhalation t.i.d.; Mucinex DM 1200 mg b.i.d.; Advair Diskus; oxygen at night; primidone 259 mg b.i.d.; hydroxychloroquine 200 mg daily.       Review of Systems  · Cardiovascular  o Denies  o : palpitations (fast, fluttering, or skipping beats), swelling (feet, ankles, hands), shortness of breath while walking or lying flat, chest pain or angina pectoris  "  · Respiratory  o Admits  o : chronic or frequent cough      Vitals  Date Time BP Position Site L\R Cuff Size HR RR TEMP (F) WT  HT  BMI kg/m2 BSA m2 O2 Sat FR L/min FiO2        11/12/2020 12:17 /74 Sitting    62 - R   147lbs 16oz 5'  6\" 23.89 1.77             Physical Examination  · Constitutional  o Appearance  o : Awake, alert, in no acute distress.   · Eyes  o Conjunctivae  o : Normal.  · Ears, Nose, Mouth and Throat  o Oral Cavity  o :   § Oral Mucosa  § : Normal.  · Neck  o Inspection/Palpation  o : No JVD. Good carotid upstroke. No thyromegaly.  · Respiratory  o Respiratory  o : Good respiratory effort. Clear to percussion and auscultation.  · Cardiovascular  o Heart  o :   § Auscultation of Heart  § : S1, S2 normal. Regular rate and rhythm without murmurs, gallops, or rubs.  o Peripheral Vascular System  o :   § Extremities  § : Good femoral and pedal pulses. No pedal edema.  · Gastrointestinal  o Abdominal Examination  o : Soft. No tenderness or masses felt. No hepatosplenomegaly. Abdominal aorta is not palpable.  · Device Interrogation  o Device Interrogation  o : Patient had an interrogation of his single-chamber ICD. Right atrial lead paced 1% of the time, working normally. He did have his right ventricular output decreased to preserve battery life. No event recorded.           Assessment     ASSESSMENT & PLAN:     1.  Ischemic cardiomyopathy with stable symptoms.  Continue with his current medications.  His blood pressure        has at times has been on the low side, he states sometimes going down into the 80s at night.  Will        decrease his Coreg to 3.125 mg b.i.d.  2.  ICD, single-chamber, working properly.  Above-stated changes made to the device.  Repeat interrogation in        6 months.    3.  Coronary artery disease with previous CABG, no ongoing anginal discomfort.  4.  Hyperlipidemia, on statin.             Electronically Signed by: Vanessa Hurley-, Other " -Author on November 17, 2020 03:15:44 PM  Electronically Co-signed by: Mono Tidwell MD -Reviewer on November 18, 2020 09:36:55 AM

## 2021-05-14 VITALS
SYSTOLIC BLOOD PRESSURE: 122 MMHG | WEIGHT: 148 LBS | BODY MASS INDEX: 23.78 KG/M2 | DIASTOLIC BLOOD PRESSURE: 74 MMHG | HEART RATE: 62 BPM | HEIGHT: 66 IN

## 2021-05-15 VITALS — HEIGHT: 66 IN | WEIGHT: 142 LBS | RESPIRATION RATE: 14 BRPM | BODY MASS INDEX: 22.82 KG/M2

## 2021-05-15 VITALS
SYSTOLIC BLOOD PRESSURE: 108 MMHG | BODY MASS INDEX: 22.98 KG/M2 | HEIGHT: 66 IN | WEIGHT: 143 LBS | DIASTOLIC BLOOD PRESSURE: 70 MMHG | HEART RATE: 78 BPM

## 2021-05-15 VITALS
HEART RATE: 82 BPM | SYSTOLIC BLOOD PRESSURE: 118 MMHG | HEIGHT: 66 IN | DIASTOLIC BLOOD PRESSURE: 80 MMHG | BODY MASS INDEX: 22.98 KG/M2 | WEIGHT: 143 LBS

## 2021-05-15 VITALS
WEIGHT: 152 LBS | BODY MASS INDEX: 24.43 KG/M2 | HEIGHT: 66 IN | SYSTOLIC BLOOD PRESSURE: 104 MMHG | HEART RATE: 72 BPM | DIASTOLIC BLOOD PRESSURE: 74 MMHG

## 2021-05-15 VITALS
HEART RATE: 99 BPM | WEIGHT: 156 LBS | DIASTOLIC BLOOD PRESSURE: 82 MMHG | SYSTOLIC BLOOD PRESSURE: 112 MMHG | HEIGHT: 66 IN | RESPIRATION RATE: 16 BRPM | OXYGEN SATURATION: 98 % | BODY MASS INDEX: 25.07 KG/M2

## 2021-05-15 VITALS
DIASTOLIC BLOOD PRESSURE: 60 MMHG | HEIGHT: 66 IN | SYSTOLIC BLOOD PRESSURE: 83 MMHG | WEIGHT: 136 LBS | BODY MASS INDEX: 21.86 KG/M2 | HEART RATE: 79 BPM

## 2021-05-15 VITALS
WEIGHT: 150 LBS | DIASTOLIC BLOOD PRESSURE: 76 MMHG | BODY MASS INDEX: 24.11 KG/M2 | SYSTOLIC BLOOD PRESSURE: 108 MMHG | HEIGHT: 66 IN | HEART RATE: 78 BPM

## 2021-05-22 ENCOUNTER — TRANSCRIBE ORDERS (OUTPATIENT)
Dept: CARDIAC REHAB | Facility: HOSPITAL | Age: 71
End: 2021-05-22

## 2021-05-22 DIAGNOSIS — J44.1 COPD WITH ACUTE EXACERBATION (HCC): Primary | ICD-10-CM

## 2021-05-23 ENCOUNTER — TRANSCRIBE ORDERS (OUTPATIENT)
Dept: ADMINISTRATIVE | Facility: HOSPITAL | Age: 71
End: 2021-05-23

## 2021-05-23 DIAGNOSIS — J44.9 CHRONIC OBSTRUCTIVE PULMONARY DISEASE, UNSPECIFIED COPD TYPE (HCC): Primary | ICD-10-CM

## 2021-05-24 ENCOUNTER — HOSPITAL ENCOUNTER (OUTPATIENT)
Dept: LAB | Facility: HOSPITAL | Age: 71
Discharge: HOME | End: 2021-05-24
Attending: INTERNAL MEDICINE

## 2021-05-24 LAB
ALBUMIN SERPL-MCNC: 3.8 G/DL (ref 3.5–5)
ALBUMIN/GLOB SERPL: 1.2 {RATIO} (ref 1.4–2.6)
ALP SERPL-CCNC: 95 U/L (ref 56–155)
ALT SERPL-CCNC: 37 U/L (ref 10–40)
ANION GAP SERPL CALC-SCNC: 10 MMOL/L (ref 8–19)
AST SERPL-CCNC: 28 U/L (ref 15–50)
BASOPHILS # BLD AUTO: 0.05 10*3/UL (ref 0–0.2)
BASOPHILS NFR BLD AUTO: 0.5 % (ref 0–3)
BILIRUB SERPL-MCNC: <0.15 MG/DL (ref 0.2–1.3)
BNP SERPL-MCNC: 549 PG/ML (ref 0–900)
BUN SERPL-MCNC: 21 MG/DL (ref 5–25)
BUN/CREAT SERPL: 21 {RATIO} (ref 6–20)
CALCIUM SERPL-MCNC: 8.9 MG/DL (ref 8.7–10.4)
CHLORIDE SERPL-SCNC: 104 MMOL/L (ref 99–111)
CONV ABS IMM GRAN: 0.19 10*3/UL (ref 0–0.2)
CONV CO2: 29 MMOL/L (ref 22–32)
CONV IMMATURE GRAN: 2 % (ref 0–1.8)
CONV TOTAL PROTEIN: 7 G/DL (ref 6.3–8.2)
CREAT UR-MCNC: 0.99 MG/DL (ref 0.7–1.2)
DEPRECATED RDW RBC AUTO: 50.8 FL (ref 35.1–43.9)
EOSINOPHIL # BLD AUTO: 0.17 10*3/UL (ref 0–0.7)
EOSINOPHIL # BLD AUTO: 1.8 % (ref 0–7)
ERYTHROCYTE [DISTWIDTH] IN BLOOD BY AUTOMATED COUNT: 13.7 % (ref 11.6–14.4)
GFR SERPLBLD BASED ON 1.73 SQ M-ARVRAT: >60 ML/MIN/{1.73_M2}
GLOBULIN UR ELPH-MCNC: 3.2 G/DL (ref 2–3.5)
GLUCOSE SERPL-MCNC: 88 MG/DL (ref 70–99)
HCT VFR BLD AUTO: 39.7 % (ref 42–52)
HGB BLD-MCNC: 12.5 G/DL (ref 14–18)
LYMPHOCYTES # BLD AUTO: 2.41 10*3/UL (ref 1–5)
LYMPHOCYTES NFR BLD AUTO: 25.5 % (ref 20–45)
MCH RBC QN AUTO: 31.9 PG (ref 27–31)
MCHC RBC AUTO-ENTMCNC: 31.5 G/DL (ref 33–37)
MCV RBC AUTO: 101.3 FL (ref 80–96)
MONOCYTES # BLD AUTO: 0.52 10*3/UL (ref 0.2–1.2)
MONOCYTES NFR BLD AUTO: 5.5 % (ref 3–10)
NEUTROPHILS # BLD AUTO: 6.12 10*3/UL (ref 2–8)
NEUTROPHILS NFR BLD AUTO: 64.7 % (ref 30–85)
NRBC CBCN: 0 % (ref 0–0.7)
OSMOLALITY SERPL CALC.SUM OF ELEC: 290 MOSM/KG (ref 273–304)
PLATELET # BLD AUTO: 295 10*3/UL (ref 130–400)
PMV BLD AUTO: 10.7 FL (ref 9.4–12.4)
POTASSIUM SERPL-SCNC: 4.4 MMOL/L (ref 3.5–5.3)
RBC # BLD AUTO: 3.92 10*6/UL (ref 4.7–6.1)
SODIUM SERPL-SCNC: 139 MMOL/L (ref 135–147)
WBC # BLD AUTO: 9.46 10*3/UL (ref 4.8–10.8)

## 2021-05-28 NOTE — PROGRESS NOTES
Patient: VANDANA PEÑA     Acct: [id number]     Report: #[id number]  UNIT #: [id number]     : 1950    Encounter Date:2019  PRIMARY CARE: Ozzy Farmer  ***Signed***  --------------------------------------------------------------------------------------------------------------------  Chief Complaint      Encounter Date      Sep 19, 2019            Primary Care Provider      Ozzy Farmer            Referring Provider            [facility]            Patient Complaint      Patient is complaining of      Patient here today for [facility] and [facility] follow up            VITALS      Height 5 ft 6 in / 167.64 cm      Weight 152 lbs  / 68.931458 kg      BSA 1.78 m2      BMI 24.5 kg/m2      Temperature 98.0 F / 36.67 C - Oral      Pulse 73      Respirations 15      Blood Pressure 102/73 Sitting, Left Arm      Pulse Oximetry 98%, room air            HPI      The patient is a very pleasant 68 year old white male recently seen by Dr. Alcantara    while hospitalized at [facility] on 19. He presented with     shortness of air, was found to be in respiratory distress and required     intubation and mechanical ventilation. He has a history of systolic heart     failure with ejection fraction of 20%, coronary artery disease, chronic     obstructive pulmonary disease and former heavy smoking history. He smoked at     least 1 pack a day for 50+ years. He is not sure how severe his chronic     obstructive pulmonary disease is but he was diagnosed at least 20 years ago with    chronic obstructive pulmonary disease.  He was diuresed, treated with     antibiotics, steroids and breathing treatments, able to be extubated and then     underwent heart catheterization by Dr. Tidwell which was already planned prior to     his hospitalization.  He was found to have severe distal left main and proximal     LAD disease, transferred to [facility] for evaluation for coronary artery     bypass graft. I do not  have hospital records from his time at [facility] but the     patient and his wife tell me that he underwent 3 vessel coronary artery bypass     graft there and was placed on a life vest which  he is wearing today. He will     continue to be followed by cardiology at [facility] and his cardiac surgeon there.     He is also planning to continue with Dr. Tidwell here in [facility]. He is overall     feeling weak and deconditioned, feels fatigued and he has chest soreness. He is     having dyspnea on exertion with mild to moderate exertion relieved with rest.       He denies increased coughing or wheezing, hemoptysis, fever or chills.             I reviewed his Review of Systems, medical, surgical and family history and agree    with those as entered.      Copies To:   Ankur Alcantara ;            JOSÉ MANUEL      Constitutional:  Complains of: Fatigue; Denies: Fever, Weight gain, Weight loss,    Chills, Insomnia, Other      Respiratory/Breathing:  Complains of: Shortness of air; Denies: Wheezing, Cough,    Hemoptysis, Pleuritic pain, Other      Endocrine:  Denies: Polydipsia, Polyuria, Heat/cold intolerance, Diabetes, Other      Eyes:  Denies: Blurred vision, Vision Changes, Other      Ears, nose, mouth, throat:  Denies: Mouth lesions, Thrush, Throat pain,     Hoarseness, Allergies/Hay Fever, Post Nasal Drip, Headaches, Recent Head Injury,    Nose Bleeding, Neck Stiffness, Thyroid Mass, Hearing Loss, Ear Fullness, Dry     Mouth, Nasal or Sinus Pain, Dry Lips, Nasal discharge, Nasal congestion, Other      Cardiovascular:  Denies: Palpitations, Syncope, Claudication, Chest Pain, Wake     up Gasping for air, Leg Swelling, Irregular Heart Rate, Cyanosis, Dyspnea on     Exertion, Other      Genitourinary:  Denies: Urinary frequency, Incontinence, Hematuria, Urgency,     Nocturia, Dysuria, Testicular problems, Other      Musculoskeletal:  Denies: Joint Pain, Joint Stiffness, Joint Swelling, Myalgias,    Other      Hematologic/lymphatic:  DENIES:  "Lymphadenopathy, Bruising, Bleeding tendencies,     Other      Neurological:  Denies: Headache, Numbness, Weakness, Seizures, Other      Psychiatric:  Denies: Anxiety, Appropriate Effect, Depression, Other      Sleep:  No: Excessive daytime sleep, Morning Headache?, Snoring, Insomnia?, Stop    breathing at sleep?, Other      Integumentary:  Denies: Rash, Dry skin, Skin Warm to Touch, Other      Immunologic/Allergic:  Denies: Latex allergy, Seasonal allergies, Asthma,     Urticaria, Eczema, Other      Immunization status:  No: Up to date            FAMILY/SOCIAL/MEDICAL HX      Surgical History:  Yes: Head Surgery (SINUS SURGERY 6 YEARS AGO), Orthopedic     Surgery (\"PLATE IN NECK\" - ROUGHLY 7 YEARS AGO), Other Surgeries (heart surgery     ); No: Abdominal Surgery, Appendectomy, Bladder Surgery, Bowel Surgery, CABG,     Cholecystectomy, Oral Surgery, Vascular Surgery      Heart - Family Hx:  Brother      Diabetes - Family Hx:  Sister      Is Father Still Living?:  No      Is Mother Still Living?:  No      Smoking status:  Former smoker (x58 years 2 PPD, quit 9-1-19)      Medical History:  Yes: Arthritis, Chronic Bronchitis/COPD, Congestive Heart     Failu, Deafness or Ringing Ears, Diabetes (TYPE II), Hemorrhoids/Rectal Prob     (GERD), High Blood Pressure (ON MEDICATIONS ), Shortness Of Breath; No: Asthma,     Blood Disease, Chemotherapy/Cancer, Seizures, Heart Attack, Miscellaneous     Medical/oth      Psychiatric History      none            PREVENTION      Hx Influenza Vaccination:  Yes      Date Influenza Vaccine Given:  Sep 1, 2018      Influenza Vaccine Declined:  No      2 or More Falls Past Year?:  No      Fall Past Year with Injury?:  No      Hx Pneumococcal Vaccination:  Yes      Encouraged to follow-up with:  PCP regarding preventative exams.      Chart initiated by      Francesca Perez CMA            ALLERGIES/MEDICATIONS      Allergies:        Coded Allergies:             CODEINE (Verified  Allergy, Mild, " 9/19/19)      Medications    Last Reconciled on 9/19/19 11:09 by SALONI NASH      Amiodarone Hcl (Amiodarone) 200 Mg Tablet      200 MG PO BID, #60 TAB         Reported         9/19/19       Potassium Chloride (K-Dur*) 10 Meq Tab.er.prt      10 MEQ PO BID, #60 TAB 0 Refills         Reported         9/19/19       Digoxin (Digoxin*) 0.125 Mg Tablet      0.125 MG PO QDAY@12 for 30 Days, #30 TAB         Prov: KVNG CONTRERAS         9/6/19       Furosemide (Furosemide) 40 Mg Tablet      40 MG PO BID@09,17, #60 TAB 0 Refills         Reported         9/3/19       Albuterol (Proair HFA) 8.5 Gm Inh      2 PUFFS INH RTQ4H PRN for SHORTNESS OF BREATH, #1 INH 5 Refills         Prov: Ozzy Farmer         8/13/19       Tiotropium Br/Olodaterol HCl (Stiolto Respimat Inhal Spray) 4 Gm Mist.inhal      2 PUFFS INH RTQDAY, #1 INH 5 Refills         Prov: Ozzy Farmer         8/13/19       Nebulizer/Compressor (Nebulizer) 1 Each Each      EACH XX ONCE, #1 0 Refills         Prov: Ozzy Farmer         8/13/19       Nicotine 21 Mg Patch (Nicotine 21 Mg Patch) 1 Each Patch.td24      21 MG TRANSDERM QDAY for 30 Days, #30 PATCH 2 Refills         Prov: Ozzy Farmer         8/13/19       Albuterol/Ipratropium (Duoneb) 3 Ml Ampul.neb      3 ML INH RTQ6H for 30 Days, #120 NEB 3 Refills         Prov: Ozzy Farmer         8/13/19       Spironolactone (Spironolactone*) 25 Mg Tablet      25 MG PO QDAY for 90 Days, #90 TAB 3 Refills         Prov: Ozzy Farmer         8/13/19       Aspirin EC (Aspirin EC) 81 Mg Tablet.      81 MG PO QDAY for 90 Days, #90 TAB.SR 3 Refills         Prov: Ozzy Farmer         8/13/19       Sertraline HCl (Sertraline*) 25 Mg Tablet      25 MG PO QDAY for 90 Days, #90 TAB 3 Refills         Prov: Ozzy Farmer         8/13/19       Ergocalciferol (Vitamin D2) 50,000 Unit Capsule      77863 UNITS PO Tu@09 for 90 Days, #12 CAP 3 Refills         Prov: Ozzy Farmer         8/13/19       Guaifenesin/Dextromethorphan  600-30 (Mucinex DM -30 mg Tablet) 1 Each Tab    .er.12h      2 TAB PO BID, TAB.ER         Reported         8/8/19       Tim-Fluticasone (Fluticasone 50 mcg) 16 Gm Spray.susp      2 PUFFS NARE EACH QAM, #1 BOTTLE 0 Refills         Reported         8/8/19       Folic Acid (Folic Acid*) 1 Mg Tablet      1 MG PO QDAY         Reported         8/8/19       Cetirizine Hcl (CETIRIZINE HCL) 10 Mg Tablet      10 MG PO QDAY         Reported         8/8/19       Pantoprazole (Protonix*) 40 Mg Tablet.dr      40 MG PO QDAY         Reported         8/8/19       Primidone (Mysoline*) 250 Mg Tablet      250 MG PO BID         Reported         8/8/19       Atorvastatin (Atorvastatin) 40 Mg Tablet      40 MG PO QDAY         Reported         8/8/19      Current Medications      Current Medications Reviewed 9/19/19            EXAM      CONSTITUTIONAL: Pleasant  normal conversant.       EYES : Pink conjunctive, no ptosis, PERRL.       ENMT : Nose and ears appear normal, normal dentition, mild posterior pharyngeal     wall erythema, no sinus tenderness. Mallampati classification       Neck: Nontender, no masses, no thyromegaly, no nodules.      Resp : Moderately decreased breath sounds throughout, no wheezes, rhonchi or     crackles, normal work of breathing noted.        CVS  : No carotid bruits, s1s2 nl, RRR, no murmur, rubs or gallop, no peripheral    edema       Chest wall: Normal rise with inspiration, nontender on palpation.      GI   : Abdomen soft, with no masses, no hepatosplenomegaly, no hernias, BS+      MSK  : Normal gait and station, no digital cyanosis or clubbing       Skin : No rashes, ulcerations or lesions, normal turgor and temperature      Neuro: CN II - XII intact, no sensory deficits, DTRs intact and symmetrical, no     motor weakness      Psych: Appropriate affect, A   Vtials      Vitals:             Height 5 ft 6 in / 167.64 cm           Weight 152 lbs  / 68.907497 kg           BSA 1.78 m2           BMI 24.5  kg/m2           Temperature 98.0 F / 36.67 C - Oral           Pulse 73           Respirations 15           Blood Pressure 102/73 Sitting, Left Arm           Pulse Oximetry 98%, room air            REVIEW      Results Reviewed      PCCS Results Reviewed?:  Yes Prev Lab Results, Yes Prev Radiology Results, Yes     Previous [facility] Records (I personally reviewed the patient's most recent pulmon    sukh consultation, progress notes and discharge summary.)            Assessment      COPD (chronic obstructive pulmonary disease) - J44.9            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            CAD (coronary artery disease) - I25.10            Notes      New Medications      * Potassium Chloride (K-Dur*) 10 MEQ TAB.ER.PRT: 10 MEQ PO BID #60      * AMIODARONE HCL (Amiodarone) 200 MG TABLET: 200 MG PO BID #60      * Budesonide/Formoterol Fumarate (Symbicort 160/4.5 Mcg) 10.2 GM INH: 2 PUFF INH      BID #1      * TIOTROPIUM BROMIDE (Spiriva Respimat 2.5 mcg/Puff) 4 GM MIST.INHAL: 2 PUFFS       INH QDAY #1      Discontinued Medications      * Tiotropium Br/Olodaterol HCl (Stiolto Respimat Inhal [place]) 4 GM MIST.INHAL: 2      PUFFS INH RTQDAY #1      New Diagnostics      * PFT-Comp, PrePost,DLCO,BodyBox, 2 Months         Dx: COPD (chronic obstructive pulmonary disease) - J44.9      * 6 Min Walk With Pulse Ox, 2 Months         Dx: COPD (chronic obstructive pulmonary disease) - J44.9      * Low Dose Chest CT, SCHEDULED PROCEDURE         Dx: COPD (chronic obstructive pulmonary disease) - J44.9      * Alpha 1 Antitrypsin , Routine         Dx: COPD (chronic obstructive pulmonary disease) - J44.9      ASSESSMENT:      1. Chronic obstructive pulmonary disease severity currently unknown without     acute exacerbation.       2. Longstanding heavy tobacco abuse of cigarettes with 50+ pack year history in     remission for 2-3 weeks.       3. Severe coronary artery disease status post 3 vessel coronary artery bypass     grafting 2  weeks ago at [facility] in [place].       4. Severe systolic heart failure with ejection fraction 15-20% followed by     cardiology at [facility] and Dr. Tidwell locally now on life vest.        5. Physical deconditioning.      6. Recent acute hypoxic respiratory failure requiring intubation and mechanical     ventilation resolving.             PLAN:       1. I have discussed with the patient's current inhaler regimen and management of    chronic obstructive pulmonary disease with him today.      2. The patient is taking Symbicort 160 and feels like it helps. I am suspicious     that he may have chronic obstructive pulmonary disease based on his previous     heavy smoking history. I will optimize him to triple inhaler therapy by adding     Spiriva Respimat 2.5 mcg 2 puffs once daily. I have prescribed both of these     today. He already has a rescue inhaler and DuoNeb which he is to continue using     as needed.       3. He recently had coronary artery bypass graft and it is not at all an optimal     time to do baseline pulmonary function test. I will have him set up for     pulmonary function test and 6 minute walk test in 2 months after he had had time    recover from his recent cardiac surgery. These tests will be performed to obtain    his baseline lung function.       4. I will do alpha 1 antitrypsin testing in the office today.        5. The patient qualifies for annual low dose lung cancer screening CT scans     based on his 50+ pack year history and quit smoking in the past 3 weeks. I will     get him enrolled in annual low dose lung cancer screening CT scans. Shared     decision making was done regarding this and he wishes to proceed.       6. I have discussed smoking cessation with the patient for 3 minutes and     congratulated him on quitting smoking. I offered nicotine replacement therapy     and  pharmacotherapy and he does not feel like he needs that at this time.       7. Continue to follow up as  scheduled with his cardiologists and cardiac surgeon    for management of his coronary artery disease and severe systolic heart failure.          8. The patient's vaccination status can be addressed at his next office visit.        9. Follow up in 2-3 months with Dr. Alcantraa to discuss test results, sooner if     needed.            Patient Education      Tobacco Cessation Counseling:  for 3 - 10 minutes      Patient Education Provided:  COPD, How to use an Inhaler, Smoking Cessation      Time Spent:  > 50% /[facility]            Patient Education:        Chronic Obstructive Pulmonary Disease                 Disclaimer: Converted document may not contain table formatting or lab diagrams. Please see [employer] for the authenticated document.

## 2021-05-28 NOTE — PROGRESS NOTES
Patient: VANDANA PEÑA     Acct: [id number]     Report: #[id number]  UNIT #: [id number]     : 1950    Encounter Date:2020  PRIMARY CARE: Ozzy Farmer  ***Signed***  --------------------------------------------------------------------------------------------------------------------  Chief Complaint      Encounter Date      2020            Primary Care Provider      Ozzy Farmer            Referring Provider            [facility]            Patient Complaint      Patient is complaining of      Patient here today for f/u, increased SOA, COPD            VITALS      Height 66 in / 167.64 cm      Weight 150 lbs  / 68.91175 kg      BSA 1.77 m2      BMI 24.2 kg/m2      Temperature 98 F / 36.67 C - Oral      Pulse 79      Respirations 14      Blood Pressure 117/74 Sitting, Left Arm      Pulse Oximetry 100%, room air            HPI      The patient is a 69 year old  male patient of Dr. Alcantara's with a     history of COPD, severe coronary artery disease, status post three vessel CABG     at [facility] back in 2019 and a defibrillator recently placed two     weeks ago in 2020.  The patient is here for follow up today. The patient had     a severe systolic heart failure with an EF of 15-20%, is being followed by     cardiology at [facility].  The patient states that he was on the Advair HFA inhaler    and he ran out of his prescription and went back to using the Advair Diskus that    he had previously.  The patient states that the Advair Diskus helped him breath     a lot better than Advair HFA inhaler and would like to switch back to the Advair    Diskus.  The patient states that he also using Spiriva everyday as prescribed,     however Incruse was sent the pharmacy, however [employer] has not sent     Incruse to his house yet.  The patient states that he has shortness of air that     is worse with exertion, moderate in severity and improved with rest.  The     patient states he  had a defibrillator placed two weeks ago and is now in cardiac    rehab.  The patient states he was seen by Dr. Tidwell over a week ago and scheduled    to follow up with him in 04/2020.  The patient states he was smoking two packs     of cigarettes a day and is now down to seven cigarettes a day and is wearing the    nicotine patch.  The patient denies any cough, wheezing, fever, chills, night     sweats, hemoptysis, purulent sputum production, unintentional weight loss, chest    pain, chest tightness, abdominal pain, nausea, vomiting, diarrhea.  The patient     states he is able to perform ADLs without difficulty.  The patient also had a     chest x-ray after defibrillator was placed that was ordered by Dr. Tidwell that had    showed interval placement of single lead pacer AICD, no pneumothorax, possible     mild interstitial edema pattern with a trace left pleural effusion.  The patient    states he is currently taking Bumex twice a day.            I have personally reviewed the review of systems, past family, social, surgical     and medical histories and I agree with those as entered in the chart.      Copies To:   Ankur Alcantara      Constitutional:  Denies: Fatigue, Fever, Weight gain, Weight loss, Chills,     Insomnia, Other      Respiratory/Breathing:  Complains of: Shortness of air; Denies: Wheezing, Cough,    Hemoptysis, Pleuritic pain, Other      Endocrine:  Denies: Polydipsia, Polyuria, Heat/cold intolerance, Diabetes, Other      Eyes:  Denies: Blurred vision, Vision Changes, Other      Ears, nose, mouth, throat:  Denies: Congestion, Dysphagia, Hearing Changes, Nose    Bleeding, Nasal Discharge, Throat pain, Tinnitus, Other      Cardiovascular:  Denies: Chest Pain, Exertional dyspnea, Peripheral Edema,     Palpitations, Syncope, Wake up Gasping for air, Orthopnea, Tachycardia, Other      Gastrointestinal:  Denies: Abdominal pain/cramping, Bloody stools, Constipation,    Diarrhea, Melena, Nausea,  "Vomiting, Other      Genitourinary:  Denies: Dysuria, Urinary frequency, Incontinence, Hematuria,     Urgency, Other      Musculoskeletal:  Denies: Joint Pain, Joint Stiffness, Joint Swelling, Myalgias,    Other      Hematologic/lymphatic:  DENIES: Lymphadenopathy, Bruising, Bleeding tendencies,     Other      Neurologic:  Denies: Headache, Numbness, Weakness, Seizures, Other      Psychiatric:  Denies: Anxiety, Appropriate Effect, Depression, Other      Sleep:  No: Excessive daytime sleep, Morning Headache?, Snoring, Insomnia?, Stop    breathing at sleep?, Other      Integumentary:  Denies: Rash, Dry skin, Skin Warm to Touch, Other            FAMILY/SOCIAL/MEDICAL HX      Surgical History:  Yes: CABG (times 3 on 9-9-19), Head Surgery (SINUS SURGERY 6     YEARS AGO and cricopnaryngcalmyoyomy 2010), Orthopedic Surgery (\"PLATE IN NECK\"     - ROUGHLY 7 YEARS AGO), Other Surgeries (heart surgery ); No: Abdominal Surgery,    Appendectomy, Bladder Surgery, Bowel Surgery, Cholecystectomy, Oral Surgery,     Vascular Surgery      Heart - Family Hx:  Brother      Diabetes - Family Hx:  Sister      Is Father Still Living?:  No      Is Mother Still Living?:  No       Family History:  Yes      Social History:  Tobacco Use; No Alcohol Use, No Recreational Drug use      Smoking status:  Current every day smoker (x58 years 2 PPD, quit 9-1-19 started     back 11/01/19, 7 cigs a day )      Anticoagulation Therapy:  No      Antibiotic Prophylaxis:  No      Medical History:  Yes: Arthritis, Chemotherapy/Cancer (skin cancer left arm ),     Chronic Bronchitis/COPD, Congestive Heart Failu, Deafness or Ringing Ears,     Diabetes (Type 11 for 20 years), Heart Attack (2019 3 vessel CABG, [facility]),     Hemorrhoids/Rectal Prob (GERD), High Blood Pressure (ON MEDICATIONS ), Shortness    Of Breath; No: Alcoholism, Allergies, Anemia, Asthma, Atrial Fibrillation, Blood    Disease, Broken Bones, Cataracts, Chemical Dependency, Emphysema, Chronic " Liver     Disease, Colon Trouble, Colitis, Diverticulitis, Convulsions, Depression,     Anxiety, Bipolar Disorder, PTSD, Epilepsy, Seizures, Forgetfullness, Glaucoma,     Gall Stones, Gout, Head Injury, Heart Murmur, GERD, Hepatitis, Hiatal Hernia,     High Cholesterol, HIV (Do not ask - volu, Jaundice, Kidney or Bladder Disease,     Kidney Stones, Migrane Headaches, Mitral Valve Prolapse, Night sweats,     Phlebitis, Psychiatric Care, Reflux Disease, Rheumatic Fever, Sexually     Transmitted Dis, Sinus Trouble, Skin Disease/Psoriais/Ecz, Stroke, Thyroid     Problem, Tuberculosis or Pos TB Te, Miscellaneous Medical/oth      Psychiatric History      none            PREVENTION      Hx Influenza Vaccination:  Yes      Date Influenza Vaccine Given:  Sep 1, 2019      Influenza Vaccine Declined:  No      2 or More Falls Past Year?:  No      Fall Past Year with Injury?:  No      Hx Pneumococcal Vaccination:  Yes      Encouraged to follow-up with:  PCP regarding preventative exams.      Chart initiated by      Francesca Perez CMA            ALLERGIES/MEDICATIONS      Allergies:        Coded Allergies:             LISINOPRIL (Verified  Allergy, Severe, ANGIOEDEMA, 1/28/20)           CODEINE (Verified  Allergy, Mild, 1/28/20)      Medications    Last Reconciled on 1/28/20 14:09 by Naveed HERRERA Polacrilex (Nicotine) 2 Mg Lozenge      2 MG BUCCAL Q4-6H, #180 MATI         Prov: [facility]         1/28/20       MDI-Advair 250/50 (Advair 250/50 Diskus) 1 Each Blst.w.dev      1 PUFF INH RTBID for 90 Days, #3 INH 2 Refills         Prov: ALEXX OMER [facility]         1/28/20       Umeclidinium Bromide (Incruse Ellipta) 62.5 Mcg Blst.w.dev      1 PUFF INH QDAY, #3 MDI 3 Refills         Prov: Kaini,Ankur         1/28/20       Umeclidinium Bromide (Incruse Ellipta) 62.5 Mcg Blst.w.dev      1 PUFF INH RTQDAY for 30 Days, #1 MDI 3 Refills         Prov: Kaini,Ankur         12/19/19       Nicotine 21 Mg Patch  (Nicotine 21 Mg Patch) 1 Each Patch.td24      21 MG TRANSDERM QDAY PRN PRN for NICOTINE WITHDRAWAL ANXIETY for 30 Days, #30     PATCH 3 Refills         Prov: Ankur Alcantara         12/17/19       Bumetanide (BUMETANIDE) 2 Mg Tablet      2 MG PO BID, #60 TAB         Reported         12/10/19       Carvedilol (Carvedilol) 3.125 Mg Tablet      3.125 MG PO QDAY, #30 TAB 0 Refills         Reported         12/10/19       Cetirizine Hcl (zyrTEC) 5 Mg Tablet      10 MG PO QDAY for 30 Days, #60 TAB         Reported         12/10/19       Atorvastatin Calcium (Lipitor*) 40 Mg Tablet      40 MG PO QDAY, #30 TAB 0 Refills         Reported         12/10/19       guaiFENesin-PSE 1,200-120 Mg (Mucinex D ER 1,200-120 Mg) 1 Each Tab.er.12h      1 TAB PO BID, TAB.SR.12H         Reported         10/23/19       Amiodarone Hcl (Amiodarone) 200 Mg Tablet      200 MG PO Q12H, #60 TAB         Reported         9/19/19       Folic Acid (Folic Acid) 1 Mg Tablet      1 MG PO QDAY         Reported         8/8/19       Primidone (Mysoline*) 250 Mg Tablet      250 MG PO BID         Reported         8/8/19      Current Medications      Current Medications Reviewed 1/28/20            EXAM      CONSTITUTIONAL: Well built, well nourished, in no acute distress.        EYES : Pink conjunctive, no ptosis, PERRL.       ENMT : Nose and ears appear normal, normal dentition, posterior pharyngeal wall     without erythema, no sinus tenderness. Mallampati classification 2.        Neck: Nontender, no masses, no thyromegaly, no nodules.      Resp : Bilateral diminished breath sounds, no wheezes, rales or rhonchi, normal     work of breathing noted, patient able to speak full sentences without     difficulty.       CVS  : No carotid bruits, s1s2 nl, RRR, no murmur, rubs or gallop, no peripheral    edema       Chest wall: Normal rise with inspiration, nontender on palpation.      GI   : Abdomen soft, with no masses, no hepatosplenomegaly, no hernias, BS+      MSK  :  Normal gait and station, no digital cyanosis or clubbing       Skin : No rashes, ulcerations or lesions, normal turgor and temperature      Neuro: CN II - XII intact, no sensory deficits, DTRs intact and symmetrical, no     motor weakness      Psych: Appropriate affect, A   Vitals      Vitals:             Height 66 in / 167.64 cm           Weight 150 lbs  / 68.71117 kg           BSA 1.77 m2           BMI 24.2 kg/m2           Temperature 98 F / 36.67 C - Oral           Pulse 79           Respirations 14           Blood Pressure 117/74 Sitting, Left Arm           Pulse Oximetry 100%, room air            REVIEW      Results Reviewed      PCCS Results Reviewed?:  Yes Prev Lab Results, Yes Prev Radiology Results, Yes     Previous Mecial Records      Radiographic Results               [facility]                PACS RADIOLOGY REPORT            Patient: VANDANA PEÑA   Acct: #[id number]   Report: #[id number]            UNIT #: [id number]    DOS: 20 1003      INSURANCE:MEDICARE PART A   LOCATION:[place] [address]   : 1950            PROVIDERS      ADMITTING:     ATTENDING: KVNG CONTRERAS      FAMILY:  Ozzy Farmer   ORDERING:  KVNG CONTRERAS         OTHER:    DICTATING:  CLYDE FOSTER MD            REQ #:[id number]   EXAM:CXR1 - CHEST 1 View AP PA      REASON FOR EXAM:  Post Pacemaker/Internal Defibrillator      REASON FOR VISIT:  CHF            *******Signed******         PROCEDURE:   CHEST AP/PA SINGLE VIEW             COMPARISON:   [facility], , CHEST AP/PA 1 VIEW, 2019,     11:59.             INDICATIONS:   Post Pacemaker/Internal Defibrillator             FINDINGS:         Stable cardiomegaly.  Previous CABG.  Interval placement of a single lead pacer     or AICD device.        The single lead projects along the inferior left heart margin.  No pneumothorax.     Mild interstitial       prominence with a possible small left  pleural effusion.             CONCLUSION:   Interval placement of a single lead pacer or AICD.  No     pneumothorax.             Possible mild interstitial edema pattern with a trace left pleural effusion              CLYDE FOSTER MD             Electronically Signed and Approved By: CLYDE FOSTER MD on 1/13/2020 at 11:35                        Until signed, this is an unconfirmed preliminary report that may contain      errors and is subject to change.                                              SOPHIEER:      D:01/13/20 1136            Assessment      CHF (NYHA class IV, ACC/AHA stage D) - I50.9            Notes      New Medications      * MDI-Advair 250/50 (Advair 250/50 Diskus) 1 EACH BLST.W.DEV: 1 PUFF INH RTBID       90 Days #3      * Nicotine Polacrilex (Nicotine) 2 MG LOZENGE: 2 MG BUCCAL Q4-6H #180      * TIOTROPIUM BROMIDE (Spiriva Respimat 2.5 mcg/Puff) 4 GM MIST.INHAL          Sample - Qty 1      Discontinued Medications      * Fluticasone/Salmeterol 230/21 (Advair /21 MCG) 12 GM HFA.AER.AD: 2 PUFF      INH RTBID 90 Days #3      * Varenicline (Chantix) 0.5 MG TABLET: 0.5 MG PO BID 30 Days #60      * TIOTROPIUM BROMIDE (Spiriva Respimat 2.5 mcg/Puff) 4 GM MIST.INHAL          Sample - Qty 2      * TIOTROPIUM BROMIDE (Spiriva Respimat 2.5 mcg/Puff) 4 GM MIST.INHAL          Sample - Qty 2      * TIOTROPIUM BROMIDE (Spiriva Respimat 2.5 mcg/Puff) 4 GM MIST.INHAL: 2 PUFFS       INH RTQDAY #1      * CEPHALEXIN MONOHYDRATE (Keflex*) 750 MG CAPSULE: 750 MG PO TID #12      New Diagnostics      * Chest 2 View, Week         Dx: CHF (NYHA class IV, ACC/AHA stage D) - I50.9      * Brain Natriuretic Pe, Routine         Dx: CHF (NYHA class IV, ACC/AHA stage D) - I50.9      * BMP, 2 Week         Dx: CHF (NYHA class IV, ACC/AHA stage D) - I50.9      ASSESSMENT:       1.  History of pleural effusion.      2. COPD, severe.      3.  Severe coronary artery disease, status post three vessel coronary artery     bypass graft  at [facility] back in 09/2019.      4. Severe systolic heart failure with an EF of 15-20% being followed by     cardiology at [facility].  Defibrillator placed two weeks ago in 01/2020.      5. Tobacco abuse with cigarettes.            PLAN:      1.  For a history of pleural effusion, the patient had a recent chest x-ray that    had showed a trace left pleural effusion. The patient states that he is now on     Bumex 10 mg twice a day. We will repeat chest x-ray again in two weeks with a     pro-BNP and a BMP. The patient no longer wants to take the Advair HFA inhaler.     The patient would rather switch back to the disk.  We will switch the patient     back to the Advair 250/50 disk to take one puff twice a day.  Patient is advised    to rinse his mouth out after each use. The patient is to continue Spiriva until     we can get a hold of his pharmacy to see why Incruse has not been sent.      Continue albuterol inhaler as needed.      2.  Follow up with Dr. Tidwell as scheduled for history of coronary artery disease     and defibrillator placement, also follow up with cardiology at [facility]     as scheduled.      3. Patient is to call the office, 911 or go to the ER with any new or worsening     symptoms.      4. I spent five minutes today counseling the patient on smoking cessation.  I     counseled the patient on the risks of continued smoking including the risk of     lung cancer, head and neck cancer, renal cancer, heart disease, stroke, and     early death.  The patient is to continue nicotine patches. We will send nicotine    lozenges to the pharmacy for patient to try.  Also patient is advised to     decrease the number of cigarettes he is smoking up until the point of where he     can quit.      5. Patient states that he is up to date with his flu and pneumonia vaccines.      6. Follow up in 2-3 months with Dr. Alcantara, sooner if needed.            Patient Education      Tobacco Cessation Counseling:   for 3 - 10 minutes      Patient Education Provided:  COPD, Smoking Cessation      Time Spent:  > 50% /Coord Care            Electronically signed by ALEXX OMER PCCS  02/03/2020 15:51       Disclaimer: Converted document may not contain table formatting or lab diagrams. Please see [employer] for the authenticated document.

## 2021-05-28 NOTE — PROGRESS NOTES
Patient: VANDANA PEÑA     Acct: [id number]     Report: #[id number]  UNIT #: [id number]     : 1950    Encounter Date:2021  PRIMARY CARE: Ozzy Farmer  ***Signed***  --------------------------------------------------------------------------------------------------------------------  History of Present Illness      Chief Complaint: 3 month f/u            Vandana Peña is presenting for evaluation via Telehealth visit. Verbal     consent obtained before beginning visit.            PAST MEDICAL HISTORY/OVERVIEW OF PATIENT SYMPTOMS            Hx: COPD, Respiratory failure            Current every day smoker ( (x58 years 2 PPD, quit 19 started back 19,     5 cigarettes a day )            Vaccinations- current            Symptoms: SOA            Provider spent 14 minutes with the patient during telehealth visit.            The following staff were present during this visit: Dr. Ankur Alcantara, [facility]            The patient is a 70 year old male with severe chronic obstructive pulmonary     disease, severe coronary artery disease, severe systolic heart failure with     ejection fraction of 15-20%, history of coronary artery bypass graft at [facility]     in 2019, tobacco abuse of cigarettes, seasonal allergies and allergic     rhinitis  who presents for Telehealth visit today.  Since his last office visit     he has been on advair and spiriva and uses albuterol for rescue 2-3 times a day.    He has not heard anything about a portable oxygen concentrator yet. He had a CT     scan of the chest done in November which showed a small new infiltrate but he     has not had another CT scan of the chest ordered. He does not have any chest     pain, no nausea or vomiting, no fever or chills, no changes in weight or     appetite. He does have some swelling in the legs but it has been stable. He has     not been coughing up blood. He is short of breath with activities, his  oxygen     drops easily at home.             Physical exam is deferred due to Telehealth visit.                   Patient: CEDRIC PEÑA   Acct: #[id number]   Report: #[id number]            UNIT #: [id number]    DOS:       LOCATION:[facility]     : 1950            PROVIDERS      ADMITTING:     FAMILY:  Ozzy Farmer         OTHER:       DICTATING:  Ankur Alcantara MD            REASON FOR VISIT:  COPD            *******Signed******                                    [facility]                          [employer]                            [facility]  [address]               __________________________________________________________________________             Patient Name:                   Attending Physician:      PeñaCedrickings GAMBINO PA-C             Patient Visit # MR #            Admit Date  Disch Date     Location      [id number]    [id number]      2019                 [facility]- -             Date of Birth      1950      __________________________________________________________________________      821 - DIAGNOSTIC REPORT             PULMONARY FUNCTION TEST             SPIROMETRY:      Spirometry shows severe obstructive defect.      FEV1/FVC is 53.      FEV1 is 0.98 L, 35% of predicted.      FVC is 1.85 L, 48% of predicted.             BRONCHODILATOR RESPONSE:      There is a significant response to bronchodilator administration.      FEV1 increased from 0.98 L to 1.23 L, 26% of predicted.      FVC was 1.85 L to 2.08 L, 12% change.             LUNG VOLUMES:      Lung volumes show restrictive defect, as well.      Total lung capacity is 4.17 L, 66% of predicted.      Residual volume is 2.20 L, 98% of predicted.             DIFFUSION:      Diffusion capacity is severe decreased, 30% of predicted.             FLOW VOLUME LOOP:      Flow volume loop is suggestive of severe obstructive process.              CONCLUSION:      Low FEV1/FVC with low FEV1 and FVC with low lung volumes and low diffusion      capacity. It suggests mixed obstructive and restrictive airway disease,      severe restrictive and obstructive airway disease. There is significant      bronchial reversibility suggesting underlying reactive airway disease      component, as well.  Please correlate clinically.             To be electronically signed in mymxlog      [id number] HANG ALCANTARA M.D.             NK:catherine      D:  2019 16:49      T:  2019 18:01      #[id number]             Until signed, this is an unconfirmed preliminary report that may contain      errors and is subject to change.                   Until signed, this is an unconfirmed preliminary report that may contain      errors and is subject to change.                     <Electronically signed by Hang Alcantara MD>                19 0829               Hang Alcantara MD:LETA      D:19 1649                     [facility]                PACS RADIOLOGY REPORT            Patient: VANDANA PEÑA   Acct: #[id number]   Report: #[id number]            UNIT #: [id number]    DOS: 20 1327      INSURANCE:MEDICARE PART A   LOCATION:CT     : 1950            PROVIDERS      ADMITTING:     ATTENDING: Hang Alcantara      FAMILY:  Ozzy Farmer   ORDERING:  Hang Alcantara         OTHER:    DICTATING:  Morgan Bruner DO            REQ #:[id number]   EXAM:[facility] - LOW DOSE CHEST CT SCREENING      REASON FOR EXAM:  CURRENT      REASON FOR VISIT:  CURRENT            *******Signed******         CORRECTION      Corrected on: 2020;              PROCEDURE:   CT LOW DOSE CHEST SCREENING             COMPARISON:   [facility], CT, CHEST W/O CONTRAST, 2019,     12:27.             REASON FOR SCREENING:   Patient is 70 years of age, asymptomatic,  and has a     smoking history of more       than 30 pack years.      SMOKING STATUS:   Current smoker.                  SCREENING VISIT:   Baseline.                   TECHNIQUE:   Axial unenhanced LDCT images from the apices through mid-kidney     were obtained.       Evaluation of solid organs and vascular structures is suboptimal due to the lack    of IV contrast.       Imaging was performed on a Siemens Sensation 40 CT scanner.             RADIATION:   CT Dose Index Vol (CTDIvol):    114  mGy         Dose Length Product (DLP):    114  mGy-cm             DIAGNOSTIC QUALITY:   Satisfactory             FINDINGS:   There is centrilobular emphysema.  Within the right upper lobe there    is a patchy       irregular ground-glass opacity which is new since the study from 1 year ago and     measures 1.4 x 1. 5       cm on coronal CT image 80.  There is a similar irregular ground-glass opacity     more peripherally and       posteriorly on axial CT image 45. A 3rd ground-glass irregular nodule lower     opacity measures 6 mm       on axial CT image 57.  These are all new since 11/1/2019.  Additionally, there     is mild diffuse       bronchial wall thickening which may be due to acute or chronic bronchitis.  No     discrete airspace       consolidations.  No pleural or pericardial effusions or pneumothorax.  Thyroid     gland is normal in       size.  There is pacemaker in place as well as a right paratracheal node (on     image 68) , which is       1.1 cm in short axis and was 1.5 cm.        AP window node on image 62 is 10 mm in short axis and was 1.5 cm.  A tiny     residual or recurrent       left pleural effusion is present.  Limited images of the upper abdomen     demonstrate normal adrenal       glands.  No acute bony abnormality or aggressive focal osseous lesion.  There is    hardware in the       lower cervical spine.               LUNG-RADS CLASSIFICATION:  Lung-RADS 3 - Probably benign finding(s) - short term     follow up       suggested; includes nodules with a low likelihood of becoming a clinically     active cancer.        Recommendation: Follow-up screening with LDCT in 6 months.  1-2% probability of     malignancy.        Estimated population prevalence is 5%.  Reference: ACR Lung-RADS (Lung CT     Screening reporting and       data system) Version 1.1 assessment categories release date 2019.               CONCLUSION:   1. Development of patchy irregular ground-glass opacities in the     right upper lobe new       since 11/1/2019.  Findings are favored to represent infection or inflammatory     etiology but       follow-up is recommended.  There is also mild diffuse bronchial wall thickening     which may be acute       or chronic bronchitis.  Mild mediastinal adenopathy on prior study has improved.           2. Tiny residual or recurrent left pleural effusion.      3. Other chronic ancillary findings are described above .                ABRAHAM BURTON DO             Electronically Signed and Approved By: ABRAHAM BURTON DO on 11/04/2020 at 14:04              ABRAHAM BURTON DO          Electronically Signed and Approved By: ABRAHAM BURTON DO on 11/20/2020 at 16:12                                 Until signed, this is an unconfirmed preliminary report that may contain      errors and is subject to change.                                              NIVIA:      D:11/20/20 1612            Allergies and Medications      Allergies:        Coded Allergies:             LISINOPRIL (Verified  Allergy, Severe, ANGIOEDEMA, 1/28/21)                  can't breathe, tongue swelling           CODEINE (Verified  Allergy, Intermediate, 1/28/21)                  nausea           AMIODARONE (Verified  Allergy, Unknown, 1/28/21)      Medications    Last Reconciled on 1/28/21 16:16 by HANG LEBRON MD      Benzonatate (Tessalon Perles) 100 Mg Capsule      100 MG PO TID PRN for COUGH for 30 Days, #90 CAP 4 Refills         Prov:  Ankur Alcantara         1/5/21       Umeclidinium Bromide (Incruse Ellipta) 62.5 Mcg Blst.w.dev      1 PUFF INH RTQDAY for 30 Days, #1 MDI 3 Refills         Prov: ALEXX [facility]         10/27/20       Spironolactone (Aldactone) 25 Mg Tablet      25 MG PO QDAY, #30 TAB 0 Refills         Reported         10/27/20       Carvedilol (Carvedilol) 6.25 Mg Tablet      6.25 MG PO BID, #60 TAB 0 Refills         Reported         10/27/20       MDI-Albuterol (Ventolin HFA) 8 Gm Hfa.aer.ad      2 PUFFS INH RTTID for 90 Days, #1 MDI 3 Refills         Prov: Ankur Alcantara         7/22/20       P-Ephed HCl/Fexofenadine HCl 120/60 MG (Allegra-D 12 Hour) 1 Each Tab.er.12h      1 TAB PO Q12HR, #60 TAB.SR.12H 0 Refills         Prov: Ankur Alcantara         7/22/20       MDI-Advair 250/50 (Advair 250/50 Diskus) 1 Each Blst.w.dev      1 PUFF INH RTBID for 90 Days, #3 INH 2 Refills         Prov: Ankur Alcantara         7/22/20       Sacubitril/Valsartan (Entresto 24/26 mg) 1 Each Tablet      1 TAB PO QDAY, #60 TAB 0 Refills         Reported         2/4/20       metOLazone (metOLazone) 2.5 Mg Tablet      2.5 MG PO QDAY, TAB         Reported         2/4/20       Bumetanide (BUMETANIDE) 0.25 Mg/1 Ml Vial      2 MG IV QDAY, #30 VIAL         Reported         2/4/20       Levothyroxine (Levothyroxine) 0.075 Mg Tablet      50 MCG PO QDAY@07, #30 TAB 0 Refills         Reported         2/4/20       Sennosides (Senna) 8.8 Mg/5 Ml Syrup      PO BID PRN for prn, ML         Reported         2/4/20       Polyethylene Glycol (Miralax*) 17 Gm Packet      1 CAP PO prn PRN for CONSTIPATION, #30 PKT 0 Refills         Reported         2/4/20       Famotidine (Famotidine) 10 Mg Tablet      20 MG PO BID for 30 Days, #120 TAB         Reported         2/4/20       Sertraline HCl (Sertraline*) 25 Mg Tablet      25 MG PO QDAY, TAB         Reported         2/4/20       glipiZIDE (glipiZIDE) 5 Mg Tablet      5 MG PO QDAY, #30 TAB 0 Refills         Reported         2/4/20        Bumetanide (BUMETANIDE) 2 Mg Tablet      2 MG PO BID, #60 TAB         Reported         12/10/19       Carvedilol (Carvedilol) 3.125 Mg Tablet      3.125 MG PO QDAY, #30 TAB 0 Refills         Reported         12/10/19       Atorvastatin Calcium (Lipitor*) 40 Mg Tablet      40 MG PO QDAY, #30 TAB 0 Refills         Reported         12/10/19       guaiFENesin-PSE 1,200-120 Mg (Mucinex D ER 1,200-120 Mg) 1 Each Tab.er.12h      1 TAB PO BID, TAB.SR.12H         Reported         10/23/19       Folic Acid (Folic Acid) 1 Mg Tablet      1 MG PO QDAY         Reported         8/8/19       Primidone (Mysoline*) 250 Mg Tablet      250 MG PO BID         Reported         8/8/19            Plan      Orders:  Phone Eval 11-20 mi [phone]      Instructions      * Chronic conditions reviewed and taken in consideration for today's treatment       plan.      * Plan Of Care: ()      * Patient instructed to seek medical attention urgently for new or worsening       symptoms.      * Patient was educated/instructed on their diagnosis, treatment and medications       today.      * Recommend self monitoring. Instructions given.      * Recommend self quarantine for 14 days.      * Recommend self quarantine until without fever for 72 hours without using fever       reducing medications.      * Recommends over the counter medications for symptom management.            PLAN:      The patient is a 70 year old male with severe chronic obstructive pulmonary     disease, severe coronary artery disease, congestive heart failure with ejection     fraction of 15-20% followed by cardiologist at [facility], history of coronary     artery bypass graft at [facility] in September 2020, history of tobacco abuse of     cigarettes, seasonal allergies, allergic rhinitis, chronic hypoxic respiratory     failure, persistent infiltrate on CT scan.             1. Persistent infiltrate. He is improving with his symptoms. I will repeat CT     scan of the chest in 3 months.        2. Chronic hypoxic respiratory failure. I was told he will need a repeat 6     minute walk test, he had a significant drop during walk test at his last office     visit. He will need a repeat 6 minute walk test and a portable oxygen     concentrator. We will arrange for him to have a portable oxygen concentrator.     Continue advair, spiriva and albuterol as needed.       3. Smoking cessation counseling was done for more than 5 minutes. I offered     nicotine replacement therapy but he wants to work on his own. He should keep     himself as active as possible.       4. Follow up in 3-4 months earlier if needed.            Electronically signed by Ankur Alcantara  02/09/2021 21:09       Disclaimer: Converted document may not contain table formatting or lab diagrams. Please see "[employer] System for the authenticated document.

## 2021-05-28 NOTE — PROGRESS NOTES
Patient: CEDRIC PEÑA     Acct: [id number]     Report: #[id number]  UNIT #: [id number]     : 1950    Encounter Date:2020  PRIMARY CARE: Ozzy Farmer  ***Signed***  --------------------------------------------------------------------------------------------------------------------  TELEHEALTH NOTE      History of Present Illness            Chief Complaint: (f/u very soa. meds not working            Cedric Peña is presenting for evaluation via Telehealth visit. Verbal     consent obtained before beginning visit.            Provider spent (number of mins) minutes with the patient during telehealth     visit.            The following staff were present during the visit: (lilo dasilva. adolfo barron)            The patient is a 69 year old male with a history of severe COPD and severe     coronary artery disease, status post three vessel CABG at [facility] in     2019, severe systolic heart failure with EF of 15-20%, followed by cardiology    at [facility], defibrillator was placed in 2020, tobacco abuse of cigarettes and    history of pleural effusion.  This is a TeleHealth consult.  He has shortness of    breath with activities, especially when he starts walking he gets very short of     breath, that is why he tries too use nebulizer prior to walking. He uses Advair     250/50 and Spiriva once daily. He uses albuterol for rescue inhaler 2-3 times a     day. He has no changes in weight or appetite, does not have significant weight     loss or loss or appetite.  He does not have any chest pain or chest tightness.     He has a defibrillator and is following with cardiology.              Physical exam deferred due to TeleHealth visit.              Patient: CEDRIC PEÑA   Acct: #[id number]   Report: #[id number]            UNIT #: [id number]    DOS:       LOCATION:[facility]: 1950            PROVIDERS      ADMITTING:     FAMILY:  Ozzy Farmer         OTHER:        DICTATING:  Ankur Alcantara MD            REASON FOR VISIT:  COPD            *******Signed******                                    [facility]                          [facility]                            [place]  [id number]               __________________________________________________________________________             Patient Name:                   Attending Physician:      Cedric Wade PA-C             Patient Visit # MR #            Admit Date  Disch Date     Location      [id number]    [id number]      11/04/2019                 [facility] -             Date of Birth      1950      __________________________________________________________________________      821 - DIAGNOSTIC REPORT             PULMONARY FUNCTION TEST             SPIROMETRY:      Spirometry shows severe obstructive defect.      FEV1/FVC is 53.      FEV1 is 0.98 L, 35% of predicted.      FVC is 1.85 L, 48% of predicted.             BRONCHODILATOR RESPONSE:      There is a significant response to bronchodilator administration.      FEV1 increased from 0.98 L to 1.23 L, 26% of predicted.      FVC was 1.85 L to 2.08 L, 12% change.             LUNG VOLUMES:      Lung volumes show restrictive defect, as well.      Total lung capacity is 4.17 L, 66% of predicted.      Residual volume is 2.20 L, 98% of predicted.             DIFFUSION:      Diffusion capacity is severe decreased, 30% of predicted.             FLOW VOLUME LOOP:      Flow volume loop is suggestive of severe obstructive process.             CONCLUSION:      Low FEV1/FVC with low FEV1 and FVC with low lung volumes and low diffusion      capacity. It suggests mixed obstructive and restrictive airway disease,      severe restrictive and obstructive airway disease. There is significant      bronchial reversibility suggesting underlying reactive airway disease      component, as well.  Please  correlate clinically.             To be electronically signed in [employer])      [id number] HANG ALCANTARA M.D.             NK:catherine      D:  2019 16:49      T:  2019 18:01      #[id number]             Until signed, this is an unconfirmed preliminary report that may contain      errors and is subject to change.                   Until signed, this is an unconfirmed preliminary report that may contain      errors and is subject to change.                     <Electronically signed by Hang Alcantara MD>                19 0829               Hang Alcantara MD:[id number]      D:19 1649                     [facility]                PACS RADIOLOGY REPORT            Patient: VANDANA PEÑA   Acct: #[id number]   Report: #[id number]            UNIT #: [id number]    DOS: 19 1229      INSURANCE:MEDICARE PART A   LOCATION:CT     : 1950            PROVIDERS      ADMITTING:     ATTENDING: Ava Lowe PA-C      FAMILY:  Ozzy Farmer   ORDERING:  Ava Lowe PA-C         OTHER:    DICTATING:  MONSE GUTIERREZ MD            REQ #:[id number]   EXAM:[facility] - CT CHEST without CONTRAST      REASON FOR EXAM:        REASON FOR VISIT:  PLURAL EFFUSION            *******Signed******         PROCEDURE:   CT CHEST WITHOUT CONTRAST             COMPARISON:   [facility], CT, CT LOW DOSE CHEST SCREENING,     2019, 16:47.             INDICATIONS:   PLEURAL EFFUSION             TECHNIQUE:   CT images were created without the administration of contrast     material.               PROTOCOL:     Standard imaging protocol performed                RADIATION:     DLP: 457mGy*cm          Automated exposure control was utilized to minimize radiation dose.              FINDINGS:         The visualized soft tissue structures at the base of the neck including the     thyroid appear  within       normal limits.  There is no lower cervical or axillary adenopathy.  The heart is    enlarged.  There       are postsurgical changes of prior CABG.  There is a trace pericardial effusion     which is similar to       the prior examination.  There is extensive coronary artery atherosclerotic     calcification.  The       aorta and main pulmonary artery are normal in caliber.  There are enlarged     mediastinal lymph nodes.        Examples include a low right paratracheal lymph node measuring 1.6 cm in short     axis, slightly       increased from 1.3 cm on the prior examination and a low left paratracheal lymph    node measuring 1.4       cm in short axis, previously measured 1.1 cm.  There is an enlarged subcarinal     lymph node measuring       1.7 cm in short axis which previously measured 1.6.  Esophagus is normal in     course and caliber.             The tracheobronchial tree is patent.  There are areas of smooth interlobular     septal thickening       likely representing mild interstitial edema.  There are background findings of     mild to moderate       centrilobular emphysema.  There is a moderate layering left-sided pleural     effusion with associated       compressive atelectasis involving the left lower lobe and posterior aspect of     the lingula.  There       is bandlike atelectasis within the right lower lobe.  There is no infectious     consolidation or       suspicious pulmonary nodule.             Visualized portions of the upper abdomen demonstrate no acute findings.  There     is an external       defibrillator vast which causes streak artifact.  There are no acute osseous     findings.  Median       sternotomy wires are present and intact.             CONCLUSION:         1.  Similar moderate left-sided pleural effusion with associated left lower lobe    compressive       atelectasis.      2. Cardiomegaly with small pericardial effusion, slightly decreased from the     prior exam.    "   3.  Mild to moderate centrilobular emphysema.             4. Mild enlargement of the mediastinal lymphadenopathy when compared to the     prior examination.        This may be reactive to underlying pulmonary disease.  Continued attention on     follow-up is       recommended.                MONSE GUTIERREZ MD             Electronically Signed and Approved By: MONSE GUTIERREZ MD on 11/01/2019 at     13:57                        Until signed, this is an unconfirmed preliminary report that may contain      errors and is subject to change.                                              BATB1:      D:11/01/19 1357      Overview of Symptoms      pt states he is \"soa when walking, no cough, wheezing or fever\"            Plan/Instructions      Ambulatory Assessment/Plan:        Notes      New Medications      * P-Ephed HCl/Fexofenadine HCl 120/60 MG (Allegra-D 12 Hour) 1 EACH TAB.ER.12H:       1 TAB PO Q12HR #60      Plan/Instructions            * Plan Of Care: ()            * Chronic conditions reviewed and taken into consideration for today's treatment       plan.      * Patient instructed to seek medical attention urgently for new or worsening       symptoms.      * Patient was educated/instructed on their diagnosis, treatment and medications       prior to discharge from the clinic today.            PLAN:  The patient is a 69 year old male with a history of severe COPD and     severe coronary artery disease, status post bypass graft and severe systolic     heart failure with EF of 15-20%, has defibrillator placed in 01/2020 and tobacco     abuse of cigarettes.      1. COPD.  Continue with Advair 250/50 and Spiriva once daily. Use albuterol as     needed.        2.  Diuretics and cardiac meds were per Dr. Tidwell and cardiology service, [facility]     in [place].        3. He will need low dose lung cancer screening CT scan at least in 11/2020.      4. Keep himself as active as possible.      5. Continue with current " nebulizers.      6. I will send Allegra D as needed to use 3-5 days at one point for de    congestion.      7. Follow up in 3-4 months, earlier if needed.  If he has worsening shortness of     breath, he may need chest x-ray and may need thoracentesis in the future.      8. Follow up in 3-4 months, earlier if needed.              Time spend on TeleHealth visit 23 minutes.      Codes:  Phone Eval 21-30 mi [id number]            Electronically signed by Ankur Alcantara  04/16/2020 17:49       Disclaimer: Converted document may not contain table formatting or lab diagrams. Please see [employer] for the authenticated document.

## 2021-05-28 NOTE — PROGRESS NOTES
Patient: VANDANA PEÑA     Acct: [id number]     Report: #[id number]  UNIT #: [id number]     : 1950    Encounter Date:2021  PRIMARY CARE: Ozzy Farmer  ***Signed***  --------------------------------------------------------------------------------------------------------------------  Chief Complaint      Encounter Date      May 13, 2021            Primary Care Provider      Ozzy Farmer            Referring Provider            [facility]            Patient Complaint      Patient is complaining of      Pt here for 4 month follow up/Chest ct results/COPD            VITALS      Height 5 ft 6.00 in / 167.64 cm      Weight 149 lbs  / 67.383490 kg      BSA 1.76 m2      BMI 24.0 kg/m2      Temperature 97.1 F / 36.17 C - Temporal      Pulse 75      Respirations 16      Blood Pressure 72/48 Sitting, Left Arm      Pulse Oximetry 96%, room air      Comment: Recheckd B/P in Left arm 69/42            Rechecked B/P in Right arm       71/48            HPI      The patient is a 70 year  male patient of Dr. Alcantara's who presents for     follow up today.             On exam today he is hypotensive with blood pressure of 72/48. He is dizzy and     lightheaded at this time. The patient states since his last office visit he has     not had any antibiotics or steroids. He is using his Incruse and advair as     prescribed and uses albuterol nebulizer three times a day. The patient states he    wears oxygen at 2 liters per minute at night and he is supposed to use oxygen     during the day. The patient states he has been having some chest discomfort over    the past 2 weeks. He states that it is dull and constant. The patient states the    pain does not radiate. He denies any fever or chills, hemoptysis,  purulent     sputum production, swollen glands in the head and neck, nausea or vomiting or     diarrhea. He also denies any leg swelling. He has a productive cough with clear     sputum. He had a CT scan of the  chest in April 2021 and the results were     discussed with the patient. In regards to the patient needing to wear oxygen     during the day, he had a 6 minute walk test performed by LEONIDAS Sims     and he did qualify for oxygen at that time but he states [employer] never dropped     off a portable tank. He is also interested in getting a portable oxygen     concentrator and this information was given to the clinical coordinator.     Portable oxygen concentrator would allow the patient to be more mobile outside     his home.             I personally reviewed Review of Systems, family, social, surgical and medical     history and agree with their findings.            ROS      Constitutional:  Denies: Fatigue, Fever, Weight gain, Weight loss, Chills,     Insomnia, Other      Respiratory/Breathing:  Complains of: Shortness of air, Wheezing, Cough; Denies:    Hemoptysis, Pleuritic pain, Other      Endocrine:  Complains of: Diabetes; Denies: Polydipsia, Polyuria, Heat/cold     intolerance, Other      Eyes:  Denies: Blurred vision, Vision Changes, Other      Ears, nose, mouth, throat:  Denies: Congestion, Dysphagia, Hearing Changes, Nose    Bleeding, Nasal Discharge, Throat pain, Tinnitus, Other      Cardiovascular:  Complains of: Exertional dyspnea; Denies: Chest Pain,     Peripheral Edema, Palpitations, Syncope, Wake up Gasping for air, Orthopnea, Ta    chycardia, Other      Gastrointestinal:  Denies: Abdominal pain/cramping, Bloody stools, Constipation,    Diarrhea, Melena, Nausea, Vomiting, Other      Genitourinary:  Denies: Dysuria, Urinary frequency, Incontinence, Hematuria,     Urgency, Other      Musculoskeletal:  Complains of: Joint Pain; Denies: Joint Stiffness, Joint     Swelling, Myalgias, Other      Hematologic/lymphatic:  DENIES: Lymphadenopathy, Bruising, Bleeding tendencies,     Other      Neurologic:  Denies: Headache, Numbness, Weakness, Seizures, Other      Psychiatric:  Denies: Anxiety,  "Appropriate Effect, Depression, Other      Sleep:  No: Excessive daytime sleep, Morning Headache?, Snoring, Insomnia?, Stop    breathing at sleep?, Other      Integumentary:  Denies: Rash, Dry skin, Skin Warm to Touch, Other            FAMILY/SOCIAL/MEDICAL HX      Surgical History:  Yes: CABG (times 3 on 9-9-19, nucul, angiagramar dress test),    Head Surgery (SINUS SURGERY 6 YEARS AGO and lipoma removal left side of neck),     Orthopedic Surgery (\"PLATE IN NECK\" - ROUGHLY 7 YEARS AGO), Other Surgeries     (heart surgery ); No: AAA Repair, Abdominal Surgery, Adenoids, Angioplasty,     Appendectomy, Back Surgery, Bladder Surgery, Bowel Surgery, Breast Surgery,     Carotid Stenosis, Cholecystectomy, Ear Surgery, Eye Surgery, Hernia Surgery,     Kidney Surgery, Nose Surgery, Oral Surgery, Prostatectomy, Rectal Surgery,     Spinal Surgery, Testicular Surgery, Throat Surgery, Tonsils, Valve Replacement,     Vascular Surgery      Heart - Family Hx:  Brother      Diabetes - Family Hx:  Sister      Social History:  Tobacco Use      Smoking status:  Current every day smoker ( (x58 years 2 PPD, quit 9-1-19     started back 11/01/19, 5 cigs a day ))      Anticoagulation Therapy:  No      Antibiotic Prophylaxis:  No      Medical History:  Yes: Arthritis, Chemotherapy/Cancer (skin cancer left arm ),     Chronic Bronchitis/COPD, Congestive Heart Failu, Deafness or Ringing Ears,     Diabetes (Type 11 for 20 years), Heart Attack (2019 3 vessel CABG, [facility]),     Hemorrhoids/Rectal Prob (GERD), Shortness Of Breath; No: Anemia, Asthma, Blood     Disease, Broken Bones, Cataracts, Chemical Dependency, Emphysema, Chronic Liver     Disease, Colon Trouble, Colitis, Diverticulitis, Depression, Anxiety, Bipolar     Disorder, PTSD, Epilepsy, Seizures, Glaucoma, Gall Stones, Gout, Head Injury,     Heart Murmur, GERD, Hepatitis, Hiatal Hernia, High Blood Pressure, HIV (Do not     ask - volu, Kidney or Bladder Disease, Kidney Stones, " Migrane Headaches, Mitral     Valve Prolapse, Psychiatric Care, Reflux Disease, Rheumatic Fever, Sexually     Transmitted Dis, Sinus Trouble, Skin Disease/Psoriais/Ecz, Stroke, Thyroid     Problem, Tuberculosis or Pos TB Te, Miscellaneous Medical/oth (lupus)      Psychiatric History      None            PREVENTION      Hx Influenza Vaccination:  Yes      Date Influenza Vaccine Given:  Oct 1, 2020      Influenza Vaccine Declined:  No      2 or More Falls in Past Year?:  No      Fall Past Year with Injury?:  No      Hx Pneumococcal Vaccination:  Yes      Encouraged to follow-up with:  PCP regarding preventative exams.      Chart initiated by      Gisele Banks MA            ALLERGIES/MEDICATIONS      Allergies:        Coded Allergies:             LISINOPRIL (Verified  Allergy, Severe, ANGIOEDEMA, 5/13/21)                  can't breathe, tongue swelling           CODEINE (Verified  Allergy, Intermediate, 5/13/21)                  nausea           AMIODARONE (Verified  Allergy, Unknown, 5/13/21)      Medications    Last Reconciled on 5/13/21 10:30 by ROBI HANSON      Benzonatate (Benzonatate) 100 Mg Capsule      100 MG PO TID for 90 Days, #270 CAP 0 Refills         Prov: Ankur Alcantara         4/21/21       Umeclidinium Bromide (Incruse Ellipta) 62.5 Mcg Blst.w.dev      1 PUFF INH RTQDAY for 90 Days, #3 MDI 1 Refill         Prov: Ankur Alcantara         4/16/21       Spironolactone (Aldactone) 25 Mg Tablet      25 MG PO QDAY, #30 TAB 0 Refills         Reported         10/27/20       Carvedilol (Carvedilol) 6.25 Mg Tablet      6.25 MG PO BID, #60 TAB 0 Refills         Reported         10/27/20       MDI-Albuterol (Ventolin HFA) 8 Gm Hfa.aer.ad      2 PUFFS INH RTTID for 90 Days, #1 MDI 3 Refills         Prov: Ankur Alcantara         7/22/20       P-Ephed HCl/Fexofenadine HCl 120/60 MG (Allegra-D 12 Hour) 1 Each Tab.er.12h      1 TAB PO Q12HR, #60 TAB.SR.12H 0 Refills         Prov: Ankur Alcantara         7/22/20       MDI-Advair  250/50 (Advair 250/50 Diskus) 1 Each Blst.w.dev      1 PUFF INH RTBID for 90 Days, #3 INH 2 Refills         Prov: Ankur Alcantara         7/22/20       Sacubitril/Valsartan (Entresto 24/26 mg) 1 Each Tablet      1 TAB PO QDAY, #60 TAB 0 Refills         Reported         2/4/20       metOLazone (metOLazone) 2.5 Mg Tablet      2.5 MG PO QDAY, TAB         Reported         2/4/20       Bumetanide (BUMETANIDE) 0.25 Mg/1 Ml Vial      2 MG IV QDAY, #30 VIAL         Reported         2/4/20       Levothyroxine (Levothyroxine) 0.075 Mg Tablet      50 MCG PO QDAY@07, #30 TAB 0 Refills         Reported         2/4/20       Sennosides (Senna) 8.8 Mg/5 Ml Syrup      PO BID PRN for prn, ML         Reported         2/4/20       Polyethylene Glycol (Miralax*) 17 Gm Packet      1 CAP PO prn PRN for CONSTIPATION, #30 PKT 0 Refills         Reported         2/4/20       Famotidine (Famotidine) 10 Mg Tablet      20 MG PO BID for 30 Days, #120 TAB         Reported         2/4/20       Sertraline HCl (Sertraline*) 25 Mg Tablet      25 MG PO QDAY, TAB         Reported         2/4/20       glipiZIDE (glipiZIDE) 5 Mg Tablet      5 MG PO QDAY, #30 TAB 0 Refills         Reported         2/4/20       Bumetanide (BUMETANIDE) 2 Mg Tablet      2 MG PO BID, #60 TAB         Reported         12/10/19       Carvedilol (Carvedilol) 3.125 Mg Tablet      3.125 MG PO QDAY, #30 TAB 0 Refills         Reported         12/10/19       Atorvastatin Calcium (Lipitor*) 40 Mg Tablet      40 MG PO QDAY, #30 TAB 0 Refills         Reported         12/10/19       guaiFENesin-PSE 1,200-120 Mg (Mucinex D ER 1,200-120 Mg) 1 Each Tab.er.12h      1 TAB PO BID, TAB.SR.12H         Reported         10/23/19       Folic Acid (Folic Acid) 1 Mg Tablet      1 MG PO QDAY         Reported         8/8/19       Primidone (Mysoline*) 250 Mg Tablet      250 MG PO BID         Reported         8/8/19      Current Medications      Current Medications Reviewed 5/13/21            EXAM      Vital  Signs Reviewed      Gen: WDWN, Alert, NAD.        HEENT:  PERRL, EOMI.  OP, nares clear, no sinus tenderness.      Neck:  Supple, no JVD, no thyromegaly.      Lymph: No axillary, cervical, supraclavicular lymphadenopathy noted bilaterally.      Chest:  Bilateral diminished breath sounds, no wheezing, crackles or rhonchi,     normal work of breathing noted, patient able to speak full sentences without     difficulty.        CV:  Irregular rate and rhythm, no MGR, pulses 2+, equal.      Abd:  Soft, NT, ND, + BS, no HSM.      EXT:  No clubbing, no cyanosis, no edema, no joint tenderness.       Neuro:  A  Skin: No rashes or lesions.      Vitals      Vitals:             Height 5 ft 6.00 in / 167.64 cm           Weight 149 lbs  / 67.352648 kg           BSA 1.76 m2           BMI 24.0 kg/m2           Temperature 97.1 F / 36.17 C - Temporal           Pulse 75           Respirations 16           Blood Pressure 72/48 Sitting, Left Arm           Pulse Oximetry 96%, room air           Comment: Recheckd B/P in Left arm 69/42            Rechecked B/P in Right arm       71/48            REVIEW      Results Reviewed      PCCS Results Reviewed?:  Yes Prev Lab Results, Yes Prev Radiology Results, Yes     Previous [facility] Records      Lab Results      I personally reviewed the patient's labs from 2021.      PFT Results               [facility]                PACS RADIOLOGY REPORT            Patient: VANDANA PEÑA   Acct: #[id number]   Report: #[id number]            UNIT #: [id number]    DOS: 21 1006      INSURANCE:MEDICARE PART A   LOCATION:CT     : 1950            PROVIDERS      ADMITTING:     ATTENDING: Ankur Alcantara      FAMILY:  Ozzy Farmer   ORDERING:  Ankur Alcantara         OTHER:    DICTATING:  Forest Herrera MD            REQ #:[id number]   EXAM:WO - CT CHEST without CONTRAST      REASON FOR EXAM:  COPD/PNEUMONIA      REASON FOR VISIT:   COPD/PNEUMONIA            *******Signed******         PROCEDURE:   CT CHEST WITHOUT CONTRAST             COMPARISON:   [facility], CT, CT LOW DOSE CHEST SCREENING,     9/30/2019, 16:47.  [facility], CT, CT LOW DOSE CHEST SCREENING, 11/04/2020, 13:32.             INDICATIONS:   COPD/PNEUMONIA             TECHNIQUE:   CT images were created without the administration of contrast     material.               PROTOCOL:     Standard imaging protocol performed                RADIATION:     DLP: 396mGy*cm          Automated exposure control was utilized to minimize radiation dose.              FINDINGS:         Mediastinal lymph nodes in the AP window, lower right paratracheal and     precarinal compartments       measure up to 1.0 cm in short axis.  These appear stable since 11/4/2020.  No     hilar or axillary       adenopathy is seen.  A trace left pleural effusion is noted.  No pericardial or     right pleural       effusion is noted.  Severe coronary artery atherosclerotic calcification is     noted.             A 0.2 cm nodule in the right upper lobe on image 23 is stable since 11/4/2020.      Irregular densities       in the lung bases likely represent scarring or atelectasis.  Densities p    reviously noted in the       right upper lobe have resolved.  Mild to moderate emphysematous changes are     noted.             Rounded densities are noted in the trachea and left mainstem bronchus likely     representing mucus.             A 1.1 cm cyst is noted in the mid to upper right kidney.  No focal osseous     lesion is seen.             CONCLUSION:         1. Mild to moderate emphysematous changes      2. Stable chronic mediastinal adenopathy      3. Trace left pleural effusion      4. Interval resolution of irregular density previously noted in the right upper     lobe              Forest Herrera M.D.             Electronically Signed and Approved By: Forest Herrera M.D. on 4/28/2021 at  12:12                                  Until signed, this is an unconfirmed preliminary report that may contain      errors and is subject to change.                                              WURRO:      D:04/28/21 1212            Assessment      Nicotine dependence with current use - F17.200            Nocturnal hypoxia - G47.34            Severe chronic obstructive pulmonary disease - J44.9            Severe systolic congestive heart failure - I50.20            CAD (coronary artery disease) - I25.10            Allergic rhinitis - J30.9            Chronic dyspnea - R06.09            Seasonal allergies - J30.2            Notes      New Diagnostics      * LDCT for lung ca screening, 9 Months         Dx: Nicotine dependence with current use - F17.200      IMPRESSION:      1. Severe chronic obstructive pulmonary disease.       2. Severe coronary artery disease.       3. Severe systolic heart failure with ejection fraction of 15-20% followed by     cardiology.       4. History of coronary artery bypass graft in 2019.       5. Ongoing tobacco abuse of cigarettes.       6. Hypotension.       7. Seasonal allergies.       8. Allergic rhinitis.       9. Up to date on flu and pneumonia vaccines.       10. Nocturnal hypoxia.              PLAN:      1. Due to the patient being hypotensive and symptomatic, we will call ambulance     to have the patient taken to the ER. The patient states he does not feel like he    is able to drive himself due to how bad he is feeling.       2. Continue Incruse and advair as prescribed as well as albuterol as needed. He     does not need any refills today.       3. The patient would like a portable oxygen concentrator. He qualified for     oxygen in October 2020. I will notify our clinical coordinator to have this set     up for the patient and for the patient to have oxygen tanks at home.       4. The patient will be due for another low dose lung cancer screening CT scan of    the chest in  April 2022, order placed today.       5. Smoking cessation counseling provided.  I spent 3 minutes today counseling     the patient on risks of smoking, including throat cancer, lung cancer, COPD,     heart disease and death. I also discussed the benefits of quitting.   The     patient is going to try to decrease the number of cigarettes on his own, he does    not want any pharmacological help at this time.       6. Continue nighttime oxygen.       7. Follow up in 3-4 months, sooner if needed.            Patient Education      Tobacco Cessation Counseling:  for 3 - 10 minutes      Patient Education Provided:  COPD, Smoking Cessation            LDCT      Assessment      Nicotine dependence, cigarettes, uncomplicated   F17.210            Plan      LDCT Orders:   to discuss lung ca screen, LDCT for lung ca screeing            Determine need to perform LDCT      Determine need to perform LDCT:  Pt between ages of 55-77 years old, Absence of     sign/symptoms of lung ca, Current Smoker (F17.210)      Smoking history:  25-50 pack years            CT History      Pt has not had a reg CT  last 12 mo            Time Spent/Education      LDCT Education      LDCT Patient Education            * Patient was presented with the benefits and harms of screening to include:         The possible need for follow-up diagnostic testing         Over Diagnosis         False Positive Rate         Total Radiation Exposure            * Counseled on the importance of:         Adherence to annual lung cancer LDCT screening         Impact of co-morbidities         The ability or willingness to undergo diagnosis of treatment               * Counseled on the importance of cigarette cessation.      * Counseled on the importance of maintaining cigarette smoking abstinence.      * Handout provided regarding tobacco cessation interventions.      * Order for lung cancer screening with LDCT was given to the patient.            Electronically  signed by ROBI HANSON  05/18/2021 16:23       Disclaimer: Converted document may not contain table formatting or lab diagrams. Please see [employer] [facility] for the authenticated document.

## 2021-05-28 NOTE — PROGRESS NOTES
Patient: VANDANA PEÑA     Acct: [id number]     Report: #[id number]  UNIT #: [id number]     : 1950    Encounter Date:12/10/2019  PRIMARY CARE: Ozzy Farmer  ***Signed***  --------------------------------------------------------------------------------------------------------------------  Chief Complaint      Encounter Date      Dec 10, 2019            Primary Care Provider      Ozzy Farmer            Referring Provider            [facility]            Patient Complaint      Patient is complaining of      F/U COPD            VITALS      Height 5 ft 6 in / 167.64 cm      Weight 152 lbs 0 oz / 68.117646 kg      BSA 1.78 m2      BMI 24.5 kg/m2      Temperature 98.4 F / 36.89 C - Oral      Pulse 74      Respirations 16      Blood Pressure 114/70 Sitting, Right Arm      Pulse Oximetry 97%, room air            HPI      The patient is a 69 year old male with a history of COPD, severe coronary artery    disease, status post three vessel coronary artery bypass graft at [facility] several months ago, severe systolic heart failure with EF of 15-20%,     followed by [facility] and now on [facility]. He has physical     deconditioning. He recently had a pleural effusion which was tapped as well.  He    is here for follow up. Since his last office visit, he has been on cardiac rehab    and is doing okay overall. He is planned to have defibrillator placed by Dr. Tidwell. He has some shortness of breath with activities He had pulmonary function     test and six minute walk test which was reviewed with him today.  He no cough or    phlegm, but has some chest discomfort, more on the left side.  He did feel     significantly better with breathing after he had thoracentesis last office     visit.            ROS      Constitutional:  Complains of: Fatigue; Denies: Fever, Weight gain, Weight loss,    Chills, Insomnia, Other      Respiratory/Breathing:  Denies: Shortness of air, Wheezing, Cough,  "Hemoptysis,     Pleuritic pain, Other      Endocrine:  Denies: Polydipsia, Polyuria, Heat/cold intolerance, Diabetes, Other      Eyes:  Denies: Blurred vision, Vision Changes, Other      Ears, nose, mouth, throat:  Denies: Congestion, Dysphagia, Hearing Changes, Nose    Bleeding, Nasal Discharge, Throat pain, Tinnitus, Other      Cardiovascular:  Denies: Chest Pain, Exertional dyspnea, Peripheral Edema,     Palpitations, Syncope, Wake up Gasping for air, Orthopnea, Tachycardia, Other      Gastrointestinal:  Denies: Abdominal pain/cramping, Bloody stools, Constipation,    Diarrhea, Melena, Nausea, Vomiting, Other      Genitourinary:  Denies: Dysuria, Urinary frequency, Incontinence, Hematuria,     Urgency, Other      Musculoskeletal:  Denies: Joint Pain, Joint Stiffness, Joint Swelling, Myalgias,    Other      Hematologic/lymphatic:  DENIES: Lymphadenopathy, Bruising, Bleeding tendencies,     Other      Neurologic:  Denies: Headache, Numbness, Weakness, Seizures, Other      Psychiatric:  Denies: Anxiety, Appropriate Effect, Depression, Other      Sleep:  No: Excessive daytime sleep, Morning Headache?, Snoring, Insomnia?, Stop    breathing at sleep?, Other      Integumentary:  Denies: Rash, Dry skin, Skin Warm to Touch, Other            FAMILY/SOCIAL/MEDICAL HX      Surgical History:  Yes: CABG (times 3 on 9-9-19), Head Surgery (SINUS SURGERY 6     YEARS AGO and cricopnaryngcalmyoyomy 2010), Orthopedic Surgery (\"PLATE IN NECK\"     - ROUGHLY 7 YEARS AGO), Other Surgeries (heart surgery ); No: Abdominal Surgery,    Appendectomy, Bladder Surgery, Bowel Surgery, Cholecystectomy, Oral Surgery,     Vascular Surgery      Heart - Family Hx:  Brother      Diabetes - Family Hx:  Sister      Is Father Still Living?:  No      Is Mother Still Living?:  No       Family History:  Yes      Social History:  Tobacco Use; No Alcohol Use, No Recreational Drug use      Smoking status:  Current every day smoker (x58 years 2 PPD, quit " 9-1-19 started     back 11/01/19, 7 cigs a day )      Anticoagulation Therapy:  No      Antibiotic Prophylaxis:  No      Medical History:  Yes: Arthritis, Chemotherapy/Cancer (skin cancer left arm ),     Chronic Bronchitis/COPD, Congestive Heart Failu, Deafness or Ringing Ears,     Diabetes (Type 11 for 20 years), Heart Attack (2019 3 vessel CABG, [facility]),     Hemorrhoids/Rectal Prob (GERD), High Blood Pressure (ON MEDICATIONS ), Shortness    Of Breath; No: Alcoholism, Allergies, Anemia, Asthma, Atrial Fibrillation, Blood    Disease, Broken Bones, Cataracts, Chemical Dependency, Emphysema, Chronic Liver     Disease, Colon Trouble, Colitis, Diverticulitis, Convulsions, Depression,     Anxiety, Bipolar Disorder, PTSD, Epilepsy, Seizures, Forgetfullness, Glaucoma,     Gall Stones, Gout, Head Injury, Heart Murmur, GERD, Hepatitis, Hiatal Hernia,     High Cholesterol, HIV (Do not ask - volu, Jaundice, Kidney or Bladder Disease, K    idney Stones, Migrane Headaches, Mitral Valve Prolapse, Night sweats, Phlebitis,    Psychiatric Care, Reflux Disease, Rheumatic Fever, Sexually Transmitted Dis,     Sinus Trouble, Skin Disease/Psoriais/Ecz, Stroke, Thyroid Problem, Tuberculosis     or Pos TB Te, Miscellaneous Medical/oth      Psychiatric History      NONE            PREVENTION      Hx Influenza Vaccination:  Yes      Date Influenza Vaccine Given:  Sep 1, 2019      Influenza Vaccine Declined:  No      2 or More Falls Past Year?:  No      Fall Past Year with Injury?:  No      Hx Pneumococcal Vaccination:  Yes      Encouraged to follow-up with:  PCP regarding preventative exams.      Chart initiated by      JAKE HORTON/ MA            ALLERGIES/MEDICATIONS      Allergies:        Coded Allergies:             LISINOPRIL (Verified  Allergy, Severe, ANGIOEDEMA, 12/10/19)           CODEINE (Verified  Allergy, Mild, 12/10/19)      Medications    Last Reconciled on 12/10/19 13:47 by HANG LEBRON MD      Bumetanide (BUMETANIDE) 2  Mg Tablet      2 MG PO BID, #60 TAB         Reported         12/10/19       Carvedilol (Carvedilol) 3.125 Mg Tablet      3.125 MG PO QDAY, #30 TAB 0 Refills         Reported         12/10/19       Cetirizine Hcl (ZyrTEC) 5 Mg Tablet      10 MG PO QDAY for 30 Days, #60 TAB         Reported         12/10/19       Atorvastatin Calcium (Lipitor*) 40 Mg Tablet      40 MG PO QDAY, #30 TAB 0 Refills         Reported         12/10/19       guaiFENesin/PSE HCl (Mucinex D ER 1,200-120 MG TAB) 1 Each Tab.er.12h      1 TAB PO BID, TAB.SR.12H         Reported         10/23/19       Patiromer Calcium Sorbitex (VELTASSA) 8.4 Gm Powd.pack      8.4 GM PO QDAY for 30 Days, #30 PACKET         Reported         10/23/19       Amiodarone Hcl (Amiodarone) 200 Mg Tablet      200 MG PO Q12H, #60 TAB         Reported         9/19/19       Folic Acid (Folic Acid) 1 Mg Tablet      1 MG PO QDAY         Reported         8/8/19       Pantoprazole (Protonix*) 40 Mg Tablet.dr      40 MG PO QDAY         Reported         8/8/19       Primidone (Mysoline*) 250 Mg Tablet      250 MG PO BID         Reported         8/8/19      Current Medications      Current Medications Reviewed 12/10/19            EXAM      CONSTITUTIONAL: Pleasant male in no acute distress, normal conversant, pale     looking.        EYES : Pink conjunctive, no ptosis, PERRL.       ENMT : Nose and ears appear normal, normal dentition, mild posterior pharyngeal     wall erythema, no sinus tenderness. Mallampati classification 2.        Neck: Nontender, no masses, no thyromegaly, no nodules.      Resp : Bilateral diminished breath sounds, has some breath sounds in the left     mid and lower chest, dull to percussion left mid and lower chest.        CVS  : No carotid bruits, s1s2 nl, RRR, no murmur, rubs or gallop, no peripheral    edema       Chest wall: Normal rise with inspiration, nontender on palpation.      GI   : Abdomen soft, with no masses, no hepatosplenomegaly, no hernias,  BS+      MSK  : Normal gait and station, no digital cyanosis or clubbing       Skin : No rashes, ulcerations or lesions, normal turgor and temperature      Neuro: CN II - XII intact, no sensory deficits, DTRs intact and symmetrical, no     motor weakness      Psych: Appropriate affect, A   Vitals      Vitals:             Height 5 ft 6 in / 167.64 cm           Weight 152 lbs 0 oz / 68.275143 kg           BSA 1.78 m2           BMI 24.5 kg/m2           Temperature 98.4 F / 36.89 C - Oral           Pulse 74           Respirations 16           Blood Pressure 114/70 Sitting, Right Arm           Pulse Oximetry 97%, room air            REVIEW      Results Reviewed      PCCS Results Reviewed?:  Yes Prev Lab Results, Yes Prev Radiology Results, Yes     Previous Mecial Records      Lab Results      Patient: PEÑACEDRIC CAPPS VARINDER   Acct: #[id number]   Report: #[id number]            UNIT #: [id number]    DOS: 19      LOCATION:[facility]  106   : 1950            PROVIDERS      ADMITTING:  Ozzy Farmer   FAMILY:  Ozzy Farmer         OTHER:       DICTATING:  KVNG CONTRERAS MD            REASON FOR VISIT:  ACUTE RESP FAILURE,CHF, POSSIBLE PNEUMONIA            *******Signed******                                    [facility]                          Health Information Management Services                            [place], [place]  [address]               __________________________________________________________________________             Patient Name:                   Attending Physician:      Cedric Peña M.D.             Patient Visit # MR #            Admit Date  Disch Date     Location      [id number]    [id number]      2019    [facility] 106             Date of Birth      1950      __________________________________________________________________________      1061 - CARDIAC CATHETERIZATION             DATE OF PROCEDURE:      2019.              INDICATION:      1.  Non-ST elevation myocardial infarction.      2.  Congestive heart failure.             PROCEDURE PERFORMED:      1. Left heart catheterization.      2. Coronary angiography.      3. No left ventriculogram was performed due to elevated creatinine level.      4. Right heart catheterization.      5. Calculation of the cardiac output indexed by the Lupillo method.             TECHNIQUE AND PROCEDURE:      After the risks, benefits and alternatives were explained, the patient signed      a written informed consent. The patient was brought to the catheterization      lab in a fasting state and then was prepped and draped in sterile fashion.      An area overlying the right femoral artery was anesthetizing with 1%      lidocaine and a 4-Cymro sheath was placed.  Then the right femoral vein was      anesthetized with 1% lidocaine and an 8-Cymro sheath was used to cannulate      the vein. Following that a PA catheter was advanced into the right atrium,      right ventricle, pulmonary artery, and it was attempted to go in the wedge      position but it was difficult to get the balloon catheter to sit there and      therefore wedge was not recorded.  The patient did have a saturation taken      from the PA as well as the right atrium and then the catheter was removed.      Following that the JL-4 catheter was advanced up into the left main and three      cineangiographic views were obtained due to distal left main disease.  After      that the catheter was removed.  The JR-4 catheter was used to engage the      right coronary artery and multiple cineangiographic views were obtained.      Following that the patient tolerated the procedure well.  The sheaths were      pulled and manual pressure was held.             RESULTS:      A)  CORONARY ANGIOGRAPHY:      1. The left main bifurcated into the LAD and the circumflex.  There was          significant distal left main disease, hazy in appearance,  extending the          majority into the LAD but also some into the circumflex artery too as          well.      2. The left anterior descending (LAD) had a proximal tight stenosis of over          99%.  The midportion of the LAD had mild to moderate disease of 30-40%          followed by just mild disease in the distal portion.      3. The circumflex artery had proximal disease of about 70%, hazy in          appearance. There was also a mid area of stenosis of about 75% just          before the bifurcation of an obtuse marginal branch.      4. The right coronary artery was dominant for posterior circulation. There          was some proximal disease of about 30-40% severity, otherwise just mild          disease in the RCA. The PDA had an area of disease in the proximal          portion that was hazy in appearance but appeared to only be 50% severity.             B)  LEFT VENTRICULOGRAPHY:  Not performed due to the patient's elevated          creatinine.             C)  HEMODYNAMICS: Left ventricular pressure was 97/1 with LVEDP of 25.  The          aortic pressure was 92/75.  The mean right atrial pressure was 6.  The          right ventricular pressure was 53/3.  The PA pressure was 54/25.  The          cardiac output was 5.4 L/min and the cardiac index was 2.88 L/min/m2.             IMPRESSION:      1. Severe distal left main and proximal left anterior descending disease.      2. Moderate pulmonary hypertension.      3. Mildly elevated left ventricular and diastolic filling pressures.             PLAN:      1.  The patient's coronary anatomy is not suitable for percutaneous          intervention.  The only option would be bypass surgery.  The patient,          however, will be high risk given his severely reduced LV systolic          function, dilated cardiomyopathy, and COPD issues.  Discussed the case          with Dr. Mazariegos at [facility] surgery, who agreed to transfer the          patient up for further  evaluation as a possible candidate for bypass          surgery.      2.  Continue with medical management for CAD and CHF.  Due to the patient's          low blood pressure issues he has not been able to tolerate much          pharmacologic treatment for CHF.             To be electronically signed in [employer]      [id number] KVNG CONTRERAS M.D.             JH:rt      D:  2019 11:32      T:  2019 12:54      #[id number]                   Until signed, this is an unconfirmed preliminary report that may contain      errors and is subject to change.                     <Electronically signed by KVNG CONTRERAS MD>                19 1557               KVNG CONTRERAS MDJ4:RJT      D:19 1132      Radiographic Results               [facility]                PACS RADIOLOGY REPORT            Patient: VANDANA PEÑA   Acct: #[id number]   Report: #[id number]            UNIT #: [id number]    DOS: 19 1152      INSURANCE:MEDICARE PART A   LOCATION:[address] [place] [address]   : 1950            PROVIDERS      ADMITTING:     ATTENDING: Ankur Alcantara      FAMILY:  Ozzy Farmer   ORDERING:  Ankur Alcantraa         OTHER:    DICTATING:  Forest Herrera MD            REQ #:[id number]   EXAM:CXR1 - CHEST 1 View AP PA      REASON FOR EXAM:  POST THORACENTESIS      REASON FOR VISIT:  PLEURAL EFFUSION            *******Signed******         PROCEDURE:   CHEST AP/PA SINGLE VIEW             COMPARISON:   [facility], , CHEST PA/AP   12/10/2019, 14:38.             INDICATIONS:   POST THORACENTESIS             FINDINGS:         A trace left pleural effusion is noted, improved in the interval.  Minimal left     basilar airspace       opacity is noted, improved in the interval.  The lungs are otherwise clear     bilaterally.  The       cardiac and mediastinal silhouettes appear normal.  A CABG  has been performed.      No pneumothorax is       seen             CONCLUSION:         1. Trace left pleural effusion, improved      2. Minimal left basilar atelectasis consistent with atelectasis plus or minus     superimposed       pneumonia, improved              Forest Herrera M.D.             Electronically Signed and Approved By: Forest Herrera M.D. on 2019 at 12:14                           Until signed, this is an unconfirmed preliminary report that may contain      errors and is subject to change.                                              WURRO:      D:19 1214      PFT Results      Patient: PEÑACEDRIC REEDER   Acct: #[id number]   Report: #[id number]            UNIT #: [id number]    DOS:       LOCATION:[facility]     : 1950            PROVIDERS      ADMITTING:     FAMILY:  Ozzy Farmer         OTHER:       DICTATING:  Ankur Alcantara MD            REASON FOR VISIT:  COPD            *******Signed******                                    [facility]                          Health Information Management Services                            [place], [place]  [address]               __________________________________________________________________________             Patient Name:                   Attending Physician:      Cedric Peña PA-C             Patient Visit # MR #            Admit Date  Disch Date     Location      [id number]    [id number]      2019                 [facility]- -             Date of Birth      1950      __________________________________________________________________________      821 - DIAGNOSTIC REPORT             PULMONARY FUNCTION TEST             SPIROMETRY:      Spirometry shows severe obstructive defect.      FEV1/FVC is 53.      FEV1 is 0.98 L, 35% of predicted.      FVC is 1.85 L, 48% of predicted.             BRONCHODILATOR RESPONSE:      There is a significant response to bronchodilator  administration.      FEV1 increased from 0.98 L to 1.23 L, 26% of predicted.      FVC was 1.85 L to 2.08 L, 12% change.             LUNG VOLUMES:      Lung volumes show restrictive defect, as well.      Total lung capacity is 4.17 L, 66% of predicted.      Residual volume is 2.20 L, 98% of predicted.             DIFFUSION:      Diffusion capacity is severe decreased, 30% of predicted.             FLOW VOLUME LOOP:      Flow volume loop is suggestive of severe obstructive process.             CONCLUSION:      Low FEV1/FVC with low FEV1 and FVC with low lung volumes and low diffusion      capacity. It suggests mixed obstructive and restrictive airway disease,      severe restrictive and obstructive airway disease. There is significant      bronchial reversibility suggesting underlying reactive airway disease      component, as well.  Please correlate clinically.             To be electronically signed in [employer]      [id number] ANKUR ALCANTARA M.D.             NK:catherine      D:  11/07/2019 16:49      T:  11/07/2019 18:01      #[id number]             Until signed, this is an unconfirmed preliminary report that may contain      errors and is subject to change.                   Until signed, this is an unconfirmed preliminary report that may contain      errors and is subject to change.                     <Electronically signed by Ankur Alcantara MD>                11/12/19 0829               Ankur Alcantara MD:JTJ      D:11/07/19 1649            Assessment      Chronic respiratory failure with hypoxia and hypercapnia - J96.11, J96.12            Pleural effusion - J90            Notes      New Medications      * Atorvastatin Calcium (Lipitor*) 40 MG TABLET: 40 MG PO QDAY #30      * CETIRIZINE HCL (zyrTEC) 5 MG TABLET: 10 MG PO QDAY 30 Days #60      * Carvedilol 3.125 MG TABLET: 3.125 MG PO QDAY #30      * BUMETANIDE 2 MG TABLET: 2 MG PO BID #60      *  Fluticasone/Salmeterol 230/21 (Advair /21 MCG) 12 GM HFA.AER.AD: 2 PUFF      INH RTBID #1      * VARENICLINE TARTRATE (Chantix) 1 MG TABLET: 1 MG PO BID 30 Days #60         Instructions: FOR SMOKING CESSATION      * TIOTROPIUM BROMIDE (Spiriva Respimat 2.5 mcg/Puff) 4 GM MIST.INHAL          Sample - Qty 2      Discontinued Medications      * Sertraline HCl (Sertraline*) 25 MG TABLET: 25 MG PO QDAY 90 Days #90      * Spironolactone (Spironolactone*) 25 MG TABLET: 25 MG PO QDAY 90 Days #90      * Albuterol/Ipratropium (Duoneb) 3 ML AMPUL.NEB: 3 ML INH RTQ6H 30 Days #120         Instructions: DIAGNOSIS CODE REQUIRED PRIOR TO PRESCRIBING.      * NEBULIZER/COMPRESSOR (Nebulizer) 1 EACH EACH: EACH XX ONCE #1         Instructions: Use as directed to administer nebulized medications.      * ALBUTEROL (Proair HFA) 8.5 GM INH: 2 PUFFS INH RTQ4H PRN SHORTNESS OF BREATH       #1      * Budesonide/Formoterol Fumarate (Symbicort 160/4.5 Mcg) 10.2 GM INH: 2 PUFF INH      BID #1      * TIOTROPIUM BROMIDE (Spiriva Respimat 2.5 mcg/Puff) 4 GM MIST.INHAL: 2 PUFFS       INH QDAY #1      * Famotidine 20 MG TABLET: 20 MG PO BID 30 Days #60      * glipiZIDE 5 MG TABLET: 5 MG PO QDAY 30 Days #30      New Diagnostics      * Overnight Oximetry, Routine         Dx: Chronic respiratory failure with hypoxia and hypercapnia - J96.11, J96.12      * Chest 2 View, 1 DAY         Dx: Pleural effusion - J90      PLAN:  The patient is a 69 year old male with severe COPD and severe coronary     artery disease, status post CABG, severe systolic heart failure, physical     deconditioning and pleural effusion.        1. Pleural effusion.  I will check chest x-ray now.  She might need     thoracentesis again.  If he needs thoracentesis, I will have it scheduled if     needed.      2. COPD.  He has severe COPD.  I will put him on Advair 230/21 twice a day.     Continue Spiriva.  Use albuterol as needed. Continue with Diuretics per Dr. Tidwell. He  needs to quit smoking. He is currently smoking up to one half pack per     day. He is willing to try Chantix.  I will check overnight oximetry, he will     likely need oxygen with sleep.  I will send a script for Chantix, he is willing     to start it now.  Smoking cessation: Counseling was done for more than five     minutes.      3. Follow up in 3-4 months, earlier if needed.            Patient Education      Education resources provided:  Yes      Patient Education Provided:  Acute Bronchitis            Electronically signed by Ankur Alcantara  12/26/2019 20:40       Disclaimer: Converted document may not contain table formatting or lab diagrams. Please see [employer] for the authenticated document.

## 2021-05-28 NOTE — PROGRESS NOTES
Patient: VANDANA PEÑA     Acct: [id number]     Report: #[id number]  UNIT #: [id number]     : 1950    Encounter Date:10/27/2020  PRIMARY CARE: Ozzy Farmer  ***Signed***  --------------------------------------------------------------------------------------------------------------------  Chief Complaint      Encounter Date      Oct 27, 2020            Primary Care Provider      Ozzy Farmer            Referring Provider            [facility]            Patient Complaint      Patient is complaining of      PT here today for F/U, needs O2 re-cert, COPD            VITALS      Height 5 ft 6 in / 167.64 cm      Weight 152 lbs  / 68.011446 kg      BSA 1.78 m2      BMI 24.5 kg/m2      Temperature 97.6 F / 36.44 C - Temporal      Pulse 75      Respirations 15      Blood Pressure 90/60 Sitting, Left Arm      Pulse Oximetry 98%, room air            HPI      The patient is a 70 year old male, patient of Dr. Alcantara's with severe COPD and     severe coronary artery disease status post three vessel CABG at [facility], [place]    back in 2019, severe systolic heart failure with an EF of 15-20% that is     followed by cardiology at [facility].  The patient states that he continues to     smoke five cigarettes a day.  The patient states that he does have patches and     lozenges at home that he can use.  The patient states that he does get short of     breath that is worse with exertion, moderate in severity and improved with rest.     The patient states that he is taking Advair everyday as prescribed and uses     Ventolin inhaler as needed.  The patient states he is also scheduled to have a     repeat chest CT scan on 2020.  The patient currently denies any wheezing,     fever, chills, night sweats, hemoptysis, purulent sputum production, swollen     glands in the head and neck, chest pain, chest tightness, abdominal pain,     nausea, vomiting, diarrhea.  The patient denies any headaches, myalgias, sore     throat,  changes in sense of taste and/or smell or any other coronavirus or flu-    like symptoms.  The patient denies any leg swelling, paroxysmal nocturnal     dyspnea or orthopnea. The patient states that he is on 2 liters of oxygen via     nasal cannula and has needed recertification to keep his oxygen. The patient     states he is able to perform his ADLs.  The patient however has been on Incruse.                 I have personally reviewed the review of systems, past family, social, surgical     and medical histories and I agree with those as entered in the chart.      Copies To:   Ankur Alcantara      Constitutional:  Denies: Fatigue, Fever, Weight gain, Weight loss, Chills,     Insomnia, Other      Respiratory/Breathing:  Complains of: Shortness of air, Cough; Denies: Wheezing,    Hemoptysis, Pleuritic pain, Other      Endocrine:  Denies: Polydipsia, Polyuria, Heat/cold intolerance, Diabetes, Other      Eyes:  Denies: Blurred vision, Vision Changes, Other      Ears, nose, mouth, throat:  Denies: Congestion, Dysphagia, Hearing Changes, Nose    Bleeding, Nasal Discharge, Throat pain, Tinnitus, Other      Cardiovascular:  Denies: Chest Pain, Exertional dyspnea, Peripheral Edema,     Palpitations, Syncope, Wake up Gasping for air, Orthopnea, Tachycardia, Other      Gastrointestinal:  Denies: Abdominal pain/cramping, Bloody stools, Constipation,    Diarrhea, Melena, Nausea, Vomiting, Other      Genitourinary:  Denies: Dysuria, Urinary frequency, Incontinence, Hematuria,     Urgency, Other      Musculoskeletal:  Denies: Joint Pain, Joint Stiffness, Joint Swelling, Myalgias,    Other      Hematologic/lymphatic:  DENIES: Lymphadenopathy, Bruising, Bleeding tendencies,     Other      Neurologic:  Denies: Headache, Numbness, Weakness, Seizures, Other      Psychiatric:  Denies: Anxiety, Appropriate Effect, Depression, Other      Sleep:  No: Excessive daytime sleep, Morning Headache?, Snoring, Insomnia?, Stop   "  breathing at sleep?, Other      Integumentary:  Denies: Rash, Dry skin, Skin Warm to Touch, Other            FAMILY/SOCIAL/MEDICAL HX      Surgical History:  Yes: CABG (times 3 on 9-9-19, nucul, angiagramar dress test),    Head Surgery (SINUS SURGERY 6 YEARS AGO and lipoma removal left side of neck),     Orthopedic Surgery (\"PLATE IN NECK\" - ROUGHLY 7 YEARS AGO), Other Surgeries     (heart surgery ); No: AAA Repair, Abdominal Surgery, Adenoids, Angioplasty,     Appendectomy, Back Surgery, Bladder Surgery, Bowel Surgery, Breast Surgery,     Carotid Stenosis, Cholecystectomy, Ear Surgery, Eye Surgery, Hernia Surgery,     Kidney Surgery, Nose Surgery, Oral Surgery, Prostatectomy, Rectal Surgery, Spin    al Surgery, Testicular Surgery, Throat Surgery, Tonsils, Valve Replacement,     Vascular Surgery      Heart - Family Hx:  Brother      Diabetes - Family Hx:  Sister      Social History:  Tobacco Use      Smoking status:  Current every day smoker ( (x58 years 2 PPD, quit 9-1-19     started back 11/01/19, 5 cigs a day ))      Anticoagulation Therapy:  No      Antibiotic Prophylaxis:  No      Medical History:  Yes: Arthritis, Chemotherapy/Cancer (skin cancer left arm ),     Chronic Bronchitis/COPD, Congestive Heart Failu, Deafness or Ringing Ears,     Diabetes (Type 11 for 20 years), Heart Attack (2019 3 vessel CABG, [facility]),     Hemorrhoids/Rectal Prob (GERD), Shortness Of Breath; No: Anemia, Asthma, Blood     Disease, Broken Bones, Cataracts, Chemical Dependency, Emphysema, Chronic Liver     Disease, Colon Trouble, Colitis, Diverticulitis, Depression, Anxiety, Bipolar     Disorder, PTSD, Epilepsy, Seizures, Glaucoma, Gall Stones, Gout, Head Injury,     Heart Murmur, GERD, Hepatitis, Hiatal Hernia, High Blood Pressure, HIV (Do not     ask - volu, Kidney or Bladder Disease, Kidney Stones, Migrane Headaches, Mitral     Valve Prolapse, Psychiatric Care, Reflux Disease, Rheumatic Fever, Sexually     Transmitted Dis, " Sinus Trouble, Skin Disease/Psoriais/Ecz, Stroke, Thyroid     Problem, Tuberculosis or Pos TB Te, Miscellaneous Medical/oth (lupus)      Psychiatric History      none            PREVENTION      Hx Influenza Vaccination:  Yes      Date Influenza Vaccine Given:  Oct 1, 2020      Influenza Vaccine Declined:  No      2 or More Falls in Past Year?:  No      Fall Past Year with Injury?:  No      Hx Pneumococcal Vaccination:  Yes      Encouraged to follow-up with:  PCP regarding preventative exams.      Chart initiated by      Francesca Perez CMA            ALLERGIES/MEDICATIONS      Allergies:        Coded Allergies:             LISINOPRIL (Verified  Allergy, Severe, ANGIOEDEMA, 7/29/20)                  can't breathe, tongue swelling           CODEINE (Verified  Allergy, Intermediate, 7/29/20)                  nausea           AMIODARONE (Verified  Allergy, Unknown, 7/29/20)      Medications    Last Reconciled on 10/27/20 09:47 by ALEXX OMER       Umeclidinium Bromide (Incruse Ellipta) 62.5 Mcg Blst.w.dev      1 PUFF INH RTQDAY for 30 Days, #1 MDI 3 Refills         Prov: ALEXX OMER PCCS         10/27/20       Spironolactone (Aldactone) 25 Mg Tablet      25 MG PO QDAY, #30 TAB 0 Refills         Reported         10/27/20       Carvedilol (Carvedilol) 6.25 Mg Tablet      6.25 MG PO BID, #60 TAB 0 Refills         Reported         10/27/20       MDI-Albuterol (Ventolin HFA) 8 Gm Hfa.aer.ad      2 PUFFS INH RTTID for 90 Days, #1 MDI 3 Refills         Prov: Ankur Alcantara         7/22/20       P-Ephed HCl/Fexofenadine HCl 120/60 MG (Allegra-D 12 Hour) 1 Each Tab.er.12h      1 TAB PO Q12HR, #60 TAB.SR.12H 0 Refills         Prov: Ankur Alcantara         7/22/20       MDI-Advair 250/50 (Advair 250/50 Diskus) 1 Each Blst.w.dev      1 PUFF INH RTBID for 90 Days, #3 INH 2 Refills         Prov: Ankur Alcantara         7/22/20       Sacubitril/Valsartan (Entresto 24/26 mg) 1 Each Tablet      1 TAB PO QDAY, #60 TAB 0 Refills          Reported         2/4/20       metOLazone (metOLazone) 2.5 Mg Tablet      2.5 MG PO QDAY, TAB         Reported         2/4/20       Bumetanide (BUMETANIDE) 0.25 Mg/1 Ml Vial      2 MG IV QDAY, #30 VIAL         Reported         2/4/20       Levothyroxine (Levothyroxine) 0.075 Mg Tablet      50 MCG PO QDAY@07, #30 TAB 0 Refills         Reported         2/4/20       Sennosides (Senna) 8.8 Mg/5 Ml Syrup      PO BID PRN for prn, ML         Reported         2/4/20       Polyethylene Glycol (Miralax*) 17 Gm Packet      1 CAP PO prn PRN for CONSTIPATION, #30 PKT 0 Refills         Reported         2/4/20       Famotidine (Famotidine) 10 Mg Tablet      20 MG PO BID for 30 Days, #120 TAB         Reported         2/4/20       Sertraline HCl (Sertraline*) 25 Mg Tablet      25 MG PO QDAY, TAB         Reported         2/4/20       glipiZIDE (glipiZIDE) 5 Mg Tablet      5 MG PO QDAY, #30 TAB 0 Refills         Reported         2/4/20       Bumetanide (BUMETANIDE) 2 Mg Tablet      2 MG PO BID, #60 TAB         Reported         12/10/19       Carvedilol (Carvedilol) 3.125 Mg Tablet      3.125 MG PO QDAY, #30 TAB 0 Refills         Reported         12/10/19       Atorvastatin Calcium (Lipitor*) 40 Mg Tablet      40 MG PO QDAY, #30 TAB 0 Refills         Reported         12/10/19       guaiFENesin-PSE 1,200-120 Mg (Mucinex D ER 1,200-120 Mg) 1 Each Tab.er.12h      1 TAB PO BID, TAB.SR.12H         Reported         10/23/19       Folic Acid (Folic Acid) 1 Mg Tablet      1 MG PO QDAY         Reported         8/8/19       Primidone (Mysoline*) 250 Mg Tablet      250 MG PO BID         Reported         8/8/19      Current Medications      Current Medications Reviewed 10/27/20            EXAM      Vital Signs Reviewed.      General:  WDWN, Alert, NAD.      HEENT: PERRL, EOMI.  OP, nares clear, no sinus tenderness.      Neck: Supple, no JVD, no thyromegaly.      Lymph: No axillary, cervical, supraclavicular lymphadenopathy noted bilaterally.       Chest: Bilateral diminished breath sounds, no wheezes, rales or rhonchi     appreciated, normal work of breathing noted.  The patient is able to speak full     sentences without difficulty.        CV: RRR, no MGR, pulses 2+, equal.        Abd: Soft, NT, ND, +BS, no HSM.      EXT: No clubbing, no cyanosis, no edema, no joint tenderness.        Neuro:  A  Skin: No rashes or lesions.      Vitals      Vitals:             Height 5 ft 6 in / 167.64 cm           Weight 152 lbs  / 68.233681 kg           BSA 1.78 m2           BMI 24.5 kg/m2           Temperature 97.6 F / 36.44 C - Temporal           Pulse 75           Respirations 15           Blood Pressure 90/60 Sitting, Left Arm           Pulse Oximetry 98%, room air            REVIEW      Results Reviewed      PCCS Results Reviewed?:  Yes Prev Lab Results, Yes Prev Radiology Results, Yes     Previous [facility] Records      Lab Results      I reviewed Dr. Alcantara's last office visit note.            Assessment      Notes      New Medications      * SPIRONOLACTONE (Aldactone) 25 MG TABLET: 25 MG PO QDAY #30      Renewed Medications      * UMECLIDINIUM BROMIDE (Incruse Ellipta) 62.5 MCG BLST.W.DEV: 1 PUFF INH RTQDAY       30 Days #1         Instructions: TO REPLACE SPIRIVA  2.5      Changed Medications      * Carvedilol 6.25 MG TABLET: 6.25 MG PO BID #60         Replaced 3.125 MG TABLET: 3.125 MG PO QDAY #60      Discontinued Medications      * Nicotine 21 Mg Patch 1 EACH PATCH.TD24: 21 MG TRANSDERM QDAY PRN NICOTINE       WITHDRAWAL ANXIETY 30 Days #30      * Nicotine Polacrilex (Nicotine) 2 MG LOZENGE: 2 MG BUCCAL Q4-6H #180      * Benzonatate (Tessalon Perles) 100 MG CAP: 100 MG PO TID #120      New Office Procedures      * Fluzone Vaccine High-Dose, Routine         Flu Vacc (65Yr Up)/Pf (Fluzone High-Dose Syr) 180 MCG/0.5 ML SYRINGE: 180        MICROGRAM INTRAMUSCULARLY Qty 1 SYRINGE      IMPRESSION:      1.  Severe COPD.      2.  Severe coronary artery disease.      3.  Severe systolic heart failure with an EF of 15-20% followed by cardiology at     [facility].      4. History of CABG at [facility] back in 09/2019.      5. Tobacco abuse with cigarettes, ongoing.      6. Seasonal allergies/allergic rhinitis.              PLAN:      1.  The patient to continue Advair and Spiriva everyday as prescribed and rinse     his mouth out after each use of Advair.      2.  Six minute walk test was performed in the office today and patient still     qualifies for oxygen.  The patient's SPO2 at room air was 98% and dropped down     to 88% upon ambulation, with two liters applied, it went back up to 97%.  I will    notify Amos Fernandez, Clinical Coordinator that patient still qualifies for     oxygen.      3. The patient would like to have a portable oxygen concentrator.  The patient     states that portable oxygen concentrator would help him to be able to ambulate     more and perform his ADLs a lot easier than carrying a tank.  I will notify     Amos Fernandez, Clinical Coordinator to set patient up with portable oxygen     concentrator.      4. The patient is already scheduled to have a repeat chest CT scan on     11/04/2020. The patient is advised to have this completed as scheduled.      5.  I spent three minutes today counseling the patient on smoking cessation.  I     counseled the patient on the risks of continued smoking including the risk of     lung cancer, head and neck cancer, renal cancer, heart disease, stroke, and     early death.  The patient refuses nicotine therapy and pharmacotherapy at this     time.  The patient is advised to decrease the number of cigarettes he is smoking    up to the point where he can quit.        6.  The patient to continue Allegra D for allergies as prescribed.      7. Fluzone given to patient in the office today.  The patient reported he is     up-to-date with his Pneumonia vaccines.      8.  Patient is advised to call the office, 911 or go to the ER with any  new or     worsening symptoms.      9.  The patient to continue oxygen to keep SPO2 89% and above.      10.  Follow up with Dr. Alcantara in 3-4 months, sooner if needed.            Patient Education      Tobacco Cessation Counseling:  for 3 - 10 minutes      Patient Education Provided:  COPD, Smoking Cessation      Time Spent:  > 50% /Coord Care            Electronically signed by ALEXX OMER PCCS  10/29/2020 10:28       Disclaimer: Converted document may not contain table formatting or lab diagrams. Please see [employer] for the authenticated document.

## 2021-05-28 NOTE — PROGRESS NOTES
Patient: VANDANA PEÑA     Acct: [id number]     Report: #[id number]  UNIT #: [id number]     : 1950    Encounter Date:2020  PRIMARY CARE: Ozzy Farmer  ***Signed***  --------------------------------------------------------------------------------------------------------------------  Chief Complaint      Encounter Date      2020            Primary Care Provider      Ozzy Farmer            Referring Provider            [facility]            Patient Complaint      Patient is complaining of      4 month follow up/soa            VITALS      Height 5 ft 6 in / 167.64 cm      Weight 150 lbs 6 oz / 68.444512 kg      BSA 1.77 m2      BMI 24.3 kg/m2      Temperature 98.2 F / 36.78 C - Oral      Pulse 55      Respirations 16      Blood Pressure 112/62 Sitting, Right Arm      Pulse Oximetry 96%, room air            HPI      The patient is a 69 year old male with a history of severe COPD and severe     coronary artery disease, status post three vessel CABG at [facility] in     2019, severe systolic heart failure with an EF of 15-20% followed by     cardiology at [facility].  He continues to smoke about 4-5 cigarettes a day. He has    shortness of breath with activities, no fevers, chills, nausea or vomiting.     Since his last office visit he has been on Advair 250/50 twice a day, Incruse     once daily, is on DuoNeb 2-3 times a day and albuterol for rescue which he uses     1-2 times a day. He has no significant leg swelling, fever, chills, nausea or     vomiting. He is on ProAir which he uses several times a day, but ProAir seems     like it is not going to be covered by [employer].  He had a chest x-ray at     the end of May which did not show a significant pleural effusion.  He is due for    low dose lung cancer screening CT scan in the next three months. He has a cough     and is wanting to have something for cough, also needing something for his mucus    production. He is willing to  try Mucinex DM.  No changes in weight or appetite.            ROS      Constitutional:  Complains of: Fatigue; Denies: Fever, Weight gain, Weight loss,    Chills, Insomnia, Other      Respiratory/Breathing:  Complains of: Shortness of air, Wheezing, Cough; Denies:    Hemoptysis, Pleuritic pain, Other      Endocrine:  Denies: Polydipsia, Polyuria, Heat/cold intolerance, Diabetes, Other      Eyes:  Denies: Blurred vision, Vision Changes, Other      Ears, nose, mouth, throat:  Denies: Mouth lesions, Thrush, Throat pain,     Hoarseness, Allergies/Hay Fever, Post Nasal Drip, Headaches, Recent Head Injury,    Nose Bleeding, Neck Stiffness, Thyroid Mass, Hearing Loss, Ear Fullness, Dry     Mouth, Nasal or Sinus Pain, Dry Lips, Nasal discharge, Nasal congestion, Other      Cardiovascular:  Denies: Palpitations, Syncope, Claudication, Chest Pain, Wake     up Gasping for air, Leg Swelling, Irregular Heart Rate, Cyanosis, Dyspnea on     Exertion, Other      Gastrointestinal:  Denies: Nausea, Constipation, Diarrhea, Abdominal pain,     Vomiting, Difficulty Swallowing, Reflux/Heartburn, Dysphagia, Jaundice,     Bloating, Melena, Bloody stools, Other      Genitourinary:  Denies: Urinary frequency, Incontinence, Hematuria, Urgency,     Nocturia, Dysuria, Testicular problems, Other      Musculoskeletal:  Denies: Joint Pain, Joint Stiffness, Joint Swelling, Myalgias,    Other      Hematologic/lymphatic:  DENIES: Lymphadenopathy, Bruising, Bleeding tendencies,     Other      Neurological:  Denies: Headache, Numbness, Weakness, Seizures, Other      Psychiatric:  Denies: Anxiety, Appropriate Effect, Depression, Other      Sleep:  No: Excessive daytime sleep, Morning Headache?, Snoring, Insomnia?, Stop    breathing at sleep?, Other      Integumentary:  Denies: Rash, Dry skin, Skin Warm to Touch, Other      Immunologic/Allergic:  Denies: Latex allergy, Seasonal allergies, Asthma,     Urticaria, Eczema, Other      Immunization status:   "No: Up to date            FAMILY/SOCIAL/MEDICAL HX      Surgical History:  Yes: CABG (times 3 on 9-9-19, nucul, angiagramar dress test),    Head Surgery (SINUS SURGERY 6 YEARS AGO and lipoma removal left side of neck),     Orthopedic Surgery (\"PLATE IN NECK\" - ROUGHLY 7 YEARS AGO), Other Surgeries     (heart surgery ); No: AAA Repair, Abdominal Surgery, Adenoids, Angioplasty,     Appendectomy, Back Surgery, Bladder Surgery, Bowel Surgery, Breast Surgery,     Carotid Stenosis, Cholecystectomy, Ear Surgery, Eye Surgery, Hernia Surgery, Kid    jose Surgery, Nose Surgery, Oral Surgery, Prostatectomy, Rectal Surgery, Spinal     Surgery, Testicular Surgery, Throat Surgery, Tonsils, Valve Replacement,     Vascular Surgery      Heart - Family Hx:  Brother      Diabetes - Family Hx:  Sister      Is Father Still Living?:  No      Is Mother Still Living?:  No       Family History:  Yes      Social History:  Tobacco Use; No Alcohol Use, No Recreational Drug use      Smoking status:  Current every day smoker (x58 years 2 PPD, quit 9-1-19 started     back 11/01/19, 7 cigs a day )      Anticoagulation Therapy:  No      Antibiotic Prophylaxis:  No      Medical History:  Yes: Arthritis, Chemotherapy/Cancer (skin cancer left arm ),     Chronic Bronchitis/COPD, Congestive Heart Failu, Deafness or Ringing Ears,     Diabetes (Type 11 for 20 years), Heart Attack (2019 3 vessel CABG, [facility]),     Hemorrhoids/Rectal Prob (GERD), Shortness Of Breath; No: Alcoholism, Allergies,     Anemia, Asthma, Atrial Fibrillation, Blood Disease, Broken Bones, Cataracts,     Chemical Dependency, Emphysema, Chronic Liver Disease, Colon Trouble, Colitis,     Diverticulitis, Convulsions, Depression, Anxiety, Bipolar Disorder, PTSD,     Epilepsy, Seizures, Forgetfullness, Glaucoma, Gall Stones, Gout, Head Injury,     Heart Murmur, GERD, Hepatitis, Hiatal Hernia, High Blood Pressure, High     Cholesterol, HIV (Do not ask - volu, Jaundice, Kidney or Bladder " Disease, Kidney    Stones, Migrane Headaches, Mitral Valve Prolapse, Night sweats, Phlebitis,     Psychiatric Care, Reflux Disease, Rheumatic Fever, Sexually Transmitted Dis,     Sinus Trouble, Skin Disease/Psoriais/Ecz, Stroke, Thyroid Problem, Tuberculosis     or Pos TB Te, Miscellaneous Medical/oth (lupus)      Psychiatric History      none            PREVENTION      Hx Influenza Vaccination:  Yes      Date Influenza Vaccine Given:  Sep 1, 2019      Influenza Vaccine Declined:  No      2 or More Falls in Past Year?:  No      Fall Past Year with Injury?:  No      Hx Pneumococcal Vaccination:  Yes      Encouraged to follow-up with:  PCP regarding preventative exams.      Chart initiated by      jono rogel ma            ALLERGIES/MEDICATIONS      Allergies:        Coded Allergies:             LISINOPRIL (Verified  Allergy, Severe, ANGIOEDEMA, 2/4/20)                  can't breathe, tongue swelling           CODEINE (Verified  Allergy, Intermediate, 2/4/20)                  nausea      Medications    Last Reconciled on 7/22/20 15:54 by ANKUR ALCANTARA MD MDI-Albuterol (Ventolin HFA) 8 Gm Hfa.aer.ad      2 PUFFS INH RTTID for 90 Days, #1 MDI 3 Refills         Prov: Ankur Alcantara         7/22/20       Benzonatate (Tessalon Perles) 100 Mg Cap      100 MG PO TID, #120 CAP 1 Refill         Prov: Ankur Alcantara         7/22/20       P-Ephed HCl/Fexofenadine HCl 120/60 MG (Allegra-D 12 Hour) 1 Each Tab.er.12h      1 TAB PO Q12HR, #60 TAB.SR.12H 0 Refills         Prov: Aknur Alcantara         7/22/20       MDI-Advair 250/50 (Advair 250/50 Diskus) 1 Each Blst.w.dev      1 PUFF INH RTBID for 90 Days, #3 INH 2 Refills         Prov: Ankur Alcantara         7/22/20       Umeclidinium Bromide (Incruse Ellipta) 62.5 Mcg Blst.w.dev      1 PUFF INH RTQDAY for 30 Days, #1 MDI 3 Refills         Prov: Ankur Alcantara         7/22/20       Sacubitril/Valsartan (Entresto 24/26 mg) 1 Each Tablet      1 TAB PO QDAY, #60 TAB 0 Refills          Reported         2/4/20       metOLazone (metOLazone) 2.5 Mg Tablet      2.5 MG PO QDAY, TAB         Reported         2/4/20       Bumetanide (BUMETANIDE) 0.25 Mg/1 Ml Vial      2 MG IV QDAY, #30 VIAL         Reported         2/4/20       Carvedilol (Carvedilol) 3.125 Mg Tablet      3.125 MG PO QDAY, #60 TAB 0 Refills         Reported         2/4/20       Levothyroxine (Levothyroxine) 0.075 Mg Tablet      50 MCG PO QDAY@07, #30 TAB 0 Refills         Reported         2/4/20       Sennosides (Senna) 8.8 Mg/5 Ml Syrup      PO BID PRN for prn, ML         Reported         2/4/20       Polyethylene Glycol (Miralax*) 17 Gm Packet      1 CAP PO prn PRN for CONSTIPATION, #30 PKT 0 Refills         Reported         2/4/20       Famotidine (Famotidine) 10 Mg Tablet      20 MG PO BID for 30 Days, #120 TAB         Reported         2/4/20       Sertraline HCl (Sertraline*) 25 Mg Tablet      25 MG PO QDAY, TAB         Reported         2/4/20       Cetirizine Hcl (zyrTEC) 5 Mg Tablet      10 MG PO QDAY for 30 Days, #60 TAB         Reported         2/4/20       glipiZIDE (glipiZIDE) 5 Mg Tablet      5 MG PO QDAY, #30 TAB 0 Refills         Reported         2/4/20       Nicotine Polacrilex (Nicotine) 2 Mg Lozenge      2 MG BUCCAL Q4-6H, #180 MATI         Prov: ALEXX OMER [facility]         1/28/20       Nicotine 21 Mg Patch (Nicotine 21 Mg Patch) 1 Each Patch.td24      21 MG TRANSDERM QDAY PRN PRN for NICOTINE WITHDRAWAL ANXIETY for 30 Days, #30     PATCH 3 Refills         Prov: Ankur Alcantara         12/17/19       Bumetanide (BUMETANIDE) 2 Mg Tablet      2 MG PO BID, #60 TAB         Reported         12/10/19       Carvedilol (Carvedilol) 3.125 Mg Tablet      3.125 MG PO QDAY, #30 TAB 0 Refills         Reported         12/10/19       Atorvastatin Calcium (Lipitor*) 40 Mg Tablet      40 MG PO QDAY, #30 TAB 0 Refills         Reported         12/10/19       guaiFENesin-PSE 1,200-120 Mg (Mucinex D ER 1,200-120 Mg) 1 Each Tab.er.12h      1 TAB  PO BID, TAB.SR.12H         Reported         10/23/19       Amiodarone Hcl (Amiodarone) 200 Mg Tablet      200 MG PO Q12H, #60 TAB         Reported         9/19/19       Folic Acid (Folic Acid) 1 Mg Tablet      1 MG PO QDAY         Reported         8/8/19       Primidone (Mysoline*) 250 Mg Tablet      250 MG PO BID         Reported         8/8/19      Current Medications      Current Medications Reviewed 7/22/20            EXAM      CONSTITUTIONAL: Pleasant male in no acute distress, normal conversant, pale     looking.        EYES : Pink conjunctive, no ptosis, PERRL.       ENMT : Nose and ears appear normal, normal dentition, mild posterior pharyngeal     wall erythema, no sinus tenderness. Mallampati classification 2.        Neck: Nontender, no masses, no thyromegaly, no nodules.      Resp : Bilateral diminished breath sounds, has some breath sounds in the left     mid and lower chest, dull to percussion left mid and lower chest.        CVS  : No carotid bruits, s1s2 nl, RRR, no murmur, rubs or gallop, no peripheral    edema       Chest wall: Normal rise with inspiration, nontender on palpation.      GI   : Abdomen soft, with no masses, no hepatosplenomegaly, no hernias, BS+      MSK  : Normal gait and station, no digital cyanosis or clubbing       Skin : No rashes, ulcerations or lesions, normal turgor and temperature      Neuro: CN II - XII intact, no sensory deficits, DTRs intact and symmetrical, no     motor weakness      Psych: Appropriate affect, A   Vtials      Vitals:             Height 5 ft 6 in / 167.64 cm           Weight 150 lbs 6 oz / 68.265926 kg           BSA 1.77 m2           BMI 24.3 kg/m2           Temperature 98.2 F / 36.78 C - Oral           Pulse 55           Respirations 16           Blood Pressure 112/62 Sitting, Right Arm           Pulse Oximetry 96%, room air            REVIEW      Results Reviewed      PCCS Results Reviewed?:  Yes Prev Lab Results, Yes Prev Radiology Results, Yes      Previous Mecial Records      Radiographic Results               [facility]                PACS RADIOLOGY REPORT            Patient: VANDANA PEÑA   Acct: #[id number]   Report: #[id number]            UNIT #: [id number]    DOS: 20 1434      INSURANCE:MEDICARE PART A   LOCATION:[facility]     : 1950            PROVIDERS      ADMITTING:     ATTENDING: Ozzy Farmer      FAMILY:  Ozzy Farmer   ORDERING:  Ozzy Farmer         OTHER:    DICTATING:  Gael Harman MD            REQ #:[id number]   EXAM:CXR2 - CHEST 2V AP PA LAT      REASON FOR EXAM:  R06.02      REASON FOR VISIT:  R06.02            *******Signed******         PROCEDURE:   CHEST AP/PA AND LATERAL             COMPARISON:   [facility], CT, CHEST W/O CONTRAST, 2019,     12:27.  [facility], CR, CHEST PA/AP   [facility], CR,       CHEST AP/PA 1 VIEW, 2019, 11:59.  [facility], CR, CHEST PA/A    P   2/10/2020, 11:56.             INDICATIONS:   SHORTNESS OF BREATH/HISTORY OF CHF             FINDINGS:         Patient is again noted be status post median sternotomy for coronary artery     bypass grafting.  Left       subclavian transvenous defibrillator is unchanged.  Heart size and pulmonary     vessels are within       normal limits.  There is emphysematous change of the lungs.  Lungs are clear     bilaterally.  No       pleural effusion identified.  Minimal degenerative changes noted of the thoracic    spine.  There are       postoperative changes of the cervical spine from prior anterior fusion.             CONCLUSION:         1. Emphysema      2. No acute cardiopulmonary disease.              BHARAT HARMAN MD             Electronically Signed and Approved By: BHARAT HARMAN MD on 2020 at 15:15                           Until signed, this is an unconfirmed preliminary report that may contain       errors and is subject to change.                                              STEBA:      D:20 1515      PFT Results      Patient: CEDRIC PEÑA   Acct: #[id number]   Report: #[id number]            UNIT #: [id number]    DOS:       LOCATION:[facility]     : 1950            PROVIDERS      ADMITTING:     FAMILY:  Ozzy Farmer         OTHER:       DICTATING:  Ankur Alcantara MD            REASON FOR VISIT:  COPD            *******Signed******                                    [facility]                          Health Information Management Services                            [place], [place]  [address]               __________________________________________________________________________             Patient Name:                   Attending Physician:      MauroCedric Stan GAMBINO PA-C             Patient Visit # MR #            Admit Date  Disch Date     Location      [id number]    [id number]      2019                 [facility]- -             Date of Birth      1950      __________________________________________________________________________      821 - DIAGNOSTIC REPORT             PULMONARY FUNCTION TEST             SPIROMETRY:      Spirometry shows severe obstructive defect.      FEV1/FVC is 53.      FEV1 is 0.98 L, 35% of predicted.      FVC is 1.85 L, 48% of predicted.             BRONCHODILATOR RESPONSE:      There is a significant response to bronchodilator administration.      FEV1 increased from 0.98 L to 1.23 L, 26% of predicted.      FVC was 1.85 L to 2.08 L, 12% change.             LUNG VOLUMES:      Lung volumes show restrictive defect, as well.      Total lung capacity is 4.17 L, 66% of predicted.      Residual volume is 2.20 L, 98% of predicted.             DIFFUSION:      Diffusion capacity is severe decreased, 30% of predicted.             FLOW VOLUME LOOP:      Flow volume loop is suggestive of severe obstructive process.              CONCLUSION:      Low FEV1/FVC with low FEV1 and FVC with low lung volumes and low diffusion      capacity. It suggests mixed obstructive and restrictive airway disease,      severe restrictive and obstructive airway disease. There is significant      bronchial reversibility suggesting underlying reactive airway disease      component, as well.  Please correlate clinically.             To be electronically signed in [place]      [id number] ANKUR ALCANTARA M.D.             NK:catherine      D:  11/07/2019 16:49      T:  11/07/2019 18:01      #[id number]             Until signed, this is an unconfirmed preliminary report that may contain      errors and is subject to change.                   Until signed, this is an unconfirmed preliminary report that may contain      errors and is subject to change.                     <Electronically signed by Ankur Alcantara MD>                11/12/19 0829               Ankur Alcantara MD:LETA      D:11/07/19 1649            Assessment      Smoking greater than 40 pack years - F17.210            Notes      New Medications      * Benzonatate (Tessalon Perles) 100 MG CAP: 100 MG PO TID #120      * MDI-Albuterol (Ventolin HFA) 8 GM HFA.AER.AD: 2 PUFFS INH RTTID 90 Days #1      Renewed Medications      * UMECLIDINIUM BROMIDE (Incruse Ellipta) 62.5 MCG BLST.W.DEV: 1 PUFF INH RTQDAY       30 Days #1         Instructions: TO REPLACE SPIRIVA  2.5      * MDI-Advair 250/50 (Advair 250/50 Diskus) 1 EACH BLST.W.DEV: 1 PUFF INH RTBID       90 Days #3      * P-Ephed HCl/Fexofenadine HCl 120/60 MG (Allegra-D 12 Hour) 1 EACH TAB.ER.12H:       1 TAB PO Q12HR #60      Discontinued Medications      * UMECLIDINIUM BROMIDE (Incruse Ellipta) 62.5 MCG BLST.W.DEV: 1 PUFF INH QDAY #3         Instructions: to replace spiriva      * TIOTROPIUM BROMIDE (Spiriva Respimat 2.5 mcg/Puff) 4 GM MIST.INHAL          Sample - Qty 1      New Diagnostics      *  Low Dose Chest CT, 3 Months         Dx: Smoking greater than 40 pack years - F17.210      PLAN:  The patient is a 69 year old male with severe COPD, severe coronary     artery disease, severe systolic heart failure with an EF of 15-20% followed by     cardiology at [facility]. He also has a history of CABG at [facility] back in 09/2019     and has a history of tobacco abuse with cigarettes.      1.  Severe COPD.  Continue with Advair 250/50 twice a day.  Use albuterol as     needed. Continue with Incruse once daily. Use DuoNebs as needed. I will apply     for a portable nebulizer as he needs to travel and it will help him be more     active. Change ProAir to Ventolin if needed for [employer].      2. I ordered low dose lung cancer screening CT scan in three months from now.        3. Start Tessalon Perles for cough.      4. I gave him a sample of Mucinex DM.      5. I have written for Anyi ACOSTA.      6. Follow up in 3-4 months, earlier if needed.            Patient Education      Education resources provided:  Yes      Patient Education Provided:  Acute Bronchitis            Electronically signed by Ankur Alcantara  08/07/2020 23:22       Disclaimer: Converted document may not contain table formatting or lab diagrams. Please see [employer] for the authenticated document.

## 2021-06-05 NOTE — PROGRESS NOTES
Progress Note      Patient Name: Cedric Wade   Patient ID: [id number]   Sex: Male   YOB: 1950    Primary Care Provider: Ean Farmer MD   Referring Provider: Ean Farmer MD    Visit Date: May 27, 2021    Provider: Mono Tidwell MD   Location: [facility]   Location Address: [address] [place] [address], [facility]   [place], [place]  [id number]   Location Phone: [phone]          Chief Complaint     Coronary artery disease.       History Of Present Illness  REFERRING CARE PROVIDER: Ean Farmer MD   Cedric Wade is a 70 year old /White male with coronary artery disease and previous CABG, COPD, and angioedema who had recently been admitted for atrial flutter with rapid ventricular rate. The atrial fibrillation/flutter converted spontaneously and he was put on sotalol. The patient has been having some sinus bradycardia but since pacemaker insertion has been doing well. Stable shortness of breath. Chronic cough. He has chronically low blood pressures but they have been stable at home with monitoring by home health.   PAST MEDICAL HISTORY: Bradycardia with pacemaker; COPD; Chronic renal failure; Congestive heart failure; Coronary artery disease with CABG x3 09/09/2019; Diabetes mellitus; Hypertension; Ischemic cardiomyopathy.   PSYCHOSOCIAL HISTORY: Denies alcohol consumption. Previously smoked, but quit.   CURRENT MEDICATIONS: Medications have been reviewed and are as stated.      ALLERGIES:  Codeine; Lisinopril.       Review of Systems  · Cardiovascular  o Admits  o : shortness of breath while walking or lying flat  o Denies  o : palpitations (fast, fluttering, or skipping beats), swelling (feet, ankles, hands), chest pain or angina pectoris   · Respiratory  o Admits  o : chronic or frequent cough      Vitals  Date Time BP Position Site L\R Cuff Size HR RR TEMP (F) WT  HT  BMI kg/m2 BSA m2 O2 Sat FR L/min FiO2 HC       05/27/2021 03:15 /54 Sitting    " 48 - R   150lbs 0oz 5'  6\" 24.21 1.78       05/27/2021 03:15 PM 88/54 Sitting    48 - R                   Physical Examination  · Constitutional  o Appearance  o : Awake, alert, in no acute distress.   · Eyes  o Conjunctivae  o : Normal.  · Ears, Nose, Mouth and Throat  o Oral Cavity  o :   § Oral Mucosa  § : Normal.  · Neck  o Inspection/Palpation  o : No JVD. Good carotid upstroke. No thyromegaly.  · Respiratory  o Respiratory  o : Good respiratory effort. Clear to percussion and auscultation.  · Cardiovascular  o Heart  o :   § Auscultation of Heart  § : S1, S2 normal. Regular rate and rhythm without murmurs, gallops, or rubs.  o Peripheral Vascular System  o :   § Extremities  § : Good femoral and pedal pulses. No pedal edema.  · Gastrointestinal  o Abdominal Examination  o : Soft. No tenderness or masses felt. No hepatosplenomegaly. Abdominal aorta is not palpable.  · EKG  o EKG  o : Performed in the office today.   o Indications  o : Atrial fibrillation/flutter.  o Results  o : Snus bradycardia with nonspecific intraventricular conduction delay, nonspecific T-wave abnormalities, and normal QTc interval.   o Comparison  o : No significant change from his prior EKG.          Assessment     ASSESSMENT & PLAN:     1.  Paroxysmal atrial fibrillation/flutter.  He is maintaining normal sinus rhythm with sotalol.  The patient has        been off of his Coreg due to low blood pressure problems.  Will work to optimize his heart rhythm strategy        given his known systolic CHF issues and chronically low blood pressure problems.    2.  Ischemic cardiomyopathy, stable.  Continue with sotalol for beta blockade as well as antiarrhythmic effects        and Entresto 24-26 mg b.i.d. for afterload reduction.   3.  Coronary artery disease with previous CABG.  No ongoing anginal discomfort.   4.  Chronic obstructive pulmonary disease, on chronic home oxygen therapy.     Mono Tidwell MD  JH/rt                Electronically Signed " by: Pricilla Madrid-, Other -Author on May 30, 2021 06:24:21 PM  Electronically Co-signed by: Mono Tidwell MD -Reviewer on June 1, 2021 01:58:50 PM

## 2021-06-14 NOTE — PROGRESS NOTES
Phase II and III Education    Discipline Teaching:  Cardiac Rehab Phase II []      Cardiac Rehab Phase III []      Pulmonary Rehab II [x]      Pulmonary Rehab III []      Peripheral Artery Disease []        Class Given:  Asthma Management []      Beginners Cardiac Rehab []      Beginners Pulmonary Rehab [x]      CAD I []      CAD II []      CHF []      Diabetes Mellitus [x]     Diet & Cholesterol []     Diet Consult []      Energy Conservation []     Exercise []     Grocery Store Tour []     Harmonica Therapy []     High Blood Pressure []     Living with COPD [x]     Medication Safety []     Peripheral Artery Disease []     S & S of Infection []      Stress []        Education Topics:  Angina []      Arrhythmias []      Asthma Management []      Beginning Cardiac Rehab []      Blood Pressure []     Blood Sugar []     Breathing Retrainment [x]     CHF []     Cooking []     Daily Weight []     Diabetes Mellitus []      Diet []     Energy Conservation []     Exercise []      Foot Care []      Grocery Store Tour []      Heart Disease []      Heart Function []      Incision Care []     Living with COPD [x]     Medication Safety []     PAD Symptoms/Treatment []     Reconditioning Exercises []     S & S Infection []     Smoking Consult [x]     Stress Management []     Test Results []       Teaching Aids:  Video []      PAD Handout []      Pulmonary Workbook [x]      CAD Teaching Packet []      Stress Teaching Packet []     Exercise Teaching Packet []      Diet Teaching Packet []      CHF Teaching Packet []      Blood Pressure Teaching Packet []      Smoking Cessation Packet []      Medication Safety Handout []      Pflex Training []      Diabetes Teaching Packet []      Harmonica Therapy Packet []        Teaching Method:  Discussion [x]      Written Information [x]      Audio Visual []      Demonstration []        Teaching Recipient:  Patient [x]      Family []      Friend []      Legal Guardian []      Primary Care Giver  []      Significant Other []        Barriers To Learning:  None [x]      Auditory []      Cultural []      Emotional []      Financial []      Language []    Mental []      Motivation []      Physical []      Reading Skills []      Synagogue []      Verbal/Cognitive []      Visual []      Written/Cognitive []        Teaching Response:  Verified Via Teach back [x]      Return Demo Via Teach Back []      Reinforcement Needed []      Unable to Return Demo []      Unable to Comprehend []      Declines Teaching  []        Additional Education Topics:      Follow Up Instruction Comment:

## 2021-06-16 NOTE — PROGRESS NOTES
"1350- Pt in pulmonary rehab exercising on treadmill.  States \"I'm having chest pain at level 6 when I walk.  I do this all of the time.\"  Warm, dry, pink.  Resp easy.  /70. Placed in chair.  O2 at 2 liters per nasal cannula. SAO2 100%.Cardiac monitor in [facility].    1355- States \"Pain is a level 3 now.  That is where it stays all of the time.  It never changes.  My MD knows it. I have an appointment with him soon.\"    1410- Pt condition unchanged.  States \" This is my normal.\"  Pt discharged home.  Report sent to MD.    "

## 2021-06-21 NOTE — PROGRESS NOTES
Patient tolerated  Pulmonary rehab exercise session without difficulty.  See exercise session report for details.

## 2021-07-19 PROBLEM — E78.49 OTHER HYPERLIPIDEMIA: Status: ACTIVE | Noted: 2019-09-08

## 2021-07-19 PROBLEM — I48.0 PAROXYSMAL ATRIAL FIBRILLATION (HCC): Status: ACTIVE | Noted: 2021-01-01

## 2021-07-19 NOTE — PROGRESS NOTES
Chief Complaint  Atrial Fibrillation, Coronary Artery Disease, and Cardiomyopathy    Subjective    Patient is a 70-year-old with a history of CAD with previous CABG ischemic cardiomyopathy hypertension who has been doing well no new complaints or problems.  Patient has persistent stable shortness of breath issues  Past Medical History:   Diagnosis Date   • Abnormal ECG    • Abscess    • Arrhythmia    • Arthritis    • Atrial fibrillation (CMS/[facility])    • CAD with history of CABG 9/6/2019    Added automatically from request for surgery [id number]   • Cancer (CMS/[facility])     skin   • CHF (congestive heart failure) (CMS/[facility])    • COPD (chronic obstructive pulmonary disease) (CMS/[facility])    • Coronary artery disease    • Cutaneous lupus erythematosus    • Diabetes mellitus (CMS/[facility])    • Elevated cholesterol    • GERD (gastroesophageal reflux disease)    • Heart valve disease    • Hypertension    • Myocardial infarction (CMS/[facility])    • Other hyperlipidemia 9/8/2019   • Paroxysmal atrial fibrillation (CMS/[facility]) 7/19/2021   • Sleep apnea     Bradycardia with pacemaker; COPD; Chronic renal failure; Congestive heart failure; Coronary artery disease with CABG x3 09/09/2019; Diabetes mellitus; Hypertension; Ischemic cardiomyopathy.       Current Outpatient Medications:   •  albuterol sulfate  (90 Base) MCG/ACT inhaler, Inhale 2 puffs Every 4 (Four) Hours As Needed for Wheezing., Disp: , Rfl:   •  apixaban (Eliquis) 5 MG tablet tablet, Every 12 (Twelve) Hours., Disp: , Rfl:   •  atorvastatin (LIPITOR) 40 MG tablet, Take 40 mg by mouth Every Night., Disp: , Rfl:   •  Azelastine-Fluticasone (Dymista) 137-50 MCG/ACT suspension, into the nostril(s) as directed by provider 2 (two) times a day., Disp: , Rfl:   •  benzonatate (TESSALON) 100 MG capsule, Take 100 mg by mouth 3 (Three) Times a Day As Needed for Cough., Disp: , Rfl:   •  budesonide (PULMICORT) 0.5 MG/2ML nebulizer solution, inhale contents OF one ampule via nebulizer BY MOUTH  TWICE DAILY, Disp: , Rfl:   •  bumetanide (BUMEX) 0.5 MG tablet, Take 0.5 mg by mouth Daily., Disp: , Rfl:   •  cetirizine (zyrTEC) 10 MG tablet, Take 1 tablet by mouth Daily., Disp: 90 tablet, Rfl: 3  •  folic acid (FOLVITE) 1 MG tablet, Take 1 mg by mouth Daily., Disp: , Rfl:   •  glipizide (GLUCOTROL) 5 MG tablet, Take 5 mg by mouth Daily., Disp: , Rfl:   •  guaiFENesin (MUCINEX) 600 MG 12 hr tablet, Take  by mouth 2 (Two) Times a Day., Disp: , Rfl:   •  hydroxychloroquine (Plaquenil) 200 MG tablet, Take 200 mg by mouth Daily., Disp: , Rfl:   •  levalbuterol (XOPENEX) 0.63 MG/3ML nebulizer solution, Take 1 ampule by nebulization Every 8 (Eight) Hours As Needed., Disp: , Rfl:   •  levothyroxine (SYNTHROID, LEVOTHROID) 75 MCG tablet, Take 75 mcg by mouth Daily., Disp: , Rfl:   •  montelukast (SINGULAIR) 10 MG tablet, Take 10 mg by mouth Daily., Disp: , Rfl:   •  pantoprazole (PROTONIX) 40 MG EC tablet, Take 40 mg by mouth Daily., Disp: , Rfl:   •  primidone (MYSOLINE) 250 MG tablet, Take 250 mg by mouth 2 (Two) Times a Day., Disp: , Rfl:   •  sacubitril-valsartan (Entresto) 24-26 MG tablet, Every 12 (Twelve) Hours., Disp: , Rfl:   •  sertraline (ZOLOFT) 25 MG tablet, Take 25 mg by mouth Daily., Disp: , Rfl:   •  sotalol (BETAPACE) 120 MG tablet, Take 120 mg by mouth. 1/2 tab bid, Disp: , Rfl:   •  tiotropium (SPIRIVA) 18 MCG per inhalation capsule, Place 1 capsule into inhaler and inhale Daily., Disp: , Rfl:   •  vitamin D (ERGOCALCIFEROL) 1.25 MG (55794 UT) capsule capsule, 50,000 Units Every 7 (Seven) Days., Disp: , Rfl:   •  aspirin 81 MG chewable tablet, Chew 81 mg Daily., Disp: , Rfl:   •  cetirizine (zyrTEC) 10 MG tablet, Take 10 mg by mouth Daily., Disp: , Rfl:   •  famotidine (PEPCID) 20 MG tablet, Take 20 mg by mouth 2 (Two) Times a Day., Disp: , Rfl:   •  furosemide (LASIX) 40 MG tablet, Take 40 mg by mouth Daily., Disp: , Rfl:   •  ipratropium-albuterol (DUO-NEB) 0.5-2.5 mg/3 ml nebulizer, 3 ml, Disp: ,  "Rfl:   •  metFORMIN (GLUCOPHAGE) 850 MG tablet, Take 850 mg by mouth 2 (Two) Times a Day With Meals., Disp: , Rfl:   •  Patiromer Sorbitex Calcium (VELTASSA) 8.4 g pack, Take 8.4 g by mouth 1 (One) Time., Disp: , Rfl:   •  spironolactone (ALDACTONE) 25 MG tablet, Take 25 mg by mouth Daily., Disp: , Rfl:   •  SYMBICORT 160-4.5 MCG/ACT inhaler, Inhale 2 puffs 2 (Two) Times a Day., Disp: , Rfl: 5  •  tiotropium bromide-olodaterol (STIOLTO RESPIMAT) 2.5-2.5 MCG/ACT aerosol solution inhaler, Inhale 1 puff As Needed., Disp: , Rfl:     Medications Discontinued During This Encounter   Medication Reason   • lisinopril (PRINIVIL,ZESTRIL) 2.5 MG tablet      Allergies   Allergen Reactions   • Amiodarone Unknown - High Severity   • Lisinopril Swelling   • Codeine Nausea And Vomiting        Social History     Tobacco Use   • Smoking status: Former Smoker     Packs/day: 2.00     Years: 58.00     Pack years: 116.00     Types: Cigarettes     Start date:      Quit date: 2019     Years since quittin.8   • Smokeless tobacco: Never Used   Vaping Use   • Vaping Use: Never used   Substance Use Topics   • Alcohol use: No   • Drug use: Never       Family History   Problem Relation Age of Onset   • Lung cancer Father    • Breast cancer Father    • Diabetes Sister    • Heart disease Brother    • Breast cancer Other    • Heart disease Other    • Diabetes Brother    • Heart attack Brother         Objective     /78   Pulse 66   Ht 167.6 cm (66\")   Wt 67.1 kg (148 lb)   BMI 23.89 kg/m²       Physical Exam  Constitutional:       General: He is awake. He is not in acute distress.     Appearance: Normal appearance.   Neck:      Vascular: No carotid bruit, hepatojugular reflux or JVD.   Cardiovascular:      Rate and Rhythm: Normal rate and regular rhythm.      Chest Wall: PMI is not displaced.      Heart sounds: Normal heart sounds, S1 normal and S2 normal. No murmur heard.   No friction rub. No gallop. No S3 or S4 sounds.  "   Pulmonary:      Effort: Pulmonary effort is normal.      Breath sounds: Normal breath sounds. No wheezing, rhonchi or rales.   Ext.      Trace bilateral edema skin:     General: Skin is warm and dry.      Coloration: Skin is not cyanotic.      Findings: No petechiae or rash.   Neurological:      Mental Status: He is alert.   Psychiatric:         Behavior: Behavior is cooperative.         Result Review :     proBNP   Date Value Ref Range Status   09/07/2019 19,685.0 (H) 5.0 - 900.0 pg/mL Final     CMP    CMP 5/17/21 5/20/21 5/24/21   Glucose 127 (A) 134 (A) 88   BUN 20 21 21   Creatinine 0.94 0.91 0.99   Sodium 134 (A) 132 (A) 139   Potassium 4.8 5.1 4.4   Chloride 99 99 104   Calcium 9.2 9.0 8.9   Albumin 3.3 (A) 3.5 3.8   Total Bilirubin <0.15 (A) <0.15 (A) <0.15 (A)   Alkaline Phosphatase 102 91 95   AST (SGOT) 25 25 28   ALT (SGPT) 22 31 37   (A) Abnormal value       Comments are available for some flowsheets but are not being displayed.           CBC w/diff    CBC w/Diff 5/17/21 5/20/21 5/24/21   WBC 8.51 10.99 (A) 9.46   RBC 3.66 (A) 3.83 (A) 3.92 (A)   Hemoglobin 11.8 (A) 12.3 (A) 12.5 (A)   Hematocrit 36.0 (A) 37.6 (A) 39.7 (A)   MCV 98.4 (A) 98.2 (A) 101.3 (A)   MCH 32.2 (A) 32.1 (A) 31.9 (A)   MCHC 32.8 (A) 32.7 (A) 31.5 (A)   RDW 13.0 13.1 13.7   Platelets 248 265 295   Neutrophil Rel % 75.6 80.5 64.7   Lymphocyte Rel % 18.3 (A) 14.0 (A) 25.5   Monocyte Rel % 5.2 4.2 5.5   Eosinophil Rel % 0.2 0.1 1.8   Basophil Rel % 0.1 0.2 0.5   (A) Abnormal value             Lab Results   Component Value Date    TSH 0.575 05/14/2021      Lab Results   Component Value Date    FREET4 1.3 02/22/2021      No results found for: DDIMERQUANT  Magnesium   Date Value Ref Range Status   05/20/2021 2.22 1.60 - 2.30 mg/dL Final      No results found for: DIGOXIN   Lab Results   Component Value Date    TROPONINT <0.01 05/13/2021             No results found for: POCTROP    Results for orders placed during the hospital encounter  of 09/06/19    Adult Transthoracic Echo Limited W/ Cont if Necessary Per Protocol    Interpretation Summary  · The left ventricular cavity is mildly dilated.  · Limited echocardiogram was performed for the LV function shows enlarged left ventricle with a severe LV dysfunction ejection fraction 28%                 Diagnoses and all orders for this visit:    1. Coronary artery disease involving native coronary artery of native heart with angina pectoris (CMS/HCC) (Primary)  Assessment & Plan:  Patient with no angina continue chronic aspirin 81 daily      2. Essential hypertension  Assessment & Plan:  Controlled on Entresto 2426 twice daily and spironolactone 50 mg once a day      3. Paroxysmal atrial fibrillation (CMS/HCC)  Assessment & Plan:  Patient is maintaining normal sinus rhythm on Eliquis 5 twice daily for CVA prevention.  Check EKG next visit due to patient being on sotalol 120 twice daily for rhythm maintenance            Follow Up     Return in about 6 months (around 1/19/2022) for EKG with F/U.    Patient was given instructions and counseling regarding his condition or for health maintenance advice. Please see specific information pulled into the AVS if appropriate.

## 2021-07-19 NOTE — PROGRESS NOTES
Normal VVI ICD device Interrogation.  On 05/13/2021 he had 13 RVR episodes and Ventricular Tachycardia, he has not had anymore episodes since 05/13/2021.

## 2021-07-19 NOTE — ASSESSMENT & PLAN NOTE
Patient is maintaining normal sinus rhythm on Eliquis 5 twice daily for CVA prevention.  Check EKG next visit due to patient being on sotalol 120 twice daily for rhythm maintenance

## 2021-07-23 NOTE — TELEPHONE ENCOUNTER
Patient is having low blood sugar on the days that he goes to cardiac rehab. They are having to constantly give him thing to increase glucose while he is there.     Can we have patient to  glipizide 1/2 on days that he goes to rehab ?

## 2021-07-30 PROBLEM — M19.90 ARTHRITIS: Status: ACTIVE | Noted: 2021-01-01

## 2021-07-30 PROBLEM — J44.1 ACUTE EXACERBATION OF CHRONIC OBSTRUCTIVE PULMONARY DISEASE (HCC): Status: ACTIVE | Noted: 2021-01-01

## 2021-07-30 PROBLEM — I50.9 HEART FAILURE: Status: ACTIVE | Noted: 2021-01-01

## 2021-07-30 PROBLEM — M13.0 POLYARTHRITIS: Status: ACTIVE | Noted: 2021-01-01

## 2021-07-30 PROBLEM — E03.9 HYPOTHYROIDISM, UNSPECIFIED: Status: ACTIVE | Noted: 2021-01-01

## 2021-07-30 PROBLEM — I50.22 CHRONIC SYSTOLIC HEART FAILURE: Status: ACTIVE | Noted: 2021-01-01

## 2021-08-04 NOTE — TELEPHONE ENCOUNTER
I can see him next week possibly at the beginning of a day let him know or let her know and try to schedule for next week sometime

## 2021-08-04 NOTE — TELEPHONE ENCOUNTER
Jess from Cardiac/Pulmonary rehab called stating that while she was with the patient he was coughing, SOB and wheezing when he was doing his exercises. She had him to stop and use his inhaler and noticed some improvement but not much. She suggested to him to go to the ED but he denied. She is wanting to know if he could be seen before his appt in August. Jess's call back number is [phone]

## 2021-08-05 NOTE — PROGRESS NOTES
"Chief Complaint  COPD (Follow up) productive mucus with exercise and coughing during cardiac rehab with decreased O2 sats    Subjective  --- No fever still short of breath with exertion and with exercise but is going to cardiac rehab regularly        Cedric Wade presents to [facility]      Objective   Vital Signs  Vitals:    08/05/21 1516   BP: 115/70   Pulse: 61   Temp: 98 °F (36.7 °C)   Weight: 66.1 kg (145 lb 12.8 oz)   Height: 167.6 cm (66\")      Review of Systems as above  Physical Exam patient had a coughing spell during my exam with deep breathing, has audible wheezing bilateral cardiac exam regular rhythm with distant heart tones and a 2/6 systolic murmur soft, lower extremities no ankle edema he is awake alert and oriented x3 sitting up in the chair neck is supple,  Result Review :   Lab Results   Component Value Date    PROBNP 19,685.0 (H) 09/07/2019     05/24/2021    BNP 1124 (H) 05/20/2021     05/13/2021     CMP    CMP 5/17/21 5/20/21 5/24/21   Glucose 127 (A) 134 (A) 88   BUN 20 21 21   Creatinine 0.94 0.91 0.99   Sodium 134 (A) 132 (A) 139   Potassium 4.8 5.1 4.4   Chloride 99 99 104   Calcium 9.2 9.0 8.9   Albumin 3.3 (A) 3.5 3.8   Total Bilirubin <0.15 (A) <0.15 (A) <0.15 (A)   Alkaline Phosphatase 102 91 95   AST (SGOT) 25 25 28   ALT (SGPT) 22 31 37   (A) Abnormal value       Comments are available for some flowsheets but are not being displayed.           CBC w/diff    CBC w/Diff 5/17/21 5/20/21 5/24/21   WBC 8.51 10.99 (A) 9.46   RBC 3.66 (A) 3.83 (A) 3.92 (A)   Hemoglobin 11.8 (A) 12.3 (A) 12.5 (A)   Hematocrit 36.0 (A) 37.6 (A) 39.7 (A)   MCV 98.4 (A) 98.2 (A) 101.3 (A)   MCH 32.2 (A) 32.1 (A) 31.9 (A)   MCHC 32.8 (A) 32.7 (A) 31.5 (A)   RDW 13.0 13.1 13.7   Platelets 248 265 295   Neutrophil Rel % 75.6 80.5 64.7   Lymphocyte Rel % 18.3 (A) 14.0 (A) 25.5   Monocyte Rel % 5.2 4.2 5.5   Eosinophil Rel % 0.2 0.1 1.8   Basophil Rel % 0.1 " 0.2 0.5   (A) Abnormal value                Lab Results   Component Value Date    TSH 0.575 05/14/2021    TSH 0.693 02/22/2021    TSH 0.840 01/22/2021      Lab Results   Component Value Date    FREET4 1.3 02/22/2021    FREET4 1.4 01/22/2021    FREET4 1.2 09/17/2020      A1C Last 3 Results    HGBA1C Last 3 Results 9/17/20 1/22/21   Hemoglobin A1C 6.1 (A) 5.8 (A)   (A) Abnormal value       Comments are available for some flowsheets but are not being displayed.                             Assessment and Plan      Acute COPD with exacerbation acute sinus infection, will treat with antibiotics, steroids, breathing treatments to continue, all medications reviewed August 5, 2021, will consider prolonged use of Zithromax to 50 mg daily indefinitely along with considered use of steroids possibly longer term, patient continues on Singulair also,      Follow Up   No follow-ups on file.  Patient was given instructions and counseling regarding his condition or for health maintenance advice. Please see specific information pulled into the AVS if appropriate.

## 2021-08-19 NOTE — PROGRESS NOTES
Chief Complaint  No chief complaint on file.   Cedric Wade presents to [facility]      Objective   Vital Signs  There were no vitals filed for this visit.   Review of Systems   Constitutional: Negative.    HENT: Positive for sinus pressure.    Eyes: Negative.    Respiratory: Negative.    Cardiovascular: Negative.    Gastrointestinal: Negative.    Endocrine: Negative.    Genitourinary: Negative.    Musculoskeletal: Negative.    Allergic/Immunologic: Negative.    Neurological: Negative.    Hematological: Negative.    Psychiatric/Behavioral: Negative.     as above  Physical Exam lungs reveal diminished breath sounds neck is supple, cardiac exam reveals a regular heart rate around 60 bpm soft heart tones distant abdomen soft nontender lower extremities no edema he is ambulatory is wearing his oxygen he is alert and oriented x3     Result Review :   Lab Results   Component Value Date    PROBNP 19,685.0 (H) 09/07/2019     05/24/2021    BNP 1124 (H) 05/20/2021     05/13/2021     CMP    CMP 5/17/21 5/20/21 5/24/21   Glucose 127 (A) 134 (A) 88   BUN 20 21 21   Creatinine 0.94 0.91 0.99   Sodium 134 (A) 132 (A) 139   Potassium 4.8 5.1 4.4   Chloride 99 99 104   Calcium 9.2 9.0 8.9   Albumin 3.3 (A) 3.5 3.8   Total Bilirubin <0.15 (A) <0.15 (A) <0.15 (A)   Alkaline Phosphatase 102 91 95   AST (SGOT) 25 25 28   ALT (SGPT) 22 31 37   (A) Abnormal value       Comments are available for some flowsheets but are not being displayed.           CBC w/diff    CBC w/Diff 5/17/21 5/20/21 5/24/21   WBC 8.51 10.99 (A) 9.46   RBC 3.66 (A) 3.83 (A) 3.92 (A)   Hemoglobin 11.8 (A) 12.3 (A) 12.5 (A)   Hematocrit 36.0 (A) 37.6 (A) 39.7 (A)   MCV 98.4 (A) 98.2 (A) 101.3 (A)   MCH 32.2 (A) 32.1 (A) 31.9 (A)   MCHC 32.8 (A) 32.7 (A) 31.5 (A)   RDW 13.0 13.1 13.7   Platelets 248 265 295   Neutrophil Rel % 75.6 80.5 64.7   Lymphocyte Rel % 18.3 (A) 14.0 (A) 25.5   Monocyte Rel % 5.2 4.2 5.5    Eosinophil Rel % 0.2 0.1 1.8   Basophil Rel % 0.1 0.2 0.5   (A) Abnormal value                Lab Results   Component Value Date    TSH 0.575 05/14/2021    TSH 0.693 02/22/2021    TSH 0.840 01/22/2021      Lab Results   Component Value Date    FREET4 1.3 02/22/2021    FREET4 1.4 01/22/2021    FREET4 1.2 09/17/2020      A1C Last 3 Results    HGBA1C Last 3 Results 9/17/20 1/22/21   Hemoglobin A1C 6.1 (A) 5.8 (A)   (A) Abnormal value       Comments are available for some flowsheets but are not being displayed.                             Assessment and Plan      Recurrent sinus infection, consider starting Zithromax 125 daily versus a repeat round of antibiotic and steroids again, August 24, 2021    Acute COPD with exacerbation acute sinus infection, will treat with antibiotics, steroids, breathing treatments to continue, all medications reviewed August 5, 2021, will consider prolonged use of Zithromax to 50 mg daily indefinitely along with considered use of steroids possibly longer term, patient continues on Singulair also, symptoms have returned August 24, 2021, consider retreating    A.. fib RVR May 2021 --- COPD exacerbation grew Pseudomonas from sputum culture, was treated with cefepime in the HOSPITAL,  converted back to sinus rhythm in the hospital on May 13, 2021, all meds reviewed and he will continue on--ENTRESTO QD , Lipitor 40 mg daily, eliquis 5 mg twice a day, sotalol 120 mg twice a day,, Bumex 0.5 mg daily,    Hypothyroidism--continues levothyroxine--0.75 mcg,     HYPONATREMIA, --improved 135 August 24, 2021 mild hyperkalemia    Transitional care visit -- October 24, 2019,///// AND Transitional care visit September 24, 2019 -- open heart surgery, discharged September 21, 2019 from [facility],     Anxiety, continues Zoloft 25 mg daily,    Allergies, continues  Zyrtec 10 mg daily,    COPD,--- prior acute respiratory distress respiratory failure August 29, 2019, with repeat exacerbation in the  hospital treated for Pseudomonas co-infection WITH STEROIDS AND ANTIBIOTICS MAY 2021, improved, continues on inhalers, Singulair, allergy medication,, budesonide, Spiriva, and Xopenex neb treatments,    Coronary artery disease Lipitor 40 mg daily    acute congestive heart failure acute systolic dysfunction with ejection fraction 20%,,  stress thallium test--- Dr. grande August 22, 2019, , cardiac defibrillator AICD placement January 2020--BEEN ON AMIODIARONE TILL APRIL 2020, STOPPED IT APRIL 9, 2020 DUE EYE PROBLEMS currently on Betapace 120 mg half a tablet twice a day, Entresto 24/26 1 daily    Vitamin D deficiency severe --continue--Vitamin D2-50,000 units weekly    LUPUS, RASH, PER DERM NOV 2018, --- not using currently PLAQUENIL , 200 mg daily--currently on folic acid 1 mg daily,    Right upper quadrant abdominal pain CT scan abdomen pelvis unremarkable October 2018    HTN--Medications as above    Essential tremors, -continue-- primidone--250 BID    mild anemia----Improved------For colonoscopy and endoscopy July 30, 2019,     Gastroesophageal reflux --continue--pantoprazole daily    dm 2 , --A1c improved 5.5, August 2021---Glipizide 5 mg daily, decrease dose to every other day dosing August 24, 2021    Peripheral arterial disease,      Follow Up   No follow-ups on file.  Patient was given instructions and counseling regarding his condition or for health maintenance advice. Please see specific information pulled into the AVS if appropriate.

## 2021-08-24 PROBLEM — Z12.5 SCREENING PSA (PROSTATE SPECIFIC ANTIGEN): Status: ACTIVE | Noted: 2021-01-01

## 2021-09-21 NOTE — TELEPHONE ENCOUNTER
Pt called stating he would like an excuse from jury duty as he states he is very high risk to covid-19 and do to being on oxygen, having heart problems and COPD. If approved i have a letter I can print out for you to sign.

## 2021-11-01 PROBLEM — J30.2 SEASONAL ALLERGIES: Status: ACTIVE | Noted: 2021-01-01

## 2021-11-01 PROBLEM — J30.9 ALLERGIC RHINITIS: Status: ACTIVE | Noted: 2021-01-01

## 2021-11-01 PROBLEM — Z72.0 TOBACCO ABUSE: Status: ACTIVE | Noted: 2021-01-01

## 2021-11-01 PROBLEM — G47.34 NOCTURNAL HYPOXIA: Status: ACTIVE | Noted: 2021-01-01

## 2021-11-04 NOTE — PROGRESS NOTES
Primary Care Provider  Ean Farmer MD     Referring Provider  No ref. provider found     Chief Complaint  COPD and Follow-up (Post Thoracentesis)    Subjective          History of Presenting Illness  Patient is a 71-year-old  male, patient Dr. Alcantara's was a current every day cigarette smoker with a history of COPD who presents for follow-up visit today.  Last office visit patient was having some worsening shortness of breath, intermittent cough and chest tightness and was concerned he may have fluid on his lungs.  Patient had a chest x-ray completed which showed mild blunting of left costophrenic angle that may represent small effusion or pleural thickening.  Patient was set up for thoracentesis with Dr. Alcantara on 11/5/2021 however patient states it was not completed due to not having enough fluid to drain. Last office visit EKG, CBC, CMP, pro-- BNP, and sputum culture were ordered however patient did not have these completed.   Patient is currently taking a prednisone taper and states he is feeling better. Patient states that he was started on a prednisone taper and DuoNebs however has not received duo nebs from the pharmacy yet.  Patient states that he will be restarting pulmonary rehab since it has now reopened.  Patient states he is still smoking about 3 cigarettes a day and has nicotine lozenges at home.  Patient states he is taking Pulmicort and Stiolto every day as prescribed.  Patient is on 3 L of oxygen per minute via nasal cannula continuously.  Patient denies fever, chills, night sweats, swollen glands in the head and neck, unintentional weight loss, hemoptysis, purulent sputum production, dysphagia, chest pain, palpitations, chest tightness, abdominal pain, nausea, vomiting, and diarrhea.  Patient also denies any myalgias, changes in sense of taste and/or smell, sore throat, any other coronavirus or flu-like symptoms.  Patient denies any leg swelling, orthopnea, paroxysmal nocturnal  dyspnea.  Patient is able to perform activities of daily living.      Review of Systems   Constitutional: Negative for activity change, appetite change, chills, diaphoresis, fatigue, fever, unexpected weight gain and unexpected weight loss.        Negative for Insomnia   HENT: Negative for congestion (Nasal), mouth sores, nosebleeds, postnasal drip, sore throat, swollen glands and trouble swallowing.         Negative for Thrush  Negative for Hoarseness  Negative for Allergies/Hay Fever  Negative for Recent Head injury  Negative for Ear Fullness  Negative for Nasal or Sinus pain  Negative for Dry lips  Negative for Nasal discharge   Respiratory: Positive for shortness of breath. Negative for apnea, cough, chest tightness and wheezing.         Negative for Hemoptysis  Negative for Pleuritic pain   Cardiovascular: Negative for chest pain, palpitations and leg swelling.        Negative for Claudication  Negative for Cyanosis  Negative for Dyspnea on exertion   Gastrointestinal: Negative for abdominal pain, diarrhea, nausea, vomiting and GERD.   Musculoskeletal: Negative for joint swelling and myalgias.        Negative for Joint pain  Negative for Joint stiffness   Skin: Negative for color change, dry skin, pallor and rash.   Neurological: Negative for syncope, weakness and headache.   Hematological: Negative for adenopathy. Does not bruise/bleed easily.        Family History   Problem Relation Age of Onset   • Lung cancer Father    • Breast cancer Father    • Diabetes Sister    • Heart disease Brother    • Breast cancer Other    • Heart disease Other    • Diabetes Brother    • Heart attack Brother         Social History     Socioeconomic History   • Marital status:    Tobacco Use   • Smoking status: Current Every Day Smoker     Packs/day: 0.25     Years: 58.00     Pack years: 14.50     Types: Cigarettes     Start date:      Last attempt to quit: 2019     Years since quittin.1   • Smokeless tobacco:  Never Used   • Tobacco comment: 3 cigg per day    Vaping Use   • Vaping Use: Never used   Substance and Sexual Activity   • Alcohol use: No   • Drug use: Never   • Sexual activity: Defer        Past Medical History:   Diagnosis Date   • Abnormal ECG    • Abscess    • Arrhythmia    • Arthritis    • Atrial fibrillation (HCC)    • CAD with history of CABG 9/6/2019    Added automatically from request for surgery [id number]   • Cancer (HCC)     skin   • CHF (congestive heart failure) (HCC)    • COPD (chronic obstructive pulmonary disease) (HCC)    • Coronary artery disease    • Cutaneous lupus erythematosus    • Diabetes mellitus (HCC)    • Elevated cholesterol    • GERD (gastroesophageal reflux disease)    • Heart valve disease    • Hypertension    • Hypothyroidism, unspecified 7/30/2021   • Myocardial infarction (HCC)    • Other hyperlipidemia 9/8/2019   • Paroxysmal atrial fibrillation (HCC) 7/19/2021   • Sleep apnea         Immunization History   Administered Date(s) Administered   • COVID-19 (PFIZER) 03/16/2021, 04/06/2021, 08/25/2021   • Fluzone High Dose =>65 Years ([facility] ONLY) 10/27/2020   • Pneumococcal Conjugate 13-Valent (PCV13) 10/20/2015       Allergies   Allergen Reactions   • Amiodarone Unknown - High Severity   • Lisinopril Swelling   • Codeine Nausea And Vomiting          Current Outpatient Medications:   •  albuterol sulfate  (90 Base) MCG/ACT inhaler, Inhale 2 puffs Every 4 (Four) Hours As Needed for Wheezing., Disp: , Rfl:   •  apixaban (Eliquis) 5 MG tablet tablet, Take 1 tablet by mouth 2 (two) times a day., Disp: 180 tablet, Rfl: 3  •  aspirin 81 MG chewable tablet, Chew 81 mg Daily., Disp: , Rfl:   •  atorvastatin (LIPITOR) 40 MG tablet, Take 40 mg by mouth Every Night., Disp: , Rfl:   •  Azelastine-Fluticasone (Dymista) 137-50 MCG/ACT suspension, into the nostril(s) as directed by provider 2 (two) times a day., Disp: , Rfl:   •  azithromycin (Zithromax) 250 MG tablet, Take 1 tablet by mouth  Daily., Disp: 30 tablet, Rfl: 5  •  benzonatate (TESSALON) 100 MG capsule, TAKE 1 CAPSULE THREE TIMES A DAY, Disp: 270 capsule, Rfl: 3  •  budesonide (PULMICORT) 0.5 MG/2ML nebulizer solution, Take 2 mL by nebulization 2 (two) times a day., Disp: 60 each, Rfl: 5  •  bumetanide (BUMEX) 0.5 MG tablet, Take 0.5 mg by mouth Daily., Disp: , Rfl:   •  cetirizine (zyrTEC) 10 MG tablet, Take 10 mg by mouth Daily., Disp: , Rfl:   •  clobetasol (TEMOVATE) 0.05 % cream, apply A thin layer topically TO THE back AS NEEDED, Disp: , Rfl:   •  famotidine (PEPCID) 20 MG tablet, Take 20 mg by mouth 2 (Two) Times a Day., Disp: , Rfl:   •  folic acid (FOLVITE) 1 MG tablet, TAKE 1 TABLET DAILY, Disp: 90 tablet, Rfl: 3  •  furosemide (LASIX) 40 MG tablet, Take 40 mg by mouth Daily., Disp: , Rfl:   •  glipizide (GLUCOTROL) 5 MG tablet, TAKE 1 TABLET DAILY, Disp: 90 tablet, Rfl: 0  •  guaiFENesin (MUCINEX) 600 MG 12 hr tablet, Take  by mouth 2 (Two) Times a Day., Disp: , Rfl:   •  hydrocortisone 2.5 % ointment, apply A thin layer topically TO THE NECK UP TO TWICE DAILY as needed, Disp: , Rfl:   •  hydroxychloroquine (Plaquenil) 200 MG tablet, Take 200 mg by mouth Daily., Disp: , Rfl:   •  ipratropium-albuterol (DUO-NEB) 0.5-2.5 mg/3 ml nebulizer, Take 3 mL by nebulization Every 6 (Six) Hours As Needed for Wheezing or Shortness of Air for up to 90 days., Disp: 360 mL, Rfl: 3  •  levothyroxine (SYNTHROID, LEVOTHROID) 75 MCG tablet, Take 75 mcg by mouth Daily., Disp: , Rfl:   •  metFORMIN (GLUCOPHAGE) 850 MG tablet, Take 850 mg by mouth 2 (Two) Times a Day With Meals., Disp: , Rfl:   •  montelukast (SINGULAIR) 10 MG tablet, Take 10 mg by mouth Daily., Disp: , Rfl:   •  nicotine polacrilex (COMMIT) 2 MG lozenge, Dissolve 1 lozenge in the mouth As Needed for Smoking Cessation., Disp: 120 lozenge, Rfl: 2  •  pantoprazole (PROTONIX) 40 MG EC tablet, Take 40 mg by mouth Daily., Disp: , Rfl:   •  Patiromer Sorbitex Calcium (VELTASSA) 8.4 g pack,  "Take 8.4 g by mouth 1 (One) Time., Disp: , Rfl:   •  predniSONE (DELTASONE) 10 MG tablet, 50mg qday x 3 days, 40mg qday x 3 days, 30mg qday x 3 days, 20mg qday x 3 days, 10mg qday x 3 days, then stop, Disp: 45 tablet, Rfl: 0  •  primidone (MYSOLINE) 250 MG tablet, TAKE 1 TABLET TWICE A DAY, Disp: 180 tablet, Rfl: 3  •  sacubitril-valsartan (Entresto) 24-26 MG tablet, Every 12 (Twelve) Hours., Disp: , Rfl:   •  sertraline (ZOLOFT) 25 MG tablet, TAKE 1 TABLET DAILY, Disp: 90 tablet, Rfl: 1  •  spironolactone (ALDACTONE) 25 MG tablet, Take 25 mg by mouth Daily., Disp: , Rfl:   •  tiotropium bromide-olodaterol (Stiolto Respimat) 2.5-2.5 MCG/ACT aerosol solution inhaler, Inhale 2 puffs Daily for 90 days., Disp: 3 each, Rfl: 3  •  vitamin D (ERGOCALCIFEROL) 1.25 MG (28156 UT) capsule capsule, TAKE 1 CAPSULE ONCE WEEKLY, Disp: 12 capsule, Rfl: 3     Objective     Physical Exam  Vital Signs Reviewed  WDWN, Alert, NAD.    HEENT:  PERRL, EOMI.  OP, nares clear, no sinus tenderness  Neck:  Supple, no JVD, no thyromegaly.  Lymph: no axillary, cervical, supraclavicular lymphadenopathy noted bilaterally  Chest: Mildly decreased breath sounds throughout. No wheezes, rales, or rhonchi appreciated.  Normal work of breathing noted.  Patient is able speak full sentences without difficulty.  CV: RRR, no MGR, pulses 2+, equal.  Abd:  Soft, NT, ND, + BS, no HSM  EXT:  no clubbing, no cyanosis, no edema, no joint tenderness  Neuro:  A&Ox3, CN grossly intact, no focal deficits.  Skin: No rashes or lesions noted.    Vital Signs:   /67   Pulse 51   Resp 14   Ht 167.6 cm (66\")   Wt 65.2 kg (143 lb 12.8 oz)   SpO2 100% Comment: 3liters  BMI 23.21 kg/m²         Result Review :   I have personally reviewed my last office visit note.  See scanned reports.         Assessment and Plan      Assessment:  1. Severe chronic obstructive pulmonary disease. Patient is on triple inhaler therapy.      2. Severe coronary artery disease with " history of CABG in 2019.     3. Severe systolic heart failure with ejection fraction of 15-20% followed by cardiology.          4. Seasonal allergies.       5. Allergic rhinitis.          6. Nocturnal hypoxia.    7. Tobacco abuse of cigarettes ongoing.  8.  Pleural effusion.    Plan:  1.   Last office visit EKG, CBC, CMP, pro-- BNP, and sputum culture were ordered however patient did not have these completed.  Patient was set up for thoracentesis however there was not enough fluid to drain and patient did not have procedure completed.  Patient is currently taking a prednisone taper and states he is feeling better.  2.  Continue Stiolto and Pulmicort every day as prescribed rinse mouth out after each use.  3.  Continue albuterol inhaler and Xopenex nebulizer treatments as needed.  4.  Continue oxygen to keep SPO2 at 89% and above.  5.  Follow-up with cardiologist as scheduled.  6.   I counseled the patient on smoking cessation.  I counseled the patient on the risks of continued smoking including the risk of lung cancer, head and neck cancer, renal cancer, heart disease, stroke, and early death.  Patient has nicotine lozenges at home.  Patient is advised to decrease the number of cigarettes they are smoking up until the point to where they can quit.  7.  Patient reports they are up-to-date with flu, pneumonia, and Covid vaccines.  Patient is advised to continue to follow CDC recommendations such as social distancing wearing a mask and washing hands for at least 20 seconds.  8.  Patient to call the office, 911, or go to the ER with new or worsening symptoms.  9. Follow up with Dr. Alcantara as scheduled, sooner if needed.          Follow Up   Return for keep appt with Dr. Alcantara in March, 2022 as scheduled.  Patient was given instructions and counseling regarding his condition or for health maintenance advice. Please see specific information pulled into the AVS if appropriate.

## 2021-11-04 NOTE — PATIENT INSTRUCTIONS
Steps to Quit Smoking  Smoking tobacco is the leading cause of preventable death. It can affect almost every organ in the body. Smoking puts you and people around you at risk for many serious, long-lasting (chronic) diseases. Quitting smoking can be hard, but it is one of the best things that you can do for your health. It is never too late to quit.  How do I get ready to quit?  When you decide to quit smoking, make a plan to help you succeed. Before you quit:  · Pick a date to quit. Set a date within the next 2 weeks to give you time to prepare.  · Write down the reasons why you are quitting. Keep this list in places where you will see it often.  · Tell your family, friends, and co-workers that you are quitting. Their support is important.  · Talk with your doctor about the choices that may help you quit.  · Find out if your health insurance will pay for these treatments.  · Know the people, places, things, and activities that make you want to smoke (triggers). Avoid them.  What first steps can I take to quit smoking?  · Throw away all cigarettes at home, at work, and in your car.  · Throw away the things that you use when you smoke, such as ashtrays and lighters.  · Clean your car. Make sure to empty the ashtray.  · Clean your home, including curtains and carpets.  What can I do to help me quit smoking?  Talk with your doctor about taking medicines and seeing a counselor at the same time. You are more likely to succeed when you do both.  · If you are pregnant or breastfeeding, talk with your doctor about counseling or other ways to quit smoking. Do not take medicine to help you quit smoking unless your doctor tells you to do so.  To quit smoking:  Quit right away  · Quit smoking totally, instead of slowly cutting back on how much you smoke over a period of time.  · Go to counseling. You are more likely to quit if you go to counseling sessions regularly.  Take medicine  You may take medicines to help you quit. Some  medicines need a prescription, and some you can buy over-the-counter. Some medicines may contain a drug called nicotine to replace the nicotine in cigarettes. Medicines may:  · Help you to stop having the desire to smoke (cravings).  · Help to stop the problems that come when you stop smoking (withdrawal symptoms).  Your doctor may ask you to use:  · Nicotine patches, gum, or lozenges.  · Nicotine inhalers or sprays.  · Non-nicotine medicine that is taken by mouth.  Find resources  Find resources and other ways to help you quit smoking and remain smoke-free after you quit. These resources are most helpful when you use them often. They include:  · Online chats with a counselor.  · Phone quitlines.  · Printed self-help materials.  · Support groups or group counseling.  · Text messaging programs.  · Mobile phone apps. Use apps on your mobile phone or tablet that can help you stick to your quit plan. There are many free apps for mobile phones and tablets as well as websites. Examples include Quit Guide from the CDC and smokefree.gov    What things can I do to make it easier to quit?    · Talk to your family and friends. Ask them to support and encourage you.  · Call a phone quitline (4-162-QUITNOW), reach out to support groups, or work with a counselor.  · Ask people who smoke to not smoke around you.  · Avoid places that make you want to smoke, such as:  ? Bars.  ? Parties.  ? Smoke-break areas at work.  · Spend time with people who do not smoke.  · Lower the stress in your life. Stress can make you want to smoke. Try these things to help your stress:  ? Getting regular exercise.  ? Doing deep-breathing exercises.  ? Doing yoga.  ? Meditating.  ? Doing a body scan. To do this, close your eyes, focus on one area of your body at a time from head to toe. Notice which parts of your body are tense. Try to relax the muscles in those areas.  How will I feel when I quit smoking?  Day 1 to 3 weeks  Within the first 24 hours,  you may start to have some problems that come from quitting tobacco. These problems are very bad 2-3 days after you quit, but they do not often last for more than 2-3 weeks. You may get these symptoms:  · Mood swings.  · Feeling restless, nervous, angry, or annoyed.  · Trouble concentrating.  · Dizziness.  · Strong desire for high-sugar foods and nicotine.  · Weight gain.  · Trouble pooping (constipation).  · Feeling like you may vomit (nausea).  · Coughing or a sore throat.  · Changes in how the medicines that you take for other issues work in your body.  · Depression.  · Trouble sleeping (insomnia).  Week 3 and afterward  After the first 2-3 weeks of quitting, you may start to notice more positive results, such as:  · Better sense of smell and taste.  · Less coughing and sore throat.  · Slower heart rate.  · Lower blood pressure.  · Clearer skin.  · Better breathing.  · Fewer sick days.  Quitting smoking can be hard. Do not give up if you fail the first time. Some people need to try a few times before they succeed. Do your best to stick to your quit plan, and talk with your doctor if you have any questions or concerns.  Summary  · Smoking tobacco is the leading cause of preventable death. Quitting smoking can be hard, but it is one of the best things that you can do for your health.  · When you decide to quit smoking, make a plan to help you succeed.  · Quit smoking right away, not slowly over a period of time.  · When you start quitting, seek help from your doctor, family, or friends.  This information is not intended to replace advice given to you by your health care provider. Make sure you discuss any questions you have with your health care provider.  Document Revised: 09/11/2020 Document Reviewed: 03/07/2020  [employer] Patient Education © 2021 [employer]  Pleural Effusion  Pleural effusion is an abnormal buildup of fluid in the layers of tissue between the lungs and the inside of the chest (pleural space)  The two layers of tissue that line the lungs and the inside of the chest are called pleura. Usually, there is no air in the space between the pleura, only a thin layer of fluid. Some conditions can cause a large amount of fluid to build up, which can cause the lung to collapse if untreated. A pleural effusion is usually caused by another disease that requires treatment.  What are the causes?  Pleural effusion can be caused by:  · Heart failure.  · Certain infections, such as pneumonia or tuberculosis.  · Cancer.  · A blood clot in the lung (pulmonary embolism).  · Complications from surgery, such as from open heart surgery.  · Liver disease (cirrhosis).  · Kidney disease.  What are the signs or symptoms?  In some cases, pleural effusion may cause no symptoms. If symptoms are present, they may include:  · Shortness of breath, especially when lying down.  · Chest pain. This may get worse when taking a deep breath.  · Fever.  · Dry, long-lasting (chronic) cough.  · Hiccups.  · Rapid breathing.  An underlying condition that is causing the pleural effusion (such as heart failure, pneumonia, blood clots, tuberculosis, or cancer) may also cause other symptoms.  How is this diagnosed?  This condition may be diagnosed based on:  · Your symptoms and medical history.  · A physical exam.  · A chest X-ray.  · A procedure to use a needle to remove fluid from the pleural space (thoracentesis). This fluid is tested.  · Other imaging studies of the chest, such as ultrasound or CT scan.  How is this treated?  Depending on the cause of your condition, treatment may include:  · Treating the underlying condition that is causing the effusion. When that condition improves, the effusion will also improve. Examples of treatment for underlying conditions include:  ? Antibiotic medicines to treat an infection.  ? Diuretics or other heart medicines to treat heart failure.  · Thoracentesis.  · Placing a thin flexible tube under your skin and  into your chest to continuously drain the effusion (indwelling pleural catheter).  · Surgery to remove the outer layer of tissue from the pleural space (decortication).  · A procedure to put medicine into the chest cavity to seal the pleural space and prevent fluid buildup (pleurodesis).  · Chemotherapy and radiation therapy, if you have cancerous (malignant) pleural effusion. These treatments are typically used to treat cancer. They kill certain cells in the body.  Follow these instructions at home:  · Take over-the-counter and prescription medicines only as told by your health care provider.  · Ask your health care provider what activities are safe for you.  · Keep track of how long you are able to do mild exercise (such as walking) before you get short of breath. Write down this information to share with your health care provider. Your ability to exercise should improve over time.  · Do not use any products that contain nicotine or tobacco, such as cigarettes and e-cigarettes. If you need help quitting, ask your health care provider.  · Keep all follow-up visits as told by your health care provider. This is important.  Contact a health care provider if:  · The amount of time that you are able to do mild exercise:  ? Decreases.  ? Does not improve with time.  · You have a fever.  Get help right away if:  · You are short of breath.  · You develop chest pain.  · You develop a new cough.  Summary  · Pleural effusion is an abnormal buildup of fluid in the layers of tissue between the lungs and the inside of the chest.  · Pleural effusion can have many causes, including heart failure, pulmonary embolism, infections, or cancer.  · Symptoms of pleural effusion can include shortness of breath, chest pain, fever, long-lasting (chronic) cough, hiccups, or rapid breathing.  · Diagnosis often involves making images of the chest (such as with ultrasound or X-ray) and removing fluid (thoracentesis) to send for  testing.  · Treatment for pleural effusion depends on what underlying condition is causing it.  This information is not intended to replace advice given to you by your health care provider. Make sure you discuss any questions you have with your health care provider.  Document Revised: 11/30/2018 Document Reviewed: 08/23/2018  [employer] Patient Education © 2021 [employer]

## 2021-11-11 PROBLEM — J90 PLEURAL EFFUSION: Status: ACTIVE | Noted: 2021-01-01

## 2021-11-17 NOTE — ED PROVIDER NOTES
Time: 2:28 PM EST  Arrived by: care facility's vehicle  Chief Complaint: Hypotension and tachycardia at rehab today  History provided by: Patient  History is limited by: N/A     History of Present Illness:  Patient is a 71 y.o. year old male that presents to the emergency department with hypotension and tachycardia today at rehab.  Vital signs prior to him getting rehab showed blood pressure in the 80s systolic and pulse in the 140s to 160s.  Patient states with this he felt 0 chest pain or difficulty breathing.  Does not complain of palpitations.  States his blood pressure always runs on the low end and his only complaint today is generalized fatigue.  He specifically denies dizziness with standing.  Would like to go home.  Denies any recent illness.    HPI    Similar Symptoms Previously: Yes  Recently seen: No      Patient Care Team  Primary Care Provider: Ean Farmer MD    Past Medical History:     Allergies   Allergen Reactions   • Amiodarone Unknown - High Severity   • Lisinopril Swelling   • Codeine Nausea And Vomiting     Past Medical History:   Diagnosis Date   • Abnormal ECG    • Abscess    • Arrhythmia    • Arthritis    • Atrial fibrillation (HCC)    • CAD with history of CABG 9/6/2019    Added automatically from request for surgery [id number]   • Cancer (Formerly [facility])     skin   • CHF (congestive heart failure) (Formerly [facility])    • COPD (chronic obstructive pulmonary disease) (Formerly [facility])    • Coronary artery disease    • Cutaneous lupus erythematosus    • Diabetes mellitus ([facility]ly [facility])    • Elevated cholesterol    • GERD (gastroesophageal reflux disease)    • Heart valve disease    • Hypertension    • Hypothyroidism, unspecified 7/30/2021   • Myocardial infarction (Formerly [facility])    • Other hyperlipidemia 9/8/2019   • Paroxysmal atrial fibrillation (Formerly [facility]) 7/19/2021   • Sleep apnea      Past Surgical History:   Procedure Laterality Date   • CARDIAC CATHETERIZATION     • CARDIAC DEFIBRILLATOR PLACEMENT     • COLONOSCOPY     • CORONARY  ARTERY BYPASS GRAFT N/A 9/9/2019    Procedure: INTRAOPERATIVE BUDDY, MIDLINE STENOTOMY WITH CORONARY ARTERY BYPASS GRAPHS X  3 UTILIZING LEFT SAVAGE  AND LEFT ENDOSCOPIC HARVESTED SAPHENOUS VEIN, LIGATION OF LEFT  ATRIAL APPENDAGE; PRP;  Surgeon: Alvaro Mazariegos MD;  Location: [facility];  Service: Cardiothoracic   • LIPOMA EXCISION     • NECK SURGERY     • OTHER SURGICAL HISTORY      heart valve repair   not sure which valve     Family History   Problem Relation Age of Onset   • Lung cancer Father    • Breast cancer Father    • Diabetes Sister    • Heart disease Brother    • Breast cancer Other    • Heart disease Other    • Diabetes Brother    • Heart attack Brother        Home Medications:  Prior to Admission medications    Medication Sig Start Date End Date Taking? Authorizing Provider   albuterol sulfate  (90 Base) MCG/ACT inhaler Inhale 2 puffs Every 4 (Four) Hours As Needed for Wheezing.    ProviderRajwinder MD   apixaban (Eliquis) 5 MG tablet tablet Take 1 tablet by mouth 2 (two) times a day. 8/4/21   Chanda Batista APRN   aspirin 81 MG chewable tablet Chew 81 mg Daily.    ProviderRajwinder MD   atorvastatin (LIPITOR) 40 MG tablet Take 40 mg by mouth Every Night.    ProviderRajwinder MD   Azelastine-Fluticasone (Dymista) 137-50 MCG/ACT suspension into the nostril(s) as directed by provider 2 (two) times a day.    ProviderRajwinder MD   azithromycin (Zithromax) 250 MG tablet Take 1 tablet by mouth Daily. 8/5/21   Ean Farmer MD   benzonatate (TESSALON) 100 MG capsule TAKE 1 CAPSULE THREE TIMES A DAY 8/3/21   Ankur Alcantara MD   budesonide (PULMICORT) 0.5 MG/2ML nebulizer solution Take 2 mL by nebulization 2 (two) times a day. 8/12/21   Ean Farmer MD   bumetanide (BUMEX) 0.5 MG tablet Take 0.5 mg by mouth Daily.    ProviderRajwinder MD   cetirizine (zyrTEC) 10 MG tablet Take 10 mg by mouth Daily.    ProviderRajwinder MD   clobetasol (TEMOVATE)  0.05 % cream apply A thin layer topically TO THE back AS NEEDED 9/16/21   Rajwinder Tyler MD   famotidine (PEPCID) 20 MG tablet Take 20 mg by mouth 2 (Two) Times a Day.    Rajwinder Tyler MD   folic acid (FOLVITE) 1 MG tablet TAKE 1 TABLET DAILY 7/29/21   Ean Farmer MD   furosemide (LASIX) 40 MG tablet Take 40 mg by mouth Daily.    Rajwinder Tyler MD   glipizide (GLUCOTROL) 5 MG tablet TAKE 1 TABLET DAILY 10/28/21   Ean Farmer MD   guaiFENesin (MUCINEX) 600 MG 12 hr tablet Take  by mouth 2 (Two) Times a Day.    Rajwinder Tyler MD   hydrocortisone 2.5 % ointment apply A thin layer topically TO THE NECK UP TO TWICE DAILY as needed 9/16/21   Rajwinder Tyler MD   hydroxychloroquine (Plaquenil) 200 MG tablet Take 200 mg by mouth Daily.    Rajwinder Tyler MD   ipratropium-albuterol (DUO-NEB) 0.5-2.5 mg/3 ml nebulizer Take 3 mL by nebulization Every 6 (Six) Hours As Needed for Wheezing or Shortness of Air for up to 90 days. 11/11/21 2/9/22  Cassidy Peñaloza APRN   levothyroxine (SYNTHROID, LEVOTHROID) 75 MCG tablet TAKE 1 TABLET ONCE DAILY 11/15/21   Ean Farmer MD   metFORMIN (GLUCOPHAGE) 850 MG tablet Take 850 mg by mouth 2 (Two) Times a Day With Meals.    Rajwinder Tyler MD   montelukast (SINGULAIR) 10 MG tablet TAKE 1 TABLET ONCE DAILY 11/15/21   Ean Farmer MD   nicotine polacrilex (COMMIT) 2 MG lozenge Dissolve 1 lozenge in the mouth As Needed for Smoking Cessation. 11/1/21   Cassidy Peñaloza APRN   pantoprazole (PROTONIX) 40 MG EC tablet Take 40 mg by mouth Daily.    Rajwinder Tyler MD   Patiromer Sorbitex Calcium (VELTASSA) 8.4 g pack Take 8.4 g by mouth 1 (One) Time.    Rajwinder Tyler MD   predniSONE (DELTASONE) 10 MG tablet 50mg qday x 3 days, 40mg qday x 3 days, 30mg qday x 3 days, 20mg qday x 3 days, 10mg qday x 3 days, then stop 11/5/21   Ankur Alcantara MD   primidone (MYSOLINE) 250 MG tablet TAKE 1  TABLET TWICE A DAY 21   Ean Farmer MD   sacubitril-valsartan (Entresto) 24-26 MG tablet Every 12 (Twelve) Hours. 21   ProviderRajwinder MD   sertraline (ZOLOFT) 25 MG tablet TAKE 1 TABLET DAILY 10/15/21   Ean Farmer MD   spironolactone (ALDACTONE) 25 MG tablet Take 25 mg by mouth Daily.    Provider, MD Rajwinder   tiotropium bromide-olodaterol (Stiolto Respimat) 2.5-2.5 MCG/ACT aerosol solution inhaler Inhale 2 puffs Daily for 90 days. 21  Cassidy Peñaloza APRN   vitamin D (ERGOCALCIFEROL) 1.25 MG (11070 UT) capsule capsule TAKE 1 CAPSULE ONCE WEEKLY 21   Ean Farmer MD        Social History:   Social History     Tobacco Use   • Smoking status: Current Every Day Smoker     Packs/day: 0.25     Years: 58.00     Pack years: 14.50     Types: Cigarettes     Start date:      Last attempt to quit: 2019     Years since quittin.1   • Smokeless tobacco: Never Used   • Tobacco comment: 3 cigg per day    Vaping Use   • Vaping Use: Never used   Substance Use Topics   • Alcohol use: No   • Drug use: Never     Recent travel: no     Review of Systems:  Review of Systems   Constitutional: Positive for fatigue. Negative for chills and fever.   HENT: Negative for congestion, rhinorrhea and sore throat.    Eyes: Negative for pain and visual disturbance.   Respiratory: Negative for apnea, cough, chest tightness and shortness of breath.    Cardiovascular: Negative for chest pain and palpitations.   Gastrointestinal: Negative for abdominal pain, diarrhea, nausea and vomiting.   Genitourinary: Negative for difficulty urinating and dysuria.   Musculoskeletal: Negative for joint swelling and myalgias.   Skin: Negative for color change.   Neurological: Negative for seizures and headaches.   Psychiatric/Behavioral: Negative.    All other systems reviewed and are negative.       Physical Exam:  BP 99/68   Pulse 91   Temp 97.6 °F (36.4 °C) (Oral)   Resp 19   " Ht 167.6 cm (66\")   Wt 68.3 kg (150 lb 9.2 oz)   SpO2 98%   BMI 24.30 kg/m²     Physical Exam  Vitals and nursing note reviewed.   Constitutional:       Appearance: Normal appearance.   HENT:      Head: Normocephalic and atraumatic.      Nose: Nose normal.      Mouth/Throat:      Mouth: Mucous membranes are dry.   Eyes:      Extraocular Movements: Extraocular movements intact.      Pupils: Pupils are equal, round, and reactive to light.   Cardiovascular:      Rate and Rhythm: Normal rate. Rhythm irregular.      Heart sounds: Normal heart sounds.      Comments: Heart rate in the 80s and 90s.  Never over 100 on my evaluation.  Pulmonary:      Effort: Pulmonary effort is normal.      Breath sounds: Normal breath sounds.   Abdominal:      General: Bowel sounds are normal.      Palpations: Abdomen is soft.      Tenderness: There is no abdominal tenderness.   Musculoskeletal:         General: No swelling. Normal range of motion.      Cervical back: Normal range of motion and neck supple.      Right lower leg: No edema.      Left lower leg: No edema.   Skin:     General: Skin is warm and dry.      Coloration: Skin is not jaundiced.   Neurological:      General: No focal deficit present.      Mental Status: He is alert and oriented to person, place, and time. Mental status is at baseline.   Psychiatric:         Mood and Affect: Mood normal.         Behavior: Behavior normal.         Judgment: Judgment normal.                Medications in the Emergency Department:  Medications   sodium chloride 0.9 % flush 10 mL (has no administration in time range)   sodium chloride 0.9 % bolus 500 mL (500 mL Intravenous New Bag 11/17/21 1431)        Labs  Lab Results (last 24 hours)     Procedure Component Value Units Date/Time    POC Glucose [[id number]]  (Abnormal) Collected: 11/17/21 1253    Specimen: Blood Updated: 11/17/21 1254     Glucose 157 mg/dL      Comment: Serial Number: [id number]:  [id number]       CBC & " Differential [[id number]]  (Abnormal) Collected: 11/17/21 1326    Specimen: Blood Updated: 11/17/21 1334    Narrative:      The following orders were created for panel order CBC & Differential.  Procedure                               Abnormality         Status                     ---------                               -----------         ------                     CBC Auto Differential[[id number]]        Abnormal            Final result                 Please view results for these tests on the individual orders.    Comprehensive Metabolic Panel [[id number]]  (Abnormal) Collected: 11/17/21 1326    Specimen: Blood Updated: 11/17/21 1400     Glucose 170 mg/dL      BUN 18 mg/dL      Creatinine 0.97 mg/dL      Sodium 135 mmol/L      Potassium 4.2 mmol/L      Comment: Slight hemolysis detected by analyzer. Results may be affected.        Chloride 99 mmol/L      CO2 26.2 mmol/L      Calcium 9.0 mg/dL      Total Protein 7.2 g/dL      Albumin 3.80 g/dL      ALT (SGPT) 35 U/L      AST (SGOT) 26 U/L      Alkaline Phosphatase 106 U/L      Total Bilirubin <0.2 mg/dL      eGFR Non African Amer 76 mL/min/1.73      Globulin 3.4 gm/dL      A/G Ratio 1.1 g/dL      BUN/Creatinine Ratio 18.6     Anion Gap 9.8 mmol/L     Narrative:      GFR Normal >60  Chronic Kidney Disease <60  Kidney Failure <15      BNP [[id number]]  (Abnormal) Collected: 11/17/21 1326    Specimen: Blood Updated: 11/17/21 1357     proBNP 1,551.0 pg/mL     Narrative:      Among patients with dyspnea, NT-proBNP is highly sensitive for the detection of acute congestive heart failure. In addition NT-proBNP of <300 pg/ml effectively rules out acute congestive heart failure with 99% negative predictive value.    Results may be falsely decreased if patient taking Biotin.      Troponin [[id number]]  (Normal) Collected: 11/17/21 1326    Specimen: Blood Updated: 11/17/21 1400     Troponin T <0.010 ng/mL     Narrative:      Troponin T Reference Range:  <= 0.03 ng/mL-   Negative  for AMI  >0.03 ng/mL-     Abnormal for myocardial necrosis.  Clinicians would have to utilize clinical acumen, EKG, Troponin and serial changes to determine if it is an Acute Myocardial Infarction or myocardial injury due to an underlying chronic condition.       Results may be falsely decreased if patient taking Biotin.      CBC Auto Differential [[id number]]  (Abnormal) Collected: 11/17/21 1326    Specimen: Blood Updated: 11/17/21 1334     WBC 11.33 10*3/mm3      RBC 3.99 10*6/mm3      Hemoglobin 13.1 g/dL      Hematocrit 41.0 %      .8 fL      MCH 32.8 pg      MCHC 32.0 g/dL      RDW 13.2 %      RDW-SD 50.5 fl      MPV 9.9 fL      Platelets 287 10*3/mm3      Neutrophil % 67.7 %      Lymphocyte % 21.2 %      Monocyte % 5.8 %      Eosinophil % 3.3 %      Basophil % 0.6 %      Immature Grans % 1.4 %      Neutrophils, Absolute 7.67 10*3/mm3      Lymphocytes, Absolute 2.40 10*3/mm3      Monocytes, Absolute 0.66 10*3/mm3      Eosinophils, Absolute 0.37 10*3/mm3      Basophils, Absolute 0.07 10*3/mm3      Immature Grans, Absolute 0.16 10*3/mm3      nRBC 0.0 /100 WBC            Imaging:  XR Chest 1 View    Result Date: 11/17/2021  PROCEDURE: XR CHEST 1 VW  COMPARISON: [facility], , XR CHEST 2 VW, 11/01/2021, 14:26.  INDICATIONS: SOA Triage Protocol  FINDINGS:  LUNGS: Normal.  No significant pulmonary parenchymal abnormalities.  VASCULATURE: Normal.  Unremarkable pulmonary vasculature.  CARDIAC: Normal.  No cardiac silhouette abnormality or cardiomegaly.  MEDIASTINUM: Normal.  No visible mass or adenopathy.  PLEURA: Normal.  No effusion or pleural thickening.  BONES: Median sternotomy wires appear intact.  Cervical spinal hardware is noted. OTHER: A left subclavian pacemaker is in place.  CONCLUSION: No acute cardiopulmonary process identified.       KENIA NAVA MD       Electronically Signed and Approved By: KENIA NAVA MD on 11/17/2021 at 14:26                Procedures:  Procedures    Progress  ED Course as of 11/17/21 1439   Wed Nov 17, 2021   1419 EKG interpretation,: Atrial fibrillation, heart rate 96, normal axis, normal QRS, minimal ST depression V4 through V6.  Similar findings seen on prior EKG [RP]   1437 Blood pressure 99/68, pulse in the 70s [RP]      ED Course User Index  [RP] Juan Antonio Godfrey MD                            Medical Decision Making:  MDM  Number of Diagnoses or Management Options     Amount and/or Complexity of Data Reviewed  Clinical lab tests: reviewed  Tests in the medicine section of CPT®: reviewed  Decide to obtain previous medical records or to obtain history from someone other than the patient: yes  Independent visualization of images, tracings, or specimens: yes    Risk of Complications, Morbidity, and/or Mortality  Presenting problems: moderate  Management options: low         Final diagnoses:   Atrial fibrillation with rapid ventricular response (HCC)        Disposition:  ED Disposition     ED Disposition Condition Comment    Discharge Stable           This medical record created using voice recognition software and may contain unintended errors.         Juan Antonio Godfrey MD  11/17/21 1430

## 2021-11-17 NOTE — DISCHARGE INSTRUCTIONS
Return to the emergency department if you develop any chest pain or difficulty breathing, dizziness/passing out or if your heart rate is persistently greater than 120.

## 2021-11-17 NOTE — PROGRESS NOTES
1255-Pt in cardiopulmonary rehab prior to exercise.  Heart rated noted to be elevated.  Cardiac monitor in rapid a fib -155.  BP 80/50.  Warm-dry-pale.  No co discomfort.  O2  2 liters per nasal cannula.  1310- Placed in wheel chair, transported to ED with RN.  1315- Report given to ED triage RN.

## 2021-12-06 NOTE — PROGRESS NOTES
Tol[facility].  Patient very short of air on six minute walk.  He completed phase 2 Pulmonary Rehab today and plans to return in January to Phase 3.

## 2021-12-27 PROBLEM — F41.1 ANXIETY, GENERALIZED: Status: ACTIVE | Noted: 2021-01-01

## 2021-12-27 PROBLEM — F32.A DEPRESSION: Status: ACTIVE | Noted: 2021-01-01

## 2021-12-27 PROBLEM — M32.9 LUPUS (HCC): Status: ACTIVE | Noted: 2021-01-01

## 2021-12-27 NOTE — PROGRESS NOTES
"Chief Complaint/ HPI: No appetite no energy, dyspnea on exertion, finished phase 2 of cardiac rehab starts back again in January, here with his wife, wearing oxygen, no chest pains    Epistaxis, coughing more wheezing more,          Objective   Vital Signs  Vitals:    12/27/21 1306   BP: 126/74   BP Location: Left arm   Patient Position: Sitting   Cuff Size: Adult   Pulse: 76   Temp: 97.9 °F (36.6 °C)   TempSrc: Temporal   SpO2: 96%   Weight: 64.4 kg (142 lb)   Height: 167.6 cm (66\")      Body mass index is 22.92 kg/m².  Review of Systems   HENT: Positive for nosebleeds.    Eyes: Negative.    Respiratory: Positive for cough, shortness of breath and wheezing.    Cardiovascular: Negative.    Gastrointestinal: Negative.    Endocrine: Negative.    Genitourinary: Negative.    Musculoskeletal: Negative.    Allergic/Immunologic: Negative.    Neurological: Positive for weakness.   Hematological: Negative.    Psychiatric/Behavioral: Negative.       Physical Exam  Constitutional:       General: He is not in acute distress.     Appearance: Normal appearance.   HENT:      Head: Normocephalic.      Mouth/Throat:      Mouth: Mucous membranes are moist.   Eyes:      Conjunctiva/sclera: Conjunctivae normal.      Pupils: Pupils are equal, round, and reactive to light.   Cardiovascular:      Rate and Rhythm: Normal rate and regular rhythm.      Pulses: Normal pulses.      Heart sounds: Normal heart sounds.   Pulmonary:      Effort: Respiratory distress present.      Breath sounds: Wheezing and rhonchi present.   Abdominal:      General: Abdomen is flat. Bowel sounds are normal.      Palpations: Abdomen is soft.   Musculoskeletal:         General: No swelling. Normal range of motion.      Cervical back: Neck supple.   Skin:     General: Skin is warm and dry.      Coloration: Skin is not jaundiced.   Neurological:      General: No focal deficit present.      Mental Status: He is alert and oriented to person, place, and time. Mental " status is at baseline.   Psychiatric:         Mood and Affect: Mood normal.         Behavior: Behavior normal.         Thought Content: Thought content normal.         Judgment: Judgment normal.        Result Review :   Lab Results   Component Value Date    PROBNP 825.4 12/23/2021    PROBNP 1,551.0 (H) 11/17/2021    PROBNP 985.7 (H) 08/20/2021     05/24/2021    BNP 1124 (H) 05/20/2021     05/13/2021     CMP    CMP 8/20/21 11/17/21 12/23/21   Glucose 91 170 (A) 107 (A)   BUN 12 18 17   Creatinine 1.03 0.97 0.94   eGFR Non African Am 71 76 79   Sodium 135 (A) 135 (A) 138   Potassium 5.2 4.2 5.5 (A)   Chloride 100 99 101   Calcium 9.1 9.0 9.5   Albumin 3.90 3.80 4.00   Total Bilirubin 0.2 <0.2 0.2   Alkaline Phosphatase 104 106 134 (A)   AST (SGOT) 22 26 29   ALT (SGPT) 22 35 27   (A) Abnormal value       Comments are available for some flowsheets but are not being displayed.           CBC w/diff    CBC w/Diff 8/20/21 11/17/21 12/23/21   WBC 6.73 11.33 (A) 7.02   RBC 3.87 (A) 3.99 (A) 4.16   Hemoglobin 12.4 (A) 13.1 13.6   Hematocrit 36.7 (A) 41.0 40.0   MCV 94.8 102.8 (A) 96.2   MCH 32.0 32.8 32.7   MCHC 33.8 32.0 34.0   RDW 13.0 13.2 12.3   Platelets 260 287 291   Neutrophil Rel % 60.7 67.7 63.1   Immature Granulocyte Rel % 1.0 (A) 1.4 (A) 0.6 (A)   Lymphocyte Rel % 23.3 21.2 21.9   Monocyte Rel % 6.2 5.8 6.4   Eosinophil Rel % 8.2 (A) 3.3 7.3 (A)   Basophil Rel % 0.6 0.6 0.7   (A) Abnormal value             Lipid Panel    Lipid Panel 8/20/21 12/23/21   Total Cholesterol 182 166   Triglycerides 85 70   HDL Cholesterol 91 (A) 76 (A)   VLDL Cholesterol 15 13   LDL Cholesterol  76 77   LDL/HDL Ratio 0.81 1.00   (A) Abnormal value             Lab Results   Component Value Date    TSH 1.570 12/23/2021    TSH 2.120 08/20/2021    TSH 0.575 05/14/2021      Lab Results   Component Value Date    FREET4 1.32 12/23/2021    FREET4 1.3 02/22/2021    FREET4 1.4 01/22/2021      A1C Last 3 Results    HGBA1C Last 3  Results 1/22/21 8/20/21 12/23/21   Hemoglobin A1C 5.8 (A) 5.50 6.62 (A)   (A) Abnormal value       Comments are available for some flowsheets but are not being displayed.            PSA    PSA 12/23/21   PSA 0.889                          Visit Diagnoses:    ICD-10-CM ICD-9-CM   1. Acute exacerbation of chronic obstructive pulmonary disease (HCC)  J44.1 491.21   2. H/O agent Adams exposure  Z77.098 V15.89   3. Type 2 diabetes mellitus with microalbuminuria, without long-term current use of insulin ([facility])  E11.29 250.40    R80.9 791.0   4. CAD with history of CABG  I25.119 414.01     413.9   5. COPD (chronic obstructive pulmonary disease) with chronic bronchitis ([facility])  J44.9 491.20   6. Other hyperlipidemia  E78.49 272.4   7. Ischemic cardiomyopathy  I25.5 414.8   8. Paroxysmal atrial fibrillation ([facility])  I48.0 427.31   9. Essential hypertension  I10 401.9   10. Acquired hypothyroidism  E03.9 244.9   11. Polyarthritis  M13.0 716.50   12. Depression, unspecified depression type  F32.A 311   13. Essential tremor  G25.0 333.1   14. Lupus ([facility])  M32.9 710.0   15. Anxiety, generalized  F41.1 300.02   16. Chronic systolic (congestive) heart failure ([facility])  I50.22 428.22     428.0       Assessment and Plan   Diagnoses and all orders for this visit:    1. Acute exacerbation of chronic obstructive pulmonary disease (HCC) (Primary)  -     Comprehensive Metabolic Panel; Future  -     CBC & Differential; Future  -     proBNP; Future  -     Hemoglobin A1c; Future    2. H/O agent Orange exposure  -     Comprehensive Metabolic Panel; Future  -     CBC & Differential; Future  -     proBNP; Future  -     Hemoglobin A1c; Future    3. Type 2 diabetes mellitus with microalbuminuria, without long-term current use of insulin ([facility])  -     Comprehensive Metabolic Panel; Future  -     CBC & Differential; Future  -     proBNP; Future  -     Hemoglobin A1c; Future    4. CAD with history of CABG  -     Comprehensive Metabolic Panel; Future  -      CBC & Differential; Future  -     proBNP; Future  -     Hemoglobin A1c; Future    5. COPD (chronic obstructive pulmonary disease) with chronic bronchitis (HCC)  -     Comprehensive Metabolic Panel; Future  -     CBC & Differential; Future  -     proBNP; Future  -     Hemoglobin A1c; Future    6. Other hyperlipidemia  -     Comprehensive Metabolic Panel; Future  -     CBC & Differential; Future  -     proBNP; Future  -     Hemoglobin A1c; Future    7. Ischemic cardiomyopathy  -     Comprehensive Metabolic Panel; Future  -     CBC & Differential; Future  -     proBNP; Future  -     Hemoglobin A1c; Future    8. Paroxysmal atrial fibrillation (HCC)  -     Comprehensive Metabolic Panel; Future  -     CBC & Differential; Future  -     proBNP; Future  -     Hemoglobin A1c; Future    9. Essential hypertension  -     Comprehensive Metabolic Panel; Future  -     CBC & Differential; Future  -     proBNP; Future  -     Hemoglobin A1c; Future    10. Acquired hypothyroidism  -     Comprehensive Metabolic Panel; Future  -     CBC & Differential; Future  -     proBNP; Future  -     Hemoglobin A1c; Future    11. Polyarthritis  -     Comprehensive Metabolic Panel; Future  -     CBC & Differential; Future  -     proBNP; Future  -     Hemoglobin A1c; Future    12. Depression, unspecified depression type  -     Comprehensive Metabolic Panel; Future  -     CBC & Differential; Future  -     proBNP; Future  -     Hemoglobin A1c; Future    13. Essential tremor  -     Comprehensive Metabolic Panel; Future  -     CBC & Differential; Future  -     proBNP; Future  -     Hemoglobin A1c; Future    14. Lupus (HCC)  -     Comprehensive Metabolic Panel; Future  -     CBC & Differential; Future  -     proBNP; Future  -     Hemoglobin A1c; Future    15. Anxiety, generalized  -     Comprehensive Metabolic Panel; Future  -     CBC & Differential; Future  -     proBNP; Future  -     Hemoglobin A1c; Future    16. Chronic systolic (congestive) heart  failure (HCC)  -     predniSONE (DELTASONE) 50 MG tablet; Take 1 tablet by mouth Daily.  Dispense: 7 tablet; Refill: 0  -     Comprehensive Metabolic Panel; Future  -     CBC & Differential; Future  -     proBNP; Future  -     Hemoglobin A1c; Future        Recurrent upper respiratory infection with wheezing, consider antibiotics, steroids December 27, 2021    Hyperkalemia, etiology unclear, currently off Otoniel December 2021    A.. fib RVR May 2021 --- converted back to sinus rhythm in the hospital on May 13, 2021,  continue on-- Lipitor 40 mg daily, eliquis 5 mg twice a day, sotalol 120 mg half a tablet twice a day,, Bumex 0.5 mg daily,    Hypothyroidism--continues levothyroxine--0.75 mcg,     Transitional care visit -- October 24, 2019,///// AND Transitional care visit September 24, 2019 -- open heart surgery, discharged September 21, 2019 from [facility], operated by [facility],     Anxiety, continues Zoloft 25 mg daily,    Allergies, continues  Zyrtec 10 mg daily,    COPD,--- prior acute respiratory distress respiratory failure August 29, 2019, with repeat exacerbation in the hospital treated for Pseudomonas co-infection WITH STEROIDS AND ANTIBIOTICS MAY 2021, improved, continues on inhalers, Singulair, allergy medication,, budesonide, Spiriva, and Xopenex neb treatments,    Coronary artery disease, continues Lipitor 40 mg daily    acute congestive heart failure acute systolic dysfunction with ejection fraction 20%,,  stress thallium test--- Dr. grande August 22, 2019, , cardiac defibrillator AICD placement January 2020--BEEN ON AMIODIARONE TILL APRIL 2020, STOPPED IT APRIL 9, 2020 DUE EYE PROBLEMS ------- continues on Betapace 120 mg half a tablet twice a day,, BR NP level at 820 5 December 2021    Vitamin D deficiency severe --continue--Vitamin D2-50,000 units weekly    LUPUS, RASH, PER DERM NOV 2018, --- continues on folic acid 1 mg daily,    Right upper quadrant abdominal pain CT scan abdomen pelvis unremarkable  October 2018    HTN--Medications as above    Essential tremors, -continue--continues primidone--250 BID    mild anemia----Improved----- colonoscopy and endoscopy July 30, 2019,     Gastroesophageal reflux --continue--pantoprazole daily    dm 2 , --A1c improved 5.5, August 2021-, up to 6.6 December 2021, no treatment currently    Peripheral arterial disease,    PSA at 0.88, December 2021      Follow Up   Return in about 4 months (around 4/27/2022).  Patient was given instructions and counseling regarding his condition or for health maintenance advice. Please see specific information pulled into the AVS if appropriate.

## 2021-12-28 NOTE — TELEPHONE ENCOUNTER
[facility] IN [place] CALLED ABOUT THIS PT. THEY WANT THE OLD REFERRAL OPENED BACK UP. NEED NEW ORDER DONE. TEST WAS SCHEDULED FOR [facility] FOR A PULMONARY FUNCTION TEST. REFERRAL WAS CANCELED BUT NEED NEW ORDER FOR IT. THANKS!

## 2022-01-01 ENCOUNTER — APPOINTMENT (OUTPATIENT)
Dept: CT IMAGING | Facility: HOSPITAL | Age: 72
End: 2022-01-01

## 2022-01-01 ENCOUNTER — ANESTHESIA EVENT (OUTPATIENT)
Dept: PERIOP | Facility: HOSPITAL | Age: 72
End: 2022-01-01

## 2022-01-01 ENCOUNTER — READMISSION MANAGEMENT (OUTPATIENT)
Dept: CALL CENTER | Facility: HOSPITAL | Age: 72
End: 2022-01-01

## 2022-01-01 ENCOUNTER — APPOINTMENT (OUTPATIENT)
Dept: GENERAL RADIOLOGY | Facility: HOSPITAL | Age: 72
End: 2022-01-01

## 2022-01-01 ENCOUNTER — TRANSCRIBE ORDERS (OUTPATIENT)
Dept: ADMINISTRATIVE | Facility: HOSPITAL | Age: 72
End: 2022-01-01

## 2022-01-01 ENCOUNTER — TELEPHONE (OUTPATIENT)
Dept: PULMONOLOGY | Facility: CLINIC | Age: 72
End: 2022-01-01

## 2022-01-01 ENCOUNTER — TELEPHONE (OUTPATIENT)
Dept: INTERNAL MEDICINE | Facility: CLINIC | Age: 72
End: 2022-01-01

## 2022-01-01 ENCOUNTER — HOSPITAL ENCOUNTER (OUTPATIENT)
Dept: PET IMAGING | Facility: HOSPITAL | Age: 72
Discharge: HOME | End: 2022-03-29

## 2022-01-01 ENCOUNTER — HOSPITAL ENCOUNTER (OUTPATIENT)
Dept: CT IMAGING | Facility: HOSPITAL | Age: 72
Discharge: HOME | End: 2022-04-26
Admitting: NURSE PRACTITIONER

## 2022-01-01 ENCOUNTER — APPOINTMENT (OUTPATIENT)
Dept: MRI IMAGING | Facility: HOSPITAL | Age: 72
End: 2022-01-01

## 2022-01-01 ENCOUNTER — ANESTHESIA (OUTPATIENT)
Dept: PERIOP | Facility: HOSPITAL | Age: 72
End: 2022-01-01

## 2022-01-01 ENCOUNTER — APPOINTMENT (OUTPATIENT)
Dept: RADIATION ONCOLOGY | Facility: HOSPITAL | Age: 72
End: 2022-01-01

## 2022-01-01 ENCOUNTER — LAB (OUTPATIENT)
Dept: LAB | Facility: HOSPITAL | Age: 72
End: 2022-01-01

## 2022-01-01 ENCOUNTER — DOCUMENTATION (OUTPATIENT)
Dept: RADIATION ONCOLOGY | Facility: HOSPITAL | Age: 72
End: 2022-01-01

## 2022-01-01 ENCOUNTER — HOSPITAL ENCOUNTER (INPATIENT)
Facility: HOSPITAL | Age: 72
LOS: 3 days | Discharge: HOSPICE | End: 2022-05-07
Attending: EMERGENCY MEDICINE | Admitting: INTERNAL MEDICINE

## 2022-01-01 ENCOUNTER — TELEPHONE (OUTPATIENT)
Dept: CARDIOLOGY | Facility: CLINIC | Age: 72
End: 2022-01-01

## 2022-01-01 ENCOUNTER — OFFICE VISIT (OUTPATIENT)
Dept: INTERNAL MEDICINE | Facility: CLINIC | Age: 72
End: 2022-01-01

## 2022-01-01 ENCOUNTER — HOSPITAL ENCOUNTER (INPATIENT)
Facility: HOSPITAL | Age: 72
LOS: 9 days | Discharge: HOME HEALTH | End: 2022-03-15
Attending: EMERGENCY MEDICINE | Admitting: INTERNAL MEDICINE

## 2022-01-01 ENCOUNTER — APPOINTMENT (OUTPATIENT)
Dept: CARDIOLOGY | Facility: HOSPITAL | Age: 72
End: 2022-01-01

## 2022-01-01 ENCOUNTER — ANESTHESIA EVENT (OUTPATIENT)
Dept: GASTROENTEROLOGY | Facility: HOSPITAL | Age: 72
End: 2022-01-01

## 2022-01-01 ENCOUNTER — TRANSITIONAL CARE MANAGEMENT TELEPHONE ENCOUNTER (OUTPATIENT)
Dept: CALL CENTER | Facility: HOSPITAL | Age: 72
End: 2022-01-01

## 2022-01-01 ENCOUNTER — ANESTHESIA (OUTPATIENT)
Dept: GASTROENTEROLOGY | Facility: HOSPITAL | Age: 72
End: 2022-01-01

## 2022-01-01 ENCOUNTER — HOSPITAL ENCOUNTER (INPATIENT)
Facility: HOSPITAL | Age: 72
LOS: 8 days | Discharge: HOME HEALTH | End: 2022-03-05
Attending: EMERGENCY MEDICINE | Admitting: INTERNAL MEDICINE

## 2022-01-01 VITALS
HEART RATE: 48 BPM | DIASTOLIC BLOOD PRESSURE: 64 MMHG | TEMPERATURE: 98.8 F | OXYGEN SATURATION: 94 % | BODY MASS INDEX: 22.6 KG/M2 | WEIGHT: 144 LBS | SYSTOLIC BLOOD PRESSURE: 108 MMHG | HEIGHT: 67 IN

## 2022-01-01 VITALS
OXYGEN SATURATION: 100 % | DIASTOLIC BLOOD PRESSURE: 75 MMHG | SYSTOLIC BLOOD PRESSURE: 140 MMHG | HEART RATE: 82 BPM | TEMPERATURE: 97.9 F | BODY MASS INDEX: 23.91 KG/M2 | WEIGHT: 152.34 LBS | RESPIRATION RATE: 20 BRPM | HEIGHT: 67 IN

## 2022-01-01 VITALS
BODY MASS INDEX: 21.65 KG/M2 | WEIGHT: 134.7 LBS | DIASTOLIC BLOOD PRESSURE: 100 MMHG | HEIGHT: 66 IN | TEMPERATURE: 97.7 F | HEART RATE: 80 BPM | RESPIRATION RATE: 20 BRPM | SYSTOLIC BLOOD PRESSURE: 142 MMHG | OXYGEN SATURATION: 95 %

## 2022-01-01 VITALS
HEIGHT: 67 IN | DIASTOLIC BLOOD PRESSURE: 81 MMHG | BODY MASS INDEX: 21.7 KG/M2 | SYSTOLIC BLOOD PRESSURE: 126 MMHG | HEART RATE: 80 BPM | RESPIRATION RATE: 16 BRPM | WEIGHT: 138.23 LBS | OXYGEN SATURATION: 99 % | TEMPERATURE: 97.4 F

## 2022-01-01 VITALS
BODY MASS INDEX: 21.12 KG/M2 | OXYGEN SATURATION: 96 % | WEIGHT: 134.6 LBS | HEART RATE: 66 BPM | SYSTOLIC BLOOD PRESSURE: 114 MMHG | TEMPERATURE: 99.3 F | DIASTOLIC BLOOD PRESSURE: 67 MMHG | HEIGHT: 67 IN

## 2022-01-01 DIAGNOSIS — J44.9 CHRONIC OBSTRUCTIVE PULMONARY DISEASE, UNSPECIFIED COPD TYPE: ICD-10-CM

## 2022-01-01 DIAGNOSIS — R07.9 CHEST PAIN, UNSPECIFIED TYPE: Primary | ICD-10-CM

## 2022-01-01 DIAGNOSIS — I10 ESSENTIAL HYPERTENSION: ICD-10-CM

## 2022-01-01 DIAGNOSIS — I48.91 ATRIAL FIBRILLATION WITH RVR: ICD-10-CM

## 2022-01-01 DIAGNOSIS — C79.31 SECONDARY MALIGNANT NEOPLASM OF BRAIN: ICD-10-CM

## 2022-01-01 DIAGNOSIS — Z12.11 SCREEN FOR COLON CANCER: Primary | ICD-10-CM

## 2022-01-01 DIAGNOSIS — N18.31 ANEMIA DUE TO STAGE 3A CHRONIC KIDNEY DISEASE: ICD-10-CM

## 2022-01-01 DIAGNOSIS — F32.A DEPRESSION, UNSPECIFIED DEPRESSION TYPE: ICD-10-CM

## 2022-01-01 DIAGNOSIS — E11.29 TYPE 2 DIABETES MELLITUS WITH MICROALBUMINURIA, WITHOUT LONG-TERM CURRENT USE OF INSULIN: ICD-10-CM

## 2022-01-01 DIAGNOSIS — R91.8 LUNG MASS: ICD-10-CM

## 2022-01-01 DIAGNOSIS — R91.8 LUNG MASS: Primary | ICD-10-CM

## 2022-01-01 DIAGNOSIS — C34.12 PANCOASTS SYNDROME, LEFT: Primary | ICD-10-CM

## 2022-01-01 DIAGNOSIS — I25.5 ISCHEMIC CARDIOMYOPATHY: ICD-10-CM

## 2022-01-01 DIAGNOSIS — C79.31 BRAIN METASTASES: Primary | ICD-10-CM

## 2022-01-01 DIAGNOSIS — G47.34 NOCTURNAL HYPOXIA: ICD-10-CM

## 2022-01-01 DIAGNOSIS — R26.9 GAIT DIFFICULTY: ICD-10-CM

## 2022-01-01 DIAGNOSIS — J18.9 PNEUMONIA DUE TO INFECTIOUS ORGANISM, UNSPECIFIED LATERALITY, UNSPECIFIED PART OF LUNG: Primary | ICD-10-CM

## 2022-01-01 DIAGNOSIS — J30.9 ALLERGIC RHINITIS, UNSPECIFIED SEASONALITY, UNSPECIFIED TRIGGER: ICD-10-CM

## 2022-01-01 DIAGNOSIS — I50.812 CHRONIC RIGHT-SIDED HEART FAILURE: ICD-10-CM

## 2022-01-01 DIAGNOSIS — F41.1 ANXIETY, GENERALIZED: ICD-10-CM

## 2022-01-01 DIAGNOSIS — J90 PLEURAL EFFUSION: ICD-10-CM

## 2022-01-01 DIAGNOSIS — R26.2 DIFFICULTY IN WALKING: ICD-10-CM

## 2022-01-01 DIAGNOSIS — R80.9 MICROALBUMINURIA: ICD-10-CM

## 2022-01-01 DIAGNOSIS — G93.89 BRAIN MASS: ICD-10-CM

## 2022-01-01 DIAGNOSIS — M32.9 LUPUS: ICD-10-CM

## 2022-01-01 DIAGNOSIS — R80.9 TYPE 2 DIABETES MELLITUS WITH MICROALBUMINURIA, WITHOUT LONG-TERM CURRENT USE OF INSULIN: ICD-10-CM

## 2022-01-01 DIAGNOSIS — Z78.9 DECREASED ACTIVITIES OF DAILY LIVING (ADL): ICD-10-CM

## 2022-01-01 DIAGNOSIS — J44.1 ACUTE EXACERBATION OF CHRONIC OBSTRUCTIVE PULMONARY DISEASE: ICD-10-CM

## 2022-01-01 DIAGNOSIS — J44.9 COPD (CHRONIC OBSTRUCTIVE PULMONARY DISEASE) WITH CHRONIC BRONCHITIS: Chronic | ICD-10-CM

## 2022-01-01 DIAGNOSIS — R53.1 WEAKNESS: ICD-10-CM

## 2022-01-01 DIAGNOSIS — G25.0 ESSENTIAL TREMOR: ICD-10-CM

## 2022-01-01 DIAGNOSIS — Z98.1 S/P CERVICAL SPINAL FUSION: ICD-10-CM

## 2022-01-01 DIAGNOSIS — D64.9 ANEMIA, UNSPECIFIED TYPE: ICD-10-CM

## 2022-01-01 DIAGNOSIS — Z77.098 H/O AGENT ORANGE EXPOSURE: ICD-10-CM

## 2022-01-01 DIAGNOSIS — S32.9XXA CLOSED NONDISPLACED FRACTURE OF PELVIS, UNSPECIFIED PART OF PELVIS, INITIAL ENCOUNTER: ICD-10-CM

## 2022-01-01 DIAGNOSIS — M13.0 POLYARTHRITIS: ICD-10-CM

## 2022-01-01 DIAGNOSIS — Z72.0 TOBACCO ABUSE: ICD-10-CM

## 2022-01-01 DIAGNOSIS — C34.12 PANCOASTS SYNDROME, LEFT: ICD-10-CM

## 2022-01-01 DIAGNOSIS — F17.210 CIGARETTE NICOTINE DEPENDENCE WITHOUT COMPLICATION: ICD-10-CM

## 2022-01-01 DIAGNOSIS — I25.119 CORONARY ARTERY DISEASE INVOLVING NATIVE CORONARY ARTERY OF NATIVE HEART WITH ANGINA PECTORIS: ICD-10-CM

## 2022-01-01 DIAGNOSIS — J18.9 PNEUMONIA OF LEFT LOWER LOBE DUE TO INFECTIOUS ORGANISM: ICD-10-CM

## 2022-01-01 DIAGNOSIS — I50.22 CHRONIC SYSTOLIC (CONGESTIVE) HEART FAILURE: ICD-10-CM

## 2022-01-01 DIAGNOSIS — E78.49 OTHER HYPERLIPIDEMIA: ICD-10-CM

## 2022-01-01 DIAGNOSIS — D63.1 ANEMIA DUE TO STAGE 3A CHRONIC KIDNEY DISEASE: ICD-10-CM

## 2022-01-01 DIAGNOSIS — J44.9 COPD (CHRONIC OBSTRUCTIVE PULMONARY DISEASE) WITH CHRONIC BRONCHITIS: ICD-10-CM

## 2022-01-01 DIAGNOSIS — E11.21 DIABETIC NEPHROPATHY ASSOCIATED WITH TYPE 2 DIABETES MELLITUS: ICD-10-CM

## 2022-01-01 DIAGNOSIS — E03.9 ACQUIRED HYPOTHYROIDISM: ICD-10-CM

## 2022-01-01 DIAGNOSIS — M19.90 ARTHRITIS: ICD-10-CM

## 2022-01-01 DIAGNOSIS — J30.2 SEASONAL ALLERGIES: ICD-10-CM

## 2022-01-01 DIAGNOSIS — I48.0 PAROXYSMAL ATRIAL FIBRILLATION: ICD-10-CM

## 2022-01-01 DIAGNOSIS — Z12.5 SCREENING PSA (PROSTATE SPECIFIC ANTIGEN): ICD-10-CM

## 2022-01-01 DIAGNOSIS — R53.1 GENERALIZED WEAKNESS: Primary | ICD-10-CM

## 2022-01-01 DIAGNOSIS — I50.22 CHRONIC SYSTOLIC HEART FAILURE: ICD-10-CM

## 2022-01-01 DIAGNOSIS — F41.1 ANXIETY, GENERALIZED: Primary | ICD-10-CM

## 2022-01-01 LAB
25(OH)D3 SERPL-MCNC: 43.8 NG/ML (ref 30–100)
ABO GROUP BLD: NORMAL
ABO GROUP BLD: NORMAL
ACB CMPLX DNA BAL NAA+NON-PRB-NCNCRNG: NOT DETECTED
ACTH PLAS-MCNC: 37.8 PG/ML (ref 7.2–63.3)
ALBUMIN SERPL-MCNC: 3.1 G/DL (ref 3.5–5.2)
ALBUMIN SERPL-MCNC: 3.2 G/DL (ref 3.5–5.2)
ALBUMIN SERPL-MCNC: 3.3 G/DL (ref 3.5–5.2)
ALBUMIN SERPL-MCNC: 3.4 G/DL (ref 3.5–5.2)
ALBUMIN SERPL-MCNC: 3.5 G/DL (ref 3.5–5.2)
ALBUMIN SERPL-MCNC: 3.5 G/DL (ref 3.5–5.2)
ALBUMIN SERPL-MCNC: 3.6 G/DL (ref 3.5–5.2)
ALBUMIN SERPL-MCNC: 3.7 G/DL (ref 3.5–5.2)
ALBUMIN SERPL-MCNC: 3.8 G/DL (ref 3.5–5.2)
ALBUMIN SERPL-MCNC: 3.9 G/DL (ref 3.5–5.2)
ALBUMIN/GLOB SERPL: 0.9 G/DL
ALBUMIN/GLOB SERPL: 1 G/DL
ALBUMIN/GLOB SERPL: 1.1 G/DL
ALP SERPL-CCNC: 100 U/L (ref 39–117)
ALP SERPL-CCNC: 102 U/L (ref 39–117)
ALP SERPL-CCNC: 102 U/L (ref 39–117)
ALP SERPL-CCNC: 104 U/L (ref 39–117)
ALP SERPL-CCNC: 105 U/L (ref 39–117)
ALP SERPL-CCNC: 112 U/L (ref 39–117)
ALP SERPL-CCNC: 125 U/L (ref 39–117)
ALP SERPL-CCNC: 131 U/L (ref 39–117)
ALP SERPL-CCNC: 135 U/L (ref 39–117)
ALP SERPL-CCNC: 142 U/L (ref 39–117)
ALP SERPL-CCNC: 154 U/L (ref 39–117)
ALP SERPL-CCNC: 93 U/L (ref 39–117)
ALP SERPL-CCNC: 96 U/L (ref 39–117)
ALP SERPL-CCNC: 98 U/L (ref 39–117)
ALT SERPL W P-5'-P-CCNC: 17 U/L (ref 1–41)
ALT SERPL W P-5'-P-CCNC: 18 U/L (ref 1–41)
ALT SERPL W P-5'-P-CCNC: 18 U/L (ref 1–41)
ALT SERPL W P-5'-P-CCNC: 19 U/L (ref 1–41)
ALT SERPL W P-5'-P-CCNC: 21 U/L (ref 1–41)
ALT SERPL W P-5'-P-CCNC: 23 U/L (ref 1–41)
ALT SERPL W P-5'-P-CCNC: 23 U/L (ref 1–41)
ALT SERPL W P-5'-P-CCNC: 25 U/L (ref 1–41)
ALT SERPL W P-5'-P-CCNC: 32 U/L (ref 1–41)
ALT SERPL W P-5'-P-CCNC: 43 U/L (ref 1–41)
ALT SERPL W P-5'-P-CCNC: 46 U/L (ref 1–41)
ALT SERPL W P-5'-P-CCNC: 50 U/L (ref 1–41)
ANION GAP SERPL CALCULATED.3IONS-SCNC: 10.1 MMOL/L (ref 5–15)
ANION GAP SERPL CALCULATED.3IONS-SCNC: 10.1 MMOL/L (ref 5–15)
ANION GAP SERPL CALCULATED.3IONS-SCNC: 10.3 MMOL/L (ref 5–15)
ANION GAP SERPL CALCULATED.3IONS-SCNC: 10.6 MMOL/L (ref 5–15)
ANION GAP SERPL CALCULATED.3IONS-SCNC: 10.7 MMOL/L (ref 5–15)
ANION GAP SERPL CALCULATED.3IONS-SCNC: 11.6 MMOL/L (ref 5–15)
ANION GAP SERPL CALCULATED.3IONS-SCNC: 12.1 MMOL/L (ref 5–15)
ANION GAP SERPL CALCULATED.3IONS-SCNC: 12.3 MMOL/L (ref 5–15)
ANION GAP SERPL CALCULATED.3IONS-SCNC: 12.8 MMOL/L (ref 5–15)
ANION GAP SERPL CALCULATED.3IONS-SCNC: 15.3 MMOL/L (ref 5–15)
ANION GAP SERPL CALCULATED.3IONS-SCNC: 17.8 MMOL/L (ref 5–15)
ANION GAP SERPL CALCULATED.3IONS-SCNC: 7 MMOL/L (ref 5–15)
ANION GAP SERPL CALCULATED.3IONS-SCNC: 7.6 MMOL/L (ref 5–15)
ANION GAP SERPL CALCULATED.3IONS-SCNC: 7.8 MMOL/L (ref 5–15)
ANION GAP SERPL CALCULATED.3IONS-SCNC: 8.9 MMOL/L (ref 5–15)
ANION GAP SERPL CALCULATED.3IONS-SCNC: 9 MMOL/L (ref 5–15)
ANION GAP SERPL CALCULATED.3IONS-SCNC: 9.6 MMOL/L (ref 5–15)
ANION GAP SERPL CALCULATED.3IONS-SCNC: 9.8 MMOL/L (ref 5–15)
ANION GAP SERPL CALCULATED.3IONS-SCNC: 9.8 MMOL/L (ref 5–15)
AST SERPL-CCNC: 18 U/L (ref 1–40)
AST SERPL-CCNC: 19 U/L (ref 1–40)
AST SERPL-CCNC: 21 U/L (ref 1–40)
AST SERPL-CCNC: 23 U/L (ref 1–40)
AST SERPL-CCNC: 25 U/L (ref 1–40)
AST SERPL-CCNC: 26 U/L (ref 1–40)
AST SERPL-CCNC: 26 U/L (ref 1–40)
AST SERPL-CCNC: 27 U/L (ref 1–40)
AST SERPL-CCNC: 29 U/L (ref 1–40)
AST SERPL-CCNC: 31 U/L (ref 1–40)
AST SERPL-CCNC: 36 U/L (ref 1–40)
AST SERPL-CCNC: 37 U/L (ref 1–40)
AST SERPL-CCNC: 42 U/L (ref 1–40)
AST SERPL-CCNC: 50 U/L (ref 1–40)
BACTERIA SPEC AEROBE CULT: ABNORMAL
BACTERIA SPEC AEROBE CULT: NORMAL
BACTERIA SPEC AEROBE CULT: NORMAL
BACTERIA SPEC RESP CULT: ABNORMAL
BACTERIA SPEC RESP CULT: ABNORMAL
BASOPHILS # BLD AUTO: 0 10*3/MM3 (ref 0–0.2)
BASOPHILS # BLD AUTO: 0.01 10*3/MM3 (ref 0–0.2)
BASOPHILS # BLD AUTO: 0.02 10*3/MM3 (ref 0–0.2)
BASOPHILS # BLD AUTO: 0.02 10*3/MM3 (ref 0–0.2)
BASOPHILS # BLD AUTO: 0.03 10*3/MM3 (ref 0–0.2)
BASOPHILS # BLD AUTO: 0.04 10*3/MM3 (ref 0–0.2)
BASOPHILS # BLD AUTO: 0.05 10*3/MM3 (ref 0–0.2)
BASOPHILS NFR BLD AUTO: 0 % (ref 0–1.5)
BASOPHILS NFR BLD AUTO: 0.1 % (ref 0–1.5)
BASOPHILS NFR BLD AUTO: 0.1 % (ref 0–1.5)
BASOPHILS NFR BLD AUTO: 0.2 % (ref 0–1.5)
BASOPHILS NFR BLD AUTO: 0.3 % (ref 0–1.5)
BASOPHILS NFR BLD AUTO: 0.4 % (ref 0–1.5)
BASOPHILS NFR BLD AUTO: 0.5 % (ref 0–1.5)
BASOPHILS NFR BLD AUTO: 0.5 % (ref 0–1.5)
BASOPHILS NFR BLD AUTO: 0.7 % (ref 0–1.5)
BH BB BLOOD EXPIRATION DATE: NORMAL
BH BB BLOOD TYPE BARCODE: 6200
BH BB DISPENSE STATUS: NORMAL
BH BB PRODUCT CODE: NORMAL
BH BB UNIT NUMBER: NORMAL
BH CV ECHO MEAS - AO ROOT DIAM: 3.7 CM
BH CV ECHO MEAS - EDV(MOD-SP2): 89 ML
BH CV ECHO MEAS - EDV(MOD-SP4): 89 ML
BH CV ECHO MEAS - ESV(MOD-SP2): 44 ML
BH CV ECHO MEAS - ESV(MOD-SP4): 44 ML
BH CV ECHO MEAS - IVSD: 1 CM
BH CV ECHO MEAS - LA DIMENSION(2D): 3.4 CM
BH CV ECHO MEAS - LAT PEAK E' VEL: 5.2 CM/SEC
BH CV ECHO MEAS - LVIDD: 5.2 CM
BH CV ECHO MEAS - LVIDS: 3.3 CM
BH CV ECHO MEAS - LVOT DIAM: 2 CM
BH CV ECHO MEAS - LVPWD: 1 CM
BH CV ECHO MEAS - MED PEAK E' VEL: 4.19 CM/SEC
BH CV ECHO MEAS - MV A MAX VEL: 73 CM/SEC
BH CV ECHO MEAS - MV DEC TIME: 189 MSEC
BH CV ECHO MEAS - MV E MAX VEL: 55 CM/SEC
BH CV ECHO MEAS - MV E/A: 0.8
BH CV ECHO MEAS - RVDD: 2.7 CM
BH CV ECHO MEAS - TR MAX PG: 31 MMHG
BH CV ECHO MEASUREMENTS AVERAGE E/E' RATIO: 11.71
BILIRUB SERPL-MCNC: 0.2 MG/DL (ref 0–1.2)
BILIRUB SERPL-MCNC: 0.3 MG/DL (ref 0–1.2)
BILIRUB SERPL-MCNC: <0.2 MG/DL (ref 0–1.2)
BILIRUB SERPL-MCNC: <0.2 MG/DL (ref 0–1.2)
BILIRUB UR QL STRIP: NEGATIVE
BLACTX-M ISLT/SPM QL: NOT DETECTED
BLAIMP ISLT/SPM QL: NOT DETECTED
BLAKPC ISLT/SPM QL: NOT DETECTED
BLAOXA-48-LIKE ISLT/SPM QL: ABNORMAL
BLAVIM ISLT/SPM QL: NOT DETECTED
BLD GP AB SCN SERPL QL: NEGATIVE
BUN SERPL-MCNC: 11 MG/DL (ref 8–23)
BUN SERPL-MCNC: 13 MG/DL (ref 8–23)
BUN SERPL-MCNC: 14 MG/DL (ref 8–23)
BUN SERPL-MCNC: 16 MG/DL (ref 8–23)
BUN SERPL-MCNC: 17 MG/DL (ref 8–23)
BUN SERPL-MCNC: 18 MG/DL (ref 8–23)
BUN SERPL-MCNC: 19 MG/DL (ref 8–23)
BUN SERPL-MCNC: 20 MG/DL (ref 8–23)
BUN SERPL-MCNC: 21 MG/DL (ref 8–23)
BUN SERPL-MCNC: 21 MG/DL (ref 8–23)
BUN SERPL-MCNC: 22 MG/DL (ref 8–23)
BUN SERPL-MCNC: 22 MG/DL (ref 8–23)
BUN SERPL-MCNC: 24 MG/DL (ref 8–23)
BUN SERPL-MCNC: 26 MG/DL (ref 8–23)
BUN/CREAT SERPL: 13.6 (ref 7–25)
BUN/CREAT SERPL: 13.7 (ref 7–25)
BUN/CREAT SERPL: 15.3 (ref 7–25)
BUN/CREAT SERPL: 15.7 (ref 7–25)
BUN/CREAT SERPL: 15.9 (ref 7–25)
BUN/CREAT SERPL: 16 (ref 7–25)
BUN/CREAT SERPL: 17.4 (ref 7–25)
BUN/CREAT SERPL: 19.4 (ref 7–25)
BUN/CREAT SERPL: 19.4 (ref 7–25)
BUN/CREAT SERPL: 19.8 (ref 7–25)
BUN/CREAT SERPL: 20.7 (ref 7–25)
BUN/CREAT SERPL: 21.6 (ref 7–25)
BUN/CREAT SERPL: 22.1 (ref 7–25)
BUN/CREAT SERPL: 22.3 (ref 7–25)
BUN/CREAT SERPL: 24.4 (ref 7–25)
BUN/CREAT SERPL: 25 (ref 7–25)
BUN/CREAT SERPL: 25.3 (ref 7–25)
BUN/CREAT SERPL: 25.5 (ref 7–25)
BUN/CREAT SERPL: 31.7 (ref 7–25)
C PNEUM DNA NPH QL NAA+NON-PROBE: NOT DETECTED
CALCIUM SPEC-SCNC: 7.8 MG/DL (ref 8.6–10.5)
CALCIUM SPEC-SCNC: 8.5 MG/DL (ref 8.6–10.5)
CALCIUM SPEC-SCNC: 8.6 MG/DL (ref 8.6–10.5)
CALCIUM SPEC-SCNC: 8.9 MG/DL (ref 8.6–10.5)
CALCIUM SPEC-SCNC: 8.9 MG/DL (ref 8.6–10.5)
CALCIUM SPEC-SCNC: 9 MG/DL (ref 8.6–10.5)
CALCIUM SPEC-SCNC: 9.1 MG/DL (ref 8.6–10.5)
CALCIUM SPEC-SCNC: 9.2 MG/DL (ref 8.6–10.5)
CALCIUM SPEC-SCNC: 9.3 MG/DL (ref 8.6–10.5)
CEA SERPL-MCNC: 249 NG/ML
CHLORIDE SERPL-SCNC: 100 MMOL/L (ref 98–107)
CHLORIDE SERPL-SCNC: 101 MMOL/L (ref 98–107)
CHLORIDE SERPL-SCNC: 103 MMOL/L (ref 98–107)
CHLORIDE SERPL-SCNC: 107 MMOL/L (ref 98–107)
CHLORIDE SERPL-SCNC: 94 MMOL/L (ref 98–107)
CHLORIDE SERPL-SCNC: 95 MMOL/L (ref 98–107)
CHLORIDE SERPL-SCNC: 96 MMOL/L (ref 98–107)
CHLORIDE SERPL-SCNC: 97 MMOL/L (ref 98–107)
CHLORIDE SERPL-SCNC: 98 MMOL/L (ref 98–107)
CHLORIDE SERPL-SCNC: 98 MMOL/L (ref 98–107)
CHLORIDE SERPL-SCNC: 99 MMOL/L (ref 98–107)
CHLORIDE SERPL-SCNC: 99 MMOL/L (ref 98–107)
CHROMATIN AB SERPL-ACNC: <0.2 AI (ref 0–0.9)
CLARITY UR: CLEAR
CO2 SERPL-SCNC: 17.2 MMOL/L (ref 22–29)
CO2 SERPL-SCNC: 20.7 MMOL/L (ref 22–29)
CO2 SERPL-SCNC: 22.7 MMOL/L (ref 22–29)
CO2 SERPL-SCNC: 24 MMOL/L (ref 22–29)
CO2 SERPL-SCNC: 24.2 MMOL/L (ref 22–29)
CO2 SERPL-SCNC: 24.4 MMOL/L (ref 22–29)
CO2 SERPL-SCNC: 25.2 MMOL/L (ref 22–29)
CO2 SERPL-SCNC: 25.3 MMOL/L (ref 22–29)
CO2 SERPL-SCNC: 25.4 MMOL/L (ref 22–29)
CO2 SERPL-SCNC: 25.9 MMOL/L (ref 22–29)
CO2 SERPL-SCNC: 25.9 MMOL/L (ref 22–29)
CO2 SERPL-SCNC: 26.1 MMOL/L (ref 22–29)
CO2 SERPL-SCNC: 26.2 MMOL/L (ref 22–29)
CO2 SERPL-SCNC: 26.4 MMOL/L (ref 22–29)
CO2 SERPL-SCNC: 26.4 MMOL/L (ref 22–29)
CO2 SERPL-SCNC: 26.7 MMOL/L (ref 22–29)
CO2 SERPL-SCNC: 27 MMOL/L (ref 22–29)
CO2 SERPL-SCNC: 27.2 MMOL/L (ref 22–29)
CO2 SERPL-SCNC: 29.9 MMOL/L (ref 22–29)
COLOR UR: YELLOW
CORTIS SERPL-MCNC: 16.63 MCG/DL
CORTIS SERPL-MCNC: 27.39 MCG/DL
CORTIS SERPL-MCNC: 33.53 MCG/DL
CORTIS SERPL-MCNC: 38.25 MCG/DL
CREAT SERPL-MCNC: 0.72 MG/DL (ref 0.76–1.27)
CREAT SERPL-MCNC: 0.8 MG/DL (ref 0.76–1.27)
CREAT SERPL-MCNC: 0.81 MG/DL (ref 0.76–1.27)
CREAT SERPL-MCNC: 0.82 MG/DL (ref 0.76–1.27)
CREAT SERPL-MCNC: 0.82 MG/DL (ref 0.76–1.27)
CREAT SERPL-MCNC: 0.83 MG/DL (ref 0.76–1.27)
CREAT SERPL-MCNC: 0.85 MG/DL (ref 0.76–1.27)
CREAT SERPL-MCNC: 0.86 MG/DL (ref 0.76–1.27)
CREAT SERPL-MCNC: 0.86 MG/DL (ref 0.76–1.27)
CREAT SERPL-MCNC: 0.87 MG/DL (ref 0.76–1.27)
CREAT SERPL-MCNC: 0.87 MG/DL (ref 0.76–1.27)
CREAT SERPL-MCNC: 0.9 MG/DL (ref 0.76–1.27)
CREAT SERPL-MCNC: 0.94 MG/DL (ref 0.76–1.27)
CREAT SERPL-MCNC: 0.94 MG/DL (ref 0.76–1.27)
CREAT SERPL-MCNC: 0.95 MG/DL (ref 0.76–1.27)
CREAT SERPL-MCNC: 0.97 MG/DL (ref 0.76–1.27)
CREAT SERPL-MCNC: 0.98 MG/DL (ref 0.76–1.27)
CREAT SERPL-MCNC: 1 MG/DL (ref 0.76–1.27)
CREAT SERPL-MCNC: 1.09 MG/DL (ref 0.76–1.27)
CROSSMATCH INTERPRETATION: NORMAL
CRP SERPL-MCNC: 3.42 MG/DL (ref 0.01–0.5)
CYTO UR: NORMAL
CYTO UR: NORMAL
D-LACTATE SERPL-SCNC: 0.6 MMOL/L (ref 0.5–2)
D-LACTATE SERPL-SCNC: 0.8 MMOL/L (ref 0.5–2)
DEPRECATED RDW RBC AUTO: 44.1 FL (ref 37–54)
DEPRECATED RDW RBC AUTO: 44.2 FL (ref 37–54)
DEPRECATED RDW RBC AUTO: 44.9 FL (ref 37–54)
DEPRECATED RDW RBC AUTO: 45.2 FL (ref 37–54)
DEPRECATED RDW RBC AUTO: 45.8 FL (ref 37–54)
DEPRECATED RDW RBC AUTO: 46.2 FL (ref 37–54)
DEPRECATED RDW RBC AUTO: 46.4 FL (ref 37–54)
DEPRECATED RDW RBC AUTO: 46.5 FL (ref 37–54)
DEPRECATED RDW RBC AUTO: 47 FL (ref 37–54)
DEPRECATED RDW RBC AUTO: 47.3 FL (ref 37–54)
DEPRECATED RDW RBC AUTO: 47.4 FL (ref 37–54)
DEPRECATED RDW RBC AUTO: 47.6 FL (ref 37–54)
DEPRECATED RDW RBC AUTO: 47.6 FL (ref 37–54)
DEPRECATED RDW RBC AUTO: 48.3 FL (ref 37–54)
DEPRECATED RDW RBC AUTO: 48.5 FL (ref 37–54)
DEPRECATED RDW RBC AUTO: 48.7 FL (ref 37–54)
DEPRECATED RDW RBC AUTO: 49.4 FL (ref 37–54)
DEPRECATED RDW RBC AUTO: 49.5 FL (ref 37–54)
DEPRECATED RDW RBC AUTO: 49.9 FL (ref 37–54)
DIGOXIN SERPL-MCNC: 0.66 NG/ML (ref 0.6–1.2)
DIGOXIN SERPL-MCNC: 0.9 NG/ML (ref 0.6–1.2)
DIGOXIN SERPL-MCNC: 1.11 NG/ML (ref 0.6–1.2)
DSDNA AB SER-ACNC: 4 IU/ML (ref 0–9)
E CLOAC COMP DNA BAL NAA+NON-PRB-NCNCRNG: NOT DETECTED
E COLI DNA BAL NAA+NON-PRB-NCNCRNG: NOT DETECTED
EGFRCR SERPLBLD CKD-EPI 2021: 72.1 ML/MIN/1.73
EGFRCR SERPLBLD CKD-EPI 2021: 81.9 ML/MIN/1.73
EGFRCR SERPLBLD CKD-EPI 2021: 82.9 ML/MIN/1.73
EGFRCR SERPLBLD CKD-EPI 2021: 86.1 ML/MIN/1.73
EGFRCR SERPLBLD CKD-EPI 2021: 86.1 ML/MIN/1.73
EGFRCR SERPLBLD CKD-EPI 2021: 90.7 ML/MIN/1.73
EGFRCR SERPLBLD CKD-EPI 2021: 91.7 ML/MIN/1.73
EGFRCR SERPLBLD CKD-EPI 2021: 91.7 ML/MIN/1.73
EGFRCR SERPLBLD CKD-EPI 2021: 92 ML/MIN/1.73
EGFRCR SERPLBLD CKD-EPI 2021: 92 ML/MIN/1.73
EGFRCR SERPLBLD CKD-EPI 2021: 92.3 ML/MIN/1.73
EGFRCR SERPLBLD CKD-EPI 2021: 93.3 ML/MIN/1.73
EGFRCR SERPLBLD CKD-EPI 2021: 93.3 ML/MIN/1.73
EGFRCR SERPLBLD CKD-EPI 2021: 93.7 ML/MIN/1.73
EGFRCR SERPLBLD CKD-EPI 2021: 94 ML/MIN/1.73
EGFRCR SERPLBLD CKD-EPI 2021: 97.1 ML/MIN/1.73
ENA RNP AB SER-ACNC: <0.2 AI (ref 0–0.9)
ENA SM AB SER-ACNC: <0.2 AI (ref 0–0.9)
ENA SS-A AB SER-ACNC: 0.2 AI (ref 0–0.9)
ENA SS-B AB SER-ACNC: <0.2 AI (ref 0–0.9)
EOSINOPHIL # BLD AUTO: 0 10*3/MM3 (ref 0–0.4)
EOSINOPHIL # BLD AUTO: 0.01 10*3/MM3 (ref 0–0.4)
EOSINOPHIL # BLD AUTO: 0.02 10*3/MM3 (ref 0–0.4)
EOSINOPHIL # BLD AUTO: 0.03 10*3/MM3 (ref 0–0.4)
EOSINOPHIL # BLD AUTO: 0.04 10*3/MM3 (ref 0–0.4)
EOSINOPHIL # BLD AUTO: 0.08 10*3/MM3 (ref 0–0.4)
EOSINOPHIL # BLD AUTO: 0.13 10*3/MM3 (ref 0–0.4)
EOSINOPHIL # BLD AUTO: 0.14 10*3/MM3 (ref 0–0.4)
EOSINOPHIL # BLD AUTO: 0.16 10*3/MM3 (ref 0–0.4)
EOSINOPHIL # BLD AUTO: 0.17 10*3/MM3 (ref 0–0.4)
EOSINOPHIL # BLD AUTO: 0.18 10*3/MM3 (ref 0–0.4)
EOSINOPHIL # BLD AUTO: 0.19 10*3/MM3 (ref 0–0.4)
EOSINOPHIL # BLD AUTO: 0.28 10*3/MM3 (ref 0–0.4)
EOSINOPHIL # BLD AUTO: 0.33 10*3/MM3 (ref 0–0.4)
EOSINOPHIL # BLD AUTO: 0.4 10*3/MM3 (ref 0–0.4)
EOSINOPHIL # BLD AUTO: 0.45 10*3/MM3 (ref 0–0.4)
EOSINOPHIL NFR BLD AUTO: 0 % (ref 0.3–6.2)
EOSINOPHIL NFR BLD AUTO: 0.1 % (ref 0.3–6.2)
EOSINOPHIL NFR BLD AUTO: 0.3 % (ref 0.3–6.2)
EOSINOPHIL NFR BLD AUTO: 0.5 % (ref 0.3–6.2)
EOSINOPHIL NFR BLD AUTO: 0.6 % (ref 0.3–6.2)
EOSINOPHIL NFR BLD AUTO: 0.7 % (ref 0.3–6.2)
EOSINOPHIL NFR BLD AUTO: 0.7 % (ref 0.3–6.2)
EOSINOPHIL NFR BLD AUTO: 1 % (ref 0.3–6.2)
EOSINOPHIL NFR BLD AUTO: 1 % (ref 0.3–6.2)
EOSINOPHIL NFR BLD AUTO: 1.3 % (ref 0.3–6.2)
EOSINOPHIL NFR BLD AUTO: 1.6 % (ref 0.3–6.2)
EOSINOPHIL NFR BLD AUTO: 1.9 % (ref 0.3–6.2)
EOSINOPHIL NFR BLD AUTO: 2 % (ref 0.3–6.2)
EOSINOPHIL NFR BLD AUTO: 2.1 % (ref 0.3–6.2)
EOSINOPHIL NFR BLD AUTO: 2.2 % (ref 0.3–6.2)
EOSINOPHIL NFR BLD AUTO: 2.3 % (ref 0.3–6.2)
EOSINOPHIL NFR BLD AUTO: 4.2 % (ref 0.3–6.2)
EOSINOPHIL NFR BLD AUTO: 4.2 % (ref 0.3–6.2)
EOSINOPHIL NFR BLD AUTO: 4.8 % (ref 0.3–6.2)
ERYTHROCYTE [DISTWIDTH] IN BLOOD BY AUTOMATED COUNT: 12.3 % (ref 12.3–15.4)
ERYTHROCYTE [DISTWIDTH] IN BLOOD BY AUTOMATED COUNT: 12.4 % (ref 12.3–15.4)
ERYTHROCYTE [DISTWIDTH] IN BLOOD BY AUTOMATED COUNT: 12.5 % (ref 12.3–15.4)
ERYTHROCYTE [DISTWIDTH] IN BLOOD BY AUTOMATED COUNT: 12.6 % (ref 12.3–15.4)
ERYTHROCYTE [DISTWIDTH] IN BLOOD BY AUTOMATED COUNT: 12.7 % (ref 12.3–15.4)
ERYTHROCYTE [DISTWIDTH] IN BLOOD BY AUTOMATED COUNT: 12.8 % (ref 12.3–15.4)
ERYTHROCYTE [DISTWIDTH] IN BLOOD BY AUTOMATED COUNT: 13 % (ref 12.3–15.4)
ERYTHROCYTE [DISTWIDTH] IN BLOOD BY AUTOMATED COUNT: 13.1 % (ref 12.3–15.4)
ERYTHROCYTE [DISTWIDTH] IN BLOOD BY AUTOMATED COUNT: 13.1 % (ref 12.3–15.4)
ERYTHROCYTE [DISTWIDTH] IN BLOOD BY AUTOMATED COUNT: 13.2 % (ref 12.3–15.4)
ERYTHROCYTE [DISTWIDTH] IN BLOOD BY AUTOMATED COUNT: 13.2 % (ref 12.3–15.4)
ERYTHROCYTE [DISTWIDTH] IN BLOOD BY AUTOMATED COUNT: 14.4 % (ref 12.3–15.4)
ERYTHROCYTE [DISTWIDTH] IN BLOOD BY AUTOMATED COUNT: 14.8 % (ref 12.3–15.4)
ERYTHROCYTE [DISTWIDTH] IN BLOOD BY AUTOMATED COUNT: 15.1 % (ref 12.3–15.4)
ERYTHROCYTE [DISTWIDTH] IN BLOOD BY AUTOMATED COUNT: 15.8 % (ref 12.3–15.4)
ERYTHROCYTE [SEDIMENTATION RATE] IN BLOOD: 34 MM/HR (ref 0–20)
FERRITIN SERPL-MCNC: 309.8 NG/ML (ref 30–400)
FLUAV SUBTYP SPEC NAA+PROBE: NOT DETECTED
FLUBV RNA ISLT QL NAA+PROBE: NOT DETECTED
FOLATE SERPL-MCNC: 17.5 NG/ML (ref 4.78–24.2)
FUNGUS WND CULT: ABNORMAL
FUNGUS WND CULT: ABNORMAL
GFR SERPL CREATININE-BSD FRML MDRD: 73 ML/MIN/1.73
GFR SERPL CREATININE-BSD FRML MDRD: 78 ML/MIN/1.73
GFR SERPL CREATININE-BSD FRML MDRD: 91 ML/MIN/1.73
GLOBULIN UR ELPH-MCNC: 3 GM/DL
GLOBULIN UR ELPH-MCNC: 3.1 GM/DL
GLOBULIN UR ELPH-MCNC: 3.1 GM/DL
GLOBULIN UR ELPH-MCNC: 3.2 GM/DL
GLOBULIN UR ELPH-MCNC: 3.4 GM/DL
GLOBULIN UR ELPH-MCNC: 3.5 GM/DL
GLOBULIN UR ELPH-MCNC: 3.6 GM/DL
GLOBULIN UR ELPH-MCNC: 3.7 GM/DL
GLOBULIN UR ELPH-MCNC: 3.7 GM/DL
GLOBULIN UR ELPH-MCNC: 3.9 GM/DL
GLUCOSE BLDC GLUCOMTR-MCNC: 103 MG/DL (ref 70–99)
GLUCOSE BLDC GLUCOMTR-MCNC: 109 MG/DL (ref 70–99)
GLUCOSE BLDC GLUCOMTR-MCNC: 112 MG/DL (ref 70–99)
GLUCOSE BLDC GLUCOMTR-MCNC: 114 MG/DL (ref 70–99)
GLUCOSE BLDC GLUCOMTR-MCNC: 117 MG/DL (ref 70–99)
GLUCOSE BLDC GLUCOMTR-MCNC: 118 MG/DL (ref 70–99)
GLUCOSE BLDC GLUCOMTR-MCNC: 121 MG/DL (ref 70–99)
GLUCOSE BLDC GLUCOMTR-MCNC: 124 MG/DL (ref 70–99)
GLUCOSE BLDC GLUCOMTR-MCNC: 125 MG/DL (ref 70–99)
GLUCOSE BLDC GLUCOMTR-MCNC: 125 MG/DL (ref 70–99)
GLUCOSE BLDC GLUCOMTR-MCNC: 127 MG/DL (ref 70–99)
GLUCOSE BLDC GLUCOMTR-MCNC: 127 MG/DL (ref 70–99)
GLUCOSE BLDC GLUCOMTR-MCNC: 130 MG/DL (ref 70–99)
GLUCOSE BLDC GLUCOMTR-MCNC: 134 MG/DL (ref 70–99)
GLUCOSE BLDC GLUCOMTR-MCNC: 136 MG/DL (ref 70–99)
GLUCOSE BLDC GLUCOMTR-MCNC: 136 MG/DL (ref 70–99)
GLUCOSE BLDC GLUCOMTR-MCNC: 138 MG/DL (ref 70–99)
GLUCOSE BLDC GLUCOMTR-MCNC: 138 MG/DL (ref 70–99)
GLUCOSE BLDC GLUCOMTR-MCNC: 141 MG/DL (ref 70–99)
GLUCOSE BLDC GLUCOMTR-MCNC: 141 MG/DL (ref 70–99)
GLUCOSE BLDC GLUCOMTR-MCNC: 142 MG/DL (ref 70–99)
GLUCOSE BLDC GLUCOMTR-MCNC: 144 MG/DL (ref 70–99)
GLUCOSE BLDC GLUCOMTR-MCNC: 145 MG/DL (ref 70–99)
GLUCOSE BLDC GLUCOMTR-MCNC: 147 MG/DL (ref 70–99)
GLUCOSE BLDC GLUCOMTR-MCNC: 149 MG/DL (ref 70–99)
GLUCOSE BLDC GLUCOMTR-MCNC: 150 MG/DL (ref 70–99)
GLUCOSE BLDC GLUCOMTR-MCNC: 151 MG/DL (ref 70–99)
GLUCOSE BLDC GLUCOMTR-MCNC: 153 MG/DL (ref 70–99)
GLUCOSE BLDC GLUCOMTR-MCNC: 156 MG/DL (ref 70–99)
GLUCOSE BLDC GLUCOMTR-MCNC: 159 MG/DL (ref 70–99)
GLUCOSE BLDC GLUCOMTR-MCNC: 159 MG/DL (ref 70–99)
GLUCOSE BLDC GLUCOMTR-MCNC: 161 MG/DL (ref 70–99)
GLUCOSE BLDC GLUCOMTR-MCNC: 163 MG/DL (ref 70–99)
GLUCOSE BLDC GLUCOMTR-MCNC: 163 MG/DL (ref 70–99)
GLUCOSE BLDC GLUCOMTR-MCNC: 164 MG/DL (ref 70–99)
GLUCOSE BLDC GLUCOMTR-MCNC: 168 MG/DL (ref 70–99)
GLUCOSE BLDC GLUCOMTR-MCNC: 169 MG/DL (ref 70–99)
GLUCOSE BLDC GLUCOMTR-MCNC: 172 MG/DL (ref 70–99)
GLUCOSE BLDC GLUCOMTR-MCNC: 172 MG/DL (ref 70–99)
GLUCOSE BLDC GLUCOMTR-MCNC: 176 MG/DL (ref 70–99)
GLUCOSE BLDC GLUCOMTR-MCNC: 177 MG/DL (ref 70–99)
GLUCOSE BLDC GLUCOMTR-MCNC: 180 MG/DL (ref 70–99)
GLUCOSE BLDC GLUCOMTR-MCNC: 181 MG/DL (ref 70–99)
GLUCOSE BLDC GLUCOMTR-MCNC: 184 MG/DL (ref 70–99)
GLUCOSE BLDC GLUCOMTR-MCNC: 185 MG/DL (ref 70–99)
GLUCOSE BLDC GLUCOMTR-MCNC: 187 MG/DL (ref 70–99)
GLUCOSE BLDC GLUCOMTR-MCNC: 188 MG/DL (ref 70–99)
GLUCOSE BLDC GLUCOMTR-MCNC: 190 MG/DL (ref 70–99)
GLUCOSE BLDC GLUCOMTR-MCNC: 190 MG/DL (ref 70–99)
GLUCOSE BLDC GLUCOMTR-MCNC: 192 MG/DL (ref 70–99)
GLUCOSE BLDC GLUCOMTR-MCNC: 199 MG/DL (ref 70–99)
GLUCOSE BLDC GLUCOMTR-MCNC: 208 MG/DL (ref 70–99)
GLUCOSE BLDC GLUCOMTR-MCNC: 219 MG/DL (ref 70–99)
GLUCOSE BLDC GLUCOMTR-MCNC: 222 MG/DL (ref 70–99)
GLUCOSE BLDC GLUCOMTR-MCNC: 228 MG/DL (ref 70–99)
GLUCOSE BLDC GLUCOMTR-MCNC: 237 MG/DL (ref 70–99)
GLUCOSE BLDC GLUCOMTR-MCNC: 258 MG/DL (ref 70–99)
GLUCOSE BLDC GLUCOMTR-MCNC: 262 MG/DL (ref 70–99)
GLUCOSE BLDC GLUCOMTR-MCNC: 94 MG/DL (ref 70–99)
GLUCOSE BLDC GLUCOMTR-MCNC: 99 MG/DL (ref 70–99)
GLUCOSE SERPL-MCNC: 125 MG/DL (ref 65–99)
GLUCOSE SERPL-MCNC: 128 MG/DL (ref 65–99)
GLUCOSE SERPL-MCNC: 136 MG/DL (ref 65–99)
GLUCOSE SERPL-MCNC: 136 MG/DL (ref 65–99)
GLUCOSE SERPL-MCNC: 138 MG/DL (ref 65–99)
GLUCOSE SERPL-MCNC: 142 MG/DL (ref 65–99)
GLUCOSE SERPL-MCNC: 148 MG/DL (ref 65–99)
GLUCOSE SERPL-MCNC: 148 MG/DL (ref 65–99)
GLUCOSE SERPL-MCNC: 150 MG/DL (ref 65–99)
GLUCOSE SERPL-MCNC: 151 MG/DL (ref 65–99)
GLUCOSE SERPL-MCNC: 155 MG/DL (ref 65–99)
GLUCOSE SERPL-MCNC: 156 MG/DL (ref 65–99)
GLUCOSE SERPL-MCNC: 160 MG/DL (ref 65–99)
GLUCOSE SERPL-MCNC: 165 MG/DL (ref 65–99)
GLUCOSE SERPL-MCNC: 172 MG/DL (ref 65–99)
GLUCOSE SERPL-MCNC: 67 MG/DL (ref 65–99)
GLUCOSE SERPL-MCNC: 83 MG/DL (ref 65–99)
GLUCOSE SERPL-MCNC: 89 MG/DL (ref 65–99)
GLUCOSE SERPL-MCNC: 91 MG/DL (ref 65–99)
GLUCOSE UR STRIP-MCNC: NEGATIVE MG/DL
GP B STREP DNA BAL NAA+NON-PRB-NCNCRNG: NOT DETECTED
GRAM STN SPEC: ABNORMAL
HADV DNA SPEC NAA+PROBE: NOT DETECTED
HAEM INFLU DNA BAL NAA+NON-PRB-NCNCRNG: NOT DETECTED
HAV IGM SERPL QL IA: NEGATIVE
HBA1C MFR BLD: 6.9 % (ref 4.8–5.6)
HBV CORE IGM SERPL QL IA: NEGATIVE
HBV SURFACE AG SERPL QL IA: NEGATIVE
HCOV RNA LOWER RESP QL NAA+NON-PROBE: NOT DETECTED
HCT VFR BLD AUTO: 23.4 % (ref 37.5–51)
HCT VFR BLD AUTO: 23.4 % (ref 37.5–51)
HCT VFR BLD AUTO: 26.7 % (ref 37.5–51)
HCT VFR BLD AUTO: 27 % (ref 37.5–51)
HCT VFR BLD AUTO: 28.5 % (ref 37.5–51)
HCT VFR BLD AUTO: 31.5 % (ref 37.5–51)
HCT VFR BLD AUTO: 32.4 % (ref 37.5–51)
HCT VFR BLD AUTO: 33 % (ref 37.5–51)
HCT VFR BLD AUTO: 33.1 % (ref 37.5–51)
HCT VFR BLD AUTO: 33.3 % (ref 37.5–51)
HCT VFR BLD AUTO: 33.8 % (ref 37.5–51)
HCT VFR BLD AUTO: 34.4 % (ref 37.5–51)
HCT VFR BLD AUTO: 34.5 % (ref 37.5–51)
HCT VFR BLD AUTO: 35.1 % (ref 37.5–51)
HCT VFR BLD AUTO: 35.1 % (ref 37.5–51)
HCT VFR BLD AUTO: 35.2 % (ref 37.5–51)
HCT VFR BLD AUTO: 36.5 % (ref 37.5–51)
HCT VFR BLD AUTO: 36.9 % (ref 37.5–51)
HCT VFR BLD AUTO: 37.5 % (ref 37.5–51)
HGB BLD-MCNC: 10.4 G/DL (ref 13–17.7)
HGB BLD-MCNC: 10.5 G/DL (ref 13–17.7)
HGB BLD-MCNC: 10.8 G/DL (ref 13–17.7)
HGB BLD-MCNC: 10.9 G/DL (ref 13–17.7)
HGB BLD-MCNC: 10.9 G/DL (ref 13–17.7)
HGB BLD-MCNC: 11.2 G/DL (ref 13–17.7)
HGB BLD-MCNC: 11.4 G/DL (ref 13–17.7)
HGB BLD-MCNC: 11.5 G/DL (ref 13–17.7)
HGB BLD-MCNC: 11.5 G/DL (ref 13–17.7)
HGB BLD-MCNC: 11.8 G/DL (ref 13–17.7)
HGB BLD-MCNC: 12 G/DL (ref 13–17.7)
HGB BLD-MCNC: 12.2 G/DL (ref 13–17.7)
HGB BLD-MCNC: 12.2 G/DL (ref 13–17.7)
HGB BLD-MCNC: 12.4 G/DL (ref 13–17.7)
HGB BLD-MCNC: 7.7 G/DL (ref 13–17.7)
HGB BLD-MCNC: 7.7 G/DL (ref 13–17.7)
HGB BLD-MCNC: 8.8 G/DL (ref 13–17.7)
HGB BLD-MCNC: 9 G/DL (ref 13–17.7)
HGB BLD-MCNC: 9.2 G/DL (ref 13–17.7)
HGB UR QL STRIP.AUTO: NEGATIVE
HIV1+2 AB SER QL: NORMAL
HMPV RNA NPH QL NAA+NON-PROBE: NOT DETECTED
HOLD SPECIMEN: NORMAL
HPIV RNA LOWER RESP QL NAA+NON-PROBE: NOT DETECTED
IMM GRANULOCYTES # BLD AUTO: 0.01 10*3/MM3 (ref 0–0.05)
IMM GRANULOCYTES # BLD AUTO: 0.02 10*3/MM3 (ref 0–0.05)
IMM GRANULOCYTES # BLD AUTO: 0.04 10*3/MM3 (ref 0–0.05)
IMM GRANULOCYTES # BLD AUTO: 0.04 10*3/MM3 (ref 0–0.05)
IMM GRANULOCYTES # BLD AUTO: 0.05 10*3/MM3 (ref 0–0.05)
IMM GRANULOCYTES # BLD AUTO: 0.06 10*3/MM3 (ref 0–0.05)
IMM GRANULOCYTES # BLD AUTO: 0.09 10*3/MM3 (ref 0–0.05)
IMM GRANULOCYTES # BLD AUTO: 0.1 10*3/MM3 (ref 0–0.05)
IMM GRANULOCYTES # BLD AUTO: 0.11 10*3/MM3 (ref 0–0.05)
IMM GRANULOCYTES # BLD AUTO: 0.11 10*3/MM3 (ref 0–0.05)
IMM GRANULOCYTES # BLD AUTO: 0.12 10*3/MM3 (ref 0–0.05)
IMM GRANULOCYTES # BLD AUTO: 0.2 10*3/MM3 (ref 0–0.05)
IMM GRANULOCYTES # BLD AUTO: 0.22 10*3/MM3 (ref 0–0.05)
IMM GRANULOCYTES # BLD AUTO: 0.25 10*3/MM3 (ref 0–0.05)
IMM GRANULOCYTES # BLD AUTO: 0.27 10*3/MM3 (ref 0–0.05)
IMM GRANULOCYTES NFR BLD AUTO: 0.3 % (ref 0–0.5)
IMM GRANULOCYTES NFR BLD AUTO: 0.4 % (ref 0–0.5)
IMM GRANULOCYTES NFR BLD AUTO: 0.5 % (ref 0–0.5)
IMM GRANULOCYTES NFR BLD AUTO: 0.6 % (ref 0–0.5)
IMM GRANULOCYTES NFR BLD AUTO: 0.8 % (ref 0–0.5)
IMM GRANULOCYTES NFR BLD AUTO: 0.9 % (ref 0–0.5)
IMM GRANULOCYTES NFR BLD AUTO: 1 % (ref 0–0.5)
IMM GRANULOCYTES NFR BLD AUTO: 1.1 % (ref 0–0.5)
IMM GRANULOCYTES NFR BLD AUTO: 1.2 % (ref 0–0.5)
IMM GRANULOCYTES NFR BLD AUTO: 1.4 % (ref 0–0.5)
IMM GRANULOCYTES NFR BLD AUTO: 1.4 % (ref 0–0.5)
IMM GRANULOCYTES NFR BLD AUTO: 1.6 % (ref 0–0.5)
IMM GRANULOCYTES NFR BLD AUTO: 1.8 % (ref 0–0.5)
IMM GRANULOCYTES NFR BLD AUTO: 2.2 % (ref 0–0.5)
IMM GRANULOCYTES NFR BLD AUTO: 2.6 % (ref 0–0.5)
IMM GRANULOCYTES NFR BLD AUTO: 3.5 % (ref 0–0.5)
INR PPP: 0.91 (ref 2–3)
INR PPP: 0.99 (ref 2–3)
IRON 24H UR-MRATE: 64 MCG/DL (ref 59–158)
IRON 24H UR-MRATE: 70 MCG/DL (ref 59–158)
IRON SATN MFR SERPL: 28 % (ref 20–50)
IRON SATN MFR SERPL: 32 % (ref 20–50)
IVRT: 79 MSEC
K AEROGENES DNA BAL NAA+NON-PRB-NCNCRNG: NOT DETECTED
K OXYTOCA DNA BAL NAA+NON-PRB-NCNCRNG: NOT DETECTED
K PNEU GRP DNA BAL NAA+NON-PRB-NCNCRNG: NOT DETECTED
KETONES UR QL STRIP: NEGATIVE
L PNEUMO DNA LOWER RESP QL NAA+NON-PROBE: NOT DETECTED
LAB AP CASE REPORT: NORMAL
LAB AP CASE REPORT: NORMAL
LAB AP CLINICAL INFORMATION: NORMAL
LAB AP CLINICAL INFORMATION: NORMAL
LAB AP DIAGNOSIS COMMENT: NORMAL
LAB AP INTRADEPARTMENTAL CONSULT: NORMAL
LAB AP SPECIAL STAINS: NORMAL
LEFT ATRIUM VOLUME INDEX: 15.5 ML/M2
LEUKOCYTE ESTERASE UR QL STRIP.AUTO: NEGATIVE
LV EF 2D ECHO EST: 35 %
LYMPHOCYTES # BLD AUTO: 0.92 10*3/MM3 (ref 0.7–3.1)
LYMPHOCYTES # BLD AUTO: 0.98 10*3/MM3 (ref 0.7–3.1)
LYMPHOCYTES # BLD AUTO: 0.99 10*3/MM3 (ref 0.7–3.1)
LYMPHOCYTES # BLD AUTO: 1.02 10*3/MM3 (ref 0.7–3.1)
LYMPHOCYTES # BLD AUTO: 1.05 10*3/MM3 (ref 0.7–3.1)
LYMPHOCYTES # BLD AUTO: 1.08 10*3/MM3 (ref 0.7–3.1)
LYMPHOCYTES # BLD AUTO: 1.09 10*3/MM3 (ref 0.7–3.1)
LYMPHOCYTES # BLD AUTO: 1.09 10*3/MM3 (ref 0.7–3.1)
LYMPHOCYTES # BLD AUTO: 1.1 10*3/MM3 (ref 0.7–3.1)
LYMPHOCYTES # BLD AUTO: 1.14 10*3/MM3 (ref 0.7–3.1)
LYMPHOCYTES # BLD AUTO: 1.17 10*3/MM3 (ref 0.7–3.1)
LYMPHOCYTES # BLD AUTO: 1.25 10*3/MM3 (ref 0.7–3.1)
LYMPHOCYTES # BLD AUTO: 1.28 10*3/MM3 (ref 0.7–3.1)
LYMPHOCYTES # BLD AUTO: 1.3 10*3/MM3 (ref 0.7–3.1)
LYMPHOCYTES # BLD AUTO: 1.35 10*3/MM3 (ref 0.7–3.1)
LYMPHOCYTES # BLD AUTO: 1.35 10*3/MM3 (ref 0.7–3.1)
LYMPHOCYTES # BLD AUTO: 1.49 10*3/MM3 (ref 0.7–3.1)
LYMPHOCYTES # BLD AUTO: 1.53 10*3/MM3 (ref 0.7–3.1)
LYMPHOCYTES # BLD AUTO: 1.74 10*3/MM3 (ref 0.7–3.1)
LYMPHOCYTES NFR BLD AUTO: 11.4 % (ref 19.6–45.3)
LYMPHOCYTES NFR BLD AUTO: 11.4 % (ref 19.6–45.3)
LYMPHOCYTES NFR BLD AUTO: 12.3 % (ref 19.6–45.3)
LYMPHOCYTES NFR BLD AUTO: 12.7 % (ref 19.6–45.3)
LYMPHOCYTES NFR BLD AUTO: 12.9 % (ref 19.6–45.3)
LYMPHOCYTES NFR BLD AUTO: 13.5 % (ref 19.6–45.3)
LYMPHOCYTES NFR BLD AUTO: 14.2 % (ref 19.6–45.3)
LYMPHOCYTES NFR BLD AUTO: 14.2 % (ref 19.6–45.3)
LYMPHOCYTES NFR BLD AUTO: 15.5 % (ref 19.6–45.3)
LYMPHOCYTES NFR BLD AUTO: 16.2 % (ref 19.6–45.3)
LYMPHOCYTES NFR BLD AUTO: 16.4 % (ref 19.6–45.3)
LYMPHOCYTES NFR BLD AUTO: 17.8 % (ref 19.6–45.3)
LYMPHOCYTES NFR BLD AUTO: 19.4 % (ref 19.6–45.3)
LYMPHOCYTES NFR BLD AUTO: 25.3 % (ref 19.6–45.3)
LYMPHOCYTES NFR BLD AUTO: 26.8 % (ref 19.6–45.3)
LYMPHOCYTES NFR BLD AUTO: 28.1 % (ref 19.6–45.3)
LYMPHOCYTES NFR BLD AUTO: 28.3 % (ref 19.6–45.3)
Lab: NORMAL
M CATARRHALIS DNA BAL NAA+NON-PRB-NCNCRNG: NOT DETECTED
M PNEUMO IGG SER IA-ACNC: NOT DETECTED
MAGNESIUM SERPL-MCNC: 1.8 MG/DL (ref 1.6–2.4)
MAGNESIUM SERPL-MCNC: 1.9 MG/DL (ref 1.6–2.4)
MAGNESIUM SERPL-MCNC: 2 MG/DL (ref 1.6–2.4)
MAGNESIUM SERPL-MCNC: 2 MG/DL (ref 1.6–2.4)
MAGNESIUM SERPL-MCNC: 2.1 MG/DL (ref 1.6–2.4)
MAXIMAL PREDICTED HEART RATE: 148 BPM
MCH RBC QN AUTO: 32.1 PG (ref 26.6–33)
MCH RBC QN AUTO: 32.3 PG (ref 26.6–33)
MCH RBC QN AUTO: 32.3 PG (ref 26.6–33)
MCH RBC QN AUTO: 32.5 PG (ref 26.6–33)
MCH RBC QN AUTO: 32.5 PG (ref 26.6–33)
MCH RBC QN AUTO: 32.6 PG (ref 26.6–33)
MCH RBC QN AUTO: 32.7 PG (ref 26.6–33)
MCH RBC QN AUTO: 32.7 PG (ref 26.6–33)
MCH RBC QN AUTO: 32.8 PG (ref 26.6–33)
MCH RBC QN AUTO: 32.9 PG (ref 26.6–33)
MCH RBC QN AUTO: 32.9 PG (ref 26.6–33)
MCH RBC QN AUTO: 33 PG (ref 26.6–33)
MCH RBC QN AUTO: 33.2 PG (ref 26.6–33)
MCH RBC QN AUTO: 33.3 PG (ref 26.6–33)
MCH RBC QN AUTO: 33.5 PG (ref 26.6–33)
MCH RBC QN AUTO: 33.8 PG (ref 26.6–33)
MCH RBC QN AUTO: 33.8 PG (ref 26.6–33)
MCHC RBC AUTO-ENTMCNC: 32.2 G/DL (ref 31.5–35.7)
MCHC RBC AUTO-ENTMCNC: 32.3 G/DL (ref 31.5–35.7)
MCHC RBC AUTO-ENTMCNC: 32.4 G/DL (ref 31.5–35.7)
MCHC RBC AUTO-ENTMCNC: 32.5 G/DL (ref 31.5–35.7)
MCHC RBC AUTO-ENTMCNC: 32.7 G/DL (ref 31.5–35.7)
MCHC RBC AUTO-ENTMCNC: 32.7 G/DL (ref 31.5–35.7)
MCHC RBC AUTO-ENTMCNC: 32.9 G/DL (ref 31.5–35.7)
MCHC RBC AUTO-ENTMCNC: 32.9 G/DL (ref 31.5–35.7)
MCHC RBC AUTO-ENTMCNC: 33 G/DL (ref 31.5–35.7)
MCHC RBC AUTO-ENTMCNC: 33 G/DL (ref 31.5–35.7)
MCHC RBC AUTO-ENTMCNC: 33.1 G/DL (ref 31.5–35.7)
MCHC RBC AUTO-ENTMCNC: 33.1 G/DL (ref 31.5–35.7)
MCHC RBC AUTO-ENTMCNC: 33.3 G/DL (ref 31.5–35.7)
MCHC RBC AUTO-ENTMCNC: 33.3 G/DL (ref 31.5–35.7)
MCHC RBC AUTO-ENTMCNC: 33.4 G/DL (ref 31.5–35.7)
MCHC RBC AUTO-ENTMCNC: 33.4 G/DL (ref 31.5–35.7)
MCHC RBC AUTO-ENTMCNC: 33.6 G/DL (ref 31.5–35.7)
MCHC RBC AUTO-ENTMCNC: 33.8 G/DL (ref 31.5–35.7)
MCHC RBC AUTO-ENTMCNC: 34.1 G/DL (ref 31.5–35.7)
MCV RBC AUTO: 100 FL (ref 79–97)
MCV RBC AUTO: 100.3 FL (ref 79–97)
MCV RBC AUTO: 100.5 FL (ref 79–97)
MCV RBC AUTO: 100.6 FL (ref 79–97)
MCV RBC AUTO: 100.7 FL (ref 79–97)
MCV RBC AUTO: 100.9 FL (ref 79–97)
MCV RBC AUTO: 102.6 FL (ref 79–97)
MCV RBC AUTO: 96.4 FL (ref 79–97)
MCV RBC AUTO: 97.2 FL (ref 79–97)
MCV RBC AUTO: 97.7 FL (ref 79–97)
MCV RBC AUTO: 98 FL (ref 79–97)
MCV RBC AUTO: 98.8 FL (ref 79–97)
MCV RBC AUTO: 98.9 FL (ref 79–97)
MCV RBC AUTO: 99.1 FL (ref 79–97)
MCV RBC AUTO: 99.3 FL (ref 79–97)
MCV RBC AUTO: 99.6 FL (ref 79–97)
MCV RBC AUTO: 99.7 FL (ref 79–97)
MECA+MECC ISLT/SPM QL: ABNORMAL
MONOCYTES # BLD AUTO: 0.26 10*3/MM3 (ref 0.1–0.9)
MONOCYTES # BLD AUTO: 0.35 10*3/MM3 (ref 0.1–0.9)
MONOCYTES # BLD AUTO: 0.41 10*3/MM3 (ref 0.1–0.9)
MONOCYTES # BLD AUTO: 0.45 10*3/MM3 (ref 0.1–0.9)
MONOCYTES # BLD AUTO: 0.48 10*3/MM3 (ref 0.1–0.9)
MONOCYTES # BLD AUTO: 0.48 10*3/MM3 (ref 0.1–0.9)
MONOCYTES # BLD AUTO: 0.51 10*3/MM3 (ref 0.1–0.9)
MONOCYTES # BLD AUTO: 0.52 10*3/MM3 (ref 0.1–0.9)
MONOCYTES # BLD AUTO: 0.53 10*3/MM3 (ref 0.1–0.9)
MONOCYTES # BLD AUTO: 0.55 10*3/MM3 (ref 0.1–0.9)
MONOCYTES # BLD AUTO: 0.56 10*3/MM3 (ref 0.1–0.9)
MONOCYTES # BLD AUTO: 0.57 10*3/MM3 (ref 0.1–0.9)
MONOCYTES # BLD AUTO: 0.58 10*3/MM3 (ref 0.1–0.9)
MONOCYTES # BLD AUTO: 0.62 10*3/MM3 (ref 0.1–0.9)
MONOCYTES # BLD AUTO: 0.63 10*3/MM3 (ref 0.1–0.9)
MONOCYTES # BLD AUTO: 0.63 10*3/MM3 (ref 0.1–0.9)
MONOCYTES # BLD AUTO: 0.8 10*3/MM3 (ref 0.1–0.9)
MONOCYTES # BLD AUTO: 0.92 10*3/MM3 (ref 0.1–0.9)
MONOCYTES # BLD AUTO: 0.94 10*3/MM3 (ref 0.1–0.9)
MONOCYTES NFR BLD AUTO: 10 % (ref 5–12)
MONOCYTES NFR BLD AUTO: 11.7 % (ref 5–12)
MONOCYTES NFR BLD AUTO: 12.3 % (ref 5–12)
MONOCYTES NFR BLD AUTO: 15.6 % (ref 5–12)
MONOCYTES NFR BLD AUTO: 16.4 % (ref 5–12)
MONOCYTES NFR BLD AUTO: 3.6 % (ref 5–12)
MONOCYTES NFR BLD AUTO: 5.1 % (ref 5–12)
MONOCYTES NFR BLD AUTO: 5.3 % (ref 5–12)
MONOCYTES NFR BLD AUTO: 5.4 % (ref 5–12)
MONOCYTES NFR BLD AUTO: 5.5 % (ref 5–12)
MONOCYTES NFR BLD AUTO: 5.6 % (ref 5–12)
MONOCYTES NFR BLD AUTO: 6 % (ref 5–12)
MONOCYTES NFR BLD AUTO: 6.5 % (ref 5–12)
MONOCYTES NFR BLD AUTO: 6.5 % (ref 5–12)
MONOCYTES NFR BLD AUTO: 6.7 % (ref 5–12)
MONOCYTES NFR BLD AUTO: 6.9 % (ref 5–12)
MONOCYTES NFR BLD AUTO: 7.2 % (ref 5–12)
MONOCYTES NFR BLD AUTO: 7.7 % (ref 5–12)
MONOCYTES NFR BLD AUTO: 8.2 % (ref 5–12)
MYCOBACTERIUM SPEC CULT: NORMAL
NDM GENE: NOT DETECTED
NEUTROPHILS NFR BLD AUTO: 10.46 10*3/MM3 (ref 1.7–7)
NEUTROPHILS NFR BLD AUTO: 2.13 10*3/MM3 (ref 1.7–7)
NEUTROPHILS NFR BLD AUTO: 2.2 10*3/MM3 (ref 1.7–7)
NEUTROPHILS NFR BLD AUTO: 2.35 10*3/MM3 (ref 1.7–7)
NEUTROPHILS NFR BLD AUTO: 2.43 10*3/MM3 (ref 1.7–7)
NEUTROPHILS NFR BLD AUTO: 3.68 10*3/MM3 (ref 1.7–7)
NEUTROPHILS NFR BLD AUTO: 5.25 10*3/MM3 (ref 1.7–7)
NEUTROPHILS NFR BLD AUTO: 5.44 10*3/MM3 (ref 1.7–7)
NEUTROPHILS NFR BLD AUTO: 5.45 10*3/MM3 (ref 1.7–7)
NEUTROPHILS NFR BLD AUTO: 5.72 10*3/MM3 (ref 1.7–7)
NEUTROPHILS NFR BLD AUTO: 5.73 10*3/MM3 (ref 1.7–7)
NEUTROPHILS NFR BLD AUTO: 54.7 % (ref 42.7–76)
NEUTROPHILS NFR BLD AUTO: 59.1 % (ref 42.7–76)
NEUTROPHILS NFR BLD AUTO: 6.43 10*3/MM3 (ref 1.7–7)
NEUTROPHILS NFR BLD AUTO: 6.74 10*3/MM3 (ref 1.7–7)
NEUTROPHILS NFR BLD AUTO: 6.94 10*3/MM3 (ref 1.7–7)
NEUTROPHILS NFR BLD AUTO: 60.1 % (ref 42.7–76)
NEUTROPHILS NFR BLD AUTO: 61 % (ref 42.7–76)
NEUTROPHILS NFR BLD AUTO: 65.6 % (ref 42.7–76)
NEUTROPHILS NFR BLD AUTO: 68.8 % (ref 42.7–76)
NEUTROPHILS NFR BLD AUTO: 69.4 % (ref 42.7–76)
NEUTROPHILS NFR BLD AUTO: 7.22 10*3/MM3 (ref 1.7–7)
NEUTROPHILS NFR BLD AUTO: 7.67 10*3/MM3 (ref 1.7–7)
NEUTROPHILS NFR BLD AUTO: 7.77 10*3/MM3 (ref 1.7–7)
NEUTROPHILS NFR BLD AUTO: 7.78 10*3/MM3 (ref 1.7–7)
NEUTROPHILS NFR BLD AUTO: 71.1 % (ref 42.7–76)
NEUTROPHILS NFR BLD AUTO: 72.3 % (ref 42.7–76)
NEUTROPHILS NFR BLD AUTO: 74.4 % (ref 42.7–76)
NEUTROPHILS NFR BLD AUTO: 76.3 % (ref 42.7–76)
NEUTROPHILS NFR BLD AUTO: 76.5 % (ref 42.7–76)
NEUTROPHILS NFR BLD AUTO: 77.3 % (ref 42.7–76)
NEUTROPHILS NFR BLD AUTO: 79 % (ref 42.7–76)
NEUTROPHILS NFR BLD AUTO: 79 % (ref 42.7–76)
NEUTROPHILS NFR BLD AUTO: 79.3 % (ref 42.7–76)
NEUTROPHILS NFR BLD AUTO: 79.7 % (ref 42.7–76)
NEUTROPHILS NFR BLD AUTO: 80.3 % (ref 42.7–76)
NEUTROPHILS NFR BLD AUTO: 80.4 % (ref 42.7–76)
NEUTROPHILS NFR BLD AUTO: 9.54 10*3/MM3 (ref 1.7–7)
NEUTROPHILS NFR FLD MANUAL: 100 %
NIGHT BLUE STAIN TISS: NORMAL
NIGHT BLUE STAIN TISS: NORMAL
NITRITE UR QL STRIP: NEGATIVE
NRBC BLD AUTO-RTO: 0 /100 WBC (ref 0–0.2)
NT-PROBNP SERPL-MCNC: 1412 PG/ML (ref 0–900)
NT-PROBNP SERPL-MCNC: 315.2 PG/ML (ref 0–900)
NT-PROBNP SERPL-MCNC: 319 PG/ML (ref 0–900)
NT-PROBNP SERPL-MCNC: 476.3 PG/ML (ref 0–900)
NT-PROBNP SERPL-MCNC: 700.7 PG/ML (ref 0–900)
NT-PROBNP SERPL-MCNC: 880.5 PG/ML (ref 0–900)
P AERUGINOSA DNA BAL NAA+NON-PRB-NCNCRNG: DETECTED
PATH REPORT.ADDENDUM SPEC: NORMAL
PATH REPORT.FINAL DX SPEC: NORMAL
PATH REPORT.FINAL DX SPEC: NORMAL
PATH REPORT.GROSS SPEC: NORMAL
PATH REPORT.GROSS SPEC: NORMAL
PH UR STRIP.AUTO: 7.5 [PH] (ref 5–8)
PHOSPHATE SERPL-MCNC: 2.6 MG/DL (ref 2.5–4.5)
PLAT MORPH BLD: NORMAL
PLATELET # BLD AUTO: 217 10*3/MM3 (ref 140–450)
PLATELET # BLD AUTO: 220 10*3/MM3 (ref 140–450)
PLATELET # BLD AUTO: 228 10*3/MM3 (ref 140–450)
PLATELET # BLD AUTO: 230 10*3/MM3 (ref 140–450)
PLATELET # BLD AUTO: 231 10*3/MM3 (ref 140–450)
PLATELET # BLD AUTO: 235 10*3/MM3 (ref 140–450)
PLATELET # BLD AUTO: 239 10*3/MM3 (ref 140–450)
PLATELET # BLD AUTO: 240 10*3/MM3 (ref 140–450)
PLATELET # BLD AUTO: 244 10*3/MM3 (ref 140–450)
PLATELET # BLD AUTO: 260 10*3/MM3 (ref 140–450)
PLATELET # BLD AUTO: 262 10*3/MM3 (ref 140–450)
PLATELET # BLD AUTO: 262 10*3/MM3 (ref 140–450)
PLATELET # BLD AUTO: 266 10*3/MM3 (ref 140–450)
PLATELET # BLD AUTO: 270 10*3/MM3 (ref 140–450)
PLATELET # BLD AUTO: 271 10*3/MM3 (ref 140–450)
PLATELET # BLD AUTO: 272 10*3/MM3 (ref 140–450)
PLATELET # BLD AUTO: 324 10*3/MM3 (ref 140–450)
PLATELET # BLD AUTO: 324 10*3/MM3 (ref 140–450)
PLATELET # BLD AUTO: 75 10*3/MM3 (ref 140–450)
PMV BLD AUTO: 10 FL (ref 6–12)
PMV BLD AUTO: 10 FL (ref 6–12)
PMV BLD AUTO: 10.1 FL (ref 6–12)
PMV BLD AUTO: 10.2 FL (ref 6–12)
PMV BLD AUTO: 10.3 FL (ref 6–12)
PMV BLD AUTO: 8.8 FL (ref 6–12)
PMV BLD AUTO: 9 FL (ref 6–12)
PMV BLD AUTO: 9.1 FL (ref 6–12)
PMV BLD AUTO: 9.2 FL (ref 6–12)
PMV BLD AUTO: 9.2 FL (ref 6–12)
PMV BLD AUTO: 9.4 FL (ref 6–12)
PMV BLD AUTO: 9.6 FL (ref 6–12)
PMV BLD AUTO: 9.7 FL (ref 6–12)
PMV BLD AUTO: 9.7 FL (ref 6–12)
PMV BLD AUTO: 9.8 FL (ref 6–12)
PMV BLD AUTO: 9.8 FL (ref 6–12)
PMV BLD AUTO: 9.9 FL (ref 6–12)
POTASSIUM SERPL-SCNC: 3.8 MMOL/L (ref 3.5–5.2)
POTASSIUM SERPL-SCNC: 3.9 MMOL/L (ref 3.5–5.2)
POTASSIUM SERPL-SCNC: 4 MMOL/L (ref 3.5–5.2)
POTASSIUM SERPL-SCNC: 4.1 MMOL/L (ref 3.5–5.2)
POTASSIUM SERPL-SCNC: 4.1 MMOL/L (ref 3.5–5.2)
POTASSIUM SERPL-SCNC: 4.2 MMOL/L (ref 3.5–5.2)
POTASSIUM SERPL-SCNC: 4.2 MMOL/L (ref 3.5–5.2)
POTASSIUM SERPL-SCNC: 4.3 MMOL/L (ref 3.5–5.2)
POTASSIUM SERPL-SCNC: 4.4 MMOL/L (ref 3.5–5.2)
POTASSIUM SERPL-SCNC: 4.5 MMOL/L (ref 3.5–5.2)
POTASSIUM SERPL-SCNC: 4.7 MMOL/L (ref 3.5–5.2)
POTASSIUM SERPL-SCNC: 4.8 MMOL/L (ref 3.5–5.2)
POTASSIUM SERPL-SCNC: 4.8 MMOL/L (ref 3.5–5.2)
PROCALCITONIN SERPL-MCNC: 0.1 NG/ML (ref 0–0.25)
PROT SERPL-MCNC: 6.4 G/DL (ref 6–8.5)
PROT SERPL-MCNC: 6.4 G/DL (ref 6–8.5)
PROT SERPL-MCNC: 6.5 G/DL (ref 6–8.5)
PROT SERPL-MCNC: 6.5 G/DL (ref 6–8.5)
PROT SERPL-MCNC: 6.6 G/DL (ref 6–8.5)
PROT SERPL-MCNC: 6.9 G/DL (ref 6–8.5)
PROT SERPL-MCNC: 7 G/DL (ref 6–8.5)
PROT SERPL-MCNC: 7 G/DL (ref 6–8.5)
PROT SERPL-MCNC: 7.1 G/DL (ref 6–8.5)
PROT SERPL-MCNC: 7.5 G/DL (ref 6–8.5)
PROT SERPL-MCNC: 7.5 G/DL (ref 6–8.5)
PROT SERPL-MCNC: 7.6 G/DL (ref 6–8.5)
PROT UR QL STRIP: NEGATIVE
PROTEUS SP DNA BAL NAA+NON-PRB-NCNCRNG: NOT DETECTED
PROTHROMBIN TIME: 10.4 SECONDS (ref 9.4–12)
PROTHROMBIN TIME: 9.7 SECONDS (ref 9.4–12)
QT INTERVAL: 271 MS
QT INTERVAL: 449 MS
QT INTERVAL: 514 MS
RBC # BLD AUTO: 2.28 10*6/MM3 (ref 4.14–5.8)
RBC # BLD AUTO: 2.35 10*6/MM3 (ref 4.14–5.8)
RBC # BLD AUTO: 2.69 10*6/MM3 (ref 4.14–5.8)
RBC # BLD AUTO: 2.73 10*6/MM3 (ref 4.14–5.8)
RBC # BLD AUTO: 2.83 10*6/MM3 (ref 4.14–5.8)
RBC # BLD AUTO: 3.22 10*6/MM3 (ref 4.14–5.8)
RBC # BLD AUTO: 3.24 10*6/MM3 (ref 4.14–5.8)
RBC # BLD AUTO: 3.31 10*6/MM3 (ref 4.14–5.8)
RBC # BLD AUTO: 3.31 10*6/MM3 (ref 4.14–5.8)
RBC # BLD AUTO: 3.34 10*6/MM3 (ref 4.14–5.8)
RBC # BLD AUTO: 3.35 10*6/MM3 (ref 4.14–5.8)
RBC # BLD AUTO: 3.45 10*6/MM3 (ref 4.14–5.8)
RBC # BLD AUTO: 3.48 10*6/MM3 (ref 4.14–5.8)
RBC # BLD AUTO: 3.49 10*6/MM3 (ref 4.14–5.8)
RBC # BLD AUTO: 3.58 10*6/MM3 (ref 4.14–5.8)
RBC # BLD AUTO: 3.64 10*6/MM3 (ref 4.14–5.8)
RBC # BLD AUTO: 3.65 10*6/MM3 (ref 4.14–5.8)
RBC # BLD AUTO: 3.67 10*6/MM3 (ref 4.14–5.8)
RBC # BLD AUTO: 3.84 10*6/MM3 (ref 4.14–5.8)
RBC MORPH BLD: NORMAL
RH BLD: POSITIVE
RH BLD: POSITIVE
RHEUMATOID FACT SERPL-ACNC: 15.6 IU/ML
RHINOVIRUS RNA SPEC NAA+PROBE: NOT DETECTED
RSV RNA NPH QL NAA+NON-PROBE: NOT DETECTED
S AUREUS DNA BAL NAA+NON-PRB-NCNCRNG: NOT DETECTED
S MARCESCENS DNA BAL NAA+NON-PRB-NCNCRNG: NOT DETECTED
S PNEUM DNA BAL NAA+NON-PRB-NCNCRNG: NOT DETECTED
S PYO DNA BAL NAA+NON-PRB-NCNCRNG: NOT DETECTED
SODIUM SERPL-SCNC: 130 MMOL/L (ref 136–145)
SODIUM SERPL-SCNC: 132 MMOL/L (ref 136–145)
SODIUM SERPL-SCNC: 133 MMOL/L (ref 136–145)
SODIUM SERPL-SCNC: 134 MMOL/L (ref 136–145)
SODIUM SERPL-SCNC: 134 MMOL/L (ref 136–145)
SODIUM SERPL-SCNC: 135 MMOL/L (ref 136–145)
SODIUM SERPL-SCNC: 136 MMOL/L (ref 136–145)
SODIUM SERPL-SCNC: 136 MMOL/L (ref 136–145)
SODIUM SERPL-SCNC: 137 MMOL/L (ref 136–145)
SODIUM SERPL-SCNC: 137 MMOL/L (ref 136–145)
SODIUM SERPL-SCNC: 138 MMOL/L (ref 136–145)
SODIUM SERPL-SCNC: 139 MMOL/L (ref 136–145)
SODIUM SERPL-SCNC: 139 MMOL/L (ref 136–145)
SP GR UR STRIP: 1.01 (ref 1–1.03)
STAT OF ADQ CVX/VAG CYTO-IMP: NORMAL
STRESS TARGET HR: 126 BPM
T&S EXPIRATION DATE: NORMAL
T-UPTAKE NFR SERPL: 1.05 TBI (ref 0.8–1.3)
T4 FREE SERPL-MCNC: 1.27 NG/DL (ref 0.93–1.7)
T4 SERPL-MCNC: 5.74 MCG/DL (ref 4.5–11.7)
TIBC SERPL-MCNC: 198 MCG/DL (ref 298–536)
TIBC SERPL-MCNC: 253 MCG/DL (ref 298–536)
TRANSFERRIN SERPL-MCNC: 133 MG/DL (ref 200–360)
TRANSFERRIN SERPL-MCNC: 170 MG/DL (ref 200–360)
TROPONIN T SERPL-MCNC: <0.01 NG/ML (ref 0–0.03)
TSH SERPL DL<=0.05 MIU/L-ACNC: 0.38 UIU/ML (ref 0.27–4.2)
TSH SERPL DL<=0.05 MIU/L-ACNC: 0.49 UIU/ML (ref 0.27–4.2)
TSH SERPL DL<=0.05 MIU/L-ACNC: 0.51 UIU/ML (ref 0.27–4.2)
TSH SERPL DL<=0.05 MIU/L-ACNC: 1.22 UIU/ML (ref 0.27–4.2)
UNIT  ABO: NORMAL
UNIT  RH: NORMAL
UROBILINOGEN UR QL STRIP: NORMAL
VISUAL PRESENCE OF BLOOD: NORMAL
VIT B12 BLD-MCNC: 752 PG/ML (ref 211–946)
VIT B12 BLD-MCNC: 879 PG/ML (ref 211–946)
WBC MORPH BLD: NORMAL
WBC NRBC COR # BLD: 10.06 10*3/MM3 (ref 3.4–10.8)
WBC NRBC COR # BLD: 11.87 10*3/MM3 (ref 3.4–10.8)
WBC NRBC COR # BLD: 13.71 10*3/MM3 (ref 3.4–10.8)
WBC NRBC COR # BLD: 3.49 10*3/MM3 (ref 3.4–10.8)
WBC NRBC COR # BLD: 3.91 10*3/MM3 (ref 3.4–10.8)
WBC NRBC COR # BLD: 4.03 10*3/MM3 (ref 3.4–10.8)
WBC NRBC COR # BLD: 4.11 10*3/MM3 (ref 3.4–10.8)
WBC NRBC COR # BLD: 5.61 10*3/MM3 (ref 3.4–10.8)
WBC NRBC COR # BLD: 7.23 10*3/MM3 (ref 3.4–10.8)
WBC NRBC COR # BLD: 7.57 10*3/MM3 (ref 3.4–10.8)
WBC NRBC COR # BLD: 7.67 10*3/MM3 (ref 3.4–10.8)
WBC NRBC COR # BLD: 7.68 10*3/MM3 (ref 3.4–10.8)
WBC NRBC COR # BLD: 7.9 10*3/MM3 (ref 3.4–10.8)
WBC NRBC COR # BLD: 8.06 10*3/MM3 (ref 3.4–10.8)
WBC NRBC COR # BLD: 8.53 10*3/MM3 (ref 3.4–10.8)
WBC NRBC COR # BLD: 9.44 10*3/MM3 (ref 3.4–10.8)
WBC NRBC COR # BLD: 9.6 10*3/MM3 (ref 3.4–10.8)
WBC NRBC COR # BLD: 9.66 10*3/MM3 (ref 3.4–10.8)
WBC NRBC COR # BLD: 9.71 10*3/MM3 (ref 3.4–10.8)
WHOLE BLOOD HOLD SPECIMEN: NORMAL

## 2022-01-01 PROCEDURE — 99233 SBSQ HOSP IP/OBS HIGH 50: CPT | Performed by: INTERNAL MEDICINE

## 2022-01-01 PROCEDURE — 25010000002 HEPARIN LOCK FLUSH PER 10 UNITS

## 2022-01-01 PROCEDURE — 0B9G8ZX DRAINAGE OF LEFT UPPER LUNG LOBE, VIA NATURAL OR ARTIFICIAL OPENING ENDOSCOPIC, DIAGNOSTIC: ICD-10-PCS | Performed by: INTERNAL MEDICINE

## 2022-01-01 PROCEDURE — 94799 UNLISTED PULMONARY SVC/PX: CPT

## 2022-01-01 PROCEDURE — 94640 AIRWAY INHALATION TREATMENT: CPT

## 2022-01-01 PROCEDURE — 25010000002 SODIUM CHLORIDE 0.9 % WITH KCL 20 MEQ 20-0.9 MEQ/L-% SOLUTION: Performed by: INTERNAL MEDICINE

## 2022-01-01 PROCEDURE — 25010000002 COSYNTROPIN PER 0.25 MG: Performed by: INTERNAL MEDICINE

## 2022-01-01 PROCEDURE — 97116 GAIT TRAINING THERAPY: CPT

## 2022-01-01 PROCEDURE — 63710000001 DEXAMETHASONE PER 0.25 MG: Performed by: SURGERY

## 2022-01-01 PROCEDURE — 63710000001 DEXAMETHASONE PER 0.25 MG: Performed by: INTERNAL MEDICINE

## 2022-01-01 PROCEDURE — 85025 COMPLETE CBC W/AUTO DIFF WBC: CPT | Performed by: INTERNAL MEDICINE

## 2022-01-01 PROCEDURE — 83540 ASSAY OF IRON: CPT | Performed by: INTERNAL MEDICINE

## 2022-01-01 PROCEDURE — 0B918ZX DRAINAGE OF TRACHEA, VIA NATURAL OR ARTIFICIAL OPENING ENDOSCOPIC, DIAGNOSTIC: ICD-10-PCS | Performed by: INTERNAL MEDICINE

## 2022-01-01 PROCEDURE — 84479 ASSAY OF THYROID (T3 OR T4): CPT | Performed by: INTERNAL MEDICINE

## 2022-01-01 PROCEDURE — 97110 THERAPEUTIC EXERCISES: CPT

## 2022-01-01 PROCEDURE — 84443 ASSAY THYROID STIM HORMONE: CPT | Performed by: INTERNAL MEDICINE

## 2022-01-01 PROCEDURE — 77336 RADIATION PHYSICS CONSULT: CPT | Performed by: RADIOLOGY

## 2022-01-01 PROCEDURE — 77334 RADIATION TREATMENT AID(S): CPT | Performed by: RADIOLOGY

## 2022-01-01 PROCEDURE — 25010000002 FOSPHENYTOIN 100 MG PE/2ML SOLUTION 2 ML VIAL: Performed by: INTERNAL MEDICINE

## 2022-01-01 PROCEDURE — 63710000001 INSULIN DETEMIR PER 5 UNITS: Performed by: INTERNAL MEDICINE

## 2022-01-01 PROCEDURE — 25010000002 LEVETIRACETAM IN NACL 0.82% 500 MG/100ML SOLUTION: Performed by: INTERNAL MEDICINE

## 2022-01-01 PROCEDURE — 97165 OT EVAL LOW COMPLEX 30 MIN: CPT

## 2022-01-01 PROCEDURE — 86901 BLOOD TYPING SEROLOGIC RH(D): CPT

## 2022-01-01 PROCEDURE — 82962 GLUCOSE BLOOD TEST: CPT

## 2022-01-01 PROCEDURE — 99232 SBSQ HOSP IP/OBS MODERATE 35: CPT | Performed by: INTERNAL MEDICINE

## 2022-01-01 PROCEDURE — 80048 BASIC METABOLIC PNL TOTAL CA: CPT | Performed by: SURGERY

## 2022-01-01 PROCEDURE — 25010000002 CEFAZOLIN IN DEXTROSE 2-4 GM/100ML-% SOLUTION: Performed by: SURGERY

## 2022-01-01 PROCEDURE — 80053 COMPREHEN METABOLIC PANEL: CPT | Performed by: EMERGENCY MEDICINE

## 2022-01-01 PROCEDURE — 82607 VITAMIN B-12: CPT | Performed by: INTERNAL MEDICINE

## 2022-01-01 PROCEDURE — 25010000002 CEFEPIME PER 500 MG: Performed by: INTERNAL MEDICINE

## 2022-01-01 PROCEDURE — 77295 3-D RADIOTHERAPY PLAN: CPT | Performed by: RADIOLOGY

## 2022-01-01 PROCEDURE — 87205 SMEAR GRAM STAIN: CPT | Performed by: INTERNAL MEDICINE

## 2022-01-01 PROCEDURE — 25010000002 ONDANSETRON PER 1 MG: Performed by: INTERNAL MEDICINE

## 2022-01-01 PROCEDURE — 99232 SBSQ HOSP IP/OBS MODERATE 35: CPT | Performed by: NEUROLOGICAL SURGERY

## 2022-01-01 PROCEDURE — 80048 BASIC METABOLIC PNL TOTAL CA: CPT | Performed by: INTERNAL MEDICINE

## 2022-01-01 PROCEDURE — 36561 INSERT TUNNELED CV CATH: CPT | Performed by: SURGERY

## 2022-01-01 PROCEDURE — 77387 GUIDANCE FOR RADJ TX DLVR: CPT | Performed by: RADIOLOGY

## 2022-01-01 PROCEDURE — 82746 ASSAY OF FOLIC ACID SERUM: CPT | Performed by: INTERNAL MEDICINE

## 2022-01-01 PROCEDURE — 99222 1ST HOSP IP/OBS MODERATE 55: CPT | Performed by: INTERNAL MEDICINE

## 2022-01-01 PROCEDURE — 85025 COMPLETE CBC W/AUTO DIFF WBC: CPT

## 2022-01-01 PROCEDURE — 77412 RADIATION TX DELIVERY LVL 3: CPT | Performed by: RADIOLOGY

## 2022-01-01 PROCEDURE — 83880 ASSAY OF NATRIURETIC PEPTIDE: CPT | Performed by: INTERNAL MEDICINE

## 2022-01-01 PROCEDURE — 86235 NUCLEAR ANTIGEN ANTIBODY: CPT | Performed by: INTERNAL MEDICINE

## 2022-01-01 PROCEDURE — 84484 ASSAY OF TROPONIN QUANT: CPT | Performed by: EMERGENCY MEDICINE

## 2022-01-01 PROCEDURE — 83735 ASSAY OF MAGNESIUM: CPT | Performed by: INTERNAL MEDICINE

## 2022-01-01 PROCEDURE — 25010000002 METOCLOPRAMIDE PER 10 MG: Performed by: INTERNAL MEDICINE

## 2022-01-01 PROCEDURE — 76937 US GUIDE VASCULAR ACCESS: CPT | Performed by: SURGERY

## 2022-01-01 PROCEDURE — 31624 DX BRONCHOSCOPE/LAVAGE: CPT | Performed by: INTERNAL MEDICINE

## 2022-01-01 PROCEDURE — G6002 STEREOSCOPIC X-RAY GUIDANCE: HCPCS | Performed by: RADIOLOGY

## 2022-01-01 PROCEDURE — 63710000001 INSULIN LISPRO (HUMAN) PER 5 UNITS: Performed by: INTERNAL MEDICINE

## 2022-01-01 PROCEDURE — 0 IOHEXOL 12 MG/ML SOLUTION: Performed by: INTERNAL MEDICINE

## 2022-01-01 PROCEDURE — 63710000001 INSULIN LISPRO (HUMAN) PER 5 UNITS: Performed by: SURGERY

## 2022-01-01 PROCEDURE — 63710000001 INSULIN DETEMIR PER 5 UNITS: Performed by: SURGERY

## 2022-01-01 PROCEDURE — 88108 CYTOPATH CONCENTRATE TECH: CPT | Performed by: INTERNAL MEDICINE

## 2022-01-01 PROCEDURE — 82728 ASSAY OF FERRITIN: CPT | Performed by: INTERNAL MEDICINE

## 2022-01-01 PROCEDURE — 97530 THERAPEUTIC ACTIVITIES: CPT

## 2022-01-01 PROCEDURE — 25010000002 DIGOXIN PER 500 MCG: Performed by: INTERNAL MEDICINE

## 2022-01-01 PROCEDURE — 25010000002 DEXAMETHASONE PER 1 MG: Performed by: INTERNAL MEDICINE

## 2022-01-01 PROCEDURE — 94760 N-INVAS EAR/PLS OXIMETRY 1: CPT

## 2022-01-01 PROCEDURE — 1111F DSCHRG MED/CURRENT MED MERGE: CPT | Performed by: INTERNAL MEDICINE

## 2022-01-01 PROCEDURE — 87071 CULTURE AEROBIC QUANT OTHER: CPT | Performed by: INTERNAL MEDICINE

## 2022-01-01 PROCEDURE — 93010 ELECTROCARDIOGRAM REPORT: CPT | Performed by: INTERNAL MEDICINE

## 2022-01-01 PROCEDURE — 99231 SBSQ HOSP IP/OBS SF/LOW 25: CPT | Performed by: NEUROLOGICAL SURGERY

## 2022-01-01 PROCEDURE — 99239 HOSP IP/OBS DSCHRG MGMT >30: CPT | Performed by: INTERNAL MEDICINE

## 2022-01-01 PROCEDURE — 25010000002 PIPERACILLIN SOD-TAZOBACTAM PER 1 G: Performed by: INTERNAL MEDICINE

## 2022-01-01 PROCEDURE — 87077 CULTURE AEROBIC IDENTIFY: CPT | Performed by: INTERNAL MEDICINE

## 2022-01-01 PROCEDURE — 93306 TTE W/DOPPLER COMPLETE: CPT | Performed by: INTERNAL MEDICINE

## 2022-01-01 PROCEDURE — 84100 ASSAY OF PHOSPHORUS: CPT | Performed by: INTERNAL MEDICINE

## 2022-01-01 PROCEDURE — 07D78ZX EXTRACTION OF THORAX LYMPHATIC, VIA NATURAL OR ARTIFICIAL OPENING ENDOSCOPIC, DIAGNOSTIC: ICD-10-PCS | Performed by: INTERNAL MEDICINE

## 2022-01-01 PROCEDURE — 83036 HEMOGLOBIN GLYCOSYLATED A1C: CPT

## 2022-01-01 PROCEDURE — 99284 EMERGENCY DEPT VISIT MOD MDM: CPT

## 2022-01-01 PROCEDURE — 25010000002 HEPARIN (PORCINE) PER 1000 UNITS: Performed by: SURGERY

## 2022-01-01 PROCEDURE — 83735 ASSAY OF MAGNESIUM: CPT | Performed by: EMERGENCY MEDICINE

## 2022-01-01 PROCEDURE — 73502 X-RAY EXAM HIP UNI 2-3 VIEWS: CPT

## 2022-01-01 PROCEDURE — 84443 ASSAY THYROID STIM HORMONE: CPT | Performed by: EMERGENCY MEDICINE

## 2022-01-01 PROCEDURE — 84466 ASSAY OF TRANSFERRIN: CPT | Performed by: INTERNAL MEDICINE

## 2022-01-01 PROCEDURE — 36430 TRANSFUSION BLD/BLD COMPNT: CPT

## 2022-01-01 PROCEDURE — 89051 BODY FLUID CELL COUNT: CPT | Performed by: INTERNAL MEDICINE

## 2022-01-01 PROCEDURE — 99223 1ST HOSP IP/OBS HIGH 75: CPT | Performed by: INTERNAL MEDICINE

## 2022-01-01 PROCEDURE — 87633 RESP VIRUS 12-25 TARGETS: CPT | Performed by: INTERNAL MEDICINE

## 2022-01-01 PROCEDURE — 88312 SPECIAL STAINS GROUP 1: CPT | Performed by: INTERNAL MEDICINE

## 2022-01-01 PROCEDURE — 80053 COMPREHEN METABOLIC PANEL: CPT | Performed by: INTERNAL MEDICINE

## 2022-01-01 PROCEDURE — 93005 ELECTROCARDIOGRAM TRACING: CPT | Performed by: EMERGENCY MEDICINE

## 2022-01-01 PROCEDURE — 25010000002 MORPHINE PER 10 MG: Performed by: SURGERY

## 2022-01-01 PROCEDURE — 84439 ASSAY OF FREE THYROXINE: CPT | Performed by: EMERGENCY MEDICINE

## 2022-01-01 PROCEDURE — 87186 SC STD MICRODIL/AGAR DIL: CPT | Performed by: INTERNAL MEDICINE

## 2022-01-01 PROCEDURE — 31653 BRONCH EBUS SAMPLNG 3/> NODE: CPT | Performed by: INTERNAL MEDICINE

## 2022-01-01 PROCEDURE — 88275 CYTOGENETICS 100-300: CPT

## 2022-01-01 PROCEDURE — 86709 HEPATITIS A IGM ANTIBODY: CPT | Performed by: INTERNAL MEDICINE

## 2022-01-01 PROCEDURE — 83605 ASSAY OF LACTIC ACID: CPT | Performed by: EMERGENCY MEDICINE

## 2022-01-01 PROCEDURE — 87116 MYCOBACTERIA CULTURE: CPT | Performed by: INTERNAL MEDICINE

## 2022-01-01 PROCEDURE — 86900 BLOOD TYPING SEROLOGIC ABO: CPT

## 2022-01-01 PROCEDURE — 31628 BRONCHOSCOPY/LUNG BX EACH: CPT | Performed by: INTERNAL MEDICINE

## 2022-01-01 PROCEDURE — 97535 SELF CARE MNGMENT TRAINING: CPT

## 2022-01-01 PROCEDURE — 87206 SMEAR FLUORESCENT/ACID STAI: CPT | Performed by: INTERNAL MEDICINE

## 2022-01-01 PROCEDURE — 88104 CYTOPATH FL NONGYN SMEARS: CPT | Performed by: INTERNAL MEDICINE

## 2022-01-01 PROCEDURE — 77290 THER RAD SIMULAJ FIELD CPLX: CPT | Performed by: RADIOLOGY

## 2022-01-01 PROCEDURE — 31623 DX BRONCHOSCOPE/BRUSH: CPT | Performed by: INTERNAL MEDICINE

## 2022-01-01 PROCEDURE — 97161 PT EVAL LOW COMPLEX 20 MIN: CPT

## 2022-01-01 PROCEDURE — 0 IOPAMIDOL PER 1 ML: Performed by: INTERNAL MEDICINE

## 2022-01-01 PROCEDURE — 86900 BLOOD TYPING SEROLOGIC ABO: CPT | Performed by: EMERGENCY MEDICINE

## 2022-01-01 PROCEDURE — 85652 RBC SED RATE AUTOMATED: CPT | Performed by: INTERNAL MEDICINE

## 2022-01-01 PROCEDURE — 70450 CT HEAD/BRAIN W/O DYE: CPT

## 2022-01-01 PROCEDURE — 94761 N-INVAS EAR/PLS OXIMETRY MLT: CPT

## 2022-01-01 PROCEDURE — 82533 TOTAL CORTISOL: CPT | Performed by: INTERNAL MEDICINE

## 2022-01-01 PROCEDURE — 80162 ASSAY OF DIGOXIN TOTAL: CPT | Performed by: EMERGENCY MEDICINE

## 2022-01-01 PROCEDURE — 25010000002 CEFTRIAXONE PER 250 MG: Performed by: INTERNAL MEDICINE

## 2022-01-01 PROCEDURE — B518YZA FLUOROSCOPY OF SUPERIOR VENA CAVA USING OTHER CONTRAST, GUIDANCE: ICD-10-PCS | Performed by: SURGERY

## 2022-01-01 PROCEDURE — 88173 CYTOPATH EVAL FNA REPORT: CPT | Performed by: INTERNAL MEDICINE

## 2022-01-01 PROCEDURE — 93306 TTE W/DOPPLER COMPLETE: CPT

## 2022-01-01 PROCEDURE — 93005 ELECTROCARDIOGRAM TRACING: CPT

## 2022-01-01 PROCEDURE — 84145 PROCALCITONIN (PCT): CPT | Performed by: INTERNAL MEDICINE

## 2022-01-01 PROCEDURE — 81003 URINALYSIS AUTO W/O SCOPE: CPT | Performed by: EMERGENCY MEDICINE

## 2022-01-01 PROCEDURE — C1726 CATH, BAL DIL, NON-VASCULAR: HCPCS | Performed by: INTERNAL MEDICINE

## 2022-01-01 PROCEDURE — 25010000002 ENOXAPARIN PER 10 MG: Performed by: INTERNAL MEDICINE

## 2022-01-01 PROCEDURE — 88271 CYTOGENETICS DNA PROBE: CPT

## 2022-01-01 PROCEDURE — 88381 MICRODISSECTION MANUAL: CPT

## 2022-01-01 PROCEDURE — 76000 FLUOROSCOPY <1 HR PHYS/QHP: CPT

## 2022-01-01 PROCEDURE — 25010000002 PROPOFOL 10 MG/ML EMULSION: Performed by: NURSE ANESTHETIST, CERTIFIED REGISTERED

## 2022-01-01 PROCEDURE — P9016 RBC LEUKOCYTES REDUCED: HCPCS

## 2022-01-01 PROCEDURE — 0B978ZX DRAINAGE OF LEFT MAIN BRONCHUS, VIA NATURAL OR ARTIFICIAL OPENING ENDOSCOPIC, DIAGNOSTIC: ICD-10-PCS | Performed by: INTERNAL MEDICINE

## 2022-01-01 PROCEDURE — 71045 X-RAY EXAM CHEST 1 VIEW: CPT

## 2022-01-01 PROCEDURE — 25010000002 MIDAZOLAM PER 1 MG: Performed by: ANESTHESIOLOGY

## 2022-01-01 PROCEDURE — 86705 HEP B CORE ANTIBODY IGM: CPT | Performed by: INTERNAL MEDICINE

## 2022-01-01 PROCEDURE — 74177 CT ABD & PELVIS W/CONTRAST: CPT

## 2022-01-01 PROCEDURE — P9612 CATHETERIZE FOR URINE SPEC: HCPCS

## 2022-01-01 PROCEDURE — 88274 CYTOGENETICS 25-99: CPT

## 2022-01-01 PROCEDURE — 86431 RHEUMATOID FACTOR QUANT: CPT | Performed by: INTERNAL MEDICINE

## 2022-01-01 PROCEDURE — 99214 OFFICE O/P EST MOD 30 MIN: CPT | Performed by: INTERNAL MEDICINE

## 2022-01-01 PROCEDURE — 77001 FLUOROGUIDE FOR VEIN DEVICE: CPT | Performed by: SURGERY

## 2022-01-01 PROCEDURE — 86225 DNA ANTIBODY NATIVE: CPT | Performed by: INTERNAL MEDICINE

## 2022-01-01 PROCEDURE — 25010000002 CEFEPIME PER 500 MG: Performed by: EMERGENCY MEDICINE

## 2022-01-01 PROCEDURE — 71260 CT THORAX DX C+: CPT

## 2022-01-01 PROCEDURE — 0B938ZX DRAINAGE OF RIGHT MAIN BRONCHUS, VIA NATURAL OR ARTIFICIAL OPENING ENDOSCOPIC, DIAGNOSTIC: ICD-10-PCS | Performed by: INTERNAL MEDICINE

## 2022-01-01 PROCEDURE — 85007 BL SMEAR W/DIFF WBC COUNT: CPT

## 2022-01-01 PROCEDURE — 25010000002 MORPHINE PER 10 MG: Performed by: INTERNAL MEDICINE

## 2022-01-01 PROCEDURE — 25010000002 CHLORPROMAZINE PER 50 MG: Performed by: NURSE PRACTITIONER

## 2022-01-01 PROCEDURE — 0JH60WZ INSERTION OF TOTALLY IMPLANTABLE VASCULAR ACCESS DEVICE INTO CHEST SUBCUTANEOUS TISSUE AND FASCIA, OPEN APPROACH: ICD-10-PCS | Performed by: SURGERY

## 2022-01-01 PROCEDURE — 77300 RADIATION THERAPY DOSE PLAN: CPT | Performed by: RADIOLOGY

## 2022-01-01 PROCEDURE — 88305 TISSUE EXAM BY PATHOLOGIST: CPT | Performed by: INTERNAL MEDICINE

## 2022-01-01 PROCEDURE — 93005 ELECTROCARDIOGRAM TRACING: CPT | Performed by: INTERNAL MEDICINE

## 2022-01-01 PROCEDURE — 81235 EGFR GENE COM VARIANTS: CPT

## 2022-01-01 PROCEDURE — 99495 TRANSJ CARE MGMT MOD F2F 14D: CPT | Performed by: INTERNAL MEDICINE

## 2022-01-01 PROCEDURE — 80053 COMPREHEN METABOLIC PANEL: CPT

## 2022-01-01 PROCEDURE — 86141 C-REACTIVE PROTEIN HS: CPT | Performed by: INTERNAL MEDICINE

## 2022-01-01 PROCEDURE — 88342 IMHCHEM/IMCYTCHM 1ST ANTB: CPT | Performed by: INTERNAL MEDICINE

## 2022-01-01 PROCEDURE — 87070 CULTURE OTHR SPECIMN AEROBIC: CPT | Performed by: INTERNAL MEDICINE

## 2022-01-01 PROCEDURE — 82306 VITAMIN D 25 HYDROXY: CPT | Performed by: INTERNAL MEDICINE

## 2022-01-01 PROCEDURE — 0 FLUDEOXYGLUCOSE F18 SOLUTION: Performed by: INTERNAL MEDICINE

## 2022-01-01 PROCEDURE — 88360 TUMOR IMMUNOHISTOCHEM/MANUAL: CPT

## 2022-01-01 PROCEDURE — 36415 COLL VENOUS BLD VENIPUNCTURE: CPT | Performed by: INTERNAL MEDICINE

## 2022-01-01 PROCEDURE — 78815 PET IMAGE W/CT SKULL-THIGH: CPT

## 2022-01-01 PROCEDURE — 25010000002 DEXAMETHASONE PER 1 MG: Performed by: EMERGENCY MEDICINE

## 2022-01-01 PROCEDURE — 83880 ASSAY OF NATRIURETIC PEPTIDE: CPT

## 2022-01-01 PROCEDURE — 71271 CT THORAX LUNG CANCER SCR C-: CPT

## 2022-01-01 PROCEDURE — 25010000002 HYDROMORPHONE 1 MG/ML SOLUTION: Performed by: EMERGENCY MEDICINE

## 2022-01-01 PROCEDURE — 86850 RBC ANTIBODY SCREEN: CPT | Performed by: EMERGENCY MEDICINE

## 2022-01-01 PROCEDURE — 85610 PROTHROMBIN TIME: CPT | Performed by: INTERNAL MEDICINE

## 2022-01-01 PROCEDURE — 85025 COMPLETE CBC W/AUTO DIFF WBC: CPT | Performed by: SURGERY

## 2022-01-01 PROCEDURE — 85025 COMPLETE CBC W/AUTO DIFF WBC: CPT | Performed by: EMERGENCY MEDICINE

## 2022-01-01 PROCEDURE — G0432 EIA HIV-1/HIV-2 SCREEN: HCPCS | Performed by: INTERNAL MEDICINE

## 2022-01-01 PROCEDURE — 02HV33Z INSERTION OF INFUSION DEVICE INTO SUPERIOR VENA CAVA, PERCUTANEOUS APPROACH: ICD-10-PCS | Performed by: SURGERY

## 2022-01-01 PROCEDURE — 77280 THER RAD SIMULAJ FIELD SMPL: CPT | Performed by: RADIOLOGY

## 2022-01-01 PROCEDURE — 99231 SBSQ HOSP IP/OBS SF/LOW 25: CPT | Performed by: INTERNAL MEDICINE

## 2022-01-01 PROCEDURE — 36415 COLL VENOUS BLD VENIPUNCTURE: CPT

## 2022-01-01 PROCEDURE — 99232 SBSQ HOSP IP/OBS MODERATE 35: CPT | Performed by: NURSE PRACTITIONER

## 2022-01-01 PROCEDURE — 25010000002 VANCOMYCIN 5 G RECONSTITUTED SOLUTION: Performed by: EMERGENCY MEDICINE

## 2022-01-01 PROCEDURE — 88341 IMHCHEM/IMCYTCHM EA ADD ANTB: CPT | Performed by: INTERNAL MEDICINE

## 2022-01-01 PROCEDURE — 84436 ASSAY OF TOTAL THYROXINE: CPT | Performed by: INTERNAL MEDICINE

## 2022-01-01 PROCEDURE — A9552 F18 FDG: HCPCS | Performed by: INTERNAL MEDICINE

## 2022-01-01 PROCEDURE — 0BBG8ZX EXCISION OF LEFT UPPER LUNG LOBE, VIA NATURAL OR ARTIFICIAL OPENING ENDOSCOPIC, DIAGNOSTIC: ICD-10-PCS | Performed by: INTERNAL MEDICINE

## 2022-01-01 PROCEDURE — 83605 ASSAY OF LACTIC ACID: CPT | Performed by: INTERNAL MEDICINE

## 2022-01-01 PROCEDURE — 87040 BLOOD CULTURE FOR BACTERIA: CPT | Performed by: EMERGENCY MEDICINE

## 2022-01-01 PROCEDURE — 25010000002 MORPHINE PER 10 MG: Performed by: EMERGENCY MEDICINE

## 2022-01-01 PROCEDURE — 87102 FUNGUS ISOLATION CULTURE: CPT | Performed by: INTERNAL MEDICINE

## 2022-01-01 PROCEDURE — 87340 HEPATITIS B SURFACE AG IA: CPT | Performed by: INTERNAL MEDICINE

## 2022-01-01 PROCEDURE — 86901 BLOOD TYPING SEROLOGIC RH(D): CPT | Performed by: EMERGENCY MEDICINE

## 2022-01-01 PROCEDURE — 77427 RADIATION TX MANAGEMENT X5: CPT | Performed by: RADIOLOGY

## 2022-01-01 PROCEDURE — 86923 COMPATIBILITY TEST ELECTRIC: CPT

## 2022-01-01 PROCEDURE — 82378 CARCINOEMBRYONIC ANTIGEN: CPT | Performed by: INTERNAL MEDICINE

## 2022-01-01 PROCEDURE — 80162 ASSAY OF DIGOXIN TOTAL: CPT | Performed by: INTERNAL MEDICINE

## 2022-01-01 PROCEDURE — C1788 PORT, INDWELLING, IMP: HCPCS | Performed by: SURGERY

## 2022-01-01 PROCEDURE — 82024 ASSAY OF ACTH: CPT | Performed by: INTERNAL MEDICINE

## 2022-01-01 DEVICE — PRT INTRO VASC/INTERV VORTEX FILL/HL DETACH/POLYURET/CATH 8F: Type: IMPLANTABLE DEVICE | Site: INTERNAL JUGULAR | Status: FUNCTIONAL

## 2022-01-01 RX ORDER — FAMOTIDINE 20 MG/1
20 TABLET, FILM COATED ORAL
Status: DISCONTINUED | OUTPATIENT
Start: 2022-01-01 | End: 2022-01-01

## 2022-01-01 RX ORDER — LEVOTHYROXINE SODIUM 0.07 MG/1
75 TABLET ORAL DAILY
Status: DISCONTINUED | OUTPATIENT
Start: 2022-01-01 | End: 2022-01-01

## 2022-01-01 RX ORDER — CARVEDILOL 3.12 MG/1
3.12 TABLET ORAL 2 TIMES DAILY WITH MEALS
Status: DISCONTINUED | OUTPATIENT
Start: 2022-01-01 | End: 2022-01-01 | Stop reason: HOSPADM

## 2022-01-01 RX ORDER — DEXAMETHASONE 4 MG/1
4 TABLET ORAL
Qty: 30 TABLET | Refills: 0 | Status: ON HOLD | OUTPATIENT
Start: 2022-01-01 | End: 2022-01-01

## 2022-01-01 RX ORDER — DEXAMETHASONE SODIUM PHOSPHATE 4 MG/ML
4 INJECTION, SOLUTION INTRA-ARTICULAR; INTRALESIONAL; INTRAMUSCULAR; INTRAVENOUS; SOFT TISSUE EVERY 8 HOURS
Status: DISCONTINUED | OUTPATIENT
Start: 2022-01-01 | End: 2022-01-01 | Stop reason: HOSPADM

## 2022-01-01 RX ORDER — DIGOXIN 125 MCG
125 TABLET ORAL
Status: DISCONTINUED | OUTPATIENT
Start: 2022-01-01 | End: 2022-01-01

## 2022-01-01 RX ORDER — SERTRALINE HYDROCHLORIDE 25 MG/1
25 TABLET, FILM COATED ORAL DAILY
Qty: 90 TABLET | Refills: 3 | Status: SHIPPED | OUTPATIENT
Start: 2022-01-01

## 2022-01-01 RX ORDER — ONDANSETRON 2 MG/ML
4 INJECTION INTRAMUSCULAR; INTRAVENOUS EVERY 6 HOURS PRN
Status: DISCONTINUED | OUTPATIENT
Start: 2022-01-01 | End: 2022-01-01 | Stop reason: SDUPTHER

## 2022-01-01 RX ORDER — HYDROCODONE BITARTRATE AND ACETAMINOPHEN 7.5; 325 MG/1; MG/1
1 TABLET ORAL EVERY 4 HOURS PRN
Qty: 45 TABLET | Refills: 0 | Status: SHIPPED | OUTPATIENT
Start: 2022-01-01 | End: 2022-01-01

## 2022-01-01 RX ORDER — BISACODYL 5 MG/1
10 TABLET, DELAYED RELEASE ORAL DAILY PRN
Status: DISCONTINUED | OUTPATIENT
Start: 2022-01-01 | End: 2022-01-01 | Stop reason: HOSPADM

## 2022-01-01 RX ORDER — ONDANSETRON 2 MG/ML
4 INJECTION INTRAMUSCULAR; INTRAVENOUS EVERY 6 HOURS PRN
Status: DISCONTINUED | OUTPATIENT
Start: 2022-01-01 | End: 2022-01-01 | Stop reason: HOSPADM

## 2022-01-01 RX ORDER — PRIMIDONE 250 MG/1
250 TABLET ORAL 2 TIMES DAILY
Status: DISCONTINUED | OUTPATIENT
Start: 2022-01-01 | End: 2022-01-01 | Stop reason: HOSPADM

## 2022-01-01 RX ORDER — SODIUM CHLORIDE 0.9 % (FLUSH) 0.9 %
10 SYRINGE (ML) INJECTION AS NEEDED
Status: DISCONTINUED | OUTPATIENT
Start: 2022-01-01 | End: 2022-01-01 | Stop reason: HOSPADM

## 2022-01-01 RX ORDER — DEXAMETHASONE 4 MG/1
4 TABLET ORAL EVERY 8 HOURS SCHEDULED
Status: DISCONTINUED | OUTPATIENT
Start: 2022-01-01 | End: 2022-01-01

## 2022-01-01 RX ORDER — CETIRIZINE HYDROCHLORIDE 10 MG/1
10 TABLET ORAL DAILY
Status: DISCONTINUED | OUTPATIENT
Start: 2022-01-01 | End: 2022-01-01 | Stop reason: HOSPADM

## 2022-01-01 RX ORDER — TIOTROPIUM BROMIDE AND OLODATEROL 3.124; 2.736 UG/1; UG/1
2 SPRAY, METERED RESPIRATORY (INHALATION)
COMMUNITY

## 2022-01-01 RX ORDER — ACETAMINOPHEN 650 MG/1
650 SUPPOSITORY RECTAL EVERY 4 HOURS PRN
Status: DISCONTINUED | OUTPATIENT
Start: 2022-01-01 | End: 2022-01-01 | Stop reason: SDUPTHER

## 2022-01-01 RX ORDER — LIDOCAINE HYDROCHLORIDE 20 MG/ML
INJECTION, SOLUTION INFILTRATION; PERINEURAL AS NEEDED
Status: DISCONTINUED | OUTPATIENT
Start: 2022-01-01 | End: 2022-01-01 | Stop reason: SURG

## 2022-01-01 RX ORDER — ACETAMINOPHEN 500 MG
1000 TABLET ORAL ONCE
Status: COMPLETED | OUTPATIENT
Start: 2022-01-01 | End: 2022-01-01

## 2022-01-01 RX ORDER — SODIUM CHLORIDE AND POTASSIUM CHLORIDE 150; 900 MG/100ML; MG/100ML
65 INJECTION, SOLUTION INTRAVENOUS CONTINUOUS
Status: DISCONTINUED | OUTPATIENT
Start: 2022-01-01 | End: 2022-01-01 | Stop reason: HOSPADM

## 2022-01-01 RX ORDER — IPRATROPIUM BROMIDE AND ALBUTEROL SULFATE 2.5; .5 MG/3ML; MG/3ML
3 SOLUTION RESPIRATORY (INHALATION)
Status: DISCONTINUED | OUTPATIENT
Start: 2022-01-01 | End: 2022-01-01 | Stop reason: HOSPADM

## 2022-01-01 RX ORDER — PANTOPRAZOLE SODIUM 40 MG/1
40 TABLET, DELAYED RELEASE ORAL DAILY
Status: DISCONTINUED | OUTPATIENT
Start: 2022-01-01 | End: 2022-01-01

## 2022-01-01 RX ORDER — DULOXETIN HYDROCHLORIDE 30 MG/1
30 CAPSULE, DELAYED RELEASE ORAL DAILY
Qty: 14 CAPSULE | Refills: 0 | Status: SHIPPED | OUTPATIENT
Start: 2022-01-01

## 2022-01-01 RX ORDER — MONTELUKAST SODIUM 10 MG/1
10 TABLET ORAL DAILY
Status: DISCONTINUED | OUTPATIENT
Start: 2022-01-01 | End: 2022-01-01 | Stop reason: HOSPADM

## 2022-01-01 RX ORDER — AZITHROMYCIN 250 MG/1
TABLET, FILM COATED ORAL
Qty: 15 TABLET | Refills: 5 | Status: ON HOLD | OUTPATIENT
Start: 2022-01-01 | End: 2022-01-01

## 2022-01-01 RX ORDER — MEPERIDINE HYDROCHLORIDE 25 MG/ML
12.5 INJECTION INTRAMUSCULAR; INTRAVENOUS; SUBCUTANEOUS
Status: DISCONTINUED | OUTPATIENT
Start: 2022-01-01 | End: 2022-01-01 | Stop reason: HOSPADM

## 2022-01-01 RX ORDER — ONDANSETRON 2 MG/ML
4 INJECTION INTRAMUSCULAR; INTRAVENOUS ONCE AS NEEDED
Status: DISCONTINUED | OUTPATIENT
Start: 2022-01-01 | End: 2022-01-01 | Stop reason: HOSPADM

## 2022-01-01 RX ORDER — FAMOTIDINE 10 MG/ML
20 INJECTION, SOLUTION INTRAVENOUS EVERY 12 HOURS SCHEDULED
Status: DISCONTINUED | OUTPATIENT
Start: 2022-01-01 | End: 2022-01-01 | Stop reason: HOSPADM

## 2022-01-01 RX ORDER — LEVOTHYROXINE SODIUM 0.07 MG/1
75 TABLET ORAL DAILY
Status: DISCONTINUED | OUTPATIENT
Start: 2022-01-01 | End: 2022-01-01 | Stop reason: HOSPADM

## 2022-01-01 RX ORDER — ACETAMINOPHEN 325 MG/1
650 TABLET ORAL EVERY 4 HOURS PRN
Status: DISCONTINUED | OUTPATIENT
Start: 2022-01-01 | End: 2022-01-01 | Stop reason: HOSPADM

## 2022-01-01 RX ORDER — BLOOD-GLUCOSE METER
KIT MISCELLANEOUS
Qty: 300 EACH | Refills: 3 | Status: SHIPPED | OUTPATIENT
Start: 2022-01-01 | End: 2022-01-01

## 2022-01-01 RX ORDER — SODIUM CHLORIDE, SODIUM LACTATE, POTASSIUM CHLORIDE, CALCIUM CHLORIDE 600; 310; 30; 20 MG/100ML; MG/100ML; MG/100ML; MG/100ML
50 INJECTION, SOLUTION INTRAVENOUS CONTINUOUS
Status: DISCONTINUED | OUTPATIENT
Start: 2022-01-01 | End: 2022-01-01

## 2022-01-01 RX ORDER — METOPROLOL TARTRATE 5 MG/5ML
INJECTION INTRAVENOUS
Status: DISPENSED
Start: 2022-01-01 | End: 2022-01-01

## 2022-01-01 RX ORDER — DIGOXIN 125 MCG
TABLET ORAL
Qty: 30 TABLET | Refills: 0 | Status: SHIPPED | OUTPATIENT
Start: 2022-01-01

## 2022-01-01 RX ORDER — PREDNISONE 10 MG/1
10 TABLET ORAL DAILY
Status: DISCONTINUED | OUTPATIENT
Start: 2022-01-01 | End: 2022-01-01

## 2022-01-01 RX ORDER — BUDESONIDE 0.5 MG/2ML
0.5 INHALANT ORAL
Status: DISCONTINUED | OUTPATIENT
Start: 2022-01-01 | End: 2022-01-01 | Stop reason: HOSPADM

## 2022-01-01 RX ORDER — LEVETIRACETAM 500 MG/1
500 TABLET ORAL 2 TIMES DAILY
Status: DISCONTINUED | OUTPATIENT
Start: 2022-01-01 | End: 2022-01-01 | Stop reason: HOSPADM

## 2022-01-01 RX ORDER — HYDROXYCHLOROQUINE SULFATE 200 MG/1
200 TABLET, FILM COATED ORAL DAILY
Status: DISCONTINUED | OUTPATIENT
Start: 2022-01-01 | End: 2022-01-01

## 2022-01-01 RX ORDER — SACUBITRIL AND VALSARTAN 24; 26 MG/1; MG/1
1 TABLET, FILM COATED ORAL 2 TIMES DAILY
COMMUNITY
Start: 2022-01-01 | End: 2022-01-01 | Stop reason: HOSPADM

## 2022-01-01 RX ORDER — COSYNTROPIN 0.25 MG/ML
0.25 INJECTION, POWDER, FOR SOLUTION INTRAMUSCULAR; INTRAVENOUS ONCE
Status: COMPLETED | OUTPATIENT
Start: 2022-01-01 | End: 2022-01-01

## 2022-01-01 RX ORDER — ATORVASTATIN CALCIUM 40 MG/1
40 TABLET, FILM COATED ORAL NIGHTLY
Status: DISCONTINUED | OUTPATIENT
Start: 2022-01-01 | End: 2022-01-01 | Stop reason: HOSPADM

## 2022-01-01 RX ORDER — ALBUTEROL SULFATE 2.5 MG/3ML
2.5 SOLUTION RESPIRATORY (INHALATION) EVERY 6 HOURS PRN
Status: DISCONTINUED | OUTPATIENT
Start: 2022-01-01 | End: 2022-01-01 | Stop reason: HOSPADM

## 2022-01-01 RX ORDER — PROPOFOL 10 MG/ML
VIAL (ML) INTRAVENOUS AS NEEDED
Status: DISCONTINUED | OUTPATIENT
Start: 2022-01-01 | End: 2022-01-01

## 2022-01-01 RX ORDER — PROMETHAZINE HYDROCHLORIDE 25 MG/1
25 SUPPOSITORY RECTAL ONCE AS NEEDED
Status: DISCONTINUED | OUTPATIENT
Start: 2022-01-01 | End: 2022-01-01 | Stop reason: HOSPADM

## 2022-01-01 RX ORDER — GLYCOPYRROLATE 0.2 MG/ML
0.2 INJECTION INTRAMUSCULAR; INTRAVENOUS
Status: COMPLETED | OUTPATIENT
Start: 2022-01-01 | End: 2022-01-01

## 2022-01-01 RX ORDER — AZITHROMYCIN 250 MG/1
TABLET, FILM COATED ORAL
Qty: 30 TABLET | Refills: 5 | Status: SHIPPED | OUTPATIENT
Start: 2022-01-01 | End: 2022-01-01 | Stop reason: HOSPADM

## 2022-01-01 RX ORDER — CETIRIZINE HYDROCHLORIDE 10 MG/1
10 TABLET ORAL DAILY
Status: DISCONTINUED | OUTPATIENT
Start: 2022-01-01 | End: 2022-01-01

## 2022-01-01 RX ORDER — COSYNTROPIN 0.25 MG/ML
0.25 INJECTION, POWDER, FOR SOLUTION INTRAMUSCULAR; INTRAVENOUS ONCE
Status: DISCONTINUED | OUTPATIENT
Start: 2022-01-01 | End: 2022-01-01

## 2022-01-01 RX ORDER — OXYCODONE HYDROCHLORIDE 5 MG/1
5 TABLET ORAL
Status: DISCONTINUED | OUTPATIENT
Start: 2022-01-01 | End: 2022-01-01 | Stop reason: HOSPADM

## 2022-01-01 RX ORDER — NALOXONE HCL 0.4 MG/ML
0.4 VIAL (ML) INJECTION
Status: DISCONTINUED | OUTPATIENT
Start: 2022-01-01 | End: 2022-01-01 | Stop reason: HOSPADM

## 2022-01-01 RX ORDER — SACUBITRIL AND VALSARTAN 24; 26 MG/1; MG/1
TABLET, FILM COATED ORAL
Qty: 180 TABLET | Refills: 3 | Status: SHIPPED | OUTPATIENT
Start: 2022-01-01 | End: 2022-01-01

## 2022-01-01 RX ORDER — ACETAMINOPHEN 650 MG/1
650 SUPPOSITORY RECTAL EVERY 4 HOURS PRN
Status: DISCONTINUED | OUTPATIENT
Start: 2022-01-01 | End: 2022-01-01 | Stop reason: HOSPADM

## 2022-01-01 RX ORDER — DIGOXIN 0.25 MG/ML
125 INJECTION INTRAMUSCULAR; INTRAVENOUS
Status: DISCONTINUED | OUTPATIENT
Start: 2022-01-01 | End: 2022-01-01 | Stop reason: HOSPADM

## 2022-01-01 RX ORDER — LIDOCAINE HYDROCHLORIDE 40 MG/ML
INJECTION, SOLUTION RETROBULBAR; TOPICAL AS NEEDED
Status: DISCONTINUED | OUTPATIENT
Start: 2022-01-01 | End: 2022-01-01 | Stop reason: HOSPADM

## 2022-01-01 RX ORDER — BUDESONIDE 0.5 MG/2ML
0.5 INHALANT ORAL
Status: DISCONTINUED | OUTPATIENT
Start: 2022-01-01 | End: 2022-01-01 | Stop reason: SDUPTHER

## 2022-01-01 RX ORDER — BUDESONIDE 0.5 MG/2ML
INHALANT ORAL
Qty: 120 ML | Refills: 11 | Status: SHIPPED | OUTPATIENT
Start: 2022-01-01

## 2022-01-01 RX ORDER — SOTALOL HYDROCHLORIDE 120 MG/1
60 TABLET ORAL 2 TIMES DAILY
COMMUNITY
Start: 2022-01-01

## 2022-01-01 RX ORDER — SODIUM CHLORIDE 0.9 % (FLUSH) 0.9 %
10 SYRINGE (ML) INJECTION EVERY 12 HOURS SCHEDULED
Status: DISCONTINUED | OUTPATIENT
Start: 2022-01-01 | End: 2022-01-01 | Stop reason: HOSPADM

## 2022-01-01 RX ORDER — LORAZEPAM 0.5 MG/1
0.5 TABLET ORAL EVERY 6 HOURS PRN
Status: DISCONTINUED | OUTPATIENT
Start: 2022-01-01 | End: 2022-01-01 | Stop reason: HOSPADM

## 2022-01-01 RX ORDER — POLYETHYLENE GLYCOL 3350 17 G/17G
17 POWDER, FOR SOLUTION ORAL DAILY
Status: DISCONTINUED | OUTPATIENT
Start: 2022-01-01 | End: 2022-01-01 | Stop reason: HOSPADM

## 2022-01-01 RX ORDER — CEFAZOLIN SODIUM 2 G/100ML
2 INJECTION, SOLUTION INTRAVENOUS ONCE
Status: COMPLETED | OUTPATIENT
Start: 2022-01-01 | End: 2022-01-01

## 2022-01-01 RX ORDER — SODIUM CHLORIDE 0.9 % (FLUSH) 0.9 %
10 SYRINGE (ML) INJECTION AS NEEDED
Status: DISCONTINUED | OUTPATIENT
Start: 2022-01-01 | End: 2022-01-01

## 2022-01-01 RX ORDER — SOTALOL HYDROCHLORIDE 80 MG/1
80 TABLET ORAL EVERY 12 HOURS SCHEDULED
Status: DISCONTINUED | OUTPATIENT
Start: 2022-01-01 | End: 2022-01-01

## 2022-01-01 RX ORDER — MORPHINE SULFATE 2 MG/ML
2 INJECTION, SOLUTION INTRAMUSCULAR; INTRAVENOUS
Status: DISCONTINUED | OUTPATIENT
Start: 2022-01-01 | End: 2022-01-01 | Stop reason: SDUPTHER

## 2022-01-01 RX ORDER — DIGOXIN 0.25 MG/ML
250 INJECTION INTRAMUSCULAR; INTRAVENOUS ONCE
Status: COMPLETED | OUTPATIENT
Start: 2022-01-01 | End: 2022-01-01

## 2022-01-01 RX ORDER — ATORVASTATIN CALCIUM 40 MG/1
40 TABLET, FILM COATED ORAL NIGHTLY
Status: DISCONTINUED | OUTPATIENT
Start: 2022-01-01 | End: 2022-01-01

## 2022-01-01 RX ORDER — IPRATROPIUM BROMIDE AND ALBUTEROL SULFATE 2.5; .5 MG/3ML; MG/3ML
3 SOLUTION RESPIRATORY (INHALATION)
Status: DISCONTINUED | OUTPATIENT
Start: 2022-01-01 | End: 2022-01-01

## 2022-01-01 RX ORDER — GLIPIZIDE 5 MG/1
TABLET ORAL
Qty: 90 TABLET | Refills: 3 | Status: SHIPPED | OUTPATIENT
Start: 2022-01-01 | End: 2022-01-01 | Stop reason: HOSPADM

## 2022-01-01 RX ORDER — BUMETANIDE 0.5 MG/1
0.5 TABLET ORAL DAILY
COMMUNITY
End: 2022-01-01 | Stop reason: HOSPADM

## 2022-01-01 RX ORDER — ONDANSETRON 8 MG/1
TABLET, ORALLY DISINTEGRATING ORAL
Status: ON HOLD | COMMUNITY
Start: 2022-01-01 | End: 2022-01-01

## 2022-01-01 RX ORDER — HYDROCODONE BITARTRATE AND ACETAMINOPHEN 5; 325 MG/1; MG/1
1 TABLET ORAL EVERY 4 HOURS PRN
Status: DISCONTINUED | OUTPATIENT
Start: 2022-01-01 | End: 2022-01-01 | Stop reason: SDUPTHER

## 2022-01-01 RX ORDER — ERGOCALCIFEROL 1.25 MG/1
50000 CAPSULE ORAL WEEKLY
Status: DISCONTINUED | OUTPATIENT
Start: 2022-01-01 | End: 2022-01-01 | Stop reason: HOSPADM

## 2022-01-01 RX ORDER — ACETAMINOPHEN 160 MG/5ML
650 SOLUTION ORAL EVERY 4 HOURS PRN
Status: DISCONTINUED | OUTPATIENT
Start: 2022-01-01 | End: 2022-01-01 | Stop reason: HOSPADM

## 2022-01-01 RX ORDER — ACETAMINOPHEN 325 MG/1
650 TABLET ORAL EVERY 4 HOURS PRN
Status: DISCONTINUED | OUTPATIENT
Start: 2022-01-01 | End: 2022-01-01 | Stop reason: SDUPTHER

## 2022-01-01 RX ORDER — DEXAMETHASONE 4 MG/1
4 TABLET ORAL 2 TIMES DAILY WITH MEALS
Qty: 40 TABLET | Refills: 0 | Status: SHIPPED | OUTPATIENT
Start: 2022-01-01 | End: 2022-01-01

## 2022-01-01 RX ORDER — MORPHINE SULFATE 100 MG/5ML
5 SOLUTION ORAL
Qty: 30 ML | Refills: 0 | Status: SHIPPED | OUTPATIENT
Start: 2022-01-01

## 2022-01-01 RX ORDER — PROMETHAZINE HYDROCHLORIDE 12.5 MG/1
25 TABLET ORAL ONCE AS NEEDED
Status: DISCONTINUED | OUTPATIENT
Start: 2022-01-01 | End: 2022-01-01 | Stop reason: HOSPADM

## 2022-01-01 RX ORDER — ACETAMINOPHEN 160 MG/5ML
650 SOLUTION ORAL EVERY 4 HOURS PRN
Status: DISCONTINUED | OUTPATIENT
Start: 2022-01-01 | End: 2022-01-01 | Stop reason: SDUPTHER

## 2022-01-01 RX ORDER — PRIMIDONE 250 MG/1
250 TABLET ORAL 2 TIMES DAILY
Status: DISCONTINUED | OUTPATIENT
Start: 2022-01-01 | End: 2022-01-01

## 2022-01-01 RX ORDER — KETOROLAC TROMETHAMINE 30 MG/ML
15 INJECTION, SOLUTION INTRAMUSCULAR; INTRAVENOUS EVERY 6 HOURS PRN
Status: ACTIVE | OUTPATIENT
Start: 2022-01-01 | End: 2022-01-01

## 2022-01-01 RX ORDER — CEFEPIME 1 G/50ML
2 INJECTION, SOLUTION INTRAVENOUS ONCE
Status: COMPLETED | OUTPATIENT
Start: 2022-01-01 | End: 2022-01-01

## 2022-01-01 RX ORDER — CEFEPIME 1 G/50ML
2 INJECTION, SOLUTION INTRAVENOUS ONCE
Status: DISCONTINUED | OUTPATIENT
Start: 2022-01-01 | End: 2022-01-01 | Stop reason: ALTCHOICE

## 2022-01-01 RX ORDER — PROMETHAZINE HYDROCHLORIDE 12.5 MG/1
12.5 TABLET ORAL EVERY 6 HOURS PRN
Qty: 30 TABLET | Refills: 2 | Status: SHIPPED | OUTPATIENT
Start: 2022-01-01

## 2022-01-01 RX ORDER — LEVETIRACETAM 5 MG/ML
500 INJECTION INTRAVASCULAR EVERY 12 HOURS SCHEDULED
Status: DISCONTINUED | OUTPATIENT
Start: 2022-01-01 | End: 2022-01-01 | Stop reason: HOSPADM

## 2022-01-01 RX ORDER — DILTIAZEM HCL IN NACL,ISO-OSM 125 MG/125
5-15 PLASTIC BAG, INJECTION (ML) INTRAVENOUS
Status: DISCONTINUED | OUTPATIENT
Start: 2022-01-01 | End: 2022-01-01

## 2022-01-01 RX ORDER — BUPIVACAINE HYDROCHLORIDE AND EPINEPHRINE 2.5; 5 MG/ML; UG/ML
INJECTION, SOLUTION EPIDURAL; INFILTRATION; INTRACAUDAL; PERINEURAL AS NEEDED
Status: DISCONTINUED | OUTPATIENT
Start: 2022-01-01 | End: 2022-01-01 | Stop reason: HOSPADM

## 2022-01-01 RX ORDER — DEXAMETHASONE SODIUM PHOSPHATE 10 MG/ML
10 INJECTION INTRAMUSCULAR; INTRAVENOUS ONCE
Status: COMPLETED | OUTPATIENT
Start: 2022-01-01 | End: 2022-01-01

## 2022-01-01 RX ORDER — PANTOPRAZOLE SODIUM 40 MG/1
40 TABLET, DELAYED RELEASE ORAL EVERY MORNING
Status: DISCONTINUED | OUTPATIENT
Start: 2022-01-01 | End: 2022-01-01 | Stop reason: HOSPADM

## 2022-01-01 RX ORDER — ATORVASTATIN CALCIUM 40 MG/1
TABLET, FILM COATED ORAL
Qty: 90 TABLET | Refills: 3 | Status: SHIPPED | OUTPATIENT
Start: 2022-01-01 | End: 2022-01-01 | Stop reason: HOSPADM

## 2022-01-01 RX ORDER — DIGOXIN 125 MCG
125 TABLET ORAL
Status: DISCONTINUED | OUTPATIENT
Start: 2022-01-01 | End: 2022-01-01 | Stop reason: HOSPADM

## 2022-01-01 RX ORDER — ONDANSETRON 4 MG/1
4 TABLET, ORALLY DISINTEGRATING ORAL EVERY 6 HOURS PRN
Status: DISCONTINUED | OUTPATIENT
Start: 2022-01-01 | End: 2022-01-01 | Stop reason: HOSPADM

## 2022-01-01 RX ORDER — BENZONATATE 100 MG/1
100 CAPSULE ORAL 3 TIMES DAILY
Status: DISCONTINUED | OUTPATIENT
Start: 2022-01-01 | End: 2022-01-01 | Stop reason: HOSPADM

## 2022-01-01 RX ORDER — LEVOTHYROXINE SODIUM 20 UG/ML
50 INJECTION, SOLUTION INTRAVENOUS
Status: DISCONTINUED | OUTPATIENT
Start: 2022-01-01 | End: 2022-01-01 | Stop reason: HOSPADM

## 2022-01-01 RX ORDER — AZITHROMYCIN 250 MG/1
250 TABLET, FILM COATED ORAL DAILY
Status: DISCONTINUED | OUTPATIENT
Start: 2022-01-01 | End: 2022-01-01

## 2022-01-01 RX ORDER — ONDANSETRON 4 MG/1
4 TABLET, FILM COATED ORAL EVERY 6 HOURS PRN
Status: DISCONTINUED | OUTPATIENT
Start: 2022-01-01 | End: 2022-01-01 | Stop reason: HOSPADM

## 2022-01-01 RX ORDER — DRONABINOL 2.5 MG/1
2.5 CAPSULE ORAL
Qty: 60 CAPSULE | Refills: 2 | Status: SHIPPED | OUTPATIENT
Start: 2022-01-01

## 2022-01-01 RX ORDER — MONTELUKAST SODIUM 10 MG/1
10 TABLET ORAL DAILY
Status: DISCONTINUED | OUTPATIENT
Start: 2022-01-01 | End: 2022-01-01

## 2022-01-01 RX ORDER — PANTOPRAZOLE SODIUM 40 MG/1
40 TABLET, DELAYED RELEASE ORAL
Status: DISCONTINUED | OUTPATIENT
Start: 2022-01-01 | End: 2022-01-01

## 2022-01-01 RX ORDER — FOLIC ACID 1 MG/1
1000 TABLET ORAL DAILY
Status: DISCONTINUED | OUTPATIENT
Start: 2022-01-01 | End: 2022-01-01 | Stop reason: HOSPADM

## 2022-01-01 RX ORDER — ALBUTEROL SULFATE 2.5 MG/3ML
2.5 SOLUTION RESPIRATORY (INHALATION)
Status: DISCONTINUED | OUTPATIENT
Start: 2022-01-01 | End: 2022-01-01

## 2022-01-01 RX ORDER — SODIUM CHLORIDE 9 MG/ML
INJECTION, SOLUTION INTRAVENOUS CONTINUOUS PRN
Status: DISCONTINUED | OUTPATIENT
Start: 2022-01-01 | End: 2022-01-01 | Stop reason: SURG

## 2022-01-01 RX ORDER — SERTRALINE HYDROCHLORIDE 25 MG/1
25 TABLET, FILM COATED ORAL DAILY
Status: DISCONTINUED | OUTPATIENT
Start: 2022-01-01 | End: 2022-01-01 | Stop reason: HOSPADM

## 2022-01-01 RX ORDER — SPIRONOLACTONE 25 MG/1
25 TABLET ORAL DAILY
Status: DISCONTINUED | OUTPATIENT
Start: 2022-01-01 | End: 2022-01-01

## 2022-01-01 RX ORDER — AMOXICILLIN 250 MG
1 CAPSULE ORAL 2 TIMES DAILY
Status: DISCONTINUED | OUTPATIENT
Start: 2022-01-01 | End: 2022-01-01 | Stop reason: HOSPADM

## 2022-01-01 RX ORDER — DULOXETIN HYDROCHLORIDE 30 MG/1
30 CAPSULE, DELAYED RELEASE ORAL DAILY
Status: DISCONTINUED | OUTPATIENT
Start: 2022-01-01 | End: 2022-01-01 | Stop reason: HOSPADM

## 2022-01-01 RX ORDER — CALCIUM CARBONATE 200(500)MG
2 TABLET,CHEWABLE ORAL 3 TIMES DAILY PRN
Status: DISCONTINUED | OUTPATIENT
Start: 2022-01-01 | End: 2022-01-01

## 2022-01-01 RX ORDER — CHLORPROMAZINE HYDROCHLORIDE 25 MG/ML
25 INJECTION INTRAMUSCULAR ONCE
Status: COMPLETED | OUTPATIENT
Start: 2022-01-01 | End: 2022-01-01

## 2022-01-01 RX ORDER — HEPARIN SODIUM (PORCINE) LOCK FLUSH IV SOLN 100 UNIT/ML 100 UNIT/ML
5 SOLUTION INTRAVENOUS AS NEEDED
Status: DISCONTINUED | OUTPATIENT
Start: 2022-01-01 | End: 2022-01-01 | Stop reason: HOSPADM

## 2022-01-01 RX ORDER — METOCLOPRAMIDE HYDROCHLORIDE 5 MG/ML
5 INJECTION INTRAMUSCULAR; INTRAVENOUS
Status: DISCONTINUED | OUTPATIENT
Start: 2022-01-01 | End: 2022-01-01 | Stop reason: HOSPADM

## 2022-01-01 RX ORDER — DEXAMETHASONE SODIUM PHOSPHATE 4 MG/ML
4 INJECTION, SOLUTION INTRA-ARTICULAR; INTRALESIONAL; INTRAMUSCULAR; INTRAVENOUS; SOFT TISSUE EVERY 6 HOURS
Status: DISCONTINUED | OUTPATIENT
Start: 2022-01-01 | End: 2022-01-01

## 2022-01-01 RX ORDER — CEFEPIME 1 G/50ML
2 INJECTION, SOLUTION INTRAVENOUS EVERY 8 HOURS
Status: DISCONTINUED | OUTPATIENT
Start: 2022-01-01 | End: 2022-01-01 | Stop reason: ALTCHOICE

## 2022-01-01 RX ORDER — LEVETIRACETAM 500 MG/1
500 TABLET ORAL 2 TIMES DAILY
Qty: 180 TABLET | Refills: 3 | Status: SHIPPED | OUTPATIENT
Start: 2022-01-01 | End: 2022-01-01 | Stop reason: SDUPTHER

## 2022-01-01 RX ORDER — DIGOXIN 0.25 MG/ML
INJECTION INTRAMUSCULAR; INTRAVENOUS
Status: DISPENSED
Start: 2022-01-01 | End: 2022-01-01

## 2022-01-01 RX ORDER — MAGNESIUM HYDROXIDE 1200 MG/15ML
LIQUID ORAL AS NEEDED
Status: DISCONTINUED | OUTPATIENT
Start: 2022-01-01 | End: 2022-01-01 | Stop reason: HOSPADM

## 2022-01-01 RX ORDER — DEXTROSE MONOHYDRATE 100 MG/ML
25 INJECTION, SOLUTION INTRAVENOUS
Status: DISCONTINUED | OUTPATIENT
Start: 2022-01-01 | End: 2022-01-01 | Stop reason: HOSPADM

## 2022-01-01 RX ORDER — DIGOXIN 0.25 MG/ML
250 INJECTION INTRAMUSCULAR; INTRAVENOUS ONCE
Status: DISCONTINUED | OUTPATIENT
Start: 2022-01-01 | End: 2022-01-01

## 2022-01-01 RX ORDER — DULOXETIN HYDROCHLORIDE 60 MG/1
60 CAPSULE, DELAYED RELEASE ORAL DAILY
Qty: 60 CAPSULE | Refills: 5 | Status: ON HOLD | OUTPATIENT
Start: 2022-01-01 | End: 2022-01-01

## 2022-01-01 RX ORDER — HYDROXYCHLOROQUINE SULFATE 200 MG/1
200 TABLET, FILM COATED ORAL DAILY
Status: DISCONTINUED | OUTPATIENT
Start: 2022-01-01 | End: 2022-01-01 | Stop reason: HOSPADM

## 2022-01-01 RX ORDER — CHLORPROMAZINE HYDROCHLORIDE 25 MG/1
25 TABLET, FILM COATED ORAL 3 TIMES DAILY
Status: DISCONTINUED | OUTPATIENT
Start: 2022-01-01 | End: 2022-01-01 | Stop reason: HOSPADM

## 2022-01-01 RX ORDER — DIGOXIN 125 MCG
125 TABLET ORAL
Qty: 90 TABLET | Refills: 3 | Status: ON HOLD | OUTPATIENT
Start: 2022-01-01 | End: 2022-01-01

## 2022-01-01 RX ORDER — PROPOFOL 10 MG/ML
VIAL (ML) INTRAVENOUS AS NEEDED
Status: DISCONTINUED | OUTPATIENT
Start: 2022-01-01 | End: 2022-01-01 | Stop reason: SURG

## 2022-01-01 RX ORDER — LEVOTHYROXINE SODIUM 0.07 MG/1
75 TABLET ORAL EVERY MORNING
Status: DISCONTINUED | OUTPATIENT
Start: 2022-01-01 | End: 2022-01-01 | Stop reason: HOSPADM

## 2022-01-01 RX ORDER — LEVETIRACETAM 500 MG/1
500 TABLET ORAL 2 TIMES DAILY
Status: DISCONTINUED | OUTPATIENT
Start: 2022-01-01 | End: 2022-01-01 | Stop reason: SDUPTHER

## 2022-01-01 RX ORDER — MIDAZOLAM HYDROCHLORIDE 1 MG/ML
1 INJECTION INTRAMUSCULAR; INTRAVENOUS ONCE
Status: COMPLETED | OUTPATIENT
Start: 2022-01-01 | End: 2022-01-01

## 2022-01-01 RX ORDER — ALBUTEROL SULFATE 90 UG/1
2 AEROSOL, METERED RESPIRATORY (INHALATION) EVERY 4 HOURS PRN
Qty: 8.5 G | Refills: 5 | Status: ON HOLD | OUTPATIENT
Start: 2022-01-01 | End: 2022-01-01

## 2022-01-01 RX ORDER — LEVETIRACETAM 500 MG/1
500 TABLET ORAL 2 TIMES DAILY
Qty: 180 TABLET | Refills: 3 | Status: SHIPPED | OUTPATIENT
Start: 2022-01-01

## 2022-01-01 RX ORDER — CARVEDILOL 3.12 MG/1
3.12 TABLET ORAL 2 TIMES DAILY WITH MEALS
Qty: 60 TABLET | Refills: 5 | Status: SHIPPED | OUTPATIENT
Start: 2022-01-01

## 2022-01-01 RX ORDER — LORAZEPAM 2 MG/ML
1 CONCENTRATE ORAL EVERY 6 HOURS PRN
Qty: 30 ML | Refills: 2 | Status: SHIPPED | OUTPATIENT
Start: 2022-01-01

## 2022-01-01 RX ORDER — HYDROCODONE BITARTRATE AND ACETAMINOPHEN 7.5; 325 MG/1; MG/1
1 TABLET ORAL EVERY 4 HOURS PRN
Status: DISCONTINUED | OUTPATIENT
Start: 2022-01-01 | End: 2022-01-01 | Stop reason: HOSPADM

## 2022-01-01 RX ORDER — DIGOXIN 0.25 MG/ML
500 INJECTION INTRAMUSCULAR; INTRAVENOUS ONCE
Status: COMPLETED | OUTPATIENT
Start: 2022-01-01 | End: 2022-01-01

## 2022-01-01 RX ORDER — NICOTINE POLACRILEX 4 MG
15 LOZENGE BUCCAL
Status: DISCONTINUED | OUTPATIENT
Start: 2022-01-01 | End: 2022-01-01 | Stop reason: HOSPADM

## 2022-01-01 RX ORDER — APIXABAN 5 MG/1
5 TABLET, FILM COATED ORAL EVERY 12 HOURS SCHEDULED
COMMUNITY
Start: 2022-01-01 | End: 2022-01-01 | Stop reason: HOSPADM

## 2022-01-01 RX ORDER — CHLORPROMAZINE HYDROCHLORIDE 25 MG/1
25 TABLET, FILM COATED ORAL 3 TIMES DAILY PRN
Qty: 30 TABLET | Refills: 0 | Status: SHIPPED | OUTPATIENT
Start: 2022-01-01 | End: 2022-01-01 | Stop reason: HOSPADM

## 2022-01-01 RX ORDER — DOCUSATE SODIUM 100 MG/1
100 CAPSULE, LIQUID FILLED ORAL 2 TIMES DAILY PRN
Status: DISCONTINUED | OUTPATIENT
Start: 2022-01-01 | End: 2022-01-01 | Stop reason: HOSPADM

## 2022-01-01 RX ORDER — SODIUM CHLORIDE, SODIUM LACTATE, POTASSIUM CHLORIDE, CALCIUM CHLORIDE 600; 310; 30; 20 MG/100ML; MG/100ML; MG/100ML; MG/100ML
9 INJECTION, SOLUTION INTRAVENOUS CONTINUOUS PRN
Status: DISCONTINUED | OUTPATIENT
Start: 2022-01-01 | End: 2022-01-01

## 2022-01-01 RX ORDER — DEXAMETHASONE 4 MG/1
4 TABLET ORAL EVERY 12 HOURS SCHEDULED
Status: DISCONTINUED | OUTPATIENT
Start: 2022-01-01 | End: 2022-01-01 | Stop reason: HOSPADM

## 2022-01-01 RX ORDER — BUMETANIDE 0.5 MG/1
0.5 TABLET ORAL DAILY
Status: DISCONTINUED | OUTPATIENT
Start: 2022-01-01 | End: 2022-01-01

## 2022-01-01 RX ORDER — SODIUM CHLORIDE AND POTASSIUM CHLORIDE 150; 900 MG/100ML; MG/100ML
65 INJECTION, SOLUTION INTRAVENOUS CONTINUOUS
Status: DISCONTINUED | OUTPATIENT
Start: 2022-01-01 | End: 2022-01-01

## 2022-01-01 RX ORDER — HEPARIN SODIUM (PORCINE) LOCK FLUSH IV SOLN 100 UNIT/ML 100 UNIT/ML
SOLUTION INTRAVENOUS
Status: COMPLETED
Start: 2022-01-01 | End: 2022-01-01

## 2022-01-01 RX ORDER — SODIUM CHLORIDE, SODIUM LACTATE, POTASSIUM CHLORIDE, CALCIUM CHLORIDE 600; 310; 30; 20 MG/100ML; MG/100ML; MG/100ML; MG/100ML
30 INJECTION, SOLUTION INTRAVENOUS CONTINUOUS
Status: DISCONTINUED | OUTPATIENT
Start: 2022-01-01 | End: 2022-01-01

## 2022-01-01 RX ORDER — HYDROCODONE BITARTRATE AND ACETAMINOPHEN 5; 325 MG/1; MG/1
1 TABLET ORAL EVERY 4 HOURS PRN
Status: DISCONTINUED | OUTPATIENT
Start: 2022-01-01 | End: 2022-01-01

## 2022-01-01 RX ORDER — LEVOFLOXACIN 750 MG/1
750 TABLET ORAL EVERY 24 HOURS
Status: DISCONTINUED | OUTPATIENT
Start: 2022-01-01 | End: 2022-01-01 | Stop reason: HOSPADM

## 2022-01-01 RX ORDER — LEVETIRACETAM 500 MG/1
500 TABLET ORAL 2 TIMES DAILY
Qty: 28 TABLET | Refills: 0 | Status: ON HOLD | OUTPATIENT
Start: 2022-01-01 | End: 2022-01-01

## 2022-01-01 RX ORDER — HYDROCODONE BITARTRATE AND ACETAMINOPHEN 7.5; 325 MG/1; MG/1
1 TABLET ORAL EVERY 6 HOURS PRN
Status: DISCONTINUED | OUTPATIENT
Start: 2022-01-01 | End: 2022-01-01 | Stop reason: HOSPADM

## 2022-01-01 RX ORDER — HALOPERIDOL 2 MG/ML
1 SOLUTION ORAL 3 TIMES DAILY PRN
Qty: 30 ML | Refills: 0 | Status: SHIPPED | OUTPATIENT
Start: 2022-01-01

## 2022-01-01 RX ORDER — LEVETIRACETAM 500 MG/1
500 TABLET ORAL EVERY 12 HOURS SCHEDULED
Status: DISCONTINUED | OUTPATIENT
Start: 2022-01-01 | End: 2022-01-01

## 2022-01-01 RX ORDER — ONDANSETRON 8 MG/1
8 TABLET, ORALLY DISINTEGRATING ORAL EVERY 8 HOURS PRN
COMMUNITY

## 2022-01-01 RX ORDER — FOLIC ACID 1 MG/1
1000 TABLET ORAL DAILY
Status: DISCONTINUED | OUTPATIENT
Start: 2022-01-01 | End: 2022-01-01

## 2022-01-01 RX ORDER — AZITHROMYCIN 250 MG/1
250 TABLET, FILM COATED ORAL EVERY OTHER DAY
Status: DISCONTINUED | OUTPATIENT
Start: 2022-01-01 | End: 2022-01-01 | Stop reason: HOSPADM

## 2022-01-01 RX ORDER — CARVEDILOL 3.12 MG/1
3.12 TABLET ORAL 2 TIMES DAILY WITH MEALS
Status: DISCONTINUED | OUTPATIENT
Start: 2022-01-01 | End: 2022-01-01

## 2022-01-01 RX ORDER — LANCETS 28 GAUGE
EACH MISCELLANEOUS
Qty: 300 EACH | Refills: 3 | Status: SHIPPED | OUTPATIENT
Start: 2022-01-01 | End: 2022-01-01

## 2022-01-01 RX ORDER — BISACODYL 5 MG/1
10 TABLET, DELAYED RELEASE ORAL DAILY PRN
Status: DISCONTINUED | OUTPATIENT
Start: 2022-01-01 | End: 2022-01-01 | Stop reason: SDUPTHER

## 2022-01-01 RX ORDER — SODIUM CHLORIDE 0.9 % (FLUSH) 0.9 %
10 SYRINGE (ML) INJECTION EVERY 12 HOURS SCHEDULED
Status: DISCONTINUED | OUTPATIENT
Start: 2022-01-01 | End: 2022-01-01

## 2022-01-01 RX ORDER — DILTIAZEM HCL IN NACL,ISO-OSM 125 MG/125
5-15 PLASTIC BAG, INJECTION (ML) INTRAVENOUS
Status: DISCONTINUED | OUTPATIENT
Start: 2022-01-01 | End: 2022-01-01 | Stop reason: HOSPADM

## 2022-01-01 RX ORDER — ARFORMOTEROL TARTRATE 15 UG/2ML
15 SOLUTION RESPIRATORY (INHALATION)
Status: DISCONTINUED | OUTPATIENT
Start: 2022-01-01 | End: 2022-01-01

## 2022-01-01 RX ORDER — FUROSEMIDE 40 MG/1
40 TABLET ORAL DAILY
Status: DISCONTINUED | OUTPATIENT
Start: 2022-01-01 | End: 2022-01-01 | Stop reason: HOSPADM

## 2022-01-01 RX ORDER — SPIRONOLACTONE 25 MG/1
25 TABLET ORAL DAILY
Status: DISCONTINUED | OUTPATIENT
Start: 2022-01-01 | End: 2022-01-01 | Stop reason: HOSPADM

## 2022-01-01 RX ORDER — DIGOXIN 0.25 MG/ML
150 INJECTION INTRAMUSCULAR; INTRAVENOUS ONCE
Status: COMPLETED | OUTPATIENT
Start: 2022-01-01 | End: 2022-01-01

## 2022-01-01 RX ORDER — NITROGLYCERIN 0.4 MG/1
0.4 TABLET SUBLINGUAL
Status: DISCONTINUED | OUTPATIENT
Start: 2022-01-01 | End: 2022-01-01 | Stop reason: HOSPADM

## 2022-01-01 RX ORDER — HYDROCODONE BITARTRATE AND ACETAMINOPHEN 7.5; 325 MG/1; MG/1
1 TABLET ORAL EVERY 6 HOURS PRN
Qty: 45 TABLET | Refills: 0 | Status: SHIPPED | OUTPATIENT
Start: 2022-01-01

## 2022-01-01 RX ADMIN — CARVEDILOL 3.12 MG: 3.12 TABLET, FILM COATED ORAL at 08:35

## 2022-01-01 RX ADMIN — ATORVASTATIN CALCIUM 40 MG: 40 TABLET, FILM COATED ORAL at 22:23

## 2022-01-01 RX ADMIN — IPRATROPIUM BROMIDE AND ALBUTEROL SULFATE 3 ML: 2.5; .5 SOLUTION RESPIRATORY (INHALATION) at 06:50

## 2022-01-01 RX ADMIN — LEVETIRACETAM 500 MG: 500 TABLET, FILM COATED ORAL at 09:47

## 2022-01-01 RX ADMIN — CEFEPIME 2 G: 1 INJECTION, SOLUTION INTRAVENOUS at 00:59

## 2022-01-01 RX ADMIN — ENOXAPARIN SODIUM 40 MG: 100 INJECTION SUBCUTANEOUS at 09:04

## 2022-01-01 RX ADMIN — Medication 10 ML: at 08:49

## 2022-01-01 RX ADMIN — LEVOTHYROXINE SODIUM 75 MCG: 75 TABLET ORAL at 06:01

## 2022-01-01 RX ADMIN — IPRATROPIUM BROMIDE AND ALBUTEROL SULFATE 3 ML: 2.5; .5 SOLUTION RESPIRATORY (INHALATION) at 23:40

## 2022-01-01 RX ADMIN — SENNOSIDES AND DOCUSATE SODIUM 1 TABLET: 50; 8.6 TABLET ORAL at 09:35

## 2022-01-01 RX ADMIN — DEXAMETHASONE SODIUM PHOSPHATE 4 MG: 4 INJECTION, SOLUTION INTRA-ARTICULAR; INTRALESIONAL; INTRAMUSCULAR; INTRAVENOUS; SOFT TISSUE at 15:36

## 2022-01-01 RX ADMIN — BENZONATATE 100 MG: 100 CAPSULE ORAL at 22:12

## 2022-01-01 RX ADMIN — ERGOCALCIFEROL 50000 UNITS: 1.25 CAPSULE ORAL at 09:48

## 2022-01-01 RX ADMIN — PRIMIDONE 250 MG: 250 TABLET ORAL at 08:07

## 2022-01-01 RX ADMIN — ZINC OXIDE 1 APPLICATION: 200 OINTMENT TOPICAL at 17:19

## 2022-01-01 RX ADMIN — LEVETIRACETAM 500 MG: 500 TABLET, FILM COATED ORAL at 20:48

## 2022-01-01 RX ADMIN — DEXAMETHASONE 4 MG: 4 TABLET ORAL at 08:47

## 2022-01-01 RX ADMIN — POLYETHYLENE GLYCOL 3350 17 G: 17 POWDER, FOR SOLUTION ORAL at 08:28

## 2022-01-01 RX ADMIN — BUDESONIDE 0.5 MG: 0.5 SUSPENSION RESPIRATORY (INHALATION) at 23:02

## 2022-01-01 RX ADMIN — PANTOPRAZOLE SODIUM 40 MG: 40 TABLET, DELAYED RELEASE ORAL at 06:01

## 2022-01-01 RX ADMIN — PRIMIDONE 250 MG: 250 TABLET ORAL at 21:51

## 2022-01-01 RX ADMIN — CARVEDILOL 3.12 MG: 3.12 TABLET, FILM COATED ORAL at 07:51

## 2022-01-01 RX ADMIN — BUDESONIDE 0.5 MG: 0.5 SUSPENSION RESPIRATORY (INHALATION) at 08:05

## 2022-01-01 RX ADMIN — DEXAMETHASONE SODIUM PHOSPHATE 4 MG: 4 INJECTION, SOLUTION INTRA-ARTICULAR; INTRALESIONAL; INTRAMUSCULAR; INTRAVENOUS; SOFT TISSUE at 03:54

## 2022-01-01 RX ADMIN — Medication 5 MG/HR: at 09:30

## 2022-01-01 RX ADMIN — HYDROXYCHLOROQUINE SULFATE 200 MG: 200 TABLET ORAL at 09:44

## 2022-01-01 RX ADMIN — BUDESONIDE 0.5 MG: 0.5 SUSPENSION RESPIRATORY (INHALATION) at 06:11

## 2022-01-01 RX ADMIN — HYDROCODONE BITARTRATE AND ACETAMINOPHEN 1 TABLET: 5; 325 TABLET ORAL at 21:46

## 2022-01-01 RX ADMIN — LEVETIRACETAM 500 MG: 500 TABLET, FILM COATED ORAL at 23:36

## 2022-01-01 RX ADMIN — FOLIC ACID 1000 MCG: 1 TABLET ORAL at 08:36

## 2022-01-01 RX ADMIN — SENNOSIDES AND DOCUSATE SODIUM 1 TABLET: 50; 8.6 TABLET ORAL at 08:28

## 2022-01-01 RX ADMIN — LEVETIRACETAM 500 MG: 500 TABLET, FILM COATED ORAL at 21:40

## 2022-01-01 RX ADMIN — IPRATROPIUM BROMIDE AND ALBUTEROL SULFATE 3 ML: 2.5; .5 SOLUTION RESPIRATORY (INHALATION) at 08:05

## 2022-01-01 RX ADMIN — DEXAMETHASONE 4 MG: 4 TABLET ORAL at 09:06

## 2022-01-01 RX ADMIN — LEVETIRACETAM 500 MG: 5 INJECTION, SOLUTION INTRAVENOUS at 20:35

## 2022-01-01 RX ADMIN — IPRATROPIUM BROMIDE AND ALBUTEROL SULFATE 3 ML: 2.5; .5 SOLUTION RESPIRATORY (INHALATION) at 12:22

## 2022-01-01 RX ADMIN — ATORVASTATIN CALCIUM 40 MG: 40 TABLET, FILM COATED ORAL at 20:52

## 2022-01-01 RX ADMIN — Medication 10 ML: at 20:36

## 2022-01-01 RX ADMIN — SPIRONOLACTONE 25 MG: 25 TABLET ORAL at 08:34

## 2022-01-01 RX ADMIN — BUDESONIDE 0.5 MG: 0.5 SUSPENSION RESPIRATORY (INHALATION) at 07:35

## 2022-01-01 RX ADMIN — ATORVASTATIN CALCIUM 40 MG: 40 TABLET, FILM COATED ORAL at 22:12

## 2022-01-01 RX ADMIN — Medication 10 ML: at 09:04

## 2022-01-01 RX ADMIN — DEXAMETHASONE 4 MG: 4 TABLET ORAL at 09:35

## 2022-01-01 RX ADMIN — PROPOFOL 30 MG: 10 INJECTION, EMULSION INTRAVENOUS at 11:09

## 2022-01-01 RX ADMIN — SERTRALINE 25 MG: 25 TABLET, FILM COATED ORAL at 08:36

## 2022-01-01 RX ADMIN — CETIRIZINE HYDROCHLORIDE 10 MG: 10 TABLET, FILM COATED ORAL at 08:46

## 2022-01-01 RX ADMIN — IPRATROPIUM BROMIDE AND ALBUTEROL SULFATE 3 ML: 2.5; .5 SOLUTION RESPIRATORY (INHALATION) at 17:38

## 2022-01-01 RX ADMIN — LEVETIRACETAM 500 MG: 500 TABLET, FILM COATED ORAL at 08:07

## 2022-01-01 RX ADMIN — TAZOBACTAM SODIUM AND PIPERACILLIN SODIUM 3.38 G: 375; 3 INJECTION, SOLUTION INTRAVENOUS at 12:31

## 2022-01-01 RX ADMIN — IPRATROPIUM BROMIDE AND ALBUTEROL SULFATE 3 ML: 2.5; .5 SOLUTION RESPIRATORY (INHALATION) at 19:27

## 2022-01-01 RX ADMIN — HYDROCODONE BITARTRATE AND ACETAMINOPHEN 1 TABLET: 7.5; 325 TABLET ORAL at 07:52

## 2022-01-01 RX ADMIN — IPRATROPIUM BROMIDE AND ALBUTEROL SULFATE 3 ML: 2.5; .5 SOLUTION RESPIRATORY (INHALATION) at 12:18

## 2022-01-01 RX ADMIN — LEVOTHYROXINE SODIUM 75 MCG: 75 TABLET ORAL at 07:04

## 2022-01-01 RX ADMIN — BUDESONIDE 0.5 MG: 0.5 SUSPENSION RESPIRATORY (INHALATION) at 19:41

## 2022-01-01 RX ADMIN — CHLORPROMAZINE HYDROCHLORIDE 25 MG: 25 TABLET, FILM COATED ORAL at 17:37

## 2022-01-01 RX ADMIN — SENNOSIDES AND DOCUSATE SODIUM 1 TABLET: 50; 8.6 TABLET ORAL at 08:54

## 2022-01-01 RX ADMIN — PROPOFOL 30 MG: 10 INJECTION, EMULSION INTRAVENOUS at 11:16

## 2022-01-01 RX ADMIN — MONTELUKAST 10 MG: 10 TABLET, FILM COATED ORAL at 08:44

## 2022-01-01 RX ADMIN — PRIMIDONE 250 MG: 250 TABLET ORAL at 21:10

## 2022-01-01 RX ADMIN — CETIRIZINE HYDROCHLORIDE 10 MG: 10 TABLET, FILM COATED ORAL at 09:01

## 2022-01-01 RX ADMIN — CETIRIZINE HYDROCHLORIDE 10 MG: 10 TABLET, FILM COATED ORAL at 17:39

## 2022-01-01 RX ADMIN — IPRATROPIUM BROMIDE AND ALBUTEROL SULFATE 3 ML: 2.5; .5 SOLUTION RESPIRATORY (INHALATION) at 06:11

## 2022-01-01 RX ADMIN — DEXAMETHASONE 4 MG: 4 TABLET ORAL at 20:12

## 2022-01-01 RX ADMIN — BUDESONIDE 0.5 MG: 0.5 SUSPENSION RESPIRATORY (INHALATION) at 09:32

## 2022-01-01 RX ADMIN — PANTOPRAZOLE SODIUM 40 MG: 40 TABLET, DELAYED RELEASE ORAL at 06:10

## 2022-01-01 RX ADMIN — DEXAMETHASONE SODIUM PHOSPHATE 4 MG: 4 INJECTION, SOLUTION INTRA-ARTICULAR; INTRALESIONAL; INTRAMUSCULAR; INTRAVENOUS; SOFT TISSUE at 20:35

## 2022-01-01 RX ADMIN — SODIUM CHLORIDE 1 G: 900 INJECTION INTRAVENOUS at 05:37

## 2022-01-01 RX ADMIN — PRIMIDONE 250 MG: 250 TABLET ORAL at 09:47

## 2022-01-01 RX ADMIN — Medication 10 ML: at 08:36

## 2022-01-01 RX ADMIN — DEXAMETHASONE 4 MG: 4 TABLET ORAL at 22:04

## 2022-01-01 RX ADMIN — LEVOTHYROXINE SODIUM 50 MCG: 20 INJECTION, SOLUTION INTRAVENOUS at 11:37

## 2022-01-01 RX ADMIN — AZITHROMYCIN MONOHYDRATE 250 MG: 250 TABLET ORAL at 09:47

## 2022-01-01 RX ADMIN — POTASSIUM CHLORIDE AND SODIUM CHLORIDE 65 ML/HR: 900; 150 INJECTION, SOLUTION INTRAVENOUS at 10:36

## 2022-01-01 RX ADMIN — IPRATROPIUM BROMIDE AND ALBUTEROL SULFATE 3 ML: 2.5; .5 SOLUTION RESPIRATORY (INHALATION) at 04:28

## 2022-01-01 RX ADMIN — MONTELUKAST 10 MG: 10 TABLET, FILM COATED ORAL at 08:36

## 2022-01-01 RX ADMIN — Medication 10 ML: at 23:40

## 2022-01-01 RX ADMIN — CARVEDILOL 3.12 MG: 3.12 TABLET, FILM COATED ORAL at 09:05

## 2022-01-01 RX ADMIN — DEXAMETHASONE SODIUM PHOSPHATE 4 MG: 4 INJECTION, SOLUTION INTRA-ARTICULAR; INTRALESIONAL; INTRAMUSCULAR; INTRAVENOUS; SOFT TISSUE at 04:39

## 2022-01-01 RX ADMIN — IPRATROPIUM BROMIDE AND ALBUTEROL SULFATE 3 ML: 2.5; .5 SOLUTION RESPIRATORY (INHALATION) at 00:09

## 2022-01-01 RX ADMIN — INSULIN LISPRO 2 UNITS: 100 INJECTION, SOLUTION INTRAVENOUS; SUBCUTANEOUS at 08:46

## 2022-01-01 RX ADMIN — INSULIN DETEMIR 15 UNITS: 100 INJECTION, SOLUTION SUBCUTANEOUS at 20:52

## 2022-01-01 RX ADMIN — Medication 10 ML: at 20:38

## 2022-01-01 RX ADMIN — FAMOTIDINE 20 MG: 20 TABLET ORAL at 06:43

## 2022-01-01 RX ADMIN — IPRATROPIUM BROMIDE AND ALBUTEROL SULFATE 3 ML: 2.5; .5 SOLUTION RESPIRATORY (INHALATION) at 07:16

## 2022-01-01 RX ADMIN — CARVEDILOL 3.12 MG: 3.12 TABLET, FILM COATED ORAL at 09:48

## 2022-01-01 RX ADMIN — LORAZEPAM 0.5 MG: 0.5 TABLET ORAL at 00:43

## 2022-01-01 RX ADMIN — IPRATROPIUM BROMIDE AND ALBUTEROL SULFATE 3 ML: 2.5; .5 SOLUTION RESPIRATORY (INHALATION) at 19:41

## 2022-01-01 RX ADMIN — BUDESONIDE 0.5 MG: 0.5 SUSPENSION RESPIRATORY (INHALATION) at 20:11

## 2022-01-01 RX ADMIN — HYDROCODONE BITARTRATE AND ACETAMINOPHEN 1 TABLET: 5; 325 TABLET ORAL at 20:22

## 2022-01-01 RX ADMIN — HYDROXYCHLOROQUINE SULFATE 200 MG: 200 TABLET ORAL at 08:29

## 2022-01-01 RX ADMIN — PANTOPRAZOLE SODIUM 40 MG: 40 TABLET, DELAYED RELEASE ORAL at 06:39

## 2022-01-01 RX ADMIN — BUDESONIDE 0.5 MG: 0.5 SUSPENSION RESPIRATORY (INHALATION) at 19:06

## 2022-01-01 RX ADMIN — DEXAMETHASONE 4 MG: 4 TABLET ORAL at 08:32

## 2022-01-01 RX ADMIN — LEVETIRACETAM 500 MG: 500 TABLET, FILM COATED ORAL at 09:44

## 2022-01-01 RX ADMIN — PRIMIDONE 250 MG: 250 TABLET ORAL at 20:36

## 2022-01-01 RX ADMIN — BENZONATATE 100 MG: 100 CAPSULE ORAL at 09:06

## 2022-01-01 RX ADMIN — DIGOXIN 125 MCG: 125 TABLET ORAL at 11:16

## 2022-01-01 RX ADMIN — PRIMIDONE 250 MG: 250 TABLET ORAL at 21:40

## 2022-01-01 RX ADMIN — SACUBITRIL AND VALSARTAN 1 TABLET: 24; 26 TABLET, FILM COATED ORAL at 23:35

## 2022-01-01 RX ADMIN — FUROSEMIDE 40 MG: 40 TABLET ORAL at 09:49

## 2022-01-01 RX ADMIN — ATORVASTATIN CALCIUM 40 MG: 40 TABLET, FILM COATED ORAL at 20:13

## 2022-01-01 RX ADMIN — CHLORPROMAZINE HYDROCHLORIDE 25 MG: 25 TABLET, FILM COATED ORAL at 20:52

## 2022-01-01 RX ADMIN — BUMETANIDE 0.5 MG: 0.5 TABLET ORAL at 08:35

## 2022-01-01 RX ADMIN — POLYETHYLENE GLYCOL 3350 17 G: 17 POWDER, FOR SOLUTION ORAL at 08:55

## 2022-01-01 RX ADMIN — LEVOTHYROXINE SODIUM 75 MCG: 75 TABLET ORAL at 17:39

## 2022-01-01 RX ADMIN — MONTELUKAST 10 MG: 10 TABLET, FILM COATED ORAL at 11:00

## 2022-01-01 RX ADMIN — IPRATROPIUM BROMIDE AND ALBUTEROL SULFATE 3 ML: 2.5; .5 SOLUTION RESPIRATORY (INHALATION) at 23:31

## 2022-01-01 RX ADMIN — HYDROXYCHLOROQUINE SULFATE 200 MG: 200 TABLET ORAL at 08:36

## 2022-01-01 RX ADMIN — ACETAMINOPHEN 1000 MG: 500 TABLET ORAL at 13:40

## 2022-01-01 RX ADMIN — MORPHINE SULFATE 2 MG: 2 INJECTION, SOLUTION INTRAMUSCULAR; INTRAVENOUS at 21:04

## 2022-01-01 RX ADMIN — Medication 10 ML: at 21:49

## 2022-01-01 RX ADMIN — ATORVASTATIN CALCIUM 40 MG: 40 TABLET, FILM COATED ORAL at 21:10

## 2022-01-01 RX ADMIN — Medication 10 ML: at 21:23

## 2022-01-01 RX ADMIN — Medication 10 ML: at 21:00

## 2022-01-01 RX ADMIN — ATORVASTATIN CALCIUM 40 MG: 40 TABLET, FILM COATED ORAL at 21:26

## 2022-01-01 RX ADMIN — MORPHINE SULFATE 4 MG: 4 INJECTION, SOLUTION INTRAMUSCULAR; INTRAVENOUS at 17:19

## 2022-01-01 RX ADMIN — SERTRALINE 25 MG: 25 TABLET, FILM COATED ORAL at 18:09

## 2022-01-01 RX ADMIN — CEFAZOLIN SODIUM 2 G: 2 INJECTION, SOLUTION INTRAVENOUS at 14:15

## 2022-01-01 RX ADMIN — SERTRALINE 25 MG: 25 TABLET, FILM COATED ORAL at 09:47

## 2022-01-01 RX ADMIN — LEVETIRACETAM 500 MG: 5 INJECTION, SOLUTION INTRAVENOUS at 10:09

## 2022-01-01 RX ADMIN — ATORVASTATIN CALCIUM 40 MG: 40 TABLET, FILM COATED ORAL at 21:23

## 2022-01-01 RX ADMIN — ARFORMOTEROL TARTRATE 15 MCG: 15 SOLUTION RESPIRATORY (INHALATION) at 19:27

## 2022-01-01 RX ADMIN — SPIRONOLACTONE 25 MG: 25 TABLET ORAL at 09:35

## 2022-01-01 RX ADMIN — DEXAMETHASONE 4 MG: 4 TABLET ORAL at 08:07

## 2022-01-01 RX ADMIN — DEXAMETHASONE SODIUM PHOSPHATE 4 MG: 4 INJECTION, SOLUTION INTRA-ARTICULAR; INTRALESIONAL; INTRAMUSCULAR; INTRAVENOUS; SOFT TISSUE at 21:10

## 2022-01-01 RX ADMIN — FOSPHENYTOIN SODIUM 100 MG PE: 50 INJECTION INTRAMUSCULAR; INTRAVENOUS at 08:35

## 2022-01-01 RX ADMIN — LEVOTHYROXINE SODIUM 75 MCG: 75 TABLET ORAL at 09:34

## 2022-01-01 RX ADMIN — BUDESONIDE 0.5 MG: 0.5 SUSPENSION RESPIRATORY (INHALATION) at 21:54

## 2022-01-01 RX ADMIN — LEVETIRACETAM 500 MG: 500 TABLET, FILM COATED ORAL at 21:51

## 2022-01-01 RX ADMIN — SPIRONOLACTONE 25 MG: 25 TABLET ORAL at 09:05

## 2022-01-01 RX ADMIN — Medication 10 ML: at 09:48

## 2022-01-01 RX ADMIN — ZINC OXIDE 1 APPLICATION: 200 OINTMENT TOPICAL at 09:06

## 2022-01-01 RX ADMIN — FOLIC ACID 1000 MCG: 1 TABLET ORAL at 11:58

## 2022-01-01 RX ADMIN — SERTRALINE 25 MG: 25 TABLET, FILM COATED ORAL at 09:44

## 2022-01-01 RX ADMIN — SENNOSIDES AND DOCUSATE SODIUM 1 TABLET: 50; 8.6 TABLET ORAL at 10:09

## 2022-01-01 RX ADMIN — AZITHROMYCIN MONOHYDRATE 250 MG: 250 TABLET ORAL at 08:36

## 2022-01-01 RX ADMIN — Medication 10 ML: at 21:28

## 2022-01-01 RX ADMIN — DEXAMETHASONE SODIUM PHOSPHATE 4 MG: 4 INJECTION, SOLUTION INTRA-ARTICULAR; INTRALESIONAL; INTRAMUSCULAR; INTRAVENOUS; SOFT TISSUE at 21:26

## 2022-01-01 RX ADMIN — IPRATROPIUM BROMIDE AND ALBUTEROL SULFATE 3 ML: 2.5; .5 SOLUTION RESPIRATORY (INHALATION) at 06:25

## 2022-01-01 RX ADMIN — DIGOXIN 125 MCG: 0.25 INJECTION INTRAMUSCULAR; INTRAVENOUS at 12:28

## 2022-01-01 RX ADMIN — DIGOXIN 125 MCG: 125 TABLET ORAL at 11:01

## 2022-01-01 RX ADMIN — PRIMIDONE 250 MG: 250 TABLET ORAL at 09:06

## 2022-01-01 RX ADMIN — ATORVASTATIN CALCIUM 40 MG: 40 TABLET, FILM COATED ORAL at 20:48

## 2022-01-01 RX ADMIN — IPRATROPIUM BROMIDE AND ALBUTEROL SULFATE 3 ML: 2.5; .5 SOLUTION RESPIRATORY (INHALATION) at 20:27

## 2022-01-01 RX ADMIN — LORAZEPAM 0.5 MG: 0.5 TABLET ORAL at 18:20

## 2022-01-01 RX ADMIN — AZITHROMYCIN MONOHYDRATE 250 MG: 250 TABLET ORAL at 08:35

## 2022-01-01 RX ADMIN — LEVETIRACETAM 500 MG: 500 TABLET, FILM COATED ORAL at 09:34

## 2022-01-01 RX ADMIN — IPRATROPIUM BROMIDE AND ALBUTEROL SULFATE 3 ML: 2.5; .5 SOLUTION RESPIRATORY (INHALATION) at 00:33

## 2022-01-01 RX ADMIN — HYDROCODONE BITARTRATE AND ACETAMINOPHEN 1 TABLET: 5; 325 TABLET ORAL at 17:11

## 2022-01-01 RX ADMIN — ONDANSETRON 4 MG: 2 INJECTION INTRAMUSCULAR; INTRAVENOUS at 18:31

## 2022-01-01 RX ADMIN — ENOXAPARIN SODIUM 40 MG: 100 INJECTION SUBCUTANEOUS at 08:27

## 2022-01-01 RX ADMIN — CARVEDILOL 3.12 MG: 3.12 TABLET, FILM COATED ORAL at 18:06

## 2022-01-01 RX ADMIN — LEVOTHYROXINE SODIUM 75 MCG: 75 TABLET ORAL at 07:31

## 2022-01-01 RX ADMIN — IPRATROPIUM BROMIDE AND ALBUTEROL SULFATE 3 ML: 2.5; .5 SOLUTION RESPIRATORY (INHALATION) at 15:58

## 2022-01-01 RX ADMIN — CALCIUM CARBONATE 2 TABLET: 500 TABLET, CHEWABLE ORAL at 06:44

## 2022-01-01 RX ADMIN — Medication 10 ML: at 09:00

## 2022-01-01 RX ADMIN — DEXAMETHASONE SODIUM PHOSPHATE 4 MG: 4 INJECTION, SOLUTION INTRA-ARTICULAR; INTRALESIONAL; INTRAMUSCULAR; INTRAVENOUS; SOFT TISSUE at 06:35

## 2022-01-01 RX ADMIN — BUDESONIDE 0.5 MG: 0.5 SUSPENSION RESPIRATORY (INHALATION) at 06:25

## 2022-01-01 RX ADMIN — POLYETHYLENE GLYCOL 3350 17 G: 17 POWDER, FOR SOLUTION ORAL at 08:54

## 2022-01-01 RX ADMIN — LEVETIRACETAM 500 MG: 500 TABLET, FILM COATED ORAL at 10:56

## 2022-01-01 RX ADMIN — HYDROCODONE BITARTRATE AND ACETAMINOPHEN 1 TABLET: 5; 325 TABLET ORAL at 19:47

## 2022-01-01 RX ADMIN — BUDESONIDE 0.5 MG: 0.5 SUSPENSION RESPIRATORY (INHALATION) at 19:15

## 2022-01-01 RX ADMIN — SODIUM CHLORIDE 500 ML: 9 INJECTION, SOLUTION INTRAVENOUS at 15:45

## 2022-01-01 RX ADMIN — IPRATROPIUM BROMIDE AND ALBUTEROL SULFATE 3 ML: 2.5; .5 SOLUTION RESPIRATORY (INHALATION) at 11:45

## 2022-01-01 RX ADMIN — DEXAMETHASONE SODIUM PHOSPHATE 4 MG: 4 INJECTION, SOLUTION INTRA-ARTICULAR; INTRALESIONAL; INTRAMUSCULAR; INTRAVENOUS; SOFT TISSUE at 22:31

## 2022-01-01 RX ADMIN — MORPHINE SULFATE 4 MG: 4 INJECTION, SOLUTION INTRAMUSCULAR; INTRAVENOUS at 06:43

## 2022-01-01 RX ADMIN — INSULIN LISPRO 2 UNITS: 100 INJECTION, SOLUTION INTRAVENOUS; SUBCUTANEOUS at 18:12

## 2022-01-01 RX ADMIN — IPRATROPIUM BROMIDE AND ALBUTEROL SULFATE 3 ML: 2.5; .5 SOLUTION RESPIRATORY (INHALATION) at 00:30

## 2022-01-01 RX ADMIN — ATORVASTATIN CALCIUM 40 MG: 40 TABLET, FILM COATED ORAL at 22:04

## 2022-01-01 RX ADMIN — METOPROLOL TARTRATE 5 MG: 5 INJECTION INTRAVENOUS at 07:35

## 2022-01-01 RX ADMIN — DEXAMETHASONE 4 MG: 4 TABLET ORAL at 06:30

## 2022-01-01 RX ADMIN — IPRATROPIUM BROMIDE AND ALBUTEROL SULFATE 3 ML: 2.5; .5 SOLUTION RESPIRATORY (INHALATION) at 07:11

## 2022-01-01 RX ADMIN — INSULIN LISPRO 3 UNITS: 100 INJECTION, SOLUTION INTRAVENOUS; SUBCUTANEOUS at 11:42

## 2022-01-01 RX ADMIN — IPRATROPIUM BROMIDE AND ALBUTEROL SULFATE 3 ML: 2.5; .5 SOLUTION RESPIRATORY (INHALATION) at 18:21

## 2022-01-01 RX ADMIN — CARVEDILOL 3.12 MG: 3.12 TABLET, FILM COATED ORAL at 23:40

## 2022-01-01 RX ADMIN — ZINC OXIDE 1 APPLICATION: 200 OINTMENT TOPICAL at 08:30

## 2022-01-01 RX ADMIN — LIDOCAINE HYDROCHLORIDE 60 MG: 20 INJECTION, SOLUTION INFILTRATION; PERINEURAL at 14:19

## 2022-01-01 RX ADMIN — CEFEPIME 2 G: 1 INJECTION, SOLUTION INTRAVENOUS at 17:01

## 2022-01-01 RX ADMIN — DIGOXIN 125 MCG: 125 TABLET ORAL at 12:12

## 2022-01-01 RX ADMIN — SENNOSIDES AND DOCUSATE SODIUM 1 TABLET: 50; 8.6 TABLET ORAL at 08:35

## 2022-01-01 RX ADMIN — POLYETHYLENE GLYCOL 3350 17 G: 17 POWDER, FOR SOLUTION ORAL at 09:35

## 2022-01-01 RX ADMIN — SPIRONOLACTONE 25 MG: 25 TABLET ORAL at 08:28

## 2022-01-01 RX ADMIN — IPRATROPIUM BROMIDE AND ALBUTEROL SULFATE 3 ML: 2.5; .5 SOLUTION RESPIRATORY (INHALATION) at 14:13

## 2022-01-01 RX ADMIN — BENZONATATE 100 MG: 100 CAPSULE ORAL at 17:11

## 2022-01-01 RX ADMIN — IPRATROPIUM BROMIDE AND ALBUTEROL SULFATE 3 ML: 2.5; .5 SOLUTION RESPIRATORY (INHALATION) at 19:53

## 2022-01-01 RX ADMIN — TAZOBACTAM SODIUM AND PIPERACILLIN SODIUM 3.38 G: 375; 3 INJECTION, SOLUTION INTRAVENOUS at 20:02

## 2022-01-01 RX ADMIN — DIGOXIN 125 MCG: 125 TABLET ORAL at 12:06

## 2022-01-01 RX ADMIN — FUROSEMIDE 40 MG: 40 TABLET ORAL at 08:46

## 2022-01-01 RX ADMIN — METOCLOPRAMIDE HYDROCHLORIDE 5 MG: 5 INJECTION INTRAMUSCULAR; INTRAVENOUS at 20:54

## 2022-01-01 RX ADMIN — INSULIN DETEMIR 15 UNITS: 100 INJECTION, SOLUTION SUBCUTANEOUS at 21:51

## 2022-01-01 RX ADMIN — FAMOTIDINE 20 MG: 20 TABLET ORAL at 08:34

## 2022-01-01 RX ADMIN — DEXAMETHASONE SODIUM PHOSPHATE 4 MG: 4 INJECTION, SOLUTION INTRA-ARTICULAR; INTRALESIONAL; INTRAMUSCULAR; INTRAVENOUS; SOFT TISSUE at 14:43

## 2022-01-01 RX ADMIN — INSULIN LISPRO 3 UNITS: 100 INJECTION, SOLUTION INTRAVENOUS; SUBCUTANEOUS at 12:57

## 2022-01-01 RX ADMIN — CHLORPROMAZINE HYDROCHLORIDE 25 MG: 25 INJECTION INTRAMUSCULAR at 10:32

## 2022-01-01 RX ADMIN — LIDOCAINE HYDROCHLORIDE 100 MG: 20 INJECTION, SOLUTION INFILTRATION; PERINEURAL at 10:48

## 2022-01-01 RX ADMIN — DEXAMETHASONE 4 MG: 4 TABLET ORAL at 09:47

## 2022-01-01 RX ADMIN — CHLORPROMAZINE HYDROCHLORIDE 25 MG: 25 TABLET, FILM COATED ORAL at 08:55

## 2022-01-01 RX ADMIN — Medication 5 MG/HR: at 16:33

## 2022-01-01 RX ADMIN — SPIRONOLACTONE 25 MG: 25 TABLET ORAL at 09:03

## 2022-01-01 RX ADMIN — Medication 10 ML: at 10:03

## 2022-01-01 RX ADMIN — PROPOFOL 60 MG: 10 INJECTION, EMULSION INTRAVENOUS at 10:47

## 2022-01-01 RX ADMIN — HYDROCODONE BITARTRATE AND ACETAMINOPHEN 1 TABLET: 7.5; 325 TABLET ORAL at 10:30

## 2022-01-01 RX ADMIN — MORPHINE SULFATE 2 MG: 2 INJECTION, SOLUTION INTRAMUSCULAR; INTRAVENOUS at 02:20

## 2022-01-01 RX ADMIN — PANTOPRAZOLE SODIUM 40 MG: 40 TABLET, DELAYED RELEASE ORAL at 06:19

## 2022-01-01 RX ADMIN — FAMOTIDINE 20 MG: 10 INJECTION INTRAVENOUS at 09:48

## 2022-01-01 RX ADMIN — FOSPHENYTOIN SODIUM 100 MG PE: 50 INJECTION INTRAMUSCULAR; INTRAVENOUS at 17:38

## 2022-01-01 RX ADMIN — DEXAMETHASONE SODIUM PHOSPHATE 4 MG: 4 INJECTION, SOLUTION INTRA-ARTICULAR; INTRALESIONAL; INTRAMUSCULAR; INTRAVENOUS; SOFT TISSUE at 08:36

## 2022-01-01 RX ADMIN — CETIRIZINE HYDROCHLORIDE 10 MG: 10 TABLET, FILM COATED ORAL at 08:44

## 2022-01-01 RX ADMIN — POLYETHYLENE GLYCOL 3350 17 G: 17 POWDER, FOR SOLUTION ORAL at 08:36

## 2022-01-01 RX ADMIN — IPRATROPIUM BROMIDE AND ALBUTEROL SULFATE 3 ML: 2.5; .5 SOLUTION RESPIRATORY (INHALATION) at 07:31

## 2022-01-01 RX ADMIN — LEVETIRACETAM 500 MG: 500 TABLET, FILM COATED ORAL at 22:12

## 2022-01-01 RX ADMIN — BUDESONIDE 0.5 MG: 0.5 SUSPENSION RESPIRATORY (INHALATION) at 19:48

## 2022-01-01 RX ADMIN — INSULIN DETEMIR 12 UNITS: 100 INJECTION, SOLUTION SUBCUTANEOUS at 22:09

## 2022-01-01 RX ADMIN — ZINC OXIDE 1 APPLICATION: 200 OINTMENT TOPICAL at 08:37

## 2022-01-01 RX ADMIN — HYDROXYCHLOROQUINE SULFATE 200 MG: 200 TABLET ORAL at 18:07

## 2022-01-01 RX ADMIN — BUDESONIDE 0.5 MG: 0.5 SUSPENSION RESPIRATORY (INHALATION) at 19:08

## 2022-01-01 RX ADMIN — FLUDEOXYGLUCOSE F18 1 DOSE: 300 INJECTION INTRAVENOUS at 11:25

## 2022-01-01 RX ADMIN — DEXAMETHASONE SODIUM PHOSPHATE 4 MG: 4 INJECTION, SOLUTION INTRA-ARTICULAR; INTRALESIONAL; INTRAMUSCULAR; INTRAVENOUS; SOFT TISSUE at 03:34

## 2022-01-01 RX ADMIN — IPRATROPIUM BROMIDE AND ALBUTEROL SULFATE 3 ML: 2.5; .5 SOLUTION RESPIRATORY (INHALATION) at 22:57

## 2022-01-01 RX ADMIN — BUDESONIDE 0.5 MG: 0.5 SUSPENSION RESPIRATORY (INHALATION) at 07:32

## 2022-01-01 RX ADMIN — CETIRIZINE HYDROCHLORIDE 10 MG: 10 TABLET, FILM COATED ORAL at 08:34

## 2022-01-01 RX ADMIN — SERTRALINE 25 MG: 25 TABLET, FILM COATED ORAL at 08:55

## 2022-01-01 RX ADMIN — HYDROCODONE BITARTRATE AND ACETAMINOPHEN 1 TABLET: 5; 325 TABLET ORAL at 08:10

## 2022-01-01 RX ADMIN — DEXAMETHASONE 4 MG: 4 TABLET ORAL at 20:48

## 2022-01-01 RX ADMIN — BENZONATATE 100 MG: 100 CAPSULE ORAL at 08:07

## 2022-01-01 RX ADMIN — INSULIN DETEMIR 15 UNITS: 100 INJECTION, SOLUTION SUBCUTANEOUS at 20:54

## 2022-01-01 RX ADMIN — TAZOBACTAM SODIUM AND PIPERACILLIN SODIUM 3.38 G: 375; 3 INJECTION, SOLUTION INTRAVENOUS at 03:59

## 2022-01-01 RX ADMIN — IPRATROPIUM BROMIDE AND ALBUTEROL SULFATE 3 ML: 2.5; .5 SOLUTION RESPIRATORY (INHALATION) at 19:06

## 2022-01-01 RX ADMIN — LEVOFLOXACIN 750 MG: 750 TABLET, FILM COATED ORAL at 14:04

## 2022-01-01 RX ADMIN — IPRATROPIUM BROMIDE AND ALBUTEROL SULFATE 3 ML: 2.5; .5 SOLUTION RESPIRATORY (INHALATION) at 15:55

## 2022-01-01 RX ADMIN — HYDROCODONE BITARTRATE AND ACETAMINOPHEN 1 TABLET: 5; 325 TABLET ORAL at 20:48

## 2022-01-01 RX ADMIN — IPRATROPIUM BROMIDE AND ALBUTEROL SULFATE 3 ML: 2.5; .5 SOLUTION RESPIRATORY (INHALATION) at 00:48

## 2022-01-01 RX ADMIN — METOCLOPRAMIDE HYDROCHLORIDE 5 MG: 5 INJECTION INTRAMUSCULAR; INTRAVENOUS at 12:27

## 2022-01-01 RX ADMIN — HYDROCODONE BITARTRATE AND ACETAMINOPHEN 1 TABLET: 5; 325 TABLET ORAL at 15:31

## 2022-01-01 RX ADMIN — CARVEDILOL 3.12 MG: 3.12 TABLET, FILM COATED ORAL at 18:21

## 2022-01-01 RX ADMIN — IPRATROPIUM BROMIDE AND ALBUTEROL SULFATE 3 ML: 2.5; .5 SOLUTION RESPIRATORY (INHALATION) at 19:48

## 2022-01-01 RX ADMIN — MORPHINE SULFATE 4 MG: 4 INJECTION, SOLUTION INTRAMUSCULAR; INTRAVENOUS at 12:12

## 2022-01-01 RX ADMIN — CARVEDILOL 3.12 MG: 3.12 TABLET, FILM COATED ORAL at 17:17

## 2022-01-01 RX ADMIN — LEVETIRACETAM 500 MG: 500 TABLET, FILM COATED ORAL at 11:00

## 2022-01-01 RX ADMIN — LEVETIRACETAM 500 MG: 500 TABLET, FILM COATED ORAL at 21:22

## 2022-01-01 RX ADMIN — CARVEDILOL 3.12 MG: 3.12 TABLET, FILM COATED ORAL at 09:35

## 2022-01-01 RX ADMIN — CEFEPIME 2 G: 1 INJECTION, SOLUTION INTRAVENOUS at 09:00

## 2022-01-01 RX ADMIN — ENOXAPARIN SODIUM 40 MG: 100 INJECTION SUBCUTANEOUS at 09:47

## 2022-01-01 RX ADMIN — POLYETHYLENE GLYCOL 3350 17 G: 17 POWDER, FOR SOLUTION ORAL at 08:46

## 2022-01-01 RX ADMIN — IPRATROPIUM BROMIDE AND ALBUTEROL SULFATE 3 ML: 2.5; .5 SOLUTION RESPIRATORY (INHALATION) at 18:31

## 2022-01-01 RX ADMIN — PRIMIDONE 250 MG: 250 TABLET ORAL at 09:48

## 2022-01-01 RX ADMIN — Medication 10 ML: at 20:03

## 2022-01-01 RX ADMIN — IPRATROPIUM BROMIDE AND ALBUTEROL SULFATE 3 ML: 2.5; .5 SOLUTION RESPIRATORY (INHALATION) at 19:08

## 2022-01-01 RX ADMIN — SENNOSIDES AND DOCUSATE SODIUM 1 TABLET: 50; 8.6 TABLET ORAL at 20:36

## 2022-01-01 RX ADMIN — MONTELUKAST 10 MG: 10 TABLET, FILM COATED ORAL at 17:39

## 2022-01-01 RX ADMIN — MORPHINE SULFATE 2 MG: 2 INJECTION, SOLUTION INTRAMUSCULAR; INTRAVENOUS at 08:08

## 2022-01-01 RX ADMIN — IPRATROPIUM BROMIDE AND ALBUTEROL SULFATE 3 ML: 2.5; .5 SOLUTION RESPIRATORY (INHALATION) at 07:35

## 2022-01-01 RX ADMIN — BUDESONIDE 0.5 MG: 0.5 SUSPENSION RESPIRATORY (INHALATION) at 18:21

## 2022-01-01 RX ADMIN — BUDESONIDE 0.5 MG: 0.5 SUSPENSION RESPIRATORY (INHALATION) at 07:11

## 2022-01-01 RX ADMIN — CETIRIZINE HYDROCHLORIDE 10 MG: 10 TABLET, FILM COATED ORAL at 08:07

## 2022-01-01 RX ADMIN — LEVOTHYROXINE SODIUM 50 MCG: 20 INJECTION, SOLUTION INTRAVENOUS at 10:11

## 2022-01-01 RX ADMIN — FOLIC ACID 1000 MCG: 1 TABLET ORAL at 08:28

## 2022-01-01 RX ADMIN — DIGOXIN 250 MCG: 0.25 INJECTION INTRAMUSCULAR; INTRAVENOUS at 16:45

## 2022-01-01 RX ADMIN — IPRATROPIUM BROMIDE AND ALBUTEROL SULFATE 3 ML: 2.5; .5 SOLUTION RESPIRATORY (INHALATION) at 07:15

## 2022-01-01 RX ADMIN — MONTELUKAST 10 MG: 10 TABLET, FILM COATED ORAL at 09:47

## 2022-01-01 RX ADMIN — SENNOSIDES AND DOCUSATE SODIUM 1 TABLET: 50; 8.6 TABLET ORAL at 08:55

## 2022-01-01 RX ADMIN — LEVETIRACETAM 500 MG: 500 TABLET, FILM COATED ORAL at 20:02

## 2022-01-01 RX ADMIN — DIGOXIN 150 MCG: 0.25 INJECTION INTRAMUSCULAR; INTRAVENOUS at 07:34

## 2022-01-01 RX ADMIN — AZITHROMYCIN MONOHYDRATE 250 MG: 250 TABLET ORAL at 09:34

## 2022-01-01 RX ADMIN — LEVOFLOXACIN 750 MG: 750 TABLET, FILM COATED ORAL at 13:14

## 2022-01-01 RX ADMIN — HYDROXYCHLOROQUINE SULFATE 200 MG: 200 TABLET ORAL at 11:45

## 2022-01-01 RX ADMIN — FUROSEMIDE 40 MG: 40 TABLET ORAL at 08:29

## 2022-01-01 RX ADMIN — IPRATROPIUM BROMIDE AND ALBUTEROL SULFATE 3 ML: 2.5; .5 SOLUTION RESPIRATORY (INHALATION) at 12:24

## 2022-01-01 RX ADMIN — FOLIC ACID 1000 MCG: 1 TABLET ORAL at 09:01

## 2022-01-01 RX ADMIN — FOLIC ACID 1000 MCG: 1 TABLET ORAL at 17:39

## 2022-01-01 RX ADMIN — FOSPHENYTOIN SODIUM 100 MG PE: 50 INJECTION INTRAMUSCULAR; INTRAVENOUS at 00:01

## 2022-01-01 RX ADMIN — MONTELUKAST 10 MG: 10 TABLET, FILM COATED ORAL at 08:07

## 2022-01-01 RX ADMIN — DEXAMETHASONE SODIUM PHOSPHATE 4 MG: 4 INJECTION, SOLUTION INTRA-ARTICULAR; INTRALESIONAL; INTRAMUSCULAR; INTRAVENOUS; SOFT TISSUE at 17:38

## 2022-01-01 RX ADMIN — BUDESONIDE 0.5 MG: 0.5 SUSPENSION RESPIRATORY (INHALATION) at 19:17

## 2022-01-01 RX ADMIN — BENZONATATE 100 MG: 100 CAPSULE ORAL at 17:17

## 2022-01-01 RX ADMIN — MONTELUKAST 10 MG: 10 TABLET, FILM COATED ORAL at 09:44

## 2022-01-01 RX ADMIN — CARVEDILOL 3.12 MG: 3.12 TABLET, FILM COATED ORAL at 17:19

## 2022-01-01 RX ADMIN — BENZONATATE 100 MG: 100 CAPSULE ORAL at 20:36

## 2022-01-01 RX ADMIN — IPRATROPIUM BROMIDE AND ALBUTEROL SULFATE 3 ML: 2.5; .5 SOLUTION RESPIRATORY (INHALATION) at 00:44

## 2022-01-01 RX ADMIN — CETIRIZINE HYDROCHLORIDE 10 MG: 10 TABLET, FILM COATED ORAL at 09:47

## 2022-01-01 RX ADMIN — SENNOSIDES AND DOCUSATE SODIUM 1 TABLET: 50; 8.6 TABLET ORAL at 20:13

## 2022-01-01 RX ADMIN — SERTRALINE 25 MG: 25 TABLET, FILM COATED ORAL at 08:46

## 2022-01-01 RX ADMIN — BUDESONIDE 0.5 MG: 0.5 SUSPENSION RESPIRATORY (INHALATION) at 18:17

## 2022-01-01 RX ADMIN — CEFEPIME 2 G: 1 INJECTION, SOLUTION INTRAVENOUS at 00:23

## 2022-01-01 RX ADMIN — BUDESONIDE 0.5 MG: 0.5 SUSPENSION RESPIRATORY (INHALATION) at 20:27

## 2022-01-01 RX ADMIN — LEVOTHYROXINE SODIUM 75 MCG: 75 TABLET ORAL at 06:17

## 2022-01-01 RX ADMIN — IPRATROPIUM BROMIDE AND ALBUTEROL SULFATE 3 ML: 2.5; .5 SOLUTION RESPIRATORY (INHALATION) at 03:13

## 2022-01-01 RX ADMIN — BENZONATATE 100 MG: 100 CAPSULE ORAL at 15:31

## 2022-01-01 RX ADMIN — LEVETIRACETAM 500 MG: 500 TABLET, FILM COATED ORAL at 20:12

## 2022-01-01 RX ADMIN — Medication 10 ML: at 08:33

## 2022-01-01 RX ADMIN — METOCLOPRAMIDE HYDROCHLORIDE 5 MG: 5 INJECTION INTRAMUSCULAR; INTRAVENOUS at 20:34

## 2022-01-01 RX ADMIN — Medication 10 ML: at 20:16

## 2022-01-01 RX ADMIN — AZITHROMYCIN MONOHYDRATE 250 MG: 250 TABLET ORAL at 08:44

## 2022-01-01 RX ADMIN — FAMOTIDINE 20 MG: 10 INJECTION INTRAVENOUS at 20:02

## 2022-01-01 RX ADMIN — BUDESONIDE 0.5 MG: 0.5 SUSPENSION RESPIRATORY (INHALATION) at 06:22

## 2022-01-01 RX ADMIN — PRIMIDONE 250 MG: 250 TABLET ORAL at 09:02

## 2022-01-01 RX ADMIN — CETIRIZINE HYDROCHLORIDE 10 MG: 10 TABLET, FILM COATED ORAL at 08:28

## 2022-01-01 RX ADMIN — CARVEDILOL 3.12 MG: 3.12 TABLET, FILM COATED ORAL at 17:38

## 2022-01-01 RX ADMIN — BENZONATATE 100 MG: 100 CAPSULE ORAL at 22:04

## 2022-01-01 RX ADMIN — PRIMIDONE 250 MG: 250 TABLET ORAL at 11:58

## 2022-01-01 RX ADMIN — PROPOFOL 125 MCG/KG/MIN: 10 INJECTION, EMULSION INTRAVENOUS at 14:19

## 2022-01-01 RX ADMIN — SPIRONOLACTONE 25 MG: 25 TABLET ORAL at 17:39

## 2022-01-01 RX ADMIN — BENZONATATE 100 MG: 100 CAPSULE ORAL at 16:27

## 2022-01-01 RX ADMIN — LEVOTHYROXINE SODIUM 75 MCG: 75 TABLET ORAL at 08:45

## 2022-01-01 RX ADMIN — DIGOXIN 125 MCG: 0.25 INJECTION INTRAMUSCULAR; INTRAVENOUS at 11:37

## 2022-01-01 RX ADMIN — IPRATROPIUM BROMIDE AND ALBUTEROL SULFATE 3 ML: 2.5; .5 SOLUTION RESPIRATORY (INHALATION) at 02:56

## 2022-01-01 RX ADMIN — SODIUM CHLORIDE 250 ML: 9 INJECTION, SOLUTION INTRAVENOUS at 04:05

## 2022-01-01 RX ADMIN — SPIRONOLACTONE 25 MG: 25 TABLET ORAL at 08:35

## 2022-01-01 RX ADMIN — SODIUM CHLORIDE 500 ML: 9 INJECTION, SOLUTION INTRAVENOUS at 11:20

## 2022-01-01 RX ADMIN — BUDESONIDE 0.5 MG: 0.5 SUSPENSION RESPIRATORY (INHALATION) at 06:35

## 2022-01-01 RX ADMIN — LEVOFLOXACIN 750 MG: 750 TABLET, FILM COATED ORAL at 11:36

## 2022-01-01 RX ADMIN — FOSPHENYTOIN SODIUM 100 MG PE: 50 INJECTION INTRAMUSCULAR; INTRAVENOUS at 15:38

## 2022-01-01 RX ADMIN — INSULIN DETEMIR 8 UNITS: 100 INJECTION, SOLUTION SUBCUTANEOUS at 20:10

## 2022-01-01 RX ADMIN — DEXAMETHASONE SODIUM PHOSPHATE 4 MG: 4 INJECTION, SOLUTION INTRA-ARTICULAR; INTRALESIONAL; INTRAMUSCULAR; INTRAVENOUS; SOFT TISSUE at 15:38

## 2022-01-01 RX ADMIN — PRIMIDONE 250 MG: 250 TABLET ORAL at 22:32

## 2022-01-01 RX ADMIN — Medication 10 ML: at 08:37

## 2022-01-01 RX ADMIN — SERTRALINE 25 MG: 25 TABLET, FILM COATED ORAL at 09:03

## 2022-01-01 RX ADMIN — HYDROXYCHLOROQUINE SULFATE 200 MG: 200 TABLET ORAL at 09:01

## 2022-01-01 RX ADMIN — LEVETIRACETAM 500 MG: 5 INJECTION, SOLUTION INTRAVENOUS at 08:54

## 2022-01-01 RX ADMIN — DULOXETINE HYDROCHLORIDE 30 MG: 30 CAPSULE, DELAYED RELEASE ORAL at 09:47

## 2022-01-01 RX ADMIN — FAMOTIDINE 20 MG: 20 TABLET ORAL at 18:20

## 2022-01-01 RX ADMIN — DEXAMETHASONE 4 MG: 4 TABLET ORAL at 09:01

## 2022-01-01 RX ADMIN — MORPHINE SULFATE 2 MG: 2 INJECTION, SOLUTION INTRAMUSCULAR; INTRAVENOUS at 21:30

## 2022-01-01 RX ADMIN — DEXAMETHASONE 4 MG: 4 TABLET ORAL at 21:22

## 2022-01-01 RX ADMIN — BENZONATATE 100 MG: 100 CAPSULE ORAL at 21:40

## 2022-01-01 RX ADMIN — Medication 10 ML: at 09:36

## 2022-01-01 RX ADMIN — LEVOTHYROXINE SODIUM 75 MCG: 75 TABLET ORAL at 08:36

## 2022-01-01 RX ADMIN — SENNOSIDES AND DOCUSATE SODIUM 1 TABLET: 50; 8.6 TABLET ORAL at 21:40

## 2022-01-01 RX ADMIN — CETIRIZINE HYDROCHLORIDE 10 MG: 10 TABLET, FILM COATED ORAL at 09:06

## 2022-01-01 RX ADMIN — SENNOSIDES AND DOCUSATE SODIUM 1 TABLET: 50; 8.6 TABLET ORAL at 09:02

## 2022-01-01 RX ADMIN — Medication 10 ML: at 09:45

## 2022-01-01 RX ADMIN — HYDROCODONE BITARTRATE AND ACETAMINOPHEN 1 TABLET: 5; 325 TABLET ORAL at 16:36

## 2022-01-01 RX ADMIN — INSULIN LISPRO 2 UNITS: 100 INJECTION, SOLUTION INTRAVENOUS; SUBCUTANEOUS at 08:45

## 2022-01-01 RX ADMIN — PRIMIDONE 250 MG: 250 TABLET ORAL at 21:49

## 2022-01-01 RX ADMIN — DEXAMETHASONE SODIUM PHOSPHATE 4 MG: 4 INJECTION, SOLUTION INTRA-ARTICULAR; INTRALESIONAL; INTRAMUSCULAR; INTRAVENOUS; SOFT TISSUE at 06:33

## 2022-01-01 RX ADMIN — METOCLOPRAMIDE HYDROCHLORIDE 5 MG: 5 INJECTION INTRAMUSCULAR; INTRAVENOUS at 17:37

## 2022-01-01 RX ADMIN — MONTELUKAST 10 MG: 10 TABLET, FILM COATED ORAL at 09:05

## 2022-01-01 RX ADMIN — IPRATROPIUM BROMIDE AND ALBUTEROL SULFATE 3 ML: 2.5; .5 SOLUTION RESPIRATORY (INHALATION) at 12:13

## 2022-01-01 RX ADMIN — DEXAMETHASONE 4 MG: 4 TABLET ORAL at 20:36

## 2022-01-01 RX ADMIN — ATORVASTATIN CALCIUM 40 MG: 40 TABLET, FILM COATED ORAL at 20:36

## 2022-01-01 RX ADMIN — INSULIN DETEMIR 8 UNITS: 100 INJECTION, SOLUTION SUBCUTANEOUS at 20:37

## 2022-01-01 RX ADMIN — CARVEDILOL 3.12 MG: 3.12 TABLET, FILM COATED ORAL at 17:05

## 2022-01-01 RX ADMIN — BUDESONIDE 0.5 MG: 0.5 SUSPENSION RESPIRATORY (INHALATION) at 07:16

## 2022-01-01 RX ADMIN — LEVETIRACETAM 500 MG: 500 TABLET, FILM COATED ORAL at 09:06

## 2022-01-01 RX ADMIN — FOSPHENYTOIN SODIUM 100 MG PE: 50 INJECTION INTRAMUSCULAR; INTRAVENOUS at 09:34

## 2022-01-01 RX ADMIN — BUDESONIDE 0.5 MG: 0.5 SUSPENSION RESPIRATORY (INHALATION) at 07:31

## 2022-01-01 RX ADMIN — LEVETIRACETAM 500 MG: 500 TABLET, FILM COATED ORAL at 20:36

## 2022-01-01 RX ADMIN — PRIMIDONE 250 MG: 250 TABLET ORAL at 08:36

## 2022-01-01 RX ADMIN — CETIRIZINE HYDROCHLORIDE 10 MG: 10 TABLET, FILM COATED ORAL at 09:34

## 2022-01-01 RX ADMIN — FAMOTIDINE 20 MG: 10 INJECTION INTRAVENOUS at 10:10

## 2022-01-01 RX ADMIN — INSULIN LISPRO 2 UNITS: 100 INJECTION, SOLUTION INTRAVENOUS; SUBCUTANEOUS at 12:25

## 2022-01-01 RX ADMIN — BUDESONIDE 0.5 MG: 0.5 SUSPENSION RESPIRATORY (INHALATION) at 19:54

## 2022-01-01 RX ADMIN — FAMOTIDINE 20 MG: 10 INJECTION INTRAVENOUS at 20:54

## 2022-01-01 RX ADMIN — DIGOXIN 125 MCG: 125 TABLET ORAL at 16:31

## 2022-01-01 RX ADMIN — PANTOPRAZOLE SODIUM 40 MG: 40 TABLET, DELAYED RELEASE ORAL at 06:36

## 2022-01-01 RX ADMIN — LORAZEPAM 0.5 MG: 0.5 TABLET ORAL at 15:38

## 2022-01-01 RX ADMIN — LEVOTHYROXINE SODIUM 75 MCG: 75 TABLET ORAL at 12:34

## 2022-01-01 RX ADMIN — HYDROMORPHONE HYDROCHLORIDE 1 MG: 1 INJECTION, SOLUTION INTRAMUSCULAR; INTRAVENOUS; SUBCUTANEOUS at 20:38

## 2022-01-01 RX ADMIN — HYDROCODONE BITARTRATE AND ACETAMINOPHEN 1 TABLET: 5; 325 TABLET ORAL at 12:05

## 2022-01-01 RX ADMIN — PANTOPRAZOLE SODIUM 40 MG: 40 TABLET, DELAYED RELEASE ORAL at 16:44

## 2022-01-01 RX ADMIN — HYDROCODONE BITARTRATE AND ACETAMINOPHEN 1 TABLET: 5; 325 TABLET ORAL at 07:51

## 2022-01-01 RX ADMIN — BENZONATATE 100 MG: 100 CAPSULE ORAL at 09:34

## 2022-01-01 RX ADMIN — BUDESONIDE 0.5 MG: 0.5 SUSPENSION RESPIRATORY (INHALATION) at 20:43

## 2022-01-01 RX ADMIN — LEVETIRACETAM 500 MG: 500 TABLET, FILM COATED ORAL at 11:58

## 2022-01-01 RX ADMIN — INSULIN DETEMIR 8 UNITS: 100 INJECTION, SOLUTION SUBCUTANEOUS at 22:16

## 2022-01-01 RX ADMIN — DULOXETINE HYDROCHLORIDE 30 MG: 30 CAPSULE, DELAYED RELEASE ORAL at 11:58

## 2022-01-01 RX ADMIN — PANTOPRAZOLE SODIUM 40 MG: 40 TABLET, DELAYED RELEASE ORAL at 07:04

## 2022-01-01 RX ADMIN — FAMOTIDINE 20 MG: 20 TABLET ORAL at 16:38

## 2022-01-01 RX ADMIN — CARVEDILOL 3.12 MG: 3.12 TABLET, FILM COATED ORAL at 08:28

## 2022-01-01 RX ADMIN — LEVETIRACETAM 500 MG: 500 TABLET, FILM COATED ORAL at 22:04

## 2022-01-01 RX ADMIN — IPRATROPIUM BROMIDE AND ALBUTEROL SULFATE 3 ML: 2.5; .5 SOLUTION RESPIRATORY (INHALATION) at 19:40

## 2022-01-01 RX ADMIN — PANTOPRAZOLE SODIUM 40 MG: 40 TABLET, DELAYED RELEASE ORAL at 09:01

## 2022-01-01 RX ADMIN — CHLORPROMAZINE HYDROCHLORIDE 25 MG: 25 TABLET, FILM COATED ORAL at 20:55

## 2022-01-01 RX ADMIN — HYDROCODONE BITARTRATE AND ACETAMINOPHEN 1 TABLET: 5; 325 TABLET ORAL at 10:25

## 2022-01-01 RX ADMIN — MONTELUKAST 10 MG: 10 TABLET, FILM COATED ORAL at 08:28

## 2022-01-01 RX ADMIN — BENZONATATE 100 MG: 100 CAPSULE ORAL at 08:32

## 2022-01-01 RX ADMIN — AZITHROMYCIN MONOHYDRATE 250 MG: 250 TABLET ORAL at 09:35

## 2022-01-01 RX ADMIN — CETIRIZINE HYDROCHLORIDE 10 MG: 10 TABLET, FILM COATED ORAL at 08:29

## 2022-01-01 RX ADMIN — ONDANSETRON 4 MG: 2 INJECTION INTRAMUSCULAR; INTRAVENOUS at 20:29

## 2022-01-01 RX ADMIN — SENNOSIDES AND DOCUSATE SODIUM 1 TABLET: 50; 8.6 TABLET ORAL at 08:29

## 2022-01-01 RX ADMIN — SENNOSIDES AND DOCUSATE SODIUM 1 TABLET: 50; 8.6 TABLET ORAL at 08:07

## 2022-01-01 RX ADMIN — MONTELUKAST 10 MG: 10 TABLET, FILM COATED ORAL at 09:34

## 2022-01-01 RX ADMIN — FOSPHENYTOIN SODIUM 100 MG PE: 50 INJECTION INTRAMUSCULAR; INTRAVENOUS at 00:40

## 2022-01-01 RX ADMIN — GLYCOPYRROLATE 0.2 MG: 0.2 INJECTION, SOLUTION INTRAMUSCULAR; INTRAVENOUS at 13:41

## 2022-01-01 RX ADMIN — HYDROCODONE BITARTRATE AND ACETAMINOPHEN 1 TABLET: 5; 325 TABLET ORAL at 16:57

## 2022-01-01 RX ADMIN — SODIUM CHLORIDE 1 G: 900 INJECTION INTRAVENOUS at 04:40

## 2022-01-01 RX ADMIN — SACUBITRIL AND VALSARTAN 1 TABLET: 24; 26 TABLET, FILM COATED ORAL at 11:00

## 2022-01-01 RX ADMIN — SODIUM CHLORIDE, POTASSIUM CHLORIDE, SODIUM LACTATE AND CALCIUM CHLORIDE 70 ML/HR: 600; 310; 30; 20 INJECTION, SOLUTION INTRAVENOUS at 02:16

## 2022-01-01 RX ADMIN — LEVETIRACETAM 500 MG: 5 INJECTION, SOLUTION INTRAVENOUS at 20:54

## 2022-01-01 RX ADMIN — DEXAMETHASONE SODIUM PHOSPHATE 10 MG: 10 INJECTION INTRAMUSCULAR; INTRAVENOUS at 13:38

## 2022-01-01 RX ADMIN — MONTELUKAST 10 MG: 10 TABLET, FILM COATED ORAL at 08:46

## 2022-01-01 RX ADMIN — PRIMIDONE 250 MG: 250 TABLET ORAL at 20:48

## 2022-01-01 RX ADMIN — INSULIN LISPRO 2 UNITS: 100 INJECTION, SOLUTION INTRAVENOUS; SUBCUTANEOUS at 11:43

## 2022-01-01 RX ADMIN — FOLIC ACID 1000 MCG: 1 TABLET ORAL at 09:05

## 2022-01-01 RX ADMIN — HYDROCODONE BITARTRATE AND ACETAMINOPHEN 1 TABLET: 5; 325 TABLET ORAL at 12:34

## 2022-01-01 RX ADMIN — BENZONATATE 100 MG: 100 CAPSULE ORAL at 20:48

## 2022-01-01 RX ADMIN — Medication 10 ML: at 08:30

## 2022-01-01 RX ADMIN — HYDROXYCHLOROQUINE SULFATE 200 MG: 200 TABLET ORAL at 09:06

## 2022-01-01 RX ADMIN — PRIMIDONE 250 MG: 250 TABLET ORAL at 21:26

## 2022-01-01 RX ADMIN — Medication 10 ML: at 21:51

## 2022-01-01 RX ADMIN — AZITHROMYCIN MONOHYDRATE 250 MG: 250 TABLET ORAL at 17:39

## 2022-01-01 RX ADMIN — BUDESONIDE 0.5 MG: 0.5 SUSPENSION RESPIRATORY (INHALATION) at 20:38

## 2022-01-01 RX ADMIN — DIGOXIN 125 MCG: 125 TABLET ORAL at 13:20

## 2022-01-01 RX ADMIN — DEXAMETHASONE SODIUM PHOSPHATE 4 MG: 4 INJECTION, SOLUTION INTRA-ARTICULAR; INTRALESIONAL; INTRAMUSCULAR; INTRAVENOUS; SOFT TISSUE at 22:56

## 2022-01-01 RX ADMIN — Medication 10 ML: at 02:19

## 2022-01-01 RX ADMIN — IPRATROPIUM BROMIDE AND ALBUTEROL SULFATE 3 ML: 2.5; .5 SOLUTION RESPIRATORY (INHALATION) at 15:04

## 2022-01-01 RX ADMIN — LEVOTHYROXINE SODIUM 75 MCG: 75 TABLET ORAL at 11:58

## 2022-01-01 RX ADMIN — DEXAMETHASONE 4 MG: 4 TABLET ORAL at 20:53

## 2022-01-01 RX ADMIN — LEVETIRACETAM 500 MG: 500 TABLET, FILM COATED ORAL at 20:52

## 2022-01-01 RX ADMIN — LORAZEPAM 0.5 MG: 0.5 TABLET ORAL at 21:26

## 2022-01-01 RX ADMIN — POTASSIUM CHLORIDE AND SODIUM CHLORIDE 65 ML/HR: 900; 150 INJECTION, SOLUTION INTRAVENOUS at 17:39

## 2022-01-01 RX ADMIN — FOLIC ACID 1000 MCG: 1 TABLET ORAL at 08:07

## 2022-01-01 RX ADMIN — ZINC OXIDE 1 APPLICATION: 200 OINTMENT TOPICAL at 12:13

## 2022-01-01 RX ADMIN — FOLIC ACID 1000 MCG: 1 TABLET ORAL at 09:35

## 2022-01-01 RX ADMIN — SODIUM CHLORIDE 1000 ML: 9 INJECTION, SOLUTION INTRAVENOUS at 18:46

## 2022-01-01 RX ADMIN — DULOXETINE HYDROCHLORIDE 30 MG: 30 CAPSULE, DELAYED RELEASE ORAL at 10:58

## 2022-01-01 RX ADMIN — LEVOTHYROXINE SODIUM 50 MCG: 20 INJECTION, SOLUTION INTRAVENOUS at 10:16

## 2022-01-01 RX ADMIN — Medication 10 ML: at 09:05

## 2022-01-01 RX ADMIN — IPRATROPIUM BROMIDE AND ALBUTEROL SULFATE 3 ML: 2.5; .5 SOLUTION RESPIRATORY (INHALATION) at 15:24

## 2022-01-01 RX ADMIN — FOLIC ACID 1000 MCG: 1 TABLET ORAL at 09:47

## 2022-01-01 RX ADMIN — IPRATROPIUM BROMIDE AND ALBUTEROL SULFATE 3 ML: 2.5; .5 SOLUTION RESPIRATORY (INHALATION) at 00:32

## 2022-01-01 RX ADMIN — FOSPHENYTOIN SODIUM 100 MG PE: 50 INJECTION INTRAMUSCULAR; INTRAVENOUS at 00:44

## 2022-01-01 RX ADMIN — SPIRONOLACTONE 25 MG: 25 TABLET ORAL at 08:47

## 2022-01-01 RX ADMIN — PANTOPRAZOLE SODIUM 40 MG: 40 TABLET, DELAYED RELEASE ORAL at 08:44

## 2022-01-01 RX ADMIN — HEPARIN SODIUM (PORCINE) LOCK FLUSH IV SOLN 100 UNIT/ML 500 UNITS: 100 SOLUTION at 15:28

## 2022-01-01 RX ADMIN — BENZONATATE 100 MG: 100 CAPSULE ORAL at 20:12

## 2022-01-01 RX ADMIN — HYDROCODONE BITARTRATE AND ACETAMINOPHEN 1 TABLET: 5; 325 TABLET ORAL at 12:26

## 2022-01-01 RX ADMIN — POLYETHYLENE GLYCOL 3350 17 G: 17 POWDER, FOR SOLUTION ORAL at 10:09

## 2022-01-01 RX ADMIN — IOPAMIDOL 100 ML: 755 INJECTION, SOLUTION INTRAVENOUS at 15:13

## 2022-01-01 RX ADMIN — SENNOSIDES AND DOCUSATE SODIUM 1 TABLET: 50; 8.6 TABLET ORAL at 20:35

## 2022-01-01 RX ADMIN — LEVETIRACETAM 500 MG: 500 TABLET, FILM COATED ORAL at 08:36

## 2022-01-01 RX ADMIN — BUDESONIDE 0.5 MG: 0.5 SUSPENSION RESPIRATORY (INHALATION) at 06:26

## 2022-01-01 RX ADMIN — MONTELUKAST 10 MG: 10 TABLET, FILM COATED ORAL at 08:29

## 2022-01-01 RX ADMIN — CEFEPIME 2 G: 1 INJECTION, SOLUTION INTRAVENOUS at 18:51

## 2022-01-01 RX ADMIN — IPRATROPIUM BROMIDE AND ALBUTEROL SULFATE 3 ML: 2.5; .5 SOLUTION RESPIRATORY (INHALATION) at 18:17

## 2022-01-01 RX ADMIN — PRIMIDONE 250 MG: 250 TABLET ORAL at 11:01

## 2022-01-01 RX ADMIN — FOSPHENYTOIN SODIUM 100 MG PE: 50 INJECTION INTRAMUSCULAR; INTRAVENOUS at 08:37

## 2022-01-01 RX ADMIN — IPRATROPIUM BROMIDE AND ALBUTEROL SULFATE 3 ML: 2.5; .5 SOLUTION RESPIRATORY (INHALATION) at 01:01

## 2022-01-01 RX ADMIN — HYDROXYCHLOROQUINE SULFATE 200 MG: 200 TABLET ORAL at 08:07

## 2022-01-01 RX ADMIN — IPRATROPIUM BROMIDE AND ALBUTEROL SULFATE 3 ML: 2.5; .5 SOLUTION RESPIRATORY (INHALATION) at 06:26

## 2022-01-01 RX ADMIN — CHLORPROMAZINE HYDROCHLORIDE 25 MG: 25 TABLET, FILM COATED ORAL at 09:00

## 2022-01-01 RX ADMIN — BUMETANIDE 0.5 MG: 0.5 TABLET ORAL at 09:34

## 2022-01-01 RX ADMIN — PRIMIDONE 250 MG: 250 TABLET ORAL at 22:04

## 2022-01-01 RX ADMIN — DEXAMETHASONE 4 MG: 4 TABLET ORAL at 20:37

## 2022-01-01 RX ADMIN — PRIMIDONE 250 MG: 250 TABLET ORAL at 22:12

## 2022-01-01 RX ADMIN — HYDROXYCHLOROQUINE SULFATE 200 MG: 200 TABLET ORAL at 09:35

## 2022-01-01 RX ADMIN — IPRATROPIUM BROMIDE AND ALBUTEROL SULFATE 3 ML: 2.5; .5 SOLUTION RESPIRATORY (INHALATION) at 03:43

## 2022-01-01 RX ADMIN — IPRATROPIUM BROMIDE AND ALBUTEROL SULFATE 3 ML: 2.5; .5 SOLUTION RESPIRATORY (INHALATION) at 03:39

## 2022-01-01 RX ADMIN — CEFEPIME 2 G: 1 INJECTION, SOLUTION INTRAVENOUS at 09:45

## 2022-01-01 RX ADMIN — ERGOCALCIFEROL 50000 UNITS: 1.25 CAPSULE ORAL at 08:29

## 2022-01-01 RX ADMIN — FAMOTIDINE 20 MG: 20 TABLET ORAL at 09:35

## 2022-01-01 RX ADMIN — Medication 10 ML: at 22:23

## 2022-01-01 RX ADMIN — LEVOTHYROXINE SODIUM 75 MCG: 75 TABLET ORAL at 11:01

## 2022-01-01 RX ADMIN — ZINC OXIDE 1 APPLICATION: 200 OINTMENT TOPICAL at 17:17

## 2022-01-01 RX ADMIN — CETIRIZINE HYDROCHLORIDE 10 MG: 10 TABLET, FILM COATED ORAL at 08:35

## 2022-01-01 RX ADMIN — CETIRIZINE HYDROCHLORIDE 10 MG: 10 TABLET, FILM COATED ORAL at 09:44

## 2022-01-01 RX ADMIN — METOPROLOL TARTRATE 5 MG: 5 INJECTION INTRAVENOUS at 11:32

## 2022-01-01 RX ADMIN — MORPHINE SULFATE 4 MG: 4 INJECTION, SOLUTION INTRAMUSCULAR; INTRAVENOUS at 14:25

## 2022-01-01 RX ADMIN — FUROSEMIDE 40 MG: 40 TABLET ORAL at 09:02

## 2022-01-01 RX ADMIN — LEVETIRACETAM 500 MG: 5 INJECTION, SOLUTION INTRAVENOUS at 08:55

## 2022-01-01 RX ADMIN — BUDESONIDE 0.5 MG: 0.5 SUSPENSION RESPIRATORY (INHALATION) at 06:50

## 2022-01-01 RX ADMIN — CARVEDILOL 3.12 MG: 3.12 TABLET, FILM COATED ORAL at 18:16

## 2022-01-01 RX ADMIN — SERTRALINE 25 MG: 25 TABLET, FILM COATED ORAL at 08:29

## 2022-01-01 RX ADMIN — BUMETANIDE 0.5 MG: 0.5 TABLET ORAL at 08:45

## 2022-01-01 RX ADMIN — PRIMIDONE 250 MG: 250 TABLET ORAL at 08:29

## 2022-01-01 RX ADMIN — PRIMIDONE 250 MG: 250 TABLET ORAL at 09:34

## 2022-01-01 RX ADMIN — INSULIN LISPRO 2 UNITS: 100 INJECTION, SOLUTION INTRAVENOUS; SUBCUTANEOUS at 13:40

## 2022-01-01 RX ADMIN — IPRATROPIUM BROMIDE AND ALBUTEROL SULFATE 3 ML: 2.5; .5 SOLUTION RESPIRATORY (INHALATION) at 13:33

## 2022-01-01 RX ADMIN — PANTOPRAZOLE SODIUM 40 MG: 40 TABLET, DELAYED RELEASE ORAL at 06:17

## 2022-01-01 RX ADMIN — LEVOTHYROXINE SODIUM 75 MCG: 75 TABLET ORAL at 09:47

## 2022-01-01 RX ADMIN — ONDANSETRON 4 MG: 2 INJECTION INTRAMUSCULAR; INTRAVENOUS at 11:59

## 2022-01-01 RX ADMIN — MONTELUKAST 10 MG: 10 TABLET, FILM COATED ORAL at 08:34

## 2022-01-01 RX ADMIN — CALCIUM CARBONATE 2 TABLET: 500 TABLET, CHEWABLE ORAL at 15:02

## 2022-01-01 RX ADMIN — FUROSEMIDE 40 MG: 40 TABLET ORAL at 09:06

## 2022-01-01 RX ADMIN — PRIMIDONE 250 MG: 250 TABLET ORAL at 23:36

## 2022-01-01 RX ADMIN — HYDROCODONE BITARTRATE AND ACETAMINOPHEN 1 TABLET: 5; 325 TABLET ORAL at 06:12

## 2022-01-01 RX ADMIN — SPIRONOLACTONE 25 MG: 25 TABLET ORAL at 08:36

## 2022-01-01 RX ADMIN — Medication 10 ML: at 09:35

## 2022-01-01 RX ADMIN — COSYNTROPIN 0.25 MG: 0.25 INJECTION, POWDER, LYOPHILIZED, FOR SOLUTION INTRAVENOUS at 11:07

## 2022-01-01 RX ADMIN — IOHEXOL 250 ML: 12 SOLUTION ORAL at 10:17

## 2022-01-01 RX ADMIN — FUROSEMIDE 40 MG: 40 TABLET ORAL at 08:28

## 2022-01-01 RX ADMIN — MORPHINE SULFATE 2 MG: 2 INJECTION, SOLUTION INTRAMUSCULAR; INTRAVENOUS at 16:27

## 2022-01-01 RX ADMIN — IPRATROPIUM BROMIDE AND ALBUTEROL SULFATE 3 ML: 2.5; .5 SOLUTION RESPIRATORY (INHALATION) at 15:59

## 2022-01-01 RX ADMIN — BENZONATATE 100 MG: 100 CAPSULE ORAL at 16:52

## 2022-01-01 RX ADMIN — CARVEDILOL 3.12 MG: 3.12 TABLET, FILM COATED ORAL at 08:29

## 2022-01-01 RX ADMIN — IPRATROPIUM BROMIDE AND ALBUTEROL SULFATE 3 ML: 2.5; .5 SOLUTION RESPIRATORY (INHALATION) at 23:05

## 2022-01-01 RX ADMIN — PRIMIDONE 250 MG: 250 TABLET ORAL at 09:35

## 2022-01-01 RX ADMIN — INSULIN LISPRO 4 UNITS: 100 INJECTION, SOLUTION INTRAVENOUS; SUBCUTANEOUS at 18:48

## 2022-01-01 RX ADMIN — INSULIN LISPRO 2 UNITS: 100 INJECTION, SOLUTION INTRAVENOUS; SUBCUTANEOUS at 12:28

## 2022-01-01 RX ADMIN — SERTRALINE 25 MG: 25 TABLET, FILM COATED ORAL at 08:54

## 2022-01-01 RX ADMIN — FOLIC ACID 1000 MCG: 1 TABLET ORAL at 08:45

## 2022-01-01 RX ADMIN — DEXAMETHASONE 4 MG: 4 TABLET ORAL at 08:29

## 2022-01-01 RX ADMIN — LEVETIRACETAM 500 MG: 500 TABLET, FILM COATED ORAL at 09:03

## 2022-01-01 RX ADMIN — INSULIN LISPRO 2 UNITS: 100 INJECTION, SOLUTION INTRAVENOUS; SUBCUTANEOUS at 12:43

## 2022-01-01 RX ADMIN — FAMOTIDINE 20 MG: 20 TABLET ORAL at 08:36

## 2022-01-01 RX ADMIN — HYDROCODONE BITARTRATE AND ACETAMINOPHEN 1 TABLET: 5; 325 TABLET ORAL at 09:19

## 2022-01-01 RX ADMIN — IPRATROPIUM BROMIDE AND ALBUTEROL SULFATE 3 ML: 2.5; .5 SOLUTION RESPIRATORY (INHALATION) at 19:17

## 2022-01-01 RX ADMIN — ALBUTEROL SULFATE 2.5 MG: 2.5 SOLUTION RESPIRATORY (INHALATION) at 04:26

## 2022-01-01 RX ADMIN — SODIUM CHLORIDE 500 ML: 9 INJECTION, SOLUTION INTRAVENOUS at 12:00

## 2022-01-01 RX ADMIN — DEXAMETHASONE 4 MG: 4 TABLET ORAL at 21:51

## 2022-01-01 RX ADMIN — FOLIC ACID 1000 MCG: 1 TABLET ORAL at 11:01

## 2022-01-01 RX ADMIN — INSULIN LISPRO 2 UNITS: 100 INJECTION, SOLUTION INTRAVENOUS; SUBCUTANEOUS at 17:50

## 2022-01-01 RX ADMIN — SENNOSIDES AND DOCUSATE SODIUM 1 TABLET: 50; 8.6 TABLET ORAL at 20:55

## 2022-01-01 RX ADMIN — FAMOTIDINE 20 MG: 20 TABLET ORAL at 17:39

## 2022-01-01 RX ADMIN — LEVETIRACETAM 500 MG: 500 TABLET, FILM COATED ORAL at 08:46

## 2022-01-01 RX ADMIN — CARVEDILOL 3.12 MG: 3.12 TABLET, FILM COATED ORAL at 17:11

## 2022-01-01 RX ADMIN — Medication 10 ML: at 20:35

## 2022-01-01 RX ADMIN — LEVETIRACETAM 500 MG: 500 TABLET, FILM COATED ORAL at 08:32

## 2022-01-01 RX ADMIN — SERTRALINE 25 MG: 25 TABLET, FILM COATED ORAL at 08:07

## 2022-01-01 RX ADMIN — CETIRIZINE HYDROCHLORIDE 10 MG: 10 TABLET, FILM COATED ORAL at 11:00

## 2022-01-01 RX ADMIN — IPRATROPIUM BROMIDE AND ALBUTEROL SULFATE 3 ML: 2.5; .5 SOLUTION RESPIRATORY (INHALATION) at 20:11

## 2022-01-01 RX ADMIN — LEVETIRACETAM 500 MG: 500 TABLET, FILM COATED ORAL at 21:49

## 2022-01-01 RX ADMIN — SODIUM CHLORIDE: 9 INJECTION, SOLUTION INTRAVENOUS at 10:36

## 2022-01-01 RX ADMIN — BENZONATATE 100 MG: 100 CAPSULE ORAL at 09:48

## 2022-01-01 RX ADMIN — ZINC OXIDE 1 APPLICATION: 200 OINTMENT TOPICAL at 20:40

## 2022-01-01 RX ADMIN — CHLORPROMAZINE HYDROCHLORIDE 25 MG: 25 TABLET, FILM COATED ORAL at 15:02

## 2022-01-01 RX ADMIN — IPRATROPIUM BROMIDE AND ALBUTEROL SULFATE 3 ML: 2.5; .5 SOLUTION RESPIRATORY (INHALATION) at 12:35

## 2022-01-01 RX ADMIN — HYDROCODONE BITARTRATE AND ACETAMINOPHEN 1 TABLET: 5; 325 TABLET ORAL at 08:28

## 2022-01-01 RX ADMIN — MONTELUKAST 10 MG: 10 TABLET, FILM COATED ORAL at 08:35

## 2022-01-01 RX ADMIN — ZINC OXIDE 1 APPLICATION: 200 OINTMENT TOPICAL at 12:05

## 2022-01-01 RX ADMIN — LORAZEPAM 0.5 MG: 0.5 TABLET ORAL at 03:51

## 2022-01-01 RX ADMIN — MORPHINE SULFATE 2 MG: 2 INJECTION, SOLUTION INTRAMUSCULAR; INTRAVENOUS at 10:58

## 2022-01-01 RX ADMIN — Medication 10 ML: at 21:27

## 2022-01-01 RX ADMIN — MONTELUKAST 10 MG: 10 TABLET, FILM COATED ORAL at 11:58

## 2022-01-01 RX ADMIN — LEVETIRACETAM 500 MG: 500 TABLET, FILM COATED ORAL at 09:48

## 2022-01-01 RX ADMIN — IPRATROPIUM BROMIDE AND ALBUTEROL SULFATE 3 ML: 2.5; .5 SOLUTION RESPIRATORY (INHALATION) at 02:30

## 2022-01-01 RX ADMIN — HYDROCODONE BITARTRATE AND ACETAMINOPHEN 1 TABLET: 5; 325 TABLET ORAL at 03:07

## 2022-01-01 RX ADMIN — SPIRONOLACTONE 25 MG: 25 TABLET, FILM COATED ORAL at 11:01

## 2022-01-01 RX ADMIN — BUDESONIDE 0.5 MG: 0.5 SUSPENSION RESPIRATORY (INHALATION) at 18:38

## 2022-01-01 RX ADMIN — IPRATROPIUM BROMIDE AND ALBUTEROL SULFATE 3 ML: 2.5; .5 SOLUTION RESPIRATORY (INHALATION) at 08:08

## 2022-01-01 RX ADMIN — PRIMIDONE 250 MG: 250 TABLET ORAL at 20:52

## 2022-01-01 RX ADMIN — CHLORPROMAZINE HYDROCHLORIDE 25 MG: 25 TABLET, FILM COATED ORAL at 20:35

## 2022-01-01 RX ADMIN — IPRATROPIUM BROMIDE AND ALBUTEROL SULFATE 3 ML: 2.5; .5 SOLUTION RESPIRATORY (INHALATION) at 06:38

## 2022-01-01 RX ADMIN — IPRATROPIUM BROMIDE AND ALBUTEROL SULFATE 3 ML: 2.5; .5 SOLUTION RESPIRATORY (INHALATION) at 12:11

## 2022-01-01 RX ADMIN — DEXAMETHASONE 4 MG: 4 TABLET ORAL at 06:36

## 2022-01-01 RX ADMIN — FUROSEMIDE 40 MG: 40 TABLET ORAL at 08:36

## 2022-01-01 RX ADMIN — MONTELUKAST 10 MG: 10 TABLET, FILM COATED ORAL at 09:48

## 2022-01-01 RX ADMIN — BENZONATATE 100 MG: 100 CAPSULE ORAL at 16:31

## 2022-01-01 RX ADMIN — INSULIN LISPRO 2 UNITS: 100 INJECTION, SOLUTION INTRAVENOUS; SUBCUTANEOUS at 17:37

## 2022-01-01 RX ADMIN — BENZONATATE 100 MG: 100 CAPSULE ORAL at 08:36

## 2022-01-01 RX ADMIN — LORAZEPAM 0.5 MG: 0.5 TABLET ORAL at 18:34

## 2022-01-01 RX ADMIN — DEXAMETHASONE 4 MG: 4 TABLET ORAL at 15:02

## 2022-01-01 RX ADMIN — IPRATROPIUM BROMIDE AND ALBUTEROL SULFATE 3 ML: 2.5; .5 SOLUTION RESPIRATORY (INHALATION) at 15:52

## 2022-01-01 RX ADMIN — ARFORMOTEROL TARTRATE 15 MCG: 15 SOLUTION RESPIRATORY (INHALATION) at 07:11

## 2022-01-01 RX ADMIN — BUDESONIDE 0.5 MG: 0.5 SUSPENSION RESPIRATORY (INHALATION) at 09:39

## 2022-01-01 RX ADMIN — CALCIUM CARBONATE 2 TABLET: 500 TABLET, CHEWABLE ORAL at 15:36

## 2022-01-01 RX ADMIN — DIGOXIN 125 MCG: 125 TABLET ORAL at 12:45

## 2022-01-01 RX ADMIN — IPRATROPIUM BROMIDE AND ALBUTEROL SULFATE 3 ML: 2.5; .5 SOLUTION RESPIRATORY (INHALATION) at 10:51

## 2022-01-01 RX ADMIN — SENNOSIDES AND DOCUSATE SODIUM 1 TABLET: 50; 8.6 TABLET ORAL at 09:06

## 2022-01-01 RX ADMIN — CETIRIZINE HYDROCHLORIDE 10 MG: 10 TABLET, FILM COATED ORAL at 08:36

## 2022-01-01 RX ADMIN — BUDESONIDE 0.5 MG: 0.5 SUSPENSION RESPIRATORY (INHALATION) at 19:27

## 2022-01-01 RX ADMIN — DEXAMETHASONE 4 MG: 4 TABLET ORAL at 21:40

## 2022-01-01 RX ADMIN — BUDESONIDE 0.5 MG: 0.5 SUSPENSION RESPIRATORY (INHALATION) at 18:30

## 2022-01-01 RX ADMIN — IPRATROPIUM BROMIDE AND ALBUTEROL SULFATE 3 ML: 2.5; .5 SOLUTION RESPIRATORY (INHALATION) at 11:41

## 2022-01-01 RX ADMIN — LEVOTHYROXINE SODIUM 75 MCG: 75 TABLET ORAL at 06:19

## 2022-01-01 RX ADMIN — LEVOTHYROXINE SODIUM 75 MCG: 75 TABLET ORAL at 08:35

## 2022-01-01 RX ADMIN — AZITHROMYCIN MONOHYDRATE 250 MG: 250 TABLET ORAL at 09:01

## 2022-01-01 RX ADMIN — BUMETANIDE 0.5 MG: 0.5 TABLET ORAL at 17:39

## 2022-01-01 RX ADMIN — SERTRALINE 25 MG: 25 TABLET, FILM COATED ORAL at 08:32

## 2022-01-01 RX ADMIN — PRIMIDONE 250 MG: 250 TABLET ORAL at 20:02

## 2022-01-01 RX ADMIN — IPRATROPIUM BROMIDE AND ALBUTEROL SULFATE 3 ML: 2.5; .5 SOLUTION RESPIRATORY (INHALATION) at 06:22

## 2022-01-01 RX ADMIN — ARFORMOTEROL TARTRATE 15 MCG: 15 SOLUTION RESPIRATORY (INHALATION) at 06:44

## 2022-01-01 RX ADMIN — INSULIN DETEMIR 8 UNITS: 100 INJECTION, SOLUTION SUBCUTANEOUS at 21:44

## 2022-01-01 RX ADMIN — HYDROXYCHLOROQUINE SULFATE 200 MG: 200 TABLET ORAL at 09:47

## 2022-01-01 RX ADMIN — PRIMIDONE 250 MG: 250 TABLET ORAL at 22:22

## 2022-01-01 RX ADMIN — Medication 10 ML: at 21:10

## 2022-01-01 RX ADMIN — DEXAMETHASONE SODIUM PHOSPHATE 4 MG: 4 INJECTION, SOLUTION INTRA-ARTICULAR; INTRALESIONAL; INTRAMUSCULAR; INTRAVENOUS; SOFT TISSUE at 03:51

## 2022-01-01 RX ADMIN — DEXAMETHASONE SODIUM PHOSPHATE 4 MG: 4 INJECTION, SOLUTION INTRA-ARTICULAR; INTRALESIONAL; INTRAMUSCULAR; INTRAVENOUS; SOFT TISSUE at 14:04

## 2022-01-01 RX ADMIN — LORAZEPAM 0.5 MG: 0.5 TABLET ORAL at 06:43

## 2022-01-01 RX ADMIN — PRIMIDONE 250 MG: 250 TABLET ORAL at 08:48

## 2022-01-01 RX ADMIN — HYDROCODONE BITARTRATE AND ACETAMINOPHEN 1 TABLET: 7.5; 325 TABLET ORAL at 18:12

## 2022-01-01 RX ADMIN — PRIMIDONE 250 MG: 250 TABLET ORAL at 20:12

## 2022-01-01 RX ADMIN — POTASSIUM CHLORIDE AND SODIUM CHLORIDE 65 ML/HR: 900; 150 INJECTION, SOLUTION INTRAVENOUS at 13:42

## 2022-01-01 RX ADMIN — IPRATROPIUM BROMIDE AND ALBUTEROL SULFATE 3 ML: 2.5; .5 SOLUTION RESPIRATORY (INHALATION) at 03:01

## 2022-01-01 RX ADMIN — PANTOPRAZOLE SODIUM 40 MG: 40 TABLET, DELAYED RELEASE ORAL at 11:01

## 2022-01-01 RX ADMIN — POLYETHYLENE GLYCOL 3350 17 G: 17 POWDER, FOR SOLUTION ORAL at 08:29

## 2022-01-01 RX ADMIN — FOLIC ACID 1000 MCG: 1 TABLET ORAL at 08:34

## 2022-01-01 RX ADMIN — SPIRONOLACTONE 25 MG: 25 TABLET ORAL at 09:48

## 2022-01-01 RX ADMIN — FOSPHENYTOIN SODIUM 100 MG PE: 50 INJECTION INTRAMUSCULAR; INTRAVENOUS at 23:40

## 2022-01-01 RX ADMIN — BENZONATATE 100 MG: 100 CAPSULE ORAL at 21:23

## 2022-01-01 RX ADMIN — DEXAMETHASONE SODIUM PHOSPHATE 4 MG: 4 INJECTION, SOLUTION INTRA-ARTICULAR; INTRALESIONAL; INTRAMUSCULAR; INTRAVENOUS; SOFT TISSUE at 16:38

## 2022-01-01 RX ADMIN — CARVEDILOL 3.12 MG: 3.12 TABLET, FILM COATED ORAL at 18:22

## 2022-01-01 RX ADMIN — IPRATROPIUM BROMIDE AND ALBUTEROL SULFATE 3 ML: 2.5; .5 SOLUTION RESPIRATORY (INHALATION) at 23:30

## 2022-01-01 RX ADMIN — PRIMIDONE 250 MG: 250 TABLET ORAL at 08:45

## 2022-01-01 RX ADMIN — ZINC OXIDE 1 APPLICATION: 200 OINTMENT TOPICAL at 08:48

## 2022-01-01 RX ADMIN — BUDESONIDE 0.5 MG: 0.5 SUSPENSION RESPIRATORY (INHALATION) at 06:29

## 2022-01-01 RX ADMIN — Medication 10 ML: at 23:36

## 2022-01-01 RX ADMIN — ATORVASTATIN CALCIUM 40 MG: 40 TABLET, FILM COATED ORAL at 21:51

## 2022-01-01 RX ADMIN — Medication 10 ML: at 21:40

## 2022-01-01 RX ADMIN — SERTRALINE 25 MG: 25 TABLET, FILM COATED ORAL at 09:00

## 2022-01-01 RX ADMIN — IPRATROPIUM BROMIDE AND ALBUTEROL SULFATE 3 ML: 2.5; .5 SOLUTION RESPIRATORY (INHALATION) at 04:02

## 2022-01-01 RX ADMIN — CARVEDILOL 3.12 MG: 3.12 TABLET, FILM COATED ORAL at 07:31

## 2022-01-01 RX ADMIN — BENZONATATE 100 MG: 100 CAPSULE ORAL at 08:47

## 2022-01-01 RX ADMIN — CEFEPIME 2 G: 1 INJECTION, SOLUTION INTRAVENOUS at 17:12

## 2022-01-01 RX ADMIN — FUROSEMIDE 40 MG: 40 TABLET ORAL at 09:35

## 2022-01-01 RX ADMIN — TAZOBACTAM SODIUM AND PIPERACILLIN SODIUM 3.38 G: 375; 3 INJECTION, SOLUTION INTRAVENOUS at 03:05

## 2022-01-01 RX ADMIN — SENNOSIDES AND DOCUSATE SODIUM 1 TABLET: 50; 8.6 TABLET ORAL at 08:48

## 2022-01-01 RX ADMIN — LEVOTHYROXINE SODIUM 75 MCG: 75 TABLET ORAL at 06:10

## 2022-01-01 RX ADMIN — ATORVASTATIN CALCIUM 40 MG: 40 TABLET, FILM COATED ORAL at 22:32

## 2022-01-01 RX ADMIN — PRIMIDONE 250 MG: 250 TABLET ORAL at 09:44

## 2022-01-01 RX ADMIN — Medication 10 ML: at 11:05

## 2022-01-01 RX ADMIN — DIGOXIN 125 MCG: 125 TABLET ORAL at 12:57

## 2022-01-01 RX ADMIN — PROPOFOL 20 MG: 10 INJECTION, EMULSION INTRAVENOUS at 14:36

## 2022-01-01 RX ADMIN — METOCLOPRAMIDE HYDROCHLORIDE 5 MG: 5 INJECTION INTRAMUSCULAR; INTRAVENOUS at 06:35

## 2022-01-01 RX ADMIN — MIDAZOLAM HYDROCHLORIDE 1 MG: 1 INJECTION, SOLUTION INTRAMUSCULAR; INTRAVENOUS at 13:41

## 2022-01-01 RX ADMIN — METOCLOPRAMIDE HYDROCHLORIDE 5 MG: 5 INJECTION INTRAMUSCULAR; INTRAVENOUS at 17:26

## 2022-01-01 RX ADMIN — BENZONATATE 100 MG: 100 CAPSULE ORAL at 09:03

## 2022-01-01 RX ADMIN — SACUBITRIL AND VALSARTAN 1 TABLET: 24; 26 TABLET, FILM COATED ORAL at 21:49

## 2022-01-01 RX ADMIN — BUDESONIDE 0.5 MG: 0.5 SUSPENSION RESPIRATORY (INHALATION) at 09:50

## 2022-01-01 RX ADMIN — DEXAMETHASONE SODIUM PHOSPHATE 4 MG: 4 INJECTION, SOLUTION INTRA-ARTICULAR; INTRALESIONAL; INTRAMUSCULAR; INTRAVENOUS; SOFT TISSUE at 09:34

## 2022-01-01 RX ADMIN — PANTOPRAZOLE SODIUM 40 MG: 40 TABLET, DELAYED RELEASE ORAL at 07:31

## 2022-01-01 RX ADMIN — CARVEDILOL 3.12 MG: 3.12 TABLET, FILM COATED ORAL at 11:01

## 2022-01-01 RX ADMIN — BUDESONIDE 0.5 MG: 0.5 SUSPENSION RESPIRATORY (INHALATION) at 06:38

## 2022-01-01 RX ADMIN — FOLIC ACID 1000 MCG: 1 TABLET ORAL at 09:34

## 2022-01-01 RX ADMIN — AZITHROMYCIN MONOHYDRATE 250 MG: 250 TABLET ORAL at 08:29

## 2022-01-01 RX ADMIN — BENZONATATE 100 MG: 100 CAPSULE ORAL at 16:58

## 2022-01-01 RX ADMIN — DEXAMETHASONE 4 MG: 4 TABLET ORAL at 16:44

## 2022-01-01 RX ADMIN — SPIRONOLACTONE 25 MG: 25 TABLET ORAL at 08:07

## 2022-01-01 RX ADMIN — SPIRONOLACTONE 25 MG: 25 TABLET ORAL at 08:29

## 2022-01-01 RX ADMIN — SERTRALINE 25 MG: 25 TABLET, FILM COATED ORAL at 09:06

## 2022-01-01 RX ADMIN — FOSPHENYTOIN SODIUM 100 MG PE: 50 INJECTION INTRAMUSCULAR; INTRAVENOUS at 16:38

## 2022-01-01 RX ADMIN — DEXAMETHASONE SODIUM PHOSPHATE 4 MG: 4 INJECTION, SOLUTION INTRA-ARTICULAR; INTRALESIONAL; INTRAMUSCULAR; INTRAVENOUS; SOFT TISSUE at 22:23

## 2022-01-01 RX ADMIN — CHLORPROMAZINE HYDROCHLORIDE 25 MG: 25 TABLET, FILM COATED ORAL at 14:44

## 2022-01-01 RX ADMIN — DIGOXIN 125 MCG: 125 TABLET ORAL at 12:43

## 2022-01-01 RX ADMIN — HYDROXYCHLOROQUINE SULFATE 200 MG: 200 TABLET ORAL at 09:34

## 2022-01-01 RX ADMIN — SERTRALINE 25 MG: 25 TABLET, FILM COATED ORAL at 08:44

## 2022-01-01 RX ADMIN — IPRATROPIUM BROMIDE AND ALBUTEROL SULFATE 3 ML: 2.5; .5 SOLUTION RESPIRATORY (INHALATION) at 14:59

## 2022-01-01 RX ADMIN — Medication 10 ML: at 08:08

## 2022-01-01 RX ADMIN — MONTELUKAST 10 MG: 10 TABLET, FILM COATED ORAL at 09:01

## 2022-01-01 RX ADMIN — PROPOFOL 250 MCG/KG/MIN: 10 INJECTION, EMULSION INTRAVENOUS at 10:43

## 2022-01-01 RX ADMIN — FAMOTIDINE 20 MG: 10 INJECTION INTRAVENOUS at 08:56

## 2022-01-01 RX ADMIN — BUMETANIDE 0.5 MG: 0.5 TABLET ORAL at 08:36

## 2022-01-01 RX ADMIN — METOCLOPRAMIDE HYDROCHLORIDE 5 MG: 5 INJECTION INTRAMUSCULAR; INTRAVENOUS at 11:37

## 2022-01-01 RX ADMIN — IPRATROPIUM BROMIDE AND ALBUTEROL SULFATE 3 ML: 2.5; .5 SOLUTION RESPIRATORY (INHALATION) at 11:43

## 2022-01-01 RX ADMIN — TAZOBACTAM SODIUM AND PIPERACILLIN SODIUM 3.38 G: 375; 3 INJECTION, SOLUTION INTRAVENOUS at 13:10

## 2022-01-01 RX ADMIN — PANTOPRAZOLE SODIUM 40 MG: 40 TABLET, DELAYED RELEASE ORAL at 09:48

## 2022-01-01 RX ADMIN — LEVOTHYROXINE SODIUM 75 MCG: 75 TABLET ORAL at 09:03

## 2022-01-01 RX ADMIN — IPRATROPIUM BROMIDE AND ALBUTEROL SULFATE 3 ML: 2.5; .5 SOLUTION RESPIRATORY (INHALATION) at 11:37

## 2022-01-01 RX ADMIN — SODIUM CHLORIDE 500 ML: 9 INJECTION, SOLUTION INTRAVENOUS at 22:45

## 2022-01-01 RX ADMIN — ATORVASTATIN CALCIUM 40 MG: 40 TABLET, FILM COATED ORAL at 21:40

## 2022-01-01 RX ADMIN — BUDESONIDE 0.5 MG: 0.5 SUSPENSION RESPIRATORY (INHALATION) at 19:40

## 2022-01-01 RX ADMIN — FOLIC ACID 1000 MCG: 1 TABLET ORAL at 08:29

## 2022-01-01 RX ADMIN — FAMOTIDINE 20 MG: 10 INJECTION INTRAVENOUS at 11:58

## 2022-01-01 RX ADMIN — FOLIC ACID 1000 MCG: 1 TABLET ORAL at 09:44

## 2022-01-01 RX ADMIN — BUDESONIDE 0.5 MG: 0.5 SUSPENSION RESPIRATORY (INHALATION) at 08:08

## 2022-01-01 RX ADMIN — DIGOXIN 125 MCG: 125 TABLET ORAL at 12:25

## 2022-01-01 RX ADMIN — CETIRIZINE HYDROCHLORIDE 10 MG: 10 TABLET, FILM COATED ORAL at 11:58

## 2022-01-01 RX ADMIN — BENZONATATE 100 MG: 100 CAPSULE ORAL at 18:06

## 2022-01-01 RX ADMIN — POLYETHYLENE GLYCOL 3350 17 G: 17 POWDER, FOR SOLUTION ORAL at 09:04

## 2022-01-01 RX ADMIN — IPRATROPIUM BROMIDE AND ALBUTEROL SULFATE 3 ML: 2.5; .5 SOLUTION RESPIRATORY (INHALATION) at 06:35

## 2022-01-01 RX ADMIN — PRIMIDONE 250 MG: 250 TABLET ORAL at 21:22

## 2022-01-01 RX ADMIN — POTASSIUM CHLORIDE AND SODIUM CHLORIDE 65 ML/HR: 900; 150 INJECTION, SOLUTION INTRAVENOUS at 17:29

## 2022-01-01 RX ADMIN — BENZONATATE 100 MG: 100 CAPSULE ORAL at 08:29

## 2022-01-01 RX ADMIN — SERTRALINE 25 MG: 25 TABLET, FILM COATED ORAL at 09:34

## 2022-01-01 RX ADMIN — PANTOPRAZOLE SODIUM 40 MG: 40 TABLET, DELAYED RELEASE ORAL at 06:30

## 2022-01-01 RX ADMIN — SENNOSIDES AND DOCUSATE SODIUM 1 TABLET: 50; 8.6 TABLET ORAL at 20:48

## 2022-01-01 RX ADMIN — Medication 10 ML: at 08:51

## 2022-01-01 RX ADMIN — FUROSEMIDE 40 MG: 40 TABLET ORAL at 08:06

## 2022-01-01 RX ADMIN — IPRATROPIUM BROMIDE AND ALBUTEROL SULFATE 3 ML: 2.5; .5 SOLUTION RESPIRATORY (INHALATION) at 00:36

## 2022-01-01 RX ADMIN — SODIUM CHLORIDE, POTASSIUM CHLORIDE, SODIUM LACTATE AND CALCIUM CHLORIDE: 600; 310; 30; 20 INJECTION, SOLUTION INTRAVENOUS at 14:15

## 2022-01-01 RX ADMIN — PANTOPRAZOLE SODIUM 40 MG: 40 TABLET, DELAYED RELEASE ORAL at 17:12

## 2022-01-01 RX ADMIN — CARVEDILOL 3.12 MG: 3.12 TABLET, FILM COATED ORAL at 09:03

## 2022-01-01 RX ADMIN — INSULIN LISPRO 2 UNITS: 100 INJECTION, SOLUTION INTRAVENOUS; SUBCUTANEOUS at 13:41

## 2022-01-01 RX ADMIN — FAMOTIDINE 20 MG: 20 TABLET ORAL at 18:12

## 2022-01-01 RX ADMIN — SODIUM CHLORIDE, POTASSIUM CHLORIDE, SODIUM LACTATE AND CALCIUM CHLORIDE 50 ML/HR: 600; 310; 30; 20 INJECTION, SOLUTION INTRAVENOUS at 13:04

## 2022-01-01 RX ADMIN — CHLORPROMAZINE HYDROCHLORIDE 25 MG: 25 TABLET, FILM COATED ORAL at 09:44

## 2022-01-01 RX ADMIN — ZINC OXIDE 1 APPLICATION: 200 OINTMENT TOPICAL at 13:00

## 2022-01-01 RX ADMIN — SERTRALINE 25 MG: 25 TABLET, FILM COATED ORAL at 09:35

## 2022-01-01 RX ADMIN — DIGOXIN 125 MCG: 125 TABLET ORAL at 12:34

## 2022-01-01 RX ADMIN — DEXAMETHASONE 4 MG: 4 TABLET ORAL at 22:12

## 2022-01-01 RX ADMIN — FOLIC ACID 1000 MCG: 1 TABLET ORAL at 08:47

## 2022-01-01 RX ADMIN — PROPOFOL 30 MG: 10 INJECTION, EMULSION INTRAVENOUS at 11:23

## 2022-01-01 RX ADMIN — DEXAMETHASONE 4 MG: 4 TABLET ORAL at 08:36

## 2022-01-01 RX ADMIN — FOLIC ACID 1000 MCG: 1 TABLET ORAL at 09:48

## 2022-01-01 RX ADMIN — BUDESONIDE 0.5 MG: 0.5 SUSPENSION RESPIRATORY (INHALATION) at 08:44

## 2022-01-01 RX ADMIN — PROPOFOL 20 MG: 10 INJECTION, EMULSION INTRAVENOUS at 11:02

## 2022-01-01 RX ADMIN — SPIRONOLACTONE 25 MG: 25 TABLET ORAL at 08:44

## 2022-01-01 RX ADMIN — IPRATROPIUM BROMIDE AND ALBUTEROL SULFATE 3 ML: 2.5; .5 SOLUTION RESPIRATORY (INHALATION) at 18:38

## 2022-01-01 RX ADMIN — IPRATROPIUM BROMIDE AND ALBUTEROL SULFATE 3 ML: 2.5; .5 SOLUTION RESPIRATORY (INHALATION) at 12:39

## 2022-01-01 RX ADMIN — IPRATROPIUM BROMIDE AND ALBUTEROL SULFATE 3 ML: 2.5; .5 SOLUTION RESPIRATORY (INHALATION) at 06:29

## 2022-01-01 RX ADMIN — FOSPHENYTOIN SODIUM 100 MG PE: 50 INJECTION INTRAMUSCULAR; INTRAVENOUS at 15:36

## 2022-01-01 RX ADMIN — Medication 1250 MG: at 19:25

## 2022-01-01 RX ADMIN — CETIRIZINE HYDROCHLORIDE 10 MG: 10 TABLET, FILM COATED ORAL at 09:49

## 2022-01-01 RX ADMIN — FOLIC ACID 1000 MCG: 1 TABLET ORAL at 08:35

## 2022-01-01 RX ADMIN — IPRATROPIUM BROMIDE AND ALBUTEROL SULFATE 3 ML: 2.5; .5 SOLUTION RESPIRATORY (INHALATION) at 04:15

## 2022-01-01 RX ADMIN — TAZOBACTAM SODIUM AND PIPERACILLIN SODIUM 3.38 G: 375; 3 INJECTION, SOLUTION INTRAVENOUS at 09:48

## 2022-01-01 RX ADMIN — INSULIN DETEMIR 15 UNITS: 100 INJECTION, SOLUTION SUBCUTANEOUS at 20:36

## 2022-01-01 RX ADMIN — TAZOBACTAM SODIUM AND PIPERACILLIN SODIUM 3.38 G: 375; 3 INJECTION, SOLUTION INTRAVENOUS at 18:21

## 2022-01-01 RX ADMIN — IPRATROPIUM BROMIDE AND ALBUTEROL SULFATE 3 ML: 2.5; .5 SOLUTION RESPIRATORY (INHALATION) at 11:48

## 2022-01-01 RX ADMIN — HYDROXYCHLOROQUINE SULFATE 200 MG: 200 TABLET ORAL at 08:46

## 2022-01-01 RX ADMIN — LEVOTHYROXINE SODIUM 75 MCG: 75 TABLET ORAL at 09:44

## 2022-01-01 RX ADMIN — AZITHROMYCIN MONOHYDRATE 250 MG: 250 TABLET ORAL at 08:46

## 2022-01-01 RX ADMIN — LEVETIRACETAM 500 MG: 500 TABLET, FILM COATED ORAL at 08:29

## 2022-01-01 RX ADMIN — DIGOXIN 500 MCG: 0.25 INJECTION INTRAMUSCULAR; INTRAVENOUS at 12:25

## 2022-01-01 RX ADMIN — PROPOFOL 20 MG: 10 INJECTION, EMULSION INTRAVENOUS at 10:55

## 2022-01-01 RX ADMIN — IPRATROPIUM BROMIDE AND ALBUTEROL SULFATE 3 ML: 2.5; .5 SOLUTION RESPIRATORY (INHALATION) at 19:15

## 2022-01-01 RX ADMIN — LEVOTHYROXINE SODIUM 75 MCG: 75 TABLET ORAL at 06:43

## 2022-01-01 RX ADMIN — METOCLOPRAMIDE HYDROCHLORIDE 5 MG: 5 INJECTION INTRAMUSCULAR; INTRAVENOUS at 08:54

## 2022-01-01 RX ADMIN — FAMOTIDINE 20 MG: 10 INJECTION INTRAVENOUS at 08:54

## 2022-01-01 RX ADMIN — FAMOTIDINE 20 MG: 10 INJECTION INTRAVENOUS at 20:35

## 2022-02-25 PROBLEM — G93.89 BRAIN MASS: Status: ACTIVE | Noted: 2022-01-01

## 2022-02-28 PROBLEM — R91.8 LUNG MASS: Status: ACTIVE | Noted: 2022-01-01

## 2022-02-28 NOTE — ANESTHESIA PREPROCEDURE EVALUATION
Anesthesia Evaluation     Patient summary reviewed and Nursing notes reviewed   no history of anesthetic complications:  NPO Solid Status: > 8 hours  NPO Liquid Status: > 2 hours           Airway   Mallampati: II  TM distance: >3 FB  Neck ROM: full  No difficulty expected  Dental    (+) upper dentures and lower dentures    Pulmonary - normal exam    breath sounds clear to auscultation  (+) pleural effusion, lung cancer, COPD, home oxygen, sleep apnea,   Cardiovascular - normal exam  Exercise tolerance: poor (<4 METS)    Rhythm: regular  Rate: normal    (+) pacemaker, past MI , CAD, CABG, CHF ,     ROS comment:  2/27 EF improved to 35%    Neuro/Psych  (+) seizures,      ROS Comment: Brain mets  GI/Hepatic/Renal/Endo    (+)  GERD,  renal disease, diabetes mellitus, thyroid problem hypothyroidism    Musculoskeletal (-) negative ROS    Abdominal    Substance History - negative use     OB/GYN          Other      history of cancer (Lung CA with brain mets) active                    Anesthesia Plan    ASA 4     general   total IV anesthesia    Anesthetic plan, all risks, benefits, and alternatives have been provided, discussed and informed consent has been obtained with: patient.        CODE STATUS:    Level Of Support Discussed With: Patient  Code Status (Patient has no pulse and is not breathing): CPR (Attempt to Resuscitate)  Medical Interventions (Patient has pulse or is breathing): Full Support

## 2022-02-28 NOTE — ANESTHESIA POSTPROCEDURE EVALUATION
Patient: Cedric Wade    Procedure Summary     Date: 02/28/22 Room / Location: [facility] ENDOSCOPY 3 / [facility] END[facility]COPY    Anesthesia Start: 1036 Anesthesia Stop: 1155    Procedure: BRONCHOSCOPY WITH ENDOBRONCHIAL ULTRASOUND, BAL, WASHINGS, BRUSHINGS, NEEDLE ASPIRATIONS, BIOPSIES (N/A Bronchus) Diagnosis:       Lung mass      (Lung mass [R91.8])    Surgeons: Ankur Alcantara MD Provider: Florentino Luke MD    Anesthesia Type: general ASA Status: 4          Anesthesia Type: general    Vitals  Vitals Value Taken Time   /86 02/28/22 1211   Temp 36.1 °C (97 °F) 02/28/22 1151   Pulse 99 02/28/22 1211   Resp 17 02/28/22 1211   SpO2 97 % 02/28/22 1211           Post Anesthesia Care and Evaluation    Patient location during evaluation: bedside  Patient participation: complete - patient participated  Level of consciousness: awake and alert  Pain management: adequate  Airway patency: patent  Anesthetic complications: No anesthetic complications  PONV Status: none  Cardiovascular status: acceptable  Respiratory status: acceptable  Hydration status: acceptable    Comments: An Anesthesiologist personally participated in the most demanding procedures (including induction and emergence if applicable) in the anesthesia plan, monitored the course of anesthesia administration at frequent intervals and remained physically present and available for immediate diagnosis and treatment of emergencies.

## 2022-03-01 PROBLEM — I48.91 ATRIAL FIBRILLATION WITH RVR: Status: ACTIVE | Noted: 2021-01-01

## 2022-03-03 NOTE — TELEPHONE ENCOUNTER
Patient has been in the Hospital since 02/25/2022 and is currently having rapid Ventricular Response.

## 2022-03-05 NOTE — OUTREACH NOTE
Prep Survey    Flowsheet Row Responses   [facility] patient discharged from? [facility]   Is LACE score < 7 ? No   Emergency Room discharge w/ pulse ox? No   Eligibility [facility]   Date of Admission 02/25/22   Date of Discharge 03/05/22   Discharge Disposition Home or Self Care   Discharge diagnosis Metastatic brain lesions,   adenocarcinoma most likely lung   Does the patient have one of the following disease processes/diagnoses(primary or secondary)? Other   Does the patient have Home health ordered? Yes   What is the Home health agency?  [facility]   Is there a DME ordered? Yes   What DME was ordered? [employer] - [employer]   Prep survey completed? Yes          LYDAI BISHOP - Registered Nurse

## 2022-03-06 PROBLEM — S32.9XXA CLOSED NONDISPLACED FRACTURE OF PELVIS: Status: ACTIVE | Noted: 2022-01-01

## 2022-03-06 NOTE — ED TRIAGE NOTES
Pt was recently seen for similar issues 1 week ago per ER. Pt has Stage 4 lung cancer. Pt reports he was discharged from [facility] yesterday.

## 2022-03-06 NOTE — ED PROVIDER NOTES
Time: 5:06 PM EST  Arrived by: ambulance  Chief Complaint: Fall  History provided by: Pt  History is limited by: N/A     History of Present Illness:  Patient is a 71 y.o. male that presents to the emergency department with Fall. He reports that he injured his left hip due to this fall. He denies any back pain at this time. He reports that the fall occurred because he tried to change direction while he was walking and lost his balance.  He denies feeling any numbness tingling, chest pain, or SOA before the fall. Pt has a history of stage 4 lung cancer and is currently receiving radiation treatment.       History provided by:  Patient  Fall  Mechanism of injury: fall    Injury location:  Pelvis  Pelvic injury location:  L hip  Time since incident:  2 hours  Arrived directly from scene: yes    Fall:     Fall occurred:  Walking    Entrapped after fall: yes    Suspicion of alcohol use: no    Suspicion of drug use: no    Prior to arrival data:     Bystander interventions:  Splinting    Amnesic to event: no      Airway interventions:  None  Associated symptoms: no abdominal pain, no chest pain, no headaches, no nausea, no seizures and no vomiting    Hip Pain  Associated symptoms: no abdominal pain, no chest pain, no congestion, no cough, no diarrhea, no fever, no headaches, no myalgias, no nausea, no rhinorrhea, no shortness of breath, no sore throat and no vomiting        Similar Symptoms Previously: Fell 1 week ago  Recently seen: Seen last week for fall      Patient Care Team  Primary Care Provider: Ean Farmer MD    Past Medical History:     Allergies   Allergen Reactions   • Amiodarone Unknown - High Severity   • Lisinopril Swelling   • Codeine Nausea And Vomiting     Past Medical History:   Diagnosis Date   • Abnormal ECG    • Abscess    • Arrhythmia    • Arthritis    • Atrial fibrillation (HCC)    • CAD with history of CABG 9/6/2019    Added automatically from request for surgery [id number]   • Cancer (HCC)      skin   • CHF (congestive heart failure) ([facility])    • COPD (chronic obstructive pulmonary disease) ([facility])    • Coronary artery disease    • Cutaneous lupus erythematosus    • Diabetes mellitus ([facility])    • Elevated cholesterol    • GERD (gastroesophageal reflux disease)    • Heart valve disease    • Hypertension    • Hypothyroidism, unspecified 7/30/2021   • Myocardial infarction ([facility])    • Other hyperlipidemia 9/8/2019   • Paroxysmal atrial fibrillation ([facility]) 7/19/2021   • Sleep apnea      Past Surgical History:   Procedure Laterality Date   • BRONCHOSCOPY N/A 2/28/2022    Procedure: BRONCHOSCOPY WITH ENDOBRONCHIAL ULTRASOUND, BAL, WASHINGS, BRUSHINGS, NEEDLE ASPIRATIONS, BIOPSIES;  Surgeon: Ankur Alcantara MD;  Location: [facility] ENDOSCOPY;  Service: Pulmonary;  Laterality: N/A;  LEFT HILAR MASS   • CARDIAC CATHETERIZATION     • CARDIAC DEFIBRILLATOR PLACEMENT     • CARDIAC DEFIBRILLATOR PLACEMENT     • COLONOSCOPY     • CORONARY ARTERY BYPASS GRAFT N/A 9/9/2019    Procedure: INTRAOPERATIVE BUDDY, MIDLINE STENOTOMY WITH CORONARY ARTERY BYPASS GRAPHS X  3 UTILIZING LEFT SAVAGE  AND LEFT ENDOSCOPIC HARVESTED SAPHENOUS VEIN, LIGATION OF LEFT  ATRIAL APPENDAGE; PRP;  Surgeon: Alvaro Mazariegos MD;  Location: [facility] OR;  Service: Cardiothoracic   • LIPOMA EXCISION     • NECK SURGERY     • OTHER SURGICAL HISTORY      heart valve repair   not sure which valve   • PACEMAKER IMPLANTATION       Family History   Problem Relation Age of Onset   • Lung cancer Father    • Breast cancer Father    • Diabetes Sister    • Heart disease Brother    • Breast cancer Other    • Heart disease Other    • Diabetes Brother    • Heart attack Brother        Home Medications:  Prior to Admission medications    Medication Sig Start Date End Date Taking? Authorizing Provider   albuterol sulfate  (90 Base) MCG/ACT inhaler Inhale 2 puffs Every 4 (Four) Hours As Needed for Wheezing. 3/5/22   Ean Farmer MD   aspirin 81 MG chewable  tablet Chew 81 mg Daily.    Rajwinder Tyler MD   atorvastatin (LIPITOR) 40 MG tablet Take 40 mg by mouth Every Night.    Rajwinder Tyler MD   Azelastine-Fluticasone (Dymista) 137-50 MCG/ACT suspension into the nostril(s) as directed by provider 2 (two) times a day.    Rajwinder Tyler MD   azithromycin (ZITHROMAX) 250 MG tablet TAKE 1 TABLET BY MOUTH EVERY DAY 2/23/22   Ean Farmer MD   benzonatate (TESSALON) 100 MG capsule TAKE 1 CAPSULE THREE TIMES A DAY 8/3/21   Ankur Alcantara MD   budesonide (PULMICORT) 0.5 MG/2ML nebulizer solution INHALE 2 ML BY NEBULIZATION TWICE A DAY  Patient taking differently: Take 0.5 mg by nebulization 2 (Two) Times a Day. 1/21/22   Ean Farmer MD   bumetanide (BUMEX) 0.5 MG tablet Take 0.5 mg by mouth Daily.    Rajwinder Tyler MD   carvedilol (Coreg) 3.125 MG tablet Take 1 tablet by mouth 2 (Two) Times a Day With Meals. 3/5/22   Ean Farmer MD   carvedilol (COREG) 3.125 MG tablet take 1 tablet by mouth twice a day 3/5/22      cetirizine (zyrTEC) 10 MG tablet Take 10 mg by mouth Daily.    Rajwinder Tyler MD   chlorproMAZINE (THORAZINE) 25 MG tablet Take 1 tablet by mouth 3 (Three) Times a Day As Needed for Nausea. 3/5/22   Ean Farmer MD   chlorproMAZINE (THORAZINE) 25 MG tablet take 1 tablet by three times a day as needed for hiccups 3/5/22      dexamethasone (DECADRON) 4 MG tablet Take 1 tablet by mouth 2 (Two) Times a Day With Meals. 3/5/22   Ean Farmer MD   dexamethasone (DECADRON) 4 MG tablet take 1 tablet by mouth twice a day 3/5/22      digoxin (Lanoxin) 125 MCG tablet Take 1 tablet by mouth Daily. 3/5/22   Ean Farmer MD   digoxin (LANOXIN) 125 MCG tablet take 1 tablet by mouth once daily 3/5/22      famotidine (PEPCID) 20 MG tablet Take 20 mg by mouth 2 (Two) Times a Day.    Provider, MD Rajwinder   folic acid (FOLVITE) 1 MG tablet TAKE 1 TABLET DAILY 7/29/21   Darian  Ean COSTA MD   furosemide (LASIX) 40 MG tablet Take 40 mg by mouth Daily.    Rajwinder Tyler MD   glipizide (GLUCOTROL) 5 MG tablet TAKE 1 TABLET DAILY 1/10/22   Ean Farmer MD   hydroxychloroquine (Plaquenil) 200 MG tablet Take 200 mg by mouth Daily.    Rajwinder Tyler MD   ipratropium-albuterol (DUO-NEB) 0.5-2.5 mg/3 ml nebulizer Take 3 mL by nebulization Every 6 (Six) Hours As Needed for Wheezing or Shortness of Air for up to 90 days. 11/11/21 2/9/22  Cassidy Peñaloza APRN   levETIRAcetam (Keppra) 500 MG tablet Take 1 tablet by mouth 2 (Two) Times a Day. 3/5/22   Ean Farmer MD   levETIRAcetam (KEPPRA) 500 MG tablet take 1 tablet by mouth twice a day 3/5/22      levothyroxine (SYNTHROID, LEVOTHROID) 75 MCG tablet TAKE 1 TABLET ONCE DAILY 11/15/21   Ean Farmer MD   montelukast (SINGULAIR) 10 MG tablet TAKE 1 TABLET ONCE DAILY 11/15/21   Ean Farmer MD   pantoprazole (PROTONIX) 40 MG EC tablet Take 40 mg by mouth Daily.    Rajwinder Tyler MD   primidone (MYSOLINE) 250 MG tablet TAKE 1 TABLET TWICE A DAY 11/5/21   Ean Farmer MD   ProAir  (90 Base) MCG/ACT inhaler USE 2 INHALATIONS EVERY 4 HOURS AS NEEDED 1/28/22   Ean Farmer MD   sertraline (ZOLOFT) 25 MG tablet TAKE 1 TABLET DAILY 10/15/21   Ean Farmer MD   spironolactone (ALDACTONE) 25 MG tablet Take 25 mg by mouth Daily.    Rajwinder Tyler MD   tiotropium bromide-olodaterol (Stiolto Respimat) 2.5-2.5 MCG/ACT aerosol solution inhaler Inhale 2 puffs Daily for 90 days. 11/1/21 1/30/22  Cassidy Peñaloza APRN   vitamin D (ERGOCALCIFEROL) 1.25 MG (51847 UT) capsule capsule TAKE 1 CAPSULE ONCE WEEKLY 7/27/21   Ean Farmer MD        Social History:   Social History     Tobacco Use   • Smoking status: Current Every Day Smoker     Packs/day: 0.25     Years: 58.00     Pack years: 14.50     Types: Cigarettes     Start date: 1961     Last  "attempt to quit: 2019     Years since quittin.4   • Smokeless tobacco: Never Used   • Tobacco comment: 3 cigg per day    Vaping Use   • Vaping Use: Never used   Substance Use Topics   • Alcohol use: No   • Drug use: Never     Recent travel: no     Review of Systems:  Review of Systems   Constitutional: Negative for chills and fever.   HENT: Negative for congestion, rhinorrhea and sore throat.    Eyes: Negative for pain and visual disturbance.   Respiratory: Negative for apnea, cough, chest tightness and shortness of breath.    Cardiovascular: Negative for chest pain and palpitations.   Gastrointestinal: Negative for abdominal pain, diarrhea, nausea and vomiting.   Genitourinary: Negative for difficulty urinating and dysuria.   Musculoskeletal: Positive for arthralgias. Negative for joint swelling and myalgias.   Skin: Negative for color change.   Neurological: Negative for seizures and headaches.   Psychiatric/Behavioral: Negative.    All other systems reviewed and are negative.       Physical Exam:  /81   Pulse 81   Temp 98.8 °F (37.1 °C) (Oral)   Resp 16   Ht 170.2 cm (67\")   Wt 66.9 kg (147 lb 7.8 oz)   SpO2 96%   BMI 23.10 kg/m²     Physical Exam  Vitals and nursing note reviewed.   Constitutional:       General: He is not in acute distress.     Appearance: Normal appearance. He is not toxic-appearing.   HENT:      Head: Normocephalic and atraumatic.      Jaw: There is normal jaw occlusion.   Eyes:      General: Lids are normal.      Extraocular Movements: Extraocular movements intact.      Conjunctiva/sclera: Conjunctivae normal.      Pupils: Pupils are equal, round, and reactive to light.   Cardiovascular:      Rate and Rhythm: Normal rate and regular rhythm.      Pulses: Normal pulses.      Heart sounds: Normal heart sounds.   Pulmonary:      Effort: Pulmonary effort is normal. No respiratory distress.      Breath sounds: Normal breath sounds. No wheezing or rhonchi.   Abdominal:      " General: Abdomen is flat.      Palpations: Abdomen is soft.      Tenderness: There is no abdominal tenderness. There is no guarding or rebound.   Musculoskeletal:         General: Normal range of motion.      Cervical back: Normal range of motion and neck supple.      Right lower leg: No edema.      Left lower leg: No edema.      Comments: Left hip tenderness   Skin:     General: Skin is warm and dry.   Neurological:      Mental Status: He is alert and oriented to person, place, and time. Mental status is at baseline.   Psychiatric:         Mood and Affect: Mood normal.                Medications in the Emergency Department:  Medications   morphine injection 4 mg (4 mg Intravenous Given 3/6/22 1719)   HYDROmorphone (DILAUDID) injection 1 mg (1 mg Intravenous Given 3/6/22 2038)        Labs  Lab Results (last 24 hours)     Procedure Component Value Units Date/Time    Digoxin Level [[id number]]  (Normal) Collected: 03/06/22 1719    Specimen: Blood Updated: 03/06/22 1817     Digoxin 1.11 ng/mL     CBC & Differential [[id number]]  (Abnormal) Collected: 03/06/22 2038    Specimen: Blood Updated: 03/06/22 2051    Narrative:      The following orders were created for panel order CBC & Differential.  Procedure                               Abnormality         Status                     ---------                               -----------         ------                     CBC Auto Differential[[id number]]        Abnormal            Final result                 Please view results for these tests on the individual orders.    CBC Auto Differential [[id number]]  (Abnormal) Collected: 03/06/22 2038    Specimen: Blood Updated: 03/06/22 2051     WBC 13.71 10*3/mm3      RBC 3.67 10*6/mm3      Hemoglobin 12.2 g/dL      Hematocrit 36.9 %      .5 fL      MCH 33.2 pg      MCHC 33.1 g/dL      RDW 13.1 %      RDW-SD 48.3 fl      MPV 9.7 fL      Platelets 272 10*3/mm3      Neutrophil % 76.3 %      Lymphocyte % 12.7 %      Monocyte % 6.9  %      Eosinophil % 2.0 %      Basophil % 0.3 %      Immature Grans % 1.8 %      Neutrophils, Absolute 10.46 10*3/mm3      Lymphocytes, Absolute 1.74 10*3/mm3      Monocytes, Absolute 0.94 10*3/mm3      Eosinophils, Absolute 0.28 10*3/mm3      Basophils, Absolute 0.04 10*3/mm3      Immature Grans, Absolute 0.25 10*3/mm3      nRBC 0.0 /100 WBC     Comprehensive Metabolic Panel [[id number]]  (Abnormal) Collected: 03/06/22 2040    Specimen: Blood Updated: 03/06/22 2108     Glucose 89 mg/dL      BUN 21 mg/dL      Creatinine 0.97 mg/dL      Sodium 134 mmol/L      Potassium 4.5 mmol/L      Comment: Slight hemolysis detected by analyzer. Results may be affected.        Chloride 99 mmol/L      CO2 22.7 mmol/L      Calcium 8.6 mg/dL      Total Protein 6.6 g/dL      Albumin 3.40 g/dL      ALT (SGPT) 32 U/L      AST (SGOT) 37 U/L      Comment: Slight hemolysis detected by analyzer. Results may be affected.        Alkaline Phosphatase 105 U/L      Total Bilirubin 0.3 mg/dL      Globulin 3.2 gm/dL      A/G Ratio 1.1 g/dL      BUN/Creatinine Ratio 21.6     Anion Gap 12.3 mmol/L      eGFR 82.9 mL/min/1.73      Comment: National Kidney Foundation and American Society of Nephrology (ASN) Task Force recommended calculation based on the Chronic Kidney Disease Epidemiology Collaboration (CKD-EPI) equation refit without adjustment for race.       Narrative:      GFR Normal >60  Chronic Kidney Disease <60  Kidney Failure <15             Imaging:  XR Hip With or Without Pelvis 2 - 3 View Left    Result Date: 3/6/2022  PROCEDURE: XR HIP W OR WO PELVIS 2-3 VIEW LEFT  COMPARISON: [facility], CT, CT ABDOMEN PELVIS W CONTRAST, 2/26/2022, 14:55.  [facility], CR, LEFT HIP/PELVIS, 8/16/2018, 10:20.  INDICATIONS: fall injury to L hip  FINDINGS:  Diffuse osteopenia.  Cortical irregularity through the left superior and inferior pubic rami, which could represent nondisplaced fractures.  Mild bilateral hip joint DJD.  Pubic  symphysis is unremarkable.  The SI joint spaces are fairly well maintained.  Partially imaged lumbosacral spondylosis.  Mild vascular calcifications.        1. Diffuse osteopenia with questionable nondisplaced fractures of the left superior and inferior pubic rami. 2. Mild bilateral hip joint DJD.      MISAEL OCHOA MD       Electronically Signed and Approved By: MISAEL OCHOA MD on 3/06/2022 at 18:10               Procedures:  Procedures    Progress                            Medical Decision Making:  MDM  Number of Diagnoses or Management Options  Closed nondisplaced fracture of pelvis, unspecified part of pelvis, initial encounter (HCC)  Diagnosis management comments: The patient´s CBC was reviewed and shows no abnormalities of critical concern. Of note, there is no anemia requiring a blood transfusion and the platelet count is acceptable.  The patient´s CMP was reviewed and shows no abnormalities of critical concern. Of note, the patient´s sodium and potassium are acceptable. The patient´s liver enzymes are unremarkable. The patient´s renal function (creatinine) is preserved. The patient has a normal anion gap.  CT scan is consistent with a inferior and superior pelvic rami fracture.  I did attempt to walk the patient with a walker.  He states that he has too much pain and is unstable.  Case was discussed with Dr. Farmer who agrees with admission.       Amount and/or Complexity of Data Reviewed  Clinical lab tests: reviewed  Tests in the radiology section of CPT®: reviewed  Discussion of test results with the performing providers: yes  Discuss the patient with other providers: yes    Risk of Complications, Morbidity, and/or Mortality  Presenting problems: moderate  Management options: moderate    Patient Progress  Patient progress: stable       Final diagnoses:   Closed nondisplaced fracture of pelvis, unspecified part of pelvis, initial encounter (HCC)        Disposition:  ED Disposition     ED Disposition    Decision to Admit    Condition   --    Comment   Level of Care: Med/Surg [1]   Diagnosis: Closed nondisplaced fracture of pelvis, unspecified part of pelvis, initial encounter ([facility]) [[id number]]   Admitting Physician: ANJEL CUMMINGS [[id number]]   Certification: I Certify That Inpatient Hospital Services Are Medically Necessary For Greater Than 2 Midnights               Documentation assistance provided by Elijah Olivares acting as scribe for Dr. Pat Fu. Information recorded by the scribe was done at my direction and has been verified and validated by me.        Elijah Olivares  03/06/22 [id number]       Pat Fu MD  03/06/22 5180

## 2022-03-07 NOTE — PLAN OF CARE
Problem: Adult Inpatient Plan of Care  Goal: Plan of Care Review  Outcome: Ongoing, Progressing  Flowsheets (Taken 3/7/2022 1526)  Plan of Care Reviewed With: patient     Problem: Fall Injury Risk  Goal: Absence of Fall and Fall-Related Injury  Outcome: Ongoing, Progressing  Intervention: Identify and Manage Contributors  Recent Flowsheet Documentation  Taken 3/7/2022 0751 by Renetta Watson RN  Medication Review/Management: medications reviewed  Intervention: Promote Injury-Free Environment  Recent Flowsheet Documentation  Taken 3/7/2022 0751 by Renetta Watson RN  Safety Promotion/Fall Prevention:   assistive device/personal items within reach   clutter free environment maintained   fall prevention program maintained   gait belt   nonskid shoes/slippers when out of bed   safety round/check completed     Problem: Skin Injury Risk Increased  Goal: Skin Health and Integrity  Outcome: Ongoing, Progressing  Intervention: Optimize Skin Protection  Recent Flowsheet Documentation  Taken 3/7/2022 0751 by Renetta Watson RN  Head of Bed (HOB) Positioning: HOB elevated   Goal Outcome Evaluation:  Plan of Care Reviewed With: patient

## 2022-03-07 NOTE — H&P
[facility]   HISTORY AND PHYSICAL    Patient Name: Cedric Wade  : 1950  MRN: [id number]  Primary Care Physician: Ean Farmer MD  Date of admission: 3/6/2022      Subjective   Subjective     Chief Complaint/HPI: Fall at home,    Cedric Wade is a 71 y.o. male who just discharged from the hospital yesterday morning went home and fell last night came back to the emergency room with hip pain and pelvic pain and was found to have pelvic fractures he is being admitted for pain control and possible rehab, his daughter is at bedside and I had discussion with her and the patient about plan of care over the next 4 to 6 weeks due to the prolonged healing nature of this type of injury and the need for rehab possibly nursing home placement, I reviewed his labs from last night nothing essentially that remarkable except his white count was back up from the day before at 13.7 hemoglobin 12. 2    Review of Systems   All systems were reviewed and negative except for: Pelvic pain, gait dysfunction, no fevers no nausea or vomiting, no focal weakness      Personal History     Past Medical History:   Diagnosis Date   • Abnormal ECG    • Abscess    • Arrhythmia    • Arthritis    • Atrial fibrillation (HCC)    • CAD with history of CABG 2019    Added automatically from request for surgery [id number]   • Cancer (HCC)     skin   • CHF (congestive heart failure) (HCC)    • COPD (chronic obstructive pulmonary disease) (HCC)    • Coronary artery disease    • Cutaneous lupus erythematosus    • Diabetes mellitus (HCC)    • Elevated cholesterol    • GERD (gastroesophageal reflux disease)    • Heart valve disease    • Hypertension    • Hypothyroidism, unspecified 2021   • Myocardial infarction (HCC)    • Other hyperlipidemia 2019   • Paroxysmal atrial fibrillation (HCC) 2021   • Sleep apnea        Past Surgical History:   Procedure Laterality Date   • BRONCHOSCOPY N/A 2022     Procedure: BRONCHOSCOPY WITH ENDOBRONCHIAL ULTRASOUND, BAL, WASHINGS, BRUSHINGS, NEEDLE ASPIRATIONS, BIOPSIES;  Surgeon: Ankur Alcantara MD;  Location:  [facility];  Service: Pulmonary;  Laterality: N/A;  LEFT HILAR MASS   • CARDIAC CATHETERIZATION     • CARDIAC DEFIBRILLATOR PLACEMENT     • CARDIAC DEFIBRILLATOR PLACEMENT     • COLONOSCOPY     • CORONARY ARTERY BYPASS GRAFT N/A 9/9/2019    Procedure: INTRAOPERATIVE BUDDY, MIDLINE STENOTOMY WITH CORONARY ARTERY BYPASS GRAPHS X  3 UTILIZING LEFT SAVAGE  AND LEFT ENDOSCOPIC HARVESTED SAPHENOUS VEIN, LIGATION OF LEFT  ATRIAL APPENDAGE; PRP;  Surgeon: Alvaro Mazariegos MD;  Location: [facility] OR;  Service: Cardiothoracic   • LIPOMA EXCISION     • NECK SURGERY     • OTHER SURGICAL HISTORY      heart valve repair   not sure which valve   • PACEMAKER IMPLANTATION         Family History: family history includes Breast cancer in his father and another family member; Diabetes in his brother and sister; Heart attack in his brother; Heart disease in his brother and another family member; Lung cancer in his father. Otherwise pertinent FHx was reviewed and not pertinent to current issue.    Social History:  reports that he has been smoking cigarettes. He started smoking about 61 years ago. He has a 14.50 pack-year smoking history. He has never used smokeless tobacco. He reports that he does not drink alcohol and does not use drugs.    Home Medications:  Azelastine-Fluticasone, albuterol sulfate HFA, aspirin, atorvastatin, azithromycin, benzonatate, budesonide, carvedilol, cetirizine, chlorproMAZINE, dexamethasone, digoxin, folic acid, furosemide, glipizide, hydroxychloroquine, ipratropium-albuterol, levETIRAcetam, levothyroxine, montelukast, pantoprazole, primidone, sertraline, spironolactone, tiotropium bromide-olodaterol, and vitamin D      Allergies:  Allergies   Allergen Reactions   • Amiodarone Unknown - High Severity   • Lisinopril Swelling   • Codeine Nausea And  Vomiting       Objective   Objective     Vitals:  Temp:  [97.7 °F (36.5 °C)-98.8 °F (37.1 °C)] 97.7 °F (36.5 °C)  Heart Rate:  [62-81] 72  Resp:  [16-20] 18  BP: (107-128)/(70-95) 113/73  Flow (L/min):  [2-3] 3    Physical Exam   Lungs show diminished breath sounds a few coarse wheezes at the left side laterally, cardiac exam regular rhythm distant heart tones displaced PMI neck is supple throat is clear sclera nonicteric he is alert and oriented x3 his abdomen soft slight protuberant nontender his lower legs showed no edema cool and dry to the lower feet and ankle areas, he can move his feet and ankles but he has pain trying to move his hips,    Result Review    Result Review:  I have personally reviewed the results from the time of this admission to 03/07/22 7:56 AM EST and agree with these findings:  [x]  Laboratory  []  Microbiology  [x]  Radiology  []  EKG/Telemetry   []  Cardiology/Vascular   []  Pathology  [x]  Old records  []  Other:      Assessment/Plan   Assessment / Plan     Brief Patient Summary:  Cedric Wade is a 71 y.o. 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 patient will need to follow-up with radiation oncology, and restart radiation therapies today as an inpatient, he was set up as an outpatient to do today, continue steroids at 4 mg twice a day,  Congestive heart failure, severe systolic heart failure with defibrillator backup pacemaker in place  Anxiety  Essential tremor,  Hyperlipidemia continues on moderate dose statin therapy,  Hypothyroid continue  levothyroxine  Rheumatoid arthritis  Type 2 diabetes  Vitamin D deficiency  Seizure type disorder currently on Keppra due to the brain tumors,    Plan rehab evaluations, restart oncology radiation oncology services with radiation to the brain, continue home medications, follow labs periodically through his hospital course, pain control will be necessary and DVT prophylaxis will be necessary     Prior admission and discharge summary from March 6, 2022:  Hiccups, now with nausea vomiting,  continue Thorazine March 2, 2022, Reglan and Phenergan added March 3, some improvement overall, continue GI prophylaxis also with IV Pepcid,, improved, will send home with some as needed Thorazine, he already has GI prophylactic medications at home     Metastatic brain lesions.,  [facility] biopsy shows adenocarcinoma most likely lung, for whole brain radiation starting March 2, discussed with pulmonology and oncology March 2, also discussed with patient wife at bedside today,, and patient March 3 ------CT scan abdomen pelvis chest reviewed, appreciate Dr. Larry Bliss for second opinion, changed to oral steroids, oral medications March 1 and 2, continues  neurochecks, seizure precautions etc., I am holding his anticoagulation due to the brain lesions and potential need for biopsy in the near future,and GI prophylaxis will be instituted---> cardiology feels we could restart his anticoagulation at this point, may need to dose adjust slightly for bleeding risks etc.,, cardiology's notes state holding Eliquis is okay for now and just continuing aspirin which we will do at time of discharge     Partial seizure.  Patient seizure-free thus far in the hospital, he is maintained on seizure medication, on iv Keppra as of March 1, no seizure activity reported overnight, transition to oral meds once he is tolerating fluids and intake better, and will transition to oral medicines at time of discharge today March 5     Elevated blood sugars not  unexpected with steroids, will add Levemir plus sliding scale insulin coverage, improved, March 4     Hyponatremia --trend, March 2, fluctuating but stable March 4, and fifth, at 133     A. fib with rapid ventricular response developing after coughing spell morning of March 1,, back in sinus rhythm now over the past couple days,--- since March 1 through March 2, 3,, fourth 2022,, ----, was given Cardizem, dig, IV Lopressor yesterday, morning March 1,, with additional doses by cardiology, did not tolerate Cardizem due to blood pressure issues, not able to take amiodarone due to previous reaction, has now converted, holding diuretics March 1 and 2,----------- cardiology was consulted, --------------------in the emergency room, pacemaker was interrogated and felt to be stable at the current rate, backup of 40,, his heart rate was lower in the emergency room into the 40s and 50s at times, appears to be sinus on EKG, initially.  ----Appreciate cardiology's input,---- echo February 27, 2022 reviewed with moderate global hypokinesis ejection fraction 35% diastolic dysfunction present with mild mitral regurgitation,, will restart carvedilol 3.125 twice a day at time of discharge to help with rate control and afterload reduction in congestive heart failure     Chronic systolic heart failure.  Patient's BNP was only 1.4K on admit which appears to be his baseline.  BNP was 20K back in 2019 when he first presented prior to AICD placement.  His EF was 20% then, his last echo was in 5/21 and EF improved to 30%.  Current echo as above, will continue on Aldactone and diuretics.  He had angioedema to ACE inhibitors, not sure about ARB's.  Unclear whether patient would benefit from low-dose carvedilol i.e. 3.125 twice a day or low-dose Toprol     COPD exacerbation.    Will continue duo nebs, Singulair,, steroids, Pulmicort nebs, but stop Brovana nebs, due to A. fib March 1, patient has been on continuous Zithromax for several months,  will stop this also due to nausea for now, could restart as an outpatient we will send in a new prescription for nebulizer machine and a refill of his ProAir inhaler that he is requesting at time of discharge     Tremulousness.  Discussed this patient this is related to the steroids on top of the bronchodilators.  Ativan written for 2/27.,  Stable,, improved March 2,, and fourth     Essential tremors continues on primidone,, currently worse due to steroids etc. breathing treatments etc.     Anxiety depression continues on Zoloft, overall clinically better still has some emotional episodes when talking with family members etc.,     Vitamin D deficiency continues replacement     Hypothyroid--we will restart home medication at time of discharge today March 5, did receive IV levothyroxine IV for now at lower doses until oral intake better, due to nausea and vomiting over the past couple days,     Rheumatoid arthritis has been on Plaquenil will continue low-dose 200 mg daily     DVT prophylaxis.  Via mechanical means for now pending surgical intervention.     PUD prophylaxis.  Via p.o. H2 blocker.,  Patient states GI reflux is worse, will transition to proton pump inhibitor twice a day March 1, 2022, adding Thorazine for hiccups March 2,           CODE STATUS:    There are no questions and answers to display.         Electronically signed by Ean Farmer MD, 03/07/22, 7:56 AM EST.

## 2022-03-07 NOTE — PLAN OF CARE
Goal Outcome Evaluation:  Plan of Care Reviewed With: patient, daughter        Progress: no change  Outcome Evaluation: Pt was a new admit this shift. No c/o pain overnight. Scheduled for radiotx in the morning. Will continue to monitor.

## 2022-03-08 NOTE — PROGRESS NOTES
[facility]   Progress Note    Patient Name: Cedric Wade  : 1950  MRN: [id number]  Primary Care Physician: Ean Farmer MD  Date of admission: 3/6/2022    Subjective   Subjective   HPI: Pelvic fracture, hip pain pelvic pain, patient continues to demand stable, daughter had concerns about stool softeners and telemetry monitoring, discussed with patient today about telemetry and restrictions it will place on them to some degree with his movements etc., he seems to be doing well clinically, still having pain in his pelvic area        Review of Systems   All systems were reviewed and negative except for: Decreased movement of the lower legs with continued pain, no nausea or vomiting, some constipation issues, immobilized,      Objective   Objective     Vitals:  Patient Vitals for the past 24 hrs:   BP Temp Temp src Pulse Resp SpO2   22 0735 -- -- -- 67 20 99 %   22 0731 -- -- -- 65 20 97 %   22 0700 127/75 97.5 °F (36.4 °C) -- 59 18 98 %   22 0400 122/79 97.1 °F (36.2 °C) Oral 68 16 96 %   22 0343 -- -- -- 70 16 93 %   22 0101 -- -- -- 62 18 97 %   22 2300 130/72 98.1 °F (36.7 °C) Oral 62 18 97 %   22 2100 136/76 98.1 °F (36.7 °C) Oral 63 18 96 %   22 1944 -- -- -- 55 18 97 %   22 1940 -- -- -- 53 18 98 %   22 1552 -- -- -- 65 18 96 %   22 1438 102/70 99.9 °F (37.7 °C) Oral 71 18 97 %   22 1141 -- -- -- 69 18 97 %      Physical Exam   Lower extremities no edema warm and dry abdomen soft nontender lung fields are clear anteriorly with diminished breath sounds a few coarse wheezes are noted on the left lateral, cardiac exam reveals a regular rhythm he is alert and oriented x3 skin is warm and dry sitting up in the bed talkative moving his upper and lower extremities mainly his feet to command but he is not able to lift his legs off the end of the bed with hip flexion bilaterally,      Result Review    Result  Review:  I have personally reviewed the results from the time of this admission to 03/08/22 7:58 AM EST and agree with these findings:  [x]  Laboratory  []  Microbiology  []  Radiology  []  EKG/Telemetry   []  Cardiology/Vascular   []  Pathology  []  Old records  []  Other:      Active Hospital Meds:  Scheduled Meds:atorvastatin, 40 mg, Oral, Nightly  azithromycin, 250 mg, Oral, Daily  benzonatate, 100 mg, Oral, TID  budesonide, 0.5 mg, Nebulization, BID - RT  carvedilol, 3.125 mg, Oral, BID With Meals  cetirizine, 10 mg, Oral, Daily  dexamethasone, 4 mg, Oral, Q12H  digoxin, 125 mcg, Oral, Daily  enoxaparin, 40 mg, Subcutaneous, Q24H  folic acid, 1,000 mcg, Oral, Daily  furosemide, 40 mg, Oral, Daily  hydroxychloroquine, 200 mg, Oral, Daily  insulin detemir, 8 Units, Subcutaneous, Nightly  insulin lispro, 0-7 Units, Subcutaneous, TID AC  ipratropium-albuterol, 3 mL, Nebulization, Q4H - RT  levETIRAcetam, 500 mg, Oral, BID  levothyroxine, 75 mcg, Oral, QAM  montelukast, 10 mg, Oral, Daily  pantoprazole, 40 mg, Oral, QAM  primidone, 250 mg, Oral, BID  sertraline, 25 mg, Oral, Daily  sodium chloride, 10 mL, Intravenous, Q12H  spironolactone, 25 mg, Oral, Daily  vitamin D, 50,000 Units, Oral, Weekly      Continuous Infusions:   PRN Meds:.•  acetaminophen **OR** acetaminophen **OR** acetaminophen  •  dextrose  •  dextrose  •  glucagon (human recombinant)  •  HYDROcodone-acetaminophen  •  Morphine  •  ondansetron  •  sodium chloride    Assessment/Plan   Assessment / Plan     Active Hospital Problems:  Active Hospital Problems    Diagnosis    • Closed nondisplaced fracture of pelvis (HCC)        Assessment/Plan:     Nondisplaced inferior and superior left pubic rami fractures, March 6, 2022  Fall continue therapy evaluations, pain control, social service consult  Gait dysfunction  Hyponatremia, worsening, will fluid restrict today, BR NP levels are low,  Underlying COPD  Elevated liver enzymes will need to trend new  finding March 8  Constipation issues we will add bowel regimen today March 8,  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, discussed with radiation oncology, continuing radiation therapy daily while in the hospital for whole brain radiation, will continue steroids at 4 mg twice a day,  Congestive heart failure, severe systolic heart failure with defibrillator backup pacemaker in place, continue Aldactone, Lasix, carvedilol  Anxiety, controlled not an is currently,  Essential tremor, continue primidone  Hyperlipidemia continues on moderate dose statin therapy,  Hypothyroid continue levothyroxine  Rheumatoid arthritis  Type 2 diabetes, continue sliding scale plus low-dose Levemir blood sugars are fairly well controlled currently,  Vitamin D deficiency  Seizure type disorder currently on Keppra due to the brain tumors,       Prior admission and discharge summary from March 6, 2022:  Hiccups, now with nausea vomiting,  continue Thorazine March 2, 2022, Reglan and Phenergan added March 3, some improvement overall, continue GI prophylaxis also with IV Pepcid,, improved, will send home with some as needed Thorazine, he already has GI prophylactic medications at home     Metastatic brain lesions.,  [facility] biopsy shows adenocarcinoma most likely lung, for whole brain radiation starting March 2, discussed with pulmonology and oncology March 2, also discussed with patient wife at bedside today,, and patient March 3 ------CT scan abdomen pelvis chest reviewed, appreciate Dr. Larry Bliss for second opinion, changed to oral steroids, oral medications March 1 and 2, continues  neurochecks, seizure precautions etc., I am holding his anticoagulation due to the brain lesions and potential need for biopsy in the near future,and GI prophylaxis will be instituted---> cardiology feels we could restart his anticoagulation at this point, may need to dose adjust slightly for bleeding risks etc.,, cardiology's notes  state holding Eliquis is okay for now and just continuing aspirin which we will do at time of discharge     Partial seizure.  Patient seizure-free thus far in the hospital, he is maintained on seizure medication, on iv Keppra as of March 1, no seizure activity reported overnight, transition to oral meds once he is tolerating fluids and intake better, and will transition to oral medicines at time of discharge today March 5     Elevated blood sugars not unexpected with steroids, will add Levemir plus sliding scale insulin coverage, improved, March 4     Hyponatremia --trend, March 2, fluctuating but stable March 4, and fifth, at 133     A. fib with rapid ventricular response developing after coughing spell morning of March 1,, back in sinus rhythm now over the past couple days,--- since March 1 through March 2, 3,, fourth 2022,, ----, was given Cardizem, dig, IV Lopressor yesterday, morning March 1,, with additional doses by cardiology, did not tolerate Cardizem due to blood pressure issues, not able to take amiodarone due to previous reaction, has now converted, holding diuretics March 1 and 2,----------- cardiology was consulted, --------------------in the emergency room, pacemaker was interrogated and felt to be stable at the current rate, backup of 40,, his heart rate was lower in the emergency room into the 40s and 50s at times, appears to be sinus on EKG, initially.  ----Appreciate cardiology's input,---- echo February 27, 2022 reviewed with moderate global hypokinesis ejection fraction 35% diastolic dysfunction present with mild mitral regurgitation,, will restart carvedilol 3.125 twice a day at time of discharge to help with rate control and afterload reduction in congestive heart failure     Chronic systolic heart failure.  Patient's BNP was only 1.4K on admit which appears to be his baseline.  BNP was 20K back in 2019 when he first presented prior to AICD placement.  His EF was 20% then, his last echo was in  5/21 and EF improved to 30%.  Current echo as above, will continue on Aldactone and diuretics.  He had angioedema to ACE inhibitors, not sure about ARB's.  Unclear whether patient would benefit from low-dose carvedilol i.e. 3.125 twice a day or low-dose Toprol     COPD exacerbation.    Will continue duo nebs, Singulair,, steroids, Pulmicort nebs, but stop Brovana nebs, due to A. fib March 1, patient has been on continuous Zithromax for several months, will stop this also due to nausea for now, could restart as an outpatient we will send in a new prescription for nebulizer machine and a refill of his ProAir inhaler that he is requesting at time of discharge     Tremulousness.  Discussed this patient this is related to the steroids on top of the bronchodilators.  Ativan written for 2/27.,  Stable,, improved March 2,, and fourth     Essential tremors continues on primidone,, currently worse due to steroids etc. breathing treatments etc.     Anxiety depression continues on Zoloft, overall clinically better still has some emotional episodes when talking with family members etc.,     Vitamin D deficiency continues replacement     Hypothyroid--we will restart home medication at time of discharge today March 5, did receive IV levothyroxine IV for now at lower doses until oral intake better, due to nausea and vomiting over the past couple days,     Rheumatoid arthritis has been on Plaquenil will continue low-dose 200 mg daily     DVT prophylaxis.  Via mechanical means for now pending surgical intervention.     PUD prophylaxis.  Via p.o. H2 blocker.,  Patient states GI reflux is worse, will transition to proton pump inhibitor twice a day March 1, 2022, adding Thorazine for hiccups March 2,            CODE STATUS:    Code Status and Medical Interventions:   Ordered at: 03/07/22 0810     Level Of Support Discussed With:    Patient     Code Status (Patient has no pulse and is not breathing):    CPR (Attempt to Resuscitate)      Medical Interventions (Patient has pulse or is breathing):    Full Support       Electronically signed by Ean Farmer MD, 03/08/22, 7:58 AM EST.

## 2022-03-08 NOTE — PLAN OF CARE
Goal Outcome Evaluation:      Pain well controlled today with Norco . Patient went to radiation today. Family at bedside and participating in care. Patient urinating in urinal. Fluid restriction maintained of 1000ml per shift. Patient ambulated to the chair with one assist. No other complaints at this time will continue to monitor.

## 2022-03-08 NOTE — PLAN OF CARE
Goal Outcome Evaluation:  Plan of Care Reviewed With: patient, spouse        Progress: no change (initial evaluation encounter)  Outcome Evaluation: Patient has experienced decline in function from baseline status, presenting w/ deficits related to UE strength, activity tolerance, safety awareness, ADL transfers, functional mobility and BADL management. Patient would benefit from skilled OT intervention to maxamize patient safety, prevent further debility and promote return to optimal level of independence.

## 2022-03-08 NOTE — PROGRESS NOTES
On Treatment Visit       Patient: Cedric Wade   YOB: 1950   Medical Record Number: [id number]     Date of Visit  March 8, 2022   Primary Diagnosis:[unfilled]  Cancer Staging: Cancer Staging  No matching staging information was found for the patient.       was seen today for an on treatment visit.  He is receiving radiation therapy to the whole brain. He  has received 1500 cGy in 5 fractions out of a planned dose of 3000 cGy in 10 fractions.    Improving left-sided weakness; no headaches.  No complaints outside of fractured pelvis.                                          Review of Systems:   Review of Systems    Vitals:     Vitals:    03/08/22 0735   BP:    Pulse: 67   Resp: 20   Temp:    SpO2: 99%       Weight:   Wt Readings from Last 3 Encounters:   03/06/22 69.1 kg (152 lb 5.4 oz)   02/25/22 62.7 kg (138 lb 3.7 oz)   12/27/21 64.4 kg (142 lb)      Pain:  There were no vitals filed for this visit.      Physical Exam:  Gen: WD/WN; NAD; thin elderly  HEENT: MMM  Trachea: midline  Chest: symmetric  Resp: normal respiratory effort  Extr: warm, well-perfused; ecchymoses  Neuro: awake and alert; no aphasia or neglect; difficulty hearing      Plan: I have reviewed treatment setup notes, checked and approved the daily guidance images.  I reviewed dose delivery, treatment parameters and deemed them appropriate. We plan to continue radiation therapy as prescribed.          Radiation Oncology    Electronically signed by Matt Sarkar MD 3/6/2022  09:53 EST

## 2022-03-08 NOTE — PLAN OF CARE
Goal Outcome Evaluation:         Pt presents with decreased strength, transfer and ambulation. Skilled PT services will be required to address these mobility deficits.  Recommend inpatient rehab placement after discharge from the hospital

## 2022-03-08 NOTE — THERAPY EVALUATION
Acute Care - Physical Therapy Initial Evaluation  PETTY Renteria     Patient Name: Cedric Wade  : 1950  MRN: [id number]  Today's Date: 3/8/2022     Admit date: 3/6/2022     Referring Physician: Ean Farmer, *     Surgery Date:* No surgery found *               Visit Dx:     ICD-10-CM ICD-9-CM   1. Closed nondisplaced fracture of pelvis, unspecified part of pelvis, initial encounter ([facility])  S32.9XXA 808.8   2. Difficulty in walking  R26.2 719.7     Patient Active Problem List   Diagnosis   • CAD with history of CABG   • COPD (chronic obstructive pulmonary disease) with chronic bronchitis ([facility])   • Microalbuminuria   • Type 2 diabetes mellitus with microalbuminuria ([facility])   • Other hyperlipidemia   • Essential hypertension   • H/O agent Orange exposure   • Ischemic cardiomyopathy   • Atrial fibrillation with RVR ([facility])   • Acute exacerbation of chronic obstructive pulmonary disease ([facility])   • Chronic obstructive pulmonary disease ([facility])   • Arthritis   • Polyarthritis   • Chronic systolic (congestive) heart failure ([facility])   • Diabetic renal disease ([facility])   • Essential tremor   • Heart failure ([facility])   • Acquired hypothyroidism   • S/P cervical spinal fusion   • Screening PSA (prostate specific antigen)   • Allergic rhinitis   • Seasonal allergies   • Tobacco abuse   • Nocturnal hypoxia   • Pleural effusion   • Depression   • Lupus ([facility])   • Anxiety, generalized   • Brain mass   • Lung mass   • Closed nondisplaced fracture of pelvis ([facility])     Past Medical History:   Diagnosis Date   • Abnormal ECG    • Abscess    • Arrhythmia    • Arthritis    • Atrial fibrillation ([facility])    • CAD with history of CABG 2019    Added automatically from request for surgery [id number]   • Cancer ([facility])     skin   • CHF (congestive heart failure) ([facility])    • COPD (chronic obstructive pulmonary disease) ([facility])    • Coronary artery disease    • Cutaneous lupus erythematosus    • Diabetes mellitus ([facility])    • Elevated cholesterol     • GERD (gastroesophageal reflux disease)    • Heart valve disease    • Hypertension    • Hypothyroidism, unspecified 7/30/2021   • Myocardial infarction (H[facility])    • Other hyperlipidemia 9/8/2019   • Paroxysmal atrial fibrillation (HCC) 7/19/2021   • Sleep apnea      Past Surgical History:   Procedure Laterality Date   • BRONCHOSCOPY N/A 2/28/2022    Procedure: BRONCHOSCOPY WITH ENDOBRONCHIAL ULTRASOUND, BAL, WASHINGS, BRUSHINGS, NEEDLE ASPIRATIONS, BIOPSIES;  Surgeon: Ankur Alcantara MD;  Location: [facility] ENDOSCOPY;  Service: Pulmonary;  Laterality: N/A;  LEFT HILAR MASS   • CARDIAC CATHETERIZATION     • CARDIAC DEFIBRILLATOR PLACEMENT     • CARDIAC DEFIBRILLATOR PLACEMENT     • COLONOSCOPY     • CORONARY ARTERY BYPASS GRAFT N/A 9/9/2019    Procedure: INTRAOPERATIVE BUDDY, MIDLINE STENOTOMY WITH CORONARY ARTERY BYPASS GRAPHS X  3 UTILIZING LEFT SAVAGE  AND LEFT ENDOSCOPIC HARVESTED SAPHENOUS VEIN, LIGATION OF LEFT  ATRIAL APPENDAGE; PRP;  Surgeon: Alvaro Mazariegos MD;  Location: [facility];  Service: Cardiothoracic   • LIPOMA EXCISION     • NECK SURGERY     • OTHER SURGICAL HISTORY      heart valve repair   not sure which valve   • PACEMAKER IMPLANTATION       PT Assessment (last 12 hours)     PT Evaluation and Treatment     Row Name 03/08/22 0800          Physical Therapy Time and Intention    Subjective Information complains of;pain  -MARTHA     Document Type evaluation  -MARTHA     Mode of Treatment individual therapy;physical therapy  -MARTHA     Patient Effort good  -MARTHA     Row Name 03/08/22 0800          General Information    Patient Observations alert;cooperative;agree to therapy  -MARTHA     Prior Level of Function independent:;all household mobility;community mobility  -MARTHA     Equipment Currently Used at Home none  -MARTHA     Existing Precautions/Restrictions fall;weight bearing  -MARTHA     Barriers to Rehab none identified  -MARTHA     Row Name 03/08/22 0800          Living Environment    Current Living Arrangements home  -MARTHA      People in Home spouse  -MARTHA     Row Name 03/08/22 0800          Pain    Pretreatment Pain Rating 8/10  -[facility] Name 03/08/22 0800          Cognition    Affect/Mental Status (Cognition) WFL  -[facility] Name 03/08/22 0800          Range of Motion (ROM)    Range of Motion ROM is [facility]  -[facility] Name 03/08/22 0800          Strength (Manual Muscle Testing)    Strength (Manual Muscle Testing) left lower extremity  hip flexion 2/5; knee extension 3/5  -[facility] Name 03/08/22 0800          Mobility    Extremity Weight-bearing Status left lower extremity  -MARTHA     Left Lower Extremity (Weight-bearing Status) weight-bearing as tolerated (WBAT)  -MARTHA     [facility] Name 03/08/22 0800          Bed Mobility    Bed Mobility bed mobility (all) activities;supine-sit  -MARTHA     All Activities, [place] (Bed Mobility) contact guard  -MARTHA     Supine-Sit [place] (Bed Mobility) contact guard  -MARTHA     [facility] Name 03/08/22 0800          Transfers    Transfers bed-chair transfer;sit-stand transfer  -MARTHA     Bed-Chair [place] (Transfers) contact guard  -MARTHA     Assistive Device (Bed-Chair Transfers) walker, front-wheeled  -MARTHA     Sit-Stand [place] (Transfers) minimum assist (75% patient effort)  -MARTHA     [facility] Name 03/08/22 0800          Sit-Stand Transfer    Assistive Device (Sit-Stand Transfers) walker, front-wheeled  -MARTHA     [facility] Name 03/08/22 0800          Gait/Stairs (Locomotion)    Gait/Stairs Locomotion gait/ambulation assistive device  -MARTHA     [place] Level (Gait) contact guard  -MARTHA     Assistive Device (Gait) walker, front-wheeled  -MARTHA     Distance in Feet (Gait) 3  -MARTHA     Row Name 03/08/22 0800          Safety Issues, Functional Mobility    Impairments Affecting Function (Mobility) balance;pain;strength  -MARTHA     [facility] Name 03/08/22 0800          Therapy Assessment/Plan (PT)    Patient/Family Therapy Goals Statement (PT) Pt wants to have less pain  -MARTHA     Rehab Potential (PT) good, to achieve stated therapy goals  -MARTHA      Criteria for Skilled Interventions Met (PT) skilled treatment is necessary  -MARTAH     Predicted Duration of Therapy Intervention (PT) 10 days  -MARTHA     Problem List (PT) problems related to;balance;mobility;strength;pain  -MARTHA     Activity Limitations Related to Problem List (PT) unable to transfer safely;unable to ambulate safely  -MARTHA     Row Name 03/08/22 0800          PT Evaluation Complexity    History, PT Evaluation Complexity no personal factors and/or comorbidities  -MARTHA     Examination of Body Systems (PT Eval Complexity) total of 4 or more elements  -MARTHA     Clinical Presentation (PT Evaluation Complexity) stable  -MARTHA     Clinical Decision Making (PT Evaluation Complexity) low complexity  -MARTHA     Overall Complexity (PT Evaluation Complexity) low complexity  -MARTHA     Row Name 03/08/22 0800          Physical Therapy Goals    Transfer Goal Selection (PT) transfer, PT goal 1  -MARTHA     Gait Training Goal Selection (PT) gait training, PT goal 1  -MARTHA     Row Name 03/08/22 0800          Transfer Goal 1 (PT)    Activity/Assistive Device (Transfer Goal 1, PT) transfers, all;walker, rolling  -MARTHA     [place] Level/Cues Needed (Transfer Goal 1, PT) independent  -MARTHA     Time Frame (Transfer Goal 1, PT) long term goal (LTG);10 days  -MARTHA     Row Name 03/08/22 0800          Gait Training Goal 1 (PT)    Activity/Assistive Device (Gait Training Goal 1, PT) gait (walking locomotion);walker, rolling  -MARTHA     [place] Level (Gait Training Goal 1, PT) independent  -MARTHA     Distance (Gait Training Goal 1, PT) 150  -MARTHA     Time Frame (Gait Training Goal 1, PT) long term goal (LTG);10 days  -MARTHA           User Key  (r) = Recorded By, (t) = Taken By, (c) = Cosigned By    Initials Name Provider Type    MARTHA Marciano Thayer, PT Physical Therapist                Physical Therapy Education                 Title: PT OT SLP Therapies (Done)     Topic: Physical Therapy (Done)     Point: Mobility training (Done)     Learning Progress  Summary           Patient Acceptance, E,TB, VU by MARTHA at 3/8/2022 0903                   Point: Precautions (Done)     Learning Progress Summary           Patient Acceptance, E,TB, VU by MARTHA at 3/8/2022 0903                               User Key     Initials Effective Dates Name Provider Type Discipline    MARTHA 06/03/21 -  Marciano Thayer PT Physical Therapist PT              PT Recommendation and Plan  Anticipated Discharge Disposition (PT): inpatient rehabilitation facility  Planned Therapy Interventions (PT): balance training, bed mobility training, gait training, home exercise program, strengthening, transfer training  Therapy Frequency (PT): daily      Outcome Measures     Row Name 03/08/22 0900             How much help from another person do you currently need...    Turning from your back to your side while in flat bed without using bedrails? 3  -MARTHA      Moving from lying on back to sitting on the side of a flat bed without bedrails? 3  -MARTHA      Moving to and from a bed to a chair (including a wheelchair)? 3  -MARTHA      Standing up from a chair using your arms (e.g., wheelchair, bedside chair)? 3  -MARTHA      Climbing 3-5 steps with a railing? 1  -MARTHA      To walk in hospital room? 2  -MARTHA      AM-PAC 6 Clicks Score (PT) 15  -MARTHA              Functional Assessment    Outcome Measure Options AM-PAC 6 Clicks Basic Mobility (PT)  -MARTHA            User Key  (r) = Recorded By, (t) = Taken By, (c) = Cosigned By    Initials Name Provider Type    Marciano Barrera PT Physical Therapist                 Time Calculation:    PT Charges     Row Name 03/08/22 0857             Time Calculation    PT Received On 03/08/22  -MARTHA      PT Goal Re-Cert Due Date 03/17/22  -MARTHA              Untimed Charges    PT Eval/Re-eval Minutes 16  -MARTHA              Total Minutes    Untimed Charges Total Minutes 16  -MARTHA       Total Minutes 16  -MARTHA            User Key  (r) = Recorded By, (t) = Taken By, (c) = Cosigned By    Initials Name Provider Type     MARTHAMarciano Byrd, PT Physical Therapist              Therapy Charges for Today     Code Description Service Date Service Provider Modifiers Qty    [id number] HC PT EVAL LOW COMPLEXITY 2 3/8/2022 Marciano Thayer, PT GP 1          PT G-Codes  Outcome Measure Options: AM-PAC 6 Clicks Basic Mobility (PT)  AM-PAC 6 Clicks Score (PT): 15    Marciano Thayer, PT  3/8/2022

## 2022-03-08 NOTE — THERAPY EVALUATION
Patient Name: Cedric Wade  : 1950    MRN: [id number]                              Today's Date: 3/8/2022       Admit Date: 3/6/2022    Visit Dx:     ICD-10-CM ICD-9-CM   1. Closed nondisplaced fracture of pelvis, unspecified part of pelvis, initial encounter ([facility])  S32.9XXA 808.8   2. Difficulty in walking  R26.2 719.7   3. Decreased activities of daily living (ADL)  Z78.9 V49.89     Patient Active Problem List   Diagnosis   • CAD with history of CABG   • COPD (chronic obstructive pulmonary disease) with chronic bronchitis ([facility])   • Microalbuminuria   • Type 2 diabetes mellitus with microalbuminuria ([facility])   • Other hyperlipidemia   • Essential hypertension   • H/O agent Orange exposure   • Ischemic cardiomyopathy   • Atrial fibrillation with RVR ([facility])   • Acute exacerbation of chronic obstructive pulmonary disease ([facility])   • Chronic obstructive pulmonary disease ([facility])   • Arthritis   • Polyarthritis   • Chronic systolic (congestive) heart failure ([facility])   • Diabetic renal disease ([facility])   • Essential tremor   • Heart failure ([facility])   • Acquired hypothyroidism   • S/P cervical spinal fusion   • Screening PSA (prostate specific antigen)   • Allergic rhinitis   • Seasonal allergies   • Tobacco abuse   • Nocturnal hypoxia   • Pleural effusion   • Depression   • Lupus ([facility])   • Anxiety, generalized   • Brain mass   • Lung mass   • Closed nondisplaced fracture of pelvis ([facility])     Past Medical History:   Diagnosis Date   • Abnormal ECG    • Abscess    • Arrhythmia    • Arthritis    • Atrial fibrillation ([facility])    • CAD with history of CABG 2019    Added automatically from request for surgery [id number]   • Cancer ([facility])     skin   • CHF (congestive heart failure) ([facility])    • COPD (chronic obstructive pulmonary disease) ([facility])    • Coronary artery disease    • Cutaneous lupus erythematosus    • Diabetes mellitus ([facility])    • Elevated cholesterol    • GERD (gastroesophageal reflux disease)    • Heart valve disease     • Hypertension    • Hypothyroidism, unspecified 7/30/2021   • Myocardial infarction (HCC)    • Other hyperlipidemia 9/8/2019   • Paroxysmal atrial fibrillation (HCC) 7/19/2021   • Sleep apnea      Past Surgical History:   Procedure Laterality Date   • BRONCHOSCOPY N/A 2/28/2022    Procedure: BRONCHOSCOPY WITH ENDOBRONCHIAL ULTRASOUND, BAL, WASHINGS, BRUSHINGS, NEEDLE ASPIRATIONS, BIOPSIES;  Surgeon: Ankur Alcantara MD;  Location:  [facility];  Service: Pulmonary;  Laterality: N/A;  LEFT HILAR MASS   • CARDIAC CATHETERIZATION     • CARDIAC DEFIBRILLATOR PLACEMENT     • CARDIAC DEFIBRILLATOR PLACEMENT     • COLONOSCOPY     • CORONARY ARTERY BYPASS GRAFT N/A 9/9/2019    Procedure: INTRAOPERATIVE BUDDY, MIDLINE STENOTOMY WITH CORONARY ARTERY BYPASS GRAPHS X  3 UTILIZING LEFT SAVAGE  AND LEFT ENDOSCOPIC HARVESTED SAPHENOUS VEIN, LIGATION OF LEFT  ATRIAL APPENDAGE; PRP;  Surgeon: Alvaro Mazariegos MD;  Location: [facility];  Service: Cardiothoracic   • LIPOMA EXCISION     • NECK SURGERY     • OTHER SURGICAL HISTORY      heart valve repair   not sure which valve   • PACEMAKER IMPLANTATION        General Information     Row Name 03/08/22 1303          OT Time and Intention    Document Type evaluation  -ES     Mode of Treatment individual therapy;occupational therapy  -ES     Row Name 03/08/22 1306          General Information    Patient Profile Reviewed yes  -ES     Prior Level of Function independent:  Patient independent with B and IADLs. No device for mobility, has RWX. Walk in shower w/ chair: standing shower tasks. No home O2 use. Declines falls in last 3 months.  -ES     Existing Precautions/Restrictions fall;weight bearing  -ES     Row Name 03/08/22 1300          Occupational Profile    Reason for Services/Referral (Occupational Profile) Patient is 71 yr old male admitted to [facility] on 3/6/2022 after mechanical fall at home resulting in nondisplaced left inferior/superior pubic rami fx. Patient  WBAT LLE. OT evaluation and treatment ordered d/t recent decline in ADLs/transfer ability. No previous OT services for current condition.  -ES     Row Name 03/08/22 1303          Living Environment    People in Home spouse  -ES     Row Name 03/08/22 1303          Home Main Entrance    Number of Stairs, Main Entrance none  ramp to main entrance  -ES     Row Name 03/08/22 1303          Stairs Within Home, Primary    Number of Stairs, Within Home, Primary none  -ES     Row Name 03/08/22 1303          Cognition    Orientation Status (Cognition) oriented x 3  -ES     Row Name 03/08/22 1303          Safety Issues, Functional Mobility    Impairments Affecting Function (Mobility) balance;pain;endurance/activity tolerance;strength  -ES           User Key  (r) = Recorded By, (t) = Taken By, (c) = Cosigned By    Initials Name Provider Type    ES Trish Reyes, OT Occupational Therapist                 Mobility/ADL's     Row Name 03/08/22 1308          Bed Mobility    Bed Mobility supine-sit;sit-supine  -ES     Supine-Sit [place] (Bed Mobility) minimum assist (75% patient effort);1 person assist  -ES     Sit-Supine [place] (Bed Mobility) minimum assist (75% patient effort);1 person assist  -ES     Bed Mobility, Safety Issues decreased use of legs for bridging/pushing  -ES     Assistive Device (Bed Mobility) bed rails  -ES     Comment, (Bed Mobility) Patient declines further mobility secondary to complaints of 8/10 pain  -ES     Row Name 03/08/22 1308          Activities of Daily Living    BADL Assessment/Intervention bathing;upper body dressing;lower body dressing;grooming;feeding;toileting  -ES     Row Name 03/08/22 1308          Mobility    Extremity Weight-bearing Status left lower extremity  -ES     Left Lower Extremity (Weight-bearing Status) weight-bearing as tolerated (WBAT)  -ES     Row Name 03/08/22 1308          Bathing Assessment/Intervention    [place] Level (Bathing) bathing skills;moderate assist  (50% patient effort)  -ES     Row Name 03/08/22 1308          Upper Body Dressing Assessment/Training    [place] Level (Upper Body Dressing) upper body dressing skills;set up  -ES     Row Name 03/08/22 1308          Lower Body Dressing Assessment/Training    [place] Level (Lower Body Dressing) lower body dressing skills;moderate assist (50% patient effort)  -ES     Row Name 03/08/22 1308          Grooming Assessment/Training    [place] Level (Grooming) grooming skills;set up  -ES     Row Name 03/08/22 1308          Self-Feeding Assessment/Training    [place] Level (Feeding) feeding skills;set up  -ES     Row Name 03/08/22 1308          Toileting Assessment/Training    [place] Level (Toileting) toileting skills;moderate assist (50% patient effort)  -ES           User Key  (r) = Recorded By, (t) = Taken By, (c) = Cosigned By    Initials Name Provider Type    ES Trish Reyes OT Occupational Therapist               Obj/Interventions     Row Name 03/08/22 1312          Sensory Assessment (Somatosensory)    Sensory Assessment (Somatosensory) UE sensation intact  -ES     Row Name 03/08/22 1312          Vision Assessment/Intervention    Visual Impairment/Limitations WFL;corrective lenses full-time  -ES     Row Name 03/08/22 1312          Range of Motion Comprehensive    General Range of Motion no range of motion deficits identified;bilateral upper extremity ROM WNL  -ES     Row Name 03/08/22 1312          Strength Comprehensive (MMT)    General Manual Muscle Testing (MMT) Assessment upper extremity strength deficits identified  -ES     Comment, General Manual Muscle Testing (MMT) Assessment BUEs assessed grossly 4-/5  -ES     Row Name 03/08/22 1312          Motor Skills    Motor Skills coordination;functional endurance  -ES     Coordination WFL;upper extremity  -ES     Functional Endurance fair minus for ADL engagement  -ES     Row Name 03/08/22 1312          Balance    Balance Assessment sitting  dynamic balance  -ES     Dynamic Sitting Balance supervision  -ES     Position, Sitting Balance unsupported;sitting edge of bed  -ES           User Key  (r) = Recorded By, (t) = Taken By, (c) = Cosigned By    Initials Name Provider Type    Trish Jang, OT Occupational Therapist               Goals/Plan     Row Name 03/08/22 1317          Bed Mobility Goal 1 (OT)    Activity/Assistive Device (Bed Mobility Goal 1, OT) bed mobility activities, all  -ES     [place] Level/Cues Needed (Bed Mobility Goal 1, OT) modified independence  -ES     Time Frame (Bed Mobility Goal 1, OT) long term goal (LTG);10 days  -ES     Row Name 03/08/22 1317          Transfer Goal 1 (OT)    Activity/Assistive Device (Transfer Goal 1, OT) transfers, all;walker, rolling  -ES     [place] Level/Cues Needed (Transfer Goal 1, OT) modified independence  -ES     Time Frame (Transfer Goal 1, OT) long term goal (LTG);10 days  -ES     Row Name 03/08/22 1317          Bathing Goal 1 (OT)    Activity/Device (Bathing Goal 1, OT) bathing skills, all;shower chair  -ES     [place] Level/Cues Needed (Bathing Goal 1, OT) modified independence  -ES     Time Frame (Bathing Goal 1, OT) long term goal (LTG);10 days  -ES     Row Name 03/08/22 1317          Dressing Goal 1 (OT)    Activity/Device (Dressing Goal 1, OT) dressing skills, all  -ES     [place]/Cues Needed (Dressing Goal 1, OT) modified independence  -ES     Time Frame (Dressing Goal 1, OT) long term goal (LTG);10 days  -ES     Row Name 03/08/22 1317          Toileting Goal 1 (OT)    Activity/Device (Toileting Goal 1, OT) toileting skills, all;commode chair  -ES     [place] Level/Cues Needed (Toileting Goal 1, OT) modified independence  -ES     Time Frame (Toileting Goal 1, OT) long term goal (LTG);10 days  -ES     Row Name 03/08/22 1317          Grooming Goal 1 (OT)    Activity/Device (Grooming Goal 1, OT) grooming skills, all  -ES     [place] (Grooming Goal 1, OT)  modified independence  -ES     Time Frame (Grooming Goal 1, OT) long term goal (LTG);10 days  -ES     Row Name 03/08/22 1317          Self-Feeding Goal 1 (OT)    Activity/Device (Self-Feeding Goal 1, OT) self-feeding skills, all  -ES     [place] Level/Cues Needed (Self-Feeding Goal 1, OT) independent  -ES     Time Frame (Self-Feeding Goal 1, OT) long term goal (LTG);10 days  -ES     Row Name 03/08/22 1317          Strength Goal 1 (OT)    Strength Goal 1 (OT) Pt will increase BUE strength 1/2 muscle grade w/ HEP provided handouts and demo for increased UE strength required for ADL transfers and task completion  -ES     Time Frame (Strength Goal 1, OT) long term goal (LTG);10 days  -ES     Row Name 03/08/22 1317          Therapy Assessment/Plan (OT)    Planned Therapy Interventions (OT) activity tolerance training;BADL retraining;functional balance retraining;occupation/activity based interventions;patient/caregiver education/training;strengthening exercise;transfer/mobility retraining  -ES           User Key  (r) = Recorded By, (t) = Taken By, (c) = Cosigned By    Initials Name Provider Type    ES Trish Reyes, OT Occupational Therapist               Clinical Impression     Row Name 03/08/22 1313          Pain Assessment    Pretreatment Pain Rating 8/10  -ES     Posttreatment Pain Rating 8/10  -ES     Pain Location - Side/Orientation Left  -ES     Pain Location - groin  -ES     Pain Intervention(s) Nursing Notified;Repositioned  -ES     Row Name 03/08/22 1313          Plan of Care Review    Plan of Care Reviewed With patient;spouse  -ES     Progress no change  initial evaluation encounter  -ES     Outcome Evaluation Patient has experienced decline in function from baseline status, presenting w/ deficits related to UE strength, activity tolerance, safety awareness, ADL transfers, functional mobility and BADL management. Patient would benefit from skilled OT intervention to maxamize patient safety, prevent  further debility and promote return to optimal level of independence.  -ES     Row Name 03/08/22 1313          Therapy Assessment/Plan (OT)    Rehab Potential (OT) good, to achieve stated therapy goals  -ES     Criteria for Skilled Therapeutic Interventions Met (OT) yes;meets criteria;skilled treatment is necessary  -ES     Therapy Frequency (OT) 5 times/wk  -ES     Row Name 03/08/22 1313          Therapy Plan Review/Discharge Plan (OT)    Equipment Needs Upon Discharge (OT) walker, rolling  -ES     Anticipated Discharge Disposition (OT) inpatient rehabilitation facility;skilled nursing facility  -ES     Row Name 03/08/22 1313          Vital Signs    O2 Delivery Pre Treatment room air  -ES     O2 Delivery Intra Treatment room air  -ES     O2 Delivery Post Treatment room air  -ES           User Key  (r) = Recorded By, (t) = Taken By, (c) = Cosigned By    Initials Name Provider Type    Trish Jang, OT Occupational Therapist               Outcome Measures     Row Name 03/08/22 1318          How much help from another is currently needed...    Putting on and taking off regular lower body clothing? 2  -ES     Bathing (including washing, rinsing, and drying) 2  -ES     Toileting (which includes using toilet bed pan or urinal) 2  -ES     Putting on and taking off regular upper body clothing 4  -ES     Taking care of personal grooming (such as brushing teeth) 4  -ES     Eating meals 4  -ES     AM-PAC 6 Clicks Score (OT) 18  -ES     Row Name 03/08/22 0900 03/08/22 0735       How much help from another person do you currently need...    Turning from your back to your side while in flat bed without using bedrails? 3  -MARTHA 3  -BB    Moving from lying on back to sitting on the side of a flat bed without bedrails? 3  -MARTHA 3  -BB    Moving to and from a bed to a chair (including a wheelchair)? 3  -MARTHA 3  -BB    Standing up from a chair using your arms (e.g., wheelchair, bedside chair)? 3  -MARTHA 1  -BB    Climbing 3-5 steps with a  railing? 1  -MARTHA 1  -BB    To walk in hospital room? 2  -MARTHA 2  -BB    AM-PAC 6 Clicks Score (PT) 15  -MARTHA 13  -BB    Row Name 03/08/22 1318 03/08/22 0900       Functional Assessment    Outcome Measure Options AM-PAC 6 Clicks Daily Activity (OT);Optimal Instrument  -ES AM-PAC 6 Clicks Basic Mobility (PT)  -MARTHA    Row Name 03/08/22 1318          Optimal Instrument    Optimal Instrument Optimal - 3  -ES     Bending/Stooping 3  -ES     Standing 2  -ES     Reaching 2  -ES     From the list, choose the 3 activities you would most like to be able to do without any difficulty Bending/stooping;Standing;Reaching  -ES     Total Score Optimal - 3 7  -ES           User Key  (r) = Recorded By, (t) = Taken By, (c) = Cosigned By    Initials Name Provider Type    Penny Seals, RN Registered Nurse    Marciano Barrera, PT Physical Therapist    Trish Jang, OT Occupational Therapist                Occupational Therapy Education                 Title: PT OT SLP Therapies (Done)     Topic: Occupational Therapy (Done)     Point: ADL training (Done)     Description:   Instruct learner(s) on proper safety adaptation and remediation techniques during self care or transfers.   Instruct in proper use of assistive devices.              Learning Progress Summary           Patient Acceptance, E,TB, VU by ABEL at 3/8/2022 1323                   Point: Home exercise program (Done)     Description:   Instruct learner(s) on appropriate technique for monitoring, assisting and/or progressing therapeutic exercises/activities.              Learning Progress Summary           Patient Acceptance, E,TB, VU by ABEL at 3/8/2022 1323                   Point: Precautions (Done)     Description:   Instruct learner(s) on prescribed precautions during self-care and functional transfers.              Learning Progress Summary           Patient Acceptance, E,TB, VU by ABEL at 3/8/2022 1323                   Point: Body mechanics (Done)     Description:    Instruct learner(s) on proper positioning and spine alignment during self-care, functional mobility activities and/or exercises.              Learning Progress Summary           Patient Acceptance, E,TB, VU by ABEL at 3/8/2022 1323                               User Key     Initials Effective Dates Name Provider Type Discipline     09/13/21 -  Trish Reyes OT Occupational Therapist OT              OT Recommendation and Plan  Planned Therapy Interventions (OT): activity tolerance training, BADL retraining, functional balance retraining, occupation/activity based interventions, patient/caregiver education/training, strengthening exercise, transfer/mobility retraining  Therapy Frequency (OT): 5 times/wk  Plan of Care Review  Plan of Care Reviewed With: patient, spouse  Progress: no change (initial evaluation encounter)  Outcome Evaluation: Patient has experienced decline in function from baseline status, presenting w/ deficits related to UE strength, activity tolerance, safety awareness, ADL transfers, functional mobility and BADL management. Patient would benefit from skilled OT intervention to maxamize patient safety, prevent further debility and promote return to optimal level of independence.     Time Calculation:    Time Calculation- OT     Row Name 03/08/22 1325             Time Calculation- OT    OT Received On 03/08/22  -ES      OT Goal Re-Cert Due Date 03/17/22  -ES              Untimed Charges    OT Eval/Re-eval Minutes 25  -ES              Total Minutes    Untimed Charges Total Minutes 25  -ES       Total Minutes 25  -ES            User Key  (r) = Recorded By, (t) = Taken By, (c) = Cosigned By    Initials Name Provider Type    Trish Jang OT Occupational Therapist              Therapy Charges for Today     Code Description Service Date Service Provider Modifiers Qty    [id number] HC OT EVAL LOW COMPLEXITY 2 3/8/2022 Trish Reyes OT GO 1               Trish Reyes OT  3/8/2022

## 2022-03-08 NOTE — PLAN OF CARE
Goal Outcome Evaluation:  Plan of Care Reviewed With: patient        Progress: no change  Outcome Evaluation: pt. medicated  x one for c/o left pelvic pain with relief noted. pt. resting quietly, no changes noted.

## 2022-03-09 NOTE — H&P (VIEW-ONLY)
[facility]   HISTORY AND PHYSICAL    Patient Name: Cedric Wade  : 1950  MRN: [id number]  Primary Care Physician:  Ean Farmer MD  Date of admission: 3/6/2022    Subjective   Subjective     Chief Complaint: metastatic non-small cell lung cancer, need for port placement    HPI:    Cedric Wade is a 71 y.o. male who was recently diagnosed with non-small cell lung cancer with brain metastases.  He will be starting chemotherapy.  General surgery has been consulted for placement of a port-a-cath.               Personal History     Past Medical History:   Diagnosis Date   • Abnormal ECG    • Abscess    • Arrhythmia    • Arthritis    • Atrial fibrillation (HCC)    • CAD with history of CABG 2019    Added automatically from request for surgery [id number]   • Cancer (HCC)     skin   • CHF (congestive heart failure) (HCC)    • COPD (chronic obstructive pulmonary disease) (HCC)    • Coronary artery disease    • Cutaneous lupus erythematosus    • Diabetes mellitus (HCC)    • Elevated cholesterol    • GERD (gastroesophageal reflux disease)    • Heart valve disease    • Hypertension    • Hypothyroidism, unspecified 2021   • Myocardial infarction (HCC)    • Other hyperlipidemia 2019   • Paroxysmal atrial fibrillation (HCC) 2021   • Sleep apnea        Past Surgical History:   Procedure Laterality Date   • BRONCHOSCOPY N/A 2022    Procedure: BRONCHOSCOPY WITH ENDOBRONCHIAL ULTRASOUND, BAL, WASHINGS, BRUSHINGS, NEEDLE ASPIRATIONS, BIOPSIES;  Surgeon: Ankur Alcantara MD;  Location: [facility];  Service: Pulmonary;  Laterality: N/A;  LEFT HILAR MASS   • CARDIAC CATHETERIZATION     • CARDIAC DEFIBRILLATOR PLACEMENT     • CARDIAC DEFIBRILLATOR PLACEMENT     • COLONOSCOPY     • CORONARY ARTERY BYPASS GRAFT N/A 2019    Procedure: INTRAOPERATIVE BUDDY, MIDLINE STENOTOMY WITH CORONARY ARTERY BYPASS GRAPHS X  3 UTILIZING LEFT SAVAGE  AND LEFT ENDOSCOPIC HARVESTED SAPHENOUS  VEIN, LIGATION OF LEFT  ATRIAL APPENDAGE; PRP;  Surgeon: Alvaro Mazariegos MD;  Location: [facility] OR;  Service: Cardiothoracic   • LIPOMA EXCISION     • NECK SURGERY     • OTHER SURGICAL HISTORY      heart valve repair   not sure which valve   • PACEMAKER IMPLANTATION         Family History: family history includes Breast cancer in his father and another family member; Diabetes in his brother and sister; Heart attack in his brother; Heart disease in his brother and another family member; Lung cancer in his father. Otherwise pertinent FHx was reviewed and not pertinent to current issue.    Social History:  reports that he has been smoking cigarettes. He started smoking about 61 years ago. He has a 14.50 pack-year smoking history. He has never used smokeless tobacco. He reports that he does not drink alcohol and does not use drugs.    Home Medications:  Azelastine-Fluticasone, albuterol sulfate HFA, aspirin, atorvastatin, azithromycin, benzonatate, budesonide, carvedilol, cetirizine, chlorproMAZINE, dexamethasone, digoxin, folic acid, furosemide, glipizide, hydroxychloroquine, ipratropium-albuterol, levETIRAcetam, levothyroxine, montelukast, pantoprazole, primidone, sertraline, spironolactone, tiotropium bromide-olodaterol, and vitamin D      Allergies:  Allergies   Allergen Reactions   • Amiodarone Unknown - High Severity   • Lisinopril Swelling   • Codeine Nausea And Vomiting       Objective   Objective     Vitals:   Temp:  [97.3 °F (36.3 °C)-98 °F (36.7 °C)] 97.5 °F (36.4 °C)  Heart Rate:  [54-66] 61  Resp:  [16-18] 18  BP: (112-145)/(62-74) 133/72  Flow (L/min):  [3] 3      Review of Systems  A 10 point review of systems was performed and all are negative except for what is documented in the HPI.      Physical Exam    Constitutional: Awake, alert   Eyes: PERRLA    Neck: Supple, trachea midline   Respiratory: respirations even and unlabored   Cardiovascular: RRR   Gastrointestinal: soft, nontender,  nondistended   Psychiatric: Appropriate affect, cooperative   Neurologic: Oriented x 3, speech clear   Skin: No rashes     Result Review    Result Review:  I have personally reviewed the results from the time of this admission to 3/9/2022 11:53 EST and agree with these findings:  []  Laboratory  []  Microbiology  []  Radiology  []  EKG/Telemetry   []  Cardiology/Vascular   []  Pathology  []  Old records  []  Other:      Assessment/Plan   Assessment / Plan   DIAGNOSIS     Metastatic non-small cell lung cancer  Need for port-a-cath placement      Active Hospital Problems:  Active Hospital Problems    Diagnosis    • Closed nondisplaced fracture of pelvis (HCC)          Plan:   Hold lovenox  NPO after MN  Consent for port-a-cath placement by Dr. Carl R/B/A's discussed.       DVT prophylaxis:  No DVT prophylaxis order currently exists.

## 2022-03-09 NOTE — CONSULTS
[facility]   HISTORY AND PHYSICAL    Patient Name: Cedric Wade  : 1950  MRN: [id number]  Primary Care Physician:  Ean Farmer MD  Date of admission: 3/6/2022    Subjective   Subjective     Chief Complaint: metastatic non-small cell lung cancer, need for port placement    HPI:    Cedric Wade is a 71 y.o. male who was recently diagnosed with non-small cell lung cancer with brain metastases.  He will be starting chemotherapy.  General surgery has been consulted for placement of a port-a-cath.               Personal History     Past Medical History:   Diagnosis Date   • Abnormal ECG    • Abscess    • Arrhythmia    • Arthritis    • Atrial fibrillation (HCC)    • CAD with history of CABG 2019    Added automatically from request for surgery [id number]   • Cancer (HCC)     skin   • CHF (congestive heart failure) (HCC)    • COPD (chronic obstructive pulmonary disease) (HCC)    • Coronary artery disease    • Cutaneous lupus erythematosus    • Diabetes mellitus (HCC)    • Elevated cholesterol    • GERD (gastroesophageal reflux disease)    • Heart valve disease    • Hypertension    • Hypothyroidism, unspecified 2021   • Myocardial infarction (HCC)    • Other hyperlipidemia 2019   • Paroxysmal atrial fibrillation (HCC) 2021   • Sleep apnea        Past Surgical History:   Procedure Laterality Date   • BRONCHOSCOPY N/A 2022    Procedure: BRONCHOSCOPY WITH ENDOBRONCHIAL ULTRASOUND, BAL, WASHINGS, BRUSHINGS, NEEDLE ASPIRATIONS, BIOPSIES;  Surgeon: Ankur Alcantara MD;  Location: [facility];  Service: Pulmonary;  Laterality: N/A;  LEFT HILAR MASS   • CARDIAC CATHETERIZATION     • CARDIAC DEFIBRILLATOR PLACEMENT     • CARDIAC DEFIBRILLATOR PLACEMENT     • COLONOSCOPY     • CORONARY ARTERY BYPASS GRAFT N/A 2019    Procedure: INTRAOPERATIVE BUDDY, MIDLINE STENOTOMY WITH CORONARY ARTERY BYPASS GRAPHS X  3 UTILIZING LEFT SAVAGE  AND LEFT ENDOSCOPIC HARVESTED SAPHENOUS  VEIN, LIGATION OF LEFT  ATRIAL APPENDAGE; PRP;  Surgeon: Alvaro Mazariegos MD;  Location: [facility] OR;  Service: Cardiothoracic   • LIPOMA EXCISION     • NECK SURGERY     • OTHER SURGICAL HISTORY      heart valve repair   not sure which valve   • PACEMAKER IMPLANTATION         Family History: family history includes Breast cancer in his father and another family member; Diabetes in his brother and sister; Heart attack in his brother; Heart disease in his brother and another family member; Lung cancer in his father. Otherwise pertinent FHx was reviewed and not pertinent to current issue.    Social History:  reports that he has been smoking cigarettes. He started smoking about 61 years ago. He has a 14.50 pack-year smoking history. He has never used smokeless tobacco. He reports that he does not drink alcohol and does not use drugs.    Home Medications:  Azelastine-Fluticasone, albuterol sulfate HFA, aspirin, atorvastatin, azithromycin, benzonatate, budesonide, carvedilol, cetirizine, chlorproMAZINE, dexamethasone, digoxin, folic acid, furosemide, glipizide, hydroxychloroquine, ipratropium-albuterol, levETIRAcetam, levothyroxine, montelukast, pantoprazole, primidone, sertraline, spironolactone, tiotropium bromide-olodaterol, and vitamin D      Allergies:  Allergies   Allergen Reactions   • Amiodarone Unknown - High Severity   • Lisinopril Swelling   • Codeine Nausea And Vomiting       Objective   Objective     Vitals:   Temp:  [97.3 °F (36.3 °C)-98 °F (36.7 °C)] 97.5 °F (36.4 °C)  Heart Rate:  [54-66] 61  Resp:  [16-18] 18  BP: (112-145)/(62-74) 133/72  Flow (L/min):  [3] 3      Review of Systems  A 10 point review of systems was performed and all are negative except for what is documented in the HPI.      Physical Exam    Constitutional: Awake, alert   Eyes: PERRLA    Neck: Supple, trachea midline   Respiratory: respirations even and unlabored   Cardiovascular: RRR   Gastrointestinal: soft, nontender,  nondistended   Psychiatric: Appropriate affect, cooperative   Neurologic: Oriented x 3, speech clear   Skin: No rashes     Result Review    Result Review:  I have personally reviewed the results from the time of this admission to 3/9/2022 11:53 EST and agree with these findings:  []  Laboratory  []  Microbiology  []  Radiology  []  EKG/Telemetry   []  Cardiology/Vascular   []  Pathology  []  Old records  []  Other:      Assessment/Plan   Assessment / Plan   DIAGNOSIS     Metastatic non-small cell lung cancer  Need for port-a-cath placement      Active Hospital Problems:  Active Hospital Problems    Diagnosis    • Closed nondisplaced fracture of pelvis (HCC)          Plan:   Hold lovenox  NPO after MN  Consent for port-a-cath placement by Dr. Carl R/B/A's discussed.       DVT prophylaxis:  No DVT prophylaxis order currently exists.

## 2022-03-09 NOTE — PROGRESS NOTES
[facility]   Progress Note    Patient Name: Cedric Wade  : 1950  MRN: [id number]  Primary Care Physician: Ean Farmer MD  Date of admission: 3/6/2022    Subjective   Subjective   HPI:, Metastatic lung cancer,  Patient seen in good spirits no complaints no distress no cognitive issues overnight no headaches pain in the pelvic areas controlled with just 1 dose of pain medications he did get up in the chair yesterday, I discussions with hematology oncology this morning about consultation, Port-A-Cath, treatment options etc., will ask for general surgical consultation also,      Review of Systems   All systems were reviewed and negative except for: Weakness pain in the pelvic area otherwise no respiratory issues currently,      Objective   Objective     Vitals:  Patient Vitals for the past 24 hrs:   BP Temp Temp src Pulse Resp SpO2   22 0418 145/64 97.5 °F (36.4 °C) Oral 58 18 100 %   22 0230 -- -- -- 60 18 95 %   22 2352 112/62 97.3 °F (36.3 °C) Oral 54 16 100 %   22 2257 -- -- -- 56 16 99 %   22 2137 116/70 98 °F (36.7 °C) Oral 58 16 99 %   22 -- -- -- 66 16 100 %   22 -- -- -- 63 16 100 %   22 1559 -- -- -- 64 16 100 %   22 1500 116/74 97.9 °F (36.6 °C) Oral 62 16 100 %   22 1149 -- -- -- 64 16 96 %   22 1050 119/69 97.4 °F (36.3 °C) Oral 62 16 100 %      Physical Exam   Lungs reveal diminished breath sounds cardiac exam reveals a regular rhythm his abdomen is soft nontender his lower legs showed no edema he is awake alert sitting up in the bed pleasant talkative no rash in the face arms or lower legs      Result Review    Result Review:  I have personally reviewed the results from the time of this admission to 22 7:54 AM EST and agree with these findings:  [x]  Laboratory  []  Microbiology  []  Radiology  []  EKG/Telemetry   []  Cardiology/Vascular   []  Pathology  []  Old records  []   Other:      Active Hospital Meds:  Scheduled Meds:atorvastatin, 40 mg, Oral, Nightly  azithromycin, 250 mg, Oral, Daily  benzonatate, 100 mg, Oral, TID  budesonide, 0.5 mg, Nebulization, BID - RT  carvedilol, 3.125 mg, Oral, BID With Meals  cetirizine, 10 mg, Oral, Daily  dexamethasone, 4 mg, Oral, Q12H  digoxin, 125 mcg, Oral, Daily  enoxaparin, 40 mg, Subcutaneous, Q24H  folic acid, 1,000 mcg, Oral, Daily  furosemide, 40 mg, Oral, Daily  hydroxychloroquine, 200 mg, Oral, Daily  insulin detemir, 8 Units, Subcutaneous, Nightly  insulin lispro, 0-7 Units, Subcutaneous, TID AC  ipratropium-albuterol, 3 mL, Nebulization, Q4H - RT  levETIRAcetam, 500 mg, Oral, BID  levothyroxine, 75 mcg, Oral, QAM  montelukast, 10 mg, Oral, Daily  pantoprazole, 40 mg, Oral, QAM  polyethylene glycol, 17 g, Oral, Daily  primidone, 250 mg, Oral, BID  senna-docusate sodium, 1 tablet, Oral, BID  sertraline, 25 mg, Oral, Daily  sodium chloride, 10 mL, Intravenous, Q12H  spironolactone, 25 mg, Oral, Daily  vitamin D, 50,000 Units, Oral, Weekly      Continuous Infusions:   PRN Meds:.•  acetaminophen **OR** acetaminophen **OR** acetaminophen  •  bisacodyl  •  dextrose  •  dextrose  •  glucagon (human recombinant)  •  HYDROcodone-acetaminophen  •  Morphine  •  ondansetron  •  sodium chloride    Assessment/Plan   Assessment / Plan     Active Hospital Problems:  Active Hospital Problems    Diagnosis    • Closed nondisplaced fracture of pelvis (HCC)        Assessment/Plan:     Nondisplaced inferior and superior left pubic rami fractures,, fall day of discharge last week, March 6, 2022, pain control adequate at this point, continue therapy evaluations    Gait dysfunction    Hyponatremia, worsening, will fluid restrict today, BR NP levels are low,, sodium level improved March 9 continue trending    Underlying COPD stable with current breathing treatments    Elevated liver enzymes will need to trend new finding March 8, follow-up again March  10    Constipation issues we will add bowel regimen today March 8,    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, discussed with radiation oncology, continuing radiation therapy daily while in the hospital for whole brain radiation, will continue steroids at 4 mg twice a day,, will ask hematology oncology for consultation for possible starting of chemo or immunotherapy, will get surgery for Port-A-Cath placement March 9    Congestive heart failure, severe systolic heart failure with defibrillator backup pacemaker in place, continue Aldactone, Lasix, carvedilol    Anxiety, controlled----    Essential tremor, continue primidone    Hyperlipidemia continues on moderate dose statin therapy,    Hypothyroid continue levothyroxine    Rheumatoid arthritis was on Plaquenil    Type 2 diabetes, continue sliding scale plus low-dose Levemir blood sugars are fairly well controlled currently,    Vitamin D deficiency    Seizure type disorder currently on Keppra due to the brain tumors,        Prior admission and discharge summary from March 6, 2022:      Hiccups, now with nausea vomiting,  continue Thorazine March 2, 2022, Reglan and Phenergan added March 3, some improvement overall, continue GI prophylaxis also with IV Pepcid,, improved, will send home with some as needed Thorazine, he already has GI prophylactic medications at home     Metastatic brain lesions.,  [facility] biopsy shows adenocarcinoma most likely lung, for whole brain radiation starting March 2, discussed with pulmonology and oncology March 2, also discussed with patient wife at bedside today,, and patient March 3 ------CT scan abdomen pelvis chest reviewed, appreciate Dr. Larry Bliss for second opinion, changed to oral steroids, oral medications March 1 and 2, continues  neurochecks, seizure precautions etc., I am holding his anticoagulation due to the brain lesions and potential need for biopsy in the near future,and GI prophylaxis will be  instituted---> cardiology feels we could restart his anticoagulation at this point, may need to dose adjust slightly for bleeding risks etc.,, cardiology's notes state holding Eliquis is okay for now and just continuing aspirin which we will do at time of discharge     Partial seizure.  Patient seizure-free thus far in the hospital, he is maintained on seizure medication, on iv Keppra as of March 1, no seizure activity reported overnight, transition to oral meds once he is tolerating fluids and intake better, and will transition to oral medicines at time of discharge today March 5     Elevated blood sugars not unexpected with steroids, will add Levemir plus sliding scale insulin coverage, improved, March 4     Hyponatremia --trend, March 2, fluctuating but stable March 4, and fifth, at 133     A. fib with rapid ventricular response developing after coughing spell morning of March 1,, back in sinus rhythm now over the past couple days,--- since March 1 through March 2, 3,, fourth 2022,, ----, was given Cardizem, dig, IV Lopressor yesterday, morning March 1,, with additional doses by cardiology, did not tolerate Cardizem due to blood pressure issues, not able to take amiodarone due to previous reaction, has now converted, holding diuretics March 1 and 2,----------- cardiology was consulted, --------------------in the emergency room, pacemaker was interrogated and felt to be stable at the current rate, backup of 40,, his heart rate was lower in the emergency room into the 40s and 50s at times, appears to be sinus on EKG, initially.  ----Appreciate cardiology's input,---- echo February 27, 2022 reviewed with moderate global hypokinesis ejection fraction 35% diastolic dysfunction present with mild mitral regurgitation,, will restart carvedilol 3.125 twice a day at time of discharge to help with rate control and afterload reduction in congestive heart failure     Chronic systolic heart failure.  Patient's BNP was only  1.4K on admit which appears to be his baseline.  BNP was 20K back in 2019 when he first presented prior to AICD placement.  His EF was 20% then, his last echo was in 5/21 and EF improved to 30%.  Current echo as above, will continue on Aldactone and diuretics.  He had angioedema to ACE inhibitors, not sure about ARB's.  Unclear whether patient would benefit from low-dose carvedilol i.e. 3.125 twice a day or low-dose Toprol     COPD exacerbation.    Will continue duo nebs, Singulair,, steroids, Pulmicort nebs, but stop Brovana nebs, due to A. fib March 1, patient has been on continuous Zithromax for several months, will stop this also due to nausea for now, could restart as an outpatient we will send in a new prescription for nebulizer machine and a refill of his ProAir inhaler that he is requesting at time of discharge     Tremulousness.  Discussed this patient this is related to the steroids on top of the bronchodilators.  Ativan written for 2/27.,  Stable,, improved March 2,, and fourth     Essential tremors continues on primidone,, currently worse due to steroids etc. breathing treatments etc.     Anxiety depression continues on Zoloft, overall clinically better still has some emotional episodes when talking with family members etc.,     Vitamin D deficiency continues replacement     Hypothyroid--we will restart home medication at time of discharge today March 5, did receive IV levothyroxine IV for now at lower doses until oral intake better, due to nausea and vomiting over the past couple days,     Rheumatoid arthritis has been on Plaquenil will continue low-dose 200 mg daily     DVT prophylaxis.  Via mechanical means for now pending surgical intervention.     PUD prophylaxis.  Via p.o. H2 blocker.,  Patient states GI reflux is worse, will transition to proton pump inhibitor twice a day March 1, 2022, adding Thorazine for hiccups March 2,                 CODE STATUS:    Code Status and Medical Interventions:    Ordered at: 03/07/22 0810     Level Of Support Discussed With:    Patient     Code Status (Patient has no pulse and is not breathing):    CPR (Attempt to Resuscitate)     Medical Interventions (Patient has pulse or is breathing):    Full Support       Electronically signed by Ean Farmer MD, 03/09/22, 7:54 AM EST.

## 2022-03-09 NOTE — THERAPY TREATMENT NOTE
Acute Care - Physical Therapy Treatment Note  PETTY Rent[facility]     Patient Name: Cedric Wade  : 1950  MRN: [id number]  Today's Date: 3/9/2022      Visit Dx:     ICD-10-CM ICD-9-CM   1. Lung mass  R91.8 786.6   2. Closed nondisplaced fracture of pelvis, unspecified part of pelvis, initial encounter ([facility])  S32.9XXA 808.8   3. Difficulty in walking  R26.2 719.7   4. Decreased activities of daily living (ADL)  Z78.9 V49.89     Patient Active Problem List   Diagnosis   • CAD with history of CABG   • COPD (chronic obstructive pulmonary disease) with chronic bronchitis ([facility])   • Microalbuminuria   • Type 2 diabetes mellitus with microalbuminuria ([facility])   • Other hyperlipidemia   • Essential hypertension   • H/O agent Orange exposure   • Ischemic cardiomyopathy   • Atrial fibrillation with RVR ([facility])   • Acute exacerbation of chronic obstructive pulmonary disease ([facility])   • Chronic obstructive pulmonary disease ([facility])   • Arthritis   • Polyarthritis   • Chronic systolic (congestive) heart failure ([facility])   • Diabetic renal disease ([facility])   • Essential tremor   • Heart failure ([facility])   • Acquired hypothyroidism   • S/P cervical spinal fusion   • Screening PSA (prostate specific antigen)   • Allergic rhinitis   • Seasonal allergies   • Tobacco abuse   • Nocturnal hypoxia   • Pleural effusion   • Depression   • Lupus ([facility])   • Anxiety, generalized   • Brain mass   • Lung mass   • Closed nondisplaced fracture of pelvis ([facility])     Past Medical History:   Diagnosis Date   • Abnormal ECG    • Abscess    • Arrhythmia    • Arthritis    • Atrial fibrillation ([facility])    • CAD with history of CABG 2019    Added automatically from request for surgery [id number]   • Cancer ([facility])     skin   • CHF (congestive heart failure) ([facility])    • COPD (chronic obstructive pulmonary disease) ([facility])    • Coronary artery disease    • Cutaneous lupus erythematosus    • Diabetes mellitus ([facility])    • Elevated cholesterol    • GERD (gastroesophageal reflux  disease)    • Heart valve disease    • Hypertension    • Hypothyroidism, unspecified 7/30/2021   • Myocardial infarction (HCC)    • Other hyperlipidemia 9/8/2019   • Paroxysmal atrial fibrillation (HCC) 7/19/2021   • Sleep apnea      Past Surgical History:   Procedure Laterality Date   • BRONCHOSCOPY N/A 2/28/2022    Procedure: BRONCHOSCOPY WITH ENDOBRONCHIAL ULTRASOUND, BAL, WASHINGS, BRUSHINGS, NEEDLE ASPIRATIONS, BIOPSIES;  Surgeon: Ankur Alcantara MD;  Location: [facility];  Service: Pulmonary;  Laterality: N/A;  LEFT HILAR MASS   • CARDIAC CATHETERIZATION     • CARDIAC DEFIBRILLATOR PLACEMENT     • CARDIAC DEFIBRILLATOR PLACEMENT     • COLONOSCOPY     • CORONARY ARTERY BYPASS GRAFT N/A 9/9/2019    Procedure: INTRAOPERATIVE BUDDY, MIDLINE STENOTOMY WITH CORONARY ARTERY BYPASS GRAPHS X  3 UTILIZING LEFT SAVAGE  AND LEFT ENDOSCOPIC HARVESTED SAPHENOUS VEIN, LIGATION OF LEFT  ATRIAL APPENDAGE; PRP;  Surgeon: Alvaro Mazariegos MD;  Location: [facility] OR;  Service: Cardiothoracic   • LIPOMA EXCISION     • NECK SURGERY     • OTHER SURGICAL HISTORY      heart valve repair   not sure which valve   • PACEMAKER IMPLANTATION       PT Assessment (last 12 hours)     PT Evaluation and Treatment     Row Name 03/09/22 1513          Physical Therapy Time and Intention    Subjective Information complains of;pain;weakness  -RH     Document Type therapy note (daily note)  -     Mode of Treatment physical therapy;individual therapy  -     Patient Effort adequate  -     Row Name 03/09/22 1513          Pain    Posttreatment Pain Rating 10/10  -     Pain Location - Side/Orientation Bilateral  -     Pain Location - --  Pelvis  -RH     Pain Intervention(s) Emotional support;Nursing Notified  -     Row Name 03/09/22 1513          Mobility    Extremity Weight-bearing Status left lower extremity;right lower extremity  -RH     Left Lower Extremity (Weight-bearing Status) weight-bearing as tolerated (WBAT)  -     Row Name  03/09/22 1513          Bed Mobility    Bed Mobility supine-sit  -RH     Supine-Sit Will (Bed Mobility) minimum assist (75% patient effort);moderate assist (50% patient effort)  -     Assistive Device (Bed Mobility) bed rails  -RH     Comment, (Bed Mobility) --  Pt maintains sitting with CGA.  -     Row Name 03/09/22 1513          Transfers    Transfers sit-stand transfer;stand-sit transfer  -RH     Maintains Weight-bearing Status (Transfers) able to maintain  -     Sit-Stand Will (Transfers) contact guard  -RH     Stand-Sit Will (Transfers) contact guard  -RH     Row Name 03/09/22 1513          Sit-Stand Transfer    Assistive Device (Sit-Stand Transfers) walker, front-wheeled  -RH     Row Name 03/09/22 1513          Stand-Sit Transfer    Assistive Device (Stand-Sit Transfers) walker, front-wheeled  -RH     Row Name 03/09/22 1513          Gait/Stairs (Locomotion)    Gait/Stairs Locomotion gait/ambulation independence;gait/ambulation assistive device;distance ambulated;gait pattern;gait deviations  -     Will Level (Gait) contact guard  -     Assistive Device (Gait) walker, front-wheeled  -RH     Distance in Feet (Gait) 5  -RH     Pattern (Gait) 3-point;step-to  -RH     Deviations/Abnormal Patterns (Gait) festinating/shuffling;base of support, narrow;antalgic;gait speed decreased;stride length decreased  -     Row Name 03/09/22 1513          Vital Signs    O2 Delivery Intra Treatment --  Pt with supplemental oxygen with nasal cannula.  -RH     Post SpO2 (%) 94  -RH           User Key  (r) = Recorded By, (t) = Taken By, (c) = Cosigned By    Initials Name Provider Type     Jordan Hannah PTA Physical Therapy Assistant                Physical Therapy Education                 Title: PT OT SLP Therapies (Done)     Topic: Physical Therapy (Done)     Point: Mobility training (Done)     Learning Progress Summary           Patient Acceptance, E,TB, VU by EV at 3/8/2022 1092     Acceptance, E,TB, VU by MARTHA at 3/8/2022 0903                   Point: Precautions (Done)     Learning Progress Summary           Patient Acceptance, E,TB, VU by EV at 3/8/2022 2316    Acceptance, E,TB, VU by MARTHA at 3/8/2022 0903                               User Key     Initials Effective Dates Name Provider Type Discipline    EV 06/16/21 -  Mariah Crawford RN Registered Nurse Nurse    MARTHA 06/03/21 -  Marciano Thayer PT Physical Therapist PT              PT Recommendation and Plan         Outcome Measures     Row Name 03/09/22 1500 03/08/22 0900          How much help from another person do you currently need...    Turning from your back to your side while in flat bed without using bedrails? 3  -RH 3  -MARTHA     Moving from lying on back to sitting on the side of a flat bed without bedrails? 3  -RH 3  -MARTHA     Moving to and from a bed to a chair (including a wheelchair)? 3  -RH 3  -MARTHA     Standing up from a chair using your arms (e.g., wheelchair, bedside chair)? 3  -RH 3  -MARTHA     Climbing 3-5 steps with a railing? 2  -RH 1  -MARTHA     To walk in hospital room? 2  -RH 2  -MARTHA     AM-PAC 6 Clicks Score (PT) 16  -RH 15  -MARTHA            Functional Assessment    Outcome Measure Options -- AM-PAC 6 Clicks Basic Mobility (PT)  -MARTHA           User Key  (r) = Recorded By, (t) = Taken By, (c) = Cosigned By    Initials Name Provider Type     Jordan Hannah PTA Physical Therapy Assistant    Marciano Barrera, PT Physical Therapist                 Time Calculation:    PT Charges     Row Name 03/09/22 1512             Time Calculation    PT Received On 03/09/22  -RH              Timed Charges    97722 - Gait Training Minutes  3  -RH      12558 - PT Therapeutic Activity Minutes 9  -RH              Total Minutes    Timed Charges Total Minutes 12  -RH       Total Minutes 12  -RH            User Key  (r) = Recorded By, (t) = Taken By, (c) = Cosigned By    Initials Name Provider Type    RH Jordan Hannah PTA Physical Therapy Assistant               Therapy Charges for Today     Code Description Service Date Service Provider Modifiers Qty    [id number] HC PT THERAPEUTIC ACT EA 15 MIN 3/9/2022 Jordan Hannah, PTA GP 1          PT G-Codes  Outcome Measure Options: AM-PAC 6 Clicks Daily Activity (OT), Optimal Instrument  AM-PAC 6 Clicks Score (PT): 16  AM-PAC 6 Clicks Score (OT): 18    Jordan Hannah PTA  3/9/2022

## 2022-03-09 NOTE — PLAN OF CARE
Goal Outcome Evaluation:  Plan of Care Reviewed With: patient        Progress: no change  Outcome Evaluation: pt resting quietly throughout shift. medicated x3 for pain with relief noted, see emar. no changes in pt's status.

## 2022-03-09 NOTE — CONSULTS
[facility]   Consult Note    Patient Name: Cedric Wade  : 1950  MRN: [id number]  Primary Care Physician:  Ean Farmer MD  Date of admission: 3/6/2022    Subjective   Subjective     Reason for Consult: Metastatic non-small cell lung cancer    HPI: Patient most likely has EGFR negative cancer will require either immunotherapy or combination or chemotherapy alone that decision will be done after I get the molecular studies the results of which are still pending patient most likely will require IV medications and IV axis while patient is here we will get the work-up done and will also get Port-A-Cath inserted, patient will also get the rest of the blood work which is required prior to starting the chemotherapy in anticipation of that we will also need to know if he has any autoimmune disorder and therefore a lupus panel will be ordered    Cedric Wade is a 71 y.o. male has metastatic non-small cell lung cancer with brain metastases initial work-up for in anticipation of chemo immunotherapy will be ordered    Review of Systems   All systems were reviewed and negative except for: Reviewed    Personal History     Past Medical History:   Diagnosis Date   • Abnormal ECG    • Abscess    • Arrhythmia    • Arthritis    • Atrial fibrillation (HCC)    • CAD with history of CABG 2019    Added automatically from request for surgery [id number]   • Cancer (HCC)     skin   • CHF (congestive heart failure) (HCC)    • COPD (chronic obstructive pulmonary disease) (HCC)    • Coronary artery disease    • Cutaneous lupus erythematosus    • Diabetes mellitus (HCC)    • Elevated cholesterol    • GERD (gastroesophageal reflux disease)    • Heart valve disease    • Hypertension    • Hypothyroidism, unspecified 2021   • Myocardial infarction (HCC)    • Other hyperlipidemia 2019   • Paroxysmal atrial fibrillation (HCC) 2021   • Sleep apnea        Past Surgical History:   Procedure  Laterality Date   • BRONCHOSCOPY N/A 2/28/2022    Procedure: BRONCHOSCOPY WITH ENDOBRONCHIAL ULTRASOUND, BAL, WASHINGS, BRUSHINGS, NEEDLE ASPIRATIONS, BIOPSIES;  Surgeon: Ankur Alcantara MD;  Location: [facility] ENDOSCOPY;  Service: Pulmonary;  Laterality: N/A;  LEFT HILAR MASS   • CARDIAC CATHETERIZATION     • CARDIAC DEFIBRILLATOR PLACEMENT     • CARDIAC DEFIBRILLATOR PLACEMENT     • COLONOSCOPY     • CORONARY ARTERY BYPASS GRAFT N/A 9/9/2019    Procedure: INTRAOPERATIVE BUDDY, MIDLINE STENOTOMY WITH CORONARY ARTERY BYPASS GRAPHS X  3 UTILIZING LEFT SAVAGE  AND LEFT ENDOSCOPIC HARVESTED SAPHENOUS VEIN, LIGATION OF LEFT  ATRIAL APPENDAGE; PRP;  Surgeon: Alvaro Mazariegos MD;  Location: [facility] OR;  Service: Cardiothoracic   • LIPOMA EXCISION     • NECK SURGERY     • OTHER SURGICAL HISTORY      heart valve repair   not sure which valve   • PACEMAKER IMPLANTATION         Family History: family history includes Breast cancer in his father and another family member; Diabetes in his brother and sister; Heart attack in his brother; Heart disease in his brother and another family member; Lung cancer in his father. Otherwise pertinent FHx was reviewed and not pertinent to current issue.    Social History:  reports that he has been smoking cigarettes. He started smoking about 61 years ago. He has a 14.50 pack-year smoking history. He has never used smokeless tobacco. He reports that he does not drink alcohol and does not use drugs.    Home Medications:  Azelastine-Fluticasone, albuterol sulfate HFA, aspirin, atorvastatin, azithromycin, benzonatate, budesonide, carvedilol, cetirizine, chlorproMAZINE, dexamethasone, digoxin, folic acid, furosemide, glipizide, hydroxychloroquine, ipratropium-albuterol, levETIRAcetam, levothyroxine, montelukast, pantoprazole, primidone, sertraline, spironolactone, tiotropium bromide-olodaterol, and vitamin D      Allergies:  Allergies   Allergen Reactions   • Amiodarone Unknown - High Severity   •  Lisinopril Swelling   • Codeine Nausea And Vomiting       Objective   Objective     Vitals:   Temp:  [97.3 °F (36.3 °C)-98 °F (36.7 °C)] 97.5 °F (36.4 °C)  Heart Rate:  [54-66] 58  Resp:  [16-18] 18  BP: (112-145)/(62-74) 145/64  Flow (L/min):  [3] 3  Physical Exam    Constitutional: Awake, alert   Eyes: PERRLA, sclerae anicteric, no conjunctival injection   HENT: NCAT, mucous membranes moist   Neck: Supple, no thyromegaly, no lymphadenopathy, trachea midline   Respiratory: Clear to auscultation bilaterally, nonlabored respirations    Cardiovascular: RRR, no murmurs, rubs, or gallops, palpable pedal pulses bilaterally   Gastrointestinal: Positive bowel sounds, soft, nontender, nondistended   Musculoskeletal: No bilateral ankle edema, no clubbing or cyanosis to extremities   Psychiatric: Appropriate affect, cooperative   Neurologic: Oriented x 3, strength symmetric in all extremities, Cranial Nerves grossly intact to confrontation, speech clear   Skin: No rashes   Stable  Result Review    Result Review:  I have personally reviewed the results from the time of this admission to 3/9/2022 08:55 EST and agree with these findings:  [x]  Laboratory low-grade anemia possible iron deficiency  []  Microbiology  []  Radiology  []  EKG/Telemetry   []  Cardiology/Vascular   []  Pathology  []  Old records  []  Other:  Most notable findings include: Metastatic brain cancer    Assessment/Plan   Assessment / Plan     Brief Patient Summary:  Cedric Wade is a 71 y.o. male who admitted because of fall and pelvic fracture    Active Hospital Problems:  Active Hospital Problems    Diagnosis    • Closed nondisplaced fracture of pelvis (HCC)        Plan:   Duration therapy in process we will get the initial work-up in anticipation of chemo immunotherapy and Port-A-Cath        Electronically signed by Patrice Rodriguez MD, 03/09/22, 8:55 AM EST.

## 2022-03-10 NOTE — PLAN OF CARE
Goal Outcome Evaluation:  Plan of Care Reviewed With: patient        Progress: no change  Pt. Medicated x 2 for c/o pelvic pain with relief noted.  Pt. Has been npo since midnight for port-a-cath placement.  Pt. Resting quietly, no changes noted.

## 2022-03-10 NOTE — ANESTHESIA PREPROCEDURE EVALUATION
Anesthesia Evaluation     Patient summary reviewed and Nursing notes reviewed   no history of anesthetic complications:  NPO Solid Status: > 8 hours  NPO Liquid Status: > 2 hours           Airway   Mallampati: II  TM distance: >3 FB  Neck ROM: full  No difficulty expected  Dental    (+) upper dentures and lower dentures    Pulmonary - normal exam    breath sounds clear to auscultation  (+) pleural effusion, a smoker Current Abstained day of surgery, COPD, home oxygen, sleep apnea,   Cardiovascular - normal exam  Exercise tolerance: poor (<4 METS)    ECG reviewed  PT is on anticoagulation therapy  Patient on routine beta blocker and Beta blocker given within 24 hours of surgery  Rhythm: regular  Rate: normal    (+) pacemaker ICD, pacemaker interrogated <1 month ago, hypertension, valvular problems/murmurs MR, past MI , CAD, CABG, dysrhythmias Atrial Fib, angina, CHF Systolic <55%, hyperlipidemia,       Neuro/Psych  (+) psychiatric history Depression and Anxiety,    GI/Hepatic/Renal/Endo    (+)  GERD,  renal disease CRI, diabetes mellitus type 2, thyroid problem hypothyroidism    Musculoskeletal     Abdominal    Substance History - negative use     OB/GYN negative ob/gyn ROS         Other   arthritis, autoimmune disease (SLE) ,    history of cancer                  Anesthesia Plan    ASA 4     general and MAC   (Patient understands anesthesia not responsible for dental damage.)  intravenous induction     Anesthetic plan, all risks, benefits, and alternatives have been provided, discussed and informed consent has been obtained with: patient.  Use of blood products discussed with patient .   Plan discussed with CRNA.        CODE STATUS:    Level Of Support Discussed With: Patient  Code Status (Patient has no pulse and is not breathing): CPR (Attempt to Resuscitate)  Medical Interventions (Patient has pulse or is breathing): Full Support

## 2022-03-10 NOTE — PROGRESS NOTES
[facility]     Progress Note    Patient Name: Cedric Wade  : 1950  MRN: [id number]  Primary Care Physician:  Ean Farmer MD  Date of admission: 3/6/2022    Subjective   Subjective     Chief Complaint: Metastatic adenocarcinoma lung with brain metastases    HPI:  Patient reports patient is in the process of getting radiation treatment he is comfortable has no new complaints today blood work was noted CEA elevated final tumor markers are still pending we will get a PET scan as an outpatient patient is getting a Port-A-Cath inserted today in anticipation of his chemotherapy    Review of Systems   All systems were reviewed and negative except for: Reviewed    Objective   Objective     Vitals:   Temp:  [97.4 °F (36.3 °C)-98.3 °F (36.8 °C)] 97.4 °F (36.3 °C)  Heart Rate:  [51-74] 58  Resp:  [16-20] 16  BP: (103-133)/(58-75) 133/75  Flow (L/min):  [3] 3    Physical Exam    Constitutional: Awake, alert   Eyes: PERRLA, sclerae anicteric, no conjunctival injection   HENT: NCAT, mucous membranes moist   Neck: Supple, no thyromegaly, no lymphadenopathy, trachea midline   Respiratory: Clear to auscultation bilaterally, nonlabored respirations    Cardiovascular: RRR, no murmurs, rubs, or gallops, palpable pedal pulses bilaterally   Gastrointestinal: Positive bowel sounds, soft, nontender, nondistended   Musculoskeletal: No bilateral ankle edema, no clubbing or cyanosis to extremities   Psychiatric: Appropriate affect, cooperative   Neurologic: Oriented x 3, strength symmetric in all extremities, Cranial Nerves grossly intact to confrontation, speech clear   Skin: No rashes     Result Review    Result Review:  I have personally reviewed the results from the time of this admission to 3/10/2022 08:16 EST and agree with these findings:  []  Laboratory  []  Microbiology  [x]  Radiology brain mets  []  EKG/Telemetry   []  Cardiology/Vascular   []  Pathology  []  Old records  []  Other:  Most notable  findings include: Brain metastases metastatic adenocarcinoma of the lung with brain metastases    Assessment/Plan   Assessment / Plan     Brief Patient Summary:  Cedric Wade is a 71 y.o. male who admitted because of bowel and weakness    Active Hospital Problems:  Active Hospital Problems    Diagnosis    • **Lung mass      Added automatically from request for surgery [id number]     • Closed nondisplaced fracture of pelvis (HCC)        Plan:   Continue with radiation treatment Port-A-Cath to be placed today we will get PET scan as an outpatient    DVT prophylaxis:  Mechanical DVT prophylaxis orders are present.    CODE STATUS:   Level Of Support Discussed With: Patient  Code Status (Patient has no pulse and is not breathing): CPR (Attempt to Resuscitate)  Medical Interventions (Patient has pulse or is breathing): Full Support    Disposition:  I expect patient to be discharged patient is stable.    Electronically signed by Patrice Rodriguez MD, 03/10/22, 8:16 AM EST.

## 2022-03-10 NOTE — THERAPY TREATMENT NOTE
Acute Care - Physical Therapy Treatment Note  PETTY Renteria     Patient Name: Cedric Wade  : 1950  MRN: [id number]  Today's Date: 3/10/2022      Visit Dx:     ICD-10-CM ICD-9-CM   1. Lung mass  R91.8 786.6   2. Closed nondisplaced fracture of pelvis, unspecified part of pelvis, initial encounter ([facility])  S32.9XXA 808.8   3. Difficulty in walking  R26.2 719.7   4. Decreased activities of daily living (ADL)  Z78.9 V49.89     Patient Active Problem List   Diagnosis   • CAD with history of CABG   • COPD (chronic obstructive pulmonary disease) with chronic bronchitis ([facility])   • Microalbuminuria   • Type 2 diabetes mellitus with microalbuminuria ([facility])   • Other hyperlipidemia   • Essential hypertension   • H/O agent Orange exposure   • Ischemic cardiomyopathy   • Atrial fibrillation with RVR ([facility])   • Acute exacerbation of chronic obstructive pulmonary disease ([facility])   • Chronic obstructive pulmonary disease ([facility])   • Arthritis   • Polyarthritis   • Chronic systolic (congestive) heart failure ([facility])   • Diabetic renal disease ([facility])   • Essential tremor   • Heart failure ([facility])   • Acquired hypothyroidism   • S/P cervical spinal fusion   • Screening PSA (prostate specific antigen)   • Allergic rhinitis   • Seasonal allergies   • Tobacco abuse   • Nocturnal hypoxia   • Pleural effusion   • Depression   • Lupus ([facility])   • Anxiety, generalized   • Brain mass   • Lung mass   • Closed nondisplaced fracture of pelvis ([facility])     Past Medical History:   Diagnosis Date   • Abnormal ECG    • Abscess    • Arrhythmia    • Arthritis    • Atrial fibrillation ([facility])    • CAD with history of CABG 2019    Added automatically from request for surgery [id number]   • Cancer ([facility])     skin   • CHF (congestive heart failure) ([facility])    • COPD (chronic obstructive pulmonary disease) ([facility])    • Coronary artery disease    • Cutaneous lupus erythematosus    • Diabetes mellitus ([facility])    • Elevated cholesterol    • GERD (gastroesophageal reflux  disease)    • Heart valve disease    • Hypertension    • Hypothyroidism, unspecified 7/30/2021   • Myocardial infarction (HCC)    • Other hyperlipidemia 9/8/2019   • Paroxysmal atrial fibrillation (HCC) 7/19/2021   • Sleep apnea      Past Surgical History:   Procedure Laterality Date   • BRONCHOSCOPY N/A 2/28/2022    Procedure: BRONCHOSCOPY WITH ENDOBRONCHIAL ULTRASOUND, BAL, WASHINGS, BRUSHINGS, NEEDLE ASPIRATIONS, BIOPSIES;  Surgeon: Ankur Alcantara MD;  Location: [facility] ENDOSCOPY;  Service: Pulmonary;  Laterality: N/A;  LEFT HILAR MASS   • CARDIAC CATHETERIZATION     • CARDIAC DEFIBRILLATOR PLACEMENT     • CARDIAC DEFIBRILLATOR PLACEMENT     • COLONOSCOPY     • CORONARY ARTERY BYPASS GRAFT N/A 9/9/2019    Procedure: INTRAOPERATIVE BUDDY, MIDLINE STENOTOMY WITH CORONARY ARTERY BYPASS GRAPHS X  3 UTILIZING LEFT SAVAGE  AND LEFT ENDOSCOPIC HARVESTED SAPHENOUS VEIN, LIGATION OF LEFT  ATRIAL APPENDAGE; PRP;  Surgeon: Alvaro Mazariegos MD;  Location: [facility] OR;  Service: Cardiothoracic   • LIPOMA EXCISION     • NECK SURGERY     • OTHER SURGICAL HISTORY      heart valve repair   not sure which valve   • PACEMAKER IMPLANTATION       PT Assessment (last 12 hours)     PT Evaluation and Treatment     Row Name 03/10/22 0947          Physical Therapy Time and Intention    Subjective Information complains of;pain  -RH     Document Type therapy note (daily note)  -RH     Mode of Treatment physical therapy;individual therapy  -RH     Patient Effort good  -RH     Row Name 03/10/22 0947          Pain    Pretreatment Pain Rating 7/10  -RH     Posttreatment Pain Rating 7/10  -RH     Pain Location - Side/Orientation Left  -RH     Pain Location - --  Pelvis  -RH     Row Name 03/10/22 0947          Mobility    Extremity Weight-bearing Status left lower extremity;right lower extremity  -RH     Left Lower Extremity (Weight-bearing Status) weight-bearing as tolerated (WBAT)  -     Row Name 03/10/22 0947          Bed Mobility    Bed  Mobility supine-sit  -RH     Supine-Sit Gobles (Bed Mobility) contact guard  -RH     Assistive Device (Bed Mobility) bed rails  -RH     Row Name 03/10/22 0947          Transfers    Transfers sit-stand transfer;stand-sit transfer  -RH     Maintains Weight-bearing Status (Transfers) able to maintain  -RH     Sit-Stand Gobles (Transfers) contact guard  -RH     Stand-Sit Gobles (Transfers) contact guard  -RH     Row Name 03/10/22 0947          Sit-Stand Transfer    Assistive Device (Sit-Stand Transfers) walker, front-wheeled  -RH     Row Name 03/10/22 0947          Stand-Sit Transfer    Assistive Device (Stand-Sit Transfers) walker, front-wheeled  -RH     Row Name 03/10/22 0947          Gait/Stairs (Locomotion)    Gait/Stairs Locomotion gait/ambulation independence;gait/ambulation assistive device;distance ambulated;gait pattern;gait deviations  -     Gobles Level (Gait) contact guard  -     Assistive Device (Gait) walker, front-wheeled  -RH     Distance in Feet (Gait) 15  -     Pattern (Gait) 3-point;step-through  -     Deviations/Abnormal Patterns (Gait) base of support, narrow;festinating/shuffling;gait speed decreased;stride length decreased  -     Row Name 03/10/22 0947          Vital Signs    O2 Delivery Intra Treatment --  Pt on 3 liters of supplemental oxygen.  -     Row Name 03/10/22 0947          Progress Summary (PT)    Progress Toward Functional Goals (PT) progress toward functional goals is good  -           User Key  (r) = Recorded By, (t) = Taken By, (c) = Cosigned By    Initials Name Provider Type     Jordan Hannah PTA Physical Therapy Assistant                Physical Therapy Education                 Title: PT OT SLP Therapies (Done)     Topic: Physical Therapy (Done)     Point: Mobility training (Done)     Learning Progress Summary           Patient Acceptance, E,TB, VU by JOSE at 3/8/2022 2316    Acceptance, E,TB, VU by MARTHA at 3/8/2022 0903                    Point: Precautions (Done)     Learning Progress Summary           Patient Acceptance, E,TB, VU by EV at 3/8/2022 2316    Acceptance, E,TB, VU by MARTHA at 3/8/2022 0903                               User Key     Initials Effective Dates Name Provider Type Discipline    EV 06/16/21 -  Mariah Crawford RN Registered Nurse Nurse    MARTHA 06/03/21 -  Marciano Thayer, PT Physical Therapist PT              PT Recommendation and Plan     Progress Summary (PT)  Progress Toward Functional Goals (PT): progress toward functional goals is good   Outcome Measures     Row Name 03/10/22 0900 03/09/22 1500 03/08/22 0900       How much help from another person do you currently need...    Turning from your back to your side while in flat bed without using bedrails? 3  -RH 3  -RH 3  -MARTHA    Moving from lying on back to sitting on the side of a flat bed without bedrails? 3  -RH 3  -RH 3  -MARTHA    Moving to and from a bed to a chair (including a wheelchair)? 3  -RH 3  -RH 3  -MARTHA    Standing up from a chair using your arms (e.g., wheelchair, bedside chair)? 3  -RH 3  -RH 3  -MARTHA    Climbing 3-5 steps with a railing? 3  -RH 2  -RH 1  -MARTHA    To walk in hospital room? 3  -RH 2  -RH 2  -MARTHA    AM-PAC 6 Clicks Score (PT) 18  -RH 16  -RH 15  -MARTHA       Functional Assessment    Outcome Measure Options -- -- AM-PAC 6 Clicks Basic Mobility (PT)  -MARTHA          User Key  (r) = Recorded By, (t) = Taken By, (c) = Cosigned By    Initials Name Provider Type     Jordan Hannah PTA Physical Therapy Assistant    Marciano Barrera, PT Physical Therapist                 Time Calculation:    PT Charges     Row Name 03/10/22 0947             Timed Charges    05218 - Gait Training Minutes  4  -RH      28113 - PT Therapeutic Activity Minutes 6  -RH              Total Minutes    Timed Charges Total Minutes 10  -RH       Total Minutes 10  -RH            User Key  (r) = Recorded By, (t) = Taken By, (c) = Cosigned By    Initials Name Provider Type     Jordan Hannah PTA  Physical Therapy Assistant              Therapy Charges for Today     Code Description Service Date Service Provider Modifiers Qty    64623445940 HC PT THERAPEUTIC ACT EA 15 MIN 3/9/2022 Jordan Hannah, BONILLA GP 1    49552834549 HC PT THERAPEUTIC ACT EA 15 MIN 3/10/2022 Jordan Hannah PTA GP 1          PT G-Codes  Outcome Measure Options: AM-PAC 6 Clicks Daily Activity (OT), Optimal Instrument  AM-PAC 6 Clicks Score (PT): 18  AM-PAC 6 Clicks Score (OT): 18    Jordan Hannah PTA  3/10/2022

## 2022-03-10 NOTE — PROGRESS NOTES
[facility]   Progress Note    Patient Name: Cedric Wade  : 1950  MRN: [id number]  Primary Care Physician: Ean Farmer MD  Date of admission: 3/6/2022    Subjective   Subjective   HPI: Pelvic fracture, pelvic pain, fall, patient says he is doing much better he did get up to the chair a little bit easier than he did the day before, says he is feeling okay otherwise no distress, we discussed possible rehab and discharge again, he says he is eating okay no nausea or vomiting no headaches  Patient is scheduled for Port-A-Cath placement today, IV fluids were started by general surgery, discussed with nursing staff about decreasing rate to avoid volume overload      Review of Systems   All systems were reviewed and negative except for: Pelvic pain weakness, otherwise doing okay,      Objective   Objective     Vitals:  Patient Vitals for the past 24 hrs:   BP Temp Temp src Pulse Resp SpO2   03/10/22 0732 133/75 97.4 °F (36.3 °C) Oral 58 16 100 %   03/10/22 0650 -- -- -- 69 20 99 %   03/10/22 0339 -- -- -- 68 16 98 %   03/10/22 0325 119/58 98.3 °F (36.8 °C) Oral 51 18 100 %   22 2331 -- -- -- 67 16 99 %   22 2238 123/70 97.9 °F (36.6 °C) Oral 59 16 100 %   22 126/67 97.9 °F (36.6 °C) Oral 69 18 98 %   22 1941 -- -- -- 74 18 95 %   22 1524 -- -- -- 70 18 96 %   22 1500 103/66 98 °F (36.7 °C) Oral 72 16 98 %   22 1150 103/67 98.1 °F (36.7 °C) Oral 72 18 100 %   22 0902 133/72 -- -- 61 -- --      Physical Exam   Lungs clear diminished breath sounds cardiac exam regular rhythm his abdomen soft scaphoid lower extremities no edema, he is alert and oriented x3, sitting partially up in bed pleasant no rashes on the face or arms, sclera nonicteric,      Result Review    Result Review:  I have personally reviewed the results from the time of this admission to 03/10/22 8:10 AM EST and agree with these findings:  [x]  Laboratory  []   Microbiology  []  Radiology  []  EKG/Telemetry   []  Cardiology/Vascular   []  Pathology  []  Old records  []  Other:      Active Hospital Meds:  Scheduled Meds:atorvastatin, 40 mg, Oral, Nightly  azithromycin, 250 mg, Oral, Every Other Day  benzonatate, 100 mg, Oral, TID  budesonide, 0.5 mg, Nebulization, BID - RT  carvedilol, 3.125 mg, Oral, BID With Meals  cetirizine, 10 mg, Oral, Daily  dexamethasone, 4 mg, Oral, Q12H  digoxin, 125 mcg, Oral, Daily  folic acid, 1,000 mcg, Oral, Daily  furosemide, 40 mg, Oral, Daily  hydroxychloroquine, 200 mg, Oral, Daily  insulin detemir, 8 Units, Subcutaneous, Nightly  insulin lispro, 0-7 Units, Subcutaneous, TID AC  ipratropium-albuterol, 3 mL, Nebulization, Q4H - RT  levETIRAcetam, 500 mg, Oral, BID  levothyroxine, 75 mcg, Oral, QAM  montelukast, 10 mg, Oral, Daily  pantoprazole, 40 mg, Oral, QAM  polyethylene glycol, 17 g, Oral, Daily  primidone, 250 mg, Oral, BID  senna-docusate sodium, 1 tablet, Oral, BID  sertraline, 25 mg, Oral, Daily  sodium chloride, 10 mL, Intravenous, Q12H  sodium chloride, 10 mL, Intravenous, Q12H  spironolactone, 25 mg, Oral, Daily  vitamin D, 50,000 Units, Oral, Weekly      Continuous Infusions:lactated ringers, 50 mL/hr, Last Rate: 50 mL/hr (03/10/22 0731)      PRN Meds:.•  acetaminophen **OR** acetaminophen **OR** acetaminophen  •  bisacodyl  •  dextrose  •  dextrose  •  glucagon (human recombinant)  •  HYDROcodone-acetaminophen  •  Morphine  •  ondansetron  •  sodium chloride  •  sodium chloride    Assessment/Plan   Assessment / Plan     Active Hospital Problems:  Active Hospital Problems    Diagnosis    • **Lung mass      Added automatically from request for surgery [id number]     • Closed nondisplaced fracture of pelvis (HCC)        Assessment/Plan:     Nondisplaced inferior and superior left pubic rami fractures,, fall on same day of discharge last week, March 6, 2022, pain control adequate at this point, continue therapy evaluations     Gait  dysfunction, continue rehab efforts, left message for  to start looking into inpatient rehab March 10, 2022     Hyponatremia,, improved, restrict decrease IV fluid rate perioperatively March 10, 2022, continue fluid restriction, BR NP levels are low,,     Underlying COPD stable with current breathing treatments     Elevated liver enzymes will need to trend new finding March 8, follow-up again March 10     Constipation issues we will add bowel regimen today March 8,     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, discussed with radiation oncology, continuing radiation therapy daily while in the hospital for whole brain radiation, will continue steroids at 4 mg twice a day,, will ask hematology oncology for consultation for possible starting of chemo or immunotherapy, will get surgery for Port-A-Cath placement March 9, placement to be done March 10,     Congestive heart failure, severe systolic heart failure with defibrillator backup pacemaker in place, continue Aldactone, Lasix, carvedilol watch fluid resuscitation efforts today closely March 10     Anxiety, controlled----     Essential tremor, continue primidone     Hyperlipidemia continues on moderate dose statin therapy,     Hypothyroid continue levothyroxine     Rheumatoid arthritis was on Plaquenil     Type 2 diabetes, continue sliding scale plus low-dose Levemir blood sugars are fairly well controlled currently,     Vitamin D deficiency     Seizure type disorder currently on Keppra due to the brain tumors,        Prior admission and discharge summary from March 6, 2022:        Hiccups, now with nausea vomiting,  continue Thorazine March 2, 2022, Reglan and Phenergan added March 3, some improvement overall, continue GI prophylaxis also with IV Pepcid,, improved, will send home with some as needed Thorazine, he already has GI prophylactic medications at home     Metastatic brain lesions.,  [facility] biopsy shows adenocarcinoma most  likely lung, for whole brain radiation starting March 2, discussed with pulmonology and oncology March 2, also discussed with patient wife at bedside today,, and patient March 3 ------CT scan abdomen pelvis chest reviewed, appreciate Dr. Larry Bliss for second opinion, changed to oral steroids, oral medications March 1 and 2, continues  neurochecks, seizure precautions etc., I am holding his anticoagulation due to the brain lesions and potential need for biopsy in the near future,and GI prophylaxis will be instituted---> cardiology feels we could restart his anticoagulation at this point, may need to dose adjust slightly for bleeding risks etc.,, cardiology's notes state holding Eliquis is okay for now and just continuing aspirin which we will do at time of discharge     Partial seizure.  Patient seizure-free thus far in the hospital, he is maintained on seizure medication, on iv Keppra as of March 1, no seizure activity reported overnight, transition to oral meds once he is tolerating fluids and intake better, and will transition to oral medicines at time of discharge today March 5     Elevated blood sugars not unexpected with steroids, will add Levemir plus sliding scale insulin coverage, improved, March 4     Hyponatremia --trend, March 2, fluctuating but stable March 4, and fifth, at 133     A. fib with rapid ventricular response developing after coughing spell morning of March 1,, back in sinus rhythm now over the past couple days,--- since March 1 through March 2, 3,, fourth 2022,, ----, was given Cardizem, dig, IV Lopressor yesterday, morning March 1,, with additional doses by cardiology, did not tolerate Cardizem due to blood pressure issues, not able to take amiodarone due to previous reaction, has now converted, holding diuretics March 1 and 2,----------- cardiology was consulted, --------------------in the emergency room, pacemaker was interrogated and felt to be stable at the current rate, backup of  40,, his heart rate was lower in the emergency room into the 40s and 50s at times, appears to be sinus on EKG, initially.  ----Appreciate cardiology's input,---- echo February 27, 2022 reviewed with moderate global hypokinesis ejection fraction 35% diastolic dysfunction present with mild mitral regurgitation,, will restart carvedilol 3.125 twice a day at time of discharge to help with rate control and afterload reduction in congestive heart failure     Chronic systolic heart failure.  Patient's BNP was only 1.4K on admit which appears to be his baseline.  BNP was 20K back in 2019 when he first presented prior to AICD placement.  His EF was 20% then, his last echo was in 5/21 and EF improved to 30%.  Current echo as above, will continue on Aldactone and diuretics.  He had angioedema to ACE inhibitors, not sure about ARB's.  Unclear whether patient would benefit from low-dose carvedilol i.e. 3.125 twice a day or low-dose Toprol     COPD exacerbation.    Will continue duo nebs, Singulair,, steroids, Pulmicort nebs, but stop Brovana nebs, due to A. fib March 1, patient has been on continuous Zithromax for several months, will stop this also due to nausea for now, could restart as an outpatient we will send in a new prescription for nebulizer machine and a refill of his ProAir inhaler that he is requesting at time of discharge     Tremulousness.  Discussed this patient this is related to the steroids on top of the bronchodilators.  Ativan written for 2/27.,  Stable,, improved March 2,, and fourth     Essential tremors continues on primidone,, currently worse due to steroids etc. breathing treatments etc.     Anxiety depression continues on Zoloft, overall clinically better still has some emotional episodes when talking with family members etc.,     Vitamin D deficiency continues replacement     Hypothyroid--we will restart home medication at time of discharge today March 5, did receive IV levothyroxine IV for now at  lower doses until oral intake better, due to nausea and vomiting over the past couple days,     Rheumatoid arthritis has been on Plaquenil will continue low-dose 200 mg daily     DVT prophylaxis.  Via mechanical means for now pending surgical intervention.     PUD prophylaxis.  Via p.o. H2 blocker.,  Patient states GI reflux is worse, will transition to proton pump inhibitor twice a day March 1, 2022, adding Thorazine for hiccups March 2,                    CODE STATUS:    Code Status and Medical Interventions:   Ordered at: 03/07/22 0810     Level Of Support Discussed With:    Patient     Code Status (Patient has no pulse and is not breathing):    CPR (Attempt to Resuscitate)     Medical Interventions (Patient has pulse or is breathing):    Full Support       Electronically signed by Ean Farmer MD, 03/10/22, 8:10 AM EST.

## 2022-03-10 NOTE — ANESTHESIA POSTPROCEDURE EVALUATION
Patient: Cedric Wade    Procedure Summary     Date: 03/10/22 Room / Location: [facility] OR  /  [facility]    Anesthesia Start: 1415 Anesthesia Stop: 1505    Procedure: INSERTION OF PORTACATH (N/A ) Diagnosis:       Lung mass      (Lung mass [R91.8])    Surgeons: Chele Carl MD Provider: Avelina Malagon MD    Anesthesia Type: general, MAC ASA Status: 4          Anesthesia Type: general, MAC    Vitals  Vitals Value Taken Time   /75 03/10/22 1521   Temp 36.4 °C (97.6 °F) 03/10/22 1503   Pulse 66 03/10/22 1524   Resp 14 03/10/22 1520   SpO2 95 % 03/10/22 1524   Vitals shown include unvalidated device data.        Post Anesthesia Care and Evaluation    Patient location during evaluation: bedside  Patient participation: complete - patient participated  Level of consciousness: awake  Pain score: 0  Pain management: adequate  Airway patency: patent  Anesthetic complications: No anesthetic complications  PONV Status: none  Cardiovascular status: acceptable and stable  Respiratory status: acceptable and room air  Hydration status: acceptable    Comments: An Anesthesiologist personally participated in the most demanding procedures (including induction and emergence if applicable) in the anesthesia plan, monitored the course of anesthesia administration at frequent intervals and remained physically present and available for immediate diagnosis and treatment of emergencies.

## 2022-03-10 NOTE — OP NOTE
INSERTION OF PORTACATH  Procedure Report    Patient Name:  Cedric Wade  YOB: 1950    Date of Surgery:  3/10/2022     Indications: The patient is a 71-year-old gentleman that presented with metastatic lung cancer.  The decision was made to proceed with a PowerPort placement for chemotherapy access.    Pre-op Diagnosis: Metastatic lung cancer    Post-Op Diagnosis: Same    Procedure/CPT® Codes:    PowerPort placement    Staff:  Surgeon(s):  Chele Carl MD    Assistant: Juanita Mac CSA    Anesthesia: Monitored Anesthesia Care    Estimated Blood Loss: 10 mL    Implants:    Implant Name Type Inv. Item Serial No.  Lot No. LRB No. Used Action   PRT INTRO VASC/INTERV VORTEX FILL/HL DETACH/POLYURET/CATH 8F - [id number] Implant PRT INTRO VASC/INTERV VORTEX FILL/HL DETACH/POLYURET/CATH 8F  [employer] [id number] Right 1 Implanted       Specimen:          None        Findings: None    Complications: None    Description of Procedure: The patient was taken to the operating room and placed on the table in supine position.  After administration of MAC anesthesia, the right neck and chest were prepped and draped sterilely.  We examined the right side of the neck with an ultrasound machine and identified the internal jugular vein.  We anesthetized the skin overlying the vein with quarter percent Marcaine and then accessed that vein with an 18-gauge needle.  Dark venous blood was aspirated and a guidewire was advanced into the superior vena cava under fluoroscopy.  The needle was removed and the skin and subcutaneous tissues of the right upper chest were anesthetized with quarter percent Marcaine.  A subcutaneous pocket was created there using a 15 blade scalpel and cautery.  A small stab incision was then made at the base of the guidewire and a 8 [place] PowerPort catheter was tunneled from the chest incision up to the neck incision.  A dilator introducer sheath was then advanced over the  guidewire and the guidewire and dilator were removed.  The PowerPort catheter was inserted through the introducer sheath and guided into the superior vena cava under fluoroscopy.  After removing the introducer sheath, the line was cut off to the appropriate length and connected to the PowerPort reservoir.  Dark venous blood was aspirated through the port and the port was flushed with concentrated heparin solution.  The reservoir was placed into the subcutaneous pocket and sutured to the chest wall with 2-0 Prolene sutures.  The skin incisions were closed with 4-0 Vicryl subcuticular sutures and sterile dressings were applied.  The patient was then taken to the postanesthesia recovery room in stable condition.      Assistant: Juanita Mac CSA  was responsible for performing the following activities: Retraction, Suturing and Placing Dressing and their skilled assistance was necessary for the success of this case.    Chele Carl MD     Date: 3/10/2022  Time: 14:55 EST

## 2022-03-10 NOTE — PLAN OF CARE
Goal Outcome Evaluation:  Plan of Care Reviewed With: patient, spouse        Progress: improving  Patient worked with therapy today and got his port-a-cath placed. Medicated x2 for pain. No other issues.    Problem: Adult Inpatient Plan of Care  Goal: Plan of Care Review  Outcome: Ongoing, Progressing  Flowsheets (Taken 3/10/2022 1648)  Progress: improving  Plan of Care Reviewed With:   patient   spouse  Goal: Patient-Specific Goal (Individualized)  Outcome: Ongoing, Progressing  Goal: Absence of Hospital-Acquired Illness or Injury  Outcome: Ongoing, Progressing  Intervention: Identify and Manage Fall Risk  Recent Flowsheet Documentation  Taken 3/10/2022 0735 by Riley Cancino RN  Safety Promotion/Fall Prevention:   assistive device/personal items within reach   safety round/check completed  Intervention: Prevent Skin Injury  Recent Flowsheet Documentation  Taken 3/10/2022 0735 by Riley Cancino RN  Body Position: position changed independently  Intervention: Prevent and Manage VTE (Venous Thromboembolism) Risk  Recent Flowsheet Documentation  Taken 3/10/2022 0735 by Riley Cancino RN  Activity Management: activity adjusted per tolerance  VTE Prevention/Management:   bilateral   compression stockings off  Intervention: Prevent Infection  Recent Flowsheet Documentation  Taken 3/10/2022 0735 by Riley Cancino RN  Infection Prevention:   environmental surveillance performed   hand hygiene promoted   rest/sleep promoted   single patient room provided  Goal: Optimal Comfort and Wellbeing  Outcome: Ongoing, Progressing  Intervention: Monitor Pain and Promote Comfort  Recent Flowsheet Documentation  Taken 3/10/2022 0808 by Rilye Cancino RN  Pain Management Interventions:   care clustered   quiet environment facilitated   see MAR   relaxation techniques promoted  Taken 3/10/2022 0735 by Riley Cancino RN  Pain Management Interventions:   care clustered   quiet environment facilitated   relaxation  techniques promoted  Intervention: Provide Person-Centered Care  Recent Flowsheet Documentation  Taken 3/10/2022 0735 by Riley Cancino, RN  Trust Relationship/Rapport:   care explained   empathic listening provided   questions answered   questions encouraged   thoughts/feelings acknowledged  Goal: Readiness for Transition of Care  Outcome: Ongoing, Progressing

## 2022-03-10 NOTE — PLAN OF CARE
Goal Outcome Evaluation:      Pt stable throughout shift, vitals WNL. Medicated 3x this shift for c/o moderate to severe pain with activity. Pt worked with PT and sat up in the chair for most of the day. PT will be NPO after midnight for port a cath placement tomorrow. No other changes this shift.

## 2022-03-11 NOTE — SIGNIFICANT NOTE
Wound Eval / Progress Noted    PETTY Renteria     Patient Name: Cedric Wade  : 1950  MRN: [id number]  Today's Date: 3/11/2022                 Admit Date: 3/6/2022    Visit Dx:    ICD-10-CM ICD-9-CM   1. Lung mass  R91.8 786.6   2. Closed nondisplaced fracture of pelvis, unspecified part of pelvis, initial encounter ([facility])  S32.9XXA 808.8   3. Difficulty in walking  R26.2 719.7   4. Decreased activities of daily living (ADL)  Z78.9 V49.89       Patient Active Problem List   Diagnosis    CAD with history of CABG    COPD (chronic obstructive pulmonary disease) with chronic bronchitis ([facility])    Microalbuminuria    Type 2 diabetes mellitus with microalbuminuria ([facility])    Other hyperlipidemia    Essential hypertension    H/O agent Orange exposure    Ischemic cardiomyopathy    Atrial fibrillation with RVR ([facility])    Acute exacerbation of chronic obstructive pulmonary disease (HCC)    Chronic obstructive pulmonary disease (HCC)    Arthritis    Polyarthritis    Chronic systolic (congestive) heart failure (HCC)    Diabetic renal disease ([facility])    Essential tremor    Heart failure ([facility])    Acquired hypothyroidism    S/P cervical spinal fusion    Screening PSA (prostate specific antigen)    Allergic rhinitis    Seasonal allergies    Tobacco abuse    Nocturnal hypoxia    Pleural effusion    Depression    Lupus (HCC)    Anxiety, generalized    Brain mass    Lung mass    Closed nondisplaced fracture of pelvis ([facility])        Past Medical History:   Diagnosis Date    Abnormal ECG     Abscess     Arrhythmia     Arthritis     Atrial fibrillation ([facility])     CAD with history of CABG 2019    Added automatically from request for surgery [id number]    Cancer (HCC)     skin    CHF (congestive heart failure) (HCC)     COPD (chronic obstructive pulmonary disease) (HCC)     Coronary artery disease     Cutaneous lupus erythematosus     Diabetes mellitus (HCC)     Elevated cholesterol     GERD (gastroesophageal reflux disease)     Heart  valve disease     Hypertension     Hypothyroidism, unspecified 7/30/2021    Myocardial infarction (HCC)     Other hyperlipidemia 9/8/2019    Paroxysmal atrial fibrillation (HCC) 7/19/2021    Sleep apnea         Past Surgical History:   Procedure Laterality Date    BRONCHOSCOPY N/A 2/28/2022    Procedure: BRONCHOSCOPY WITH ENDOBRONCHIAL ULTRASOUND, BAL, WASHINGS, BRUSHINGS, NEEDLE ASPIRATIONS, BIOPSIES;  Surgeon: Ankur Alcantara MD;  Location: [facility] ENDOSCOPY;  Service: Pulmonary;  Laterality: N/A;  LEFT HILAR MASS    CARDIAC CATHETERIZATION      CARDIAC DEFIBRILLATOR PLACEMENT      CARDIAC DEFIBRILLATOR PLACEMENT      COLONOSCOPY      CORONARY ARTERY BYPASS GRAFT N/A 9/9/2019    Procedure: INTRAOPERATIVE BUDDY, MIDLINE STENOTOMY WITH CORONARY ARTERY BYPASS GRAPHS X  3 UTILIZING LEFT SAVAGE  AND LEFT ENDOSCOPIC HARVESTED SAPHENOUS VEIN, LIGATION OF LEFT  ATRIAL APPENDAGE; PRP;  Surgeon: Alvaro Mazariegos MD;  Location: [facility];  Service: Cardiothoracic    LIPOMA EXCISION      NECK SURGERY      OTHER SURGICAL HISTORY      heart valve repair   not sure which valve    PACEMAKER IMPLANTATION      PORTACATH PLACEMENT N/A 3/10/2022    Procedure: INSERTION OF PORTACATH;  Surgeon: Chele Carl MD;  Location: [facility] OR [facility];  Service: General;  Laterality: N/A;         Physical Assessment:  Rash Left proximal thigh other (see comments) (Active)   Distribution generalized 03/11/22 1602   Color red 03/11/22 1602   Configuration/Shape round 03/11/22 1602   Borders diffuse;irregular;raised 03/11/22 1602   Characteristics itching;moist;raised;scaly 03/11/22 1602   Lesion Size greater than 1 cm 03/11/22 1602       Rash Right proximal thigh other (see comments) (Active)   Distribution generalized 03/11/22 1602   Color red 03/11/22 1602   Configuration/Shape round 03/11/22 1602   Borders irregular;diffuse;raised 03/11/22 1602   Characteristics itching;moist;raised;scaly 03/11/22 1602   Lesion Size greater than 1 cm 03/11/22  1602        Wound Check / Follow-up:  Wound Consult: Patient seen today for complaints of redness, rash to bilateral inner thighs and along posterior aspect of scrotum. Patient states it itches and burns and is not improving. Multiple different topicals even ointment from home has been tried. Area is red, rashy with raised dried rolling skin. Diffuse circular presentation along bilateral inner thighs. Redness and irritation noted to posterior scrotum. Patient denies any other affected areas and denies any other areas of concerns. Declines need for assessment of posterior aspect of body.     Impression: rash with red moist tissue to bilateral inner thighs and posterior scrotum    Short term goals:  skin care, topical treatment, regain skin integrity    Ching Koch RN    3/11/2022    16:04 EST

## 2022-03-11 NOTE — THERAPY TREATMENT NOTE
Acute Care - Physical Therapy Treatment Note  PETTY Renteria     Patient Name: Cedric Wade  : 1950  MRN: [id number]  Today's Date: 3/11/2022      Visit Dx:     ICD-10-CM ICD-9-CM   1. Lung mass  R91.8 786.6   2. Closed nondisplaced fracture of pelvis, unspecified part of pelvis, initial encounter ([facility])  S32.9XXA 808.8   3. Difficulty in walking  R26.2 719.7   4. Decreased activities of daily living (ADL)  Z78.9 V49.89     Patient Active Problem List   Diagnosis   • CAD with history of CABG   • COPD (chronic obstructive pulmonary disease) with chronic bronchitis ([facility])   • Microalbuminuria   • Type 2 diabetes mellitus with microalbuminuria ([facility])   • Other hyperlipidemia   • Essential hypertension   • H/O agent Orange exposure   • Ischemic cardiomyopathy   • Atrial fibrillation with RVR ([facility])   • Acute exacerbation of chronic obstructive pulmonary disease ([facility])   • Chronic obstructive pulmonary disease ([facility])   • Arthritis   • Polyarthritis   • Chronic systolic (congestive) heart failure ([facility])   • Diabetic renal disease ([facility])   • Essential tremor   • Heart failure ([facility])   • Acquired hypothyroidism   • S/P cervical spinal fusion   • Screening PSA (prostate specific antigen)   • Allergic rhinitis   • Seasonal allergies   • Tobacco abuse   • Nocturnal hypoxia   • Pleural effusion   • Depression   • Lupus ([facility])   • Anxiety, generalized   • Brain mass   • Lung mass   • Closed nondisplaced fracture of pelvis ([facility])     Past Medical History:   Diagnosis Date   • Abnormal ECG    • Abscess    • Arrhythmia    • Arthritis    • Atrial fibrillation ([facility])    • CAD with history of CABG 2019    Added automatically from request for surgery [id number]   • Cancer ([facility])     skin   • CHF (congestive heart failure) ([facility])    • COPD (chronic obstructive pulmonary disease) ([facility])    • Coronary artery disease    • Cutaneous lupus erythematosus    • Diabetes mellitus ([facility])    • Elevated cholesterol    • GERD (gastroesophageal reflux  disease)    • Heart valve disease    • Hypertension    • Hypothyroidism, unspecified 7/30/2021   • Myocardial infarction ([facility])    • Other hyperlipidemia 9/8/2019   • Paroxysmal atrial fibrillation ([facility]) 7/19/2021   • Sleep apnea      Past Surgical History:   Procedure Laterality Date   • BRONCHOSCOPY N/A 2/28/2022    Procedure: BRONCHOSCOPY WITH ENDOBRONCHIAL ULTRASOUND, BAL, WASHINGS, BRUSHINGS, NEEDLE ASPIRATIONS, BIOPSIES;  Surgeon: Ankur Alcantara MD;  Location: [facility] ENDOSCOPY;  Service: Pulmonary;  Laterality: N/A;  LEFT HILAR MASS   • CARDIAC CATHETERIZATION     • CARDIAC DEFIBRILLATOR PLACEMENT     • CARDIAC DEFIBRILLATOR PLACEMENT     • COLONOSCOPY     • CORONARY ARTERY BYPASS GRAFT N/A 9/9/2019    Procedure: INTRAOPERATIVE BUDDY, MIDLINE STENOTOMY WITH CORONARY ARTERY BYPASS GRAPHS X  3 UTILIZING LEFT SAVAGE  AND LEFT ENDOSCOPIC HARVESTED SAPHENOUS VEIN, LIGATION OF LEFT  ATRIAL APPENDAGE; PRP;  Surgeon: Alvaro Mazariegos MD;  Location: [facility] OR;  Service: Cardiothoracic   • LIPOMA EXCISION     • NECK SURGERY     • OTHER SURGICAL HISTORY      heart valve repair   not sure which valve   • PACEMAKER IMPLANTATION     • PORTACATH PLACEMENT N/A 3/10/2022    Procedure: INSERTION OF PORTACATH;  Surgeon: Chele Carl MD;  Location: [facility];  Service: General;  Laterality: N/A;     PT Assessment (last 12 hours)     PT Evaluation and Treatment     Row Name 03/11/22 1251          Physical Therapy Time and Intention    Subjective Information complains of;pain;weakness;dyspnea  -RH     Document Type therapy note (daily note)  -RH     Mode of Treatment physical therapy;individual therapy  -RH     Patient Effort fair  -     Row Name 03/11/22 6651          Pain    Posttreatment Pain Rating 6/10  -     Pain Location - Side/Orientation Bilateral  -RH     Pain Location - --  Pelvis  -RH     Pain Intervention(s) Emotional support;Nursing Notified  -     Row Name 03/11/22 1251          Mobility    Extremity  Weight-bearing Status left lower extremity;right lower extremity  -RH     Left Lower Extremity (Weight-bearing Status) weight-bearing as tolerated (WBAT)  -RH     Row Name 03/11/22 1251          Transfers    Transfers sit-stand transfer;stand-sit transfer  -RH     Maintains Weight-bearing Status (Transfers) able to maintain  -RH     Sit-Stand [place] (Transfers) contact guard  -RH     Stand-Sit [place] (Transfers) contact guard  -RH     Row Name 03/11/22 1251          Sit-Stand Transfer    Assistive Device (Sit-Stand Transfers) walker, front-wheeled  -RH     Row Name 03/11/22 1251          Stand-Sit Transfer    Assistive Device (Stand-Sit Transfers) walker, front-wheeled  -RH     Row Name 03/11/22 1251          Gait/Stairs (Locomotion)    Gait/Stairs Locomotion gait/ambulation independence;gait/ambulation assistive device;distance ambulated;gait pattern;gait deviations  -RH     [place] Level (Gait) contact guard  -RH     Assistive Device (Gait) walker, front-wheeled  -RH     Distance in Feet (Gait) 16  -RH     Pattern (Gait) 3-point;step-through  -RH     Deviations/Abnormal Patterns (Gait) base of support, narrow;jennifer decreased;gait speed decreased;antalgic  -RH     Row Name             Wound 09/09/19 0827 Other (See comments) chest Incision    Wound - Properties Group Placement Date: 09/09/19  -TL Placement Time: 0827 -TL Side: Other (See comments)  -TL Location: chest  -TL Primary Wound Type: Incision  -TL     Retired Wound - Properties Group Placement Date: 09/09/19  -TL Placement Time: 0827 -TL Side: Other (See comments)  -TL Location: chest  -TL Primary Wound Type: Incision  -TL     Retired Wound - Properties Group Date first assessed: 09/09/19  -TL Time first assessed: 0827 -TL Side: Other (See comments)  -TL Location: chest  -TL Primary Wound Type: Incision  -TL     Row Name 03/11/22 1251          Vital Signs    O2 Delivery Intra Treatment --  Pt on 3 liters of oxygen with nasal cannula.   -RH     Row Name 03/11/22 1251          Progress Summary (PT)    Progress Toward Functional Goals (PT) progress toward functional goals is gradual  -RH           User Key  (r) = Recorded By, (t) = Taken By, (c) = Cosigned By    Initials Name Provider Type    Saundra Mendoza RN Registered Nurse    RH Jordan Hannah PTA Physical Therapy Assistant                Physical Therapy Education                 Title: PT OT SLP Therapies (Done)     Topic: Physical Therapy (Done)     Point: Mobility training (Done)     Learning Progress Summary           Patient Acceptance, E,TB, VU by EV at 3/8/2022 2316    Acceptance, E,TB, VU by MARTHA at 3/8/2022 0903                   Point: Precautions (Done)     Learning Progress Summary           Patient Acceptance, E,TB, VU by EV at 3/8/2022 2316    Acceptance, E,TB, VU by MARTHA at 3/8/2022 0903                               User Key     Initials Effective Dates Name Provider Type Discipline    EV 06/16/21 -  Mariah Crawford RN Registered Nurse Nurse    MARTHA 06/03/21 -  Marciano Thayer, PT Physical Therapist PT              PT Recommendation and Plan     Progress Summary (PT)  Progress Toward Functional Goals (PT): progress toward functional goals is gradual   Outcome Measures     Row Name 03/11/22 1200 03/10/22 0900 03/09/22 1500       How much help from another person do you currently need...    Turning from your back to your side while in flat bed without using bedrails? 3  -RH 3  -RH 3  -RH    Moving from lying on back to sitting on the side of a flat bed without bedrails? 3  -RH 3  -RH 3  -RH    Moving to and from a bed to a chair (including a wheelchair)? 3  -RH 3  -RH 3  -RH    Standing up from a chair using your arms (e.g., wheelchair, bedside chair)? 3  -RH 3  -RH 3  -RH    Climbing 3-5 steps with a railing? 3  -RH 3  -RH 2  -RH    To walk in hospital room? 3  -RH 3  -RH 2  -RH    AM-PAC 6 Clicks Score (PT) 18  -RH 18  -RH 16  -RH          User Key  (r) = Recorded By,  (t) = Taken By, (c) = Cosigned By    Initials Name Provider Type    RH Jordan Hannah PTA Physical Therapy Assistant                 Time Calculation:    PT Charges     Row Name 03/11/22 1250             Time Calculation    PT Received On 03/11/22  -RH              Timed Charges    46387 - Gait Training Minutes  5  -RH      51476 - PT Therapeutic Activity Minutes 4  -RH              Total Minutes    Timed Charges Total Minutes 9  -RH       Total Minutes 9  -RH            User Key  (r) = Recorded By, (t) = Taken By, (c) = Cosigned By    Initials Name Provider Type     Jordan Hannah PTA Physical Therapy Assistant              Therapy Charges for Today     Code Description Service Date Service Provider Modifiers Qty    03622930637 HC PT THERAPEUTIC ACT EA 15 MIN 3/10/2022 Jordan Hannah PTA GP 1    09190146569 HC GAIT TRAINING EA 15 MIN 3/11/2022 Jordan Hannah PTA GP 1          PT G-Codes  Outcome Measure Options: AM-PAC 6 Clicks Daily Activity (OT), Optimal Instrument  AM-PAC 6 Clicks Score (PT): 18  AM-PAC 6 Clicks Score (OT): 18    Jordan Hannah PTA  3/11/2022

## 2022-03-11 NOTE — PROGRESS NOTES
[facility]   Progress Note    Patient Name: Cedric Wade  : 1950  MRN: [id number]  Primary Care Physician: Ean Farmer MD  Date of admission: 3/6/2022    Subjective   Subjective   HPI: Metastatic brain cancer, fall with pelvic fracture, patient was seen today in good spirits drinking and eating in bed his wife is at bedside, they questions about the plan for chemotherapy, they also had questions about why the cancer was not caught earlier and explained to them about lung cancer and how it usually caught it later stages unfortunately, also spoke with her hematologist oncologist about plan of care and chemotherapy options unfortunately that which will not be able to be done while he is in the hospital or in rehab,    Patient is status post Port-A-Cath placement yesterday, IV fluids have been stopped, discussed with nursing staff last night and again today,    Review of Systems   All systems were reviewed and negative except for: Pelvic pain, otherwise doing well anxious, no chest pain no shortness of breath,      Objective   Objective     Vitals:  Patient Vitals for the past 24 hrs:   BP Temp Temp src Pulse Resp SpO2   22 0729 116/71 97.6 °F (36.4 °C) Oral 56 14 100 %   22 0635 -- -- -- 54 16 97 %   22 0406 111/64 98.1 °F (36.7 °C) -- 58 18 100 %   22 0256 -- -- -- 66 16 97 %   03/10/22 2340 -- -- -- 64 16 98 %   03/10/22 2302 122/78 97.9 °F (36.6 °C) -- 55 18 100 %   03/10/22 1905 112/67 98.1 °F (36.7 °C) -- 62 20 99 %   03/10/22 1830 -- -- -- 66 16 98 %   03/10/22 1815 123/73 98.2 °F (36.8 °C) Oral 66 16 98 %   03/10/22 1615 142/87 97.4 °F (36.3 °C) Oral 68 18 98 %   03/10/22 1550 137/78 97.8 °F (36.6 °C) Temporal 54 20 95 %   03/10/22 1535 135/89 -- -- 68 16 90 %   03/10/22 1520 101/75 -- -- 66 14 98 %   03/10/22 1515 103/68 -- -- 67 16 97 %   03/10/22 1510 90/69 -- -- 70 14 97 %   03/10/22 1505 (!)  -- -- 73 12 97 %   03/10/22 1503 (!)  97.6 °F  (36.4 °C) Temporal 72 10 97 %   03/10/22 1256 148/83 97.2 °F (36.2 °C) Temporal 57 16 98 %   03/10/22 1138 131/70 98 °F (36.7 °C) Oral 58 18 100 %      Physical Exam   Lungs diminished breath sounds otherwise clear cardiac exam regular rhythm his abdomen soft nontender his lower legs showed no edema he is awake alert sitting up in the bed pleasant talkative no rashes, still having some pelvic pain with movement in the bed,      Result Review    Result Review:  I have personally reviewed the results from the time of this admission to 03/11/22 9:00 AM EST and agree with these findings:  [x]  Laboratory  []  Microbiology  [x]  Radiology  []  EKG/Telemetry   []  Cardiology/Vascular   []  Pathology  []  Old records  []  Other:      Active Hospital Meds:  Scheduled Meds:atorvastatin, 40 mg, Oral, Nightly  azithromycin, 250 mg, Oral, Every Other Day  benzonatate, 100 mg, Oral, TID  budesonide, 0.5 mg, Nebulization, BID - RT  carvedilol, 3.125 mg, Oral, BID With Meals  cetirizine, 10 mg, Oral, Daily  dexamethasone, 4 mg, Oral, Q12H  digoxin, 125 mcg, Oral, Daily  folic acid, 1,000 mcg, Oral, Daily  furosemide, 40 mg, Oral, Daily  hydroxychloroquine, 200 mg, Oral, Daily  insulin detemir, 8 Units, Subcutaneous, Nightly  insulin lispro, 0-7 Units, Subcutaneous, TID AC  ipratropium-albuterol, 3 mL, Nebulization, Q4H - RT  levETIRAcetam, 500 mg, Oral, BID  levothyroxine, 75 mcg, Oral, QAM  montelukast, 10 mg, Oral, Daily  pantoprazole, 40 mg, Oral, QAM  polyethylene glycol, 17 g, Oral, Daily  primidone, 250 mg, Oral, BID  senna-docusate sodium, 1 tablet, Oral, BID  sertraline, 25 mg, Oral, Daily  sodium chloride, 10 mL, Intravenous, Q12H  sodium chloride, 10 mL, Intravenous, Q12H  spironolactone, 25 mg, Oral, Daily  vitamin D, 50,000 Units, Oral, Weekly      Continuous Infusions:lactated ringers, 9 mL/hr      PRN Meds:.•  acetaminophen **OR** acetaminophen  •  bisacodyl  •  dextrose  •  dextrose  •  docusate sodium  •  glucagon  (human recombinant)  •  HYDROcodone-acetaminophen  •  ketorolac  •  lactated ringers  •  Morphine **AND** naloxone  •  ondansetron **OR** ondansetron  •  sodium chloride  •  sodium chloride    Assessment/Plan   Assessment / Plan     Active Hospital Problems:  Active Hospital Problems    Diagnosis    • **Lung mass      Added automatically from request for surgery [id number]     • Closed nondisplaced fracture of pelvis ([facility])        Assessment/Plan:     Nondisplaced inferior and superior left pubic rami fractures,, fall on same day of discharge last week, March 6, 2022, pain control adequate at this point, continue therapy evaluations, discussed going to rehab once he completes his radiation to the brain, also discussed with hematology oncology March 11 about finishing rehab before starting chemo,     Gait dysfunction, continue rehab efforts, left message for  to start looking into inpatient rehab March 10, 2022     Hyponatremia,, improved, stable March 11, restrict// decrease IV fluid rate perioperatively March 10, 2022, continue fluid restriction, BR NP levels are low,,, currently off IV fluids after Port-A-Cath placement, follow-up labs periodically     Underlying COPD stable with current breathing treatments, stable March 11    Elevated liver enzymes will need to trend--- new finding March 8, follow-up again March 10     Constipation issues we will add bowel regimen today March 8,     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, discussed with radiation oncology, continuing radiation therapy daily while in the hospital for whole brain radiation, will continue steroids at 4 mg twice a day,, appreciate hematology oncology for consultation for possible starting of chemo or immunotherapy,  Port-A-Cath placement March 10,      Congestive heart failure, severe systolic heart failure with defibrillator backup pacemaker in place, continue Aldactone, Lasix, carvedilol watch fluid  resuscitation efforts today closely March 10     Anxiety, controlled----     Essential tremor, continue primidone     Hyperlipidemia continues on moderate dose statin therapy,     Hypothyroid continue levothyroxine     Rheumatoid arthritis was on Plaquenil, continuing for now, may need to stop anticipating chemotherapy,?     Type 2 diabetes, continue sliding scale plus low-dose Levemir blood sugars are fairly well controlled currently,, watch for low blood sugars,     Vitamin D deficiency     Seizure type disorder currently on Keppra due to the brain tumors,        Prior admission and discharge summary from March 6, 2022:        Hiccups, now with nausea vomiting,  continue Thorazine March 2, 2022, Reglan and Phenergan added March 3, some improvement overall, continue GI prophylaxis also with IV Pepcid,, improved, will send home with some as needed Thorazine, he already has GI prophylactic medications at home     Metastatic brain lesions.,  [facility] biopsy shows adenocarcinoma most likely lung, for whole brain radiation starting March 2, discussed with pulmonology and oncology March 2, also discussed with patient wife at bedside today,, and patient March 3 ------CT scan abdomen pelvis chest reviewed, appreciate Dr. Larry Bliss for second opinion, changed to oral steroids, oral medications March 1 and 2, continues  neurochecks, seizure precautions etc., I am holding his anticoagulation due to the brain lesions and potential need for biopsy in the near future,and GI prophylaxis will be instituted---> cardiology feels we could restart his anticoagulation at this point, may need to dose adjust slightly for bleeding risks etc.,, cardiology's notes state holding Eliquis is okay for now and just continuing aspirin which we will do at time of discharge     Partial seizure.  Patient seizure-free thus far in the hospital, he is maintained on seizure medication, on iv Keppra as of March 1, no seizure activity reported overnight,  transition to oral meds once he is tolerating fluids and intake better, and will transition to oral medicines at time of discharge today March 5     Elevated blood sugars not unexpected with steroids, will add Levemir plus sliding scale insulin coverage, improved, March 4     Hyponatremia --trend, March 2, fluctuating but stable March 4, and fifth, at 133     A. fib with rapid ventricular response developing after coughing spell morning of March 1,, back in sinus rhythm now over the past couple days,--- since March 1 through March 2, 3,, fourth 2022,, ----, was given Cardizem, dig, IV Lopressor yesterday, morning March 1,, with additional doses by cardiology, did not tolerate Cardizem due to blood pressure issues, not able to take amiodarone due to previous reaction, has now converted, holding diuretics March 1 and 2,----------- cardiology was consulted, --------------------in the emergency room, pacemaker was interrogated and felt to be stable at the current rate, backup of 40,, his heart rate was lower in the emergency room into the 40s and 50s at times, appears to be sinus on EKG, initially.  ----Appreciate cardiology's input,---- echo February 27, 2022 reviewed with moderate global hypokinesis ejection fraction 35% diastolic dysfunction present with mild mitral regurgitation,, will restart carvedilol 3.125 twice a day at time of discharge to help with rate control and afterload reduction in congestive heart failure     Chronic systolic heart failure.  Patient's BNP was only 1.4K on admit which appears to be his baseline.  BNP was 20K back in 2019 when he first presented prior to AICD placement.  His EF was 20% then, his last echo was in 5/21 and EF improved to 30%.  Current echo as above, will continue on Aldactone and diuretics.  He had angioedema to ACE inhibitors, not sure about ARB's.  Unclear whether patient would benefit from low-dose carvedilol i.e. 3.125 twice a day or low-dose Toprol     COPD  exacerbation.    Will continue duo nebs, Singulair,, steroids, Pulmicort nebs, but stop Brovana nebs, due to A. fib March 1, patient has been on continuous Zithromax for several months, will stop this also due to nausea for now, could restart as an outpatient we will send in a new prescription for nebulizer machine and a refill of his ProAir inhaler that he is requesting at time of discharge     Tremulousness.  Discussed this patient this is related to the steroids on top of the bronchodilators.  Ativan written for 2/27.,  Stable,, improved March 2,, and fourth     Essential tremors continues on primidone,, currently worse due to steroids etc. breathing treatments etc.     Anxiety depression continues on Zoloft, overall clinically better still has some emotional episodes when talking with family members etc.,     Vitamin D deficiency continues replacement     Hypothyroid--we will restart home medication at time of discharge today March 5, did receive IV levothyroxine IV for now at lower doses until oral intake better, due to nausea and vomiting over the past couple days,     Rheumatoid arthritis has been on Plaquenil will continue low-dose 200 mg daily     DVT prophylaxis.  Via mechanical means for now pending surgical intervention.     PUD prophylaxis.  Via p.o. H2 blocker.,  Patient states GI reflux is worse, will transition to proton pump inhibitor twice a day March 1, 2022, adding Thorazine for hiccups March 2,               CODE STATUS:    Code Status and Medical Interventions:   Ordered at: 03/10/22 1623     Code Status (Patient has no pulse and is not breathing):    CPR (Attempt to Resuscitate)     Medical Interventions (Patient has pulse or is breathing):    Full       Electronically signed by Ean Farmer MD, 03/11/22, 9:00 AM EST.

## 2022-03-11 NOTE — PLAN OF CARE
Goal Outcome Evaluation:  Plan of Care Reviewed With: patient, spouse        Progress: improving  Patient doing okay today. Medicated for pain x3. Radiation therapy done today. Patient has two more rounds of radiation to go. Patient had two Bms today. No other issues.    Problem: Adult Inpatient Plan of Care  Goal: Plan of Care Review  Outcome: Ongoing, Progressing  Flowsheets (Taken 3/11/2022 1743)  Progress: improving  Plan of Care Reviewed With:   patient   spouse  Goal: Patient-Specific Goal (Individualized)  Outcome: Ongoing, Progressing  Goal: Absence of Hospital-Acquired Illness or Injury  Outcome: Ongoing, Progressing  Intervention: Identify and Manage Fall Risk  Recent Flowsheet Documentation  Taken 3/11/2022 0810 by Riley Cancino RN  Safety Promotion/Fall Prevention:   assistive device/personal items within reach   fall prevention program maintained   nonskid shoes/slippers when out of bed   safety round/check completed  Intervention: Prevent Skin Injury  Recent Flowsheet Documentation  Taken 3/11/2022 0810 by Riley Cancino RN  Body Position: position changed independently  Intervention: Prevent and Manage VTE (Venous Thromboembolism) Risk  Recent Flowsheet Documentation  Taken 3/11/2022 0810 by Riley Cancino RN  Activity Management: activity adjusted per tolerance  VTE Prevention/Management:   bilateral   sequential compression devices on  Range of Motion: active ROM (range of motion) encouraged  Intervention: Prevent Infection  Recent Flowsheet Documentation  Taken 3/11/2022 0810 by Riley Cancino RN  Infection Prevention:   environmental surveillance performed   hand hygiene promoted   rest/sleep promoted   single patient room provided  Goal: Optimal Comfort and Wellbeing  Outcome: Ongoing, Progressing  Intervention: Monitor Pain and Promote Comfort  Recent Flowsheet Documentation  Taken 3/11/2022 1226 by Riley Cancino RN  Pain Management Interventions: see MAR  Taken 3/11/2022  0810 by Riley Cancino, RN  Pain Management Interventions: see MAR  Intervention: Provide Person-Centered Care  Recent Flowsheet Documentation  Taken 3/11/2022 0810 by Riley Cancino, RN  Trust Relationship/Rapport:   care explained   emotional support provided   empathic listening provided   questions answered   questions encouraged   thoughts/feelings acknowledged  Goal: Readiness for Transition of Care  Outcome: Ongoing, Progressing

## 2022-03-11 NOTE — PLAN OF CARE
Goal Outcome Evaluation:  Plan of Care Reviewed With: patient        Progress: improving     No new changes throughout the night, VSS. PRN Norco given for pelvic pain. Pt should be going for radiation today. Incentive spirometer education taught and pt demonstrated how to use, understands well. Port a cath dressing c/d/i, no complications noted.

## 2022-03-11 NOTE — PROGRESS NOTES
[facility]     Progress Note    Patient Name: Cedric Wade  : 1950  MRN: [id number]  Primary Care Physician:  Ean Farmer MD  Date of admission: 3/6/2022    Subjective   Subjective     Chief Complaint: Patient with metastatic non-small cell lung cancer with metastases to the brain he fell down and sustained a fracture of the pelvis and is still receiving radiation therapy to the brain which will be continued until Tuesday I have discussed with the family regarding the further work-up including a PET scan to be done as an outpatient patient states that if he is able to walk then he might be able to go home wife is also trying to get some help and she feels that she should be able to get that filled in the meantime waiting for the molecular studies which are not back as yet he already has had Port-A-Cath inserted he will probably require either chemotherapy or combination of chemo immunotherapy or only immunotherapy depending upon the results of molecular studies he will not be a candidate for TKI because those studies are negative    HPI:  Patient reports with metastatic adenocarcinoma with metastases to the brain with history of fall and fracture of the pelvis    Review of Systems   All systems were reviewed and negative except for: Reviewed    Objective   Objective     Vitals:   Temp:  [97.2 °F (36.2 °C)-98.2 °F (36.8 °C)] 97.6 °F (36.4 °C)  Heart Rate:  [54-73] 56  Resp:  [10-20] 14  BP: ()/(64-89) 116/71  Flow (L/min):  [2-4] 2  FiO2 (%):  [0.4 %] 0.4 %    Physical Exam    Constitutional: Awake, alert   Eyes: PERRLA, sclerae anicteric, no conjunctival injection   HENT: NCAT, mucous membranes moist   Neck: Supple, no thyromegaly, no lymphadenopathy, trachea midline   Respiratory: Clear to auscultation bilaterally, nonlabored respirations    Cardiovascular: RRR, no murmurs, rubs, or gallops, palpable pedal pulses bilaterally   Gastrointestinal: Positive bowel sounds, soft,  nontender, nondistended   Musculoskeletal: No bilateral ankle edema, no clubbing or cyanosis to extremities   Psychiatric: Appropriate affect, cooperative   Neurologic: Oriented x 3, strength symmetric in all extremities, Cranial Nerves grossly intact to confrontation, speech clear   Skin: No rashes   No significant new findings  Result Review    Result Review:  I have personally reviewed the results from the time of this admission to 3/11/2022 10:49 EST and agree with these findings:  []  Laboratory  []  Microbiology  [x]  Radiology pelvic fracture metastatic cancer  []  EKG/Telemetry   []  Cardiology/Vascular   []  Pathology  []  Old records  []  Other:  Most notable findings include: Metastatic cancer to the brain    Assessment/Plan   Assessment / Plan     Brief Patient Summary:  Cedric Wade is a 71 y.o. male who patient admitted now because of fracture of the pelvis has not been able to ambulate he feels that he is feeling better he could walk from the bed to the chair    Active Hospital Problems:  Active Hospital Problems    Diagnosis    • **Lung mass      Added automatically from request for surgery [id number]     • Closed nondisplaced fracture of pelvis (HCC)        Plan:   Continue physical therapy radiation therapy decisions regarding the systemic therapy still pending    DVT prophylaxis:  Mechanical DVT prophylaxis orders are present.    CODE STATUS:   Code Status (Patient has no pulse and is not breathing): CPR (Attempt to Resuscitate)  Medical Interventions (Patient has pulse or is breathing): Full    Disposition:  I expect patient to be discharged when the patient is able to ambulate possible rehab.    Electronically signed by Patrice Rodriguez MD, 03/11/22, 10:49 AM EST.

## 2022-03-11 NOTE — NURSING NOTE
Patient is alert and oriented and in stable condition. Introduced myself and told him I was going to take over from Riley MARTINEZ. He denies pain medicine and is visiting with family. Will monitor.

## 2022-03-12 NOTE — PLAN OF CARE
Goal Outcome Evaluation:       Pain well controlled today. Wife at bedside. Patient has magic lotion if needed for his legs and bony areas. Urinal with in reach and used today. Ambulated to the chair and sat up in the chair most of the day. No complaints voiced.

## 2022-03-12 NOTE — THERAPY TREATMENT NOTE
Acute Care - Physical Therapy Treatment Note  PETTY Renteria     Patient Name: Cedric Wade  : 1950  MRN: [id number]  Today's Date: 3/12/2022      Visit Dx:     ICD-10-CM ICD-9-CM   1. Lung mass  R91.8 786.6   2. Closed nondisplaced fracture of pelvis, unspecified part of pelvis, initial encounter ([facility])  S32.9XXA 808.8   3. Difficulty in walking  R26.2 719.7   4. Decreased activities of daily living (ADL)  Z78.9 V49.89     Patient Active Problem List   Diagnosis   • CAD with history of CABG   • COPD (chronic obstructive pulmonary disease) with chronic bronchitis ([facility])   • Microalbuminuria   • Type 2 diabetes mellitus with microalbuminuria ([facility])   • Other hyperlipidemia   • Essential hypertension   • H/O agent Orange exposure   • Ischemic cardiomyopathy   • Atrial fibrillation with RVR ([facility])   • Acute exacerbation of chronic obstructive pulmonary disease ([facility])   • Chronic obstructive pulmonary disease ([facility])   • Arthritis   • Polyarthritis   • Chronic systolic (congestive) heart failure ([facility])   • Diabetic renal disease ([facility])   • Essential tremor   • Heart failure ([facility])   • Acquired hypothyroidism   • S/P cervical spinal fusion   • Screening PSA (prostate specific antigen)   • Allergic rhinitis   • Seasonal allergies   • Tobacco abuse   • Nocturnal hypoxia   • Pleural effusion   • Depression   • Lupus ([facility])   • Anxiety, generalized   • Brain mass   • Lung mass   • Closed nondisplaced fracture of pelvis ([facility])     Past Medical History:   Diagnosis Date   • Abnormal ECG    • Abscess    • Arrhythmia    • Arthritis    • Atrial fibrillation ([facility])    • CAD with history of CABG 2019    Added automatically from request for surgery [id number]   • Cancer ([facility])     skin   • CHF (congestive heart failure) ([facility])    • COPD (chronic obstructive pulmonary disease) ([facility])    • Coronary artery disease    • Cutaneous lupus erythematosus    • Diabetes mellitus ([facility])    • Elevated cholesterol    • GERD (gastroesophageal reflux  disease)    • Heart valve disease    • Hypertension    • Hypothyroidism, unspecified 7/30/2021   • Myocardial infarction (HCC)    • Other hyperlipidemia 9/8/2019   • Paroxysmal atrial fibrillation (HCC) 7/19/2021   • Sleep apnea      Past Surgical History:   Procedure Laterality Date   • BRONCHOSCOPY N/A 2/28/2022    Procedure: BRONCHOSCOPY WITH ENDOBRONCHIAL ULTRASOUND, BAL, WASHINGS, BRUSHINGS, NEEDLE ASPIRATIONS, BIOPSIES;  Surgeon: Ankur Alcantara MD;  Location: [facility] ENDOSCOPY;  Service: Pulmonary;  Laterality: N/A;  LEFT HILAR MASS   • CARDIAC CATHETERIZATION     • CARDIAC DEFIBRILLATOR PLACEMENT     • CARDIAC DEFIBRILLATOR PLACEMENT     • COLONOSCOPY     • CORONARY ARTERY BYPASS GRAFT N/A 9/9/2019    Procedure: INTRAOPERATIVE BUDDY, MIDLINE STENOTOMY WITH CORONARY ARTERY BYPASS GRAPHS X  3 UTILIZING LEFT SAVAGE  AND LEFT ENDOSCOPIC HARVESTED SAPHENOUS VEIN, LIGATION OF LEFT  ATRIAL APPENDAGE; PRP;  Surgeon: Alvaro Mazariegos MD;  Location: [facility] OR;  Service: Cardiothoracic   • LIPOMA EXCISION     • NECK SURGERY     • OTHER SURGICAL HISTORY      heart valve repair   not sure which valve   • PACEMAKER IMPLANTATION     • PORTACATH PLACEMENT N/A 3/10/2022    Procedure: INSERTION OF PORTACATH;  Surgeon: Chele Carl MD;  [place] [facility] [place] [facility]  Service: General;  Laterality: N/A;     PT Assessment (last 12 hours)     PT Evaluation and Treatment     Row Name 03/12/22 1407          Physical Therapy Time and Intention    Subjective Information complains of;pain  -TS     Document Type therapy note (daily note)  -TS     Mode of Treatment physical therapy;individual therapy  -TS     Row Name 03/12/22 1407          Pain    Pretreatment Pain Rating 7/10  -TS     Pain Location - Side/Orientation Left  -TS     Pain Location - hip  -TS     Row Name 03/12/22 1407          Cognition    Affect/Mental Status (Cognition) WFL  -TS     Row Name 03/12/22 1407          Mobility    Extremity Weight-bearing Status left lower  extremity;right lower extremity  -TS     Left Lower Extremity (Weight-bearing Status) weight-bearing as tolerated (WBAT)  -TS     Row Name 03/12/22 1407          Bed Mobility    Bed Mobility supine-sit  -TS     All Activities, Gem (Bed Mobility) contact guard  -TS     Supine-Sit Gem (Bed Mobility) contact guard  -TS     Bed Mobility, Safety Issues decreased use of legs for bridging/pushing  -TS     Assistive Device (Bed Mobility) bed rails  -TS     Row Name 03/12/22 1407          Transfers    Transfers sit-stand transfer;stand-sit transfer  -TS     Bed-Chair Gem (Transfers) contact guard  -TS     Assistive Device (Bed-Chair Transfers) walker, front-wheeled  -TS     Sit-Stand Gem (Transfers) contact guard  -TS     Stand-Sit Gem (Transfers) contact guard  -TS     Row Name 03/12/22 1407          Sit-Stand Transfer    Assistive Device (Sit-Stand Transfers) walker, front-wheeled  -TS     Row Name 03/12/22 1407          Stand-Sit Transfer    Assistive Device (Stand-Sit Transfers) walker, front-wheeled  -TS     Row Name 03/12/22 1407          Gait/Stairs (Locomotion)    Gait/Stairs Locomotion gait/ambulation independence;gait/ambulation assistive device;distance ambulated;gait pattern;gait deviations  -TS     Gem Level (Gait) contact guard  -TS     Assistive Device (Gait) walker, front-wheeled  -TS     Distance in Feet (Gait) 70  -TS     Pattern (Gait) 3-point;step-through  -TS     Deviations/Abnormal Patterns (Gait) base of support, narrow;jennifer decreased;gait speed decreased;antalgic  -TS     Row Name 03/12/22 1407          Safety Issues, Functional Mobility    Impairments Affecting Function (Mobility) balance;pain;endurance/activity tolerance;strength  -TS     Row Name 03/12/22 1407          Motor Skills    Therapeutic Exercise hip;knee;ankle  AAROM x20 reps, supine position  -TS     Row Name             Wound 09/09/19 0827 Other (See comments) chest Incision     Wound - Properties Group Placement Date: 09/09/19  -TL Placement Time: 0827 -TL Side: Other (See comments)  -TL Location: chest  -TL Primary Wound Type: Incision  -TL     Retired Wound - Properties Group Placement Date: 09/09/19  -TL Placement Time: 0827 -TL Side: Other (See comments)  -TL Location: chest  -TL Primary Wound Type: Incision  -TL     Retired Wound - Properties Group Date first assessed: 09/09/19  -TL Time first assessed: 0827 -TL Side: Other (See comments)  -TL Location: chest  -TL Primary Wound Type: Incision  -TL     Row Name 03/12/22 1407          Vital Signs    O2 Delivery Pre Treatment nasal cannula  2L  -TS     Row Name 03/12/22 1407          Positioning and Restraints    Pre-Treatment Position in bed  -TS     Post Treatment Position chair  -TS     In Chair reclined;call light within reach;exit alarm on;heels elevated  -TS     Row Name 03/12/22 1407          Progress Summary (PT)    Progress Toward Functional Goals (PT) progress toward functional goals is good  -TS           User Key  (r) = Recorded By, (t) = Taken By, (c) = Cosigned By    Initials Name Provider Type    TL Saundra Lynn, RN Registered Nurse    Devan Diaz PTA Physical Therapy Assistant            Bilateral Lower Extremity   Exercise  Reps  Sets    Glut sets  10 2   Heel slides  10 2   Ankle pumps  10 2   Quad sets  10 2   Hip ab/adduction  10 2               Physical Therapy Education                 Title: PT OT SLP Therapies (Done)     Topic: Physical Therapy (Done)     Point: Mobility training (Done)     Learning Progress Summary           Patient Acceptance, E,TB, VU by JOSE at 3/8/2022 2316    Acceptance, E,TB, VU by MARTHA at 3/8/2022 0903                   Point: Precautions (Done)     Learning Progress Summary           Patient Acceptance, E,TB, VU by EV at 3/8/2022 2316    Acceptance, E,TB, VU by MARTHA at 3/8/2022 0903                               User Key     Initials Effective Dates Name Provider Type  Discipline    EV 06/16/21 -  Mariah Crawford, RN Registered Nurse Nurse    MARTHA 06/03/21 -  Marciano Thayer PT Physical Therapist PT              PT Recommendation and Plan     Progress Summary (PT)  Progress Toward Functional Goals (PT): progress toward functional goals is good   Outcome Measures     Row Name 03/12/22 1411 03/11/22 1200 03/10/22 0900       How much help from another person do you currently need...    Turning from your back to your side while in flat bed without using bedrails? 3  -TS 3  -RH 3  -RH    Moving from lying on back to sitting on the side of a flat bed without bedrails? 3  -TS 3  -RH 3  -RH    Moving to and from a bed to a chair (including a wheelchair)? 3  -TS 3  -RH 3  -RH    Standing up from a chair using your arms (e.g., wheelchair, bedside chair)? 3  -TS 3  -RH 3  -RH    Climbing 3-5 steps with a railing? 3  -TS 3  -RH 3  -RH    To walk in hospital room? 3  -TS 3  -RH 3  -RH    AM-PAC 6 Clicks Score (PT) 18  -TS 18  -RH 18  -RH    Row Name 03/09/22 1500             How much help from another person do you currently need...    Turning from your back to your side while in flat bed without using bedrails? 3  -RH      Moving from lying on back to sitting on the side of a flat bed without bedrails? 3  -RH      Moving to and from a bed to a chair (including a wheelchair)? 3  -RH      Standing up from a chair using your arms (e.g., wheelchair, bedside chair)? 3  -RH      Climbing 3-5 steps with a railing? 2  -RH      To walk in hospital room? 2  -RH      AM-PAC 6 Clicks Score (PT) 16  -RH            User Key  (r) = Recorded By, (t) = Taken By, (c) = Cosigned By    Initials Name Provider Type    Devan Diaz PTA Physical Therapy Assistant    Jordan Weber PTA Physical Therapy Assistant                 Time Calculation:    PT Charges     Row Name 03/12/22 1406             Time Calculation    PT Received On 03/12/22  -TS      PT Goal Re-Cert Due Date 03/17/22  -TS              Timed  Charges    64946 - PT Therapeutic Exercise Minutes 15  -TS      95828 - Gait Training Minutes  9  -TS      06154 - PT Therapeutic Activity Minutes 4  -TS              Total Minutes    Timed Charges Total Minutes 28  -TS       Total Minutes 28  -TS            User Key  (r) = Recorded By, (t) = Taken By, (c) = Cosigned By    Initials Name Provider Type    TS Devan Ward PTA Physical Therapy Assistant              Therapy Charges for Today     Code Description Service Date Service Provider Modifiers Qty    69034880486 HC PT THER PROC EA 15 MIN 3/12/2022 Devan Ward PTA GP 1    09584795802 HC GAIT TRAINING EA 15 MIN 3/12/2022 eDvan Ward PTA GP 1          PT G-Codes  Outcome Measure Options: AM-PAC 6 Clicks Daily Activity (OT), Optimal Instrument  AM-PAC 6 Clicks Score (PT): 18  AM-PAC 6 Clicks Score (OT): 18    Devan Ward PTA  3/12/2022

## 2022-03-12 NOTE — PROGRESS NOTES
[facility]     Progress Note    Patient Name: Cedric Wade  : 1950  MRN: [id number]  Primary Care Physician:  Ean Farmer MD  Date of admission: 3/6/2022    Subjective   Subjective     Chief Complaint: Patient stable and doing well he states that he was able to walk more comfortable does not have any constipation as he was complaining yesterday plan is to continue his radiation therapy the last 1 is going to be on Tuesday probably will be discharged on Wednesday and the following week I will see him in the office that time a PET scan will be scheduled and patient hopefully will get the results of molecular studies which are still pending    HPI:  Patient reports been admitted because of history of fall pelvic fracture she has brain metastases getting radiation therapy to the brain    Review of Systems   All systems were reviewed and negative except for: Reviewed    Objective   Objective     Vitals:   Temp:  [97.9 °F (36.6 °C)-98.2 °F (36.8 °C)] 98.2 °F (36.8 °C)  Heart Rate:  [60-79] 62  Resp:  [16-18] 18  BP: (108-127)/(53-74) 117/74  Flow (L/min):  [2-3] 2    Physical Exam    Constitutional: Awake, alert   Eyes: PERRLA, sclerae anicteric, no conjunctival injection   HENT: NCAT, mucous membranes moist   Neck: Supple, no thyromegaly, no lymphadenopathy, trachea midline   Respiratory: Clear to auscultation bilaterally, nonlabored respirations    Cardiovascular: RRR, no murmurs, rubs, or gallops, palpable pedal pulses bilaterally   Gastrointestinal: Positive bowel sounds, soft, nontender, nondistended   Musculoskeletal: No bilateral ankle edema, no clubbing or cyanosis to extremities   Psychiatric: Appropriate affect, cooperative   Neurologic: Oriented x 3, strength symmetric in all extremities, Cranial Nerves grossly intact to confrontation, speech clear   Skin: No rashes     Result Review    Result Review:  I have personally reviewed the results from the time of this admission to  3/12/2022 11:32 EST and agree with these findings:  []  Laboratory  []  Microbiology  [x]  Radiology read fracture and brain metastasis  []  EKG/Telemetry   []  Cardiology/Vascular   []  Pathology  []  Old records  []  Other:  Most notable findings include: Fracture brain metastases lung cancer    Assessment/Plan   Assessment / Plan     Brief Patient Summary:  Cedric Wade is a 71 y.o. male who patient admitted because of fall and inability to walk but is improving with physical therapy duration therapy in progress    Active Hospital Problems:  Active Hospital Problems    Diagnosis    • **Lung mass      Added automatically from request for surgery [id number]     • Closed nondisplaced fracture of pelvis (HCC)        Plan:   Patient will continue with his radiation treatment he will be seen as an outpatient to schedule for PET scan piquantly immunotherapy or combination will be scheduled pending on the molecular studies    DVT prophylaxis:  Mechanical DVT prophylaxis orders are present.    CODE STATUS:   Code Status (Patient has no pulse and is not breathing): CPR (Attempt to Resuscitate)  Medical Interventions (Patient has pulse or is breathing): Full    Disposition:  I expect patient to be discharged the patient has completed his radiation therapy.    Electronically signed by Patrice Rodriguez MD, 03/12/22, 11:32 AM EST.

## 2022-03-12 NOTE — PLAN OF CARE
Goal Outcome Evaluation:   No significant changes throughout shift. Medicated for pain x1. No concerns or complaints noted.

## 2022-03-12 NOTE — PROGRESS NOTES
[facility]   Progress Note    Patient Name: Cedric Wade  : 1950  MRN: [id number]  Primary Care Physician: Ean Farmer MD  Date of admission: 3/6/2022    Subjective   Subjective   HPI: Fall with pelvic fracture, pain,  Patient seems to be doing well today pain is a little bit better controlled he would like to go home with home health after he finishes his radiation to the brain, discussed with oncology yesterday about plan of care to start chemo once he is finished his therapy but if he does home health he could probably start chemo a little bit sooner, Port-A-Cath has been placed he seems to be doing well he is very pleasant talkative sitting up in the bed        Review of Systems   All systems were reviewed and negative except for: Pelvic pain, no nausea or vomiting no chest pains breathing is comfortable for him,      Objective   Objective     Vitals:  Patient Vitals for the past 24 hrs:   BP Temp Temp src Pulse Resp SpO2   22 0811 -- -- -- 62 18 --   22 0805 -- -- -- 60 18 98 %   22 0700 117/74 98.2 °F (36.8 °C) Oral 65 16 99 %   22 0301 -- -- -- 66 16 96 %   22 0252 127/69 97.9 °F (36.6 °C) Oral 69 18 100 %   22 2301 -- -- -- 69 18 99 %   22 2253 109/53 98.2 °F (36.8 °C) Oral 60 18 100 %   22 1938 108/62 98.2 °F (36.8 °C) Oral 69 18 98 %   22 1858 -- -- -- 79 18 98 %   22 1505 -- -- -- 77 16 99 %   22 1116 116/71 97.9 °F (36.6 °C) Oral 63 16 100 %      Physical Exam   Lungs diminished breath sounds cardiac exam regular rhythm, abdomen soft nontender slightly protuberant, lower extremities no edema no rashes, he sitting up he is got a little bit of tremor to his hands and arms, but he is pleasant talkative alert      Result Review    Result Review:  I have personally reviewed the results from the time of this admission to 22 8:52 AM EST and agree with these findings:  [x]  Laboratory  []  Microbiology  []   Radiology  []  EKG/Telemetry   []  Cardiology/Vascular   []  Pathology  []  Old records  []  Other:      Active Hospital Meds:  Scheduled Meds:atorvastatin, 40 mg, Oral, Nightly  azithromycin, 250 mg, Oral, Every Other Day  benzonatate, 100 mg, Oral, TID  budesonide, 0.5 mg, Nebulization, BID - RT  carvedilol, 3.125 mg, Oral, BID With Meals  cetirizine, 10 mg, Oral, Daily  dexamethasone, 4 mg, Oral, Q12H  digoxin, 125 mcg, Oral, Daily  folic acid, 1,000 mcg, Oral, Daily  furosemide, 40 mg, Oral, Daily  hydrocortisone-bacitracin-zinc oxide-nystatin, 1 application, Topical, 4x Daily  hydroxychloroquine, 200 mg, Oral, Daily  insulin detemir, 8 Units, Subcutaneous, Nightly  insulin lispro, 0-7 Units, Subcutaneous, TID AC  ipratropium-albuterol, 3 mL, Nebulization, Q4H - RT  levETIRAcetam, 500 mg, Oral, BID  levothyroxine, 75 mcg, Oral, QAM  montelukast, 10 mg, Oral, Daily  pantoprazole, 40 mg, Oral, QAM  polyethylene glycol, 17 g, Oral, Daily  primidone, 250 mg, Oral, BID  senna-docusate sodium, 1 tablet, Oral, BID  sertraline, 25 mg, Oral, Daily  sodium chloride, 10 mL, Intravenous, Q12H  sodium chloride, 10 mL, Intravenous, Q12H  spironolactone, 25 mg, Oral, Daily  vitamin D, 50,000 Units, Oral, Weekly      Continuous Infusions:   PRN Meds:.•  acetaminophen **OR** acetaminophen  •  bisacodyl  •  dextrose  •  dextrose  •  docusate sodium  •  glucagon (human recombinant)  •  HYDROcodone-acetaminophen  •  ketorolac  •  Morphine **AND** naloxone  •  ondansetron **OR** ondansetron  •  sodium chloride  •  sodium chloride    Assessment/Plan   Assessment / Plan     Active Hospital Problems:  Active Hospital Problems    Diagnosis    • **Lung mass      Added automatically from request for surgery [id number]     • Closed nondisplaced fracture of pelvis (HCC)        Assessment/Plan:     Nondisplaced inferior and superior left pubic rami fractures,, fall on same day of discharge last week, March 6, 2022, pain control adequate at this  point, continue therapy evaluations, discussed going to rehab once he completes his radiation to the brain, also discussed with hematology oncology March 11 about finishing rehab before starting chemo, patient would like to try home health which would expedite his chemo start date, will follow-up labs again on Monday, finishes radiation on Tuesday     Gait dysfunction, continue rehab efforts, left message for  to start looking into inpatient rehab March 10, 2022, patient may want to go home with home health?  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, discussed with radiation oncology, continuing radiation therapy daily while in the hospital for whole brain radiation, will continue steroids at 4 mg twice a day,, appreciate hematology oncology for consultation for possible starting of chemo or immunotherapy,  Port-A-Cath placement March 10,      Congestive heart failure, severe systolic heart failure with defibrillator backup pacemaker in place, continue Aldactone, Lasix, carvedilol watch fluid resuscitation efforts today closely March 10     Anxiety, controlled----     Essential tremor, continue primidone     Hyperlipidemia continues on moderate dose statin therapy,     Hypothyroid continue levothyroxine     Rheumatoid arthritis was on Plaquenil, continuing for now, may need to stop anticipating chemotherapy,?     Type 2 diabetes, continue sliding scale plus low-dose Levemir  blood sugars are fairly well controlled currently,, watch for low blood sugars,     Vitamin D deficiency     Seizure type disorder currently on Keppra due to the brain tumors,        Prior admission and discharge summary from March 6, 2022:        Hiccups, now with nausea vomiting,  continue Thorazine March 2, 2022, Reglan and Phenergan added March 3, some improvement overall, continue GI prophylaxis also with IV Pepcid,, improved, will send home with some as needed Thorazine, he already has GI prophylactic medications at home     Metastatic brain lesions.,  [facility] biopsy shows adenocarcinoma most likely lung, for whole brain radiation starting March 2, discussed with pulmonology and oncology March 2, also discussed with patient wife at bedside today,, and patient March 3 ------CT scan abdomen pelvis chest reviewed, appreciate Dr. Larry Bliss for second opinion, changed to oral steroids, oral medications March 1 and 2, continues  neurochecks, seizure precautions etc., I am holding his anticoagulation due to the brain lesions and potential need for biopsy in the near future,and GI prophylaxis will be instituted---> cardiology feels we could restart his anticoagulation at this point, may need to dose adjust slightly for bleeding risks etc.,, cardiology's notes state holding Eliquis is okay for now and just continuing aspirin which we will do at time of discharge     Partial seizure.  Patient seizure-free thus far in the hospital, he is maintained on seizure medication, on iv Keppra as of March 1, no seizure activity reported overnight, transition to oral meds once he is tolerating fluids and intake better, and will transition to oral medicines at time of discharge today March 5     Elevated blood sugars not unexpected with steroids, will add Levemir plus sliding scale insulin coverage, improved, March 4     Hyponatremia --trend, March 2, fluctuating but stable March 4, and fifth, at 133     A. fib with rapid  ventricular response developing after coughing spell morning of March 1,, back in sinus rhythm now over the past couple days,--- since March 1 through March 2, 3,, fourth 2022,, ----, was given Cardizem, dig, IV Lopressor yesterday, morning March 1,, with additional doses by cardiology, did not tolerate Cardizem due to blood pressure issues, not able to take amiodarone due to previous reaction, has now converted, holding diuretics March 1 and 2,----------- cardiology was consulted, --------------------in the emergency room, pacemaker was interrogated and felt to be stable at the current rate, backup of 40,, his heart rate was lower in the emergency room into the 40s and 50s at times, appears to be sinus on EKG, initially.  ----Appreciate cardiology's input,---- echo February 27, 2022 reviewed with moderate global hypokinesis ejection fraction 35% diastolic dysfunction present with mild mitral regurgitation,, will restart carvedilol 3.125 twice a day at time of discharge to help with rate control and afterload reduction in congestive heart failure     Chronic systolic heart failure.  Patient's BNP was only 1.4K on admit which appears to be his baseline.  BNP was 20K back in 2019 when he first presented prior to AICD placement.  His EF was 20% then, his last echo was in 5/21 and EF improved to 30%.  Current echo as above, will continue on Aldactone and diuretics.  He had angioedema to ACE inhibitors, not sure about ARB's.  Unclear whether patient would benefit from low-dose carvedilol i.e. 3.125 twice a day or low-dose Toprol     COPD exacerbation.    Will continue duo nebs, Singulair,, steroids, Pulmicort nebs, but stop Brovana nebs, due to A. fib March 1, patient has been on continuous Zithromax for several months, will stop this also due to nausea for now, could restart as an outpatient we will send in a new prescription for nebulizer machine and a refill of his ProAir inhaler that he is requesting at time of  discharge     Tremulousness.  Discussed this patient this is related to the steroids on top of the bronchodilators.  Ativan written for 2/27.,  Stable,, improved March 2,, and fourth     Essential tremors continues on primidone,, currently worse due to steroids etc. breathing treatments etc.     Anxiety depression continues on Zoloft, overall clinically better still has some emotional episodes when talking with family members etc.,     Vitamin D deficiency continues replacement     Hypothyroid--we will restart home medication at time of discharge today March 5, did receive IV levothyroxine IV for now at lower doses until oral intake better, due to nausea and vomiting over the past couple days,     Rheumatoid arthritis has been on Plaquenil will continue low-dose 200 mg daily     DVT prophylaxis.  Via mechanical means for now pending surgical intervention.     PUD prophylaxis.  Via p.o. H2 blocker.,  Patient states GI reflux is worse, will transition to proton pump inhibitor twice a day March 1, 2022, adding Thorazine for hiccups March 2,            CODE STATUS:    Code Status and Medical Interventions:   Ordered at: 03/10/22 1623     Code Status (Patient has no pulse and is not breathing):    CPR (Attempt to Resuscitate)     Medical Interventions (Patient has pulse or is breathing):    Full       Electronically signed by Ean Farmer MD, 03/12/22, 8:52 AM EST.

## 2022-03-13 NOTE — PROGRESS NOTES
[facility]   Progress Note    Patient Name: Cedric Wade  : 1950  MRN: [id number]  Primary Care Physician: Ean Farmer MD  Date of admission: 3/6/2022    Subjective   Subjective   HPI: Fall with pelvic fracture, says he did some exercises yesterday while he was up in the chair for the first time since he has been here, he is hoping to go home on Tuesday after his last radiation therapy, says he is drinking some prune juice to get his bowels to move this morning, he is otherwise in no distress pleasant talkative alert        Review of Systems   All systems were reviewed and negative except for: Some pelvic pain but improving overall did exercises yesterday was up in the chair eating fairly well,      Objective   Objective     Vitals:  Patient Vitals for the past 24 hrs:   BP Temp Temp src Pulse Resp SpO2   22 0700 120/58 98 °F (36.7 °C) Oral 86 16 96 %   22 0615 -- -- -- 64 14 99 %   22 0611 -- -- -- 66 16 99 %   22 0400 -- -- -- 60 16 97 %   22 0353 119/55 97.7 °F (36.5 °C) Oral 59 16 100 %   22 0030 -- -- -- 58 16 100 %   22 2318 100/59 97.9 °F (36.6 °C) Oral 63 18 99 %   22 -- -- -- 66 18 97 %   22 -- -- -- 65 18 97 %   22 1921 123/57 98.2 °F (36.8 °C) Oral 62 18 100 %   22 1620 122/65 98.7 °F (37.1 °C) Oral 62 18 99 %   22 1555 -- -- -- 60 18 98 %   22 1239 -- -- -- 66 18 99 %   22 1120 108/72 -- -- 93 18 --      Physical Exam   Lungs reveal diminished breath sounds cardiac exam regular rhythm his abdomen soft nondistended lower extremities no edema, he is pleasant talkative alert, no rashes,      Result Review    Result Review:  I have personally reviewed the results from the time of this admission to 22 9:15 AM EDT and agree with these findings:  [x]  Laboratory  []  Microbiology  []  Radiology  []  EKG/Telemetry   []  Cardiology/Vascular   []  Pathology  []  Old records  []   Other:      Active Hospital Meds:  Scheduled Meds:atorvastatin, 40 mg, Oral, Nightly  azithromycin, 250 mg, Oral, Every Other Day  benzonatate, 100 mg, Oral, TID  budesonide, 0.5 mg, Nebulization, BID - RT  carvedilol, 3.125 mg, Oral, BID With Meals  cetirizine, 10 mg, Oral, Daily  dexamethasone, 4 mg, Oral, Q12H  digoxin, 125 mcg, Oral, Daily  folic acid, 1,000 mcg, Oral, Daily  furosemide, 40 mg, Oral, Daily  hydrocortisone-bacitracin-zinc oxide-nystatin, 1 application, Topical, 4x Daily  hydroxychloroquine, 200 mg, Oral, Daily  insulin detemir, 8 Units, Subcutaneous, Nightly  insulin lispro, 0-7 Units, Subcutaneous, TID AC  ipratropium-albuterol, 3 mL, Nebulization, Q4H - RT  levETIRAcetam, 500 mg, Oral, BID  levothyroxine, 75 mcg, Oral, QAM  montelukast, 10 mg, Oral, Daily  pantoprazole, 40 mg, Oral, QAM  polyethylene glycol, 17 g, Oral, Daily  primidone, 250 mg, Oral, BID  senna-docusate sodium, 1 tablet, Oral, BID  sertraline, 25 mg, Oral, Daily  sodium chloride, 10 mL, Intravenous, Q12H  sodium chloride, 10 mL, Intravenous, Q12H  spironolactone, 25 mg, Oral, Daily  vitamin D, 50,000 Units, Oral, Weekly      Continuous Infusions:   PRN Meds:.•  acetaminophen **OR** acetaminophen  •  bisacodyl  •  dextrose  •  dextrose  •  docusate sodium  •  glucagon (human recombinant)  •  HYDROcodone-acetaminophen  •  ketorolac  •  Morphine **AND** naloxone  •  ondansetron **OR** ondansetron  •  sodium chloride  •  sodium chloride    Assessment/Plan   Assessment / Plan     Active Hospital Problems:  Active Hospital Problems    Diagnosis    • **Lung mass      Added automatically from request for surgery [id number]     • Closed nondisplaced fracture of pelvis (HCC)        Assessment/Plan:     Nondisplaced inferior and superior left pubic rami fractures,, fall on same day of discharge last week, March 6, 2022, pain control adequate at this point, continue therapy evaluations, discussed going to rehab once he completes his  radiation to the brain, also discussed with hematology oncology March 11 about finishing rehab before starting chemo, patient would like to try home health which would expedite his chemo start date, will follow-up labs again on Monday, finishes radiation on Tuesday     Gait dysfunction, continue rehab efforts, will try for home health with assistance at home from family discussed with patient March 12 and 13     hyponatremia,, improved, stable March 11,  continue fluid restriction, BR NP levels are low,,, currently off IV fluids after Port-A-Cath placement, follow-up labs periodically follow-up labs March 14     Underlying COPD stable with current breathing treatments, stable March 11     Elevated liver enzymes will need to trend--- new finding March 8, will check labs March 14Constipation issues we will add bowel regimen today March 8,     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, discussed with radiation oncology, continuing radiation therapy daily while in the hospital for whole brain radiation, will continue steroids at 4 mg twice a day,, appreciate hematology oncology for consultation for possible starting of chemo or immunotherapy,  Port-A-Cath placement March 10, PET scan is being ordered by hematology oncology as an outpatient once he is discharged,     Congestive heart failure, severe systolic heart failure with defibrillator backup pacemaker in place, continue Aldactone, Lasix, carvedilol watch fluid resuscitation efforts today closely March 10     Anxiety, controlled----     Essential tremor, continue primidone     Hyperlipidemia continues on moderate dose statin therapy,     Hypothyroid continue levothyroxine     Rheumatoid arthritis was on Plaquenil, continuing for now, may need to stop anticipating chemotherapy,?     Type 2 diabetes, continue sliding scale plus low-dose Levemir blood sugars are fairly well controlled currently,, watch for low blood sugars,     Vitamin D  deficiency     Seizure type disorder currently on Keppra due to the brain tumors,        Prior admission and discharge summary from March 6, 2022:        Hiccups, now with nausea vomiting,  continue Thorazine March 2, 2022, Reglan and Phenergan added March 3, some improvement overall, continue GI prophylaxis also with IV Pepcid,, improved, will send home with some as needed Thorazine, he already has GI prophylactic medications at home     Metastatic brain lesions.,  [facility] biopsy shows adenocarcinoma most likely lung, for whole brain radiation starting March 2, discussed with pulmonology and oncology March 2, also discussed with patient wife at bedside today,, and patient March 3 ------CT scan abdomen pelvis chest reviewed, appreciate Dr. Larry Bliss for second opinion, changed to oral steroids, oral medications March 1 and 2, continues  neurochecks, seizure precautions etc., I am holding his anticoagulation due to the brain lesions and potential need for biopsy in the near future,and GI prophylaxis will be instituted---> cardiology feels we could restart his anticoagulation at this point, may need to dose adjust slightly for bleeding risks etc.,, cardiology's notes state holding Eliquis is okay for now and just continuing aspirin which we will do at time of discharge     Partial seizure.  Patient seizure-free thus far in the hospital, he is maintained on seizure medication, on iv Keppra as of March 1, no seizure activity reported overnight, transition to oral meds once he is tolerating fluids and intake better, and will transition to oral medicines at time of discharge today March 5     Elevated blood sugars not unexpected with steroids, will add Levemir plus sliding scale insulin coverage, improved, March 4     Hyponatremia --trend, March 2, fluctuating but stable March 4, and fifth, at 133     A. fib with rapid ventricular response developing after coughing spell morning of March 1,, back in sinus rhythm now over  the past couple days,--- since March 1 through March 2, 3,, fourth 2022,, ----, was given Cardizem, dig, IV Lopressor yesterday, morning March 1,, with additional doses by cardiology, did not tolerate Cardizem due to blood pressure issues, not able to take amiodarone due to previous reaction, has now converted, holding diuretics March 1 and 2,----------- cardiology was consulted, --------------------in the emergency room, pacemaker was interrogated and felt to be stable at the current rate, backup of 40,, his heart rate was lower in the emergency room into the 40s and 50s at times, appears to be sinus on EKG, initially.  ----Appreciate cardiology's input,---- echo February 27, 2022 reviewed with moderate global hypokinesis ejection fraction 35% diastolic dysfunction present with mild mitral regurgitation,, will restart carvedilol 3.125 twice a day at time of discharge to help with rate control and afterload reduction in congestive heart failure     Chronic systolic heart failure.  Patient's BNP was only 1.4K on admit which appears to be his baseline.  BNP was 20K back in 2019 when he first presented prior to AICD placement.  His EF was 20% then, his last echo was in 5/21 and EF improved to 30%.  Current echo as above, will continue on Aldactone and diuretics.  He had angioedema to ACE inhibitors, not sure about ARB's.  Unclear whether patient would benefit from low-dose carvedilol i.e. 3.125 twice a day or low-dose Toprol     COPD exacerbation.    Will continue duo nebs, Singulair,, steroids, Pulmicort nebs, but stop Brovana nebs, due to A. fib March 1, patient has been on continuous Zithromax for several months, will stop this also due to nausea for now, could restart as an outpatient we will send in a new prescription for nebulizer machine and a refill of his ProAir inhaler that he is requesting at time of discharge     Tremulousness.  Discussed this patient this is related to the steroids on top of the  bronchodilators.  Ativan written for 2/27.,  Stable,, improved March 2,, and fourth     Essential tremors continues on primidone,, currently worse due to steroids etc. breathing treatments etc.     Anxiety depression continues on Zoloft, overall clinically better still has some emotional episodes when talking with family members etc.,     Vitamin D deficiency continues replacement     Hypothyroid--we will restart home medication at time of discharge today March 5, did receive IV levothyroxine IV for now at lower doses until oral intake better, due to nausea and vomiting over the past couple days,     Rheumatoid arthritis has been on Plaquenil will continue low-dose 200 mg daily     DVT prophylaxis.  Via mechanical means for now pending surgical intervention.     PUD prophylaxis.  Via p.o. H2 blocker.,  Patient states GI reflux is worse, will transition to proton pump inhibitor twice a day March 1, 2022, adding Thorazine for hiccups March 2,               CODE STATUS:    Code Status and Medical Interventions:   Ordered at: 03/10/22 1623     Code Status (Patient has no pulse and is not breathing):    CPR (Attempt to Resuscitate)     Medical Interventions (Patient has pulse or is breathing):    Full       Electronically signed by Ean Farmer MD, 03/13/22, 9:15 AM EDT.

## 2022-03-13 NOTE — PROGRESS NOTES
[facility]     Progress Note    Patient Name: Cedric Wade  : 1950  MRN: [id number]  Primary Care Physician:  Ean Farmer MD  Date of admission: 3/6/2022    Subjective   Subjective     Chief Complaint: Patient with metastatic lung cancer with metastases to the brain the molecular studies are still pending ALK is negative ROS1 is negative PD1 is still pending patient is to get 2 more days of radiation therapy we will see him in the office later during this week and then will arrange for PET scan fully by then will also get the results of molecular studies    HPI:  Patient reports patient admitted with history of fall and pelvic fracture he is improving gradually able to ambulate plan is to discharge on Tuesday by then I should be able to get the rest of the results already has a Port-A-Cath inserted    Review of Systems   All systems were reviewed and negative except for: Reviewed    Objective   Objective     Vitals:   Temp:  [97.5 °F (36.4 °C)-98.7 °F (37.1 °C)] 97.5 °F (36.4 °C)  Heart Rate:  [58-86] 68  Resp:  [14-18] 16  BP: (100-123)/(55-65) 110/61  Flow (L/min):  [2] 2    Physical Exam    Constitutional: Awake, alert   Eyes: PERRLA, sclerae anicteric, no conjunctival injection   HENT: NCAT, mucous membranes moist   Neck: Supple, no thyromegaly, no lymphadenopathy, trachea midline   Respiratory: Clear to auscultation bilaterally, nonlabored respirations    Cardiovascular: RRR, no murmurs, rubs, or gallops, palpable pedal pulses bilaterally   Gastrointestinal: Positive bowel sounds, soft, nontender, nondistended   Musculoskeletal: No bilateral ankle edema, no clubbing or cyanosis to extremities   Psychiatric: Appropriate affect, cooperative   Neurologic: Oriented x 3, strength symmetric in all extremities, Cranial Nerves grossly intact to confrontation, speech clear   Skin: No rashes     Result Review    Result Review:  I have personally reviewed the results from the time of this  admission to 3/13/2022 12:21 EDT and agree with these findings:  []  Laboratory  []  Microbiology  [x]  Radiology fracture pelvic  []  EKG/Telemetry   []  Cardiology/Vascular   []  Pathology  []  Old records  []  Other:  Most notable findings include: Static carcinoma with metastases to the brain    Assessment/Plan   Assessment / Plan     Brief Patient Summary:  Cedric Wade is a 71 y.o. male who patient with metastatic non-small cell lung cancer we are still waiting for the molecular studies specially the PD1 results to come    Active Hospital Problems:  Active Hospital Problems    Diagnosis    • **Lung mass      Added automatically from request for surgery [id number]     • Closed nondisplaced fracture of pelvis (HCC)        Plan:   We will continue radiation treatment will completed 10 days of treatment    DVT prophylaxis:  Mechanical DVT prophylaxis orders are present.    CODE STATUS:   Code Status (Patient has no pulse and is not breathing): CPR (Attempt to Resuscitate)  Medical Interventions (Patient has pulse or is breathing): Full    Disposition:  I expect patient to be discharged radiation therapy has been completed.    Electronically signed by Patrice Rodriguez MD, 03/13/22, 12:21 PM EDT.

## 2022-03-13 NOTE — PLAN OF CARE
Goal Outcome Evaluation:         Pt stable throughout shift, vitals WNL. Medicated multiple times for c/o moderate to severe pain. Pt worked with PT today, ambulated and sat up in the chair. Pt awaiting to finish radiation treatments at beginning of this week. No other changes this shift.

## 2022-03-13 NOTE — PLAN OF CARE
Goal Outcome Evaluation:   No significant changes throughout the night. Medicated for pain x1. No concerns or complaints noted.

## 2022-03-13 NOTE — THERAPY TREATMENT NOTE
Acute Care - Physical Therapy Treatment Note  PETTY Renteria     Patient Name: Cedric Wade  : 1950  MRN: [id number]  Today's Date: 3/13/2022      Visit Dx:     ICD-10-CM ICD-9-CM   1. Lung mass  R91.8 786.6   2. Closed nondisplaced fracture of pelvis, unspecified part of pelvis, initial encounter ([facility])  S32.9XXA 808.8   3. Difficulty in walking  R26.2 719.7   4. Decreased activities of daily living (ADL)  Z78.9 V49.89     Patient Active Problem List   Diagnosis   • CAD with history of CABG   • COPD (chronic obstructive pulmonary disease) with chronic bronchitis ([facility])   • Microalbuminuria   • Type 2 diabetes mellitus with microalbuminuria ([facility])   • Other hyperlipidemia   • Essential hypertension   • H/O agent Orange exposure   • Ischemic cardiomyopathy   • Atrial fibrillation with RVR ([facility])   • Acute exacerbation of chronic obstructive pulmonary disease ([facility])   • Chronic obstructive pulmonary disease ([facility])   • Arthritis   • Polyarthritis   • Chronic systolic (congestive) heart failure ([facility])   • Diabetic renal disease ([facility])   • Essential tremor   • Heart failure ([facility])   • Acquired hypothyroidism   • S/P cervical spinal fusion   • Screening PSA (prostate specific antigen)   • Allergic rhinitis   • Seasonal allergies   • Tobacco abuse   • Nocturnal hypoxia   • Pleural effusion   • Depression   • Lupus ([facility])   • Anxiety, generalized   • Brain mass   • Lung mass   • Closed nondisplaced fracture of pelvis ([facility])     Past Medical History:   Diagnosis Date   • Abnormal ECG    • Abscess    • Arrhythmia    • Arthritis    • Atrial fibrillation ([facility])    • CAD with history of CABG 2019    Added automatically from request for surgery [id number]   • Cancer ([facility])     skin   • CHF (congestive heart failure) ([facility])    • COPD (chronic obstructive pulmonary disease) ([facility])    • Coronary artery disease    • Cutaneous lupus erythematosus    • Diabetes mellitus ([facility])    • Elevated cholesterol    • GERD (gastroesophageal reflux  disease)    • Heart valve disease    • Hypertension    • Hypothyroidism, unspecified 7/30/2021   • Myocardial infarction (HCC)    • Other hyperlipidemia 9/8/2019   • Paroxysmal atrial fibrillation (HCC) 7/19/2021   • Sleep apnea      Past Surgical History:   Procedure Laterality Date   • BRONCHOSCOPY N/A 2/28/2022    Procedure: BRONCHOSCOPY WITH ENDOBRONCHIAL ULTRASOUND, BAL, WASHINGS, BRUSHINGS, NEEDLE ASPIRATIONS, BIOPSIES;  Surgeon: Ankur Alcantara MD;  Location: [facility] ENDOSCOPY;  Service: Pulmonary;  Laterality: N/A;  LEFT HILAR MASS   • CARDIAC CATHETERIZATION     • CARDIAC DEFIBRILLATOR PLACEMENT     • CARDIAC DEFIBRILLATOR PLACEMENT     • COLONOSCOPY     • CORONARY ARTERY BYPASS GRAFT N/A 9/9/2019    Procedure: INTRAOPERATIVE BUDDY, MIDLINE STENOTOMY WITH CORONARY ARTERY BYPASS GRAPHS X  3 UTILIZING LEFT SAVAGE  AND LEFT ENDOSCOPIC HARVESTED SAPHENOUS VEIN, LIGATION OF LEFT  ATRIAL APPENDAGE; PRP;  Surgeon: Alvaro Mazariegos MD;  Location: [facility] OR;  Service: Cardiothoracic   • LIPOMA EXCISION     • NECK SURGERY     • OTHER SURGICAL HISTORY      heart valve repair   not sure which valve   • PACEMAKER IMPLANTATION     • PORTACATH PLACEMENT N/A 3/10/2022    Procedure: INSERTION OF PORTACATH;  Surgeon: Chele Carl MD;  Location: [facility] OR [facility];  Service: General;  Laterality: N/A;     PT Assessment (last 12 hours)     PT Evaluation and Treatment     Row Name 03/13/22 1242          Physical Therapy Time and Intention    Subjective Information complains of;pain  -TS     Document Type therapy note (daily note)  -TS     Mode of Treatment physical therapy;individual therapy  -TS     Row Name 03/13/22 1242          Pain    Pretreatment Pain Rating 7/10  -TS     Pain Location - Side/Orientation Left  -TS     Pain Location - hip  -TS     Row Name 03/13/22 1242          Cognition    Affect/Mental Status (Cognition) WFL  -TS     Row Name 03/13/22 1242          Mobility    Extremity Weight-bearing Status left lower  extremity;right lower extremity  -TS     Left Lower Extremity (Weight-bearing Status) weight-bearing as tolerated (WBAT)  -TS     Row Name 03/13/22 1242          Bed Mobility    Bed Mobility supine-sit  -TS     Supine-Sit Hand (Bed Mobility) standby assist;contact guard  -TS     Bed Mobility, Safety Issues decreased use of legs for bridging/pushing  -TS     Assistive Device (Bed Mobility) bed rails  -TS     Row Name 03/13/22 1242          Transfers    Transfers sit-stand transfer;stand-sit transfer  -TS     Bed-Chair Hand (Transfers) contact guard  -TS     Assistive Device (Bed-Chair Transfers) walker, front-wheeled  -TS     Sit-Stand Hand (Transfers) contact guard  -TS     Stand-Sit Hand (Transfers) contact guard  -TS     Row Name 03/13/22 1242          Sit-Stand Transfer    Assistive Device (Sit-Stand Transfers) walker, front-wheeled  -TS     Row Name 03/13/22 1242          Stand-Sit Transfer    Assistive Device (Stand-Sit Transfers) walker, front-wheeled  -TS     Row Name 03/13/22 1242          Gait/Stairs (Locomotion)    Gait/Stairs Locomotion gait/ambulation independence;gait/ambulation assistive device;distance ambulated;gait pattern;gait deviations  -TS     Hand Level (Gait) contact guard  -TS     Assistive Device (Gait) walker, front-wheeled  -TS     Distance in Feet (Gait) 60  -TS     Pattern (Gait) 3-point;step-through  -TS     Deviations/Abnormal Patterns (Gait) base of support, narrow;jennifer decreased;gait speed decreased;antalgic  -TS     Row Name 03/13/22 1242          Safety Issues, Functional Mobility    Impairments Affecting Function (Mobility) balance;pain;endurance/activity tolerance;strength  -TS     Row Name 03/13/22 1242          Motor Skills    Therapeutic Exercise hip;knee;ankle  AAROM x20 reps, recumbant position  -TS     Row Name             Wound 09/09/19 0827 Other (See comments) chest Incision    Wound - Properties Group Placement Date: 09/09/19   -TL Placement Time: 0827 -TL Side: Other (See comments)  -TL Location: chest  -TL Primary Wound Type: Incision  -TL     Retired Wound - Properties Group Placement Date: 09/09/19  -TL Placement Time: 0827 -TL Side: Other (See comments)  -TL Location: chest  -TL Primary Wound Type: Incision  -TL     Retired Wound - Properties Group Date first assessed: 09/09/19  -TL Time first assessed: 0827 -TL Side: Other (See comments)  -TL Location: chest  -TL Primary Wound Type: Incision  -TL     Row Name 03/13/22 1242          Vital Signs    O2 Delivery Pre Treatment nasal cannula  2L  -TS     Row Name 03/13/22 1242          Positioning and Restraints    Pre-Treatment Position in bed  -TS     Post Treatment Position chair  -TS     In Chair reclined;call light within reach;exit alarm on;heels elevated  -TS     Row Name 03/13/22 1242          Progress Summary (PT)    Progress Toward Functional Goals (PT) progress toward functional goals is good  -TS           User Key  (r) = Recorded By, (t) = Taken By, (c) = Cosigned By    Initials Name Provider Type    TL Saundra Lynn, RN Registered Nurse    Devan Diaz PTA Physical Therapy Assistant            Bilateral Lower Extremity   Exercise  Reps  Sets    Short arc quads   10 2   Heel slides  10 2   Ankle pumps  10 2   Quad sets  10 2   Hip ab/adduction 10 2               Physical Therapy Education                 Title: PT OT SLP Therapies (Done)     Topic: Physical Therapy (Done)     Point: Mobility training (Done)     Learning Progress Summary           Patient Acceptance, E,TB, VU by EV at 3/8/2022 2316    Acceptance, E,TB, VU by MARTHA at 3/8/2022 0903                   Point: Precautions (Done)     Learning Progress Summary           Patient Acceptance, E,TB, VU by EV at 3/8/2022 2316    Acceptance, E,TB, VU by MARTHA at 3/8/2022 0903                               User Key     Initials Effective Dates Name Provider Type [facility]    EV 06/16/21 -  Mariah Crawford  RN Registered Nurse Nurse    MARTHA 06/03/21 -  Marciano Thayer, PT Physical Therapist PT              PT Recommendation and Plan     Progress Summary (PT)  Progress Toward Functional Goals (PT): progress toward functional goals is good   Outcome Measures     Row Name 03/13/22 1249 03/12/22 1411 03/11/22 1200       How much help from another person do you currently need...    Turning from your back to your side while in flat bed without using bedrails? 3  -TS 3  -TS 3  -RH    Moving from lying on back to sitting on the side of a flat bed without bedrails? 3  -TS 3  -TS 3  -RH    Moving to and from a bed to a chair (including a wheelchair)? 3  -TS 3  -TS 3  -RH    Standing up from a chair using your arms (e.g., wheelchair, bedside chair)? 3  -TS 3  -TS 3  -RH    Climbing 3-5 steps with a railing? 3  -TS 3  -TS 3  -RH    To walk in hospital room? 3  -TS 3  -TS 3  -RH    AM-PAC 6 Clicks Score (PT) 18  -TS 18  -TS 18  -RH          User Key  (r) = Recorded By, (t) = Taken By, (c) = Cosigned By    Initials Name Provider Type    TS Devan Ward PTA Physical Therapy Assistant    RH Jordan Hannah PTA Physical Therapy Assistant                 Time Calculation:    PT Charges     Row Name 03/13/22 1241             Time Calculation    PT Received On 03/13/22  -TS      PT Goal Re-Cert Due Date 03/17/22  -TS              Timed Charges    27186 - PT Therapeutic Exercise Minutes 14  -TS      27972 - Gait Training Minutes  8  -TS      97684 - PT Therapeutic Activity Minutes 3  -TS              Total Minutes    Timed Charges Total Minutes 25  -TS       Total Minutes 25  -TS            User Key  (r) = Recorded By, (t) = Taken By, (c) = Cosigned By    Initials Name Provider Type    Devan Diaz PTA Physical Therapy Assistant              Therapy Charges for Today     Code Description Service Date Service Provider Modifiers Qty    38433285912 HC PT THER PROC EA 15 MIN 3/12/2022 Devan Wadr PTA GP 1    04088186852 HC GAIT  TRAINING EA 15 MIN 3/12/2022 Devan Ward, PTA GP 1    15949090302  PT THER PROC EA 15 MIN 3/13/2022 Devan Ward, PTA GP 1    26834761903 HC GAIT TRAINING EA 15 MIN 3/13/2022 Devan Ward, BONILLA GP 1          PT G-Codes  Outcome Measure Options: AM-PAC 6 Clicks Daily Activity (OT), Optimal Instrument  AM-PAC 6 Clicks Score (PT): 18  AM-PAC 6 Clicks Score (OT): 18    Devan Ward PTA  3/13/2022

## 2022-03-14 NOTE — PLAN OF CARE
Goal Outcome Evaluation:     Pt followed by RD for LOS day 8. Pt consuming % meals per documentation. 6.6% wt gain in 4 months. Pt is at low risk per nutrition risk screening. No acute nutrition concerns or nutrition intervention at this time. RD will continue to follow and monitor per protocol.

## 2022-03-14 NOTE — PROGRESS NOTES
[facility]     Progress Note    Patient Name: Cedric Wade  : 1950  MRN: [id number]  Primary Care Physician:  Ean Farmer MD  Date of admission: 3/6/2022    Subjective   Subjective     Chief Complaint: No new complaints patient has been getting her radiation therapy right now in the process of getting radiation to the brain has got 1 more day of completing his radiation going to be discharged after that we will see him in the follow-up visit to my office to arrange for PET scan then I am also expecting that we will get the molecular studies to decide exactly what treatment to give to her complete.  Oncology chemotherapy education we will get the molecular studies    HPI:  Patient reports and has metastatic adenocarcinoma receiving radiation therapy to the brain    Review of Systems   All systems were reviewed and negative except for: Reviewed    Objective   Objective     Vitals:   Temp:  [97.5 °F (36.4 °C)-98.1 °F (36.7 °C)] 97.9 °F (36.6 °C)  Heart Rate:  [55-71] 59  Resp:  [16-20] 17  BP: (110-136)/(57-68) 116/57  Flow (L/min):  [2] 2    Physical Exam    Constitutional: Awake, alert   Eyes: PERRLA, sclerae anicteric, no conjunctival injection   HENT: NCAT, mucous membranes moist   Neck: Supple, no thyromegaly, no lymphadenopathy, trachea midline   Respiratory: Clear to auscultation bilaterally, nonlabored respirations    Cardiovascular: RRR, no murmurs, rubs, or gallops, palpable pedal pulses bilaterally   Gastrointestinal: Positive bowel sounds, soft, nontender, nondistended   Musculoskeletal: No bilateral ankle edema, no clubbing or cyanosis to extremities   Psychiatric: Appropriate affect, cooperative   Neurologic: Oriented x 3, strength symmetric in all extremities, Cranial Nerves grossly intact to confrontation, speech clear   Skin: No rashes   No change  Result Review    Result Review:  I have personally reviewed the results from the time of this admission to 3/14/2022 07:42  EDT and agree with these findings:  []  Laboratory  []  Microbiology  [x]  Radiology brain mets  []  EKG/Telemetry   []  Cardiology/Vascular   []  Pathology  []  Old records  []  Other:  Most notable findings include: Adenocarcinoma of the lung with metastasis to the brain    Assessment/Plan   Assessment / Plan     Brief Patient Summary:  Cedric Wade is a 71 y.o. male who receiving radiation therapy to the brain no change    Active Hospital Problems:  Active Hospital Problems    Diagnosis    • **Lung mass      Added automatically from request for surgery [id number]     • Closed nondisplaced fracture of pelvis (HCC)        Plan:   New radiation treatment    DVT prophylaxis:  Mechanical DVT prophylaxis orders are present.    CODE STATUS:   Code Status (Patient has no pulse and is not breathing): CPR (Attempt to Resuscitate)  Medical Interventions (Patient has pulse or is breathing): Full    Disposition:  I expect patient to be discharged when the patient is stable.    Electronically signed by Patrice Rodriguez MD, 03/14/22, 7:42 AM EDT.

## 2022-03-14 NOTE — PROGRESS NOTES
[facility]   Progress Note    Patient Name: Cedric Wade  : 1950  MRN: [id number]  Primary Care Physician: Ean Farmer MD  Date of admission: 3/6/2022    Subjective   Subjective   HPI: Metastatic lung cancer, to the brain, and recent fall with pelvic fracture, continued pain, going through rehab right now and waiting to finish up inpatient brain radiation so he can go to rehab or go home with home health and start outpatient chemo with PET scanning etc., discussed with hematology oncology,        Review of Systems   All systems were reviewed and negative except for: Says he feels pretty well overall, he just got back from radiation therapy, he is getting ready breakfast      Objective   Objective     Vitals:  Patient Vitals for the past 24 hrs:   BP Temp Temp src Pulse Resp SpO2   22 0700 126/67 97.9 °F (36.6 °C) Oral 61 17 100 %   22 0627 -- -- -- 59 17 99 %   22 0622 -- -- -- 55 20 96 %   22 0402 -- -- -- 65 16 96 %   22 0354 116/57 97.9 °F (36.6 °C) Oral 59 16 99 %   22 0009 -- -- -- 62 16 96 %   22 2310 136/66 97.7 °F (36.5 °C) Oral 71 16 100 %   227 -- -- -- 67 16 99 %   22 1953 -- -- -- 66 16 99 %   22 1913 110/65 97.7 °F (36.5 °C) Oral 69 16 99 %   22 1559 -- -- -- 66 16 99 %   22 1500 124/68 98.1 °F (36.7 °C) Oral 62 18 100 %   22 1148 -- -- -- 68 16 97 %   22 1026 110/61 97.5 °F (36.4 °C) Oral 69 16 100 %      Physical Exam   Lungs clear anteriorly diminished breath sounds cardiac exam regular rhythm his abdomen soft scaphoid lower extremities no edema he is alert sitting up in the bed moving all 4 extremities to command he is pleasant talkative alert sclera nonicteric, throat clear      Result Review    Result Review:  I have personally reviewed the results from the time of this admission to 22 9:15 AM EDT and agree with these findings:  [x]  Laboratory  []  Microbiology  []   Radiology  []  EKG/Telemetry   []  Cardiology/Vascular   []  Pathology  []  Old records  []  Other:      Active Hospital Meds:  Scheduled Meds:atorvastatin, 40 mg, Oral, Nightly  azithromycin, 250 mg, Oral, Every Other Day  benzonatate, 100 mg, Oral, TID  budesonide, 0.5 mg, Nebulization, BID - RT  carvedilol, 3.125 mg, Oral, BID With Meals  cetirizine, 10 mg, Oral, Daily  dexamethasone, 4 mg, Oral, Q12H  digoxin, 125 mcg, Oral, Daily  folic acid, 1,000 mcg, Oral, Daily  furosemide, 40 mg, Oral, Daily  hydrocortisone-bacitracin-zinc oxide-nystatin, 1 application, Topical, 4x Daily  hydroxychloroquine, 200 mg, Oral, Daily  insulin detemir, 8 Units, Subcutaneous, Nightly  insulin lispro, 0-7 Units, Subcutaneous, TID AC  ipratropium-albuterol, 3 mL, Nebulization, Q4H - RT  levETIRAcetam, 500 mg, Oral, BID  levothyroxine, 75 mcg, Oral, QAM  montelukast, 10 mg, Oral, Daily  pantoprazole, 40 mg, Oral, QAM  polyethylene glycol, 17 g, Oral, Daily  primidone, 250 mg, Oral, BID  senna-docusate sodium, 1 tablet, Oral, BID  sertraline, 25 mg, Oral, Daily  sodium chloride, 10 mL, Intravenous, Q12H  sodium chloride, 10 mL, Intravenous, Q12H  spironolactone, 25 mg, Oral, Daily  vitamin D, 50,000 Units, Oral, Weekly      Continuous Infusions:   PRN Meds:.•  acetaminophen **OR** acetaminophen  •  bisacodyl  •  dextrose  •  dextrose  •  docusate sodium  •  glucagon (human recombinant)  •  HYDROcodone-acetaminophen  •  ketorolac  •  Morphine **AND** naloxone  •  ondansetron **OR** ondansetron  •  sodium chloride  •  sodium chloride    Assessment/Plan   Assessment / Plan     Active Hospital Problems:  Active Hospital Problems    Diagnosis    • **Lung mass      Added automatically from request for surgery [id number]     • Closed nondisplaced fracture of pelvis (HCC)        Assessment/Plan:     Nondisplaced inferior and superior left pubic rami fractures,, fall on same day of discharge last week, March 6, 2022, pain control adequate at this  point, continue therapy evaluations, discussed going to rehab once he completes his radiation to the brain, also discussed with hematology oncology March 11 about finishing rehab before starting chemo, patient would like to try home health which would expedite his chemo start date, lab work reviewed March 14,    Gait dysfunction, continue rehab efforts, will try for home health with assistance at home from family ------discussed with patient March 12 and 13  the 14th     hyponatremia,, stable March 14    CONtinue fluid restriction, BR NP levels are low,,, currently off IV fluids after Port-A-Cath placement,     Underlying COPD stable with current breathing treatments, stable March 14     Elevated liver enzymes, will need to trend--- new finding March 8,, still elevated mild we will follow March 14      Constipation issues better with bowel regimen    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, discussed with radiation oncology, continuing radiation therapy daily while in the hospital for whole brain radiation, will continue steroids at 4 mg twice a day,, appreciate hematology oncology for consultation for possible starting of chemo or immunotherapy,  Port-A-Cath placement March 10, PET scan is being ordered by hematology oncology as an outpatient once he is discharged,, finishes up radiation tomorrow March 15 and discharge plan for tomorrow afternoon     Congestive heart failure, severe systolic heart failure with defibrillator backup pacemaker in place, continue Aldactone, Lasix, carvedilol watch fluid resuscitation efforts today closely March 10     Anxiety, controlled----     Essential tremor, continue primidone     Hyperlipidemia continues on moderate dose statin therapy,     Hypothyroid continue levothyroxine     Rheumatoid arthritis was on Plaquenil, continuing for now, may need to stop anticipating chemotherapy,?     Type 2 diabetes, continue sliding scale plus low-dose Levemir blood  sugars are fairly well controlled currently,, watch for low blood sugars,, blood sugars fluctuating, will consider increasing Levemir dosing March 14       Vitamin D deficiency     Seizure type disorder currently on Keppra due to the brain tumors, no further seizure activity, no tremors of any sort, no hiccups and no vomiting        Prior admission and discharge summary from March 6, 2022:        Hiccups, now with nausea vomiting,  continue Thorazine March 2, 2022, Reglan and Phenergan added March 3, some improvement overall, continue GI prophylaxis also with IV Pepcid,, improved, will send home with some as needed Thorazine, he already has GI prophylactic medications at home     Metastatic brain lesions.,  [facility] biopsy shows adenocarcinoma most likely lung, for whole brain radiation starting March 2, discussed with pulmonology and oncology March 2, also discussed with patient wife at bedside today,, and patient March 3 ------CT scan abdomen pelvis chest reviewed, appreciate Dr. Larry Bliss for second opinion, changed to oral steroids, oral medications March 1 and 2, continues  neurochecks, seizure precautions etc., I am holding his anticoagulation due to the brain lesions and potential need for biopsy in the near future,and GI prophylaxis will be instituted---> cardiology feels we could restart his anticoagulation at this point, may need to dose adjust slightly for bleeding risks etc.,, cardiology's notes state holding Eliquis is okay for now and just continuing aspirin which we will do at time of discharge     Partial seizure.  Patient seizure-free thus far in the hospital, he is maintained on seizure medication, on iv Keppra as of March 1, no seizure activity reported overnight, transition to oral meds once he is tolerating fluids and intake better, and will transition to oral medicines at time of discharge today March 5     Elevated blood sugars not unexpected with steroids, will add Levemir plus sliding scale  insulin coverage, improved, March 4     Hyponatremia --trend, March 2, fluctuating but stable March 4, and fifth, at 133     A. fib with rapid ventricular response developing after coughing spell morning of March 1,, back in sinus rhythm now over the past couple days,--- since March 1 through March 2, 3,, fourth 2022,, ----, was given Cardizem, dig, IV Lopressor yesterday, morning March 1,, with additional doses by cardiology, did not tolerate Cardizem due to blood pressure issues, not able to take amiodarone due to previous reaction, has now converted, holding diuretics March 1 and 2,----------- cardiology was consulted, --------------------in the emergency room, pacemaker was interrogated and felt to be stable at the current rate, backup of 40,, his heart rate was lower in the emergency room into the 40s and 50s at times, appears to be sinus on EKG, initially.  ----Appreciate cardiology's input,---- echo February 27, 2022 reviewed with moderate global hypokinesis ejection fraction 35% diastolic dysfunction present with mild mitral regurgitation,, will restart carvedilol 3.125 twice a day at time of discharge to help with rate control and afterload reduction in congestive heart failure     Chronic systolic heart failure.  Patient's BNP was only 1.4K on admit which appears to be his baseline.  BNP was 20K back in 2019 when he first presented prior to AICD placement.  His EF was 20% then, his last echo was in 5/21 and EF improved to 30%.  Current echo as above, will continue on Aldactone and diuretics.  He had angioedema to ACE inhibitors, not sure about ARB's.  Unclear whether patient would benefit from low-dose carvedilol i.e. 3.125 twice a day or low-dose Toprol     COPD exacerbation.    Will continue duo nebs, Singulair,, steroids, Pulmicort nebs, but stop Brovana nebs, due to A. fib March 1, patient has been on continuous Zithromax for several months, will stop this also due to nausea for now, could restart as  an outpatient we will send in a new prescription for nebulizer machine and a refill of his ProAir inhaler that he is requesting at time of discharge     Tremulousness.  Discussed this patient this is related to the steroids on top of the bronchodilators.  Ativan written for 2/27.,  Stable,, improved March 2,, and fourth     Essential tremors continues on primidone,, currently worse due to steroids etc. breathing treatments etc.     Anxiety depression continues on Zoloft, overall clinically better still has some emotional episodes when talking with family members etc.,     Vitamin D deficiency continues replacement     Hypothyroid--we will restart home medication at time of discharge today March 5, did receive IV levothyroxine IV for now at lower doses until oral intake better, due to nausea and vomiting over the past couple days,     Rheumatoid arthritis has been on Plaquenil will continue low-dose 200 mg daily     DVT prophylaxis.  Via mechanical means for now pending surgical intervention.     PUD prophylaxis.  Via p.o. H2 blocker.,  Patient states GI reflux is worse, will transition to proton pump inhibitor twice a day March 1, 2022, adding Thorazine for hiccups March 2,            CODE STATUS:    Code Status and Medical Interventions:   Ordered at: 03/10/22 1623     Code Status (Patient has no pulse and is not breathing):    CPR (Attempt to Resuscitate)     Medical Interventions (Patient has pulse or is breathing):    Full       Electronically signed by Ean Farmer MD, 03/14/22, 9:15 AM EDT.

## 2022-03-14 NOTE — PLAN OF CARE
Goal Outcome Evaluation:  Plan of Care Reviewed With: patient        Progress: improving  Outcome Evaluation: pt resting quietly throughout shift. no changes in pt;'s status.

## 2022-03-14 NOTE — PLAN OF CARE
Goal Outcome Evaluation:  Plan of Care Reviewed With: patient        Progress: improving  Patient doing okay. Patient has one more radiation treatment tomorrow and then he is done. Patient wanting to go home tomorrow. Norco 5 increased to norco 7.5, no other issues.    Problem: Adult Inpatient Plan of Care  Goal: Plan of Care Review  Outcome: Ongoing, Progressing  Flowsheets (Taken 3/14/2022 1821)  Progress: improving  Plan of Care Reviewed With: patient  Goal: Patient-Specific Goal (Individualized)  Outcome: Ongoing, Progressing  Goal: Absence of Hospital-Acquired Illness or Injury  Outcome: Ongoing, Progressing  Intervention: Identify and Manage Fall Risk  Recent Flowsheet Documentation  Taken 3/14/2022 1812 by Riley Cancino RN  Safety Promotion/Fall Prevention: safety round/check completed  Taken 3/14/2022 1245 by Riley Cancino, RN  Safety Promotion/Fall Prevention: safety round/check completed  Taken 3/14/2022 1045 by Riley Cancino RN  Safety Promotion/Fall Prevention: safety round/check completed  Taken 3/14/2022 0830 by Riley Cancino RN  Safety Promotion/Fall Prevention:   assistive device/personal items within reach   fall prevention program maintained   nonskid shoes/slippers when out of bed   safety round/check completed  Taken 3/14/2022 0731 by Riley Cancino RN  Safety Promotion/Fall Prevention: safety round/check completed  Intervention: Prevent Skin Injury  Recent Flowsheet Documentation  Taken 3/14/2022 0830 by Riley Cancino, RN  Body Position:   position changed independently   legs elevated  Intervention: Prevent and Manage VTE (Venous Thromboembolism) Risk  Recent Flowsheet Documentation  Taken 3/14/2022 1245 by Riley Cancino, RN  Activity Management: up in chair  Taken 3/14/2022 0830 by Riley Cancino, RN  Activity Management: activity adjusted per tolerance  VTE Prevention/Management:   bilateral   sequential compression devices on  Intervention: Prevent  Infection  Recent Flowsheet Documentation  Taken 3/14/2022 0830 by Riley Cancino, RN  Infection Prevention:   environmental surveillance performed   hand hygiene promoted   rest/sleep promoted   single patient room provided  Goal: Optimal Comfort and Wellbeing  Outcome: Ongoing, Progressing  Intervention: Monitor Pain and Promote Comfort  Recent Flowsheet Documentation  Taken 3/14/2022 1812 by Riley Cancino, RN  Pain Management Interventions: see MAR  Taken 3/14/2022 1030 by Riley Cancino, RN  Pain Management Interventions: see MAR  Taken 3/14/2022 0830 by Riley Cancino, RN  Pain Management Interventions:   care clustered   quiet environment facilitated   relaxation techniques promoted  Intervention: Provide Person-Centered Care  Recent Flowsheet Documentation  Taken 3/14/2022 0830 by Riley Cancino, RN  Trust Relationship/Rapport:   care explained   empathic listening provided   questions answered   questions encouraged   thoughts/feelings acknowledged  Goal: Readiness for Transition of Care  Outcome: Ongoing, Progressing

## 2022-03-14 NOTE — THERAPY TREATMENT NOTE
Patient Name: Cedric Wade  : 1950    MRN: [id number]                              Today's Date: 3/14/2022       Admit Date: 3/6/2022    Visit Dx:     ICD-10-CM ICD-9-CM   1. Lung mass  R91.8 786.6   2. Closed nondisplaced fracture of pelvis, unspecified part of pelvis, initial encounter (Formerly [facility])  S32.9XXA 808.8   3. Difficulty in walking  R26.2 719.7   4. Decreased activities of daily living (ADL)  Z78.9 V49.89     Patient Active Problem List   Diagnosis   • CAD with history of CABG   • COPD (chronic obstructive pulmonary disease) with chronic bronchitis (Formerly [facility])   • Microalbuminuria   • Type 2 diabetes mellitus with microalbuminuria (Formerly [facility])   • Other hyperlipidemia   • Essential hypertension   • H/O agent Orange exposure   • Ischemic cardiomyopathy   • Atrial fibrillation with RVR (Formerly [facility])   • Acute exacerbation of chronic obstructive pulmonary disease (Formerly [facility])   • Chronic obstructive pulmonary disease (Formerly [facility])   • Arthritis   • Polyarthritis   • Chronic systolic (congestive) heart failure (Formerly [facility])   • Diabetic renal disease (Formerly [facility])   • Essential tremor   • Heart failure (Formerly [facility])   • Acquired hypothyroidism   • S/P cervical spinal fusion   • Screening PSA (prostate specific antigen)   • Allergic rhinitis   • Seasonal allergies   • Tobacco abuse   • Nocturnal hypoxia   • Pleural effusion   • Depression   • Lupus (Formerly [facility])   • Anxiety, generalized   • Brain mass   • Lung mass   • Closed nondisplaced fracture of pelvis (Formerly [facility])     Past Medical History:   Diagnosis Date   • Abnormal ECG    • Abscess    • Arrhythmia    • Arthritis    • Atrial fibrillation (Formerly [facility])    • CAD with history of CABG 2019    Added automatically from request for surgery [id number]   • Cancer (Formerly [facility])     skin   • CHF (congestive heart failure) (Formerly [facility])    • COPD (chronic obstructive pulmonary disease) (Formerly [facility])    • Coronary artery disease    • Cutaneous lupus erythematosus    • Diabetes mellitus (Formerly [facility])    • Elevated cholesterol    • GERD (gastroesophageal reflux  disease)    • Heart valve disease    • Hypertension    • Hypothyroidism, unspecified 7/30/2021   • Myocardial infarction (HCC)    • Other hyperlipidemia 9/8/2019   • Paroxysmal atrial fibrillation (HCC) 7/19/2021   • Sleep apnea      Past Surgical History:   Procedure Laterality Date   • BRONCHOSCOPY N/A 2/28/2022    Procedure: BRONCHOSCOPY WITH ENDOBRONCHIAL ULTRASOUND, BAL, WASHINGS, BRUSHINGS, NEEDLE ASPIRATIONS, BIOPSIES;  Surgeon: Ankur Alcantara MD;  Location: [facility] ENDOSCOPY;  Service: Pulmonary;  Laterality: N/A;  LEFT HILAR MASS   • CARDIAC CATHETERIZATION     • CARDIAC DEFIBRILLATOR PLACEMENT     • CARDIAC DEFIBRILLATOR PLACEMENT     • COLONOSCOPY     • CORONARY ARTERY BYPASS GRAFT N/A 9/9/2019    Procedure: INTRAOPERATIVE BUDDY, MIDLINE STENOTOMY WITH CORONARY ARTERY BYPASS GRAPHS X  3 UTILIZING LEFT SAVAGE  AND LEFT ENDOSCOPIC HARVESTED SAPHENOUS VEIN, LIGATION OF LEFT  ATRIAL APPENDAGE; PRP;  Surgeon: Alvaro Mazariegos MD;  Location: [facility];  Service: Cardiothoracic   • LIPOMA EXCISION     • NECK SURGERY     • OTHER SURGICAL HISTORY      heart valve repair   not sure which valve   • PACEMAKER IMPLANTATION     • PORTACATH PLACEMENT N/A 3/10/2022    Procedure: INSERTION OF PORTACATH;  Surgeon: Chele Carl MD;  Location: [facility] OR [facility];  Service: General;  Laterality: N/A;      General Information     Row Name 03/14/22 1424          OT Time and Intention    Document Type therapy note (daily note)  -ES     Mode of Treatment individual therapy;occupational therapy  -ES     Row Name 03/14/22 1424          General Information    Existing Precautions/Restrictions fall;weight bearing  -ES     Row Name 03/14/22 1424          Cognition    Orientation Status (Cognition) oriented x 3  Patient pleasant and cooperative  -ES     Row Name 03/14/22 1424          Safety Issues, Functional Mobility    Impairments Affecting Function (Mobility) balance;pain;endurance/activity tolerance;strength  -ES           User  Key  (r) = Recorded By, (t) = Taken By, (c) = Cosigned By    Initials Name Provider Type    ES Trish Reyes OT Occupational Therapist                 Mobility/ADL's     Row Name 03/14/22 1424          Bed Mobility    Bed Mobility supine-sit  -ES     Supine-Sit [place] (Bed Mobility) standby assist;contact guard;1 person assist  -ES     Assistive Device (Bed Mobility) bed rails;head of bed elevated  -ES     Row Name 03/14/22 1424          Transfers    Transfers sit-stand transfer  -ES     Comment, (Transfers) Patient with increased safety awareness with sit <> stand, demonstrates appropriate hand placement to ensure patient safety with ascent/descent  -ES     Sit-Stand [place] (Transfers) contact guard;1 person assist  -ES     Row Name 03/14/22 1424          Sit-Stand Transfer    Assistive Device (Sit-Stand Transfers) walker, front-wheeled  -ES     Row Name 03/14/22 1424          Functional Mobility    Functional Mobility- Ind. Level contact guard assist  -ES     Functional Mobility- Device rolling walker  -ES     Functional Mobility- Comment Patient perfroms functional mobility from edge of bed to sinkside for standing ADL engagement and returns to recliner at conslusion of mobility. Patient contact guard assist w/ use of RWX.  -ES     Row Name 03/14/22 1424          Activities of Daily Living    BADL Assessment/Intervention grooming  -ES     Row Name 03/14/22 1424          Mobility    Extremity Weight-bearing Status left lower extremity;right lower extremity  -ES     Left Lower Extremity (Weight-bearing Status) weight-bearing as tolerated (WBAT)  -ES     Row Name 03/14/22 1424          Grooming Assessment/Training    [place] Level (Grooming) grooming skills;set up;oral care regimen;wash face, hands;contact guard assist  -ES     Position (Grooming) sink side;supported standing  -ES     Comment, (Grooming) Patient performs oral hygiene and face washing task in stance at sink provided s/u and clean up  of environment. Patient in stance x5 mins without rest break.  -ES           User Key  (r) = Recorded By, (t) = Taken By, (c) = Cosigned By    Initials Name Provider Type    Trish Jang OT Occupational Therapist               Obj/Interventions     Row Name 03/14/22 1429          Motor Skills    Motor Skills functional endurance  -ES     Functional Endurance fair with ADL engagement and mobility. Patient with increased ability to tolerate standing ADL task this session, tolerates standing and mobility better than previous sessions  -ES     Row Name 03/14/22 1427          Balance    Balance Assessment sitting dynamic balance;standing dynamic balance  -ES     Dynamic Sitting Balance supervision  -ES     Position, Sitting Balance unsupported;sitting edge of bed  -ES     Dynamic Standing Balance contact guard  -ES     Position/Device Used, Standing Balance supported;walker, rolling  -ES     Balance Interventions sitting;standing;sit to stand;supported;dynamic;occupation based/functional task  -ES     Comment, Balance Patient with fair standing balance with ADL engagement at sink. No LOBS noted  -ES           User Key  (r) = Recorded By, (t) = Taken By, (c) = Cosigned By    Initials Name Provider Type    Trish Jang OT Occupational Therapist               Goals/Plan    No documentation.                Clinical Impression     Row Name 03/14/22 1426          Plan of Care Review    Progress improving  -ES     Outcome Evaluation Patient with good participation in therapy session. Patient tolerates standing ADL task at sink, in stance x5 mins without rest break with ADL engagement. Patient requires no more than CGA with functional mobility this session, reports he feels he is positively progressing with therapy participation. Patient continues to require cueing for hand placement with sit to stand and stand to sit to ensure patient safety. Patient would benefit from continued skilled OT intervention to promote  return to baseline independence.  -ES     Row Name 03/14/22 1429          Positioning and Restraints    Post Treatment Position chair  -ES     In Chair reclined;call light within reach;encouraged to call for assist;exit alarm on;notified nsg  -ES           User Key  (r) = Recorded By, (t) = Taken By, (c) = Cosigned By    Initials Name Provider Type    Trish Jang OT Occupational Therapist               Outcome Measures     Row Name 03/14/22 1432          How much help from another is currently needed...    Putting on and taking off regular lower body clothing? 3  -ES     Bathing (including washing, rinsing, and drying) 3  -ES     Toileting (which includes using toilet bed pan or urinal) 3  -ES     Putting on and taking off regular upper body clothing 4  -ES     Taking care of personal grooming (such as brushing teeth) 4  -ES     Eating meals 4  -ES     AM-PAC 6 Clicks Score (OT) 21  -ES     Row Name 03/14/22 0830          How much help from another person do you currently need...    Turning from your back to your side while in flat bed without using bedrails? 3  -CG     Moving from lying on back to sitting on the side of a flat bed without bedrails? 3  -CG     Moving to and from a bed to a chair (including a wheelchair)? 3  -CG     Standing up from a chair using your arms (e.g., wheelchair, bedside chair)? 3  -CG     Climbing 3-5 steps with a railing? 3  -CG     To walk in hospital room? 3  -CG     AM-PAC 6 Clicks Score (PT) 18  -CG     Row Name 03/14/22 1432          Functional Assessment    Outcome Measure Options AM-PAC 6 Clicks Daily Activity (OT);Optimal Instrument  -ES     Row Name 03/14/22 1432          Optimal Instrument    Optimal Instrument Optimal - 3  -ES     Bending/Stooping 2  -ES     Standing 2  -ES     Reaching 1  -ES           User Key  (r) = Recorded By, (t) = Taken By, (c) = Cosigned By    Initials Name Provider Type    Riley Riley, RN Registered Nurse    Trish Jang, OT  Occupational Therapist                Occupational Therapy Education                 Title: PT OT SLP Therapies (Done)     Topic: Occupational Therapy (Done)     Point: ADL training (Done)     Description:   Instruct learner(s) on proper safety adaptation and remediation techniques during self care or transfers.   Instruct in proper use of assistive devices.              Learning Progress Summary           Patient Acceptance, E,TB, VU by EV at 3/14/2022 0000    Acceptance, E,TB, VU by EV at 3/8/2022 2316    Acceptance, E,TB, VU by ES at 3/8/2022 1323                   Point: Home exercise program (Done)     Description:   Instruct learner(s) on appropriate technique for monitoring, assisting and/or progressing therapeutic exercises/activities.              Learning Progress Summary           Patient Acceptance, E,TB, VU by EV at 3/14/2022 0000    Acceptance, E,TB, VU by EV at 3/8/2022 2316    Acceptance, E,TB, VU by ES at 3/8/2022 1323                   Point: Precautions (Done)     Description:   Instruct learner(s) on prescribed precautions during self-care and functional transfers.              Learning Progress Summary           Patient Acceptance, E,TB, VU by EV at 3/14/2022 0000    Acceptance, E,TB, VU by EV at 3/8/2022 2316    Acceptance, E,TB, VU by ES at 3/8/2022 1323                   Point: Body mechanics (Done)     Description:   Instruct learner(s) on proper positioning and spine alignment during self-care, functional mobility activities and/or exercises.              Learning Progress Summary           Patient Acceptance, E,TB, VU by EV at 3/14/2022 0000    Acceptance, E,TB, VU by EV at 3/8/2022 2316    Acceptance, E,TB, VU by ES at 3/8/2022 1323                               User Key     Initials Effective Dates Name Provider Type Discipline    EV 06/16/21 -  Mariah Crawford, RN Registered Nurse Nurse    ES 09/13/21 -  Trish Reyes OT Occupational Therapist OT              OT Recommendation and  Plan  Planned Therapy Interventions (OT): activity tolerance training, BADL retraining, functional balance retraining, occupation/activity based interventions, patient/caregiver education/training, strengthening exercise, transfer/mobility retraining  Therapy Frequency (OT): 5 times/wk  Plan of Care Review  Plan of Care Reviewed With: patient, spouse  Progress: improving  Outcome Evaluation: Patient with good participation in therapy session. Patient tolerates standing ADL task at sink, in stance x5 mins without rest break with ADL engagement. Patient requires no more than CGA with functional mobility this session, reports he feels he is positively progressing with therapy participation. Patient continues to require cueing for hand placement with sit to stand and stand to sit to ensure patient safety. Patient would benefit from continued skilled OT intervention to promote return to baseline independence.     Time Calculation:    Time Calculation- OT     Row Name 03/14/22 1432             Time Calculation- OT    OT Received On 03/14/22  -ES      OT Goal Re-Cert Due Date 03/17/22  -ES              Timed Charges    93404 - OT Self Care/Mgmt Minutes 14  -ES              Total Minutes    Timed Charges Total Minutes 14  -ES       Total Minutes 14  -ES            User Key  (r) = Recorded By, (t) = Taken By, (c) = Cosigned By    Initials Name Provider Type    ES Trish Reyes OT Occupational Therapist              Therapy Charges for Today     Code Description Service Date Service Provider Modifiers Qty    10326917283 HC OT SELF CARE/MGMT/TRAIN EA 15 MIN 3/14/2022 Trish Reyes OT GO 1               Trish Reyes OT  3/14/2022

## 2022-03-15 NOTE — OUTREACH NOTE
Prep Survey    Flowsheet Row Responses   [facility] facility patient discharged from? [facility]   Is LACE score < 7 ? No   Emergency Room discharge w/ pulse ox? No   Eligibility TCM   Hospital [facility]   Date of Admission 03/06/22   Date of Discharge 03/15/22   Discharge Disposition Home or Self Care   Discharge diagnosis Lung mass-insertion of portacath this visit   Does the patient have one of the following disease processes/diagnoses(primary or secondary)? Other   Does the patient have Home health ordered? Yes   What is the Home health agency?  [facility]   Is there a DME ordered? No   Prep survey completed? Yes          JOSUE A - Registered Nurse

## 2022-03-15 NOTE — PLAN OF CARE
Goal Outcome Evaluation:   No significant changes throughout the night. No concerns or complaints noted.

## 2022-03-15 NOTE — DISCHARGE SUMMARY
[facility]         DISCHARGE SUMMARY    Patient Name: Cedric Wade  : 1950  MRN: [id number]    Date of Admission: 3/6/2022  Date of Discharge:  3/15/2022  Primary Care Physician: Ean Farmer MD    Consults     Date and Time Order Name Status Description    3/9/2022  8:06 AM Inpatient General Surgery Consult Completed     3/9/2022  8:06 AM Hematology & Oncology Inpatient Consult      3/7/2022 10:25 AM Inpatient Radiation Oncology Consult      3/6/2022  8:16 PM General MD Inpatient Consult      2022  4:07 PM Inpatient Neurosurgery Consult Completed           Presenting Problem:   Closed nondisplaced fracture of pelvis, unspecified part of pelvis, initial encounter ([facility]) [S32.9XXA]    Active and Resolved Hospital Problems:  Active Hospital Problems    Diagnosis POA   • **Lung mass [R91.8] Unknown   • Closed nondisplaced fracture of pelvis ([facility]) [S32.9XXA] Yes      Resolved Hospital Problems   No resolved problems to display.         Hospital Course     Hospital Course:  Cedric Wade is a 71 y.o. male     Nondisplaced inferior and superior left pubic rami fractures,, fall on same day of discharge last week, 2022, pain control adequate at this point, continue therapy evaluations, discussed going to rehab once he completes his radiation to the brain, also discussed with hematology oncology  about finishing rehab before starting chemo, patient would like to try home health which would expedite his chemo start date, lab work reviewed ,     Gait dysfunction, continue rehab efforts, will try for home health with assistance at home from family ------discussed with patient  and   the      hyponatremia,, stable     CONtinue fluid restriction, BR NP levels are low,,, currently off IV fluids after Port-A-Cath placement,     Underlying COPD stable with current breathing treatments, stable      Elevated liver enzymes,  will need to trend--- new finding March 8,, still elevated mild we will follow March 14        Constipation issues better with bowel regimen     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, discussed with radiation oncology, continuing radiation therapy daily while in the hospital for whole brain radiation, will continue steroids at 4 mg twice a day,, appreciate hematology oncology for consultation for possible starting of chemo or immunotherapy,  Port-A-Cath placement March 10, PET scan is being ordered by hematology oncology as an outpatient once he is discharged,, finishes up radiation tomorrow March 15 and discharge plan for tomorrow afternoon     Congestive heart failure, severe systolic heart failure with defibrillator backup pacemaker in place, continue Aldactone, Lasix, carvedilol watch fluid resuscitation efforts today closely March 10     Anxiety, controlled----     Essential tremor, continue primidone     Hyperlipidemia continues on moderate dose statin therapy,     Hypothyroid continue levothyroxine     Rheumatoid arthritis was on Plaquenil, continuing for now, may need to stop anticipating chemotherapy,?     Type 2 diabetes, continue sliding scale plus low-dose Levemir blood sugars are fairly well controlled currently,, watch for low blood sugars,, blood sugars fluctuating, will consider increasing Levemir dosing March 14        Vitamin D deficiency     Seizure type disorder currently on Keppra due to the brain tumors, no further seizure activity, no tremors of any sort, no hiccups and no vomiting        Prior admission and discharge summary from March 6, 2022:        Hiccups, now with nausea vomiting,  continue Thorazine March 2, 2022, Reglan and Phenergan added March 3, some improvement overall, continue GI prophylaxis also with IV Pepcid,, improved, will send home with some as needed Thorazine, he already has GI prophylactic medications at home     Metastatic brain lesions.,   [facility] biopsy shows adenocarcinoma most likely lung, for whole brain radiation starting March 2, discussed with pulmonology and oncology March 2, also discussed with patient wife at bedside today,, and patient March 3 ------CT scan abdomen pelvis chest reviewed, appreciate Dr. Larry Bliss for second opinion, changed to oral steroids, oral medications March 1 and 2, continues  neurochecks, seizure precautions etc., I am holding his anticoagulation due to the brain lesions and potential need for biopsy in the near future,and GI prophylaxis will be instituted---> cardiology feels we could restart his anticoagulation at this point, may need to dose adjust slightly for bleeding risks etc.,, cardiology's notes state holding Eliquis is okay for now and just continuing aspirin which we will do at time of discharge     Partial seizure.  Patient seizure-free thus far in the hospital, he is maintained on seizure medication, on iv Keppra as of March 1, no seizure activity reported overnight, transition to oral meds once he is tolerating fluids and intake better, and will transition to oral medicines at time of discharge today March 5     Elevated blood sugars not unexpected with steroids, will add Levemir plus sliding scale insulin coverage, improved, March 4     Hyponatremia --trend, March 2, fluctuating but stable March 4, and fifth, at 133     A. fib with rapid ventricular response developing after coughing spell morning of March 1,, back in sinus rhythm now over the past couple days,--- since March 1 through March 2, 3,, fourth 2022,, ----, was given Cardizem, dig, IV Lopressor yesterday, morning March 1,, with additional doses by cardiology, did not tolerate Cardizem due to blood pressure issues, not able to take amiodarone due to previous reaction, has now converted, holding diuretics March 1 and 2,----------- cardiology was consulted, --------------------in the emergency room, pacemaker was interrogated and felt to be  stable at the current rate, backup of 40,, his heart rate was lower in the emergency room into the 40s and 50s at times, appears to be sinus on EKG, initially.  ----Appreciate cardiology's input,---- echo February 27, 2022 reviewed with moderate global hypokinesis ejection fraction 35% diastolic dysfunction present with mild mitral regurgitation,, will restart carvedilol 3.125 twice a day at time of discharge to help with rate control and afterload reduction in congestive heart failure     Chronic systolic heart failure.  Patient's BNP was only 1.4K on admit which appears to be his baseline.  BNP was 20K back in 2019 when he first presented prior to AICD placement.  His EF was 20% then, his last echo was in 5/21 and EF improved to 30%.  Current echo as above, will continue on Aldactone and diuretics.  He had angioedema to ACE inhibitors, not sure about ARB's.  Unclear whether patient would benefit from low-dose carvedilol i.e. 3.125 twice a day or low-dose Toprol     COPD exacerbation.    Will continue duo nebs, Singulair,, steroids, Pulmicort nebs, but stop Brovana nebs, due to A. fib March 1, patient has been on continuous Zithromax for several months, will stop this also due to nausea for now, could restart as an outpatient we will send in a new prescription for nebulizer machine and a refill of his ProAir inhaler that he is requesting at time of discharge     Tremulousness.  Discussed this patient this is related to the steroids on top of the bronchodilators.  Ativan written for 2/27.,  Stable,, improved March 2,, and fourth     Essential tremors continues on primidone,, currently worse due to steroids etc. breathing treatments etc.     Anxiety depression continues on Zoloft, overall clinically better still has some emotional episodes when talking with family members etc.,     Vitamin D deficiency continues replacement     Hypothyroid--we will restart home medication at time of discharge today March 5, did  receive IV levothyroxine IV for now at lower doses until oral intake better, due to nausea and vomiting over the past couple days,     Rheumatoid arthritis has been on Plaquenil will continue low-dose 200 mg daily     DVT prophylaxis.  Via mechanical means for now pending surgical intervention.     PUD prophylaxis.  Via p.o. H2 blocker.,  Patient states GI reflux is worse, will transition to proton pump inhibitor twice a day March 1, 2022, adding Thorazine for hiccups March 2,          Day of Discharge     Patient Vitals for the past 24 hrs:   BP Temp Temp src Pulse Resp SpO2   03/15/22 0637 -- -- -- 63 18 100 %   03/15/22 0629 -- -- -- 63 22 97 %   03/15/22 0415 -- -- -- 62 16 100 %   03/15/22 0259 112/54 98 °F (36.7 °C) Oral 59 16 99 %   03/15/22 0048 -- -- -- 74 16 96 %   03/14/22 2234 118/70 97.9 °F (36.6 °C) Oral 65 16 100 %   03/14/22 1941 117/83 98 °F (36.7 °C) Oral 70 20 92 %   03/14/22 1919 -- -- -- 70 18 98 %   03/14/22 1915 -- -- -- 68 16 97 %   03/14/22 1455 138/69 98.2 °F (36.8 °C) Oral 67 16 95 %   03/14/22 1230 -- -- -- 65 16 97 %   03/14/22 1125 132/72 97.8 °F (36.6 °C) Oral 70 18 100 %        Physical Exam:    Patient was awake alert pleasant talkative no distress, sitting up in bed, he is going for his last radiation today, he is moving all 4 extremities no focal weakness lower extremities no edema cardiac exam regular rhythm his lung fields are clear anteriorly and laterally with diminished breath sounds abdomen slightly protuberant nontender his neck is supple throat is dry sclera nonicteric    Pertinent  and/or Most Recent Results     LAB RESULTS:      Lab 03/14/22  0505 03/11/22  0414 03/10/22  0427 03/09/22  0440   WBC 8.53 7.68 7.57 7.67   HEMOGLOBIN 10.4* 11.4* 10.9* 10.8*   HEMATOCRIT 31.5* 35.1* 33.3* 33.0*   PLATELETS 235 266 262 228   NEUTROS ABS 6.74 5.72 5.25 5.45   IMMATURE GRANS (ABS) 0.12* 0.12* 0.20* 0.27*   LYMPHS ABS 1.05 1.09 1.35 1.09   MONOS ABS 0.45 0.55 0.58 0.63   EOS ABS  0.14 0.17 0.16 0.18   MCV 97.2* 100.6* 100.6* 98.8*   PROTIME  --   --  9.7  --          Lab 03/14/22  0505 03/11/22  0414 03/10/22  0427 03/09/22  0440   SODIUM 132* 133* 134* 132*   POTASSIUM 4.7 4.5 4.4 4.3   CHLORIDE 95* 96* 98 97*   CO2 26.7 24.2 25.3 25.2   ANION GAP 10.3 12.8 10.7 9.8   BUN 22 17 19 22   CREATININE 0.87 0.86 0.86 0.90   EGFR 91.7 92.0 92.0 90.7   GLUCOSE 165* 128* 150* 155*   CALCIUM 9.1 9.3 9.0 8.5*   MAGNESIUM 2.1  --  2.0  --          Lab 03/14/22  0505 03/10/22  0427   TOTAL PROTEIN 6.4 6.6   ALBUMIN 3.40* 3.40*   GLOBULIN 3.0 3.2   ALT (SGPT) 46* 43*   AST (SGOT) 42* 36   BILIRUBIN 0.2 <0.2   ALK PHOS 102 104         Lab 03/14/22  0505 03/10/22  0427   PROBNP 315.2  --    PROTIME  --  9.7   INR  --  0.91*             Lab 03/10/22  0427   IRON 70   IRON SATURATION 28   TIBC 253*   TRANSFERRIN 170*   FERRITIN 309.80   VITAMIN B 12 879         Brief Urine Lab Results  (Last result in the past 365 days)      Color   Clarity   Blood   Leuk Est   Nitrite   Protein   CREAT   Urine HCG        02/25/22 1336 Yellow   Clear   Negative   Negative   Negative   Negative               Microbiology Results (last 10 days)     ** No results found for the last 240 hours. **          CT Head Without Contrast    Addendum Date: 3/3/2022    ADDENDUM:  The case was reviewed with Dr. Sarkar on 3/3/2022.  There are 6 total intra-axial metastatic lesions.  There is a lesion in the right cerebellum image 14. There is a small lesion in the left occipital lobe image 33. There is a lesion in the right frontal lobe image 33. There are 3 lesions in the right parietal lobe images 40 and 46.    ENRIQUE GUARDADO MD       Electronically Signed and Approved By: ENRIQUE GUARDADO MD on 3/03/2022 at 15:12             Result Date: 3/3/2022  Impression: Multiple enhancing lesions present within the right parietal lobe, the right frontal lobe in the right cerebellum consistent with metastatic disease.  Diffuse right-sided cerebral  edema is present which appears stable as compared to the previous study..   TRAVIS REGALADO MD       Electronically Signed and Approved By: TRAVIS REGALADO MD on 2/26/2022 at 15:26             CT Head Without Contrast    Result Date: 2/25/2022  Impression:   1. New extensive hypoattenuation in the right cerebral hemisphere and less extensive foci of hypoattenuation in the left parietal-occipital region and bilateral cerebellar hemispheres.  The appearance of the findings is most suggestive of vasogenic edema and these could be related to multiple underlying mass lesions such as metastases.  Multiple infarcts are considered less likely given the distribution and appearance, but are included in the differential diagnosis.  MRI with and without contrast is recommended for further assessment. 2. There is local mass effect with effacement of right cerebral sulci, but no significant midline shift or effacement of the basal cisterns at this time.  3. No definite hemorrhage is seen at this time. 4. Findings suggestive of chronic paranasal sinusitis.   This report was communicated by telephone to Dr. Fu at 1317 hr 2/25/2022.     DONY RHODES MD       Electronically Signed and Approved By: DONY RHODES MD on 2/25/2022 at 13:21             CT Chest With Contrast Diagnostic    Result Date: 2/26/2022  Impression:   1. Left hilar adenopathy versus left perihilar mass likely representing primary malignancy.  There is a cluster of mildly prominent contiguous left hilar versus AP window nodes which are suspicious for metastatic disease.  No subcarinal or contralateral adenopathy identified.  There is an enhancing centrally necrotic nodule seen arising from the left adrenal gland which appears new as compared to the previous study concerning for metastatic disease.  No additional lesions or adenopathy identified.  No acute process. 2. Ancillary findings as described above.     TRAVIS REGALADO MD       Electronically Signed and  Approved By: TRAVIS REGALADO MD on 2/26/2022 at 15:33             CT Abdomen Pelvis With Contrast    Result Date: 2/26/2022  Impression:   1. Left hilar adenopathy versus left perihilar mass likely representing primary malignancy.  There is a cluster of mildly prominent contiguous left hilar versus AP window nodes which are suspicious for metastatic disease.  No subcarinal or contralateral adenopathy identified.  There is an enhancing centrally necrotic nodule seen arising from the left adrenal gland which appears new as compared to the previous study concerning for metastatic disease.  No additional lesions or adenopathy identified.  No acute process. 2. Ancillary findings as described above.     TRAVIS REGALADO MD       Electronically Signed and Approved By: TRAVIS REGALADO MD on 2/26/2022 at 15:33             XR Chest 1 View    Result Date: 3/10/2022  Impression:   Tip of the right internal jugular central venous port catheter terminates in the lower SVC.  No visible pneumothorax.       REJI IRBY MD       Electronically Signed and Approved By: REJI IRBY MD on 3/10/2022 at 15:38             XR Chest 1 View    Result Date: 2/25/2022  Impression:  No active disease, stable chest x-ray.       RAJESH ASTORGA MD       Electronically Signed and Approved By: RAJESH ASTORGA MD on 2/25/2022 at 15:00             XR Hip With or Without Pelvis 2 - 3 View Left    Result Date: 3/6/2022  Impression:   1. Diffuse osteopenia with questionable nondisplaced fractures of the left superior and inferior pubic rami. 2. Mild bilateral hip joint DJD.      MISAEL OCHOA MD       Electronically Signed and Approved By: MISAEL OCHOA MD on 3/06/2022 at 18:10               Results for orders placed during the hospital encounter of 09/06/19    Carotid Duplex - Bilateral    Interpretation Summary  · Proximal right internal carotid artery mild stenosis.  · Proximal left internal carotid artery mild stenosis.  · Mid right internal carotid artery is  normal.  · Distal right internal carotid artery is normal.  · All other right side carotid system vessels are normal.  · Mid left internal carotid artery is normal.  · Distal left internal carotid artery is normal.  · All other left side carotid system vessels are normal.      Results for orders placed during the hospital encounter of 09/06/19    Carotid Duplex - Bilateral    Interpretation Summary  · Proximal right internal carotid artery mild stenosis.  · Proximal left internal carotid artery mild stenosis.  · Mid right internal carotid artery is normal.  · Distal right internal carotid artery is normal.  · All other right side carotid system vessels are normal.  · Mid left internal carotid artery is normal.  · Distal left internal carotid artery is normal.  · All other left side carotid system vessels are normal.      Results for orders placed during the hospital encounter of 02/25/22    Adult Transthoracic Echo Complete W/ Cont if Necessary Per Protocol    Interpretation Summary  · There is moderate global hypokinesis left ventricle with estimated LV ejection fraction of 35%  · Left ventricular diastolic function is consistent with (grade I) impaired relaxation.  · There is mild mitral regurgitation.  · Unable to calculate pulmonary systolic pressure due to incomplete TR Doppler envelope.      Labs Pending at Discharge:      Imaging Results (All)     Procedure Component Value Units Date/Time    XR Chest 1 View [[id number]] Collected: 03/10/22 1538     Updated: 03/10/22 1541    Narrative:      PROCEDURE: XR CHEST 1 VW     COMPARISON: [facility], CT, CT CHEST W CONTRAST DIAGNOSTIC, 2/26/2022, 14:55.  [facility], CR, XR CHEST 1 VW, 2/25/2022, 14:50.     INDICATIONS: post port a cath placement     FINDINGS:   Tip of the right internal jugular central venous port catheter terminates in the lower SVC.  No   pneumothorax is seen.  Left hilar mass appears stable.  Lungs are grossly clear.   Cardiomediastinal   contours are unchanged, including previous CABG and pacemaker/ICD lead.       Impression:         Tip of the right internal jugular central venous port catheter terminates in the lower SVC.    No visible pneumothorax.                  REJI IRBY MD         Electronically Signed and Approved By: REJI IRBY MD on 3/10/2022 at 15:38                     FL < 1 Hour [[id number]] Resulted: 03/10/22 1500     Updated: 03/10/22 1500    Narrative:      This procedure was auto-finalized with no dictation required.    XR Hip With or Without Pelvis 2 - 3 View Left [[id number]] Collected: 03/06/22 1811     Updated: 03/06/22 1814    Narrative:      PROCEDURE: XR HIP W OR WO PELVIS 2-3 VIEW LEFT     COMPARISON: [facility], CT, CT ABDOMEN PELVIS W CONTRAST, 2/26/2022, 14:55.  [facility], CR, LEFT HIP/PELVIS, 8/16/2018, 10:20.     INDICATIONS: fall injury to L hip     FINDINGS:   Diffuse osteopenia.  Cortical irregularity through the left superior and inferior pubic rami, which   could represent nondisplaced fractures.  Mild bilateral hip joint DJD.  Pubic symphysis is   unremarkable.  The SI joint spaces are fairly well maintained.  Partially imaged lumbosacral   spondylosis.  Mild vascular calcifications.       Impression:         1. Diffuse osteopenia with questionable nondisplaced fractures of the left superior and inferior   pubic rami.  2. Mild bilateral hip joint DJD.               MISAEL OCHOA MD         Electronically Signed and Approved By: MISAEL OCHOA MD on 3/06/2022 at 18:10                          Discharge Details        Discharge Medications      New Medications      Instructions Start Date   HYDROcodone-acetaminophen 7.5-325 MG per tablet  Commonly known as: NORCO   1 tablet, Oral, Every 4 Hours PRN         Changes to Medications      Instructions Start Date   azithromycin 250 MG tablet  Commonly known as: ZITHROMAX  What changed:   · how much to  take  · how to take this  · when to take this  · additional instructions   Take every other day      benzonatate 100 MG capsule  Commonly known as: TESSALON  What changed:   · when to take this  · reasons to take this   TAKE 1 CAPSULE THREE TIMES A DAY      budesonide 0.5 MG/2ML nebulizer solution  Commonly known as: PULMICORT  What changed: See the new instructions.   INHALE 2 ML BY NEBULIZATION TWICE A DAY      dexamethasone 4 MG tablet  Commonly known as: DECADRON  What changed:   · how much to take  · how to take this  · when to take this   4 mg, Oral, Daily With Breakfast      glipizide 5 MG tablet  Commonly known as: GLUCOTROL  What changed: when to take this   TAKE 1 TABLET DAILY      montelukast 10 MG tablet  Commonly known as: SINGULAIR  What changed: when to take this   TAKE 1 TABLET ONCE DAILY      ProAir  (90 Base) MCG/ACT inhaler  Generic drug: albuterol sulfate HFA  What changed: how to take this   USE 2 INHALATIONS EVERY 4 HOURS AS NEEDED      albuterol sulfate  (90 Base) MCG/ACT inhaler  Commonly known as: PROVENTIL HFA;VENTOLIN HFA;PROAIR HFA  What changed: Another medication with the same name was changed. Make sure you understand how and when to take each.   2 puffs, Inhalation, Every 4 Hours PRN      vitamin D 1.25 MG (46142 UT) capsule capsule  Commonly known as: ERGOCALCIFEROL  What changed: when to take this   TAKE 1 CAPSULE ONCE WEEKLY         Continue These Medications      Instructions Start Date   aspirin 81 MG chewable tablet   81 mg, Oral, Daily      atorvastatin 40 MG tablet  Commonly known as: LIPITOR   40 mg, Oral, Nightly      Azelastine-Fluticasone 137-50 MCG/ACT suspension   1 spray, Nasal, 2 times daily      carvedilol 3.125 MG tablet  Commonly known as: Coreg   3.125 mg, Oral, 2 Times Daily With Meals      cetirizine 10 MG tablet  Commonly known as: zyrTEC   10 mg, Oral, Daily      digoxin 125 MCG tablet  Commonly known as: Lanoxin   125 mcg, Oral, Daily Digoxin       folic acid 1 MG tablet  Commonly known as: FOLVITE   TAKE 1 TABLET DAILY      furosemide 40 MG tablet  Commonly known as: LASIX   40 mg, Oral, Daily      hydroxychloroquine 200 MG tablet  Commonly known as: PLAQUENIL   200 mg, Oral, Daily      ipratropium-albuterol 0.5-2.5 mg/3 ml nebulizer  Commonly known as: DUO-NEB   3 mL, Nebulization, Every 6 Hours PRN      levETIRAcetam 500 MG tablet  Commonly known as: Keppra   500 mg, Oral, 2 Times Daily      levothyroxine 75 MCG tablet  Commonly known as: SYNTHROID, LEVOTHROID   TAKE 1 TABLET ONCE DAILY      pantoprazole 40 MG EC tablet  Commonly known as: PROTONIX   40 mg, Oral, Daily      primidone 250 MG tablet  Commonly known as: MYSOLINE   TAKE 1 TABLET TWICE A DAY      sertraline 25 MG tablet  Commonly known as: ZOLOFT   TAKE 1 TABLET DAILY      spironolactone 25 MG tablet  Commonly known as: ALDACTONE   25 mg, Oral, Daily      Stiolto Respimat 2.5-2.5 MCG/ACT aerosol solution inhaler  Generic drug: tiotropium bromide-olodaterol   2 puffs, Inhalation, Daily - RT         Stop These Medications    chlorproMAZINE 25 MG tablet  Commonly known as: THORAZINE            Allergies   Allergen Reactions   • Amiodarone Unknown - High Severity   • Lisinopril Swelling   • Codeine Nausea And Vomiting         [place] [facility] [place]iet:  Diet Instructions     Diet: Regular      Discharge Diet: Regular            Discharge Activity:   Activity Instructions     Up WIth Assist              CODE STATUS:  Code Status and Medical Interventions:   Ordered at: 03/10/22 1623     Code Status (Patient has no pulse and is not breathing):    CPR (Attempt to Resuscitate)     Medical Interventions (Patient has pulse or is breathing):    Full         Future Appointments   Date Time Provider Department Center   4/26/2022  2:30 PM [facility] CT 2 [facility] CT [facility]   4/28/2022  3:00 PM Ean Farmer MD [facility]   6/1/2022  2:30 PM [facility]    7/13/2022  2:00 PM Mono Tidwell MD [facility] [place] [facility]       Additional Instructions for the Follow-ups that You Need to Schedule     Discharge Follow-up with PCP   As directed       Currently Documented PCP:    Ean Farmer MD    PCP Phone Number:    [phone]     Follow Up Details: followup in 7-10 days               Time spent on Discharge including face to face service:  35    minutes    Electronically signed by Ean Farmer MD, 03/15/22, 7:51 AM EDT.

## 2022-03-15 NOTE — PROGRESS NOTES
[facility]     Progress Note    Patient Name: Cedric Wade  : 1950  MRN: [id number]  Primary Care Physician:  Ean Farmer MD  Date of admission: 3/6/2022    Subjective   Subjective     Chief Complaint: Patient is pathology has now been completed he is ALK negative ROS1 negative as well as EGFR is negative and PD-L1 is negative as well so he will be due for chemotherapy he is coming tomorrow to my office will arrange for the PET scan to complete the staging work-up will do the chemotherapy education and will start chemotherapy hopefully next week his radiation therapy to the brain has been completed today    HPI:  Patient reports been admitted because of metastatic lung cancer with metastases to the brain    Review of Systems   All systems were reviewed and negative except for: Reviewed    Objective   Objective     Vitals:   Temp:  [97.8 °F (36.6 °C)-98.2 °F (36.8 °C)] 98 °F (36.7 °C)  Heart Rate:  [59-74] 63  Resp:  [16-22] 18  BP: (112-138)/(54-83) 112/54  Flow (L/min):  [2] 2    Physical Exam    Constitutional: Awake, alert   Eyes: PERRLA, sclerae anicteric, no conjunctival injection   HENT: NCAT, mucous membranes moist   Neck: Supple, no thyromegaly, no lymphadenopathy, trachea midline   Respiratory: Clear to auscultation bilaterally, nonlabored respirations    Cardiovascular: RRR, no murmurs, rubs, or gallops, palpable pedal pulses bilaterally   Gastrointestinal: Positive bowel sounds, soft, nontender, nondistended   Musculoskeletal: No bilateral ankle edema, no clubbing or cyanosis to extremities   Psychiatric: Appropriate affect, cooperative   Neurologic: Oriented x 3, strength symmetric in all extremities, Cranial Nerves grossly intact to confrontation, speech clear   Skin: No rashes     Result Review    Result Review:  I have personally reviewed the results from the time of this admission to 3/15/2022 08:04 EDT and agree with these findings:  []  Laboratory  []   Microbiology  [x]  Radiology brain mets  []  EKG/Telemetry   []  Cardiology/Vascular   []  Pathology  []  Old records  []  Other:  Most notable findings include: Lung cancer with brain metastases    Assessment/Plan   Assessment / Plan     Brief Patient Summary:  Cedric Wade is a 71 y.o. male who patient therapy has been completed to the brain we will start the chemotherapy next week he will be coming tomorrow to my office to get chemotherapy education and schedule for PET scan    Active Hospital Problems:  Active Hospital Problems    Diagnosis    • **Lung mass      Added automatically from request for surgery [id number]     • Closed nondisplaced fracture of pelvis (HCC)        Plan:   Chemotherapy education will be done patient will be scheduled for PET scan and start the chemotherapy which will consist of carboplatin and Alimta    DVT prophylaxis:  Mechanical DVT prophylaxis orders are present.    CODE STATUS:   Code Status (Patient has no pulse and is not breathing): CPR (Attempt to Resuscitate)  Medical Interventions (Patient has pulse or is breathing): Full    Disposition:  I expect patient to be discharged radiation therapy to be completed today.    Electronically signed by Patrice Rodriguez MD, 03/15/22, 8:04 AM EDT.

## 2022-03-16 NOTE — PROGRESS NOTES
ATTEMPTED TO CALL THE PT AND GET HIM ON PA SCHEDULE FOR TOMORROW, PT DID NOT ANSWER CELL PHONE NOR HOME PHONE. SHOULD PT CALL BACK HE WILL NEED APT WITH NP TOMORROW 03/17/22

## 2022-03-16 NOTE — OUTREACH NOTE
Call Center TCM Note    Flowsheet Row Responses   [facility] patient discharged from? [facility]   Does the patient have one of the following disease processes/diagnoses(primary or secondary)? Other   TCM attempt successful? Yes   Call start time 1317   Call end time 1320   Discharge diagnosis Lung mass-insertion of portacath this visit   Meds reviewed with patient/caregiver? Yes   Is the patient having any side effects they believe may be caused by any medication additions or changes? No   Does the patient have all medications ordered at discharge? Yes   Is the patient taking all medications as directed (includes completed medication regime)? Yes   Does the patient have a primary care provider?  Yes   Does the patient have an appointment with their PCP within 7 days of discharge? Yes   Comments regarding PCP HOSP DC FU appt 3/23/22 @ 11am.    Has the patient kept scheduled appointments due by today? Yes   What is the Home health agency?  [facility]   Has home health visited the patient within 72 hours of discharge? Call prior to 72 hours   Psychosocial issues? No   Did the patient receive a copy of their discharge instructions? Yes   What is the patient's perception of their health status since discharge? Same  [Having pain. ]   Is the patient/caregiver able to teach back signs and symptoms related to disease process for when to call PCP? Yes   Is the patient/caregiver able to teach back signs and symptoms related to disease process for when to call 911? Yes   Is the patient/caregiver able to teach back the hierarchy of who to call/visit for symptoms/problems? PCP, Specialist, Home health nurse, Urgent Care, ED, 911 Yes   Additional teach back comments Pt at Cancer appt at this time.    TCM call completed? Yes   Wrap up additional comments Pt reports he is about the same, still having pain even with meds. Pt is at Cancer Dr appt at this time.           Michelle Villa, RN    3/16/2022, 13:21 EDT

## 2022-03-18 NOTE — TELEPHONE ENCOUNTER
Caller: [facility]    Best call back number: [phone]    Who are you requesting to speak with (clinical staff, provider,  specific staff member): MEDICAL STAFF    Do you know the name of the person who called: [facility]    What was the call regarding: [facility] RECEIVED THE REFERRAL FOR HOME HEALTH, PHYSICAL THERAPY AND OCCUPATIONAL THERAPY. PHYSICAL THERAPY WILL SEE PATIENT ONCE THE FIRST WEEK, TWICE THE SECOND WEEK AND ONCE THE THIRD WEEK. [facility] RECOMMENDS THAT THE PATIENT RECEIVES A MANUAL WHEELCHAIR. SHE WOULD LIKE THE ORDER SENT OVER IF POSSIBLE TO FAX: [phone]. THE  HOSPITAL ORDERED A WALKER FOR THE PATIENT BUT HE IS NOT ABLE TO GET AROUND THE HOUSE EASILY. [facility] WILL TRY TO GET  [employer] TO COVER THE COST OF THE WHEELCHAIR SINCE MEDICARE WILL NOT.

## 2022-03-24 NOTE — PROGRESS NOTES
"Chief Complaint/ HPI: Metastatic lung cancer,  Transitional care visit hospital stay March 6 through March 15, 2022, patient finished his radiation to the brain on March 15, 2022 and has had his first round of chemotherapy yesterday March 23, 2022 with Dr. Pelletier, here with his wife using a rolling walker to get around,    Still with quite a bit of pelvic pain from his pelvic fracture and fall from his previous admission prior to the last 1, had been home about 1 day when he fell, came back in for rehab evaluation pain control, currently at home doing rehab now,      Objective   Vital Signs  Vitals:    03/24/22 1122   BP: 108/64   Pulse: (!) 48   Temp: 98.8 °F (37.1 °C)   SpO2: 94%   Weight: 65.3 kg (144 lb)   Height: 170.2 cm (67.01\")      Body mass index is 22.55 kg/m².  Review of Systems   Psychiatric/Behavioral: Positive for depressed mood.    as above  Physical Exam patient's ears show no cerumen, tympanic membranes are intact and clean and clear, lungs show diminished breath sounds a few inspiratory crackles but very diminished, cardiac exam regular rhythm distant heart tones soft 2/6 systolic murmur lower extremities no edema patient is alert pleasant forehead shows redness peeling skin, throat shows dry mucosa,  Result Review :   Lab Results   Component Value Date    PROBNP 315.2 03/14/2022    PROBNP 476.3 03/08/2022    PROBNP 700.7 03/01/2022     05/24/2021    BNP 1124 (H) 05/20/2021     05/13/2021     CMP    CMP 3/10/22 3/11/22 3/14/22   Glucose 150 (A) 128 (A) 165 (A)   BUN 19 17 22   Creatinine 0.86 0.86 0.87   Sodium 134 (A) 133 (A) 132 (A)   Potassium 4.4 4.5 4.7   Chloride 98 96 (A) 95 (A)   Calcium 9.0 9.3 9.1   Albumin 3.40 (A)  3.40 (A)   Total Bilirubin <0.2  0.2   Alkaline Phosphatase 104  102   AST (SGOT) 36  42 (A)   ALT (SGPT) 43 (A)  46 (A)   (A) Abnormal value       Comments are available for some flowsheets but are not being displayed.           CBC w/diff    CBC w/Diff " 3/10/22 3/11/22 3/14/22   WBC 7.57 7.68 8.53   RBC 3.31 (A) 3.49 (A) 3.24 (A)   Hemoglobin 10.9 (A) 11.4 (A) 10.4 (A)   Hematocrit 33.3 (A) 35.1 (A) 31.5 (A)   .6 (A) 100.6 (A) 97.2 (A)   MCH 32.9 32.7 32.1   MCHC 32.7 32.5 33.0   RDW 13.0 12.7 12.6   Platelets 262 266 235   Neutrophil Rel % 69.4 74.4 79.0 (A)   Immature Granulocyte Rel % 2.6 (A) 1.6 (A) 1.4 (A)   Lymphocyte Rel % 17.8 (A) 14.2 (A) 12.3 (A)   Monocyte Rel % 7.7 7.2 5.3   Eosinophil Rel % 2.1 2.2 1.6   Basophil Rel % 0.4 0.4 0.4   (A) Abnormal value             Lipid Panel    Lipid Panel 8/20/21 12/23/21   Total Cholesterol 182 166   Triglycerides 85 70   HDL Cholesterol 91 (A) 76 (A)   VLDL Cholesterol 15 13   LDL Cholesterol  76 77   LDL/HDL Ratio 0.81 1.00   (A) Abnormal value             Lab Results   Component Value Date    TSH 0.384 02/26/2022    TSH 1.220 02/25/2022    TSH 1.570 12/23/2021      Lab Results   Component Value Date    FREET4 1.27 02/25/2022    FREET4 1.32 12/23/2021    FREET4 1.3 02/22/2021      A1C Last 3 Results    HGBA1C Last 3 Results 8/20/21 12/23/21   Hemoglobin A1C 5.50 6.62 (A)   (A) Abnormal value             PSA    PSA 12/23/21   PSA 0.889                          Visit Diagnoses:    ICD-10-CM ICD-9-CM   1. Screen for colon cancer  Z12.11 V76.51   2. Anxiety, generalized  F41.1 300.02   3. CAD with history of CABG  I25.119 414.01     413.9   4. COPD (chronic obstructive pulmonary disease) with chronic bronchitis (HCC)  J44.9 491.20   5. Brain mass  G93.89 348.89   6. Lung mass  R91.8 786.6   7. Acquired hypothyroidism  E03.9 244.9   8. Other hyperlipidemia  E78.49 272.4   9. Ischemic cardiomyopathy  I25.5 414.8   10. Chronic systolic (congestive) heart failure (HCC)  I50.22 428.22     428.0       Assessment and Plan   Diagnoses and all orders for this visit:    1. Screen for colon cancer (Primary)    2. Anxiety, generalized    3. CAD with history of CABG    4. COPD (chronic obstructive pulmonary disease) with  chronic bronchitis (HCC)    5. Brain mass    6. Lung mass    7. Acquired hypothyroidism    8. Other hyperlipidemia    9. Ischemic cardiomyopathy    10. Chronic systolic (congestive) heart failure (HCC)    Transitional care visit for above-mentioned admission,      Nondisplaced inferior and superior left pubic rami fractures,, fall on same day of discharge last week, March 6, 2022, pain control adequate at this point, continue therapy evaluations, discussed going to rehab once he completes his radiation to the brain, also discussed with hematology oncology March 11 about finishing rehab before starting chemo, patient would like to try home health which would expedite his chemo start date, lab work reviewed March 14,     Gait dysfunction, continue rehab efforts, will try for home health with assistance at home from family ------discussed with patient March 12 and 13  the 14th     hyponatremia,, stable March 14    CONtinue fluid restriction, BR NP levels are low,,, currently off IV fluids after Port-A-Cath placement,     Underlying COPD stable with current breathing treatments, stable March 14     Elevated liver enzymes, will need to trend--- new finding March 8,, still elevated mild we will follow March 14      Constipation issues better with bowel regimen     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, discussed with radiation oncology, continuing radiation therapy daily while in the hospital for whole brain radiation, will continue steroids at 4 mg twice a day,, appreciate hematology oncology for consultation for possible starting of chemo or immunotherapy,  Port-A-Cath placement March 10, PET scan is being ordered by hematology oncology as an outpatient once he is discharged,, finishes up radiation tomorrow March 15 and discharge plan for tomorrow afternoon     Congestive heart failure, severe systolic heart failure with defibrillator backup pacemaker in place, continue Aldactone, Lasix,  carvedilol watch fluid resuscitation efforts today closely March 10     Anxiety, controlled----     Essential tremor, continue primidone     Hyperlipidemia continues on moderate dose statin therapy,     Hypothyroid continue levothyroxine     Rheumatoid arthritis was on Plaquenil, continuing for now, may need to stop anticipating chemotherapy,?     Type 2 diabetes, continue sliding scale plus low-dose Levemir blood sugars are fairly well controlled currently,, watch for low blood sugars,, blood sugars fluctuating, will consider increasing Levemir dosing March 14      Vitamin D deficiency     Seizure type disorder currently on Keppra due to the brain tumors, no further seizure activity, no tremors of any sort, no hiccups and no vomiting        Prior admission and discharge summary from March 6, 2022:      Hiccups, now with nausea vomiting,  continue Thorazine March 2, 2022, Reglan and Phenergan added March 3, some improvement overall, continue GI prophylaxis also with IV Pepcid,, improved, will send home with some as needed Thorazine, he already has GI prophylactic medications at home     Metastatic brain lesions.,  [facility] biopsy shows adenocarcinoma most likely lung, for whole brain radiation starting March 2, discussed with pulmonology and oncology March 2, also discussed with patient wife at bedside today,, and patient March 3 ------CT scan abdomen pelvis chest reviewed, appreciate Dr. Larry Bliss for second opinion, changed to oral steroids, oral medications March 1 and 2, continues  neurochecks, seizure precautions etc., I am holding his anticoagulation due to the brain lesions and potential need for biopsy in the near future,and GI prophylaxis will be instituted---> cardiology feels we could restart his anticoagulation at this point, may need to dose adjust slightly for bleeding risks etc.,, cardiology's notes state holding Eliquis is okay for now and just continuing aspirin which we will do at time of  discharge     Hyponatremia --trend, March 2, fluctuating but stable March 4, and fifth, at 133     A. fib with rapid ventricular response developing after coughing spell morning of March 1,, back in sinus rhythm now over the past couple days,--- since March 1 through March 2, 3,, fourth 2022,, ----, was given Cardizem, dig, IV Lopressor yesterday, morning March 1,, with additional doses by cardiology, did not tolerate Cardizem due to blood pressure issues, not able to take amiodarone due to previous reaction, has now converted, holding diuretics March 1 and 2,----------- cardiology was consulted, --------------------in the emergency room, pacemaker was interrogated and felt to be stable at the current rate, backup of 40,, his heart rate was lower in the emergency room into the 40s and 50s at times, appears to be sinus on EKG, initially.  ----Appreciate cardiology's input,---- echo February 27, 2022 reviewed with moderate global hypokinesis ejection fraction 35% diastolic dysfunction present with mild mitral regurgitation,, will restart carvedilol 3.125 twice a day at time of discharge to help with rate control and afterload reduction in congestive heart failure     Chronic systolic heart failure.  Patient's BNP was only 1.4K on admit which appears to be his baseline.  BNP was 20K back in 2019 when he first presented prior to AICD placement.  His EF was 20% then, his last echo was in 5/21 and EF improved to 30%.  Current echo as above, will continue on Aldactone and diuretics.  He had angioedema to ACE inhibitors, not sure about ARB's.  Unclear whether patient would benefit from low-dose carvedilol i.e. 3.125 twice a day or low-dose Toprol     COPD exacerbation.    Will continue duo nebs, Singulair,, steroids, Pulmicort nebs, but stop Brovana nebs, due to A. fib March 1, patient has been on continuous Zithromax for several months, will stop this also due to nausea for now, could restart as an outpatient we will send  in a new prescription for nebulizer machine and a refill of his ProAir inhaler that he is requesting at time of discharge     Tremulousness.  Discussed this patient this is related to the steroids on top of the bronchodilators.  Ativan written for 2/27.,  Stable,, improved March 2,, and fourth     Essential tremors continues on primidone,, currently worse due to steroids etc. breathing treatments etc.     Anxiety depression continues on Zoloft, overall clinically better still has some emotional episodes when talking with family members etc.,     Vitamin D deficiency continues replacement     Hypothyroid--we will restart home medication at time of discharge today March 5, did receive IV levothyroxine IV for now at lower doses until oral intake better, due to nausea and vomiting over the past couple days,     Rheumatoid arthritis has been on Plaquenil will continue low-dose 200 mg daily     PUD prophylaxis.  Via p.o. H2 blocker.,  Patient states GI reflux is worse, will transition to proton pump inhibitor twice a day March 1, 2022, adding Thorazine for hiccups March 2,         Follow Up   Return for Next scheduled follow up.  Patient was given instructions and counseling regarding his condition or for health maintenance advice. Please see specific information pulled into the AVS if appropriate.

## 2022-03-24 NOTE — OUTREACH NOTE
General Surgery Week 2 Survey    Flowsheet Row Responses   [facility] patient discharged from? [facility]   Does the patient have one of the following disease processes/diagnoses(primary or secondary)? Other   Call start time 1341   Call end time 1347   Discharge diagnosis Lung mass-insertion of portacath this visit   Meds reviewed with patient/caregiver? Yes   Is the patient having any side effects they believe may be caused by any medication additions or changes? No   Is the patient taking all medications as directed (includes completed medication regime)? Yes   Has the patient kept scheduled appointments due by today? Yes   What is the Home health agency?  [facility]    Comments HH seeing patient for PT and OT   What is the patient's perception of their health status since discharge? Improving   If the patient is a current smoker, are they able to teach back resources for cessation? Not a smoker   Is the patient/caregiver able to teach back the hierarchy of who to call/visit for symptoms/problems? PCP, Specialist, Home health nurse, Urgent Care, ED, 911 Yes   Additional teach back comments Educated on the  CC number   Wrap up additional comments States has improved some. Is working with  PT and OT to come today. Started chemo yesterday with no ill effects.          LIA ROBLERO - Registered Nurse

## 2022-03-30 NOTE — TELEPHONE ENCOUNTER
Caller: Cedric Wade    Relationship: Self    Best call back number: [phone]    Requested Prescriptions:   Requested Prescriptions      No prescriptions requested or ordered in this encounter    HYDROCODONE 7.5-325    Pharmacy where request should be sent: [facility] - [place], [place] - [address] [facility] - [phone]  - [phone] FX     Additional details provided by patient: PATIENT HAS ONE PILL LEFT.    Does the patient have less than a 3 day supply:  [x] Yes  [] No    Sarah Irby Rep   03/30/22 10:19 EDT

## 2022-04-01 NOTE — OUTREACH NOTE
Medical Week 3 Survey    Flowsheet Row Responses   [facility] [place] facility patient discharged from? [facility]   Does the patient have one of the following disease processes/diagnoses(primary or secondary)? Other   Week 3 attempt successful? No   Unsuccessful attempts Attempt 1          REG BAPTISTE - Registered Nurse

## 2022-04-05 NOTE — TELEPHONE ENCOUNTER
Erika from [facility] called. She asked if we had received faxed paperwork on PT. I didn't see anything. Her call back number is [phone].

## 2022-04-05 NOTE — OUTREACH NOTE
Medical Week 3 Survey    Flowsheet Row Responses   [facility] patient discharged from? [facility]   Does the patient have one of the following disease processes/diagnoses(primary or secondary)? Other   Week 3 attempt successful? Yes   Call start time 1720   Call end time 1722   Discharge diagnosis Lung mass-insertion of portacath this visit   Is the patient taking all medications as directed (includes completed medication regime)? Yes   Does the patient have a primary care provider?  Yes   Has the patient kept scheduled appointments due by today? Yes   Comments Has appt with PCP next week   What is the Home health agency?  [facility]    Has home health visited the patient within 72 hours of discharge? Yes   Psychosocial issues? No   What is the patient's perception of their health status since discharge? Improving   Additional teach back comments States he is doing alright.  Using walker to ambulate.  Home health is still coming in to see him.   Week 3 Call Completed? Yes   Graduated Yes   Graduated/Revoked comments Denies questions or needs at this time.          GILBERTO FLORES - Licensed Nurse

## 2022-04-06 NOTE — TELEPHONE ENCOUNTER
1.  This was a two-part question.  As far as sending the Keppra to her local pharmacy, I understand what you said.  Please confirm that it looks like Dr. Farmer may have logged on and already sent that prescription today.  I cannot confirm this, but I do not see a pended prescription for me to sign.  It says that it is already an approved prescription meaning it was already sent.    2.  The other part of the question that was asked was does he actually have a prescription at his mail order that has been sitting there for the past month?.  Because if not, please pend a prescription to send to them, in case for some reason they did not receive the 1 by Dr. Farmer on 3/5/2022.    3.  Thanks.

## 2022-04-06 NOTE — TELEPHONE ENCOUNTER
When I click on Keppra on his med list, it says that 180 with 3 refills was sent to [facility] on March 5 I believe it was.  So was this not the case, is [facility] not have a prescription?  This patient has none presently, I at least need to send a supply in locally, where to?

## 2022-04-06 NOTE — TELEPHONE ENCOUNTER
PHARMACY CALLED AND STATED THEY GOT MEDICATION FROM THE HOSPITAL ONCE PT WAS DISCHARGED, PT WIFE CALLED THIS PHARMACY AND STATED PT WAS NOT ON THIS MEDICATION, THEY CALLED [facility] BACK AND THEY SAID YOU GAVE A VERBAL FOR KEPPRA 500 TWICE DAILY #60 WITH NO REFILLS. SEBASTI[facility]N AND I CHECKED AND CAN NOT FIND A ORDER FOR THIS MEDICATION. PLEASE ADVISE

## 2022-04-06 NOTE — TELEPHONE ENCOUNTER
I HAVE PENDED THE KEPPRA TO THE PHARMACY WITH A 2 WEEK SUPPLY. PT WIFE SAID THAT'S HOW LONG IS USUALLY TAKES TO GET MEDS FROM [facility].

## 2022-04-12 NOTE — TELEPHONE ENCOUNTER
Caller: [facility]    Relationship to patient: Home Health    Best call back number: [phone]     Patient is needing: [facility] CALLED TO LET DR. CUMMINGS KNOW THAT PHYSICAL THERAPY IS EXTENDING THEY CARE.     PATIENT WILL HAVE PHYSICAL THERAPY ONCE A WEEK FOR FOUR WEEKS. PLEASE CALL AND ADVISE.

## 2022-04-27 NOTE — TELEPHONE ENCOUNTER
Per patients Chest CT results, Cassidy Peñaloza asked us to forward results to Dr. Rodriguez and Dr. Sarkar and also to make sure patient had a follow up with both. I forwarded results to both and confirmed that patient does have a follow up. He is following up with Dr. Rodriguez today 4/27 and Dr. Sarkar on 5/9.

## 2022-04-28 NOTE — PROGRESS NOTES
"Chief Complaint/ HPI: Patient is here to follow-up with metastatic lung CA, finished radiation and undergoing his third round of chemotherapy, he is weak he says it is affected his vision he does not have an appetite and his wife making him eat, he gets around with a rolling walker most of the time, he seems more fatigued tired than usual, generally ill-appearing, his weight is down 10 pounds since I last saw him 1 month ago    Had a fall, March 6, 2022      Objective   Vital Signs  Vitals:    04/28/22 1524   BP: 114/67   Pulse: 66   Temp: 99.3 °F (37.4 °C)   SpO2: 96%   Weight: 61.1 kg (134 lb 9.6 oz)   Height: 170.2 cm (67.01\")      Body mass index is 21.08 kg/m².  Review of Systems as above, weight loss, weakness,  Physical Exam  Constitutional:       General: He is not in acute distress.     Appearance: Normal appearance.   HENT:      Head: Normocephalic.      Mouth/Throat:      Mouth: Mucous membranes are moist.   Eyes:      Conjunctiva/sclera: Conjunctivae normal.      Pupils: Pupils are equal, round, and reactive to light.   Cardiovascular:      Rate and Rhythm: Normal rate and regular rhythm.      Pulses: Normal pulses.      Heart sounds: Normal heart sounds.   Abdominal:      General: Abdomen is flat. Bowel sounds are normal.      Palpations: Abdomen is soft.   Musculoskeletal:         General: No swelling. Normal range of motion.      Cervical back: Neck supple.   Skin:     General: Skin is warm and dry.      Coloration: Skin is not jaundiced.   Neurological:      General: No focal deficit present.      Mental Status: He is alert and oriented to person, place, and time. Mental status is at baseline.   Psychiatric:         Mood and Affect: Mood normal.         Behavior: Behavior normal.         Thought Content: Thought content normal.         Judgment: Judgment normal.     Diminished breath sounds bilateral,  Result Review :   Lab Results   Component Value Date    PROBNP 319.0 04/25/2022    PROBNP 315.2 " 03/14/2022    PROBNP 476.3 03/08/2022     05/24/2021    BNP 1124 (H) 05/20/2021     05/13/2021     CMP    CMP 3/11/22 3/14/22 4/25/22   Glucose 128 (A) 165 (A) 91   BUN 17 22 21   Creatinine 0.86 0.87 0.94   Sodium 133 (A) 132 (A) 139   Potassium 4.5 4.7 4.2   Chloride 96 (A) 95 (A) 101   Calcium 9.3 9.1 9.0   Albumin  3.40 (A) 3.80   Total Bilirubin  0.2 0.2   Alkaline Phosphatase  102 154 (A)   AST (SGOT)  42 (A) 26   ALT (SGPT)  46 (A) 25   (A) Abnormal value       Comments are available for some flowsheets but are not being displayed.           CBC w/diff    CBC w/Diff 3/11/22 3/14/22 4/25/22   WBC 7.68 8.53 3.49   RBC 3.49 (A) 3.24 (A) 2.73 (A)   Hemoglobin 11.4 (A) 10.4 (A) 9.0 (A)   Hematocrit 35.1 (A) 31.5 (A) 27.0 (A)   .6 (A) 97.2 (A) 98.9 (A)   MCH 32.7 32.1 33.0   MCHC 32.5 33.0 33.3   RDW 12.7 12.6 13.2   Platelets 266 235 75 (A)   Neutrophil Rel % 74.4 79.0 (A) 61.0   Immature Granulocyte Rel % 1.6 (A) 1.4 (A) 0.3   Lymphocyte Rel % 14.2 (A) 12.3 (A) 28.1   Monocyte Rel % 7.2 5.3 10.0   Eosinophil Rel % 2.2 1.6 0.6   Basophil Rel % 0.4 0.4 0.0   (A) Abnormal value             Lipid Panel    Lipid Panel 8/20/21 12/23/21   Total Cholesterol 182 166   Triglycerides 85 70   HDL Cholesterol 91 (A) 76 (A)   VLDL Cholesterol 15 13   LDL Cholesterol  76 77   LDL/HDL Ratio 0.81 1.00   (A) Abnormal value             Lab Results   Component Value Date    TSH 0.384 02/26/2022    TSH 1.220 02/25/2022    TSH 1.570 12/23/2021      Lab Results   Component Value Date    FREET4 1.27 02/25/2022    FREET4 1.32 12/23/2021    FREET4 1.3 02/22/2021      A1C Last 3 Results    HGBA1C Last 3 Results 8/20/21 12/23/21 4/25/22   Hemoglobin A1C 5.50 6.62 (A) 6.90 (A)   (A) Abnormal value             PSA    PSA 12/23/21   PSA 0.889                          Visit Diagnoses:    ICD-10-CM ICD-9-CM   1. Anxiety, generalized  F41.1 300.02   2. Brain mass  G93.89 348.89   3. Depression, unspecified depression type   F32.A 311   4. Lung mass  R91.8 786.6   5. Acquired hypothyroidism  E03.9 244.9   6. Chronic systolic (congestive) heart failure ([facility])  I50.22 428.22     428.0   7. Chronic obstructive pulmonary disease, unspecified COPD type ([facility])  J44.9 496   8. Essential tremor  G25.0 333.1   9. Seasonal allergies  J30.2 477.9   10. Type 2 diabetes mellitus with microalbuminuria, without long-term current use of insulin ([facility])  E11.29 250.40    R80.9 791.0   11. CAD with history of CABG  I25.119 414.01     413.9   12. Essential hypertension  I10 401.9   13. Atrial fibrillation with RVR ([facility])  I48.91 427.31   14. Ischemic cardiomyopathy  I25.5 414.8       Assessment and Plan   Diagnoses and all orders for this visit:    1. Anxiety, generalized (Primary)  -     levETIRAcetam (Keppra) 500 MG tablet; Take 1 tablet by mouth 2 (Two) Times a Day.  Dispense: 180 tablet; Refill: 3  -     DULoxetine (Cymbalta) 30 MG capsule; Take 1 capsule by mouth Daily.  Dispense: 14 capsule; Refill: 0  -     DULoxetine (Cymbalta) 60 MG capsule; Take 1 capsule by mouth Daily.  Dispense: 60 capsule; Refill: 5  -     dronabinol (Marinol) 2.5 MG capsule; Take 1 capsule by mouth 2 (Two) Times a Day Before Meals.  Dispense: 60 capsule; Refill: 2  -     Comprehensive Metabolic Panel; Future  -     CBC & Differential; Future  -     TSH+Free T4; Future    2. Brain mass  -     levETIRAcetam (Keppra) 500 MG tablet; Take 1 tablet by mouth 2 (Two) Times a Day.  Dispense: 180 tablet; Refill: 3  -     DULoxetine (Cymbalta) 30 MG capsule; Take 1 capsule by mouth Daily.  Dispense: 14 capsule; Refill: 0  -     DULoxetine (Cymbalta) 60 MG capsule; Take 1 capsule by mouth Daily.  Dispense: 60 capsule; Refill: 5  -     dronabinol (Marinol) 2.5 MG capsule; Take 1 capsule by mouth 2 (Two) Times a Day Before Meals.  Dispense: 60 capsule; Refill: 2  -     Comprehensive Metabolic Panel; Future  -     CBC & Differential; Future  -     TSH+Free T4; Future    3. Depression,  unspecified depression type  -     levETIRAcetam (Keppra) 500 MG tablet; Take 1 tablet by mouth 2 (Two) Times a Day.  Dispense: 180 tablet; Refill: 3  -     DULoxetine (Cymbalta) 30 MG capsule; Take 1 capsule by mouth Daily.  Dispense: 14 capsule; Refill: 0  -     DULoxetine (Cymbalta) 60 MG capsule; Take 1 capsule by mouth Daily.  Dispense: 60 capsule; Refill: 5  -     dronabinol (Marinol) 2.5 MG capsule; Take 1 capsule by mouth 2 (Two) Times a Day Before Meals.  Dispense: 60 capsule; Refill: 2  -     Comprehensive Metabolic Panel; Future  -     CBC & Differential; Future  -     TSH+Free T4; Future    4. Lung mass  -     levETIRAcetam (Keppra) 500 MG tablet; Take 1 tablet by mouth 2 (Two) Times a Day.  Dispense: 180 tablet; Refill: 3  -     DULoxetine (Cymbalta) 30 MG capsule; Take 1 capsule by mouth Daily.  Dispense: 14 capsule; Refill: 0  -     DULoxetine (Cymbalta) 60 MG capsule; Take 1 capsule by mouth Daily.  Dispense: 60 capsule; Refill: 5  -     dronabinol (Marinol) 2.5 MG capsule; Take 1 capsule by mouth 2 (Two) Times a Day Before Meals.  Dispense: 60 capsule; Refill: 2  -     Comprehensive Metabolic Panel; Future  -     CBC & Differential; Future  -     TSH+Free T4; Future    5. Acquired hypothyroidism  -     levETIRAcetam (Keppra) 500 MG tablet; Take 1 tablet by mouth 2 (Two) Times a Day.  Dispense: 180 tablet; Refill: 3  -     DULoxetine (Cymbalta) 30 MG capsule; Take 1 capsule by mouth Daily.  Dispense: 14 capsule; Refill: 0  -     DULoxetine (Cymbalta) 60 MG capsule; Take 1 capsule by mouth Daily.  Dispense: 60 capsule; Refill: 5  -     dronabinol (Marinol) 2.5 MG capsule; Take 1 capsule by mouth 2 (Two) Times a Day Before Meals.  Dispense: 60 capsule; Refill: 2  -     Comprehensive Metabolic Panel; Future  -     CBC & Differential; Future  -     TSH+Free T4; Future    6. Chronic systolic (congestive) heart failure (HCC)  -     levETIRAcetam (Keppra) 500 MG tablet; Take 1 tablet by mouth 2 (Two) Times  a Day.  Dispense: 180 tablet; Refill: 3  -     DULoxetine (Cymbalta) 30 MG capsule; Take 1 capsule by mouth Daily.  Dispense: 14 capsule; Refill: 0  -     DULoxetine (Cymbalta) 60 MG capsule; Take 1 capsule by mouth Daily.  Dispense: 60 capsule; Refill: 5  -     dronabinol (Marinol) 2.5 MG capsule; Take 1 capsule by mouth 2 (Two) Times a Day Before Meals.  Dispense: 60 capsule; Refill: 2  -     Comprehensive Metabolic Panel; Future  -     CBC & Differential; Future  -     TSH+Free T4; Future    7. Chronic obstructive pulmonary disease, unspecified COPD type (HCC)  -     levETIRAcetam (Keppra) 500 MG tablet; Take 1 tablet by mouth 2 (Two) Times a Day.  Dispense: 180 tablet; Refill: 3  -     DULoxetine (Cymbalta) 30 MG capsule; Take 1 capsule by mouth Daily.  Dispense: 14 capsule; Refill: 0  -     DULoxetine (Cymbalta) 60 MG capsule; Take 1 capsule by mouth Daily.  Dispense: 60 capsule; Refill: 5  -     dronabinol (Marinol) 2.5 MG capsule; Take 1 capsule by mouth 2 (Two) Times a Day Before Meals.  Dispense: 60 capsule; Refill: 2  -     Comprehensive Metabolic Panel; Future  -     CBC & Differential; Future  -     TSH+Free T4; Future    8. Essential tremor  -     levETIRAcetam (Keppra) 500 MG tablet; Take 1 tablet by mouth 2 (Two) Times a Day.  Dispense: 180 tablet; Refill: 3  -     DULoxetine (Cymbalta) 30 MG capsule; Take 1 capsule by mouth Daily.  Dispense: 14 capsule; Refill: 0  -     DULoxetine (Cymbalta) 60 MG capsule; Take 1 capsule by mouth Daily.  Dispense: 60 capsule; Refill: 5  -     dronabinol (Marinol) 2.5 MG capsule; Take 1 capsule by mouth 2 (Two) Times a Day Before Meals.  Dispense: 60 capsule; Refill: 2  -     Comprehensive Metabolic Panel; Future  -     CBC & Differential; Future  -     TSH+Free T4; Future    9. Seasonal allergies  -     levETIRAcetam (Keppra) 500 MG tablet; Take 1 tablet by mouth 2 (Two) Times a Day.  Dispense: 180 tablet; Refill: 3  -     DULoxetine (Cymbalta) 30 MG capsule; Take 1  capsule by mouth Daily.  Dispense: 14 capsule; Refill: 0  -     DULoxetine (Cymbalta) 60 MG capsule; Take 1 capsule by mouth Daily.  Dispense: 60 capsule; Refill: 5  -     dronabinol (Marinol) 2.5 MG capsule; Take 1 capsule by mouth 2 (Two) Times a Day Before Meals.  Dispense: 60 capsule; Refill: 2  -     Comprehensive Metabolic Panel; Future  -     CBC & Differential; Future  -     TSH+Free T4; Future    10. Type 2 diabetes mellitus with microalbuminuria, without long-term current use of insulin (HCC)  -     levETIRAcetam (Keppra) 500 MG tablet; Take 1 tablet by mouth 2 (Two) Times a Day.  Dispense: 180 tablet; Refill: 3  -     DULoxetine (Cymbalta) 30 MG capsule; Take 1 capsule by mouth Daily.  Dispense: 14 capsule; Refill: 0  -     DULoxetine (Cymbalta) 60 MG capsule; Take 1 capsule by mouth Daily.  Dispense: 60 capsule; Refill: 5  -     dronabinol (Marinol) 2.5 MG capsule; Take 1 capsule by mouth 2 (Two) Times a Day Before Meals.  Dispense: 60 capsule; Refill: 2  -     Comprehensive Metabolic Panel; Future  -     CBC & Differential; Future  -     TSH+Free T4; Future    11. CAD with history of CABG  -     levETIRAcetam (Keppra) 500 MG tablet; Take 1 tablet by mouth 2 (Two) Times a Day.  Dispense: 180 tablet; Refill: 3  -     DULoxetine (Cymbalta) 30 MG capsule; Take 1 capsule by mouth Daily.  Dispense: 14 capsule; Refill: 0  -     DULoxetine (Cymbalta) 60 MG capsule; Take 1 capsule by mouth Daily.  Dispense: 60 capsule; Refill: 5  -     dronabinol (Marinol) 2.5 MG capsule; Take 1 capsule by mouth 2 (Two) Times a Day Before Meals.  Dispense: 60 capsule; Refill: 2  -     Comprehensive Metabolic Panel; Future  -     CBC & Differential; Future  -     TSH+Free T4; Future    12. Essential hypertension  -     levETIRAcetam (Keppra) 500 MG tablet; Take 1 tablet by mouth 2 (Two) Times a Day.  Dispense: 180 tablet; Refill: 3  -     DULoxetine (Cymbalta) 30 MG capsule; Take 1 capsule by mouth Daily.  Dispense: 14 capsule;  Refill: 0  -     DULoxetine (Cymbalta) 60 MG capsule; Take 1 capsule by mouth Daily.  Dispense: 60 capsule; Refill: 5  -     dronabinol (Marinol) 2.5 MG capsule; Take 1 capsule by mouth 2 (Two) Times a Day Before Meals.  Dispense: 60 capsule; Refill: 2  -     Comprehensive Metabolic Panel; Future  -     CBC & Differential; Future  -     TSH+Free T4; Future    13. Atrial fibrillation with RVR (HCC)  -     levETIRAcetam (Keppra) 500 MG tablet; Take 1 tablet by mouth 2 (Two) Times a Day.  Dispense: 180 tablet; Refill: 3  -     DULoxetine (Cymbalta) 30 MG capsule; Take 1 capsule by mouth Daily.  Dispense: 14 capsule; Refill: 0  -     DULoxetine (Cymbalta) 60 MG capsule; Take 1 capsule by mouth Daily.  Dispense: 60 capsule; Refill: 5  -     dronabinol (Marinol) 2.5 MG capsule; Take 1 capsule by mouth 2 (Two) Times a Day Before Meals.  Dispense: 60 capsule; Refill: 2  -     Comprehensive Metabolic Panel; Future  -     CBC & Differential; Future  -     TSH+Free T4; Future    14. Ischemic cardiomyopathy  -     levETIRAcetam (Keppra) 500 MG tablet; Take 1 tablet by mouth 2 (Two) Times a Day.  Dispense: 180 tablet; Refill: 3  -     DULoxetine (Cymbalta) 30 MG capsule; Take 1 capsule by mouth Daily.  Dispense: 14 capsule; Refill: 0  -     DULoxetine (Cymbalta) 60 MG capsule; Take 1 capsule by mouth Daily.  Dispense: 60 capsule; Refill: 5  -     dronabinol (Marinol) 2.5 MG capsule; Take 1 capsule by mouth 2 (Two) Times a Day Before Meals.  Dispense: 60 capsule; Refill: 2  -     Comprehensive Metabolic Panel; Future  -     CBC & Differential; Future  -     TSH+Free T4; Future        Nondisplaced inferior and superior left pubic rami fractures,, fall on same day of discharge last week, March 6, 2022, he is getting around his pain levels are fairly good he is just weak he wants something to make him feel better, overall he is just weak, consider Marinol, as an appetite stimulator and something to help with overall wellness,  consider changing Zoloft to Cymbalta to help with overall pain improvement in wellbeing,    Hyponatremia resolved as of April 25, 2022,    Underlying COPD stable with current breathing treatments, stable, April 28, 2022     Constipation issues better with bowel regimen     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, discussed with radiation oncology, continuing radiation therapy daily while in the hospital for whole brain radiation, will continue steroids at 4 mg twice a day,, appreciate hematology oncology for consultation for possible starting of chemo or immunotherapy,  Port-A-Cath placement March 10, PET scan is being ordered by hematology oncology as an outpatient once he is discharged,, finished radiation therapy to the brain and now undergoing his third round of chemotherapy with    outpatient,     congestive heart failure, severe systolic heart failure with defibrillator backup pacemaker in place, continue Aldactone, Lasix, carvedilol watch fluid resuscitation efforts today closely March 10     Anxiety, controlled----     Essential tremor, continue primidone     Hyperlipidemia continues on moderate dose statin therapy, okay to stop, April 28, 2022     Hypothyroid continue levothyroxine     Rheumatoid arthritis was on Plaquenil, continuing for now, may need to stop anticipating chemotherapy,?  Stop Plaquenil     Type 2 diabetes, off all medication including insulin as of March and April 2022,     Vitamin D deficiency     Seizure type disorder currently on Keppra due to the brain tumors, no further seizure activity, no tremors of any sort, no hiccups and no vomiting        Prior admission and discharge summary from March 6, 2022:      Hiccups, now with nausea vomiting,  continue Thorazine March 2, 2022, Reglan and Phenergan added March 3, some improvement overall, continue GI prophylaxis also with IV Pepcid,, improved, will send home with some as needed Thorazine, he already has GI  prophylactic medications at home     Metastatic brain lesions.,  [facility] biopsy shows adenocarcinoma most likely lung, for whole brain radiation starting March 2, discussed with pulmonology and oncology March 2, also discussed with patient wife at bedside today,, and patient March 3 ------CT scan abdomen pelvis chest reviewed, appreciate Dr. Larry Bliss for second opinion, changed to oral steroids, oral medications March 1 and 2, continues  neurochecks, seizure precautions etc., I am holding his anticoagulation due to the brain lesions and potential need for biopsy in the near future,and GI prophylaxis will be instituted---> cardiology feels we could restart his anticoagulation at this point, may need to dose adjust slightly for bleeding risks etc.,, cardiology's notes state holding Eliquis is okay for now and just continuing aspirin which we will do at time of discharge     Hyponatremia --trend, March 2, fluctuating but stable March 4, and fifth, at 133     A. fib with rapid ventricular response developing after coughing spell morning of March 1,, back in sinus rhythm now over the past couple days,--- since March 1 through March 2, 3,, fourth 2022,, ----, was given Cardizem, dig, IV Lopressor yesterday, morning March 1,, with additional doses by cardiology, did not tolerate Cardizem due to blood pressure issues, not able to take amiodarone due to previous reaction, has now converted, holding diuretics March 1 and 2,----------- cardiology was consulted, --------------------in the emergency room, pacemaker was interrogated and felt to be stable at the current rate, backup of 40,, his heart rate was lower in the emergency room into the 40s and 50s at times, appears to be sinus on EKG, initially.  ----Appreciate cardiology's input,---- echo February 27, 2022 reviewed with moderate global hypokinesis ejection fraction 35% diastolic dysfunction present with mild mitral regurgitation,, will restart carvedilol 3.125 twice a  day at time of discharge to help with rate control and afterload reduction in congestive heart failure     Chronic systolic heart failure.  Patient's BNP was only 1.4K on admit which appears to be his baseline.  BNP was 20K back in 2019 when he first presented prior to AICD placement.  His EF was 20% then, his last echo was in 5/21 and EF improved to 30%.  Current echo as above, will continue on Aldactone and diuretics.  He had angioedema to ACE inhibitors, not sure about ARB's.  Unclear whether patient would benefit from low-dose carvedilol i.e. 3.125 twice a day or low-dose Toprol     COPD exacerbation.    Will continue duo nebs, Singulair,, steroids, Pulmicort nebs, but stop Brovana nebs, due to A. fib March 1, patient has been on continuous Zithromax for several months, will stop this also due to nausea for now, could restart as an outpatient we will send in a new prescription for nebulizer machine and a refill of his ProAir inhaler that he is requesting at time of discharge     Tremulousness.  Discussed this patient this is related to the steroids on top of the bronchodilators.  Ativan written for 2/27.,  Stable,, improved March 2,, and fourth     Essential tremors continues on primidone,, currently worse due to steroids etc. breathing treatments etc.     Anxiety depression continues on Zoloft, overall clinically better still has some emotional episodes when talking with family members etc.,     Vitamin D deficiency continues replacement     Hypothyroid--we will restart home medication at time of discharge today March 5, did receive IV levothyroxine IV for now at lower doses until oral intake better, due to nausea and vomiting over the past couple days,     Rheumatoid arthritis has been on Plaquenil will continue low-dose 200 mg daily     PUD prophylaxis.  Via p.o. H2 blocker.,  Patient states GI reflux is worse, will transition to proton pump inhibitor twice a day March 1, 2022, adding Thorazine for hiccups  March 2,               Follow Up   Return in about 2 months (around 6/28/2022).  Patient was given instructions and counseling regarding his condition or for health maintenance advice. Please see specific information pulled into the AVS if appropriate.

## 2022-04-28 NOTE — TELEPHONE ENCOUNTER
Cassidy LAUREN asked me to call the patient and see how he was feeling since his CT showed some postobstructive pneumonia. Patient states that he is fatigued but is not currently experiencing shortness of breath, coughing or wheezing. He states that he seen Dr. Rodriguez yesterday and was on his way to see Dr. Farmer. I instructed the patient to call us if he starts feeling bad. Patient verbalized understanding.

## 2022-05-04 PROBLEM — J18.9 PNEUMONIA DUE TO INFECTIOUS ORGANISM: Status: ACTIVE | Noted: 2022-01-01

## 2022-05-04 NOTE — TELEPHONE ENCOUNTER
Caller: ELSIE PEÑA    Relationship: Emergency Contact    Best call back number: [phone]    Who are you requesting to speak with (clinical staff, provider,  specific staff member): MA    What was the call regarding: PATIENT'S WIFE STATES SHE WAS ON HOLD WITH DR CUMMINGS'S ASSISTANT BUT WAS DISCONNECTED. PLEASE CALL PATIENT'S WIFE BACK.    Do you require a callback: YES

## 2022-05-04 NOTE — ED PROVIDER NOTES
Time: 5:37 PM EDT  Arrived by: private car  Chief Complaint: severe weakness   History provided by: Patient and family  History is limited by: Nothing    History of Present Illness:  Patient is a 71 y.o. year old male who presents to the emergency department with profound weakness, shortness of breath, low-grade fever, and low blood pressure.  The patient has a history of metastatic lung cancer with metastasis to the brain along with a history of COPD, CHF, and coronary artery disease.  The patient has already had radiation therapy for his brain metastasis and he is undergoing chemotherapy every 3 weeks.  The patient was supposed to have chemotherapy today however he was too weak to even move out of a chair in order to go for his chemotherapy.  The patient has had some increasing shortness of breath and is currently on 3 L of oxygen at home.  The patient has had very low energy along with a low-grade fever.    HPI    Similar Symptoms Previously: Yes  Recently seen: Yes      Patient Care Team  Primary Care Provider: Ean Farmer MD    Past Medical History:     Allergies   Allergen Reactions   • Amiodarone Unknown - High Severity   • Lisinopril Swelling   • Codeine Nausea And Vomiting     Past Medical History:   Diagnosis Date   • Abnormal ECG    • Abscess    • Arrhythmia    • Arthritis    • Atrial fibrillation (HCC)    • CAD with history of CABG 9/6/2019    Added automatically from request for surgery [id number]   • Cancer (HCC)     skin   • CHF (congestive heart failure) (HCC)    • COPD (chronic obstructive pulmonary disease) (HCC)    • Coronary artery disease    • Cutaneous lupus erythematosus    • Diabetes mellitus (HCC)    • Elevated cholesterol    • GERD (gastroesophageal reflux disease)    • Heart valve disease    • Hypertension    • Hypothyroidism, unspecified 7/30/2021   • Myocardial infarction (HCC)    • Other hyperlipidemia 9/8/2019   • Paroxysmal atrial fibrillation (HCC) 7/19/2021   • Sleep apnea       Past Surgical History:   Procedure Laterality Date   • BRONCHOSCOPY N/A 2/28/2022    Procedure: BRONCHOSCOPY WITH ENDOBRONCHIAL ULTRASOUND, BAL, WASHINGS, BRUSHINGS, NEEDLE ASPIRATIONS, BIOPSIES;  Surgeon: Ankur Alcantara MD;  Location: [facility] ENDOSCOPY;  Service: Pulmonary;  Laterality: N/A;  LEFT HILAR MASS   • CARDIAC CATHETERIZATION     • CARDIAC DEFIBRILLATOR PLACEMENT     • CARDIAC DEFIBRILLATOR PLACEMENT     • COLONOSCOPY     • CORONARY ARTERY BYPASS GRAFT N/A 9/9/2019    Procedure: INTRAOPERATIVE BUDDY, MIDLINE STENOTOMY WITH CORONARY ARTERY BYPASS GRAPHS X  3 UTILIZING LEFT SAVAGE  AND LEFT ENDOSCOPIC HARVESTED SAPHENOUS VEIN, LIGATION OF LEFT  ATRIAL APPENDAGE; PRP;  Surgeon: Alvaro Mazariegos MD;  Location: [facility] OR;  Service: Cardiothoracic   • LIPOMA EXCISION     • NECK SURGERY     • OTHER SURGICAL HISTORY      heart valve repair   not sure which valve   • PACEMAKER IMPLANTATION     • PORTACATH PLACEMENT N/A 3/10/2022    Procedure: INSERTION OF PORTACATH;  Surgeon: Chele Carl MD;  Location: [facility] [place];  Service: General;  Laterality: N/A;     Family History   Problem Relation Age of Onset   • Lung cancer Father    • Breast cancer Father    • Diabetes Sister    • Heart disease Brother    • Breast cancer Other    • Heart disease Other    • Diabetes Brother    • Heart attack Brother        Home Medications:  Prior to Admission medications    Medication Sig Start Date End Date Taking? Authorizing Provider   albuterol sulfate  (90 Base) MCG/ACT inhaler Inhale 2 puffs Every 4 (Four) Hours As Needed for Wheezing. 3/5/22   Ean Farmer MD   aspirin 81 MG chewable tablet Chew 81 mg Daily.    Provider, MD Rajwinder   atorvastatin (LIPITOR) 40 MG tablet TAKE 1 TABLET AT BEDTIME 3/21/22   Ean Farmer MD   Azelastine-Fluticasone 137-50 MCG/ACT suspension 1 spray into the nostril(s) as directed by provider 2 (two) times a day.    Provider, MD Rajwinder   azithromycin  (ZITHROMAX) 250 MG tablet Take every other day  Indications: Pneumonia 3/15/22   Ean Farmer MD   benzonatate (TESSALON) 100 MG capsule TAKE 1 CAPSULE THREE TIMES A DAY  Patient taking differently: Take 100 mg by mouth 3 (Three) Times a Day As Needed. 8/3/21   Ankur Alcantara MD   budesonide (PULMICORT) 0.5 MG/2ML nebulizer solution INHALE 2 ML BY NEBULIZATION TWICE A DAY  Patient taking differently: Take 0.5 mg by nebulization 2 (Two) Times a Day. 1/21/22   Ean Farmer MD   carvedilol (Coreg) 3.125 MG tablet Take 1 tablet by mouth 2 (Two) Times a Day With Meals. 3/5/22   Ean Farmer MD   cetirizine (zyrTEC) 10 MG tablet Take 10 mg by mouth Daily.    Rajwinder Tyler MD   dexamethasone (DECADRON) 4 MG tablet Take 1 tablet by mouth Daily With Breakfast. 3/15/22   Ean Farmer MD   digoxin (Lanoxin) 125 MCG tablet Take 1 tablet by mouth Daily. 3/5/22   Ean Farmer MD   digoxin (LANOXIN) 125 MCG tablet take 1 tablet by mouth once daily 3/5/22      dronabinol (Marinol) 2.5 MG capsule Take 1 capsule by mouth 2 (Two) Times a Day Before Meals. 4/28/22   Ean Farmer MD   DULoxetine (Cymbalta) 30 MG capsule Take 1 capsule by mouth Daily. 4/28/22   Ean Farmer MD   DULoxetine (Cymbalta) 60 MG capsule Take 1 capsule by mouth Daily. 4/28/22   Ean Farmer MD   Eliquis 5 MG tablet tablet  4/13/22   Rajwinder Tyler MD   Entresto 24-26 MG tablet  4/4/22   Rajwinder Tyler MD   folic acid (FOLVITE) 1 MG tablet TAKE 1 TABLET DAILY  Patient taking differently: Take 1 mg by mouth Daily. 7/29/21   Ean Farmer MD   furosemide (LASIX) 40 MG tablet Take 40 mg by mouth Daily.    Rajwinder Tyler MD   glipizide (GLUCOTROL) 5 MG tablet TAKE 1 TABLET DAILY  Patient taking differently: Take 5 mg by mouth 2 (Two) Times a Day Before Meals. 1/10/22   Ean Farmer MD   HYDROcodone-acetaminophen (Norco) 7.5-325  MG per tablet Take 1 tablet by mouth Every 6 (Six) Hours As Needed for Moderate Pain . 3/30/22   Ean Farmer MD   hydroxychloroquine (PLAQUENIL) 200 MG tablet Take 200 mg by mouth Daily.    ProviderRajwinder MD   ipratropium-albuterol (DUO-NEB) 0.5-2.5 mg/3 ml nebulizer Take 3 mL by nebulization Every 6 (Six) Hours As Needed for Wheezing or Shortness of Air for up to 90 days. 11/11/21 2/9/22  Cassidy Peñaloza APRN   levETIRAcetam (Keppra) 500 MG tablet Take 1 tablet by mouth 2 (Two) Times a Day. 4/6/22   Ean Farmer MD   levETIRAcetam (Keppra) 500 MG tablet Take 1 tablet by mouth 2 (Two) Times a Day. 4/28/22   Ean Farmer MD   levothyroxine (SYNTHROID, LEVOTHROID) 75 MCG tablet TAKE 1 TABLET ONCE DAILY  Patient taking differently: Take 75 mcg by mouth Daily. 11/15/21   Ean Farmer MD   montelukast (SINGULAIR) 10 MG tablet TAKE 1 TABLET ONCE DAILY  Patient taking differently: Take 10 mg by mouth Every Night. 11/15/21   Ean Farmer MD   ondansetron ODT (ZOFRAN-ODT) 8 MG disintegrating tablet dissolve ONE tablet ON TONGUE three TIMES daily as needed 3/23/22   Rajwinder Tyler MD   pantoprazole (PROTONIX) 40 MG EC tablet Take 40 mg by mouth Daily.    Rajwinder Tyler MD   primidone (MYSOLINE) 250 MG tablet TAKE 1 TABLET TWICE A DAY  Patient taking differently: Take 250 mg by mouth 2 (Two) Times a Day. 11/5/21   Ean Farmer MD   ProAir  (90 Base) MCG/ACT inhaler USE 2 INHALATIONS EVERY 4 HOURS AS NEEDED  Patient taking differently: 2 puffs Every 4 (Four) Hours As Needed. 1/28/22   Ean Farmer MD   sertraline (ZOLOFT) 25 MG tablet Take 1 tablet by mouth Daily. 4/13/22   Ean Farmer MD   sotalol (BETAPACE) 120 MG tablet  4/13/22   Provider, Rajwinder, MD   spironolactone (ALDACTONE) 25 MG tablet Take 25 mg by mouth Daily.    Provider, Rajwinder, MD   tiotropium bromide-olodaterol (Stiolto Respimat)  2.5-2.5 MCG/ACT aerosol solution inhaler Inhale 2 puffs Daily for 90 days. 21  Cassidy Peñaloza APRN   vitamin D (ERGOCALCIFEROL) 1.25 MG (51784 UT) capsule capsule TAKE 1 CAPSULE ONCE WEEKLY  Patient taking differently: Take 50,000 Units by mouth Every 7 (Seven) Days. 21   Ean Farmer MD        Social History:   Social History     Tobacco Use   • Smoking status: Current Every Day Smoker     Packs/day: 0.25     Years: 58.00     Pack years: 14.50     Types: Cigarettes     Start date:      Last attempt to quit: 2019     Years since quittin.6   • Smokeless tobacco: Never Used   • Tobacco comment: 3 cigg per day    Vaping Use   • Vaping Use: Never used   Substance Use Topics   • Alcohol use: No   • Drug use: Never     Recent travel: no     Review of Systems:  Review of Systems   Constitutional: Positive for activity change, appetite change, chills and fever. Negative for diaphoresis, fatigue and unexpected weight change.   HENT: Negative for congestion, ear pain, facial swelling, mouth sores, rhinorrhea, sinus pressure, sinus pain and sore throat.    Eyes: Negative for photophobia, pain, redness and visual disturbance.   Respiratory: Positive for cough and shortness of breath. Negative for chest tightness and wheezing.    Cardiovascular: Negative for chest pain, palpitations and leg swelling.   Gastrointestinal: Positive for nausea. Negative for abdominal pain, diarrhea and vomiting.   Endocrine: Negative for cold intolerance, polydipsia and polyuria.   Genitourinary: Negative for difficulty urinating, flank pain, genital sores and urgency.   Musculoskeletal: Negative for back pain, joint swelling and neck stiffness.   Skin: Positive for color change and pallor. Negative for rash and wound.   Allergic/Immunologic: Negative for environmental allergies and immunocompromised state.   Neurological: Positive for dizziness, weakness and light-headedness.   Hematological: Negative  "for adenopathy. Does not bruise/bleed easily.   Psychiatric/Behavioral: Negative for agitation, confusion, hallucinations and suicidal ideas. The patient is not nervous/anxious.         Physical Exam:  /70   Pulse 66   Temp 99.4 °F (37.4 °C) (Oral)   Resp 18   Ht 167.6 cm (66\")   SpO2 99%   BMI 21.73 kg/m²     Physical Exam  Constitutional:       Appearance: He is well-developed.   HENT:      Head: Normocephalic and atraumatic.      Mouth/Throat:      Mouth: Mucous membranes are moist.      Pharynx: Oropharynx is clear.   Eyes:      Extraocular Movements: Extraocular movements intact.      Pupils: Pupils are equal, round, and reactive to light.   Cardiovascular:      Rate and Rhythm: Normal rate and regular rhythm.   Pulmonary:      Effort: Pulmonary effort is normal.      Breath sounds: Examination of the right-middle field reveals wheezing. Examination of the left-middle field reveals wheezing. Wheezing present.   Chest:      Chest wall: No deformity or tenderness.   Abdominal:      General: Bowel sounds are normal.      Palpations: Abdomen is soft.   Musculoskeletal:         General: Normal range of motion.      Cervical back: Normal range of motion and neck supple.   Skin:     Capillary Refill: Capillary refill takes less than 2 seconds.      Coloration: Skin is pale. Skin is not cyanotic.   Neurological:      General: No focal deficit present.      Mental Status: He is alert and oriented to person, place, and time.      Comments: Patient has diffuse weakness   Psychiatric:         Mood and Affect: Mood normal.         Behavior: Behavior normal.                Medications in the Emergency Department:  Medications   sodium chloride 0.9 % flush 10 mL (has no administration in time range)   sodium chloride 0.9 % bolus 1,000 mL (1,000 mL Intravenous New Bag 5/4/22 8866)   cefepime (MAXIPIME) IVPB 2 g (premix) in D5 (2 g Intravenous New Bag 5/4/22 1681)   vancomycin 1250 mg/250 mL 0.9% NS IVPB ([facility]) (has " no administration in time range)        Labs  Lab Results (last 24 hours)     Procedure Component Value Units Date/Time    CBC & Differential [[id number]]  (Abnormal) Collected: 05/04/22 1704    Specimen: Blood Updated: 05/04/22 1708    Narrative:      The following orders were created for panel order CBC & Differential.  Procedure                               Abnormality         Status                     ---------                               -----------         ------                     CBC Auto Differential[[id number]]        Abnormal            Final result                 Please view results for these tests on the individual orders.    Comprehensive Metabolic Panel [[id number]]  (Abnormal) Collected: 05/04/22 1704    Specimen: Blood Updated: 05/04/22 1728     Glucose 67 mg/dL      BUN 26 mg/dL      Creatinine 0.82 mg/dL      Sodium 137 mmol/L      Potassium 3.8 mmol/L      Chloride 101 mmol/L      CO2 25.9 mmol/L      Calcium 8.9 mg/dL      Total Protein 7.6 g/dL      Albumin 3.70 g/dL      ALT (SGPT) 19 U/L      AST (SGOT) 26 U/L      Alkaline Phosphatase 142 U/L      Total Bilirubin 0.2 mg/dL      Globulin 3.9 gm/dL      A/G Ratio 0.9 g/dL      BUN/Creatinine Ratio 31.7     Anion Gap 10.1 mmol/L      eGFR 93.3 mL/min/1.73      Comment: National Kidney Foundation and American Society of Nephrology (ASN) Task Force recommended calculation based on the Chronic Kidney Disease Epidemiology Collaboration (CKD-EPI) equation refit without adjustment for race.       Narrative:      GFR Normal >60  Chronic Kidney Disease <60  Kidney Failure <15      Troponin [[id number]]  (Normal) Collected: 05/04/22 1704    Specimen: Blood Updated: 05/04/22 1728     Troponin T <0.010 ng/mL     Narrative:      Troponin T Reference Range:  <= 0.03 ng/mL-   Negative for AMI  >0.03 ng/mL-     Abnormal for myocardial necrosis.  Clinicians would have to utilize clinical acumen, EKG, Troponin and serial changes to determine if it is an Acute  Myocardial Infarction or myocardial injury due to an underlying chronic condition.       Results may be falsely decreased if patient taking Biotin.      Magnesium [[id number]]  (Normal) Collected: 05/04/22 1704    Specimen: Blood Updated: 05/04/22 1728     Magnesium 1.9 mg/dL     CBC Auto Differential [[id number]]  (Abnormal) Collected: 05/04/22 1704    Specimen: Blood Updated: 05/04/22 1708     WBC 4.03 10*3/mm3      RBC 2.28 10*6/mm3      Hemoglobin 7.7 g/dL      Hematocrit 23.4 %      .6 fL      MCH 33.8 pg      MCHC 32.9 g/dL      RDW 14.4 %      RDW-SD 48.7 fl      MPV 9.2 fL      Platelets 239 10*3/mm3      Neutrophil % 54.7 %      Lymphocyte % 28.3 %      Monocyte % 15.6 %      Eosinophil % 0.7 %      Basophil % 0.2 %      Immature Grans % 0.5 %      Neutrophils, Absolute 2.20 10*3/mm3      Lymphocytes, Absolute 1.14 10*3/mm3      Monocytes, Absolute 0.63 10*3/mm3      Eosinophils, Absolute 0.03 10*3/mm3      Basophils, Absolute 0.01 10*3/mm3      Immature Grans, Absolute 0.02 10*3/mm3      nRBC 0.0 /100 WBC     Digoxin Level [[id number]]  (Normal) Collected: 05/04/22 1704    Specimen: Blood Updated: 05/04/22 1818     Digoxin 0.90 ng/mL            Imaging:  XR Chest 1 View    Result Date: 5/4/2022  PROCEDURE: XR CHEST 1 VW  COMPARISON: [facility], CT, CT CHEST LOW DOSE CANCER SCREENING WO, 4/26/2022, 14:47.  [facility], CR, XR CHEST 1 VW, 3/10/2022, 15:24.  INDICATIONS: Weak/Dizzy/AMS triage protocol  FINDINGS:  Left upper lobe infiltrate has increased.  No pneumothorax or pleural effusion.  No acute findings elsewhere.  Mediastinum appears unchanged.       Increased left upper lobe infiltrate concerning for increasing postobstructive pneumonia in this patient with known adjacent perihilar mass       JUMA QUARLES MD       Electronically Signed and Approved By: JUMA QUARLES MD on 5/04/2022 at 15:02               Procedures:  Procedures    Progress                             Medical Decision Making:  MDM  Number of Diagnoses or Management Options     Amount and/or Complexity of Data Reviewed  Clinical lab tests: reviewed  Tests in the radiology section of CPT®: reviewed  Tests in the medicine section of CPT®: reviewed  Decide to obtain previous medical records or to obtain history from someone other than the patient: yes  Obtain history from someone other than the patient: yes  Review and summarize past medical records: yes  Discuss the patient with other providers: yes  Independent visualization of images, tracings, or specimens: yes         Final diagnoses:   Pneumonia due to infectious organism, unspecified laterality, unspecified part of lung   Weakness   Anemia, unspecified type        Disposition:  ED Disposition     ED Disposition   Decision to Admit    Condition   --    Comment   Level of Care: Med/Surg [1]   Diagnosis: Pneumonia due to infectious organism, unspecified laterality, unspecified part of lung [[id number]]   Admitting Physician: ANJEL CUMMINGS [[id number]]   Attending Physician: ANJEL CUMMINGS [[id number]]   Certification: I Certify That Inpatient Hospital Services Are Medically Necessary For Greater Than 2 Midnights               This medical record created using voice recognition software.           Josef Bowens, DO  05/04/22 1914

## 2022-05-05 NOTE — CONSULTS
[facility]   Consult Note    Patient Name: Cedric Wade  : 1950  MRN: [id number]  Primary Care Physician:  Ean Farmer MD  Date of admission: 2022    Subjective   Subjective     Reason for Consult: Patient complaining of extreme weakness and there is some pulmonary infiltrates he has received 1 cycle of chemotherapy has become extremely short of air patient will be given IV hydration he does appear to be dehydrated 1 unit of packed RBC has been transfused his hemoglobin has improved gone up to 8.8 we will continue the supportive care antibiotics    HPI: With metastatic non-small cell lung cancer with extensive metastases brain metastases has received 1 cycle of chemotherapy    Cedric Wade is a 71 y.o. male patient with extensively metastatic cancer of the lung    Review of Systems   All systems were reviewed and negative except for: Reviewed    Personal History     Past Medical History:   Diagnosis Date   • Abnormal ECG    • Abscess    • Arrhythmia    • Arthritis    • Atrial fibrillation (HCC)    • CAD with history of CABG 2019    Added automatically from request for surgery [id number]   • Cancer (HCC)     skin   • CHF (congestive heart failure) (HCC)    • COPD (chronic obstructive pulmonary disease) (HCC)    • Coronary artery disease    • Cutaneous lupus erythematosus    • Diabetes mellitus (HCC)    • Elevated cholesterol    • GERD (gastroesophageal reflux disease)    • Heart valve disease    • Hypertension    • Hypothyroidism, unspecified 2021   • Myocardial infarction (HCC)    • Other hyperlipidemia 2019   • Paroxysmal atrial fibrillation (HCC) 2021   • Sleep apnea        Past Surgical History:   Procedure Laterality Date   • BRONCHOSCOPY N/A 2022    Procedure: BRONCHOSCOPY WITH ENDOBRONCHIAL ULTRASOUND, BAL, WASHINGS, BRUSHINGS, NEEDLE ASPIRATIONS, BIOPSIES;  Surgeon: Ankur Alcantara MD;  Location: [facility] ENDOSCOPY;  Service: Pulmonary;   Laterality: N/A;  LEFT HILAR MASS   • CARDIAC CATHETERIZATION     • CARDIAC DEFIBRILLATOR PLACEMENT     • CARDIAC DEFIBRILLATOR PLACEMENT     • COLONOSCOPY     • CORONARY ARTERY BYPASS GRAFT N/A 9/9/2019    Procedure: INTRAOPERATIVE BUDDY, MIDLINE STENOTOMY WITH CORONARY ARTERY BYPASS GRAPHS X  3 UTILIZING LEFT SAVAGE  AND LEFT ENDOSCOPIC HARVESTED SAPHENOUS VEIN, LIGATION OF LEFT  ATRIAL APPENDAGE; PRP;  Surgeon: Alvaro Mazariegos MD;  Location: [facility] OR;  Service: Cardiothoracic   • LIPOMA EXCISION     • NECK SURGERY     • OTHER SURGICAL HISTORY      heart valve repair   not sure which valve   • PACEMAKER IMPLANTATION     • PORTACATH PLACEMENT N/A 3/10/2022    Procedure: INSERTION OF PORTACATH;  Surgeon: Chele Carl MD;  Location: [facility] OR [facility];  Service: General;  Laterality: N/A;       Family History: family history includes Breast cancer in his father and another family member; Diabetes in his brother and sister; Heart attack in his brother; Heart disease in his brother and another family member; Lung cancer in his father. Otherwise pertinent FHx was reviewed and not pertinent to current issue.    Social History:  reports that he has been smoking cigarettes. He started smoking about 61 years ago. He has a 14.50 pack-year smoking history. He has never used smokeless tobacco. He reports that he does not drink alcohol and does not use drugs.    Home Medications:  Azelastine-Fluticasone, DULoxetine, HYDROcodone-acetaminophen, albuterol sulfate HFA, apixaban, aspirin, atorvastatin, azithromycin, benzonatate, budesonide, carvedilol, cetirizine, dexamethasone, digoxin, dronabinol, folic acid, furosemide, glipizide, hydroxychloroquine, ipratropium-albuterol, levETIRAcetam, levothyroxine, montelukast, ondansetron ODT, pantoprazole, primidone, sacubitril-valsartan, sertraline, sotalol, spironolactone, tiotropium bromide-olodaterol, and vitamin D      Allergies:  Allergies   Allergen Reactions   • Amiodarone  Unknown - High Severity   • Lisinopril Swelling   • Codeine Nausea And Vomiting       Objective   Objective     Vitals:   Temp:  [98.1 °F (36.7 °C)-99.4 °F (37.4 °C)] 99 °F (37.2 °C)  Heart Rate:  [58-87] 73  Resp:  [18-26] 22  BP: ()/(62-96) 129/69  Arterial Line BP: (130-133)/(67-96) 130/67  Flow (L/min):  [3] 3  Physical Exam    Constitutional: Awake, alert   Eyes: PERRLA, sclerae anicteric, no conjunctival injection   HENT: NCAT, mucous membranes moist   Neck: Supple, no thyromegaly, no lymphadenopathy, trachea midline   Respiratory: Clear to auscultation bilaterally, nonlabored respirations    Cardiovascular: RRR, no murmurs, rubs, or gallops, palpable pedal pulses bilaterally   Gastrointestinal: Positive bowel sounds, soft, nontender, nondistended   Musculoskeletal: No bilateral ankle edema, no clubbing or cyanosis to extremities   Psychiatric: Appropriate affect, cooperative   Neurologic: Oriented x 3, strength symmetric in all extremities, Cranial Nerves grossly intact to confrontation, speech clear   Skin: No rashes   No significant new finding  Result Review    Result Review:  I have personally reviewed the results from the time of this admission to 5/5/2022 08:01 EDT and agree with these findings:  [x]  Laboratory anemia  []  Microbiology  []  Radiology  []  EKG/Telemetry   []  Cardiology/Vascular   []  Pathology  []  Old records  []  Other:  Most notable findings include: Anemia    Assessment/Plan   Assessment / Plan     Brief Patient Summary:  Cedric Wade is a 71 y.o. male who seen with extensively metastatic non-small cell lung cancer postchemotherapy fatigue    Active Hospital Problems:  Active Hospital Problems    Diagnosis    • Pneumonia due to infectious organism        Plan:   Continue the current management IV hydration we will check his magnesium levels as well as cortisol levels thyroid profile continue IV fluids antibiotics        Electronically signed by Patrice Rodriguez MD,  05/05/22, 8:01 AM EDT.

## 2022-05-05 NOTE — CONSULTS
"Purpose of the visit was to evaluate for: goals of care/advanced care planning and hospice referral/discussion. Spoke with RN, patient and family and discussed palliative care, goals of care, care options, [facility] and clarify code status.      Assessment: Patient on 4NT, on nasal canula 3 liters, HOB elevated and resting with eyes closed, opened and spoke to voice. Reports no pain, nausea or anxiety at this time.     Recommendations/Plan: [facility] referral.    Tasks Completed: EMS DNR, Code Status clarification and Emotional Support.    Other Comments: Palliative care nurse was updated by the primary nurse and then visited the patient, his wife Rosalba and an adopted son at the bedside. The wife reports the patients \"goal is to live as long as possible.\" I reviewed with them options of care and encouraged the family to consider what the patients goals are and what is best for the family. Discussed aggressive care vs comfort. Wife reports the patient asked to talk to [facility] about 3 days ago, family supports a conversation to learn more. I educated about their services and a referral was placed. Discussed and explained code status, family and patient request to be a No CPR/DNI. I educated on and completed an EMS DNR, copy to MR and family, original on chart. Provider and primary nurse notified. Spouse has no questions at this time and stated \"will need to process\" all the info.     Update: [facility] meeting 5/6 at 13:30 with Adrianna.    -Whitney WHATLEY, RN, [facility]   Palliative Care    5/5/2022 12:00 EDT    "

## 2022-05-05 NOTE — CONSULTS
"Nutrition Services    Patient Name: Cedric Wade  YOB: 1950  MRN: [id number]  Admission date: 5/4/2022      CLINICAL NUTRITION ASSESSMENT      Reason for Assessment  Identified at risk by screening criteria, MST score 2+, Unintentional weight loss   H&P:    Past Medical History:   Diagnosis Date   • Abnormal ECG    • Abscess    • Arrhythmia    • Arthritis    • Atrial fibrillation (HCC)    • CAD with history of CABG 9/6/2019    Added automatically from request for surgery [id number]   • Cancer (HCC)     skin   • CHF (congestive heart failure) (HCC)    • COPD (chronic obstructive pulmonary disease) (HCC)    • Coronary artery disease    • Cutaneous lupus erythematosus    • Diabetes mellitus (HCC)    • Elevated cholesterol    • GERD (gastroesophageal reflux disease)    • Heart valve disease    • Hypertension    • Hypothyroidism, unspecified 7/30/2021   • Myocardial infarction (HCC)    • Other hyperlipidemia 9/8/2019   • Paroxysmal atrial fibrillation (HCC) 7/19/2021   • Sleep apnea         Current Problems:   Active Hospital Problems    Diagnosis    • Pneumonia due to infectious organism         Nutrition/Diet History         Narrative     RD consult for MST score of 3 with unintentional wt loss related to chemo.  Hx of lung CA with brain mets.  Previous radiation therapy & chemo.  In talking with pt & per review of chart #, has lost 16# over the past 6 month, a 10.6% clinically significant change.  Pt has decreased appetite with nausea.  + dentures but no problems chewing or swallowing.  Pt states he has been trying to increase the protein in diet at home along with fluids.  NFPE shows muscle & fat wasting.  Pt agreeable to try supplement of Boost Glucose Control TID with meals to increase calorie & protein intake.     Anthropometrics        Current Height, Weight Height: 167.6 cm (66\")  Weight: 61.1 kg (134 lb 11.2 oz)   Current BMI Body mass index is 21.74 kg/m².       Weight Hx  Wt " Readings from Last 30 Encounters:   05/04/22 2040 61.1 kg (134 lb 11.2 oz)   04/28/22 1524 61.1 kg (134 lb 9.6 oz)   03/24/22 1122 65.3 kg (144 lb)   03/06/22 2239 69.1 kg (152 lb 5.4 oz)   03/06/22 1647 66.9 kg (147 lb 7.8 oz)   02/25/22 1146 62.7 kg (138 lb 3.7 oz)   12/27/21 1306 64.4 kg (142 lb)   11/17/21 1403 68.3 kg (150 lb 9.2 oz)   11/11/21 1406 65.2 kg (143 lb 12.8 oz)   11/01/21 1312 64.7 kg (142 lb 9.6 oz)   08/24/21 1540 66.4 kg (146 lb 6.4 oz)   08/05/21 1516 66.1 kg (145 lb 12.8 oz)   07/19/21 1428 67.1 kg (148 lb)   06/14/21 0910 68.3 kg (150 lb 9.2 oz)   05/27/21 0000 68 kg (150 lb)   05/13/21 0942 67.6 kg (149 lb)   11/12/20 0000 67.1 kg (148 lb)   10/27/20 0922 68.9 kg (152 lb)   07/22/20 1515 68.2 kg (150 lb 6 oz)   01/28/20 1334 68 kg (150 lb)   01/09/20 0000 68.9 kg (152 lb)   12/10/19 1341 68.9 kg (152 lb)   12/06/19 0000 68 kg (150 lb)   11/19/19 0000 64.4 kg (142 lb)   11/06/19 0000 64.9 kg (143 lb)   10/18/19 1312 64 kg (141 lb)   10/18/19 1149 64 kg (141 lb)   10/03/19 0000 61.7 kg (136 lb)   09/26/19 0000 64.9 kg (143 lb)   09/19/19 1029 68.9 kg (152 lb)   09/15/19 0504 67.5 kg (148 lb 12.8 oz)   09/14/19 0608 66.8 kg (147 lb 3.2 oz)   09/13/19 0606 66 kg (145 lb 9.6 oz)   09/12/19 0611 66.8 kg (147 lb 3.2 oz)   09/11/19 0622 66.3 kg (146 lb 3.2 oz)   09/09/19 0500 63.6 kg (140 lb 4.8 oz)   09/08/19 0643 62.6 kg (137 lb 14.4 oz)   09/07/19 1234 64.4 kg (142 lb)   09/07/19 0438 64.5 kg (142 lb 3.2 oz)            Wt Change Observation 16# from UBW of 150#     Estimated/Assessed Needs       Energy Requirements    EST Needs (kcal/day) 4583-3566 (30-35)       Protein Requirements    EST Daily Needs (g/day) 73-92 (1.2-1.5)       Fluid Requirements     Estimated Needs (mL/day) 1833     Labs/Medications         Pertinent Labs Reviewed.   Results from last 7 days   Lab Units 05/05/22  0536 05/04/22  1704   SODIUM mmol/L 139 137   POTASSIUM mmol/L 4.1 3.8   CHLORIDE mmol/L 103 101   CO2 mmol/L  20.7* 25.9   BUN mg/dL 20 26*   CREATININE mg/dL 0.80 0.82   CALCIUM mg/dL 8.5* 8.9   BILIRUBIN mg/dL 0.2 0.2   ALK PHOS U/L 135* 142*   ALT (SGPT) U/L 17 19   AST (SGOT) U/L 29 26   GLUCOSE mg/dL 83 67     Results from last 7 days   Lab Units 05/05/22  0536 05/04/22  1704   MAGNESIUM mg/dL 1.8 1.9   PHOSPHORUS mg/dL 2.6  --    HEMOGLOBIN g/dL 8.8* 7.7*   HEMATOCRIT % 26.7* 23.4*     Lab Results   Component Value Date    HGBA1C 6.90 (H) 04/25/2022       Pertinent Medications Reviewed.     Current Nutrition Orders & Evaluation of Intake       Oral Nutrition     Current PO Diet Diet Regular   Supplement No active supplement orders       Malnutrition Severity Assessment         Malnutrition Type (last 8 hours)     Malnutrition Severity Assessment     Row Name 05/05/22 0840       Malnutrition Severity Assessment    Malnutrition Type Chronic Disease - Related Malnutrition    Row Name 05/05/22 0840       Insufficient Energy Intake     Insufficient Energy Intake Findings Severe    Insufficient Energy Intake  <75% of est. energy requirement for > or equal to 1 month    Row Name 05/05/22 0840       Unintentional Weight Loss     Unintentional Weight Loss Findings Severe    Unintentional Weight Loss  Weight loss greater than 10% in six months    Row Name 05/05/22 0840       Muscle Loss    Temple Region Moderate - slight depression    Clavicle Bone Region Severe - protruding prominent bone    Acromion Bone Region Moderate - acromion may slightly protrude    Row Name 05/05/22 0840       Fat Loss    Subcutaneous Fat Loss Findings Severe    Orbital Region  Severe - pronounced hollowness/depression, dark circles, loose saggy skin                 Nutrition Diagnosis         Nutrition Dx Problem 1 Severe malnutrition related to increased nutrient needs due to catabolic disease as evidenced by decreased appetite., unintended wt change., body composition changes., patient report. and lung CA with brain mets     Nutrition Intervention          Continue regular diet, if BG rise then can change to carb consistent diet  Add Boost Glucose Control TID     Medical Nutrition Therapy/Nutrition Education          Learner     Readiness Patient  Acceptance     Method     Response Explanation  Verbalizes understanding     Monitor/Evaluation        Monitor PO intake, Supplement intake, Pertinent labs, Weight     Nutrition Discharge Plan         To be determined     Electronically signed by:  Anu Jones RD  05/05/22 08:41 EDT

## 2022-05-05 NOTE — H&P
[facility]   HISTORY AND PHYSICAL    Patient Name: Cedric Wade  : 1950  MRN: [id number]  Primary Care Physician: Ean Farmer MD  Date of admission: 2022      Subjective   Subjective     Chief Complaint/HPI: I am weak, not eating well, wife was concerned about my mental status and changes, continued weakness, patient presented to the emergency room and was found to have be anemic, he is receiving chemotherapy for metastatic lung cancer to the brain and other areas, says he is just not doing well in general, he is being admitted for blood transfusion fluids supportive care,, chest x-ray showed a possible postobstructive pneumonia, due to the lung cancer, this finding does not appear to be that much different from his CT scan of the chest on 2022,    Cedric Wade is a 71 y.o. male history of metastatic lung CA has finished radiation to the brain and is undergoing chemotherapy with Dr. Quevedo    Review of Systems   All systems were reviewed and negative except for: Weakness, not eating well some nausea fatigue, altered mental status, no fevers no diarrhea,      Personal History     Past Medical History:   Diagnosis Date   • Abnormal ECG    • Abscess    • Arrhythmia    • Arthritis    • Atrial fibrillation (HCC)    • CAD with history of CABG 2019    Added automatically from request for surgery [id number]   • Cancer (HCC)     skin   • CHF (congestive heart failure) (HCC)    • COPD (chronic obstructive pulmonary disease) (HCC)    • Coronary artery disease    • Cutaneous lupus erythematosus    • Diabetes mellitus (HCC)    • Elevated cholesterol    • GERD (gastroesophageal reflux disease)    • Heart valve disease    • Hypertension    • Hypothyroidism, unspecified 2021   • Myocardial infarction (HCC)    • Other hyperlipidemia 2019   • Paroxysmal atrial fibrillation (HCC) 2021   • Sleep apnea        Past Surgical History:   Procedure Laterality Date   •  BRONCHOSCOPY N/A 2/28/2022    Procedure: BRONCHOSCOPY WITH ENDOBRONCHIAL ULTRASOUND, BAL, WASHINGS, BRUSHINGS, NEEDLE ASPIRATIONS, BIOPSIES;  Surgeon: Ankur Alcantara MD;  Location: [facility] ENDOSCOPY;  Service: Pulmonary;  Laterality: N/A;  LEFT HILAR MASS   • CARDIAC CATHETERIZATION     • CARDIAC DEFIBRILLATOR PLACEMENT     • CARDIAC DEFIBRILLATOR PLACEMENT     • COLONOSCOPY     • CORONARY ARTERY BYPASS GRAFT N/A 9/9/2019    Procedure: INTRAOPERATIVE BUDDY, MIDLINE STENOTOMY WITH CORONARY ARTERY BYPASS GRAPHS X  3 UTILIZING LEFT SAVAGE  AND LEFT ENDOSCOPIC HARVESTED SAPHENOUS VEIN, LIGATION OF LEFT  ATRIAL APPENDAGE; PRP;  Surgeon: Alvaro Mazariegos MD;  Location: [facility] OR;  Service: Cardiothoracic   • LIPOMA EXCISION     • NECK SURGERY     • OTHER SURGICAL HISTORY      heart valve repair   not sure which valve   • PACEMAKER IMPLANTATION     • PORTACATH PLACEMENT N/A 3/10/2022    Procedure: INSERTION OF PORTACATH;  Surgeon: Chele Carl MD;  Location: [facility] OR OSC;  Service: General;  Laterality: N/A;       Family History: family history includes Breast cancer in his father and another family member; Diabetes in his brother and sister; Heart attack in his brother; Heart disease in his brother and another family member; Lung cancer in his father.     Social History:  reports that he has been smoking cigarettes. He started smoking about 61 years ago. He has a 14.50 pack-year smoking history. He has never used smokeless tobacco. He reports that he does not drink alcohol and does not use drugs.    Home Medications:  Azelastine-Fluticasone, DULoxetine, HYDROcodone-acetaminophen, albuterol sulfate HFA, apixaban, aspirin, atorvastatin, azithromycin, benzonatate, budesonide, carvedilol, cetirizine, dexamethasone, digoxin, dronabinol, folic acid, furosemide, glipizide, hydroxychloroquine, ipratropium-albuterol, levETIRAcetam, levothyroxine, montelukast, ondansetron ODT, pantoprazole, primidone, sacubitril-valsartan,  sertraline, sotalol, spironolactone, tiotropium bromide-olodaterol, and vitamin D      Allergies:  Allergies   Allergen Reactions   • Amiodarone Unknown - High Severity   • Lisinopril Swelling   • Codeine Nausea And Vomiting       Objective   Objective     Vitals:  Temp:  [98.1 °F (36.7 °C)-99.4 °F (37.4 °C)] 98.6 °F (37 °C)  Heart Rate:  [58-87] 64  Resp:  [18-26] 20  BP: ()/(62-96) 114/64  Arterial Line BP: (130-133)/(67-96) 130/67  Flow (L/min):  [3] 3    Physical Exam   Patient's skin appears to be warm and dry his throat shows dry mucosa sclera nonicteric he is awake and alert arousable communicating he is hard of hearing so it is difficult at times, his abdomen slightly distended soft his lungs are clear with diminished breath sounds throughout, no wheezes cardiac exam regular rhythm, his lower extremities showed no edema he can move all 4 extremities to my command,    Result Review    Result Review:  I have personally reviewed the results from the time of this admission to 05/05/22 8:19 AM EDT and agree with these findings:  [x]  Laboratory  [x]  Microbiology  [x]  Radiology  []  EKG/Telemetry   []  Cardiology/Vascular   []  Pathology  [x]  Old records  []  Other:      Assessment/Plan   Assessment / Plan     Brief Patient Summary:  Cedric Wade is a 71 y.o. male who presents with increasing weakness, anemia,    Active Hospital Problems:  Active Hospital Problems    Diagnosis    • Pneumonia due to infectious organism        Assessment/Plan:     Anemia, worsening, status post 1 unit of packed red blood cells with adequate improvement in hemoglobin overnight May 5, 2022, will monitor,    Generalized weakness from his chemotherapy, recent radiation to the brain, metastatic lung cancer,    Metastatic lung cancer with hilar mass, adrenal mass, mesenteric adenopathy, brain lesions, prognosis is poor consider palliative care, will discuss with hematology oncology,    Postobstructive process in the lung  from lung cancer consider pulmonary consultation although not sure what treatment to offer at this point in time other than possible radiation to this area,?  Stenting,?    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, discussed with radiation oncology, continuing radiation therapy daily while in the hospital for whole brain radiation, patient did receive steroids in the hospital for brain swelling, those have been stopped as of the end of April 2022 -----, Port-A-Cath placement March 10, patient has had a PET scan, recent CT scan of the chest April, May 2022, progressive disease noted postobstructive processes in the lung noted,     Anxiety, controlled----     Essential tremor, continue primidone     Hyperlipidemia continues on moderate dose statin therapy, okay to stop, April 28, 2022     Hypothyroid continue levothyroxine     Type 2 diabetes, off all medication including insulin as of March and April 2022,     Vitamin D deficiency     Seizure type disorder continues on Keppra due to the brain tumors, no further seizure activity, no tremors of any sort, no hiccups and no vomiting        Prior admission and discharge summary from March 6, 2022:      Hiccups, now with nausea vomiting,  continue Thorazine March 2, 2022, Reglan and Phenergan added March 3, some improvement overall, continue GI prophylaxis also with IV Pepcid,, improved, will send home with some as needed Marni, he  already has GI prophylactic medications at home     Metastatic brain lesions.,  [facility] biopsy shows adenocarcinoma most likely lung, for whole brain radiation starting March 2, discussed with pulmonology and oncology March 2, also discussed with patient wife at bedside today,, and patient March 3 ------CT scan abdomen pelvis chest reviewed, appreciate Dr. Larry Bliss for second opinion, changed to oral steroids, oral medications March 1 and 2, continues  neurochecks, seizure precautions etc., I am holding his anticoagulation due to the brain lesions and potential need for biopsy in the near future,and GI prophylaxis will be instituted---> cardiology feels we could restart his anticoagulation at this point, may need to dose adjust slightly for bleeding risks etc.,, cardiology's notes state holding Eliquis is okay for now and just continuing aspirin which we will do at time of discharge     A. fib with rapid ventricular response developing after coughing spell morning of March 1,, back in sinus rhythm  since March 1 through March 2, 3,,4, 2022,, ----, was given Cardizem, dig, IV Lopressor yesterday, morning March 1,, with additional doses by cardiology, did not tolerate Cardizem due to blood pressure issues, not able to take amiodarone due to previous reaction, has now converted, holding diuretics March 1 and 2,-------------in the emergency room, pacemaker was interrogated and felt to be stable at the current rate, backup of 40,, his heart rate was lower in the emergency room into the 40s and 50s at times, appears to be sinus on EKG, initially.  --- echo February 27, 2022 reviewed with moderate global hypokinesis ejection fraction 35% diastolic dysfunction present with mild mitral regurgitation,, will restart carvedilol 3.125 twice a day at time of discharge to help with rate control and afterload reduction in congestive heart failure     Chronic systolic heart failure.  AICD placement.  His EF was 20% then, his last  echo was in 5/21 and EF improved to 30%.  Current echo as above, will continue on Aldactone and diuretics.  He had angioedema to ACE inhibitors, not sure about ARB's.        Essential tremors continues on primidone,, currently worse due to steroids etc. breathing treatments etc.     Anxiety depression      Vitamin D deficiency continues replacement     Rheumatoid arthritis has been on Plaquenil will continue low-dose 200 mg daily       Plan at this point is to transfuse as necessary, consider palliative care options discussed with hematology oncology and pulmonology, supportive care, slow IV fluids today nutritional support, pain control, overall prognosis is guarded to poor            CODE STATUS:    There are no questions and answers to display.         Electronically signed by Ean Farmer MD, 05/05/22, 8:19 AM EDT.

## 2022-05-05 NOTE — PLAN OF CARE
Goal Outcome Evaluation:  Plan of Care Reviewed With: patient      Pt is A&O x4, hard of hearing, on admission, he had frequent cough productive of thick creamy/transparent sputum. Positive inspiratory and expiratory wheezes on his lung. Wheezes decreased after receiving breathing treatments.  Received 1 unit of blood and Hb increased from 7.7 to 8.8. No transfusion reactions observed, tolerated procedure. Sleeping in between care. Will continue to monitor.

## 2022-05-06 NOTE — PROGRESS NOTES
[facility]   Progress Note    Patient Name: Cedric Wade  : 1950  MRN: [id number]  Primary Care Physician: Ean Farmer MD  Date of admission: 2022    Subjective   Subjective   HPI: Anemia, generalized weakness, and now with dropping in blood pressure this morning, not doing as well heart rates are erratic and tachycardic according to nursing staff, EKG and monitor now show A. fib with RVR, nurses are concerned blood pressures are still low is not eating well patient says he knows he is going to die soon his wife says this is the end and I explained to them that he is getting ready to die at some point soon, I have asked for comfort measures at this point but we will still move him to telemetry to get his heart rate control today with Cardizem drip, continue oral digoxin, may need IV Lopressor, will give a one-time fluid bolus,        Review of Systems   All systems were reviewed and negative except for: Weakness, increased heart rate, still short of breath,      Objective   Objective     Vitals:  Patient Vitals for the past 24 hrs:   BP Temp Temp src Pulse Resp SpO2   22 0935 -- -- -- 120 20 99 %   22 0831 (!) 76/44 -- -- 88 -- --   22 0723 (!) 82/46 97.9 °F (36.6 °C) Oral (!) 143 20 99 %   22 0500 (!) 84/50 -- -- 77 -- --   22 0320 (!) 82/68 98.6 °F (37 °C) Oral 89 24 95 %   22 2245 91/49 97.9 °F (36.6 °C) Oral 74 18 99 %   22 2043 -- -- -- 77 20 100 %   22 1950 123/72 98.2 °F (36.8 °C) Oral 70 20 100 %   22 1519 112/64 98.4 °F (36.9 °C) Oral 88 20 99 %   22 1115 151/74 98.4 °F (36.9 °C) Oral 66 20 100 %      Physical Exam   Lungs reveal diminished breath sounds few crackles at the bases, cardiac exam reveals an irregular rhythm distant heart tones tachycardic, abdomen soft scaphoid lower extremities show no edema he is awake alert he is very weak appearing, depressed,      Result Review    Result Review:  I have  personally reviewed the results from the time of this admission to 05/06/22 10:41 AM EDT and agree with these findings:  [x]  Laboratory  []  Microbiology  []  Radiology  [x]  EKG/Telemetry   []  Cardiology/Vascular   []  Pathology  []  Old records  []  Other:      Active Hospital Meds:  Scheduled Meds:budesonide, 0.5 mg, Nebulization, BID - RT  cetirizine, 10 mg, Oral, Daily  cosyntropin, 0.25 mg, Intravenous, Once  digoxin, 125 mcg, Oral, Daily  DULoxetine, 30 mg, Oral, Daily  folic acid, 1,000 mcg, Oral, Daily  levETIRAcetam, 500 mg, Oral, BID  levothyroxine, 75 mcg, Oral, Daily  montelukast, 10 mg, Oral, Daily  pantoprazole, 40 mg, Oral, Daily  piperacillin-tazobactam, 3.375 g, Intravenous, Q8H  primidone, 250 mg, Oral, BID  sodium chloride, 500 mL, Intravenous, Once  sodium chloride, 10 mL, Intravenous, Q12H      Continuous Infusions:dilTIAZem, 5-15 mg/hr  Pharmacy to Dose Zosyn,   sodium chloride 0.9 % with KCl 20 mEq, 65 mL/hr, Last Rate: 65 mL/hr (05/05/22 1342)      PRN Meds:.•  acetaminophen **OR** acetaminophen **OR** acetaminophen  •  HYDROcodone-acetaminophen  •  nitroglycerin  •  ondansetron  •  ondansetron ODT  •  Pharmacy to Dose Zosyn  •  sodium chloride  •  sodium chloride    Assessment/Plan   Assessment / Plan     Active Hospital Problems:  Active Hospital Problems    Diagnosis    • Pneumonia due to infectious organism        Assessment/Plan:     New onset A. fib with RVR, low blood pressures we will transfer to telemetry, start IV Cardizem, continue oral dig, may need IV Lopressor, will give 1 dose of IV fluids for blood pressure support, prognosis guarded    Anemia, worsening, status post 1 unit of packed red blood cells with adequate improvement in hemoglobin overnight May 5, 2022, and stable continued improvement May 6, iron levels are adequate,     Generalized weakness from his chemotherapy, recent radiation to the brain, and metastatic lung cancer,     Metastatic lung cancer with hilar mass,  adrenal mass, mesenteric adenopathy, brain lesions, prognosis is poor -----consider palliative care, will discuss with hematology oncology,, discussed with family today, May 6 will transition to comfort measures, continue supportive care     Postobstructive process in the lung from lung cancer ----consider pulmonary consultation although not sure what treatment to offer at this point in time other than possible radiation to this area,?  Stenting,?  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, discussed with radiation oncology, continuing radiation therapy daily while in the hospital for whole brain radiation, patient did receive steroids in the hospital for brain swelling, those have been stopped as of the end of April 2022 -----, Port-A-Cath placement March 10, patient has had a PET scan, recent CT scan of the chest April, May 2022, progressive disease noted postobstructive processes in the lung noted,     Anxiety,      Essential tremor, continue primidone     Hyperlipidemia----has been on moderate dose statin therapy, okay to stop, April 28, 2022     Hypothyroid continue levothyroxine     Type 2 diabetes, off all medication including insulin as of March and April 2022,     Vitamin D deficiency     Seizure type disorder continues on Keppra due to the brain tumors, no further seizure activity, no tremors of any sort, no hiccups and no vomiting        Prior admission and discharge summary from March 6, 2022:      Hiccups, now with nausea vomiting,  continue Thorazine March 2, 2022,  Reglan and Phenergan added March 3, some improvement overall, continue GI prophylaxis also with IV Pepcid,, improved, will send home with some as needed Thorazine, he already has GI prophylactic medications at home     Metastatic brain lesions.,  [facility] biopsy shows adenocarcinoma most likely lung, for whole brain radiation starting March 2, discussed with pulmonology and oncology March 2, also discussed with patient wife at bedside today,, and patient March 3 ------CT scan abdomen pelvis chest reviewed, appreciate Dr. Larry Bliss for second opinion, changed to oral steroids, oral medications March 1 and 2, continues  neurochecks, seizure precautions etc., I am holding his anticoagulation due to the brain lesions and potential need for biopsy in the near future,and GI prophylaxis will be instituted---> cardiology feels we could restart his anticoagulation at this point, may need to dose adjust slightly for bleeding risks etc.,, cardiology's notes state holding Eliquis is okay for now and just continuing aspirin which we will do at time of discharge     A. fib with rapid ventricular response developing after coughing spell morning of March 1,, back in sinus rhythm  since March 1 through March 2, 3,,4, 2022,, ----, was given Cardizem, dig, IV Lopressor yesterday, morning March 1,, with additional doses by cardiology, did not tolerate Cardizem due to blood pressure issues, not able to take amiodarone due to previous reaction, has now converted, holding diuretics March 1 and 2,-------------in the emergency room, pacemaker was interrogated and felt to be stable at the current rate, backup of 40,, his heart rate was lower in the emergency room into the 40s and 50s at times, appears to be sinus on EKG, initially.  --- echo February 27, 2022 reviewed with moderate global hypokinesis ejection fraction 35% diastolic dysfunction present with mild mitral regurgitation,, will restart carvedilol 3.125 twice a day at time of  discharge to help with rate control and afterload reduction in congestive heart failure     Chronic systolic heart failure.  AICD placement.  His EF was 20% then, his last echo was in 5/21 and EF improved to 30%.  Current echo as above, will continue on Aldactone and diuretics.  He had angioedema to ACE inhibitors, not sure about ARB's.        Essential tremors continues on primidone,, currently worse due to steroids etc. breathing treatments etc.     Anxiety depression      Vitamin D deficiency continues replacement     Rheumatoid arthritis has been on Plaquenil will continue low-dose 200 mg daily        Plan at this point is to transfuse as necessary, consider palliative care options discussed with hematology oncology and pulmonology, supportive care, slow IV fluids today nutritional support, pain control, overall prognosis is guarded to poor         CODE STATUS:    Code Status and Medical Interventions:   Ordered at: 05/05/22 1217     Medical Intervention Limits:    NO intubation (DNI)    NO cardioversion    NO antiarrhythmic drugs    NO dialysis     Level Of Support Discussed With:    Patient     Code Status (Patient has no pulse and is not breathing):    No CPR (Do Not Attempt to Resuscitate)     Medical Interventions (Patient has pulse or is breathing):    Limited Support       Electronically signed by Ean Farmer MD, 05/06/22, 10:41 AM EDT.

## 2022-05-06 NOTE — NURSING NOTE
Palliative care nurse met with family/patient along with James Rodas. We allowed family to discuss concerns and ask questions and talk privately. Patient is requesting to go home, wife request tomorrow. Patients brother is arriving tomorrow and family would like to continue care until DC tomorrow to help prevent patient from passing.  Patient to DC home with james 5/7 Sat, meds to be filled here, have pharm call wife to pay for any co-pays, send meds home with patient via EMS. DME needed and wife to inform when in the home. EMS DNR on chart. Provider and primary nurse updated. -Whiteny WHATLEY, RN, CHPN   Palliative Care    5/6/2022 11:31 EDT

## 2022-05-06 NOTE — PLAN OF CARE
Goal Outcome Evaluation:  Plan of Care Reviewed With: patient        Progress: no change   Patient became hypotensive overnight. Provider was notified and blood pressure medications were discontinued. A 250mL bolus was also given as ordered. Continue plan of care.

## 2022-05-06 NOTE — PLAN OF CARE
Goal Outcome Evaluation:  Plan of Care Reviewed With: patient, family        Progress: no change  Outcome Evaluation: Alert and oriented x4. On 3L NC, sats in high 90s. Continous fluids running as ordered. No complaints of pain noted throughout shift. Frequent turning and repositioning done q2hr. Family at bedside.

## 2022-05-06 NOTE — SIGNIFICANT NOTE
05/05/22 1150   Coping/Psychosocial   Observed Emotional State calm;quiet;other (see comments)  (asleep)   Family/Support Persons spouse;son   Involvement in Care no interaction with patient;other (see comments)  (Interacting with the family.)   Additional Documentation Spiritual Care (Group)   Spiritual Care   Use of Spiritual Resources non-Mu-ism use of spiritual care   Spiritual Care Source  initiative   Spiritual Care Follow-Up follow-up, none required as presently assessed   Response to Spiritual Care engaged in conversation;receptive of support;thanks expressed   Spiritual Care Interventions supportive conversation provided   Spiritual Care Visit Type initial   Receptivity to Spiritual Care visit welcomed

## 2022-05-06 NOTE — PROGRESS NOTES
[facility]     Progress Note    Patient Name: Cedric Wade  : 1950  MRN: [id number]  Primary Care Physician:  Ean Farmer MD  Date of admission: 2022    Subjective   Subjective     Chief Complaint: Patient extremely weak his wife states that it is very difficult for him to move around looks like that patient will not be able to get any further treatment does have cardiac issues atrial fibrillation Dr. Bernard trying to stabilize his cardiac status patient is being followed by palliative care has been made DNR and has been referred to hospice    HPI:  Patient reports admitted with extreme weakness brain metastases and extremely weak performance status has rapidly deteriorated has been referred to hospice    Review of Systems   All systems were reviewed and negative except for: Reviewed    Objective   Objective     Vitals:   Temp:  [97.9 °F (36.6 °C)-98.6 °F (37 °C)] 98.1 °F (36.7 °C)  Heart Rate:  [] 152  Resp:  [16-24] 16  BP: ()/(42-72) 76/42  Flow (L/min):  [3] 3    Physical Exam    Constitutional: Awake, alert   Eyes: PERRLA, sclerae anicteric, no conjunctival injection   HENT: NCAT, mucous membranes moist   Neck: Supple, no thyromegaly, no lymphadenopathy, trachea midline   Respiratory: Clear to auscultation bilaterally, nonlabored respirations    Cardiovascular: RRR, no murmurs, rubs, or gallops, palpable pedal pulses bilaterally   Gastrointestinal: Positive bowel sounds, soft, nontender, nondistended   Musculoskeletal: No bilateral ankle edema, no clubbing or cyanosis to extremities   Psychiatric: Appropriate affect, cooperative   Neurologic: Oriented x 3, strength symmetric in all extremities, Cranial Nerves grossly intact to confrontation, speech clear   Skin: No rashes   No change  Result Review    Result Review:  I have personally reviewed the results from the time of this admission to 2022 11:31 EDT and agree with these findings:  []  Laboratory  []   Microbiology  []  Radiology  []  EKG/Telemetry   []  Cardiology/Vascular   []  Pathology  []  Old records  []  Other:  Most notable findings include: Static non-small cell lung cancer    Assessment/Plan   Assessment / Plan     Brief Patient Summary:  Cedric Wade is a 71 y.o. male who patient has not been able to get adequate treatment psych because of extreme weakness performance status has rapidly deteriorated with brain metastases local cancer spread and nonfavorable molecular studies    Active Hospital Problems:  Active Hospital Problems    Diagnosis    • Pneumonia due to infectious organism        Plan:   Has been referred to hospice and probably will be discharged home with the help of hospice    DVT prophylaxis:  No DVT prophylaxis order currently exists.    CODE STATUS:   Medical Intervention Limits: NO intubation (DNI); NO cardioversion; NO antiarrhythmic drugs; NO dialysis  Level Of Support Discussed With: Patient  Code Status (Patient has no pulse and is not breathing): No CPR (Do Not Attempt to Resuscitate)  Medical Interventions (Patient has pulse or is breathing): Limited Support    Disposition:  I expect patient to be discharged patient to be discharged with the help of hospice.    Electronically signed by Patrice Rodriguez MD, 05/06/22, 11:31 AM EDT.

## 2022-05-06 NOTE — PLAN OF CARE
Goal Outcome Evaluation:  Plan of Care Reviewed With: patient, spouse        Progress: no change  Outcome Evaluation: Patient arrived to PCU this afternoon; manual SBP in the 80s. Spoke with Dr. Farmer regarding initiation of Cardizem gtt at 5mg/hr to maintain HR in 110s; stated he is not concerned with BP and OK to start gtt after additional 500cc bolus of NS. VSS being taken every 15minutes. Family at bedside. Yi CARRASQUILLO RN

## 2022-05-07 PROBLEM — R53.1 WEAKNESS: Status: ACTIVE | Noted: 2022-01-01

## 2022-05-07 PROBLEM — I48.91 ATRIAL FIBRILLATION WITH RVR (HCC): Status: ACTIVE | Noted: 2022-01-01

## 2022-05-07 PROBLEM — D64.9 ANEMIA: Status: ACTIVE | Noted: 2022-01-01

## 2022-05-07 NOTE — PLAN OF CARE
Goal Outcome Evaluation:  Plan of Care Reviewed With: patient, spouse        Progress: no change  Outcome Evaluation: VSS this morning.  fluid bolus given last night, and cardizem gtt reduced to 3mg/hr, due to low blood pressure.  pt remains in afib with heart rate in the 70-80's.  family at bedside.  plan d/c home today with hospice.

## 2022-05-07 NOTE — DISCHARGE SUMMARY
[facility]         DISCHARGE SUMMARY    Patient Name: Cedric Wade  : 1950  MRN: [id number]    Date of Admission: 2022  Date of Discharge:  2022  Primary Care Physician: Ean Farmer MD    Consults     Date and Time Order Name Status Description    2022 10:00 PM Inpatient Hematology & Oncology Consult      2022  6:05 PM IP General Consult (Use specialty-specific consult if known)      2022  5:59 PM IP General Consult (Use specialty-specific consult if known)            Presenting Problem:   Weakness [R53.1]  Anemia, unspecified type [D64.9]  Pneumonia due to infectious organism, unspecified laterality, unspecified part of lung [J18.9]  Atrial fibrillation with RVR (HCC) [I48.91]    Active and Resolved Hospital Problems:  Active Hospital Problems    Diagnosis POA   • Weakness [R53.1] Unknown   • Anemia [D64.9] Unknown   • Atrial fibrillation with RVR (HCC) [I48.91] Unknown   • Pneumonia due to infectious organism [J18.9] Yes      Resolved Hospital Problems   No resolved problems to display.         Hospital Course     Hospital Course:  Cedric Wade is a 71 y.o. male     New onset A. fib with RVR, low blood pressures we will transfer to telemetry, start IV Cardizem, continue oral dig, may need IV Lopressor, will give 1 dose of IV fluids for blood pressure support, prognosis guarded, patient received several more doses of bolus fluids 500 cc yesterday evening and last night and early this morning for blood pressure support, he is converted back to sinus rhythm at around 11 PM last night, he is still scheduled to go home today with hospice care he has been having some nausea and vomiting this morning his wife is at bedside and wants to take him home by ambulance, they have met with hospice and I believe everything is arranged we will send in prescriptions today to our local pharmacy here at the hospital for his Roxanol Ativan and  Haldol     Anemia, worsening, again, May 7, 2022 hemoglobin down to 7.7 we will not transfuse any further blood we will try to get him home with comfort measures, -----------status post 1 unit of packed red blood cells with adequate improvement in hemoglobin overnight May 5, 2022, and stable continued improvement May 6, iron levels are adequate,     Generalized weakness from his chemotherapy, recent radiation to the brain, and metastatic lung cancer, palliative care options discussed with family and patient who wants to go home with palliative care and hospice care at this point no further treatment, discussed May 6 and 7,     Metastatic lung cancer with hilar mass, adrenal mass, mesenteric adenopathy, brain lesions, prognosis is poor -----consider palliative care, will discuss with hematology oncology,, discussed with family today, May 6 will transition to comfort measures, continue supportive care     Postobstructive process in the lung from lung cancer ----consider pulmonary consultation although not sure what treatment to offer at this point in time other than possible radiation to this area,?  Stenting,?  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, discussed with radiation oncology, continuing radiation therapy daily while in the hospital for whole brain radiation, patient did receive steroids in the hospital for brain swelling, those have been stopped as of the end of April 2022 -----, Port-A-Cath placement March 10, patient has had a PET scan, recent CT scan of the chest  April, May 2022, progressive disease noted postobstructive processes in the lung noted,     Anxiety,      Essential tremor, continue primidone     Hyperlipidemia----has been on moderate dose statin therapy, okay to stop, April 28, 2022     Hypothyroid continue levothyroxine     Type 2 diabetes, off all medication including insulin as of March and April 2022,     Vitamin D deficiency     Seizure type disorder continues on Keppra due to the brain tumors, no further seizure activity, no tremors of any sort, no hiccups and no vomiting        Prior admission and discharge summary from March 6, 2022:      Hiccups, now with nausea vomiting,  continue Thorazine March 2, 2022, Reglan and Phenergan added March 3, some improvement overall, continue GI prophylaxis also with IV Pepcid,, improved, will send home with some as needed Thorazine, he already has GI prophylactic medications at home     Metastatic brain lesions.,  [facility] biopsy shows adenocarcinoma most likely lung, for whole brain radiation starting March 2, discussed with pulmonology and oncology March 2, also discussed with patient wife at bedside today,, and patient March 3 ------CT scan abdomen pelvis chest reviewed, appreciate Dr. Larry Bliss for second opinion, changed to oral steroids, oral medications March 1 and 2, continues  neurochecks, seizure precautions etc., I am holding his anticoagulation due to the brain lesions and potential need for biopsy in the near future,and GI prophylaxis will be instituted---> cardiology feels we could restart his anticoagulation at this point, may need to dose adjust slightly for bleeding risks etc.,, cardiology's notes state holding Eliquis is okay for now and just continuing aspirin which we will do at time of discharge     A. fib with rapid ventricular response developing after coughing spell morning of March 1,, back in sinus rhythm  since March 1 through March 2, 3,,4, 2022,, ----, was given Cardizem, dig, IV Lopressor  yesterday, morning March 1,, with additional doses by cardiology, did not tolerate Cardizem due to blood pressure issues, not able to take amiodarone due to previous reaction, has now converted, holding diuretics March 1 and 2,-------------in the emergency room, pacemaker was interrogated and felt to be stable at the current rate, backup of 40,, his heart rate was lower in the emergency room into the 40s and 50s at times, appears to be sinus on EKG, initially.  --- echo February 27, 2022 reviewed with moderate global hypokinesis ejection fraction 35% diastolic dysfunction present with mild mitral regurgitation,, will restart carvedilol 3.125 twice a day at time of discharge to help with rate control and afterload reduction in congestive heart failure     Chronic systolic heart failure.  AICD placement.  His EF was 20% then, his last echo was in 5/21 and EF improved to 30%.  Current echo as above, will continue on Aldactone and diuretics.  He had angioedema to ACE inhibitors, not sure about ARB's.        Essential tremors continues on primidone,, currently worse due to steroids etc. breathing treatments etc.     Anxiety depression      Vitamin D deficiency continues replacement     Rheumatoid arthritis has been on Plaquenil will continue low-dose 200 mg daily             Day of Discharge     Patient Vitals for the past 24 hrs:   BP Temp Temp src Pulse Resp SpO2   05/07/22 0934 -- -- -- 91 20 95 %   05/07/22 0815 110/58 -- -- 78 -- --   05/07/22 0800 145/98 -- -- -- -- --   05/07/22 0745 112/64 97.7 °F (36.5 °C) Oral 80 17 97 %   05/07/22 0730 119/48 -- -- 78 -- --   05/07/22 0715 (!) 83/55 -- -- 77 -- --   05/07/22 0700 90/53 -- -- 77 -- --   05/07/22 0600 139/65 -- -- 86 -- --   05/07/22 0545 130/76 -- -- 79 -- --   05/07/22 0530 134/70 -- -- 85 -- --   05/07/22 0515 134/79 -- -- 93 -- --   05/07/22 0500 98/63 -- -- 73 -- --   05/07/22 0445 114/58 -- -- 73 -- --   05/07/22 0430 108/64 -- -- 75 -- --   05/07/22 0415  125/68 -- -- 74 -- --   05/07/22 0400 122/74 -- -- 79 -- --   05/07/22 0345 111/56 -- -- 75 -- --   05/07/22 0330 129/70 -- -- 80 -- --   05/07/22 0315 117/64 -- -- 77 -- --   05/07/22 0312 127/67 98.2 °F (36.8 °C) Axillary 78 17 99 %   05/07/22 0300 110/57 -- -- 72 -- --   05/07/22 0245 109/69 -- -- 77 -- --   05/07/22 0230 122/67 -- -- 76 -- --   05/07/22 0215 109/69 -- -- 77 -- --   05/07/22 0200 106/66 -- -- 76 -- --   05/07/22 0145 108/63 -- -- 74 -- --   05/07/22 0130 105/64 -- -- 77 -- --   05/07/22 0115 98/64 -- -- 76 -- --   05/07/22 0100 100/64 -- -- 74 -- --   05/07/22 0045 101/66 -- -- 75 -- --   05/07/22 0030 106/62 -- -- 75 -- --   05/07/22 0015 96/47 -- -- 75 -- --   05/07/22 0000 (!) 89/54 -- -- 75 -- --   05/06/22 2345 (!) 84/50 -- -- 76 -- --   05/06/22 2330 92/56 -- -- 73 -- --   05/06/22 2315 (!) 84/50 97.9 °F (36.6 °C) Axillary 78 17 98 %   05/06/22 2300 (!) 89/51 -- -- 76 -- --   05/06/22 2245 (!) 69/49 -- -- 103 -- --   05/06/22 2230 (!) 70/50 -- -- 102 -- --   05/06/22 2215 (!) 77/38 -- -- 102 -- --   05/06/22 2200 (!) 78/43 -- -- -- -- --   05/06/22 2130 (!) 77/54 -- -- (!) 123 -- --   05/06/22 2100 (!) 89/57 -- -- (!) 121 -- --   05/06/22 2038 -- -- -- (!) 131 18 92 %   05/06/22 2019 96/65 98.1 °F (36.7 °C) Axillary (!) 124 18 96 %   05/06/22 1900 94/60 -- -- 99 -- --   05/06/22 1830 (!) 83/68 -- -- (!) 149 -- --   05/06/22 1815 (!) 76/40 -- -- 102 -- --   05/06/22 1800 (!) 78/49 -- -- 106 -- --   05/06/22 1745 (!) 78/52 -- -- 111 -- --   05/06/22 1730 110/66 -- -- (!) 133 -- --   05/06/22 1715 94/68 -- -- (!) 121 -- --   05/06/22 1700 (!) 81/60 -- -- 97 -- --   05/06/22 1645 90/58 -- -- 116 -- --   05/06/22 1632 (!) 81/60 -- -- 119 -- --   05/06/22 1615 98/58 -- -- -- -- --   05/06/22 1500 -- -- -- 115 -- --   05/06/22 1458 90/60 98.3 °F (36.8 °C) Oral -- 16 98 %   05/06/22 1320 -- -- -- (!) 152 -- --   05/06/22 1311 (!) 84/55 98.6 °F (37 °C) Oral (!) 142 16 100 %   05/06/22 1224 (!) 73/57  -- -- (!) 150 -- --   05/06/22 1100 (!) 76/42 98.1 °F (36.7 °C) Oral (!) 152 16 100 %        Physical Exam:  Patient is generally weak appearing he is nauseated he is gagging he is coughing he is vomiting during my exam his lungs reveal diminished breath sounds, cardiac exam regular rhythm distant heart tones abdomen soft nondistended lower extremities no edema he is generally ill-appearing,      Pertinent  and/or Most Recent Results     LAB RESULTS:      Lab 05/07/22  0436 05/06/22  0512 05/05/22  0536 05/04/22  1851 05/04/22  1704   WBC 3.91 5.61 4.11  --  4.03   HEMOGLOBIN 7.7* 9.2* 8.8*  --  7.7*   HEMATOCRIT 23.4* 28.5* 26.7*  --  23.4*   PLATELETS 324 324 271  --  239   NEUTROS ABS 2.35 3.68 2.43  --  2.20   IMMATURE GRANS (ABS) 0.04 0.05 0.05  --  0.02   LYMPHS ABS 0.99 0.92 1.10  --  1.14   MONOS ABS 0.48 0.92* 0.48  --  0.63   EOS ABS 0.04 0.03 0.03  --  0.03   MCV 99.6* 100.7* 99.3*  --  102.6*   PROCALCITONIN  --   --  0.10  --   --    LACTATE  --   --  0.8 0.6  --          Lab 05/07/22  0436 05/06/22  0512 05/05/22  0536 05/04/22  1704   SODIUM 138 136 139 137   POTASSIUM 4.2 4.0 4.1 3.8   CHLORIDE 107 101 103 101   CO2 24.0 17.2* 20.7* 25.9   ANION GAP 7.0 17.8* 15.3* 10.1   BUN 14 19 20 26*   CREATININE 0.72* 1.09 0.80 0.82   EGFR 97.1 72.1 94.0 93.3   GLUCOSE 138* 148* 83 67   CALCIUM 7.8* 8.5* 8.5* 8.9   MAGNESIUM 1.8 1.8 1.8 1.9   PHOSPHORUS  --   --  2.6  --    TSH  --   --  0.512  0.492  --          Lab 05/06/22  0512 05/05/22  0536 05/04/22  1704   TOTAL PROTEIN 6.5 7.0 7.6   ALBUMIN 3.10* 3.30* 3.70   GLOBULIN 3.4 3.7 3.9   ALT (SGPT) 21 17 19   AST (SGOT) 31 29 26   BILIRUBIN 0.2 0.2 0.2   ALK PHOS 125* 135* 142*         Lab 05/04/22  1704   PROBNP 880.5   TROPONIN T <0.010             Lab 05/06/22  0512 05/04/22  1950   IRON 64  --    IRON SATURATION 32  --    TIBC 198*  --    TRANSFERRIN 133*  --    ABO TYPING  --  A   RH TYPING  --  Positive   ANTIBODY SCREEN  --  Negative         Brief  Urine Lab Results  (Last result in the past 365 days)      Color   Clarity   Blood   Leuk Est   Nitrite   Protein   CREAT   Urine HCG        02/25/22 1336 Yellow   Clear   Negative   Negative   Negative   Negative               Microbiology Results (last 10 days)     Procedure Component Value - Date/Time    Blood Culture - Blood, Arm, Left [[id number]]  (Normal) Collected: 05/04/22 1851    Lab Status: Preliminary result Specimen: Blood from Arm, Left Updated: 05/06/22 1916     Blood Culture No growth at 2 days    Blood Culture - Blood, Arm, Right [[id number]]  (Normal) Collected: 05/04/22 1851    Lab Status: Preliminary result Specimen: Blood from Arm, Right Updated: 05/06/22 1917     Blood Culture No growth at 2 days          XR Chest 1 View    Result Date: 5/4/2022  Impression:  Increased left upper lobe infiltrate concerning for increasing postobstructive pneumonia in this patient with known adjacent perihilar mass       JUMA QUARLES MD       Electronically Signed and Approved By: JUMA QUARLES MD on 5/04/2022 at 15:02             CT Chest Low Dose Cancer Screening WO    Result Date: 4/26/2022  Impression:   1. Interval progression of disease with increase in left hilar mass with obstruction of the left upper lobe anterior and apical posterior segmental bronchi.  There has been interval development of postobstructive pneumonia in the left upper lobe with endobronchial pneumonia in the left lower lobe as well. 2. Interval increase in mediastinal adenopathy 3. Redemonstration of adrenal metastatic lesion 4. Lung rads category 4x      BHARAT THOMPSON MD       Electronically Signed and Approved By: BHARAT THOMPSON MD on 4/26/2022 at 16:41               Results for orders placed during the hospital encounter of 09/06/19    Carotid Duplex - Bilateral    Interpretation Summary  · Proximal right internal carotid artery mild stenosis.  · Proximal left internal carotid artery mild stenosis.  · Mid right internal carotid artery is  normal.  · Distal right internal carotid artery is normal.  · All other right side carotid system vessels are normal.  · Mid left internal carotid artery is normal.  · Distal left internal carotid artery is normal.  · All other left side carotid system vessels are normal.      Results for orders placed during the hospital encounter of 09/06/19    Carotid Duplex - Bilateral    Interpretation Summary  · Proximal right internal carotid artery mild stenosis.  · Proximal left internal carotid artery mild stenosis.  · Mid right internal carotid artery is normal.  · Distal right internal carotid artery is normal.  · All other right side carotid system vessels are normal.  · Mid left internal carotid artery is normal.  · Distal left internal carotid artery is normal.  · All other left side carotid system vessels are normal.      Results for orders placed during the hospital encounter of 02/25/22    Adult Transthoracic Echo Complete W/ Cont if Necessary Per Protocol    Interpretation Summary  · There is moderate global hypokinesis left ventricle with estimated LV ejection fraction of 35%  · Left ventricular diastolic function is consistent with (grade I) impaired relaxation.  · There is mild mitral regurgitation.  · Unable to calculate pulmonary systolic pressure due to incomplete TR Doppler envelope.      Labs Pending at Discharge:  Pending Labs     Order Current Status    ACTH In process    Blood Culture - Blood, Arm, Left Preliminary result    Blood Culture - Blood, Arm, Right Preliminary result          Imaging Results (All)     Procedure Component Value Units Date/Time    XR Chest 1 View [[id number]] Collected: 05/04/22 1502     Updated: 05/04/22 1505    Narrative:      PROCEDURE: XR CHEST 1 VW     COMPARISON: [facility], CT, CT CHEST LOW DOSE CANCER SCREENING WO, 4/26/2022, 14:47.    [facility], CR, XR CHEST 1 VW, 3/10/2022, 15:24.     INDICATIONS: Weak/Dizzy/AMS triage protocol      FINDINGS:   Left upper lobe infiltrate has increased.  No pneumothorax or pleural effusion.  No acute findings   elsewhere.  Mediastinum appears unchanged.       Impression:       Increased left upper lobe infiltrate concerning for increasing postobstructive   pneumonia in this patient with known adjacent perihilar mass                  JUMA QUARLES MD         Electronically Signed and Approved By: JUMA QUARLES MD on 5/04/2022 at 15:02                          Discharge Details        Discharge Medications      New Medications      Instructions Start Date   haloperidol 1 MG tablet  Commonly known as: HALDOL   1 mg, Oral, 3 Times Daily PRN      LORazepam 2 MG/ML concentrated solution  Commonly known as: ATIVAN   1 mg, Oral, Every 6 Hours PRN      morphine 20 MG/ML concentrated solution   5 mg, Oral, Every 2 Hours PRN      promethazine 12.5 MG tablet  Commonly known as: PHENERGAN   12.5 mg, Oral, Every 6 Hours PRN         Changes to Medications      Instructions Start Date   benzonatate 100 MG capsule  Commonly known as: TESSALON  What changed:   · when to take this  · reasons to take this   TAKE 1 CAPSULE THREE TIMES A DAY      budesonide 0.5 MG/2ML nebulizer solution  Commonly known as: PULMICORT  What changed: See the new instructions.   INHALE 2 ML BY NEBULIZATION TWICE A DAY      digoxin 125 MCG tablet  Commonly known as: LANOXIN  What changed:   · how much to take  · how to take this  · when to take this   take 1 tablet by mouth once daily      montelukast 10 MG tablet  Commonly known as: SINGULAIR  What changed: when to take this   TAKE 1 TABLET ONCE DAILY      ProAir  (90 Base) MCG/ACT inhaler  Generic drug: albuterol sulfate HFA  What changed: how to take this   USE 2 INHALATIONS EVERY 4 HOURS AS NEEDED         Continue These Medications      Instructions Start Date   aspirin 81 MG chewable tablet   81 mg, Oral, Daily      Azelastine-Fluticasone 137-50 MCG/ACT suspension   1 spray, Nasal, 2 times  daily      carvedilol 3.125 MG tablet  Commonly known as: Coreg   3.125 mg, Oral, 2 Times Daily With Meals      cetirizine 10 MG tablet  Commonly known as: zyrTEC   10 mg, Oral, Daily      dronabinol 2.5 MG capsule  Commonly known as: Marinol   2.5 mg, Oral, 2 Times Daily Before Meals      DULoxetine 30 MG capsule  Commonly known as: Cymbalta   30 mg, Oral, Daily      folic acid 1 MG tablet  Commonly known as: FOLVITE   TAKE 1 TABLET DAILY      HYDROcodone-acetaminophen 7.5-325 MG per tablet  Commonly known as: Norco   1 tablet, Oral, Every 6 Hours PRN      levETIRAcetam 500 MG tablet  Commonly known as: Keppra   500 mg, Oral, 2 Times Daily      levothyroxine 75 MCG tablet  Commonly known as: SYNTHROID, LEVOTHROID   TAKE 1 TABLET ONCE DAILY      ondansetron ODT 8 MG disintegrating tablet  Commonly known as: ZOFRAN-ODT   8 mg, Translingual, Every 8 Hours PRN      pantoprazole 40 MG EC tablet  Commonly known as: PROTONIX   40 mg, Oral, Daily      sertraline 25 MG tablet  Commonly known as: ZOLOFT   25 mg, Oral, Daily      sotalol 120 MG tablet  Commonly known as: BETAPACE   60 mg, Oral, 2 Times Daily      Stiolto Respimat 2.5-2.5 MCG/ACT aerosol solution inhaler  Generic drug: tiotropium bromide-olodaterol   2 puffs, Inhalation, Daily - RT         Stop These Medications    atorvastatin 40 MG tablet  Commonly known as: LIPITOR     bumetanide 0.5 MG tablet  Commonly known as: BUMEX     Eliquis 5 MG tablet tablet  Generic drug: apixaban     Entresto 24-26 MG tablet  Generic drug: sacubitril-valsartan     glipizide 5 MG tablet  Commonly known as: GLUCOTROL     primidone 250 MG tablet  Commonly known as: MYSOLINE     vitamin D 1.25 MG (53197 UT) capsule capsule  Commonly known as: ERGOCALCIFEROL            Allergies   Allergen Reactions   • Amiodarone Unknown - High Severity   • Lisinopril Swelling   • Codeine Nausea And Vomiting         Discharge Disposition:  Hospice/Home    Diet:  Diet Instructions     Diet: Regular       Discharge Diet: Regular            Discharge Activity:   Activity Instructions     Activity as Tolerated              CODE STATUS:  Code Status and Medical Interventions:   Ordered at: 05/05/22 1217     Medical Intervention Limits:    NO intubation (DNI)    NO cardioversion    NO antiarrhythmic drugs    NO dialysis     Level Of Support Discussed With:    Patient     Code Status (Patient has no pulse and is not breathing):    No CPR (Do Not Attempt to Resuscitate)     Medical Interventions (Patient has pulse or is breathing):    Limited Support         Future Appointments   Date Time Provider Department Center   5/9/2022  1:30 PM Matt Sarkar MD [facility] RO [facility]   6/1/2022  2:30 PM Ankur Alcantara MD [facility] PCC ETW [facility]   6/28/2022  3:15 PM Ean Farmer MD [facility] PC [facility]   7/13/2022  2:00 PM Mono Tdiwell MD [facility] CD [facility]       Additional Instructions for the Follow-ups that You Need to Schedule     Discharge Follow-up with PCP   As directed       Currently Documented PCP:    Ean Farmer MD    PCP Phone Number:    [phone]     Follow Up Details: followup in 7-10 days               Time spent on Discharge including face to face service:  35    minutes    Electronically signed by Ean Farmer MD, 05/07/22, 10:34 AM EDT.

## 2022-05-07 NOTE — PROGRESS NOTES
[facility]     Progress Note    Patient Name: Cedric Wade  : 1950  MRN: [id number]  Primary Care Physician:  Ean Farmer MD  Date of admission: 2022    Subjective   Subjective     Chief Complaint: Patient has metastatic non-small cell lung cancer with metastases to the brain has received 2 cycles of chemotherapy but X he is too weak his performance status is too low to get further chemotherapy treatments family has decided to go with the hospice which is appropriate    HPI:  Patient reports extreme weakness and performance status is very low    Review of Systems   All systems were reviewed and negative except for: Reviewed    Objective   Objective     Vitals:   Temp:  [97.7 °F (36.5 °C)-98.6 °F (37 °C)] 97.7 °F (36.5 °C)  Heart Rate:  [] 91  Resp:  [16-20] 20  BP: ()/(38-98) 110/58  Flow (L/min):  [3] 3    Physical Exam    Constitutional: Awake, alert   Eyes: PERRLA, sclerae anicteric, no conjunctival injection   HENT: NCAT, mucous membranes moist   Neck: Supple, no thyromegaly, no lymphadenopathy, trachea midline   Respiratory: Clear to auscultation bilaterally, nonlabored respirations    Cardiovascular: RRR, no murmurs, rubs, or gallops, palpable pedal pulses bilaterally   Gastrointestinal: Positive bowel sounds, soft, nontender, nondistended   Musculoskeletal: No bilateral ankle edema, no clubbing or cyanosis to extremities   Psychiatric: Appropriate affect, cooperative   Neurologic: Oriented x 3, strength symmetric in all extremities, Cranial Nerves grossly intact to confrontation, speech clear   Skin: No rashes   No change  Result Review    Result Review:  I have personally reviewed the results from the time of this admission to 2022 11:34 EDT and agree with these findings:  []  Laboratory  []  Microbiology  []  Radiology  []  EKG/Telemetry   []  Cardiology/Vascular   []  Pathology  []  Old records  []  Other:  Most notable findings include: Anemia static   non-small cell lung cancer    Assessment/Plan   Assessment / Plan     Brief Patient Summary:  Cedric Wade is a 71 y.o. male who sent to be discharged home with the help of hospice    Active Hospital Problems:  Active Hospital Problems    Diagnosis    • Weakness    • Anemia    • Atrial fibrillation with RVR (HCC)    • Pneumonia due to infectious organism        Plan:   Discharge home with the help of hospice    DVT prophylaxis:  No DVT prophylaxis order currently exists.    CODE STATUS:   Medical Intervention Limits: NO intubation (DNI); NO cardioversion; NO antiarrhythmic drugs; NO dialysis  Level Of Support Discussed With: Patient  Code Status (Patient has no pulse and is not breathing): No CPR (Do Not Attempt to Resuscitate)  Medical Interventions (Patient has pulse or is breathing): Limited Support    Disposition:  I expect patient to be discharged being discharged today.    Electronically signed by Patrice Rodriguez MD, 05/07/22, 11:34 AM EDT.

## 2022-05-09 NOTE — TELEPHONE ENCOUNTER
Caller: ELSIE PEÑA    Relationship to patient: Emergency Contact    Best call back number: [phone]    Patient is needing: PATIENT'S WIFE CALLED IN RETURNING A CALL TO THIS OFFICE. WARM TRANSFER UNSUCCESSFUL. PLEASE CALL AND ADVISE.

## 2022-05-26 ENCOUNTER — TELEPHONE (OUTPATIENT)
Dept: INTERNAL MEDICINE | Facility: CLINIC | Age: 72
End: 2022-05-26

## 2022-05-26 NOTE — TELEPHONE ENCOUNTER
Caller: SYDNEY    Relationship to patient: [facility]  HOME    Best call back number: [phone]    Patient is needing:SYDNEY STATED THAT THE DEATH CERTIFICATE HAS BEEN ELECTRONICALLY SENT AND THEY REQUEST THAT BE SIGNED AND SUBMITTED TO [facility]

## 2022-06-06 ENCOUNTER — TELEPHONE (OUTPATIENT)
Dept: INTERNAL MEDICINE | Facility: CLINIC | Age: 72
End: 2022-06-06

## 2022-06-06 NOTE — TELEPHONE ENCOUNTER
SYDNEY FROM [facility] CALLED. THEY ARE NEEDING DR. CUMMINGS TO SIGN THE DEATH CERTIFICATE.      SYDNEY # [phone]

## 2022-06-06 NOTE — TELEPHONE ENCOUNTER
I did just summaries last week I did not see it in the death summaries is it a new death summary if so find out if its in the area for me to sign

## 2022-06-08 NOTE — TELEPHONE ENCOUNTER
This was signed by Dr. Ozzy Farmer last night and sent back to [employer]. I confirmed with Dr. Preciado and the  home.

## 2024-03-14 NOTE — PROGRESS NOTES
Patient blood sugar at home 103.  Took bs after exercise and had dropped to 56.  Patient fueling up with orange juice x 2, then followed with peanut butter and crackers.  Will katheryn bs.  BS 83.  Had 4 pieces candy.  Rechecked bs after 15 min and it was 109.  Otherwise, tolerated session well.   14-Mar-2024 19:14

## (undated) DEVICE — SLV SCD KN/LEN ADJ EXPRSS BLENDED MD 1P/U

## (undated) DEVICE — VASOVIEW HEMOPRO: Brand: VASOVIEW HEMOPRO

## (undated) DEVICE — SINGLE USE BIOPSY VALVE MAJ-210: Brand: SINGLE USE BIOPSY VALVE (STERILE)

## (undated) DEVICE — Device: Brand: BALLOON

## (undated) DEVICE — SINGLE USE SUCTION VALVE MAJ-209: Brand: SINGLE USE SUCTION VALVE (STERILE)

## (undated) DEVICE — SENSR CERBRL O2 PK/2

## (undated) DEVICE — SYR LUER SLPTP 50ML

## (undated) DEVICE — BNDG ELAS ELITE V/CLOSE 4IN 5YD LF STRL

## (undated) DEVICE — DISPOSABLE CYTOLOGY BRUSH: Brand: DISPOSABLE CYTOLOGY BRUSH

## (undated) DEVICE — PK PERFUS CUST W/CARDIOPLEGIA

## (undated) DEVICE — SOL ISO/ALC RUB 70PCT 4OZ

## (undated) DEVICE — ANTIBACTERIAL UNDYED BRAIDED (POLYGLACTIN 910), SYNTHETIC ABSORBABLE SUTURE: Brand: COATED VICRYL

## (undated) DEVICE — PENCL E/S SMOKEEVAC W/TELESCP CANN

## (undated) DEVICE — ROTATING SURGICAL PUNCHES, 1 PER POUCH: Brand: A&E MEDICAL / ROTATING SURGICAL PUNCHES

## (undated) DEVICE — CUP SPECI 4OZ LF STRL

## (undated) DEVICE — CANN IRR VEN BVL TP

## (undated) DEVICE — CANN RETRGR STYLET RSCP 15F

## (undated) DEVICE — BRONCH KIT: Brand: MEDLINE INDUSTRIES, INC.

## (undated) DEVICE — DECANTER: Brand: UNBRANDED

## (undated) DEVICE — CANN VESL FREE FLO 2MM

## (undated) DEVICE — SPNG DISECTOR KTNER XRAY COTN 1/4X9/16IN PK/5

## (undated) DEVICE — TRAP,MUCUS SPECIMEN,40CC: Brand: MEDLINE

## (undated) DEVICE — BLOWER/MISTER AXIOUS OPCAB W/TBG

## (undated) DEVICE — SYR LUERLOK 5CC

## (undated) DEVICE — ST TOURNI COMPL A/ 7IN

## (undated) DEVICE — BNDG ELAS ELITE V/CLOSE 6IN 5YD LF STRL

## (undated) DEVICE — STPCK 1WY SPINLOCK FML LL NONDEHP LF .20ML

## (undated) DEVICE — Device

## (undated) DEVICE — PK ATS CUST W CARDIOTOMY RESEVOIR

## (undated) DEVICE — GLV SURG BIOGEL LTX PF 8

## (undated) DEVICE — STERILE POLYISOPRENE POWDER-FREE SURGICAL GLOVES WITH EMOLLIENT COATING: Brand: PROTEXIS

## (undated) DEVICE — GOWN,REINFRCE,POLY,SIRUS,BREATH SLV,XXLG: Brand: MEDLINE

## (undated) DEVICE — SEALANT HEMO SURG COSEAL PREMIX 8ML

## (undated) DEVICE — DRP SLUSH MACH FOR STND ALONE OM-ORS-321

## (undated) DEVICE — CATH IV INSYTE AUTOGARD SHLD 20G 1.88IN

## (undated) DEVICE — INSUFFLATION TUBING,LAPAROSCOPIC: Brand: DEROYAL

## (undated) DEVICE — DISPOSABLE BIOPSY FORCEPS: Brand: DISPOSABLE BIOPSY FORCEPS

## (undated) DEVICE — PK HEART OPN 40

## (undated) DEVICE — CVR PROB 96IN LF STRL

## (undated) DEVICE — 28 FR STRAIGHT – SOFT PVC CATHETER: Brand: PVC THORACIC CATHETERS

## (undated) DEVICE — CORONARY ARTERY BYPASS GRAFT MARKERS, STAINLESS STEEL, DISTAL, WITHOUT HOLDER: Brand: ANASTOMARK CORONARY ARTERY BYPASS GRAFT MARKERS, STAINLESS STEEL, DISTAL

## (undated) DEVICE — 8 FOOT DISPOSABLE EXTENSION CABLE WITH SAFE CONNECT / ALLIGATOR CLIP

## (undated) DEVICE — ST NDL BRONCH ASP VIZISHOT 2 FLX 19GA

## (undated) DEVICE — VASCULAR ACCESS-LF: Brand: MEDLINE INDUSTRIES, INC.

## (undated) DEVICE — ST. SORBAVIEW ULTIMATE IJ SYSTEM A,C: Brand: CENTURION

## (undated) DEVICE — SUT VIC 3/0 SH 27IN J416H

## (undated) DEVICE — VESSEL LOOPS X-RAY DETECTABLE: Brand: DEROYAL

## (undated) DEVICE — ST PERFUS M/

## (undated) DEVICE — GLV SURG SENSICARE PI ORTHO SZ8 LF STRL

## (undated) DEVICE — DEV ATOMIZATION MUCOSAL/NASALTRACH

## (undated) DEVICE — 3M™ MEDIPORE™ H SOFT CLOTH SURGICAL TAPE 2862, 2 INCH X 10 YARD (5CM X 9,1M), 12 ROLLS/CASE: Brand: 3M™ MEDIPORE™

## (undated) DEVICE — OPTIFOAM GENTLE SA, POSTOP, 4X12: Brand: MEDLINE

## (undated) DEVICE — MARKR SKIN W/RULR AND LBL

## (undated) DEVICE — PDS II VLT 0 107CM AG ST3: Brand: ENDOLOOP

## (undated) DEVICE — CANN AORT ROOT DLP VNT 14G 7F

## (undated) DEVICE — CANN ART DLP 1PC EDPA A/ 20F

## (undated) DEVICE — TB SXN DLP RIGD MACROSUCKER 6F 3IN

## (undated) DEVICE — INTENDED FOR TISSUE SEPARATION, AND OTHER PROCEDURES THAT REQUIRE A SHARP SURGICAL BLADE TO PUNCTURE OR CUT.: Brand: BARD-PARKER ® CARBON RIB-BACK BLADES

## (undated) DEVICE — SUT PROLN 2/0 CT2 30IN 8411H

## (undated) DEVICE — SPNG GZ WOVN 4X4IN 12PLY 10/BX STRL

## (undated) DEVICE — TBG ART PRESS 60 IN

## (undated) DEVICE — NDL PERC 1PRT THNWALL W/BASEPLT 18G 7CM

## (undated) DEVICE — HEMOCONCENTRATOR PERFUS LPS06

## (undated) DEVICE — SCANLAN® VASCU-STATT® II SINGLE-USE BULLDOG CLAMP W/FIRMER CLAMPING PRESS - MIDI ANGLED 45° (YELLOW), CLAMPING PRESSURE 75-80 G (2/STERILE PKG): Brand: SCANLAN® VASCU-STATT® II SINGLE-USE BULLDOG CLAMP W/FIRMER CLAMPING PRESS

## (undated) DEVICE — CLAMP INSERT: Brand: STEALTH® CLAMP INSERT

## (undated) DEVICE — ADHS SKIN DERMABOND TOP ADVANCED

## (undated) DEVICE — GOWN,NON-REINFORCED,SIRUS,SET IN SLV,XXL: Brand: MEDLINE

## (undated) DEVICE — SYS PERFUS SEP PLATLT W TIPS CUST

## (undated) DEVICE — DRSNG SURESITE WNDW 2.38X2.75

## (undated) DEVICE — BIOPATCH™ ANTIMICROBIAL DRESSING WITH CHLORHEXIDINE GLUCONATE IS A HYDROPHILLIC POLYURETHANE ABSORPTIVE FOAM WITH CHLORHEXIDINE GLUCONATE (CHG) WHICH INHIBITS BACTERIAL GROWTH UNDER THE DRESSING. THE DRESSING IS INTENDED TO BE USED TO ABSORB EXUDATE, COVER A WOUND CAUSED BY VASCULAR AND NONVASCULAR PERCUTANEOUS MEDICAL DEVICES DURING SURGERY, AS WELL AS REDUCE LOCAL INFECTION AND COLONIZATION OF MICROORGANISMS.: Brand: BIOPATCH

## (undated) DEVICE — TEMP PACING WIRE: Brand: MYO/WIRE

## (undated) DEVICE — OASIS DRAIN, SINGLE, INLINE & ATS COMPATIBLE: Brand: OASIS

## (undated) DEVICE — ACCESSRAIL PLATFORM (STANDARD BLADE): Brand: ACCESSRAIL PLATFORM (STANDARD BLADE)